# Patient Record
Sex: FEMALE | Race: WHITE | NOT HISPANIC OR LATINO | Employment: OTHER | URBAN - METROPOLITAN AREA
[De-identification: names, ages, dates, MRNs, and addresses within clinical notes are randomized per-mention and may not be internally consistent; named-entity substitution may affect disease eponyms.]

---

## 2017-03-13 ENCOUNTER — HISTORICAL (OUTPATIENT)
Dept: INTERNAL MEDICINE | Facility: CLINIC | Age: 62
End: 2017-03-13

## 2017-03-13 ENCOUNTER — HISTORICAL (OUTPATIENT)
Dept: CARDIOLOGY | Facility: HOSPITAL | Age: 62
End: 2017-03-13

## 2017-03-23 ENCOUNTER — HISTORICAL (OUTPATIENT)
Dept: INTERNAL MEDICINE | Facility: CLINIC | Age: 62
End: 2017-03-23

## 2017-04-11 ENCOUNTER — HISTORICAL (OUTPATIENT)
Dept: CARDIOLOGY | Facility: HOSPITAL | Age: 62
End: 2017-04-11

## 2017-04-18 ENCOUNTER — HISTORICAL (OUTPATIENT)
Dept: CARDIOLOGY | Facility: HOSPITAL | Age: 62
End: 2017-04-18

## 2017-06-08 ENCOUNTER — HISTORICAL (OUTPATIENT)
Dept: RESPIRATORY THERAPY | Facility: HOSPITAL | Age: 62
End: 2017-06-08

## 2017-07-27 ENCOUNTER — HISTORICAL (OUTPATIENT)
Dept: ADMINISTRATIVE | Facility: HOSPITAL | Age: 62
End: 2017-07-27

## 2017-07-27 LAB
ALBUMIN SERPL-MCNC: 3.2 GM/DL (ref 3.4–5)
ALBUMIN/GLOB SERPL: 1 RATIO (ref 1–2)
ALP SERPL-CCNC: 111 UNIT/L (ref 45–117)
ALT SERPL-CCNC: 20 UNIT/L (ref 12–78)
AST SERPL-CCNC: 13 UNIT/L (ref 15–37)
BILIRUB SERPL-MCNC: 0.5 MG/DL (ref 0.2–1)
BILIRUBIN DIRECT+TOT PNL SERPL-MCNC: 0.2 MG/DL
BILIRUBIN DIRECT+TOT PNL SERPL-MCNC: 0.3 MG/DL
BUN SERPL-MCNC: 10 MG/DL (ref 7–18)
CALCIUM SERPL-MCNC: 8.7 MG/DL (ref 8.5–10.1)
CHLORIDE SERPL-SCNC: 106 MMOL/L (ref 98–107)
CHOLEST SERPL-MCNC: 139 MG/DL
CHOLEST/HDLC SERPL: 2.6 {RATIO} (ref 0–4.4)
CO2 SERPL-SCNC: 30 MMOL/L (ref 21–32)
CREAT SERPL-MCNC: 0.7 MG/DL (ref 0.6–1.3)
GLOBULIN SER-MCNC: 3.3 GM/ML (ref 2.3–3.5)
GLUCOSE SERPL-MCNC: 94 MG/DL (ref 74–106)
HDLC SERPL-MCNC: 54 MG/DL
HIV 1+2 AB+HIV1 P24 AG SERPL QL IA: NONREACTIVE
LDLC SERPL CALC-MCNC: 64 MG/DL (ref 0–130)
POTASSIUM SERPL-SCNC: 4 MMOL/L (ref 3.5–5.1)
PROT SERPL-MCNC: 6.5 GM/DL (ref 6.4–8.2)
SODIUM SERPL-SCNC: 142 MMOL/L (ref 136–145)
TRIGL SERPL-MCNC: 104 MG/DL
VLDLC SERPL CALC-MCNC: 21 MG/DL

## 2018-02-14 ENCOUNTER — HISTORICAL (OUTPATIENT)
Dept: ADMINISTRATIVE | Facility: HOSPITAL | Age: 63
End: 2018-02-14

## 2018-02-14 LAB
ALBUMIN SERPL-MCNC: 3.6 GM/DL (ref 3.4–5)
ALBUMIN/GLOB SERPL: 1 RATIO (ref 1–2)
ALP SERPL-CCNC: 154 UNIT/L (ref 45–117)
ALT SERPL-CCNC: 25 UNIT/L (ref 12–78)
AST SERPL-CCNC: 15 UNIT/L (ref 15–37)
BILIRUB SERPL-MCNC: 0.3 MG/DL (ref 0.2–1)
BILIRUBIN DIRECT+TOT PNL SERPL-MCNC: 0.1 MG/DL
BILIRUBIN DIRECT+TOT PNL SERPL-MCNC: 0.2 MG/DL
BUN SERPL-MCNC: 12 MG/DL (ref 7–18)
CALCIUM SERPL-MCNC: 8.7 MG/DL (ref 8.5–10.1)
CHLORIDE SERPL-SCNC: 106 MMOL/L (ref 98–107)
CO2 SERPL-SCNC: 29 MMOL/L (ref 21–32)
CREAT SERPL-MCNC: 0.9 MG/DL (ref 0.6–1.3)
FOLATE SERPL-MCNC: 6.7 NG/ML (ref 3.1–17.5)
GLOBULIN SER-MCNC: 4 GM/ML (ref 2.3–3.5)
GLUCOSE SERPL-MCNC: 104 MG/DL (ref 74–106)
POTASSIUM SERPL-SCNC: 3.9 MMOL/L (ref 3.5–5.1)
PROT SERPL-MCNC: 7.6 GM/DL (ref 6.4–8.2)
SODIUM SERPL-SCNC: 140 MMOL/L (ref 136–145)
T4 FREE SERPL-MCNC: 1.39 NG/DL (ref 0.76–1.46)
TSH SERPL-ACNC: 0.08 MIU/L (ref 0.36–3.74)
VIT B12 SERPL-MCNC: 371 PG/ML (ref 193–986)

## 2018-03-13 ENCOUNTER — HISTORICAL (OUTPATIENT)
Dept: RADIOLOGY | Facility: HOSPITAL | Age: 63
End: 2018-03-13

## 2018-03-29 ENCOUNTER — HISTORICAL (OUTPATIENT)
Dept: INTERNAL MEDICINE | Facility: CLINIC | Age: 63
End: 2018-03-29

## 2018-05-03 ENCOUNTER — HISTORICAL (OUTPATIENT)
Dept: INTERNAL MEDICINE | Facility: CLINIC | Age: 63
End: 2018-05-03

## 2018-05-03 LAB
T4 FREE SERPL-MCNC: 0.94 NG/DL (ref 0.76–1.46)
TSH SERPL-ACNC: 0.42 MIU/L (ref 0.36–3.74)

## 2018-05-10 ENCOUNTER — HISTORICAL (OUTPATIENT)
Dept: ADMINISTRATIVE | Facility: HOSPITAL | Age: 63
End: 2018-05-10

## 2018-05-24 ENCOUNTER — HISTORICAL (OUTPATIENT)
Dept: INTERNAL MEDICINE | Facility: CLINIC | Age: 63
End: 2018-05-24

## 2018-06-05 ENCOUNTER — HISTORICAL (OUTPATIENT)
Dept: INTERNAL MEDICINE | Facility: CLINIC | Age: 63
End: 2018-06-05

## 2018-08-09 ENCOUNTER — HISTORICAL (OUTPATIENT)
Dept: ADMINISTRATIVE | Facility: HOSPITAL | Age: 63
End: 2018-08-09

## 2018-08-09 LAB
CHOLEST SERPL-MCNC: 211 MG/DL
CHOLEST/HDLC SERPL: 3.5 {RATIO} (ref 0–4.4)
HDLC SERPL-MCNC: 61 MG/DL
LDLC SERPL CALC-MCNC: 105 MG/DL (ref 0–130)
T3FREE SERPL-MCNC: 1.96 PG/ML (ref 2.18–3.98)
T4 FREE SERPL-MCNC: 0.67 NG/DL (ref 0.76–1.46)
TRIGL SERPL-MCNC: 224 MG/DL
TSH SERPL-ACNC: 14.8 MIU/L (ref 0.36–3.74)
VLDLC SERPL CALC-MCNC: 45 MG/DL

## 2018-08-28 ENCOUNTER — HISTORICAL (OUTPATIENT)
Dept: INTERNAL MEDICINE | Facility: CLINIC | Age: 63
End: 2018-08-28

## 2018-08-28 LAB
T3FREE SERPL-MCNC: 2.9 PG/ML (ref 2.18–3.98)
T4 FREE SERPL-MCNC: 1.1 NG/DL (ref 0.76–1.46)
TSH SERPL-ACNC: 2.13 MIU/L (ref 0.36–3.74)

## 2018-09-18 ENCOUNTER — HISTORICAL (OUTPATIENT)
Dept: ADMINISTRATIVE | Facility: HOSPITAL | Age: 63
End: 2018-09-18

## 2018-09-18 ENCOUNTER — HISTORICAL (OUTPATIENT)
Dept: RADIOLOGY | Facility: HOSPITAL | Age: 63
End: 2018-09-18

## 2018-09-18 LAB
BUN SERPL-MCNC: 9 MG/DL (ref 7–18)
CALCIUM SERPL-MCNC: 8.8 MG/DL (ref 8.5–10.1)
CHLORIDE SERPL-SCNC: 104 MMOL/L (ref 98–107)
CO2 SERPL-SCNC: 30 MMOL/L (ref 21–32)
CREAT SERPL-MCNC: 0.8 MG/DL (ref 0.6–1.3)
CREAT/UREA NIT SERPL: 11
GLUCOSE SERPL-MCNC: 107 MG/DL (ref 74–106)
POTASSIUM SERPL-SCNC: 4 MMOL/L (ref 3.5–5.1)
SODIUM SERPL-SCNC: 139 MMOL/L (ref 136–145)

## 2018-11-06 ENCOUNTER — HISTORICAL (OUTPATIENT)
Dept: RADIOLOGY | Facility: HOSPITAL | Age: 63
End: 2018-11-06

## 2018-11-06 ENCOUNTER — HISTORICAL (OUTPATIENT)
Dept: ADMINISTRATIVE | Facility: HOSPITAL | Age: 63
End: 2018-11-06

## 2018-11-13 ENCOUNTER — HISTORICAL (OUTPATIENT)
Dept: RADIOLOGY | Facility: HOSPITAL | Age: 63
End: 2018-11-13

## 2018-12-29 ENCOUNTER — HISTORICAL (OUTPATIENT)
Dept: ADMINISTRATIVE | Facility: HOSPITAL | Age: 63
End: 2018-12-29

## 2018-12-31 LAB — FINAL CULTURE: NORMAL

## 2019-02-12 ENCOUNTER — HISTORICAL (OUTPATIENT)
Dept: RADIOLOGY | Facility: HOSPITAL | Age: 64
End: 2019-02-12

## 2019-02-12 LAB
BUN SERPL-MCNC: 11 MG/DL (ref 7–18)
CREAT SERPL-MCNC: 0.9 MG/DL (ref 0.6–1.3)

## 2019-03-14 ENCOUNTER — HISTORICAL (OUTPATIENT)
Dept: ADMINISTRATIVE | Facility: HOSPITAL | Age: 64
End: 2019-03-14

## 2019-03-14 LAB
ABS NEUT (OLG): 4.9 X10(3)/MCL (ref 2.1–9.2)
ALBUMIN SERPL-MCNC: 3.9 GM/DL (ref 3.4–5)
ALBUMIN/GLOB SERPL: 1.1 RATIO (ref 1.1–2)
ALP SERPL-CCNC: 110 UNIT/L (ref 45–117)
ALT SERPL-CCNC: 26 UNIT/L (ref 12–78)
APPEARANCE, UA: CLEAR
AST SERPL-CCNC: 19 UNIT/L (ref 15–37)
BACTERIA #/AREA URNS AUTO: ABNORMAL /[HPF]
BASOPHILS # BLD AUTO: 0.05 X10(3)/MCL
BASOPHILS NFR BLD AUTO: 1 %
BILIRUB SERPL-MCNC: 0.6 MG/DL (ref 0.2–1)
BILIRUB UR QL STRIP: NEGATIVE
BILIRUBIN DIRECT+TOT PNL SERPL-MCNC: 0.2 MG/DL
BILIRUBIN DIRECT+TOT PNL SERPL-MCNC: 0.4 MG/DL
BUN SERPL-MCNC: 12 MG/DL (ref 7–18)
CALCIUM SERPL-MCNC: 9.2 MG/DL (ref 8.5–10.1)
CHLORIDE SERPL-SCNC: 104 MMOL/L (ref 98–107)
CHOLEST SERPL-MCNC: 212 MG/DL
CHOLEST/HDLC SERPL: 3.7 {RATIO} (ref 0–4.4)
CO2 SERPL-SCNC: 30 MMOL/L (ref 21–32)
COLOR UR: NORMAL
CREAT SERPL-MCNC: 0.9 MG/DL (ref 0.6–1.3)
CRP SERPL HS-MCNC: 1.53 MG/L (ref 0–3)
EOSINOPHIL # BLD AUTO: 0.22 10*3/UL
EOSINOPHIL NFR BLD AUTO: 3 %
ERYTHROCYTE [DISTWIDTH] IN BLOOD BY AUTOMATED COUNT: 12.8 % (ref 11.5–14.5)
ERYTHROCYTE [SEDIMENTATION RATE] IN BLOOD: 8 MM/HR (ref 0–20)
GLOBULIN SER-MCNC: 3.7 GM/ML (ref 2.3–3.5)
GLUCOSE (UA): NORMAL
GLUCOSE SERPL-MCNC: 97 MG/DL (ref 74–106)
HCT VFR BLD AUTO: 45.9 % (ref 35–46)
HDLC SERPL-MCNC: 57 MG/DL
HGB BLD-MCNC: 14.9 GM/DL (ref 12–16)
HGB UR QL STRIP: 0.2 MG/DL
HYALINE CASTS #/AREA URNS LPF: ABNORMAL /[LPF]
IMM GRANULOCYTES # BLD AUTO: 0.02 10*3/UL
IMM GRANULOCYTES NFR BLD AUTO: 0 %
KETONES UR QL STRIP: NEGATIVE
LDLC SERPL CALC-MCNC: 126 MG/DL (ref 0–130)
LEUKOCYTE ESTERASE UR QL STRIP: NEGATIVE
LYMPHOCYTES # BLD AUTO: 1.22 X10(3)/MCL
LYMPHOCYTES NFR BLD AUTO: 18 % (ref 13–40)
MCH RBC QN AUTO: 29.7 PG (ref 26–34)
MCHC RBC AUTO-ENTMCNC: 32.5 GM/DL (ref 31–37)
MCV RBC AUTO: 91.6 FL (ref 80–100)
MONOCYTES # BLD AUTO: 0.41 X10(3)/MCL
MONOCYTES NFR BLD AUTO: 6 % (ref 4–12)
NEUTROPHILS # BLD AUTO: 4.9 X10(3)/MCL
NEUTROPHILS NFR BLD AUTO: 72 X10(3)/MCL
NITRITE UR QL STRIP: NEGATIVE
PH UR STRIP: 5 [PH] (ref 4.5–8)
PLATELET # BLD AUTO: 161 X10(3)/MCL (ref 130–400)
PMV BLD AUTO: 11.6 FL (ref 7.4–10.4)
POTASSIUM SERPL-SCNC: 4 MMOL/L (ref 3.5–5.1)
PROT SERPL-MCNC: 7.6 GM/DL (ref 6.4–8.2)
PROT UR QL STRIP: NEGATIVE
RBC # BLD AUTO: 5.01 X10(6)/MCL (ref 4–5.2)
RBC #/AREA URNS AUTO: ABNORMAL /[HPF]
SODIUM SERPL-SCNC: 137 MMOL/L (ref 136–145)
SP GR UR STRIP: 1.01 (ref 1–1.03)
SQUAMOUS #/AREA URNS LPF: ABNORMAL /[LPF]
TRIGL SERPL-MCNC: 144 MG/DL
UROBILINOGEN UR STRIP-ACNC: NORMAL
VLDLC SERPL CALC-MCNC: 29 MG/DL
WBC # SPEC AUTO: 6.8 X10(3)/MCL (ref 4.5–11)
WBC #/AREA URNS AUTO: ABNORMAL /HPF

## 2019-03-26 ENCOUNTER — HISTORICAL (OUTPATIENT)
Dept: RADIOLOGY | Facility: HOSPITAL | Age: 64
End: 2019-03-26

## 2019-04-04 ENCOUNTER — HISTORICAL (OUTPATIENT)
Dept: RADIOLOGY | Facility: HOSPITAL | Age: 64
End: 2019-04-04

## 2019-08-29 ENCOUNTER — HISTORICAL (OUTPATIENT)
Dept: INTERNAL MEDICINE | Facility: CLINIC | Age: 64
End: 2019-08-29

## 2019-08-29 LAB
ABS NEUT (OLG): 4.4 X10(3)/MCL (ref 2.1–9.2)
ALBUMIN SERPL-MCNC: 3.6 GM/DL (ref 3.4–5)
ALBUMIN/GLOB SERPL: 1 RATIO (ref 1.1–2)
ALP SERPL-CCNC: 104 UNIT/L (ref 45–117)
ALT SERPL-CCNC: 26 UNIT/L (ref 12–78)
AST SERPL-CCNC: 16 UNIT/L (ref 15–37)
BASOPHILS # BLD AUTO: 0 X10(3)/MCL (ref 0–0.2)
BASOPHILS NFR BLD AUTO: 1 %
BILIRUB SERPL-MCNC: 0.4 MG/DL (ref 0.2–1)
BILIRUBIN DIRECT+TOT PNL SERPL-MCNC: 0.1 MG/DL
BILIRUBIN DIRECT+TOT PNL SERPL-MCNC: 0.3 MG/DL
BUN SERPL-MCNC: 10 MG/DL (ref 7–18)
CALCIUM SERPL-MCNC: 9.1 MG/DL (ref 8.5–10.1)
CHLORIDE SERPL-SCNC: 104 MMOL/L (ref 98–107)
CHOLEST SERPL-MCNC: 187 MG/DL
CHOLEST/HDLC SERPL: 3.5 {RATIO} (ref 0–4.4)
CO2 SERPL-SCNC: 31 MMOL/L (ref 21–32)
CREAT SERPL-MCNC: 0.9 MG/DL (ref 0.6–1.3)
DEPRECATED CALCIDIOL+CALCIFEROL SERPL-MC: 29.4 NG/ML (ref 30–80)
EOSINOPHIL # BLD AUTO: 0.2 X10(3)/MCL (ref 0–0.9)
EOSINOPHIL NFR BLD AUTO: 3 %
ERYTHROCYTE [DISTWIDTH] IN BLOOD BY AUTOMATED COUNT: 12.8 % (ref 11.5–14.5)
EST. AVERAGE GLUCOSE BLD GHB EST-MCNC: 126 MG/DL
GLOBULIN SER-MCNC: 3.6 GM/ML (ref 2.3–3.5)
GLUCOSE SERPL-MCNC: 105 MG/DL (ref 74–106)
HBA1C MFR BLD: 6 % (ref 4.2–6.3)
HCT VFR BLD AUTO: 45.1 % (ref 35–46)
HDLC SERPL-MCNC: 54 MG/DL
HGB BLD-MCNC: 14.6 GM/DL (ref 12–16)
IMM GRANULOCYTES # BLD AUTO: 0.01 10*3/UL
IMM GRANULOCYTES NFR BLD AUTO: 0 %
LDLC SERPL CALC-MCNC: 116 MG/DL (ref 0–130)
LYMPHOCYTES # BLD AUTO: 1.2 X10(3)/MCL (ref 0.6–4.6)
LYMPHOCYTES NFR BLD AUTO: 19 %
MCH RBC QN AUTO: 30 PG (ref 26–34)
MCHC RBC AUTO-ENTMCNC: 32.4 GM/DL (ref 31–37)
MCV RBC AUTO: 92.8 FL (ref 80–100)
MONOCYTES # BLD AUTO: 0.5 X10(3)/MCL (ref 0.1–1.3)
MONOCYTES NFR BLD AUTO: 7 %
NEUTROPHILS # BLD AUTO: 4.4 X10(3)/MCL (ref 2.1–9.2)
NEUTROPHILS NFR BLD AUTO: 70 %
PLATELET # BLD AUTO: 155 X10(3)/MCL (ref 130–400)
PMV BLD AUTO: 11.2 FL (ref 7.4–10.4)
POTASSIUM SERPL-SCNC: 4.4 MMOL/L (ref 3.5–5.1)
PROT SERPL-MCNC: 7.2 GM/DL (ref 6.4–8.2)
RBC # BLD AUTO: 4.86 X10(6)/MCL (ref 4–5.2)
SODIUM SERPL-SCNC: 139 MMOL/L (ref 136–145)
T PALLIDUM AB SER QL: NONREACTIVE
T3FREE SERPL-MCNC: 2.3 PG/ML (ref 2.18–3.98)
T4 FREE SERPL-MCNC: 0.96 NG/DL (ref 0.76–1.46)
T4 SERPL-MCNC: 10.2 MCG/DL (ref 4.8–13.9)
TRIGL SERPL-MCNC: 84 MG/DL
TSH SERPL-ACNC: 8.17 MIU/L (ref 0.36–3.74)
VIT B12 SERPL-MCNC: 400 PG/ML (ref 193–986)
VLDLC SERPL CALC-MCNC: 17 MG/DL
WBC # SPEC AUTO: 6.3 X10(3)/MCL (ref 4.5–11)

## 2019-10-24 ENCOUNTER — HISTORICAL (OUTPATIENT)
Dept: ADMINISTRATIVE | Facility: HOSPITAL | Age: 64
End: 2019-10-24

## 2019-11-05 ENCOUNTER — HISTORICAL (OUTPATIENT)
Dept: RADIOLOGY | Facility: HOSPITAL | Age: 64
End: 2019-11-05

## 2019-11-05 ENCOUNTER — HISTORICAL (OUTPATIENT)
Dept: ADMINISTRATIVE | Facility: HOSPITAL | Age: 64
End: 2019-11-05

## 2019-11-05 LAB
BUN SERPL-MCNC: 9 MG/DL (ref 7–18)
CALCIUM SERPL-MCNC: 8.9 MG/DL (ref 8.5–10.1)
CHLORIDE SERPL-SCNC: 104 MMOL/L (ref 98–107)
CO2 SERPL-SCNC: 28 MMOL/L (ref 21–32)
CREAT SERPL-MCNC: 1 MG/DL (ref 0.6–1.3)
CREAT/UREA NIT SERPL: 9
GLUCOSE SERPL-MCNC: 205 MG/DL (ref 74–106)
POTASSIUM SERPL-SCNC: 3.9 MMOL/L (ref 3.5–5.1)
SODIUM SERPL-SCNC: 138 MMOL/L (ref 136–145)

## 2020-02-18 ENCOUNTER — HISTORICAL (OUTPATIENT)
Dept: ADMINISTRATIVE | Facility: HOSPITAL | Age: 65
End: 2020-02-18

## 2020-02-18 LAB
ABS NEUT (OLG): 4.78 X10(3)/MCL (ref 2.1–9.2)
APTT PPP: 28.9 SECOND(S) (ref 23.3–37)
BASOPHILS # BLD AUTO: 0.1 X10(3)/MCL (ref 0–0.2)
BASOPHILS NFR BLD AUTO: 1 %
BUN SERPL-MCNC: 14 MG/DL (ref 7–18)
CALCIUM SERPL-MCNC: 9 MG/DL (ref 8.5–10.1)
CHLORIDE SERPL-SCNC: 104 MMOL/L (ref 98–107)
CO2 SERPL-SCNC: 31 MMOL/L (ref 21–32)
CREAT SERPL-MCNC: 0.9 MG/DL (ref 0.6–1.3)
CREAT/UREA NIT SERPL: 16
EOSINOPHIL # BLD AUTO: 0.2 X10(3)/MCL (ref 0–0.9)
EOSINOPHIL NFR BLD AUTO: 3 %
ERYTHROCYTE [DISTWIDTH] IN BLOOD BY AUTOMATED COUNT: 13.1 % (ref 11.5–14.5)
GLUCOSE SERPL-MCNC: 110 MG/DL (ref 74–106)
HCT VFR BLD AUTO: 42.9 % (ref 35–46)
HGB BLD-MCNC: 13.8 GM/DL (ref 12–16)
IMM GRANULOCYTES # BLD AUTO: 0.03 10*3/UL
IMM GRANULOCYTES NFR BLD AUTO: 0 %
INR PPP: 0.9 (ref 0.9–1.2)
LYMPHOCYTES # BLD AUTO: 1.4 X10(3)/MCL (ref 0.6–4.6)
LYMPHOCYTES NFR BLD AUTO: 20 %
MCH RBC QN AUTO: 30.1 PG (ref 26–34)
MCHC RBC AUTO-ENTMCNC: 32.2 GM/DL (ref 31–37)
MCV RBC AUTO: 93.7 FL (ref 80–100)
MONOCYTES # BLD AUTO: 0.5 X10(3)/MCL (ref 0.1–1.3)
MONOCYTES NFR BLD AUTO: 7 %
NEUTROPHILS # BLD AUTO: 4.78 X10(3)/MCL (ref 2.1–9.2)
NEUTROPHILS NFR BLD AUTO: 69 %
PLATELET # BLD AUTO: 182 X10(3)/MCL (ref 130–400)
PMV BLD AUTO: 12.1 FL (ref 7.4–10.4)
POTASSIUM SERPL-SCNC: 4.1 MMOL/L (ref 3.5–5.1)
PROTHROMBIN TIME: 12 SECOND(S) (ref 11.9–14.4)
RBC # BLD AUTO: 4.58 X10(6)/MCL (ref 4–5.2)
SODIUM SERPL-SCNC: 137 MMOL/L (ref 136–145)
WBC # SPEC AUTO: 7 X10(3)/MCL (ref 4.5–11)

## 2020-02-26 ENCOUNTER — HISTORICAL (OUTPATIENT)
Dept: CARDIOLOGY | Facility: HOSPITAL | Age: 65
End: 2020-02-26

## 2020-07-28 ENCOUNTER — HISTORICAL (OUTPATIENT)
Dept: ADMINISTRATIVE | Facility: HOSPITAL | Age: 65
End: 2020-07-28

## 2020-07-30 LAB — FINAL CULTURE: NORMAL

## 2020-09-10 ENCOUNTER — HISTORICAL (OUTPATIENT)
Dept: RADIOLOGY | Facility: HOSPITAL | Age: 65
End: 2020-09-10

## 2020-09-23 ENCOUNTER — HISTORICAL (OUTPATIENT)
Dept: ADMINISTRATIVE | Facility: HOSPITAL | Age: 65
End: 2020-09-23

## 2020-09-23 LAB
ABS NEUT (OLG): 4.13 X10(3)/MCL (ref 2.1–9.2)
ALBUMIN SERPL-MCNC: 3.6 GM/DL (ref 3.4–5)
ALBUMIN/GLOB SERPL: 1.1 RATIO (ref 1.1–2)
ALP SERPL-CCNC: 111 UNIT/L (ref 45–117)
ALT SERPL-CCNC: 32 UNIT/L (ref 12–78)
APPEARANCE, UA: ABNORMAL
AST SERPL-CCNC: 21 UNIT/L (ref 15–37)
BACTERIA #/AREA URNS AUTO: ABNORMAL /HPF
BASOPHILS # BLD AUTO: 0 X10(3)/MCL (ref 0–0.2)
BASOPHILS NFR BLD AUTO: 1 %
BILIRUB SERPL-MCNC: 0.3 MG/DL (ref 0.2–1)
BILIRUB UR QL STRIP: NEGATIVE
BILIRUBIN DIRECT+TOT PNL SERPL-MCNC: 0.1 MG/DL (ref 0–0.2)
BILIRUBIN DIRECT+TOT PNL SERPL-MCNC: 0.2 MG/DL
BUN SERPL-MCNC: 14 MG/DL (ref 7–18)
CALCIUM SERPL-MCNC: 8.2 MG/DL (ref 8.5–10.1)
CHLORIDE SERPL-SCNC: 106 MMOL/L (ref 98–107)
CHOLEST SERPL-MCNC: 199 MG/DL
CHOLEST/HDLC SERPL: 3.9 {RATIO} (ref 0–4.4)
CO2 SERPL-SCNC: 29 MMOL/L (ref 21–32)
COLOR UR: YELLOW
CREAT SERPL-MCNC: 1 MG/DL (ref 0.6–1.3)
DEPRECATED CALCIDIOL+CALCIFEROL SERPL-MC: 35.4 NG/ML (ref 30–80)
EOSINOPHIL # BLD AUTO: 0.3 X10(3)/MCL (ref 0–0.9)
EOSINOPHIL NFR BLD AUTO: 4 %
ERYTHROCYTE [DISTWIDTH] IN BLOOD BY AUTOMATED COUNT: 12.7 % (ref 11.5–14.5)
EST. AVERAGE GLUCOSE BLD GHB EST-MCNC: 131 MG/DL
GLOBULIN SER-MCNC: 3.4 GM/ML (ref 2.3–3.5)
GLUCOSE (UA): NEGATIVE
GLUCOSE SERPL-MCNC: 87 MG/DL (ref 74–106)
HBA1C MFR BLD: 6.2 % (ref 4.2–6.3)
HCT VFR BLD AUTO: 40.8 % (ref 35–46)
HDLC SERPL-MCNC: 51 MG/DL (ref 40–59)
HGB BLD-MCNC: 13.3 GM/DL (ref 12–16)
HGB UR QL STRIP: 0.2
HYALINE CASTS #/AREA URNS LPF: ABNORMAL /LPF
IMM GRANULOCYTES # BLD AUTO: 0.02 10*3/UL
IMM GRANULOCYTES NFR BLD AUTO: 0 %
KETONES UR QL STRIP: NEGATIVE
LDLC SERPL CALC-MCNC: 98 MG/DL
LEUKOCYTE ESTERASE UR QL STRIP: NEGATIVE
LYMPHOCYTES # BLD AUTO: 1.3 X10(3)/MCL (ref 0.6–4.6)
LYMPHOCYTES NFR BLD AUTO: 21 %
MCH RBC QN AUTO: 30.4 PG (ref 26–34)
MCHC RBC AUTO-ENTMCNC: 32.6 GM/DL (ref 31–37)
MCV RBC AUTO: 93.2 FL (ref 80–100)
MONOCYTES # BLD AUTO: 0.4 X10(3)/MCL (ref 0.1–1.3)
MONOCYTES NFR BLD AUTO: 7 %
NEUTROPHILS # BLD AUTO: 4.13 X10(3)/MCL (ref 2.1–9.2)
NEUTROPHILS NFR BLD AUTO: 67 %
NITRITE UR QL STRIP: NEGATIVE
PH UR STRIP: 7 [PH] (ref 4.5–8)
PLATELET # BLD AUTO: 180 X10(3)/MCL (ref 130–400)
PMV BLD AUTO: 11.6 FL (ref 7.4–10.4)
POTASSIUM SERPL-SCNC: 4.2 MMOL/L (ref 3.5–5.1)
PROT SERPL-MCNC: 7 GM/DL (ref 6.4–8.2)
PROT UR QL STRIP: 20 MG/DL
RBC # BLD AUTO: 4.38 X10(6)/MCL (ref 4–5.2)
RBC #/AREA URNS AUTO: ABNORMAL /HPF
SODIUM SERPL-SCNC: 139 MMOL/L (ref 136–145)
SP GR UR STRIP: 1.02 (ref 1–1.03)
SQUAMOUS #/AREA URNS LPF: >100 /LPF
T4 FREE SERPL-MCNC: 1.27 NG/DL (ref 0.76–1.46)
TRIGL SERPL-MCNC: 248 MG/DL
TSH SERPL-ACNC: 2.95 MIU/L (ref 0.36–3.74)
UROBILINOGEN UR STRIP-ACNC: 3 MG/DL
VLDLC SERPL CALC-MCNC: 50 MG/DL
WBC # SPEC AUTO: 6.2 X10(3)/MCL (ref 4.5–11)
WBC #/AREA URNS AUTO: ABNORMAL /HPF

## 2020-10-20 ENCOUNTER — HISTORICAL (OUTPATIENT)
Dept: RADIOLOGY | Facility: HOSPITAL | Age: 65
End: 2020-10-20

## 2020-12-03 ENCOUNTER — HISTORICAL (OUTPATIENT)
Dept: CARDIOLOGY | Facility: CLINIC | Age: 65
End: 2020-12-03

## 2020-12-03 LAB
BUN SERPL-MCNC: 9 MG/DL (ref 9.8–20.1)
CALCIUM SERPL-MCNC: 9 MG/DL (ref 8.4–10.2)
CHLORIDE SERPL-SCNC: 100 MMOL/L (ref 98–107)
CO2 SERPL-SCNC: 29 MMOL/L (ref 23–31)
CREAT SERPL-MCNC: 0.76 MG/DL (ref 0.55–1.02)
CREAT/UREA NIT SERPL: 12
GLUCOSE SERPL-MCNC: 83 MG/DL (ref 82–115)
POTASSIUM SERPL-SCNC: 4 MMOL/L (ref 3.5–5.1)
SODIUM SERPL-SCNC: 135 MMOL/L (ref 136–145)

## 2021-01-05 ENCOUNTER — HISTORICAL (OUTPATIENT)
Dept: RADIOLOGY | Facility: HOSPITAL | Age: 66
End: 2021-01-05

## 2021-02-11 ENCOUNTER — HISTORICAL (OUTPATIENT)
Dept: RADIOLOGY | Facility: HOSPITAL | Age: 66
End: 2021-02-11

## 2021-02-11 ENCOUNTER — HISTORICAL (OUTPATIENT)
Dept: ADMINISTRATIVE | Facility: HOSPITAL | Age: 66
End: 2021-02-11

## 2021-02-11 LAB — CREAT SERPL-MCNC: 0.81 MG/DL (ref 0.55–1.02)

## 2021-05-20 ENCOUNTER — HOSPITAL ENCOUNTER (OUTPATIENT)
Dept: MEDSURG UNIT | Facility: HOSPITAL | Age: 66
End: 2021-05-22
Attending: INTERNAL MEDICINE | Admitting: INTERNAL MEDICINE

## 2021-05-20 LAB
ABS NEUT (OLG): 4.7 X10(3)/MCL (ref 2.1–9.2)
ALBUMIN SERPL-MCNC: 3.8 GM/DL (ref 3.4–4.8)
ALBUMIN/GLOB SERPL: 1.2 RATIO (ref 1.1–2)
ALP SERPL-CCNC: 106 UNIT/L (ref 40–150)
ALT SERPL-CCNC: 17 UNIT/L (ref 0–55)
APPEARANCE, UA: CLEAR
AST SERPL-CCNC: 15 UNIT/L (ref 5–34)
BACTERIA #/AREA URNS AUTO: ABNORMAL /HPF
BASOPHILS # BLD AUTO: 0 X10(3)/MCL (ref 0–0.2)
BASOPHILS NFR BLD AUTO: 1 %
BILIRUB SERPL-MCNC: 0.7 MG/DL
BILIRUB UR QL STRIP: NEGATIVE
BILIRUBIN DIRECT+TOT PNL SERPL-MCNC: 0.3 MG/DL (ref 0–0.5)
BILIRUBIN DIRECT+TOT PNL SERPL-MCNC: 0.4 MG/DL (ref 0–0.8)
BUN SERPL-MCNC: 7.4 MG/DL (ref 9.8–20.1)
CALCIUM SERPL-MCNC: 9.8 MG/DL (ref 8.4–10.2)
CHLORIDE SERPL-SCNC: 102 MMOL/L (ref 98–107)
CO2 SERPL-SCNC: 28 MMOL/L (ref 23–31)
COLOR UR: COLORLESS
CREAT SERPL-MCNC: 0.85 MG/DL (ref 0.55–1.02)
EOSINOPHIL # BLD AUTO: 0.1 X10(3)/MCL (ref 0–0.9)
EOSINOPHIL NFR BLD AUTO: 2 %
ERYTHROCYTE [DISTWIDTH] IN BLOOD BY AUTOMATED COUNT: 12.7 % (ref 11.5–14.5)
GLOBULIN SER-MCNC: 3.3 GM/DL (ref 2.4–3.5)
GLUCOSE (UA): NEGATIVE
GLUCOSE SERPL-MCNC: 108 MG/DL (ref 82–115)
HCT VFR BLD AUTO: 44.1 % (ref 35–46)
HGB BLD-MCNC: 14.3 GM/DL (ref 12–16)
HGB UR QL STRIP: 0.06 MG/DL
HYALINE CASTS #/AREA URNS LPF: ABNORMAL /LPF
IMM GRANULOCYTES # BLD AUTO: 0.01 10*3/UL
IMM GRANULOCYTES NFR BLD AUTO: 0 %
KETONES UR QL STRIP: NEGATIVE
LEUKOCYTE ESTERASE UR QL STRIP: NEGATIVE
LIPASE SERPL-CCNC: 12 U/L
LYMPHOCYTES # BLD AUTO: 1.2 X10(3)/MCL (ref 0.6–4.6)
LYMPHOCYTES NFR BLD AUTO: 18 %
MAGNESIUM SERPL-MCNC: 2 MG/DL (ref 1.6–2.6)
MCH RBC QN AUTO: 30.2 PG (ref 26–34)
MCHC RBC AUTO-ENTMCNC: 32.4 GM/DL (ref 31–37)
MCV RBC AUTO: 93.2 FL (ref 80–100)
MONOCYTES # BLD AUTO: 0.4 X10(3)/MCL (ref 0.1–1.3)
MONOCYTES NFR BLD AUTO: 7 %
NEUTROPHILS # BLD AUTO: 4.7 X10(3)/MCL (ref 2.1–9.2)
NEUTROPHILS NFR BLD AUTO: 72 %
NITRITE UR QL STRIP: NEGATIVE
PH UR STRIP: 6 [PH] (ref 4.5–8)
PHOSPHATE SERPL-MCNC: 3.1 MG/DL (ref 2.3–4.7)
PLATELET # BLD AUTO: 169 X10(3)/MCL (ref 130–400)
PMV BLD AUTO: 11.3 FL (ref 7.4–10.4)
POTASSIUM SERPL-SCNC: 4 MMOL/L (ref 3.5–5.1)
PROT SERPL-MCNC: 7.1 GM/DL (ref 5.8–7.6)
PROT UR QL STRIP: NEGATIVE
RBC # BLD AUTO: 4.73 X10(6)/MCL (ref 4–5.2)
RBC #/AREA URNS AUTO: ABNORMAL /HPF
SARS-COV-2 AG RESP QL IA.RAPID: NEGATIVE
SODIUM SERPL-SCNC: 137 MMOL/L (ref 136–145)
SP GR UR STRIP: 1 (ref 1–1.03)
SQUAMOUS #/AREA URNS LPF: ABNORMAL /LPF
TROPONIN I SERPL-MCNC: 0.06 NG/ML (ref 0–0.04)
TROPONIN I SERPL-MCNC: <0.01 NG/ML (ref 0–0.04)
TROPONIN I SERPL-MCNC: <0.01 NG/ML (ref 0–0.04)
UROBILINOGEN UR STRIP-ACNC: NORMAL
WBC # SPEC AUTO: 6.6 X10(3)/MCL (ref 4.5–11)
WBC #/AREA URNS AUTO: ABNORMAL /HPF

## 2021-05-21 LAB
ABS NEUT (OLG): 4.66 X10(3)/MCL (ref 2.1–9.2)
ALBUMIN SERPL-MCNC: 3.3 GM/DL (ref 3.4–4.8)
ALBUMIN/GLOB SERPL: 1.1 RATIO (ref 1.1–2)
ALP SERPL-CCNC: 96 UNIT/L (ref 40–150)
ALT SERPL-CCNC: 17 UNIT/L (ref 0–55)
AST SERPL-CCNC: 16 UNIT/L (ref 5–34)
BASOPHILS # BLD AUTO: 0 X10(3)/MCL (ref 0–0.2)
BASOPHILS NFR BLD AUTO: 1 %
BILIRUB SERPL-MCNC: 0.6 MG/DL
BILIRUBIN DIRECT+TOT PNL SERPL-MCNC: 0.2 MG/DL (ref 0–0.5)
BILIRUBIN DIRECT+TOT PNL SERPL-MCNC: 0.4 MG/DL (ref 0–0.8)
BUN SERPL-MCNC: 11.8 MG/DL (ref 9.8–20.1)
CALCIUM SERPL-MCNC: 9.4 MG/DL (ref 8.4–10.2)
CHLORIDE SERPL-SCNC: 102 MMOL/L (ref 98–107)
CHOLEST SERPL-MCNC: 187 MG/DL
CHOLEST/HDLC SERPL: 4 {RATIO} (ref 0–5)
CO2 SERPL-SCNC: 28 MMOL/L (ref 23–31)
CREAT SERPL-MCNC: 0.81 MG/DL (ref 0.55–1.02)
EOSINOPHIL # BLD AUTO: 0.2 X10(3)/MCL (ref 0–0.9)
EOSINOPHIL NFR BLD AUTO: 3 %
ERYTHROCYTE [DISTWIDTH] IN BLOOD BY AUTOMATED COUNT: 12.6 % (ref 11.5–14.5)
GLOBULIN SER-MCNC: 3.1 GM/DL (ref 2.4–3.5)
GLUCOSE SERPL-MCNC: 116 MG/DL (ref 82–115)
HCT VFR BLD AUTO: 42.2 % (ref 35–46)
HDLC SERPL-MCNC: 42 MG/DL (ref 35–60)
HGB BLD-MCNC: 13.6 GM/DL (ref 12–16)
IMM GRANULOCYTES # BLD AUTO: 0.01 10*3/UL
IMM GRANULOCYTES NFR BLD AUTO: 0 %
LDLC SERPL CALC-MCNC: 124 MG/DL (ref 50–140)
LYMPHOCYTES # BLD AUTO: 0.8 X10(3)/MCL (ref 0.6–4.6)
LYMPHOCYTES NFR BLD AUTO: 13 %
MCH RBC QN AUTO: 29.9 PG (ref 26–34)
MCHC RBC AUTO-ENTMCNC: 32.2 GM/DL (ref 31–37)
MCV RBC AUTO: 92.7 FL (ref 80–100)
MONOCYTES # BLD AUTO: 0.5 X10(3)/MCL (ref 0.1–1.3)
MONOCYTES NFR BLD AUTO: 7 %
NEUTROPHILS # BLD AUTO: 4.66 X10(3)/MCL (ref 2.1–9.2)
NEUTROPHILS NFR BLD AUTO: 75 %
PLATELET # BLD AUTO: 162 X10(3)/MCL (ref 130–400)
PMV BLD AUTO: 11.8 FL (ref 7.4–10.4)
POTASSIUM SERPL-SCNC: 4.4 MMOL/L (ref 3.5–5.1)
PROT SERPL-MCNC: 6.4 GM/DL (ref 5.8–7.6)
RBC # BLD AUTO: 4.55 X10(6)/MCL (ref 4–5.2)
SODIUM SERPL-SCNC: 137 MMOL/L (ref 136–145)
TRIGL SERPL-MCNC: 105 MG/DL (ref 37–140)
VLDLC SERPL CALC-MCNC: 21 MG/DL
WBC # SPEC AUTO: 6.2 X10(3)/MCL (ref 4.5–11)

## 2021-05-22 LAB
ABS NEUT (OLG): 4.08 X10(3)/MCL (ref 2.1–9.2)
ALBUMIN SERPL-MCNC: 3.5 GM/DL (ref 3.4–4.8)
ALBUMIN/GLOB SERPL: 1.2 RATIO (ref 1.1–2)
ALP SERPL-CCNC: 92 UNIT/L (ref 40–150)
ALT SERPL-CCNC: 15 UNIT/L (ref 0–55)
AST SERPL-CCNC: 15 UNIT/L (ref 5–34)
BASOPHILS # BLD AUTO: 0 X10(3)/MCL (ref 0–0.2)
BASOPHILS NFR BLD AUTO: 1 %
BILIRUB SERPL-MCNC: 1 MG/DL
BILIRUBIN DIRECT+TOT PNL SERPL-MCNC: 0.3 MG/DL (ref 0–0.5)
BILIRUBIN DIRECT+TOT PNL SERPL-MCNC: 0.7 MG/DL (ref 0–0.8)
BUN SERPL-MCNC: 10.7 MG/DL (ref 9.8–20.1)
CALCIUM SERPL-MCNC: 9.2 MG/DL (ref 8.4–10.2)
CHLORIDE SERPL-SCNC: 104 MMOL/L (ref 98–107)
CO2 SERPL-SCNC: 27 MMOL/L (ref 23–31)
CREAT SERPL-MCNC: 0.82 MG/DL (ref 0.55–1.02)
EOSINOPHIL # BLD AUTO: 0.1 X10(3)/MCL (ref 0–0.9)
EOSINOPHIL NFR BLD AUTO: 2 %
ERYTHROCYTE [DISTWIDTH] IN BLOOD BY AUTOMATED COUNT: 12.7 % (ref 11.5–14.5)
GLOBULIN SER-MCNC: 3 GM/DL (ref 2.4–3.5)
GLUCOSE SERPL-MCNC: 102 MG/DL (ref 82–115)
HCT VFR BLD AUTO: 44 % (ref 35–46)
HGB BLD-MCNC: 14.2 GM/DL (ref 12–16)
IMM GRANULOCYTES # BLD AUTO: 0.01 10*3/UL
IMM GRANULOCYTES NFR BLD AUTO: 0 %
LYMPHOCYTES # BLD AUTO: 1.3 X10(3)/MCL (ref 0.6–4.6)
LYMPHOCYTES NFR BLD AUTO: 22 %
MCH RBC QN AUTO: 29.8 PG (ref 26–34)
MCHC RBC AUTO-ENTMCNC: 32.3 GM/DL (ref 31–37)
MCV RBC AUTO: 92.4 FL (ref 80–100)
MONOCYTES # BLD AUTO: 0.5 X10(3)/MCL (ref 0.1–1.3)
MONOCYTES NFR BLD AUTO: 8 %
NEUTROPHILS # BLD AUTO: 4.08 X10(3)/MCL (ref 2.1–9.2)
NEUTROPHILS NFR BLD AUTO: 67 %
PLATELET # BLD AUTO: 157 X10(3)/MCL (ref 130–400)
PMV BLD AUTO: 11.8 FL (ref 7.4–10.4)
POTASSIUM SERPL-SCNC: 4.1 MMOL/L (ref 3.5–5.1)
PROT SERPL-MCNC: 6.5 GM/DL (ref 5.8–7.6)
RBC # BLD AUTO: 4.76 X10(6)/MCL (ref 4–5.2)
SODIUM SERPL-SCNC: 137 MMOL/L (ref 136–145)
WBC # SPEC AUTO: 6.1 X10(3)/MCL (ref 4.5–11)

## 2021-08-31 ENCOUNTER — HISTORICAL (OUTPATIENT)
Dept: ADMINISTRATIVE | Facility: HOSPITAL | Age: 66
End: 2021-08-31

## 2021-09-03 LAB — FINAL CULTURE: NO GROWTH

## 2021-09-23 ENCOUNTER — HISTORICAL (OUTPATIENT)
Dept: LAB | Facility: HOSPITAL | Age: 66
End: 2021-09-23

## 2021-09-23 LAB
ABS NEUT (OLG): 4.43 X10(3)/MCL (ref 2.1–9.2)
ALBUMIN SERPL-MCNC: 3.8 GM/DL (ref 3.4–4.8)
ALBUMIN/GLOB SERPL: 1.1 RATIO (ref 1.1–2)
ALP SERPL-CCNC: 115 UNIT/L (ref 40–150)
ALT SERPL-CCNC: 18 UNIT/L (ref 0–55)
AST SERPL-CCNC: 16 UNIT/L (ref 5–34)
BASOPHILS # BLD AUTO: 0 X10(3)/MCL (ref 0–0.2)
BASOPHILS NFR BLD AUTO: 1 %
BILIRUB SERPL-MCNC: 0.6 MG/DL
BILIRUBIN DIRECT+TOT PNL SERPL-MCNC: 0.2 MG/DL (ref 0–0.5)
BILIRUBIN DIRECT+TOT PNL SERPL-MCNC: 0.4 MG/DL (ref 0–0.8)
BUN SERPL-MCNC: 9.1 MG/DL (ref 9.8–20.1)
CALCIUM SERPL-MCNC: 9.4 MG/DL (ref 8.4–10.2)
CHLORIDE SERPL-SCNC: 100 MMOL/L (ref 98–107)
CHOLEST SERPL-MCNC: 214 MG/DL
CHOLEST/HDLC SERPL: 5 {RATIO} (ref 0–5)
CO2 SERPL-SCNC: 28 MMOL/L (ref 23–31)
CREAT SERPL-MCNC: 0.95 MG/DL (ref 0.55–1.02)
DEPRECATED CALCIDIOL+CALCIFEROL SERPL-MC: 35.3 NG/ML (ref 30–80)
EOSINOPHIL # BLD AUTO: 0.2 X10(3)/MCL (ref 0–0.9)
EOSINOPHIL NFR BLD AUTO: 2 %
ERYTHROCYTE [DISTWIDTH] IN BLOOD BY AUTOMATED COUNT: 13.2 % (ref 11.5–14.5)
EST. AVERAGE GLUCOSE BLD GHB EST-MCNC: 116.9 MG/DL
GLOBULIN SER-MCNC: 3.6 GM/DL (ref 2.4–3.5)
GLUCOSE SERPL-MCNC: 171 MG/DL (ref 82–115)
HAV IGM SERPL QL IA: ABNORMAL
HBA1C MFR BLD: 5.7 %
HBV CORE IGM SERPL QL IA: NONREACTIVE
HBV SURFACE AG SERPL QL IA: NONREACTIVE
HCT VFR BLD AUTO: 43.9 % (ref 35–46)
HCV AB SERPL QL IA: NONREACTIVE
HDLC SERPL-MCNC: 47 MG/DL (ref 35–60)
HGB BLD-MCNC: 14.1 GM/DL (ref 12–16)
HIV 1+2 AB+HIV1 P24 AG SERPL QL IA: NONREACTIVE
IMM GRANULOCYTES # BLD AUTO: 0.01 10*3/UL
IMM GRANULOCYTES NFR BLD AUTO: 0 %
LDLC SERPL CALC-MCNC: 138 MG/DL (ref 50–140)
LYMPHOCYTES # BLD AUTO: 1.1 X10(3)/MCL (ref 0.6–4.6)
LYMPHOCYTES NFR BLD AUTO: 18 %
MCH RBC QN AUTO: 29.4 PG (ref 26–34)
MCHC RBC AUTO-ENTMCNC: 32.1 GM/DL (ref 31–37)
MCV RBC AUTO: 91.5 FL (ref 80–100)
MONOCYTES # BLD AUTO: 0.3 X10(3)/MCL (ref 0.1–1.3)
MONOCYTES NFR BLD AUTO: 5 %
NEUTROPHILS # BLD AUTO: 4.43 X10(3)/MCL (ref 2.1–9.2)
NEUTROPHILS NFR BLD AUTO: 74 %
NRBC BLD AUTO-RTO: 0 % (ref 0–0.2)
PLATELET # BLD AUTO: 180 X10(3)/MCL (ref 130–400)
PMV BLD AUTO: 11.4 FL (ref 7.4–10.4)
POTASSIUM SERPL-SCNC: 3.8 MMOL/L (ref 3.5–5.1)
PROT SERPL-MCNC: 7.4 GM/DL (ref 5.8–7.6)
RBC # BLD AUTO: 4.8 X10(6)/MCL (ref 4–5.2)
SODIUM SERPL-SCNC: 136 MMOL/L (ref 136–145)
T4 FREE SERPL-MCNC: 1.02 NG/DL (ref 0.7–1.48)
TRIGL SERPL-MCNC: 143 MG/DL (ref 37–140)
TSH SERPL-ACNC: 0.83 UIU/ML (ref 0.35–4.94)
VLDLC SERPL CALC-MCNC: 29 MG/DL
WBC # SPEC AUTO: 6 X10(3)/MCL (ref 4.5–11)

## 2021-09-28 ENCOUNTER — HISTORICAL (OUTPATIENT)
Dept: RADIOLOGY | Facility: HOSPITAL | Age: 66
End: 2021-09-28

## 2021-09-30 ENCOUNTER — HISTORICAL (OUTPATIENT)
Dept: CARDIOLOGY | Facility: HOSPITAL | Age: 66
End: 2021-09-30

## 2021-10-12 ENCOUNTER — HISTORICAL (OUTPATIENT)
Dept: GASTROENTEROLOGY | Facility: CLINIC | Age: 66
End: 2021-10-12

## 2021-10-12 LAB — SARS-COV-2 AG RESP QL IA.RAPID: NEGATIVE

## 2021-10-14 ENCOUNTER — HISTORICAL (OUTPATIENT)
Dept: ENDOSCOPY | Facility: HOSPITAL | Age: 66
End: 2021-10-14

## 2021-10-14 LAB — CRC RECOMMENDATION EXT: NORMAL

## 2022-01-10 ENCOUNTER — HISTORICAL (OUTPATIENT)
Dept: ADMINISTRATIVE | Facility: HOSPITAL | Age: 67
End: 2022-01-10

## 2022-01-10 LAB
SARS-COV-2 RNA RESP QL NAA+PROBE: POSITIVE
STREP A PCR (OHS): NOT DETECTED

## 2022-02-08 ENCOUNTER — HISTORICAL (OUTPATIENT)
Dept: ADMINISTRATIVE | Facility: HOSPITAL | Age: 67
End: 2022-02-08

## 2022-03-10 ENCOUNTER — HISTORICAL (OUTPATIENT)
Dept: ADMINISTRATIVE | Facility: HOSPITAL | Age: 67
End: 2022-03-10

## 2022-03-10 ENCOUNTER — HISTORICAL (OUTPATIENT)
Dept: RADIOLOGY | Facility: HOSPITAL | Age: 67
End: 2022-03-10

## 2022-03-10 LAB — CREAT SERPL-MCNC: 0.83 MG/DL (ref 0.55–1.02)

## 2022-04-10 ENCOUNTER — HISTORICAL (OUTPATIENT)
Dept: ADMINISTRATIVE | Facility: HOSPITAL | Age: 67
End: 2022-04-10
Payer: MEDICARE

## 2022-04-26 VITALS
BODY MASS INDEX: 30.97 KG/M2 | WEIGHT: 192.69 LBS | HEIGHT: 66 IN | DIASTOLIC BLOOD PRESSURE: 83 MMHG | SYSTOLIC BLOOD PRESSURE: 172 MMHG | OXYGEN SATURATION: 99 %

## 2022-04-30 NOTE — ED PROVIDER NOTES
Patient:   Blanquita Montoya            MRN: [id number]            FIN: [id number]               Age:   65 years     Sex:  Female     :  1955   Associated Diagnoses:   Epigastric Pain; Pain in the chest   Author:   Beau Guzman MD      Basic Information   Time seen: Date & time 2021 10:38:00.   History source: Patient.   Arrival mode: Private vehicle.   Additional information: Chief Complaint from Nursing Triage Note : Chief Complaint   2021 9:49 CDT       Chief Complaint           pt reports to the ed c/o epigastric pain that radiates to upper chest and back (x)1 week. history of GERD. (+) nausea. also states otc meds aren't working. vss. 12 lead ekg obtained..  (Modified) .   Provider/Visit info:   Time Seen:  Beau Guzman MD / 2021 10:24  .   History of Present Illness   The patient presents with abdominal pain.  The onset was 1  weeks ago.  The course/duration of symptoms is constant.  The character of symptoms is sharp.  The degree at onset was moderate.  The Location of pain at onset was mid epigastric.  The degree at present is moderate.  The Location of pain at present is mid epigastric.  Radiating pain: sternal.  middle of the back. The exacerbating factor is none.  The relieving factor is none.  Therapy today: prescription medications.  Risk factors consist of hypertension and AAA.        Review of Systems   Respiratory symptoms:  Shortness of breath.   Cardiovascular symptoms:  Chest pain.   Gastrointestinal symptoms:  Abdominal pain, nausea.    Musculoskeletal symptoms:  Back pain.             Additional review of systems information: All other systems reviewed and otherwise negative.      Health Status   Allergies:    Allergic Reactions (Selected)  Severity Not Documented  Demerol HCl- Unknown.  TraMADol- Sweating..   Medications:  (Selected)   Prescriptions  Prescribed  Fish Oil 1200 mg oral capsule: 1,200 mg = 1 cap(s), Oral, Daily, # 90 cap(s), 2 Refill(s),  Pharmacy: [facility], 170, cm, Height/Length Dosing, 11/17/20 9:24:00 CST, 84.4, kg, Weight Dosing, 11/17/20 9:24:00 CST  Melatonin 3 mg oral tablet: 3 mg = 1 tab(s), Oral, Once a day (at bedtime), PRN PRN for insomnia, 1-2 tabs as needed for sleep at night, # 60 tab(s), 1 Refill(s), Pharmacy: [facility], 170, cm, Height/Length Dosing, 09/23/20 13:18:00 CDT, 85.7, kg, Weight Do...  NEBULIZER MACHINE AND TUBING: NEBULIZER MACHINE AND TUBING, See Instructions, USE Q4H PRN SOB, # 1 units, 0 Refill(s)  NIFEdipine (Eqv-Adalat CC) 60 mg oral tablet, extended release: 60 mg = 1 tab(s), Oral, Daily, # 90 tab(s), 2 Refill(s)  Nitrostat 0.4 mg sublingual tab: 0.4 mg = 1 tab(s), SL, q5min, PRN PRN for chest pain, # 25 tab(s), 5 Refill(s), Pharmacy: [facility], 170, cm, Height/Length Dosing, 09/23/20 13:18:00 CDT, 85.7, kg, Weight Dosing, 09/23/20 13:18:00 CDT  Pantoprazole 40 mg ORAL EC-Tablet: 40 mg = 1 tab(s), Oral, Daily, # 90 tab(s), 3 Refill(s), Pharmacy: [facility], 170, cm, Height/Length Dosing, 09/23/20 13:18:00 CDT, 85.7, kg, Weight Dosing, 09/23/20 13:18:00 CDT  aspirin 81 mg oral tablet, CHEWABLE: 81 mg = 1 tab(s), Oral, Daily, # 90 tab(s), 6 Refill(s), Pharmacy: [facility]  calcium carbonate 600 mg oral tablet: 1,200 mg = 2 tab(s), Oral, BID, # 60 tab(s), 2 Refill(s), Pharmacy: [facility], 170, cm, Height/Length Dosing, 03/11/21 8:13:00 CST, 85, kg, Weight Dosing, 03/11/21 8:13:00 CST  cholecalciferol 2000 intl units (50 mcg) oral tablet: 2,000 IntUnit = 1 tab(s), Oral, Daily, # 90 tab(s), 3 Refill(s), Pharmacy: [facility], 170, cm, Height/Length Dosing, 03/11/21 8:13:00 CST, 85, kg, Weight Dosing, 03/11/21 8:13:00 CST  levothyroxine 100 mcg (0.1 mg) oral tablet: 100 mcg = 1 tab(s), Oral, Daily, on a empty stomach., # 90 tab(s), 1 Refill(s), Pharmacy: [facility], 170, cm, Height/Length Dosing,  03/11/21 8:13:00 CST, 85, kg, Weight Dosing, 03/11/21 8:13:00 CST  losartan 50 mg oral tablet: 50 mg = 1 tab(s), Oral, Daily, # 90 tab(s), 3 Refill(s), Pharmacy: [facility], 170, cm, Height/Length Dosing, 03/18/21 11:08:00 CDT, 85, kg, Weight Dosing, 03/18/21 11:08:00 CDT  meloxicam 7.5 mg oral tablet: 7.5 mg = 1 tab(s), Oral, Daily, # 30 tab(s), 2 Refill(s), Pharmacy: [facility], 170, cm, Height/Length Dosing, 03/11/21 8:13:00 CST, 85, kg, Weight Dosing, 03/11/21 8:13:00 CST  simvastatin 20 mg oral tablet: 20 mg = 1 tab(s), Oral, Once a day (at bedtime), # 90 tab(s), 1 Refill(s), Pharmacy: [facility], 170, cm, Height/Length Dosing, 03/11/21 8:13:00 CST, 85, kg, Weight Dosing, 03/11/21 8:13:00 CST  triamcinolone 0.5% topical cream: 1 carla, TOP, BID, # 20 gm, 2 Refill(s), Pharmacy: [facility], 170, cm, Height/Length Dosing, 11/17/20 12:39:00 CST, 84.4, kg, Weight Dosing, 11/17/20 12:39:00 CST  Documented Medications  Documented  hydrOXYzine hydrochloride 25 mg oral tablet: 25 mg = 1 tab(s), Oral, QID.      Past Medical/ Family/ Social History   Medical history:    Active  HTN - Hypertension ([id number])  Hyperlipidemia (37511581)  Resolved  Ascending aorta abnormality (997549268): Onset on 11/13/2012 at 56 years.  Resolved.  Disorder of aorta (60771656): Onset on 11/13/2012 at 56 years.  Resolved.  Pregnant (555794401): Onset on 9/13/1979 at 23 years.  Resolved on 6/19/1980 at 24 years.  Pregnant (748288717): Onset on 1/17/1977 at 21 years.  Resolved on 10/25/1977 at 21 years.  Thyroid (538388403):  Resolved.  Gastric reflux (597210272):  Resolved.  Thyroid disease (530613404):  Resolved.  Ankle and/or foot joint pain (8293866373):  Resolved.  Comments:  6/1/2015 CDT 10:05 CDT - Mica HAYES, Moise PIPER  right ankle  Knee joint pain (804989426):  Resolved.  Aneurysm (960707770):  Resolved.  Hypertension, uncontrolled (5703302889):  Resolved.  Chronic stable  angina (011201757):  Resolved.  Anxiety (07960710):  Resolved.  Acid reflux (6386357802):  Resolved.  Osteoarthritis of left knee (0703373420):  Resolved..   Surgical history:    Angiogram (883393907) on 1/1/2015 at 59 Years.  Comments:  6/1/2015 10:07 CDT - Mica HAYES, Moise PIPER  q six months  Sebaceous cyst removal (741411156) on 1/1/1985 at 29 Years.  Abdominal hysterectomy (581415045) in 1981 at 25 Years.  Removal of benign tumor from bone (658480852) on 1/1/1981 at 25 Years.  bilateral tubal ligation on 6/23/1980 at 24 Years.  right knee surgery.  colonoscopy.  EGD..   Family history:    Primary malignant neoplasm of lung  Father  Seizure  Sister  Nephrectomy  Brother  Congestive heart disease.  Sister  Heart failure.  Mother  Brother  Uterine cancer.......  Mother  .   Social history: Alcohol use: Denies, Tobacco use: Denies, Drug use: Denies.      Physical Examination               Vital Signs   Vital Signs   5/20/2021 10:37 CDT      Peripheral Pulse Rate     72 bpm                             Respiratory Rate          18 br/min                             SpO2                      98 %                             Systolic Blood Pressure   176 mmHg  HI                             Diastolic Blood Pressure  80 mmHg                             Mean Arterial Pressure, Cuff              112 mmHg    5/20/2021 9:49 CDT       Temperature Oral          36.6 DegC                             Temperature Oral (calculated)             97.88 DegF                             Peripheral Pulse Rate     60 bpm                             Respiratory Rate          20 br/min                             SpO2                      97 %                             Systolic Blood Pressure   184 mmHg  HI                             Diastolic Blood Pressure  85 mmHg  .   Measurements   5/20/2021 9:49 CDT       Weight Dosing             84 kg                             Weight Measured           84 kg                              Weight Measured and Calculated in Lbs     185.19 lb                             Height/Length Dosing      170.81 cm                             Height/Length Measured    170.18 cm                             Body Mass Index Measured  29 kg/m2  .   Basic Oxygen Information   5/20/2021 10:37 CDT      SpO2                      98 %    5/20/2021 9:49 CDT       SpO2                      97 %  .   General:  Alert, no acute distress.    Skin:  Intact, moist.    Head:  Atraumatic.   Neck:  Trachea midline.   Eye:  Pupils are equal, round and reactive to light, extraocular movements are intact.    Ears, nose, mouth and throat:  Oral mucosa moist.   Cardiovascular:  Regular rate and rhythm.   Respiratory:  Lungs are clear to auscultation.   Gastrointestinal:  Soft, Nontender, Non distended, Normal bowel sounds.    Back:  Nontender, Normal range of motion.    Musculoskeletal:  Normal ROM.   Neurological:  Alert and oriented to person, place, time, and situation.   Psychiatric:  Cooperative, appropriate mood & affect.       Medical Decision Making   Electrocardiogram:  EKG May 20, 2021, time 959, rate 52, sinus bradycardia with a PAC, nonspecific ST wave changes.      Impression and Plan   Diagnosis   Epigastric Pain (PNED [id number])   Pain in the chest ([id number]-CK R07.9)      Calls-Consults   -  5/20/2021 13:31:00 , Eladio CAO, Henny MEZA, Medicine.    Plan   Disposition: Medically cleared, Admit to Inpatient Telemetry Unit.

## 2022-05-02 NOTE — HISTORICAL [facility] CERNER
This is a historical note converted from [employer]. Formatting and pictures may have been removed.  Please reference [employer] for original formatting and attached multimedia. Chief Complaint  -pt c/o bilateral leg pain-lateral aspect and shoots down to toes -wants flu vaccine -medication refills  History of Present Illness  62-year-old  female presents to clinic for follow-up appointment.? Past medical history includes hypertension, hyperlipidemia, hypothyroidism, chronic GERD, atypical chest pain, mild aneurysmal dilatation of the ascending aorta, knee joint pain, no COPD based on most recent PFTs.? Patient is still complaining of chronic cough.? We have already discontinued her ACE inhibitor, and her ARB, and she has been treated for reflux.? Patient states she milligrams reflux symptoms and has not been taking her Nexium.? Patients cough continues with sputum production.? Has been treated multiple times for URI, most recently in the ED on 1/8/18 and she was also positive for influenza.? She experiences transient relief of symptoms while on antibiotics, however it returns after antibiotics.??Worse with lying down. ?Not effective with eating.? Has never had a CT of her head to evaluate for sinusitis.? Also complaining of right 2nd 3rd and 4th fingertip numbness.? Patient uses her right hand for lifting at work repeatedly.? Also complaining?of bilateral knee pain that has recently worsened over the past 2 weeks.? Family history of arthritis.? Patient has degenerative changes in her right knee from previous x-ray in 2015.? Little?relief with heating pad, massage, ibuprofen, or Tylenol.??Pain is worse at night?and with?sitting for extended periods of time.??Pain is better with walking.? She recently saw cardiology-blood pressure is well controlled. Next screening CTA scheduled for April 2018.? If descending aorta mild aneurysmal territories are unchanged, screening will be done every 2 years.  Review of  Systems  Constitutional: No fever, chills, wt loss, weakness, or fatigue.?  HEENT: No vision change, ear pain, nasal congestion, sore throat.?  Respiratory: No shortness of breath, +?chronic?cough?with sputum production  Cardiovascular: No chest pain, palpitations, peripheral edema.?  Gastrointestinal: No n/v/d/c, No abdominal pain  Musculoskeletal: Bilateral knee pain  Integumentary: No rash, breakdown, wounds?  Neuro: Right 2nd 3rd and 4th fingertip numbness  Physical Exam  Vitals & Measurements  T:?37.1? ?C ?(Oral)? HR:?65?(Peripheral)? RR:?20? BP:?118/76?  HT:?168?cm? HT:?168?cm? WT:?81.8?kg? WT:?81.8?kg? BMI:?28.98?  General: Alert and oriented, No acute distress.  HEENT: NCAT, EOMI, Normal hearing, Oral mucosa moist, No pharyngeal exudates, injected?oropharynx,?TM pearly gray  Respiratory: CTAB, No c/r/w, Respirations non-labored, Breath sounds equal.  Cardiovascular: Normal rate, Regular rhythm, No m/r/g, No c/c/e  Gastrointestinal: Soft, NT, ND, +BS  Musculoskeletal: Normal range of motion, alternatives palpation lateral?left knee. ?Mild?swelling right knee..  Integumentary: Warm, Dry, Intact.  Neurologic: Negative Tinels test at the right?wrist.  Assessment/Plan  1.?Chronic cough  ? Obtain CT sinuses to?evaluate for?chronic sinusitis. ?Continue with Flonase. ?Also take Zyrtec daily.  2.?Well-controlled hypertension  ? Continue Norvasc.  3.?Numbness of right hand  ? Instructed the patient to buy a wrist splint and wear it?at night, and during breaks during the day.? If no improvement, will order further testing.  4.?Bilateral knee pain  ???prescribe prednisone 40 mg ?3 days, then 20 mg ?5 days. ?Take ibuprofen 3 times a day for 2 weeks. ?Likely tendinitis. ?If no improvement, we will?x-ray bilateral knees.? checking CMP for legs cramps as well.  5.?Need for influenza vaccination  ? Administered today.  6.?Hypothyroid  ? Follow-up TSH today. ?Continue Synthroid.  7.?Hyperlipidemia  ? Within normal limits.  ?Continue pravastatin.  Follow-up CMP and TSH today. ?CT sinuses. ?Patient will call the clinic if knee pain persists after 2 weeks of treatment. ?If it persists, we will order?bilateral x-rays.? Return to clinic in?2 months.   Problem List/Past Medical History  Ongoing  Aneurysm  Ankle and/or foot joint pain 26-FEB-2015 00:10:12<$>  Chronic stable angina  COPD (chronic obstructive pulmonary disease)  Gastric reflux  HTN - Hypertension  Hyperlipidemia  Hyperlipidemia  Hypertension  Hypertension, uncontrolled  Knee joint pain  Thyroid  Thyroid disease  Historical  Procedure/Surgical History  Esophagogastroduodenoscopy, flexible, transoral; with biopsy, single or multiple (01/20/2016)  Excision of Small Intestine, Via Natural or Artificial Opening Endoscopic (01/20/2016)  Angiogram (01/01/2015)  Sebaceous cyst removal (01/01/1985)  Abdominal hysterectomy (1981)  Removal of benign tumor from bone (01/01/1981)  colonoscopy  EGD  right knee surgery  Medications  aspirin 81 mg oral tablet, CHEWABLE, 81 mg= 1 tab(s), Oral, Daily, 3 refills  Atrovent HFA 17 mcg/inh inhalation aerosol, 2 puff(s), INH, QID, 6 refills,? ?Not taking: pt stated Last Dose Date/Time Unknown  cloNIDine, 0.1 mg= 1 tab(s), Oral, Once  FLUTICASONE PROPIONA 50MCG/INH AER, 2 spray(s), Both Nostrils, Daily,? ?Not taking: pt stated  levothyroxine 137 mcg (0.137 mg) oral tablet, 137 mcg= 1 tab(s), Oral, Daily, 6 refills  NEBULIZER MACHINE AND TUBING, See Instructions  NexIUM 40 mg oral delayed release capsule, 40 mg= 1 cap(s), Oral, Daily, 6 refills  Norvasc 10 mg oral tablet, 10 mg= 1 tab(s), Oral, Daily, 6 refills  pravastatin 40 mg oral tablet, 40 mg= 1 tab(s), Oral, Daily, 6 refills  Allergies  Demerol HCl?(unknown)  traMADol?(sweating)  Social History  Alcohol - 05/11/2017  Never  Employment/School - 05/11/2017  Employed, Work/School description:  at [employer]. Activity level: Moderate physical work. Highest education level: High school.  Workplace hazards: Heavy lifting/twisting, Repetitive motion.  Exercise - 05/11/2017  Exercise frequency: 3-4 times/week. Self assessment: Fair condition. Exercise type: Walking, Sit-ups.  Home/Environment - 05/11/2017  Lives with Spouse. Living situation: Home/Independent. Home equipment: CPAP/BiPAP, BP monitor. Alcohol abuse in household: No. Substance abuse in household: No. Smoker in household: No. Injuries/Abuse/Neglect in household: No. Feels unsafe at home: No. Family/Friends available for support: Yes. Concern for family members at home: No. Major illness in household: No. Financial concerns: No.  Nutrition/Health - 05/11/2017  Low salt/low cholesterol, Wants to lose weight: Yes. Sleeping concerns: Yes. Feels highly stressed: No.  Sexual - 05/11/2017  Substance Abuse - 05/11/2017  Never  Tobacco - 05/11/2017  Former smoker  Family History  Congestive heart disease.: Sister.  Heart failure.: Mother and Brother.  Nephrectomy: Brother.  Primary malignant neoplasm of lung: Father.  Seizure: Sister.  Uterine cancer 27-APR-2016 23:35:49<$>: Mother.      Patient seen and examined,?reviewed with the resident,?agree with?assessment?and?plan.

## 2022-05-02 NOTE — HISTORICAL [facility]ER
This is a historical note converted from [employer]. Formatting and pictures may have been removed.  Please reference [employer] for original formatting and attached multimedia. Chief Complaint  pt reports to the ed c/o epigastric pain that radiates to upper chest and back (x)1 week. history of GERD. (+) nausea. also states otc meds arent working. vss. 12 lead ekg obtained..  Reason for Consultation  ?epigastric pain, nausea, vomiting  History of Present Illness  65-year-old  female, past medical history significant for HTN,?HLD,?thoracic ascending aortic aneurysm,?aortic regurg,?GERD (?presented to the emergency department?on 5/20/2021?with chief complaint of epigastric abdominal pain radiating into her back x1 week. Associated nausea and postprandial vomiting.?Grabs a sticking sensation after eating.?Over the last 2 days, she has only been able to tolerate liquids.?Denies fever, chills, dysphagia,?hematemesis,?hematochezia, melena,?weight loss, diarrhea, constipation.?Former 2 pack/day smoker quit 11 years ago. Denies EtOH, illicit drugs.?She has been taking?ibuprofen?and meloxicam daily?for the last 1 to 2 weeks.  ?  EGD dated 1/20/2016 per Dr. Espinoza:  ?  FINDINGS: ?Mild diffuse gastric mucosal erythema with otherwise normal upper endoscopy.  ?  Review of Systems  Comprehensive Review of Systems performed with no exceptions other than as noted in HPI.  Physical Exam  Vitals & Measurements  T:?37? ?C (Oral)? TMIN:?36.6? ?C (Oral)? TMAX:?37.2? ?C (Oral)? HR:?60(Monitored)? RR:?20? BP:?131/71? SpO2:?95%? WT:?84?kg? BMI:?29?  General:?well-developed well-nourished in no acute distress  Eye: PERRLA, EOMI, clear conjunctiva  HENT:? oropharynx without erythema/exudate, oropharynx and nasal mucosal surfaces moist  Neck:? no thyromegaly or lymphadenopathy, trachea midline  Respiratory:?symmetrical chest expansion and respiratory effort, clear to auscultation bilaterally  Cardiovascular:?regular rate and rhythm without  murmurs, gallops or rubs  Gastrointestinal:?soft, non-tender, non-distended with normal bowel sounds, without masses to palpation  Integumentary: no rashes or skin lesions present  Neurologic: cranial nerves intact, awake, alert, and oriented  Psych: good insight, appropriate mood, normal affect  Assessment/Plan  1. Epigastric abdominal pain  ?-PPI BID  ?-Stop NSAIDs  ?-Keep NPO  ?-EGD today  ?  2. Nausea/vomiting  ?  3. ?Ascending thoracic?aortic aneurysm  ?-stable   Problem List/Past Medical History  Ongoing  Anomalous origin of right coronary artery  Aortic regurgitation  GERD (gastroesophageal reflux disease)  HTN - Hypertension  Hyperlipidemia  Hyperlipidemia  Hypertension  Hypothyroidism  Insomnia  MIKI on CPAP  Osteoarthritis  Osteopenia  Prediabetes  Thoracic ascending aortic aneurysm  Vitamin D deficiency  Historical  Acid reflux  Aneurysm  Ankle and/or foot joint pain  Anxiety  Ascending aorta abnormality  Chronic stable angina  Disorder of aorta  Gastric reflux  Hypertension, uncontrolled  Knee joint pain  Osteoarthritis of left knee  Pregnant  Pregnant  Thyroid  Thyroid disease  Procedure/Surgical History  Catheter placement in coronary artery(s) for coronary angiography, including intraprocedural injection(s) for coronary angiography, imaging supervision and interpretation; with left heart catheterization including intraprocedural injection(s) for left karely (02/26/2020)  Fluoroscopy of Multiple Coronary Arteries using Low Osmolar Contrast (02/26/2020)  Fluoroscopy of Thoracic Aorta using Low Osmolar Contrast (02/26/2020)  Injection procedure during cardiac catheterization including imaging supervision, interpretation, and report; for supravalvular aortography (List separately in addition to code for primary procedure) (02/26/2020)  Measurement of Cardiac Sampling and Pressure, Left Heart, Percutaneous Approach (02/26/2020)  Esophagogastroduodenoscopy, flexible, transoral; with biopsy, single or multiple  (01/20/2016)  Excision of Small Intestine, Via Natural or Artificial Opening Endoscopic (01/20/2016)  Angiogram (01/01/2015)  Sebaceous cyst removal (01/01/1985)  Abdominal hysterectomy (1981)  Removal of benign tumor from bone (01/01/1981)  bilateral tubal ligation (06/23/1980)  colonoscopy  EGD  right knee surgery   Medications  Inpatient  aspirin 81 mg oral tablet, CHEWABLE, 81 mg= 1 tab(s), Oral, Daily  calcium carbonate 500 mg oral tablet, chewable, 500 mg= 1 tab(s), Chewed, TID, PRN  cholecalciferol 2000 intl units (50 mcg) oral tablet, 2000 units= 1 tab(s), Oral, Daily  enoxaparin 40 mg/0.4 mL subcutaneous solution, 40 mg= 0.4 mL, Subcutaneous, Daily  hydrALAZINE (Apresoline) Inj., 10 mg= 0.5 mL, IV Push, q2hr, PRN  levothyroxine 100 mcg (0.1 mg) oral tablet, 100 mcg= 1 tab(s), Oral, Daily  losartan 50 mg oral tablet, 50 mg= 1 tab(s), Oral, Daily  Melatonin 3 mg oral tablet, 3 mg= 1 tab(s), Oral, Once a day (at bedtime), PRN  NIFEdipine 30 mg oral tablet, extended release, 60 mg= 2 tab(s), Oral, Daily  Pantoprazole 40 mg ORAL EC-Tablet, 40 mg= 1 tab(s), Oral, Daily  simvastatin 20 mg oral tablet, 20 mg= 1 tab(s), Oral, Once a day (at bedtime)  Home  aspirin 81 mg oral tablet, CHEWABLE, 81 mg= 1 tab(s), Oral, Daily, 6 refills  calcium carbonate 600 mg oral tablet, 1200 mg= 2 tab(s), Oral, BID, 2 refills  cholecalciferol 2000 intl units (50 mcg) oral tablet, 2000 IntUnit= 1 tab(s), Oral, Daily, 3 refills  Fish Oil 1200 mg oral capsule, 1200 mg= 1 cap(s), Oral, Daily, 2 refills  hydrOXYzine hydrochloride 25 mg oral tablet, 25 mg= 1 tab(s), Oral, QID,? ?Not Taking per Prescriber  levothyroxine 100 mcg (0.1 mg) oral tablet, 100 mcg= 1 tab(s), Oral, Daily, 1 refills  losartan 50 mg oral tablet, 50 mg= 1 tab(s), Oral, Daily, 3 refills  Melatonin 3 mg oral tablet, 3 mg= 1 tab(s), Oral, Once a day (at bedtime), PRN, 1 refills  meloxicam 7.5 mg oral tablet, 7.5 mg= 1 tab(s), Oral, Daily, 2 refills  NEBULIZER MACHINE  AND TUBING, See Instructions  NIFEdipine (Eqv-Adalat CC) 60 mg oral tablet, extended release, 60 mg= 1 tab(s), Oral, Daily, 2 refills  Nitrostat 0.4 mg sublingual tab, 0.4 mg= 1 tab(s), SL, q5min, PRN, 5 refills  Pantoprazole 40 mg ORAL EC-Tablet, 40 mg= 1 tab(s), Oral, Daily, 3 refills  simvastatin 20 mg oral tablet, 20 mg= 1 tab(s), Oral, Once a day (at bedtime), 1 refills  triamcinolone 0.5% topical cream, 1 carla, TOP, BID, 2 refills,? ?Still taking, not as prescribed: pt stated- prn  Allergies  Demerol HCl?(unknown)  traMADol?(sweating)  Social History  Abuse/Neglect  No, 05/20/2021  No, No, Yes, 03/11/2021  Alcohol  Never, 03/11/2021  Employment/School  Employed, Highest education level: High school., 03/11/2021  Exercise  Exercise duration: 30. Exercise frequency: Daily. Exercise type: Walking., 03/11/2021  Home/Environment  Lives with Spouse. Living situation: Home/Independent. CPAP/BiPAP, Respiratory treatments, Single family house, 03/11/2021  Nutrition/Health  Low sodium, Good, 03/11/2021  Sexual  Sexually active: Yes. Number of current partners 1. Gender Identity Identifies as female., 03/11/2021  Spiritual/Cultural  Ep[employer]opalian, Yes, 03/11/2021  Substance Use  Never, 03/11/2021  Tobacco  Former smoker, quit more than 30 days ago, Yes, 05/20/2021  Former smoker, quit more than 30 days ago, Cigarettes, N/A, 55 Years (Age stopped)., 03/11/2021  Family History  Congestive heart disease.: Sister.  Heart failure.: Mother and Brother.  Nephrectomy: Brother.  Primary malignant neoplasm of lung: Father.  Seizure: Sister.  Uterine cancer.......: Mother.  Immunizations  Vaccine Date Status   influenza virus vaccine, inactivated 12/17/2020 Given   zoster vaccine, inactivated 12/17/2020 Given   zoster vaccine, inactivated 09/23/2020 Given   influenza virus vaccine, inactivated 10/24/2019 Given   pneumococcal 23-polyvalent vaccine 07/30/2019 Given   influenza virus vaccine, inactivated 02/14/2018 Given    tetanus/diphtheria/pertussis, acel(Tdap) 03/07/2017 Given   Lab Results  Labs Last 24 Hours?  ?Chemistry? Hematology/Coagulation?   Sodium Lvl: 137 mmol/L (05/21/21 04:02:00) WBC: 6.2 x10(3)/mcL (05/21/21 04:02:00)   Potassium Lvl: 4.4 mmol/L (05/21/21 04:02:00) RBC: 4.55 x10(6)/mcL (05/21/21 04:02:00)   Chloride: 102 mmol/L (05/21/21 04:02:00) Hgb: 13.6 gm/dL (05/21/21 04:02:00)   CO2: 28 mmol/L (05/21/21 04:02:00) Hct: 42.2 % (05/21/21 04:02:00)   Calcium Lvl: 9.4 mg/dL (05/21/21 04:02:00) Platelet: 162 x10(3)/mcL (05/21/21 04:02:00)   Magnesium Lvl: 2 mg/dL (05/20/21 16:34:00) MCV: 92.7 fL (05/21/21 04:02:00)   Glucose Lvl:?116 mg/dL?High (05/21/21 04:02:00) MCH: 29.9 pg (05/21/21 04:02:00)   BUN: 11.8 mg/dL (05/21/21 04:02:00) MCHC: 32.2 gm/dL (05/21/21 04:02:00)   Creatinine: 0.81 mg/dL (05/21/21 04:02:00) RDW: 12.6 % (05/21/21 04:02:00)   Est Creat Clearance Ser: 67.05 mL/min (05/21/21 05:42:02) MPV:?11.8 fL?High (05/21/21 04:02:00)   eGFR-AA: 91 (05/21/21 04:02:00) Abs Neut: 4.66 x10(3)/mcL (05/21/21 04:02:00)   eGFR-CHANTAL:?75 mL/min/1.73 m2?Low (05/21/21 04:02:00) Neutro Auto: 75 % (05/21/21 04:02:00)   Bili Total: 0.6 mg/dL (05/21/21 04:02:00) Lymph Auto: 13 % (05/21/21 04:02:00)   Bili Direct: 0.2 mg/dL (05/21/21 04:02:00) Mono Auto: 7 % (05/21/21 04:02:00)   Bili Indirect: 0.4 mg/dL (05/21/21 04:02:00) Eos Auto: 3 % (05/21/21 04:02:00)   AST: 16 unit/L (05/21/21 04:02:00) Abs Eos: 0.2 x10(3)/mcL (05/21/21 04:02:00)   ALT: 17 unit/L (05/21/21 04:02:00) Basophil Auto: 1 % (05/21/21 04:02:00)   Alk Phos: 96 unit/L (05/21/21 04:02:00) Abs Neutro: 4.66 x10(3)/mcL (05/21/21 04:02:00)   Total Protein: 6.4 gm/dL (05/21/21 04:02:00) Abs Lymph: 0.8 x10(3)/mcL (05/21/21 04:02:00)   Albumin Lvl:?3.3 gm/dL?Low (05/21/21 04:02:00) Abs Mono: 0.5 x10(3)/mcL (05/21/21 04:02:00)   Globulin: 3.1 gm/dL (05/21/21 04:02:00) Abs Baso: 0 x10(3)/mcL (05/21/21 04:02:00)   A/G Ratio: 1.1 ratio (05/21/21 04:02:00) IG%: 0 % (05/21/21  04:02:00)   Phosphorus: 3.1 mg/dL (21 16:34:00) IG#: 0.01 (21 04:02:00)   Chol: 187 mg/dL (21 04:02:00)    HDL: 42 mg/dL (21 04:02:00)    Tri mg/dL (21 04:02:00)    LDL: 124 mg/dL (21 04:02:00)    Chol/HDL: 4 (21 04:02:00)    VLDL: 21 (21 04:02:00)    Troponin-I: <0.010 (21 23:05:00)    Diagnostic Results  Accession:?[id number]  Order:?US Abdomen Limited  Report Dt/Tm:?2021 10:57  Report:?  ABDOMINAL SONOGRAM:  ?  COMPARISON: Recent CT and prior MRI from   ?  FINDINGS: The liver measures 14 cm. Visualized pancreas unremarkable.  Right kidney normal size without hydronephrosis. Gallbladder detail is  less than optimally visualized although no discrete stones or wall  thickening noted. No biliary ductal station CBD 4 mm  ?  IMPRESSION:  No sonographic right upper quadrant pathology  ?  Accession:?[id number]  Order:?CT Angio Chest W W/O Contrast  Report Dt/Tm:?2021 12:39  Report:?  CT angiogram chest with and without intravenous contrast  CT abdomen and pelvis with and without contrast  2-D MIPS and postprocessing volume rendering provided  HISTORY: Dissection ?thoracic aneurysm, correlation 2021  and 2016?  ?  As per PQRS measures, all CT scans at this facility used dose  modulation, iterative reconstruction, and/or weight based dose  adjustment when appropriate to reduce radiation dose to as low as  reasonably achievable.  ?  FINDINGS:  Ascending thoracic aorta diameter up to 4.5 cm stable. Ectasia at the  arch and descending also stable. Abdominal aorta caliber also similar  prior with ectasia/focal saccular aneurysm diameter up to 25 mm,  slightly increasing from 23 mm. Diffuse plaquing is mild. No  dissection identified. There may be significant narrowing proximal  celiac axis with poststenotic dilatation similar recent prior exam.  SMA and KARMA patent. Iliac arteries patent. No pulmonary arterial  embolus identified.  No pleural effusion. No sizable pulmonary  consolidation.  ?  Gastric mucosa may be hyperemic. No obstructive bowel findings.  Appendix normal caliber.  ?  The liver, spleen, pancreas adrenal glands and kidneys without acute  or adverse change. No intrapelvic pathology noted urinary bladder  unremarkable. Bones appear demineralized.  ?  ?  IMPRESSION:  Aortic aneurysm without dissection details above  ?  ?  ?  ?      Patient seen and examined in conjunction with GIANA Fiore, during rounds on 05/21. ?I actively participated in all aspects of [facility] patient encounter along with the NP, and agree with the assessment and plan as outlined above. ?  ?   She reports for the past week having difficulty with?mainly postprandial epigastric pain and nausea?with subsequent emesis.? She denies having these problems before.? She feels as if her food does not digest?which causes her to feel nauseated and?subsequently vomit. ?She denies any weight loss. ?Her bowel movements have been regular without any diarrhea, constipation, rectal bleeding, or melena.? Former smoker.? No significant alcohol use. ?Labs are unremarkable. ?She had a CT angiography?that showed a stable 4.5 cm?aneurysm.? No significant gastrointestinal issues seen on CT scan.? She says she has been on Nexium for several years for heartburn?and it previously was well controlled but lately it has not?been as well controlled despite the Nexium. ?She takes Advil twice a week.? Also on meloxicam.  ?   EGD today?with mild gastritis but otherwise unremarkable exam.? No?evidence of peptic ulcer disease or gastric outlet obstruction.? Biopsies were taken to rule out H. pylori.  Would recommend?advancement of diet as tolerated. ?Continue on PPI once daily.? Follow-up pathology.  ?  ?   Also of note, abdominal ultrasound was negative.

## 2022-05-02 NOTE — HISTORICAL [facility]ER
This is a historical note converted from Gilda. Formatting and pictures may have been removed.  Please reference Gilda for original formatting and attached multimedia. Chief Complaint  Pt here for a follow up. Pt c/o high blood pressure readings at home and dizziness which resulted in her falling 3 weeks ago pt states.  History of Present Illness  63 y/o WF w/PMH of hypothyroidism, MIKI on CPAP, HTN, HLD, GERD, osteopenia,?hypovitaminosis D,?osteoarthritis?of B/L knees, and ascending thoracic aneurysm who presents to?IM?clinic for routine follow-up and medication refill.  ?  States she has been doing well.? Was recently?in the ER?and they increased her?Synthroid.? Still reporting decreased appetite, weight gain,?fatigue,?and insomnia.??Also endorsing her chronic?bilateral knee?pains?but she states they have improved and she is requiring less?NSAIDs. ?Also endorsing?chronic nonproductive cough?and feelings of congestion?but has not been taking her?Protonix. Only?occasionally uses her CPAP at night?for her MIKI which could be contributing to her fatigue.  ?  Otherwise has been doing well?and denies any?chest pain, palpitations, swelling, PND, shortness of breath,?abdominal pain, constipation, diarrhea, nausea, vomiting, dysuria,?weakness, syncope. Denies tobacco, alcohol, or?drug use.  Review of Systems  12 point review of systems performed and negative except as stated above.  Physical Exam  Vitals & Measurements  T:?36.8? ?C (Oral)? HR:?71(Peripheral)? RR:?18? BP:?128/70?  HT:?170.00?cm? WT:?85.700?kg? BMI:?29.65?  Gen: well-nourished, well-developed, in no acute distress  HEENT: Normocephalic. PERRL, EMOI. Oral mucosa benign. Mucus membranes moist.  Neck: No JVD or carotid bruits. No thyromegaly. No lymphadenopathy.  Heart: RRR, no murmurs, gallops, clicks or rubs.  Lungs: CTAB without rales, wheezes or rhonchi. Normal work of breathing. Chest rise symmetrical on inspiration.  Abd: Soft, non-tender, non-distended  and without guarding. No organomegaly. No obvious masses. Bowel sounds present x4 quadrants.  Extremities: Radial and pedal pulses 2+ bilaterally, no LE edema.  MSK: No obvious deformities. Moves all extremities purposefully.  Neuro: Responds well to commands. Alert and oriented x3. ?5/5 strength?in bilateral upper and lower extremity.? Cranial nerves grossly intact. ?Sensation grossly intact.  Skin: Warm, dry and without rashes.  Assessment/Plan  1.?Hypothyroidism?E03.9  -Recent ER visit?with increase of?her Synthroid from 88 to 100 mcg  -Last TSH 19.88, free T4 0.86 on 8/6/2020  -Reordered thyroid function studies  -will change dose as necessary  Ordered:  ?  2.?HTN - Hypertension?I10  -Blood pressure today 128/70, well controlled  -Continue Imdur 30?and losartan 50  -Educated on exercise and DASH diet  Ordered:  ?  3.?Hyperlipidemia?E78.5  - on?8/2019  -Reordered lipid panel  -Continue pravastatin 40 and aspirin 81  -Counseled on exercise and low-fat, low-cholesterol diet  Ordered:  ?  4.?Osteoarthritis?M19.90  -Well-controlled with?naproxen  -No current complaints today  -Advised on over-the-counter Tylenol if needed  -Follows with Ortho and sports medicine  Ordered:  ?  5.?GERD (gastroesophageal reflux disease)?K21.9  -Chronic nonproductive cough today  -Has not been taking Protonix as patient was unaware what?this drug was for  -Reeducated about and restarted Protonix 40 daily  -We will see about symptom improvement at next follow-up  Ordered:  ?  6.?Osteopenia?M85.80  -Patient understands she has osteopenia from recent DEXA scan  -Started on?over-the-counter calcium and vitamin D  -she endorses compliance  Ordered:  ?  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  8.?MIKI on CPAP?G47.33  -States she only sometimes uses her CPAP at night?due to?her face being red?in the morning  -Encouraged patient?to use nightly CPAP?for better symptom control, she states that she will try  Ordered:  ?  9.?Vitamin D deficiency?E55.9  -Vitamin D level?29.4 on 8/2019  -Patient on supplements  -will recheck level  ?  Insomnia  -Melatonin  Ordered:  ?  Orders:  zoster vaccine, inactivated, 0.5 mL, form: Powder-Inj, IM, Once-Unscheduled, first dose 09/23/20 14:13:00 CDT  ?  Routine Health Maintenance  Mammo: 9/10/20, wnl  DEXA: 9/10/20, osteopenia of lumbar vertebrae  Colon CA screening: FIT test today 9/23  Gyne: Follows with gynecology, last seen 6/2020  Vaccines: Zoster First dose given today, 9/23  Referrals  Clinic Follow up, *Est. 12/17/20 12:30:00 CST, Order for future visit, Hypothyroidism, [facility]   Problem List/Past Medical History  Ongoing  GERD (gastroesophageal reflux disease)  HTN - Hypertension  Hyperlipidemia  Hyperlipidemia  Hypertension  Hypothyroidism  MIKI on CPAP  Osteoarthritis  Osteopenia  Thoracic ascending aortic aneurysm  Vitamin D deficiency  Historical  Acid reflux  Aneurysm  Ankle and/or foot joint pain  Anxiety  Ascending aorta abnormality  Chronic stable angina  Disorder of aorta  Gastric reflux  Hypertension, uncontrolled  Knee joint pain  Osteoarthritis of left knee  Pregnant  Pregnant  Thyroid  Thyroid disease  Procedure/Surgical History  Catheter placement in coronary artery(s) for coronary angiography, including intraprocedural injection(s) for coronary angiography, imaging supervision and interpretation; with left heart catheterization including intraprocedural injection(s) for left karely (02/26/2020)  Fluoroscopy of Multiple Coronary Arteries using Low Osmolar Contrast (02/26/2020)  Fluoroscopy of Thoracic Aorta using Low Osmolar Contrast (02/26/2020)  Injection procedure during cardiac  catheterization including imaging supervision, interpretation, and report; for supravalvular aortography (List separately in addition to code for primary procedure) (02/26/2020)  Measurement of Cardiac Sampling and Pressure, Left Heart, Percutaneous Approach (02/26/2020)  Esophagogastroduodenoscopy, flexible, transoral; with biopsy, single or multiple (01/20/2016)  Excision of Small Intestine, Via Natural or Artificial Opening Endoscopic (01/20/2016)  Angiogram (01/01/2015)  Sebaceous cyst removal (01/01/1985)  Abdominal hysterectomy (1981)  Removal of benign tumor from bone (01/01/1981)  bilateral tubal ligation (06/23/1980)  colonoscopy  EGD  right knee surgery   Medications  aspirin 81 mg oral tablet, CHEWABLE, 81 mg= 1 tab(s), Oral, Daily, 6 refills  cholecalciferol 2000 intl units (50 mcg) oral tablet, 2000 IntUnit= 1 tab(s), Oral, Daily, 3 refills  Imdur 30 mg oral tablet, extended release, 30 mg= 1 tab(s), Oral, qAM, 2 refills  levothyroxine 100 mcg (0.1 mg) oral tablet, 100 mcg= 1 tab(s), Oral, Daily, 1 refills  losartan 50 mg oral tablet, 50 mg= 1 tab(s), Oral, Daily, 3 refills  Melatonin 3 mg oral tablet, 3 mg= 1 tab(s), Oral, Once a day (at bedtime), PRN, 1 refills  naproxen 250 mg oral tablet, 250 mg= 1 tab(s), Oral, BID,? ?Not Taking, Completed Rx  NEBULIZER MACHINE AND TUBING, See Instructions  Nitrostat 0.4 mg sublingual tab, 0.4 mg= 1 tab(s), SL, q5min, PRN, 5 refills  Pantoprazole 40 mg ORAL EC-Tablet, 40 mg= 1 tab(s), Oral, Daily, 3 refills  pravastatin 40 mg oral tablet, 40 mg= 1 tab(s), Oral, Daily, 3 refills  Allergies  Demerol HCl?(unknown)  traMADol?(sweating)  Social History  Abuse/Neglect  No, 08/20/2020  Alcohol  Never, 05/11/2017  Employment/School  Employed, Work/School description:  at [employer]. Activity level: Moderate physical work. Highest education level: High school. Workplace hazards: Heavy lifting/twisting, Repetitive motion., 03/31/2015  Exercise  Exercise frequency: 3-4  times/week. Exercise type: Walking, Sit-ups., 03/14/2019  Home/Environment  Lives with Spouse. Living situation: Home/Independent. Home equipment: CPAP/BiPAP, Respiratory treatments. Alcohol abuse in household: No. Substance abuse in household: No. Smoker in household: No. Injuries/Abuse/Neglect in household: No. Feels unsafe at home: No. Safe place to go: Yes. Family/Friends available for support: Yes., 03/14/2019  Nutrition/Health  Low fat, Good, 07/30/2019  Sexual  Gender Identity Identifies as female., 01/31/2019  Spiritual/Cultural  Scientology, 02/18/2020  Substance Use  Never, 08/27/2015  Tobacco  Former smoker, quit more than 30 days ago, N/A, 08/20/2020  Family History  Congestive heart disease.: Sister.  Heart failure.: Mother and Brother.  Nephrectomy: Brother.  Primary malignant neoplasm of lung: Father.  Seizure: Sister.  Uterine cancer.......: Mother.  Immunizations  Vaccine Date Status   zoster vaccine, inactivated 09/23/2020 Given   influenza virus vaccine, inactivated 10/24/2019 Given   pneumococcal 23-polyvalent vaccine 07/30/2019 Given   influenza virus vaccine, inactivated 02/14/2018 Given   tetanus/diphtheria/pertussis, acel(Tdap) 03/07/2017 Given   Health Maintenance  Health Maintenance  ???Pending?(in the next year)  ??? ??OverDue  ??? ? ? ?Influenza Vaccine due??and every?  ??? ? ? ?Pneumococcal Vaccine due??and every?  ??? ? ? ?Zoster Vaccine due??and every?  ??? ??Due?  ??? ? ? ?Colorectal Screening due??08/25/20??and every 1??year(s)  ??? ? ? ?IPPE due??09/23/20??One-time only  ??? ? ? ?Medicare Annual Wellness Exam due??09/23/20??and every 1??year(s)  ??? ??Due In Future?  ??? ? ? ?Obesity Screening not due until??01/01/21??and every 1??year(s)  ??? ? ? ?Advance Directive not due until??01/02/21??and every 1??year(s)  ??? ? ? ?Alcohol Misuse Screening not due until??01/02/21??and every 1??year(s)  ??? ? ? ?Cognitive Screening not due until??01/02/21??and every 1??year(s)  ??? ? ? ?Fall Risk  Assessment not due until??01/02/21??and every 1??year(s)  ??? ? ? ?Functional Assessment not due until??01/02/21??and every 1??year(s)  ??? ? ? ?ADL Screening not due until??01/23/21??and every 1??year(s)  ??? ? ? ?Diabetes Screening not due until??08/06/21??and every 1??year(s)  ??? ? ? ?Hypertension Management-BMP not due until??08/06/21??and every 1??year(s)  ???Satisfied?(in the past 1 year)  ??? ??Satisfied?  ??? ? ? ?ADL Screening on??01/23/20.??Satisfied by Raymundo Morgan LPN  ??? ? ? ?Advance Directive on??09/23/20.??Satisfied by Melissa Perez LPN  ??? ? ? ?Alcohol Misuse Screening on??09/23/20.??Satisfied by Kamila Whittaker MD  ??? ? ? ?Aspirin Therapy for CVD Prevention on??09/23/20.??Satisfied by Kamila Whittaker MD  ??? ? ? ?Blood Pressure Screening on??09/23/20.??Satisfied by Melissa Perez LPN  ??? ? ? ?Body Mass Index Check on??09/23/20.??Satisfied by Melsisa Perez LPN  ??? ? ? ?Bone Density Screening on??09/10/20.??Satisfied by Airam Azevedo  ??? ? ? ?Breast Cancer Screening on??09/10/20.??Satisfied by Benita Hughes  ??? ? ? ?Cognitive Screening on??01/23/20.??Satisfied by Raymundo Morgan LPN  ??? ? ? ?Coronary Artery Disease Maintenance-Lipid Lowering Therapy on??09/23/20.??Satisfied by Kamila Whtitaker MD  ??? ? ? ?Depression Screening on??09/23/20.??Satisfied by Melissa Perez LPN  ??? ? ? ?Diabetes Screening on??08/06/20.??Satisfied by Roxanna Steele  ??? ? ? ?Fall Risk Assessment on??09/23/20.??Satisfied by Melissa Perez LPN  ??? ? ? ?Functional Assessment on??09/23/20.??Satisfied by Melissa Perez LPN  ??? ? ? ?Hypertension Management-Education on??09/23/20.??Satisfied by Kamila Whittaker MD  ??? ? ? ?Hypertension Management-Blood Pressure on??09/23/20.??Satisfied by Melissa Perez LPN  ??? ? ? ?Hypertension Management-BMP on??08/06/20.??Satisfied by Roxanna Steele  ??? ? ? ?Influenza Vaccine on??10/24/19.??Satisfied by Сергей KNAPP,  Carli  ??? ? ? ?Obesity Screening on??09/23/20.??Satisfied by Melissa Perez LPN  ??? ? ? ?Zoster Vaccine on??09/23/20.??Satisfied by Melissa Perez LPN  ??? ??Refused?  ??? ? ? ?Alcohol Misuse Screening on??08/20/20.??Recorded by Reyes Tellez??Reason: Patient Refuses  ?      Patient seen and examined,?reviewed with the resident,?agree with?assessment?and?plan.

## 2022-05-02 NOTE — HISTORICAL [facility]
This is a historical note converted from Gilda. Formatting and pictures may have been removed.  Please reference Gilda for original formatting and attached multimedia. Chief Complaint  ED follow up  thyroid problems c/o abdominal bloating  History of Present Illness  Ms. Montoya 63 yo WF w/ PMH of HTN, HLD, hypothyroidism, chronic GERD, atypical chest pain, and mild aneurysmal dilatation of the ascending aorta who presents today for follow-up from her ED visit.? She was seen in the ED on 7/1/2018 for weakness.? At that time labs showed a TSH of 0.018 and T4 of 1.91.??The ED physician?felt that her symptoms?were due to?thyroid levels.? He reduced her Synthroid to?75 mcg. ?She states that she did have symptom improvement; however, symptoms returned several weeks later.? She reports that she quit taking her Synthroid 2 weeks ago?and that her symptoms again resolved.? She is however having leg cramps and abdominal cramps.? She also reports ear pain?associated with cough and sinus symptoms.  Review of Systems  Constitutional: No recent changes in weight or appetite. No fevers, no chills, no recent illness or sick contacts  Head: No headache, no neck pain  Eyes: No blurry vision, no double vision  Ears: +?Right ear pain. ?No tinnitus no hearing loss  Nose/Mouth: +?Cough w/?clear sputum,?congestion, rhinorrhea. ?No sore throat  Cardiac: No chest pain, no palpitations, no lower extremity edema, no orthopnea, no paroxysmal nocturnal dyspena  Resp: No shortness of breath, no dyspnea on exertion, no hemoptysis  GI: +?Abdominal cramps.?No constipation, no diarrhea, no nausea, no emesis.  Musculoskeletal: +?Lower extremity cramping?and weakness.  Neurologic: No loss of sensation, no dizziness, no syncope  Endocrine: No polyuria, no polydipsia, no heat/cold intolerance  Physical Exam  Vitals & Measurements  T:?37.2? ?C (Oral)? HR:?61(Peripheral)? RR:?20? BP:?124/72?  HT:?169?cm? HT:?169?cm? WT:?83.5?kg? WT:?83.5?kg?  BMI:?29.24?  Gen: well-nourished, well-developed?61yo WF?in no acute distress  HEENT: Tympanic membrane?chi?without redness. ?PERRL, EMOI. Oral mucosa benign. Mucus membranes moist.  Neck: No JVD or carotid bruits. No thyromegaly. No lymphadenopathy.  Heart: RRR, no murmurs, gallops, clicks or rubs.  Lungs: CTAB without rales, wheezes or rhonchi. Normal work of breathing. Chest rise symmetrical on inspiration.  Abd: Soft, non-tender, non-distended and without guarding. No organomegaly. No obvious masses. Bowel sounds present x4 quadrants.  Extremities: Radial and pedal pulses 2+ bilaterally, no LE edema.  MSK: No obvious deformities. Moves all extremities purposefully.  Neuro: Responds well to commands.  Skin: Warm, dry and without rashes.  Assessment/Plan  1.?Follow up  -Seen in the ED?July 1, 2018?for weakness  -Here today for follow-up  -TSH?0.018, ?T4 1.91. in ED (7/1/208)  ?  2.?Thyroid disease  -Patient  Ordered:  Clinic Follow up, *Est. 09/09/18 3:00:00 CDT, Follow-up with Dr. Richmond in 1 month with repeat TSH, T3, T4, Order for future visit, Thyroid disease, [facility]  Free T4, Routine collect, 08/09/18 14:07:00 CDT, Blood, Stop date 08/09/18 14:07:00 CDT, Lab Collect, Thyroid disease, 08/09/18 14:07:00 CDT  Free T4, Routine collect, *Est. 10/09/18 3:00:00 CDT, Blood, Order for future visit, *Est. Stop date 10/09/18 3:00:00 CDT, Lab Collect, Thyroid disease, 08/10/18 5:00:00 CDT  T3 Free, Routine collect, 08/09/18 14:07:00 CDT, Blood, Stop date 08/09/18 14:07:00 CDT, Lab Collect, Thyroid disease, 08/09/18 14:07:00 CDT  T3 Free, Routine collect, *Est. 10/09/18 3:00:00 CDT, Blood, Order for future visit, *Est. Stop date 10/09/18 3:00:00 CDT, Lab Collect, Thyroid disease, 08/09/18 13:48:00 CDT  Thyroid Stimulating Hormone, Routine collect, *Est. 10/09/18 3:00:00 CDT, Blood, Order for future visit, *Est. Stop date 10/09/18 3:00:00 CDT, Lab Collect, Thyroid disease, 08/09/18 13:48:00 CDT  Thyroid  Stimulating Hormone, Routine collect, 08/09/18 14:07:00 CDT, Blood, Stop date 08/09/18 14:07:00 CDT, Lab Collect, Thyroid disease, 08/09/18 14:07:00 CDT  ?  3.?Otalgia, right ear  ?  Orders:  levothyroxine, 75 mcg = 1 tab(s), Oral, Daily, # 30 tab(s), 3 Refill(s), Pharmacy: [id number]  1. ED Follow-up/ Thyroid Disease  -Seen in the ED?July 1, 2018?for weakness  -Here today for follow-up  -TSH?0.018, ?T4 1.91. in ED (7/1/208)  -repeat TSH, Free T3, Free T4 today  -restart Syhthroid 75mcg  -repeat TSH, Free T3, Free T4 today in 1 month  ?   2. Right Ear Pain  -TM chi and without erythema on PE  -associated with cough and congestio  -instructed patient to try OTH antihistamine  ?   3. Hypokalemia  -K 3.4 on 7/1/2018  -associated with leg cramps  -instructed patient to take OTC K+ for one 1 week and then PRN when leg cramps occur  ?  ?   Follow-up in 1 month with Dr. Richmond  ?  ?   Callie Horton, DO  PGY-1  Internal Medicine?   Problem List/Past Medical History  Ongoing  Acid reflux  Aneurysm  Ankle and/or foot joint pain 26-FEB-2015 00:10:12<$>  Anxiety  Chronic stable angina  COPD (chronic obstructive pulmonary disease)  Gastric reflux  HTN - Hypertension  Hyperlipidemia  Hyperlipidemia  Hypertension  Hypertension, uncontrolled  Knee joint pain  Thyroid  Thyroid disease  Historical  No qualifying data  Procedure/Surgical History  Esophagogastroduodenoscopy, flexible, transoral; with biopsy, single or multiple (01/20/2016)  Excision of Small Intestine, Via Natural or Artificial Opening Endoscopic (01/20/2016)  Angiogram (01/01/2015)  Sebaceous cyst removal (01/01/1985)  Abdominal hysterectomy (1981)  Removal of benign tumor from bone (01/01/1981)  colonoscopy  EGD  right knee surgery   Medications  aspirin 81 mg oral tablet, CHEWABLE, 81 mg= 1 tab(s), Oral, Daily, 6 refills  cloNIDine, 0.1 mg= 1 tab(s), Oral, Once  Flonase 50 mcg/inh nasal spray, 1 spray(s), Nasal, BID, 4 refills,? ?Not  taking  levothyroxine 75 mcg (0.075 mg) oral tablet, 75 mcg= 1 tab(s), Oral, Daily, 3 refills  Mobic 15 mg oral tablet, 15 mg= 1 tab(s), Oral, Daily, 2 refills  NEBULIZER MACHINE AND TUBING, See Instructions  neutral wrist splint, See Instructions  NexIUM 40 mg oral delayed release capsule, 40 mg= 1 cap(s), Oral, Daily, 6 refills  Norvasc 10 mg oral tablet, 10 mg= 1 tab(s), Oral, Daily, 6 refills  pravastatin 40 mg oral tablet, 40 mg= 1 tab(s), Oral, Daily, 6 refills  Allergies  Demerol HCl?(unknown)  traMADol?(sweating)  Social History  Alcohol  Never, 05/11/2017  Employment/School  Employed, Work/School description:  at [employer]. Activity level: Moderate physical work. Highest education level: High school. Workplace hazards: Heavy lifting/twisting, Repetitive motion., 03/31/2015  Exercise  Exercise frequency: 3-4 times/week. Self assessment: Fair condition. Exercise type: Walking, Sit-ups., 03/31/2015  Home/Environment  Lives with Spouse. Living situation: Home/Independent. Home equipment: CPAP/BiPAP, BP monitor. Alcohol abuse in household: No. Substance abuse in household: No. Smoker in household: No. Injuries/Abuse/Neglect in household: No. Feels unsafe at home: No. Family/Friends available for support: Yes. Concern for family members at home: No. Major illness in household: No. Financial concerns: No., 03/31/2015  Nutrition/Health  Low salt/low cholesterol, Wants to lose weight: Yes. Sleeping concerns: Yes. Feels highly stressed: No., 03/31/2015  Sexual  Substance Abuse  Never, 08/27/2015  Tobacco  Former smoker, 03/17/2016  Family History  Congestive heart disease.: Sister.  Heart failure.: Mother and Brother.  Nephrectomy: Brother.  Primary malignant neoplasm of lung: Father.  Seizure: Sister.  Uterine cancer 27-APR-2016 23:35:49<$>: Mother.  Immunizations  Vaccine Date Status   influenza virus vaccine, inactivated 02/14/2018 Given   tetanus/diphtheria/pertussis, acel(Tdap) 03/07/2017 Given   Health  Maintenance  Health Maintenance  ???Pending?(in the next year)  ??? ??OverDue  ??? ? ? ?Coronary Artery Disease Maintenance-Antiplatelet Agent Prescribed due??and every?  ??? ? ? ?Coronary Artery Disease Maintenance-Lipid Lowering Therapy due??and every?  ??? ? ? ?Colorectal Screening due??03/12/18??and every 1??year(s)  ??? ? ? ?Alcohol Misuse Screening due??05/15/18??and every 1??year(s)  ??? ??Due?  ??? ? ? ?COPD Maintenance-Spirometry due??08/09/18??and every?  ??? ? ? ?Diabetes Screening due??08/09/18??and every?  ??? ??Due In Future?  ??? ? ? ?Smoking Cessation not due until??04/12/19??and every 1??year(s)  ??? ? ? ?Depression Screening not due until??05/10/19??and every 1??year(s)  ??? ? ? ?Hypertension Management-Education not due until??05/10/19??and every 1??year(s)  ??? ? ? ?Aspirin Therapy for CVD Prevention not due until??05/10/19??and every 1??year(s)  ??? ? ? ?Hypertension Management-BMP not due until??07/01/19??and every 1??year(s)  ??? ? ? ?Blood Pressure Screening not due until??08/02/19??and every 1??year(s)  ??? ? ? ?Body Mass Index Check not due until??08/02/19??and every 1??year(s)  ??? ? ? ?Hypertension Management-Blood Pressure not due until??08/02/19??and every 1??year(s)  ???Satisfied?(in the past 1 year)  ??? ??Satisfied?  ??? ? ? ?Aspirin Therapy for CVD Prevention on??05/10/18.??Satisfied by Hoa Richmond DO  ??? ? ? ?Blood Pressure Screening on??08/09/18.??Satisfied by Jameson, Lashell  ??? ? ? ?Body Mass Index Check on??08/09/18.??Satisfied by Lashell Barba  ??? ? ? ?Breast Cancer Screening on??03/29/18.??Satisfied by Benita Hughes  ??? ? ? ?Colorectal Screening on??06/04/18.??Satisfied by Anisha Layne  ??? ? ? ?Coronary Artery Disease Maintenance-Lipid Lowering Therapy on??05/10/18.??Satisfied by Hoa Richmond DO  ??? ? ? ?Depression Screening on??08/09/18.??Satisfied by Lashell Barba  ??? ? ? ?Diabetes Screening on??07/01/18.??Satisfied by Eddie Terrazas  ???  ? ? ?Hypertension Management-BMP on??07/01/18.??Satisfied by Dallin Lopez Jr.  ??? ? ? ?Hypertension Management-Blood Pressure on??08/09/18.??Satisfied by Lashell Barba  ??? ? ? ?Hypertension Management-Education on??05/10/18.??Satisfied by Hoa Richmond DO??Reason: Expectation Satisfied Elsewhere  ??? ? ? ?Influenza Vaccine on??02/14/18.??Satisfied by Tara Rios RN  ??? ? ? ?Lipid Screening on??08/09/18.??Satisfied by Richard Cohn MD  ??? ? ? ?Obesity Screening on??08/09/18.??Satisfied by Lashell Barba  ??? ? ? ?Smoking Cessation on??04/12/18.??Satisfied by Armando Kay RN  ??? ? ? ?Smoking Cessation (Coronary Artery Disease) on??04/12/18.??Satisfied by Armando Kay RN  ??? ??Canceled?  ??? ? ? ?Cervical Cancer Screening on??05/10/18.?Recorded for Hoa Richmond DO?Reason: Expectation Not Necessary  ?  ?      Patient seen and examined,?reviewed with the resident,?agree with?assessment?and?plan.

## 2022-05-02 NOTE — HISTORICAL [facility] CERNER
This is a historical note converted from [employer]. Formatting and pictures may have been removed.  Please reference Cer[employer]ricia for original formatting and attached multimedia. Chief Complaint  Check up. c/o right upper arm pain. Lifts heavy boxes at work. c/o itchy skin to left upper arm x 2 months. Cardiologist stopped Norvasc, started Lisinopril.  History of Present Illness  63-year-old  female presents to clinic for follow-up appointment. ?Past?medical history of?chronic cough-currently?not present, MIKI dx?about 3 yrs ago,?occasionally uses CPAP, hypertension, hyperlipidemia, hypothyroidism, chronic GERD,and mild aneurysmal dilatation of the ascending aorta -being followed by cardiology with serial CT scans.??Denies any chest pain, shortness of breath, fevers, chills,?chest pain. ?Does admit to some claudication-pain and fatigue in the legs with walking, relieved with rest.? Has bilateral erythematous peeling rash?on bilateral feet, present for months.? Patient is wearing nylon socks at work. ?Treated in the past by dermatologist for the same. ?Diagnosis tinea pedis. ?Also complaining of rash left arm. ?Rash is pruritic and has been present for a few months. ?Has not changed detergents, soap, or lotions. ?States that she lifts boxes with her right arm and?places them on her left shoulder at work.? Works in a warehouse.? Thinks it may be related to this.? She does wear long sleeves at work.  ?   Work-up thus far: ACE inhibitor was stopped,?PFTs without COPD or restrictive lung disease, multiple trials of antihistamines, Flonase, antibiotics, no history of asthma, failed trial of albuterol, failed trial of PPI, CT sinuses negative. ?Quit smoking 8 years ago.  Chest x-ray on 1/21/19 overall unremarkable with read of stable chest exam with no active cardiopulmonary findings.  Angiogram 9/2010 normal. Following with cardiology.  Review of Systems  Remaining of the 14 point review of systems has been reviewed and is  negative except as documented above.  Physical Exam  Vitals & Measurements  T:?36.9? ?C (Oral)? HR:?48(Peripheral)? RR:?18? BP:?162/72?  HT:?170?cm? WT:?87.6?kg? BMI:?30.31?  General: Alert and oriented, No acute distress.  HEENT: NCAT, EOMI, Normal hearing  Respiratory: CTAB, No c/r/w, Respirations non-labored, Breath sounds equal.  Cardiovascular: Normal rate, Regular rhythm, No m/r/g, No c/c/e  Gastrointestinal: Soft, NT, ND, +BS  Musculoskeletal:? Normal range of motion, Normal strength, No swelling, No deformity.  Integumentary: pruritic papular rash left upper arm. papules at different stages.? Erythema, peeling, pruritus?bilateral?soles of feet.  Neurologic: Alert, Oriented, No focal deficits, CN II-XII grossly intact  Psychiatric: Cooperative, Appropriate mood & affect, Normal judgment.  Assessment/Plan  1.?Skin rash?R21  ?Prescribed Lamisil and ciclopirox topical antifungals.? Recommend that she can apply this to her left arm rash.? Also recommended Zyrtec. ?Patient states she is taking this medication.? Hydroxyzine helps relieve the pruritus.  ?  2.?Tinea pedis?B35.3  ?Prescribed Lamisil and Ciclopirox top antifungals. ?Recommended cotton socks. ?Patient alternates tissues.  ?  3.?Recurrent sinusitis?J32.9  Patient is currently not having a flare, and is actually doing well for a change as far as?upper respiratory?and sinus symptoms go.??Only has occasional cough at night and sputum production in?the morning. ?This?is chronic and daily for her.? Has been treated?multiple?times in the past with antibiotics for upper respiratory tract infections and recurrent sinusitis.? Has failed a trial of PPI and antihistamines.? Well complete the workup with the below lab work.  Ordered:  CHANA Reflexive [id number], Routine collect, *Est. 03/14/19 3:00:00 CDT, Blood, Order for future visit, *Est. Stop date 03/14/19 3:00:00 CDT, Lab Collect, Recurrent sinusitis, 03/14/19 8:42:00 CDT  Anti-Neutrophil  [id number] [id number], Routine collect, *Est. 03/14/19 3:00:00 CDT, Blood, Order for future visit, *Est. Stop date 03/14/19 3:00:00 CDT, Lab Collect, Recurrent sinusitis, 03/14/19 8:42:00 CDT  Aspergillus Niger IgE-ARUP, Routine collect, *Est. 03/14/19 3:00:00 CDT, Blood, Order for future visit, *Est. Stop date 03/14/19 3:00:00 CDT, Lab Collect, Recurrent sinusitis, 03/14/19 8:42:00 CDT  C-Reactive Protein High Sensitivity, Routine collect, *Est. 03/14/19 3:00:00 CDT, Blood, Order for future visit, *Est. Stop date 03/14/19 3:00:00 CDT, Lab Collect, Recurrent sinusitis, 03/14/19 8:42:00 CDT  CBC w/ Auto Diff, Routine collect, *Est. 03/14/19 3:00:00 CDT, Blood, Order for future visit, *Est. Stop date 03/14/19 3:00:00 CDT, Lab Collect, Recurrent sinusitis, 03/14/19 8:43:00 CDT  Clinic Follow up, *Est. 05/14/19 3:00:00 CDT, Order for future visit, Chronic shortness of breath  Recurrent sinusitis, [facility]  Comprehensive Metabolic Panel, Routine collect, *Est. 03/14/19 3:00:00 CDT, Blood, Order for future visit, *Est. Stop date 03/14/19 3:00:00 CDT, Lab Collect, Recurrent sinusitis, 03/14/19 8:43:00 CDT  Hypersensitivity Pneumonitis II-ARUP, Routine collect, *Est. 03/14/19 3:00:00 CDT, Blood, Order for future visit, *Est. Stop date 03/14/19 3:00:00 CDT, Lab Collect, Recurrent sinusitis, 03/14/19 8:43:00 CDT  Immunoglobulin A, Quant, Serum-[facility] [id number], Routine collect, *Est. 03/14/19 3:00:00 CDT, Blood, Order for future visit, *Est. Stop date 03/14/19 3:00:00 CDT, Lab Collect, Recurrent sinusitis, 03/14/19 8:42:00 CDT  Immunoglobulin G Subclasses (1, 2, 3, 4)-[facility] [id number], Routine collect, *Est. 03/14/19 3:00:00 CDT, Blood, Order for future visit, *Est. Stop date 03/14/19 3:00:00 CDT, Lab Collect, Recurrent sinusitis, 03/14/19 8:42:00 CDT  Sedimentation Rate, Routine collect, *Est. 03/14/19 3:00:00 CDT, Blood, Order for future visit, *Est. Stop date 03/14/19 3:00:00 CDT, Lab Collect, Recurrent  sinusitis, 03/14/19 8:42:00 CDT  Urinalysis with Microscopic if Indicated, Routine collect, Urine, Order for future visit, *Est. 03/14/19 3:00:00 CDT, *Est. Stop date 03/14/19 3:00:00 CDT, Nurse collect, Recurrent sinusitis  ?  4.?Claudication?I73.9  ?Cardiology has ordered ABIs.  ?  5.?Hyperlipidemia?E78.5  Last lipid panel 8/9/18 cholesterol 211, triglycerides 224, HDL 61, .? Will repeat lipid panel today.? Continue with pravastatin 40 mg.  ?  6.?Chronic cough?R05  ?Reordered methacholine challenge test?in an attempt to?rule in or out asthma. ?Regular PFTs were normal.. ?Must be performed at [facility]. ?Patient is free care and only receives free care at [facility]. ?Patient would have to pay out of pocket.? This will likely have to be canceled, however, we will wait to see what the out of pocket cost will be.? If test is canceled, patient is due for spirometry?for COPD  ?  Canceling pulmonary appointment scheduled on March 26. ?Patient was referred back in November?when cough was persistent and workup was unrevealing.? Since patient is currently doing well, we will cancel the appointment?and complete the workup with the above lab work.  ?  Orders:  ciclopirox topical, 1 carla, TOP, BID, # 30 gm, 0 Refill(s), Pharmacy: [facility]  terbinafine topical, 1 carla, TOP, BID, # 24 gm, 0 Refill(s), Pharmacy: [facility]  Lipid Panel, Routine collect, *Est. 03/14/19 3:00:00 CDT, Blood, Order for future visit, *Est. Stop date 03/14/19 3:00:00 CDT, Lab Collect, Hypercholesterolemia, 03/14/19 9:49:00 CDT  MG Screening, Routine, 03/14/19 9:28:00 CDT, Screening, None, Ambulatory, Rad Type, Order for future visit, Breast cancer screening by mammogram, Schedule this test, [facility], Follow Breast Imaging Order Set Protocol, 03/14/19 9:28:00 CDT  PFT -Methocholine Challenge, *Est. 04/04/19 10:00:00 CDT, Order for future visit, Chronic shortness of breath, [facility]  Return to  clinic in 2 months.  ?  Health maintenance:  Colon cancer screening: positive fecal occult blood 6/8/18.? Referred to GI, however, I do not see the referral in the system.  Pap smear-scheduled 4/4/19  Mammogram-due now. ordered today.  Vaccinations: Has received?Tdap 2017?Vaccination, Will need PC13 at age 64.   Problem List/Past Medical History  Ongoing  Acid reflux  Aneurysm  Ankle and/or foot joint pain 26-FEB-2015 00:10:12<$>  Anxiety  Chronic stable angina  Gastric reflux  HTN - Hypertension  Hyperlipidemia  Hyperlipidemia  Hypertension  Hypertension, uncontrolled  Knee joint pain  Thyroid  Thyroid disease  Historical  No qualifying data  Procedure/Surgical History  Angiogram (01/01/2015)  Sebaceous cyst removal (01/01/1985)  Abdominal hysterectomy (1981)  Removal of benign tumor from bone (01/01/1981)  colonoscopy  EGD  right knee surgery   Medications  aspirin 81 mg oral tablet, CHEWABLE, 81 mg= 1 tab(s), Oral, Daily, 6 refills  ciclopirox 0.77% topical cream, 1 carla, TOP, BID  cloNIDine, 0.1 mg= 1 tab(s), Oral, Once  Flonase 50 mcg/inh nasal spray, 1 spray(s), Nasal, BID, 4 refills  hydrOXYzine hydrochloride 25 mg oral tablet, 25 mg= 1 tab(s), Oral, BID, PRN, 1 refills  LamISIL AT 1% topical cream, 1 carla, TOP, BID  levothyroxine 75 mcg (0.075 mg) oral tablet, 75 mcg= 1 tab(s), Oral, Daily, 3 refills  lisinopril 20 mg oral tablet, 20 mg= 1 tab(s), Oral, Daily, 6 refills  montelukast 10 mg oral TABLET, 10 mg= 1 tab(s), Oral, qPM, 11 refills,? ?Not taking  NAPROXEN 500MG TAB, 500 mg= 1 tab(s), Oral, BID  NEBULIZER MACHINE AND TUBING, See Instructions  neutral wrist splint, See Instructions,? ?Not taking  NexIUM 40 mg oral delayed release capsule, 40 mg= 1 cap(s), Oral, Daily, 6 refills  pravastatin 40 mg oral tablet, 40 mg= 1 tab(s), Oral, Daily, 6 refills  Allergies  Demerol HCl?(unknown)  traMADol?(sweating)  Social History  Alcohol  Never, 05/11/2017  Employment/School  Employed, Work/School description:  [employer] at [employer]. Activity level: Moderate physical work. Highest education level: High school. Workplace hazards: Heavy lifting/twisting, Repetitive motion., 03/31/2015  Exercise  Exercise frequency: 3-4 times/week. Exercise type: Walking, Sit-ups., 03/14/2019  Home/Environment  Lives with Spouse. Living situation: Home/Independent. Home equipment: CPAP/BiPAP, Respiratory treatments. Alcohol abuse in household: No. Substance abuse in household: No. Smoker in household: No. Injuries/Abuse/Neglect in household: No. Feels unsafe at home: No. Safe place to go: Yes. Family/Friends available for support: Yes., 03/14/2019  Nutrition/Health  Type of diet: Low Sodium., 03/14/2019  Sexual  Gender Identity Identifies as female., 01/31/2019  Substance Abuse  Never, 08/27/2015  Tobacco  Former smoker, quit more than 30 days ago, Cigarettes, N/A, Ready to change: No. Previous treatment: None., 03/14/2019  Family History  Congestive heart disease.: Sister.  Heart failure.: Mother and Brother.  Nephrectomy: Brother.  Primary malignant neoplasm of lung: Father.  Seizure: Sister.  Uterine cancer 27-APR-2016 23:35:49<$>: Mother.  Immunizations  Vaccine Date Status   influenza virus vaccine, inactivated 02/14/2018 Given   tetanus/diphtheria/pertussis, acel(Tdap) 03/07/2017 Given   Health Maintenance  Health Maintenance  ???Pending?(in the next year)  ??? ??OverDue  ??? ? ? ?Coronary Artery Disease Maintenance-Antiplatelet Agent Prescribed due??and every?  ??? ? ? ?Coronary Artery Disease Maintenance-Lipid Lowering Therapy due??and every?  ??? ? ? ?Diabetes Screening due??and every?  ??? ? ? ?Alcohol Misuse Screening due??05/15/18??and every 1??year(s)  ??? ??Due?  ??? ? ? ?COPD Maintenance-Spirometry due??03/14/19??and every?  ??? ??Due In Future?  ??? ? ? ?Smoking Cessation not due until??04/12/19??and every 1??year(s)  ??? ? ? ?Hypertension Management-Education not due until??05/10/19??and every 1??year(s)  ??? ? ? ?Aspirin  Therapy for CVD Prevention not due until??05/10/19??and every 1??year(s)  ??? ? ? ?Colorectal Screening not due until??06/04/19??and every 1??year(s)  ??? ? ? ?Depression Screening not due until??12/29/19??and every 1??year(s)  ??? ? ? ?Hypertension Management-BMP not due until??02/12/20??and every 1??year(s)  ??? ? ? ?Blood Pressure Screening not due until??03/13/20??and every 1??year(s)  ??? ? ? ?Body Mass Index Check not due until??03/13/20??and every 1??year(s)  ??? ? ? ?Hypertension Management-Blood Pressure not due until??03/13/20??and every 1??year(s)  ???Satisfied?(in the past 1 year)  ??? ??Satisfied?  ??? ? ? ?ADL Screening on??03/14/19.??Satisfied by Azeem Giles RN  ??? ? ? ?Aspirin Therapy for CVD Prevention on??05/10/18.??Satisfied by Hoa Richmond DO  ??? ? ? ?Blood Pressure Screening on??03/14/19.??Satisfied by Tara Rios RN  ??? ? ? ?Body Mass Index Check on??03/14/19.??Satisfied by Azeem Giles RN  ??? ? ? ?Breast Cancer Screening on??03/29/18.??Satisfied by Benita Hughes  ??? ? ? ?Colorectal Screening on??06/04/18.??Satisfied by Anisha Layne  ??? ? ? ?Coronary Artery Disease Maintenance-Lipid Lowering Therapy on??05/10/18.??Satisfied by Hoa Richmond DO  ??? ? ? ?Depression Screening on??12/29/18.??Satisfied by Galilea Valerio LPN  ??? ? ? ?Diabetes Screening on??09/18/18.??Satisfied by Coco Yoon  ??? ? ? ?Hypertension Management-Blood Pressure on??03/14/19.??Satisfied by Gabriel HAYES, Tara  ??? ? ? ?Hypertension Management-BMP on??02/12/19.??Satisfied by Roxanna Steele  ??? ? ? ?Hypertension Management-Education on??05/10/18.??Satisfied by Hoa Richmond DO??Reason: Expectation Satisfied Elsewhere  ??? ? ? ?Influenza Vaccine on??12/29/18.??Satisfied by Galilea Valerio LPN  ??? ? ? ?Lipid Screening on??08/09/18.??Satisfied by Rosy Ellington  ??? ? ? ?Obesity Screening on??03/14/19.??Satisfied by Chan HAYES, Azeem Nelson  ??? ? ? ?Smoking Cessation  on??04/12/18.??Satisfied by Armando Kay RN  ??? ? ? ?Smoking Cessation (Coronary Artery Disease) on??04/12/18.??Satisfied by Armando Kay RN  ??? ??Canceled?  ??? ? ? ?COPD Maintenance-Spirometry on??03/14/19.?Recorded by Hoa Richmond DO  ??? ? ? ?Cervical Cancer Screening on??05/10/18.?Recorded by Hoa Richmond DO?Reason: Expectation Not Necessary  ?  ?      Patient seen and examined,?reviewed with the resident,?agree with?assessment?and?plan.   I do not think this is scabies on her left upper outer arm but would keep it in consideration?worsens at all.

## 2022-05-02 NOTE — HISTORICAL [facility] CERNER
This is a historical note converted from [employer]. Formatting and pictures may have been removed.  Please reference Cerpatricia for original formatting and attached multimedia. Admit and Discharge Dates  Admit Date: 05/20/2021  Discharge Date: 05/22/2021  Physicians  Attending Physician - Dorothy CAO, Beatriz  Admitting Physician - Dorothy CAO, Beatriz  Consulting Physician - Lorena Cho MD  Primary Care Physician - Kamila Whittaker MD  Discharge Diagnosis  NSTEMI likely secondary to uncontrolled hypertension  Stable thoracic ascending aortic aneurysm, no dissection seen on CTA  History of GERD  Gastritis  Bradycardia, chronic  Hyperlipidemia  Prediabetes  MIKI on CPAP  Hypothyroidism  History of osteopenia with vitamin D deficiency  FOBT positive on 1/4/2021 - no overt bleeding at this time  Surgical Procedures  05/21/2021 - [id number] - Biopsy Gastrointestinal  05/21/2021 - [id number] - Esophagogastroduodenoscopy  Immunizations  No immunizations recorded for this visit.  Admission Information  Patient is a 65-year-old female with past medical history significant for hypothyroidism, MIKI on CPAP, hypertension, hyperlipidemia, prediabetes, GERD, osteopenia, vitamin D deficiency, bilateral knee osteoarthritis, insomnia, aortic regurgitation, and 4.5 cm ascending thoracic aortic aneurysm who presented to the ER today with chief complaints of epigastric pain radiating to the upper chest and back pain x1 week.? Patient reports chest pain which she describes pointing to the epigastric region and radiating to central chest.? The pain described as intermittent spasms that come and go.? Patient reports pain is worsened with eating and worse with exertion.? She does report a history of GERD and states that over the past week she feels that anytime she eats it feels like something is getting stuck in her chest.? Endorse a few occasions of nonbloody nonbilious emesis immediately upon intake of water or solid  foods.? She does state that chest pain will come on intermittently at night.? Does not know of any alleviating factors.? Chest pain will last for few minutes to a few hours.? She actually has a prescription for sublingual nitroglycerin provided by her cardiologist but states she never tried it because she was too scared.? She also endorses increased shortness of breath over the past week that accompanies the chest pain. she denies any associated diaphoresis is report associated nausea with a few episodes of vomiting, and palpitations.? Reports the chest pain is nonrating to her back.? Her back pain is separate from her chest pain, located in the lower lumbar region and feels like muscle spasms.? Patient denies any fever, chills, lower extremity swelling, weakness, dizziness, headaches, vision changes, new rashes, dysuria, hematuria, hematemesis, melena, hematochezia, increased urinary frequency, loss of consciousness or syncope.? She reports compliance with all of her medications.? Patient was last seen in the cardiology clinic on 3/9/2021 visit Procardia was started and plan to repeat CTA chest in March 2022 for monitoring purposes.? Then patient has also seen her PCP who increased losartan dose to 50 mg daily for better BP control. ?Present patient reports?chest pain?has subsided since?arrival in the ER,?she states it feels more like a soreness  ?   In the ED, CT angio chest and CT abdomen and pelvis was performed which showed aneurysm without dissection and stable from prior exam.? BMP rather unremarkable, lipase 12, initial troponin 0 0.060, CBC rather unremarkable.? Vital signs showed bradycardia HR in low 50s which on chart review patient has chronic history of bradycardia which she reports asymptomatic.? SBP noted to be elevated in the 180s to 190s.? In the ED patient was given the pain 10 mg, aspirin 325 mg, Lopressor 2 mg IV push, and nitro paste 1in. internal medicine is being consulted for further  evaluation and ACS rule out.  Hospital Course  Patient was admitted for NSTEMI thought 2/2 uncontrolled HTN. Troponins trended down to wnl and no EKG changes. Her BP was controlled with PO home meds. She denied any chest pain the following morning but did?continue to complain of nausea and vomiting after eating her dinner the night prior.?RUQ US was unremarkable.?GI was consulted?for?EGD which?was done on 5/20/2020?and was rather unremarkable. ?EGD showed gastritis and a biopsy was taken, pathology reports still pending.??today,?patient?was able to tolerate?a diet very well without any further episodes of nausea and vomiting or chest pain.? All labs are stable and her vitals have been stable. ?Patient is stable for discharge to home at this point. ?Placed an order for post wards follow-up with her PCP Dr. Whittaker.??Patient was instructed to continue all her home medications except for?hydroxyzine?due to she stated she?had already stopped taking this medication prior to this admission.  Significant Findings  CT angio chest with and without contrast on 5/20/2021 showed ascending thoracic aorta diameter up to 4.5 cm stable.? No pulmonary arterial embolus identified.? No pleural effusion.? No sizable pulmonary consolidation.? No obstructive bowel findings.? Aortic aneurysm without dissection.? The liver spleen pancreas adrenal glands and kidneys without acute or adverse change.? No intrapelvic pathology noted urinary bladder unremarkable.? Bones appear demineralized.  Ultrasound abdomen limited on 5/21/2021 showed no sonographic right upper quadrant pathology.  Time Spent on discharge  > 30 minutes  Objective  Vitals & Measurements  T:?36.4? ?C (Oral)? TMIN:?36.4? ?C (Oral)? TMAX:?37.4? ?C (Oral)? HR:?66(Peripheral)? HR:?57(Monitored)? RR:?20? BP:?159/79? SpO2:?97%?  Physical Exam  Refer to progress note on same day of discharge 5/22/21  Patient Discharge Condition  Clinically and hemodynamically stable.  Discharge  Disposition  DC to home.   Discharge Medication Reconciliation  Continue  Misc Prescription (NEBULIZER MACHINE AND TUBING)?See Instructions  NIFEdipine (NIFEdipine (Eqv-Adalat CC) 60 mg oral tablet, extended release)?60 mg, Oral, Daily  aspirin (aspirin 81 mg oral tablet, CHEWABLE)?81 mg, Oral, Daily  calcium carbonate (calcium carbonate 600 mg oral tablet)?1,200 mg, Oral, BID  cholecalciferol (cholecalciferol 2000 intl units (50 mcg) oral tablet)?2,000 IntUnit, Oral, Daily  levothyroxine (levothyroxine 100 mcg (0.1 mg) oral tablet)?100 mcg, Oral, Daily  losartan (losartan 50 mg oral tablet)?50 mg, Oral, Daily  melatonin (Melatonin 3 mg oral tablet)?3 mg, Oral, Once a day (at bedtime), PRN for insomnia  meloxicam (meloxicam 7.5 mg oral tablet)?7.5 mg, Oral, Daily  nitroglycerin (Nitrostat 0.4 mg sublingual tab)?0.4 mg, SL, q5min, PRN for chest pain  omega-3 polyunsaturated fatty acids (Fish Oil 1200 mg oral capsule)?1,200 mg, Oral, Daily  pantoprazole (Pantoprazole 40 mg ORAL EC-Tablet)?40 mg, Oral, Daily  simvastatin (simvastatin 20 mg oral tablet)?20 mg, Oral, Once a day (at bedtime)  triamcinolone topical (triamcinolone 0.5% topical cream)?1 carla, TOP, BID  Discontinue  hydrOXYzine (hydrOXYzine hydrochloride 25 mg oral tablet)?25 mg, Oral, QID  Education and Orders Provided  Nonspecific Chest Pain  Hypertension, Easy-to-Read  Managing Your Hypertension  Discharge - 05/22/21 10:29:00 CDT, Home?  Follow up  [facility], within 2 to 4 weeks  ????Office will call w/follow up appointmentPost-wards follow up with your PCP Dr. Whittaker  Report to Emergency Department if symptoms return or worsen      I was present with the Resident during discharge day management.  ?  [ x?] I discussed the case with the Resident and agree with the discharge plan as above.  [ ?] I discussed the case with the Resident and agree with the discharge plan as above?except:  ?  Time spent on discharge [ >30?] minutes  ?

## 2022-05-02 NOTE — HISTORICAL [facility] CERNER
This is a historical note converted from Cerner. Formatting and pictures may have been removed.  Please reference Cerner for original formatting and attached multimedia. Chief Complaint  bump on rt side side of face, ha. denies drainage from site. rash x 2 weeks denies changing laundry detergent or soaps  History of Present Illness  Pt is a 65-year-old female, here today for bump to face x3 weeks. ?Also states she has had a, itchy?rash for several weeks. ?Patient states that?she has had bumps to her face which needed?incision and drainage?previously,?states last time she was seen by Derm was in 2019. ?Followed by family medicine clinic, states that?her annual appointment?had been canceled recently.? Denies any changes in laundry, detergent, lotions or any other thing she can think of.? States that she had the same rash about 2 to 3 years ago, was prescribed Vistaril?and a cream?(ciclopirox olamine 0.77% apply twice daily)?which seemed to help.  States the rash starts off?as a reddened area, which she scratches and causes scabbing.  Review of Systems  Constitutional: negative except as stated in HPI  Eye: negative except as stated in HPI  ENT: negative except as stated in HPI  Respiratory: negative except as stated in HPI  Cardiovascular: negative except as stated in HPI  Gastrointestinal: negative except as stated in HPI  Genitourinary: negative except as stated in HPI  Hema/Lymph: negative except as stated in HPI  Endocrine: negative except as stated in HPI  Immunologic: negative except as stated in HPI  Musculoskeletal: negative except as stated in HPI  Integumentary: negative except as stated in HPI  Neurologic: negative except as stated in HPI  All Other ROS_ negative except as stated in HPI  ?  ?  Physical Exam  Vitals & Measurements  T:?36.7? ?C (Oral)? HR:?64(Peripheral)? RR:?18? BP:?162/88? SpO2:?98%?  HT:?170.00?cm? WT:?85.200?kg? BMI:?29.48?  General: Alert and oriented, No acute distress.  HENT:  Normocephalic.  Neck: Supple, Non-tender, No lymphadenopathy.  Respiratory: Lungs are clear to auscultation, Respirations are non-labored, Breath sounds are equal, Symmetrical chest wall expansion.  Cardiovascular: Normal rate, Regular rhythm, No murmur.  Musculoskeletal: Normal range of motion.  Integumentary: Warm, Dry, Intact. abscess noted to right face lateral to right eye approximately 2 cm x 2 cm tunneling at 6:00. No drainage or erythema.  ?Scabbing noted to bilateral upper arms. Slight erythema noted to bilateral forearms.  Neurologic: No focal deficits, Cranial Nerves II-XII are grossly intact.  ?   ??????Description?  ??????Location:?R face,?cheek  ??????Anesthesia:?ethyl chloride  ??????Preparation: sterile field established, skin prepped with?chloraprep  ??????Incision:? ?incision was made, using an 18 gauge needle  ??????Drainage: moderate amount, purulent.?  ??????Wound:?Cleansed with NS KEY.  ?????Post procedure exam:?Circulation, motor, sensory examination intact. ?  ?????Patient tolerated:?Well. ?  ?????Complications:?None. ?  ?????Follow-up:?PCP, In 2 days, Antibiotic:?Bactrim  ?????Performed by:?Self. ?  ?  ?  Assessment/Plan  1.?Abscess of face?L02.01  ?Medication as ordered.? Cleanse area twice a day,?leave open to air to dry completely.? May cover?as needed,?change any dressings twice daily?and as needed.? If no improvement in symptoms in 2 to 3 days or if symptoms worsen?then return to clinic or go to ER.  Ordered:  sulfamethoxazole-trimethoprim, 1 tab(s), Oral, BID, X 10 day(s), # 20 tab(s), 0 Refill(s), Pharmacy: [facility], 170, cm, Height/Length Dosing, 08/31/21 9:22:00 CDT, 85.2, kg, Weight Dosing, 08/31/21 9:22:00 CDT  I&D SubQ Abscess simple - Incision/Drain [id number] PC, 08/31/21 14:25:00 CDT, 08/31/21 14:25:00 CDT, Abscess of face  Rash  Itching  Office/Outpatient Visit Level 4 Established 33674 PC, Abscess of face  Rash  Itching, 25, 08/31/21 14:25:00 CDT  Wound Culture,  Stat collect, 08/31/21 9:59:00 CDT, Drainage, Face, Nurse collect, Stop date 08/31/21 10:00:00 CDT, Abscess of face  ?  2.?Rash?R21  ??Triamcinolone as ordered.? ?Avoid environmental irritants. F/u with pcp. ER precautions.  Ordered:  hydrOXYzine, 25 mg = 1 cap(s), Oral, QID, PRN PRN for itching, X 10 day(s), # 40 cap(s), 0 Refill(s), Pharmacy: [facility], 170, cm, Height/Length Dosing, 08/31/21 9:22:00 CDT, 85.2, kg, Weight Dosing, 08/31/21 9:22:00 CDT  triamcinolone topical, 1 carla, TOP, TID, Apply sufficient amount to affected area as needed. Use for no longer than 2 consecutive weeks at a time., X 14 day(s), # 30 gm, 1 Refill(s), Pharmacy: [facility], 170, cm, Height/Length Dosing, 08/31/21 9:22:00 CDT, 85.2, k...  I&D SubQ Abscess simple - Incision/Drain [id number] PC, 08/31/21 14:25:00 CDT, 08/31/21 14:25:00 CDT, Abscess of face  Rash  Itching  Office/Outpatient Visit Level 4 Established 22408 PC, Abscess of face  Rash  Itching, 25, 08/31/21 14:25:00 CDT  ?  3.?Itching?L29.9  ?as above  Ordered:  hydrOXYzine, 25 mg = 1 cap(s), Oral, QID, PRN PRN for itching, X 10 day(s), # 40 cap(s), 0 Refill(s), Pharmacy: [facility], 170, cm, Height/Length Dosing, 08/31/21 9:22:00 CDT, 85.2, kg, Weight Dosing, 08/31/21 9:22:00 CDT  I&D SubQ Abscess simple - Incision/Drain [id number] PC, 08/31/21 14:25:00 CDT, 08/31/21 14:25:00 CDT, Abscess of face  Rash  Itching  Office/Outpatient Visit Level 4 Established 15680 PC, Abscess of face  Rash  Itching, 25, 08/31/21 14:25:00 CDT  ?   Problem List/Past Medical History  Ongoing  Anomalous origin of right coronary artery  Aortic regurgitation  GERD (gastroesophageal reflux disease)  HTN - Hypertension  Hyperlipidemia  Hyperlipidemia  Hypertension  Hypothyroidism  Insomnia  MIKI on CPAP  Osteoarthritis  Osteopenia  Prediabetes  Thoracic ascending aortic aneurysm  Vitamin D deficiency  Historical  Acid reflux  Aneurysm  Ankle and/or foot joint  pain  Anxiety  Ascending aorta abnormality  Chronic stable angina  Disorder of aorta  Gastric reflux  Hypertension, uncontrolled  Knee joint pain  Osteoarthritis of left knee  Pregnant  Pregnant  Thyroid  Thyroid disease  Procedure/Surgical History  Biopsy Gastrointestinal (None) (05/21/2021)  Esophagogastroduodenoscopy (None) (05/21/2021)  Esophagogastroduodenoscopy, flexible, transoral; with biopsy, single or multiple (05/21/2021)  Excision of Stomach, Pylorus, Via Natural or Artificial Opening Endoscopic, Diagnostic (05/21/2021)  Catheter placement in coronary artery(s) for coronary angiography, including intraprocedural injection(s) for coronary angiography, imaging supervision and interpretation; with left heart catheterization including intraprocedural injection(s) for left karely (02/26/2020)  Fluoroscopy of Multiple Coronary Arteries using Low Osmolar Contrast (02/26/2020)  Fluoroscopy of Thoracic Aorta using Low Osmolar Contrast (02/26/2020)  Injection procedure during cardiac catheterization including imaging supervision, interpretation, and report; for supravalvular aortography (List separately in addition to code for primary procedure) (02/26/2020)  Measurement of Cardiac Sampling and Pressure, Left Heart, Percutaneous Approach (02/26/2020)  Esophagogastroduodenoscopy, flexible, transoral; with biopsy, single or multiple (01/20/2016)  Excision of Small Intestine, Via Natural or Artificial Opening Endoscopic (01/20/2016)  Angiogram (01/01/2015)  Sebaceous cyst removal (01/01/1985)  Abdominal hysterectomy (1981)  Removal of benign tumor from bone (01/01/1981)  bilateral tubal ligation (06/23/1980)  colonoscopy  EGD  right knee surgery   Medications  aspirin 81 mg oral tablet, CHEWABLE, 81 mg= 1 tab(s), Oral, Daily, 6 refills  Bactrim  mg-160 mg oral tablet, 1 tab(s), Oral, BID  calcium carbonate 600 mg oral tablet, 1200 mg= 2 tab(s), Oral, BID, 2 refills  cholecalciferol 2000 intl units (50 mcg) oral  tablet, 2000 IntUnit= 1 tab(s), Oral, Daily, 3 refills  Fish Oil 1200 mg oral capsule, 1200 mg= 1 cap(s), Oral, Daily, 2 refills  levothyroxine 100 mcg (0.1 mg) oral tablet, 100 mcg= 1 tab(s), Oral, Daily, 1 refills  losartan 25 mg oral tablet, 25 mg= 1 tab(s), Oral, Daily,? ?Not taking  losartan 50 mg oral tablet, 50 mg= 1 tab(s), Oral, Daily, 3 refills  Melatonin 3 mg oral tablet, 3 mg= 1 tab(s), Oral, Once a day (at bedtime), PRN, 1 refills  meloxicam 7.5 mg oral tablet, 7.5 mg= 1 tab(s), Oral, Daily, 2 refills  NEBULIZER MACHINE AND TUBING, See Instructions  NIFEdipine (Eqv-Adalat CC) 60 mg oral tablet, extended release, 60 mg= 1 tab(s), Oral, Daily, 2 refills,? ?Not Taking per Prescriber: pt stated  Nitrostat 0.4 mg sublingual tab, 0.4 mg= 1 tab(s), SL, q5min, PRN, 5 refills  Pantoprazole 40 mg ORAL EC-Tablet, 40 mg= 1 tab(s), Oral, Daily, 3 refills  simvastatin 20 mg oral tablet, 20 mg= 1 tab(s), Oral, Once a day (at bedtime), 1 refills  triamcinolone 0.1% topical cream, 1 carla, TOP, TID, 1 refills  triamcinolone 0.5% topical cream, 1 carla, TOP, BID, 2 refills,? ?Still taking, not as prescribed: pt stated- prn Last Dose Date/Time Unknown  Vistaril 25 mg oral capsule, 25 mg= 1 cap(s), Oral, QID, PRN  Allergies  Demerol HCl?(unknown)  traMADol?(sweating)  Social History  Abuse/Neglect  No, No, Yes, 03/11/2021  Alcohol  Never, Alcohol use interferes with work or home: No. Others hurt by drinking: No. Household alcohol concerns: No., 06/29/2021  Employment/School  Employed, Highest education level: High school., 03/11/2021  Exercise  Exercise duration: 30. Exercise frequency: Daily. Exercise type: Walking., 03/11/2021  Home/Environment  Lives with Spouse. Living situation: Home/Independent. CPAP/BiPAP, Respiratory treatments, Single family house, 03/11/2021  Nutrition/Health  Low sodium, Good, 03/11/2021  Sexual  Sexually active: No. Gender Identity Identifies as female., 06/29/2021  Spiritual/Cultural  [place],  Yes, 03/11/2021  Substance Use  Never, 03/11/2021  Tobacco  Former smoker, quit more than 30 days ago, Cigarettes, N/A, 55 Years (Age stopped)., 03/11/2021  Family History  Congestive heart disease.: Sister.  Heart failure.: Mother and Brother.  Nephrectomy: Brother.  Primary malignant neoplasm of lung: Father.  Seizure: Sister.  Uterine cancer.......: Mother.  Immunizations  Vaccine Date Status   COVID-19 mRNA, LNP-S, PF - Moderna 03/31/2021 Recorded   COVID-19 mRNA, LNP-S, PF - Moderna 03/03/2021 Recorded   influenza virus vaccine, inactivated 12/17/2020 Given   zoster vaccine, inactivated 12/17/2020 Given   zoster vaccine, inactivated 09/23/2020 Given   influenza virus vaccine, inactivated 10/24/2019 Given   pneumococcal 23-polyvalent vaccine 07/30/2019 Given   influenza virus vaccine, inactivated 02/14/2018 Given   tetanus/diphtheria/pertussis, acel(Tdap) 03/07/2017 Given   Health Maintenance  Health Maintenance  ???Pending?(in the next year)  ???There are no current recommendations pending  ??? ??Due In Future?  ??? ? ? ?Hypertension Management-Education not due until??09/23/21??and every 1??year(s)  ??? ? ? ?Obesity Screening not due until??01/01/22??and every 1??year(s)  ??? ? ? ?Advance Directive not due until??01/02/22??and every 1??year(s)  ??? ? ? ?Alcohol Misuse Screening not due until??01/02/22??and every 1??year(s)  ??? ? ? ?Cognitive Screening not due until??01/02/22??and every 1??year(s)  ??? ? ? ?Fall Risk Assessment not due until??01/02/22??and every 1??year(s)  ??? ? ? ?Functional Assessment not due until??01/02/22??and every 1??year(s)  ??? ? ? ?Colorectal Screening not due until??01/04/22??and every 1??year(s)  ??? ? ? ?ADL Screening not due until??03/11/22??and every 1??year(s)  ??? ? ? ?Diabetes Screening not due until??05/22/22??and every 1??year(s)  ??? ? ? ?Hypertension Management-BMP not due until??05/22/22??and every 1??year(s)  ??? ? ? ?Aspirin Therapy for CVD Prevention not due  until??05/22/22??and every 1??year(s)  ??? ? ? ?Depression Screening not due until??06/19/22??and every 1??year(s)  ??? ? ? ?Medicare Annual Wellness Exam not due until??06/19/22??and every 1??year(s)  ???Satisfied?(in the past 1 year)  ??? ??Satisfied?  ??? ? ? ?ADL Screening on??03/11/21.??Satisfied by Lacey Varela LPN  ??? ? ? ?Advance Directive on??06/19/21.??Satisfied by Pily Stallworth LPN  ??? ? ? ?Alcohol Misuse Screening on??03/11/21.??Satisfied by Kamila Whittaker MD  ??? ? ? ?Aspirin Therapy for CVD Prevention on??05/22/21.??Satisfied by Danna HAYES, Rebecca Barton  ??? ? ? ?Blood Pressure Screening on??08/31/21.??Satisfied by Fernando Rocha LPN  ??? ? ? ?Body Mass Index Check on??08/31/21.??Satisfied by Fernando Rocha LPN  ??? ? ? ?Bone Density Screening on??09/10/20.??Satisfied by Airam Azevedo  ??? ? ? ?Breast Cancer Screening on??09/10/20.??Satisfied by Benita Hughes  ??? ? ? ?Cognitive Screening on??03/11/21.??Satisfied by Lacey Varela LPN  ??? ? ? ?Colorectal Screening on??01/04/21.??Satisfied by Pranay Barba  ??? ? ? ?Coronary Artery Disease Maintenance-Lipid Lowering Therapy on??05/21/21.??Satisfied by Radha HAYES, Ajay ALTAMIRANO  ??? ? ? ?Depression Screening on??06/19/21.??Satisfied by Pily Stallworth LPN  ??? ? ? ?Diabetes Screening on??05/22/21.??Satisfied by David Urbina  ??? ? ? ?Fall Risk Assessment on??06/19/21.??Satisfied by Pily Stallworth LPN  ??? ? ? ?Functional Assessment on??06/19/21.??Satisfied by Pily Stallworth LPN  ??? ? ? ?Hypertension Management-Blood Pressure on??08/31/21.??Satisfied by Fernando Rocha LPN  ??? ? ? ?Hypertension Management-BMP on??05/22/21.??Satisfied by David Urbina  ??? ? ? ?Hypertension Management-Education on??09/23/20.??Satisfied by Kamila Whittaker MD  ??? ? ? ?Influenza Vaccine on??12/17/20.??Satisfied by Lacey Varela LPN  ??? ? ? ?Lipid Screening on??05/21/21.??Satisfied by David Urbina  Víctor  ??? ? ? ?Medicare Annual Wellness Exam on??06/19/21.??Satisfied by Alex Ramos DO  ??? ? ? ?Obesity Screening on??08/31/21.??Satisfied by Fernando Rocha LPN  ??? ? ? ?Zoster Vaccine on??12/17/20.??Satisfied by Lacey Varela LPN  ?

## 2022-05-02 NOTE — HISTORICAL [facility] CERNER
This is a historical note converted from Gilda. Formatting and pictures may have been removed.  Please reference Gilda for original formatting and attached multimedia. Chief Complaint  2 month follow up wuth labs, pt c/o persistant cough  History of Present Illness  61-year-old female with past medical history of ascending aortic mildly aneurysmal territories, atypical chest pain, hypertension, hypercholesterolemia, and hypothyroidism is here for follow-up after labs. Currently the patient denies any acute complaints.? Still reports a chronic dry cough that has not resolved since stopping lisinopril.? Patient currently on Nexium for GERD, recent PFTs reveal any evidence of COPD, restrictive disease, or diffusion defect.? Patient had CMP/lipid panel drawn today, cholesterol well-controlled, CMP within normal limits.? Blood pressure elevated in clinic today 174/73, for patient reports she did not take her medication because of the labs.? She reports that she checks her blood pressure every day at home and is usually within the 120/70 range.? She denies any complaints of chest pain, shortness of breath, syncopal episodes, or palpitations, orthopnea, PND, lower extremity swelling. ?Patient will have repeat CT angiogram of the chest on April 2018. She has had extensive cardiovascular workup recently, see full report below.  ?  48 hour Holter study April 11, 2017:  Patient was in normal sinus rhythm throughout  Supraventricular ectopic beats consisted of rare premature beats  ventricular ectopic beats consisted of rare unifocal beats  ?   Echocardiogram April 11, 2017:  Normal left ventricular cavity with overall normal systolic function, estimated at 62%  Trace MR  1+ aortic regurgitation next line trace TR  No pulmonic regurgitation  Mildly dilated left atrium  Normal right atrial dimension with no evidence of thrombus or mass noted  Normal right ventricular size with preserved RV function  No evidence of any  pericardial effusion  ?   CT angiogram of the chest with and without contrast April 18, 2017:  Ascending aortic mild aneurysmal dilatation, unchanged (last study February 2016)  ?   Lexiscan 3/23/17: low risk study  Impression:  Abnormal myocardial perfusion study. Negative for ischemia. Small to  moderate size inferior defect of mild intensity appears fixed and  represents attenuation or prior infarction. Normal LVEF of 64%  ?   CT Angio of chest Feb. 2016  The aortic root measured 44.99 x 38.42 mm; the tubular portion of the  ascending aorta measured 45.34 x 41.46 mm; the right side of the arch  measured 43.49 x 38.28 mm (all these mildly aneurysmal).  ?   The aortic arch measured 28.06 mm, and the descending aorta measured  28.33 x 26.47 mm (both normal), with the measurement of the latter at  the level of the aortic ligament.  ?   The suprarenal abdominal aorta measured 21.6 x 19.22 mm (normal). The  celiac trunk has usual three vessel configuration and the SMA is  preserved.  ?   CT with contrast of Abd 7/2016  IMPRESSION:  No acute findings. Suspected constipation. Other chronic findings as  above.  ?  ?  Review of Systems  ?  Constitutional: No fever, chills, wt loss, weakness, or fatigue.  HEENT: No vision change, ear pain, nasal congestion, sore throat.  Respiratory: No shortness of breath, + cough  Cardiovascular: No chest pain, palpitations, peripheral edema.  Gastrointestinal: No n/v/d/c, No abdominal pain  Genitourinary: No dysuria, gross hematuria  Musculoskeletal: No muscle pain, joint pain or deformity  Integumentary: No rash, breakdown, wounds  Neuro: No focal neuro deficit, change in sensation  ?  ?  Physical Exam  Vitals & Measurements  T:?36.5? ?C ?(Oral)? BP:?174/73? SpO2:?100%? WT:?82.3?kg? WT:?82.3?kg?  ?  General: Alert and oriented, No acute distress.  HEENT: PERRLA, EOMI, Normal hearing, Oral mucosa moist, No pharyngeal exudates  Respiratory: CTAB, No c/r/w, Respirations non-labored, Breath  sounds equal.  Cardiovascular: Normal rate, Regular rhythm, No c/m/r/g, no carotid bruits, 2+ radial and DP pulses, no lower extremity edema  Gastrointestinal: Soft, NT, ND  Genitourinary: No costovertebral angle or suprapubic tenderness.  Musculoskeletal: Normal range of motion, Normal strength, No swelling, No deformity.  Integumentary: Warm, Dry, Intact.  Neurologic: Alert, Oriented, No focal deficits, CN II-XII grossly intact  Psychiatric: Cooperative, Appropriate mood & affect, Normal judgment.  ?  ?  Assessment/Plan  1. Ascending aortic mildly aneurysmal territories  -CT angiogram of the chest with and without contrast April 18, 2017:  -Ascending aortic mild aneurysmal dilatation, unchanged (last study February 2016)  -Good blood pressure control is very important, patient blood pressure elevated in clinic today, she did not take her blood pressure medication, reports that it is well controlled at home. ?Advised patient to check blood pressure at?home in the morning and in the evening, keep blood pressure log and bring it to next clinic appointment. ?Counseled patient on maintaining low sodium diet no more than 2 g of salt per day. also maintain smoking cessation, and good cholesterol control  -Next screening with CTA in 1 year April 2018, if similar findings with no changes consider screening every 2 years  ?   Hypertension  Continue losartan 50 mg daily  ?   Hypercholesterolemia  LDL, triglycerides at goal, continue pravastatin 40 mg daily  ?  Hypothyroidism  Continue levothyroxine and follow up with PCP  ?  All of the cardiology clinic in 6 months  ?   Problem List/Past Medical History  Aneurysm  Ankle and/or foot joint pain 26-FEB-2015 00:10:12<$>  Chronic stable angina  COPD (chronic obstructive pulmonary disease)  Gastric reflux  HTN - Hypertension  Hyperlipidemia  Hyperlipidemia  Hypertension  Hypertension, uncontrolled  Knee joint pain  Thyroid  Thyroid disease  Historical  No historical  problems  Procedure/Surgical History  Esophagogastroduodenoscopy, flexible, transoral; with biopsy, single or multiple (01/20/2016), Excision of Small Intestine, Via Natural or Artificial Opening Endoscopic (01/20/2016), Angiogram (01/01/2015), Sebaceous cyst removal (01/01/1985), Abdominal hysterectomy (1981), Removal of benign tumor from bone (01/01/1981), colonoscopy, EGD, right knee surgery.  Medications  amitriptyline 25 mg oral tablet, 25 mg, 1 tab(s), Oral, Once a day (at bedtime)  aspirin 81 mg oral tablet, CHEWABLE, 81 mg, 1 tab(s), Oral, Daily, 3 refills  Atrovent HFA 17 mcg/inh inhalation aerosol, 2 puff(s), INH, QID, 6 refills  cloNIDine, 0.1 mg, 1 tab(s), Oral, Once  levothyroxine 137 mcg (0.137 mg) oral tablet, 137 mcg, 1 tab(s), Oral, Daily, 6 refills  losartan 50 mg oral tablet, 50 mg, 1 tab(s), Oral, Daily, 6 refills  NEBULIZER MACHINE AND TUBING, See Instructions  NexIUM 40 mg oral delayed release capsule, 40 mg, 1 cap(s), Oral, Daily, 6 refills  pravastatin 40 mg oral tablet, 40 mg, 1 tab(s), Oral, Daily, 6 refills  Allergies  Demerol HCl?(unknown)  traMADol?(sweating)  Social History  Alcohol  Never  Employment/School  Employed, Work/School description:  at [employer]. Activity level: Moderate physical work. Highest education level: High school. Workplace hazards: Heavy lifting/twisting, Repetitive motion.  Exercise  Exercise frequency: 3-4 times/week. Self assessment: Fair condition. Exercise type: Walking, Sit-ups.  Home/Environment  Lives with Spouse. Living situation: Home/Independent. Home equipment: CPAP/BiPAP, BP monitor. Alcohol abuse in household: No. Substance abuse in household: No. Smoker in household: No. Injuries/Abuse/Neglect in household: No. Feels unsafe at home: No. Family/Friends available for support: Yes. Concern for family members at home: No. Major illness in household: No. Financial concerns: No.  Nutrition/Health  Low salt/low cholesterol, Wants to lose weight: Yes.  Sleeping concerns: Yes. Feels highly stressed: No.  Sexual  Substance Abuse  Never  Tobacco  Former smoker  Family History  Congestive heart disease.: Sister.  Heart failure.: Mother and Brother.  Nephrectomy: Brother.  Primary malignant neoplasm of lung: Father.  Seizure: Sister.  Uterine cancer 27-APR-2016 23:35:49<$>: Mother.  Lab Results  Reviewed  Diagnostic Results  Reviewed      I was present with the resident during the history and exam.  ? ?  [x? ] I discussed the case with the resident and agree with the findings and plan as documented in the resident?s note.  [ ] I discussed the case with the resident and agree with the findings and plan as documented in the resident?s note except: [ ]  ?

## 2022-05-02 NOTE — HISTORICAL [facility] CERNER
This is a historical note converted from [employer]. Formatting and pictures may have been removed.  Please reference [employer] for original formatting and attached multimedia. Chief Complaint  6m FU. denies CP and SOB. Hx of aortic aneurysm.  History of Present Illness  61 y/o female with PMH Ascending aortic mild aneurysmal dilatation, atypical chest pain, HTN, HLD, COPD, and hypothyroidism is here for 6 month?follow-up. Last screening for aortic aneurysm was in 2017 which was unchanged from 2016. She was recently at the ED on 7/1/18 with diaphoresis and weakness which was attributed to iatrogenic hyperthyroidism due to levothyroxine dose being too high. Since that time she has stopped taking levothyroxine and has felt fine. She denies chest pain, SOB, orthopnea, PND. BP is good at home and she is taking her medications appropriately. She has no issues with daily activities.  Review of Systems  General:?no fever, no night sweats, chills, fatigue, changes in weight.  Skin: no rashes, bruises, petechia  HEENT: no headache, head injury, no acute vision changes.  CVS: no chest pain, orthopnea, PND, edema  Resp: no SOB, cough, wheezing  GI: no dysphagia, melena, hematochezia, abdominal pain, nausea, vomiting, constipation.  : no dysuria, hematuria, polyuria, CVA pain, nocturia  Musculoskeletal: no joint stiffness, pain, swelling or weakness  Neuro: no headaches, syncope, seizures, numbness, tingling, vertigo, dizziness  ?  Physical Exam  Vitals & Measurements  T:?36.9? ?C (Oral)? HR:?62(Peripheral)? RR:?18? BP:?126/74? SpO2:?95%? WT:?83.6?kg? WT:?83.6?kg?  General: Alert and oriented x 3, in no acute distress.  HEENT: Normocephalic, atraumatic.  Neck: Supple. No JVD.  Chest: Clear to auscultation bilaterally. No wheezing or rhonchi.  Heart: S1 and S2 present. Regular rate and rhythm. No murmurs/gallops.  Abdomen: Soft, nontender, and nondistended. Normal active bowel sounds.  Extremities: No cyanosis, clubbing or  edema.  Neurologic: No focal deficit. No sensory deficit.  Integumentary: Moist mucous membranes. Good skin turgor, intact.  Psychiatric: Appropriate affect.  Assessment/Plan  1) Ascending aorta aneurysmal dilatation  - Last CT angiogram chest?was 4/2017 which was unchanged from 2016.  - Will order CT angiogram chest to assess. If unchanged, consider imaging every 2 years.  - BP well managed.  - Continue Norvasc, clonidine, ASA.  ?  2) HTN  - BP today 126/74, HR 62  - Continue Norvasc 10mg, clonidine 0.1mg  ?  3) Hypothyroidism  - Follow up with PCP for appropriate management.  ?  4) HLD  - Last lipid profile last year. Will order repeat fasting lipid profile.  ?  Follow up in 2 months after CT angiogram chest w/ and w/o contrast and fasting lipid profile.  ?  ?   Problem List/Past Medical History  Ongoing  Acid reflux  Aneurysm  Ankle and/or foot joint pain 26-FEB-2015 00:10:12<$>  Anxiety  Chronic stable angina  COPD (chronic obstructive pulmonary disease)  Gastric reflux  HTN - Hypertension  Hyperlipidemia  Hyperlipidemia  Hypertension  Hypertension, uncontrolled  Knee joint pain  Thyroid  Thyroid disease  Historical  No qualifying data  Procedure/Surgical History  Esophagogastroduodenoscopy, flexible, transoral; with biopsy, single or multiple (01/20/2016)  Excision of Small Intestine, Via Natural or Artificial Opening Endoscopic (01/20/2016)  Angiogram (01/01/2015)  Sebaceous cyst removal (01/01/1985)  Abdominal hysterectomy (1981)  Removal of benign tumor from bone (01/01/1981)  colonoscopy  EGD  right knee surgery   Medications  aspirin 81 mg oral tablet, CHEWABLE, 81 mg= 1 tab(s), Oral, Daily, 6 refills  cloNIDine, 0.1 mg= 1 tab(s), Oral, Once  Flonase 50 mcg/inh nasal spray, 1 spray(s), Nasal, BID, 4 refills,? ?Not taking  levothyroxine 75 mcg (0.075 mg) oral tablet, 75 mcg= 1 tab(s), Oral, Daily,? ?Not taking: stopped 7/8/2018  NEBULIZER MACHINE AND TUBING, See Instructions  neutral wrist splint, See  Instructions  NexIUM 40 mg oral delayed release capsule, 40 mg= 1 cap(s), Oral, Daily, 6 refills  Norvasc 10 mg oral tablet, 10 mg= 1 tab(s), Oral, Daily, 6 refills  pravastatin 40 mg oral tablet, 40 mg= 1 tab(s), Oral, Daily, 6 refills  Allergies  Demerol HCl?(unknown)  traMADol?(sweating)  Social History  Alcohol  Never, 05/11/2017  Employment/School  Employed, Work/School description:  at [employer]. Activity level: Moderate physical work. Highest education level: High school. Workplace hazards: Heavy lifting/twisting, Repetitive motion., 03/31/2015  Exercise  Exercise frequency: 3-4 times/week. Self assessment: Fair condition. Exercise type: Walking, Sit-ups., 03/31/2015  Home/Environment  Lives with Spouse. Living situation: Home/Independent. Home equipment: CPAP/BiPAP, BP monitor. Alcohol abuse in household: No. Substance abuse in household: No. Smoker in household: No. Injuries/Abuse/Neglect in household: No. Feels unsafe at home: No. Family/Friends available for support: Yes. Concern for family members at home: No. Major illness in household: No. Financial concerns: No., 03/31/2015  Nutrition/Health  Low salt/low cholesterol, Wants to lose weight: Yes. Sleeping concerns: Yes. Feels highly stressed: No., 03/31/2015  Sexual  Substance Abuse  Never, 08/27/2015  Tobacco  Former smoker, 03/17/2016  Family History  Congestive heart disease.: Sister.  Heart failure.: Mother and Brother.  Nephrectomy: Brother.  Primary malignant neoplasm of lung: Father.  Seizure: Sister.  Uterine cancer 27-APR-2016 23:35:49<$>: Mother.      I was present with the resident during the history and exam.  ? ?  [?x ] I discussed the case with the resident and agree with the findings and plan as documented in the resident?s note.  [ ] I discussed the case with the resident and agree with the findings and plan as documented in the resident?s note except: [ ]  ?

## 2022-05-02 NOTE — HISTORICAL [facility] CERNER
This is a historical note converted from [employer]. Formatting and pictures may have been removed.  Please reference [employer] for original formatting and attached multimedia. Chief Complaint  F/U VISIT, C/O FREQUENT CHEST DISCOMFORT WITH SOB ON EXERTION, C/O ALMA ROSA. LEG PAIN  History of Present Illness  Cardiac tests:?Treadmill stress test (3/13/2017) abnormal stress test due to ST depression in recovery  Echocardiogram (11/6/2018) EF 65%.? Mild to moderate AR.? Mild MR.  ?  family hx:? brothers, sisters, mother had CAD  ?   Patient is?65 YO and is being?seen for CP.??Patient was prescribed Imdur;?however, patient has been putting it under the tongue. ?It has helped but made her lightheaded.? (Advised patient on the right way to take Imdur. ?We will plan on prescribing?sublingual nitroglycerin.)  ?   +??chest discomfort, substernal, and radiate to left arm and assoc with SOB.? Get these sxs with exertion.??  +??shortness of breath?  + 3 pillow??orthopnea?  +??dizziness?  No ?syncope?  +??fatigue?  Sometimes has? palpitations?  No ?edema? ?  ?   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   Body habitus:?overweight  Smoking history:?quit x 10 years  ?  ?  ?  ?  ?  Review of Systems  Constitutional:  ?No?weakness?  ?+?diaphoresis ?  HEENT:  ?+ congestion?  ?+ vision change  Cardiovascular:  See HPI.  Respiratory:  ?+ cough?  ?No wheezing  GI:  ?No heartburn?  ?+ nausea?  ?No vomiting?  ?No diarrhea?  :  ?No hematuria?  ?No dysuria?  Msl/Skel:  ?+ difficult walking, get pain behind knees?that shoot down leg?  ?+ joint pain  Skin:  ?No rash?  ?No pruritus?  Neuro/Psych:  ?No?headache???  ?No anxiety  Endocrine:  ?No nocturia?  ?+ excessive thirst?  Hematologic:  ?No easy bruising?  ?No easy bleeding?  ?  ?  ?  Physical Exam  Vitals & Measurements  T:?37.1? ?C (Oral)? HR:?62(Peripheral)? RR:?20? BP:?144/68? HT:?170?cm? WT:?85?kg? WT:?85?kg?  General:  No  apparent distress.  Appears stated age.  HEENT:  Pupils are round with white sclera.  No rhinorrhea.  Neck:  Supple.  No JVD.  Cardiac:  Regular rhythm.  Normal rate.  No murmur.  Lungs:  Clear to auscultation bilaterally.  Nonlabored breathing.  Abdomen:  Nondistended.  Soft.  Vascular:  No clubbing.  No cyanosis.  Normal radial pulse.  Normal capillary refill.  Extremities:  No edema.  Neuro:  Awake. ?  Alert.  Psych:  Pleasant.  Cooperative.  Hematologic:  No ecchymosis.  Skin:  Warm.  No rash.  ?  Assessment/Plan  Aortic regurgitation?I35.1  Plan on repeat?echocardiogram  ?  Ascending aortic aneurysm?I71.2  Last CT was 11/2019?(reports says?ectasia is unchanged)  ?  Hyperlipemia?E78.5  Continue statin  ?  Hypertension?I10  ?Continue antihypertensive  ?  Angina  -Discussed coronary angiogram with patient.? Patient has a abnormal stress test from 2017.? The patient is agreeable to proceed with an angiogram.  ?  The indications, risks,?benefits, and alternatives to coronary angiography and potential stenting?were discussed with the patient.? The risks include but are not limited to: ?Bleeding,?myocardial infarction,?perforation,?tamponade,?dissection,?thrombus,?death,?contrast allergy,?allergic reaction,?need for emergent surgery,?hematoma,?stent thrombosis, infection.? The patient has standard risk for?the patients?age group and comorbidities.? The patient is questions were answered.? The patient?understands the possible risks?but desires to proceed.   Problem List/Past Medical History  Ongoing  Acid reflux  Aneurysm  Ankle and/or foot joint pain  Anxiety  Ascending aorta abnormality  Chronic stable angina  Disorder of aorta  Gastric reflux  HTN - Hypertension  Hyperlipidemia  Hyperlipidemia  Hypertension  Hypertension, uncontrolled  Knee joint pain  Thyroid  Thyroid disease  Historical  Pregnant  Pregnant  Procedure/Surgical History  Esophagogastroduodenoscopy, flexible, transoral; with biopsy, single or multiple  (01/20/2016)  Excision of Small Intestine, Via Natural or Artificial Opening Endoscopic (01/20/2016)  Angiogram (01/01/2015)  Sebaceous cyst removal (01/01/1985)  Abdominal hysterectomy (1981)  Removal of benign tumor from bone (01/01/1981)  bilateral tubal ligation (06/23/1980)  colonoscopy  EGD  right knee surgery   Medications  aspirin 81 mg oral tablet, CHEWABLE, 81 mg= 1 tab(s), Oral, Daily, 6 refills  cholecalciferol 2000 intl units (50 mcg) oral tablet, 2000 IntUnit= 1 tab(s), Oral, Daily, 3 refills  Flonase 50 mcg/inh nasal spray, 1 spray(s), Nasal, BID, 7 refills  Imdur 30 mg oral tablet, extended release, 30 mg= 1 tab(s), Oral, qAM, 3 refills  levothyroxine 88 mcg (0.088 mg) oral tablet, 88 mcg= 1 tab(s), Oral, Daily, 3 refills  losartan 50 mg oral tablet, 50 mg= 1 tab(s), Oral, Daily, 3 refills  NEBULIZER MACHINE AND TUBING, See Instructions  neutral wrist splint, See Instructions,? ?Not taking: Last Dose Date/Time Unknown  Pantoprazole 40 mg ORAL EC-Tablet, 40 mg= 1 tab(s), Oral, Daily  pravastatin 40 mg oral tablet, 40 mg= 1 tab(s), Oral, Daily, 3 refills  Allergies  Demerol HCl?(unknown)  traMADol?(sweating)  Social History  Abuse/Neglect  No, No, Yes, 01/23/2020  Alcohol  Never, 05/11/2017  Employment/School  Employed, Work/School description:  at [employer]. Activity level: Moderate physical work. Highest education level: High school. Workplace hazards: Heavy lifting/twisting, Repetitive motion., 03/31/2015  Exercise  Exercise frequency: 3-4 times/week. Exercise type: Walking, Sit-ups., 03/14/2019  Home/Environment  Lives with Spouse. Living situation: Home/Independent. Home equipment: CPAP/BiPAP, Respiratory treatments. Alcohol abuse in household: No. Substance abuse in household: No. Smoker in household: No. Injuries/Abuse/Neglect in household: No. Feels unsafe at home: No. Safe place to go: Yes. Family/Friends available for support: Yes., 03/14/2019  Nutrition/Health  Low fat, Good,  07/30/2019  Sexual  Gender Identity Identifies as female., 01/31/2019  Spiritual/Cultural  Restoration, 02/18/2020  Substance Use  Never, 08/27/2015  Tobacco  Former smoker, quit more than 30 days ago, Cigarettes, N/A, 02/18/2020  Former smoker, quit more than 30 days ago, N/A, 01/23/2020  Family History  Congestive heart disease.: Sister.  Heart failure.: Mother and Brother.  Nephrectomy: Brother.  Primary malignant neoplasm of lung: Father.  Seizure: Sister.  Uterine cancer 27-APR-2016 23:35:49<$>: Mother.  Health Maintenance  Health Maintenance  ???Pending?(in the next year)  ??? ??OverDue  ??? ? ? ?Coronary Artery Disease Maintenance-Antiplatelet Agent Prescribed due??and every?  ??? ? ? ?Coronary Artery Disease Maintenance-Lipid Lowering Therapy due??and every?  ??? ? ? ?Pneumococcal Vaccine due??and every?  ??? ? ? ?Hypertension Management-Education due??05/10/19??and every 1??year(s)  ??? ? ? ?Aspirin Therapy for CVD Prevention due??05/10/19??and every 1??year(s)  ??? ? ? ?Advance Directive due??01/01/20??and every 1??year(s)  ??? ??Due?  ??? ? ? ?Alcohol Misuse Screening due??01/01/20??and every 1??year(s)  ??? ? ? ?Functional Assessment due??01/01/20??and every 1??year(s)  ??? ? ? ?Bone Density Screening due??02/18/20??Variable frequency  ??? ? ? ?Medicare Annual Wellness Exam due??02/18/20??and every 1??year(s)  ??? ? ? ?Pneumococcal Vaccine due??02/18/20??Variable frequency  ??? ? ? ?Zoster Vaccine due??02/18/20??and every 100??year(s)  ??? ??Due In Future?  ??? ? ? ?Colorectal Screening (Senior Wellness) not due until??08/25/20??and every 1??year(s)  ??? ? ? ?Hypertension Management-BMP not due until??11/04/20??and every 1??year(s)  ??? ? ? ?Cognitive Screening not due until??01/01/21??and every 1??year(s)  ??? ? ? ?Fall Risk Assessment not due until??01/01/21??and every 1??year(s)  ??? ? ? ?Geriatric Depression Screening not due until??01/01/21??and every 1??year(s)  ??? ? ? ?Obesity Screening not due  until??01/01/21??and every 1??year(s)  ??? ? ? ?ADL Screening not due until??01/23/21??and every 1??year(s)  ??? ? ? ?Hypertension Management-Blood Pressure not due until??02/17/21??and every 1??year(s)  ???Satisfied?(in the past 1 year)  ??? ??Satisfied?  ??? ? ? ?ADL Screening on??01/23/20.??Satisfied by Morgan RA, Raymundo Aerial  ??? ? ? ?Blood Pressure Screening on??02/18/20.??Satisfied by Panchito LPN, Lashae K  ??? ? ? ?Body Mass Index Check on??02/18/20.??Satisfied by Panchito LPN, Lashae K  ??? ? ? ?Breast Cancer Screening (Senior Wellness) on??04/04/19.??Satisfied by Griselda Mac  ??? ? ? ?Cognitive Screening on??01/23/20.??Satisfied by Eddie KNAPP, Raymundo Aerial  ??? ? ? ?Colorectal Screening (Senior Wellness) on??08/26/19.??Satisfied by Tammy Layne  ??? ? ? ?Coronary Artery Disease Maintenance-Lipid Lowering Therapy on??10/24/19.??Satisfied by Leticia Contreras MD  ??? ? ? ?Depression Screening on??02/18/20.??Satisfied by Panchito LPN, Lashae K  ??? ? ? ?Diabetes Screening on??11/05/19.??Satisfied by Rosita Moore  ??? ? ? ?Fall Risk Assessment on??02/18/20.??Satisfied by Panchito LPN, Lashae K  ??? ? ? ?Geriatric Depression Screening on??01/23/20.??Satisfied by Morgan LPN, Raymundo Aerial  ??? ? ? ?Hypertension Management-Blood Pressure on??02/18/20.??Satisfied by Panchito LPN, Lashae K  ??? ? ? ?Influenza Vaccine on??10/24/19.??Satisfied by Сергей KNAPP Carli  ??? ? ? ?Lipid Screening on??08/29/19.??Satisfied by Jace Marin  ??? ? ? ?Obesity Screening on??02/18/20.??Satisfied by Lashae Flanagan LPN

## 2022-05-02 NOTE — HISTORICAL [facility] CERNER
This is a historical note converted from [employer]. Formatting and pictures may have been removed.  Please reference [employer] for original formatting and attached multimedia. Chief Complaint: chest pain, nausea, vomiting, back pain  ?   History of Present Illness  Patient is a 65-year-old female with past medical history significant for hypothyroidism, MIKI on CPAP, hypertension, hyperlipidemia, prediabetes, GERD, osteopenia, vitamin D deficiency, bilateral knee osteoarthritis, insomnia, aortic regurgitation, and 4.5 cm ascending thoracic aortic aneurysm who presented to the ER today with chief complaints of epigastric pain radiating to the upper chest and back pain x1 week.? Patient reports chest pain which she describes pointing to the epigastric region and radiating to central chest.? The pain described as intermittent spasms that come and go.? Patient reports pain is worsened with eating and worse with exertion.? She does report a history of GERD and states that over the past week she feels that anytime she eats it feels like something is getting stuck in her chest.? Endorse a few occasions of nonbloody nonbilious emesis immediately upon intake of water or solid foods.? She does state that chest pain will come on intermittently at night.? Does not know of any alleviating factors.? Chest pain will last for few minutes to a few hours.? She actually has a prescription for sublingual nitroglycerin provided by her cardiologist but states she never tried it because she was too scared.? She also endorses increased shortness of breath over the past week that accompanies the chest pain. she denies any associated diaphoresis is report associated nausea with a few episodes of vomiting, and palpitations.? Reports the chest pain is nonrating to her back.? Her back pain is separate from her chest pain, located in the lower lumbar region and feels like muscle spasms.? Patient denies any fever, chills, lower extremity swelling,  weakness, dizziness, headaches, vision changes, new rashes, dysuria, hematuria, hematemesis, melena, hematochezia, increased urinary frequency, loss of consciousness or syncope.? She reports compliance with all of her medications.? Patient was last seen in the cardiology clinic on 3/9/2021 visit Procardia was started and plan to repeat CTA chest in March 2022 for monitoring purposes.? Then patient has also seen her PCP who increased losartan dose to 50 mg daily for better BP control. ?Present patient reports?chest pain?has subsided since?arrival in the ER,?she states it feels more like a soreness  ?  In the ED, CT angio chest and CT abdomen and pelvis was performed which showed aneurysm without dissection and stable from prior exam.? BMP rather unremarkable, lipase 12, initial troponin 0 0.060, CBC rather unremarkable.? Vital signs showed bradycardia HR in low 50s which on chart review patient has chronic history of bradycardia which she reports asymptomatic.? SBP noted to be elevated in the 180s to 190s.? In the ED patient was given the pain 10 mg, aspirin 325 mg, Lopressor 2 mg IV push, and nitro paste 1in. internal medicine is being consulted for further evaluation and ACS rule out.  ?  PMH: as stated above in HPI  ?   PSX:Angiogram: 01/01/15  Sebaceous cyst removal: 01/01/85  Abdominal hysterectomy: 1981  Removal of benign tumor from bone: 01/01/81  bilateral tubal ligation: 06/23/80  right knee surgery  EGD  colonoscopy  ?  FMH:Father: Primary malignant neoplasm of lung  Mother: Heart failure.; Uterine cancer.......  Brother: Heart failure.; Nephrectomy  Sister: Congestive heart disease.; Seizure  ?  Allergies:traMADol; Demerol HCl  ?  Social  Tobacco: Former smokers quit 10-12 years ago, prior 2 ppd since age 15  ETOH abuse: Denies  Illicit drug use: Denies  ?   ROS  General:?no fever, no night sweats, chills, fatigue, changes in weight.  Skin: no rashes, bruises, petechia  HEENT: no headache, head injury, no  acute vision changes.  CVS: + chest pain, palpitations, orthopnea; no PND, no edema  Resp:?+ SOB worse with exertion during chest pain, no cough or wheezing  GI: no dysphagia, melena, hematochezia, + epigastric abdominal pain, +nausea, +vomiting, no constipation.  : no dysuria, hematuria, polyuria, CVA pain, nocturia  Musculoskeletal: no joint stiffness, pain, swelling or weakness; + lower lumar back spasms  Neuro: no headaches, syncope, seizures, numbness, tingling, vertigo, dizziness  ?   Home medications  Home Medications (15) Active  aspirin 81 mg oral tablet, CHEWABLE?81 mg = 1 tab(s), Oral, Daily  calcium carbonate 600 mg oral tablet?1,200 mg = 2 tab(s), Oral, BID  cholecalciferol 2000 intl units (50 mcg) oral tablet?2,000 IntUnit = 1 tab(s), Oral, Daily  Fish Oil 1200 mg oral capsule?1,200 mg = 1 cap(s), Oral, Daily  hydrOXYzine hydrochloride 25 mg oral tablet?25 mg = 1 tab(s), Oral, QID  levothyroxine 100 mcg (0.1 mg) oral tablet?100 mcg = 1 tab(s), Oral, Daily  losartan 50 mg oral tablet?50 mg = 1 tab(s), Oral, Daily  Melatonin 3 mg oral tablet?3 mg = 1 tab(s), PRN, Oral, Once a day (at bedtime)  meloxicam 7.5 mg oral tablet?7.5 mg = 1 tab(s), Oral, Daily  NEBULIZER MACHINE AND TUBING?See Instructions  NIFEdipine (Eqv-Adalat CC) 60 mg oral tablet, extended release?60 mg = 1 tab(s), Oral, Daily  Nitrostat 0.4 mg sublingual tab?0.4 mg = 1 tab(s), PRN, SL, q5min  Pantoprazole 40 mg ORAL EC-Tablet?40 mg = 1 tab(s), Oral, Daily  simvastatin 20 mg oral tablet?20 mg = 1 tab(s), Oral, Once a day (at bedtime)  triamcinolone 0.5% topical cream?1 carla, TOP, BID  ?  Physical exam  Vital Signs (last 24 hrs)_____??????????Last Charted___________  Temp Oral??????????????????????36.6 DegC ?(MAY 20 09:49)  Heart Rate Peripheral?????????????????L?55bpm ?(MAY 20 14:10)  Resp Rate???????????????????????18 br/min ?(MAY 20 14:10)  SBP??????????????????????H?188mmHg ?(MAY 20 14:10)  DBP??????????????????????H?91mmHg ?(MAY 20  14:10)  SpO2??????????????????????97 % ?(MAY 20 14:10)  Weight??????????????????????84 kg ?(MAY 20 09:49)  Height??????????????????????170.18 cm ?(MAY 20 09:49)  BMI??????????????????????29 ?(MAY 20 09:49)  ?  General: AAOx3, NAD, alert and cooperative  HEENT: PERRL, EOMI  Neck: no LAD, no JVD, supple  CVS: S1/S2 nml, Regular rhythm, bradycardic, no murmurs, rubs or gallops, no carotid bruits  Resp: CTA b/l, no wheezing, rales or rhonchi  GI: Not distended, BS +, no guarding or rebound, TTP in epigastric region and RUQ  Skin: not jaundiced, warm, no rashes  Musculoskeletal: normal ROM, no joint tenderness, normal muscular development  Extremities: No edema, peripheral pulses intact  Neuro: CN II-XII grossly intact, strength and sensation symmetric and intact throughout, no focal neurological deficits  ?   Labs  Labs Last 24 Hours?  ?Chemistry? Hematology/Coagulation?   Sodium Lvl: 137 mmol/L (05/20/21 10:32:00) WBC: 6.6 x10(3)/mcL (05/20/21 10:32:00)   Potassium Lvl: 4 mmol/L (05/20/21 10:32:00) RBC: 4.73 x10(6)/mcL (05/20/21 10:32:00)   Chloride: 102 mmol/L (05/20/21 10:32:00) Hgb: 14.3 gm/dL (05/20/21 10:32:00)   CO2: 28 mmol/L (05/20/21 10:32:00) Hct: 44.1 % (05/20/21 10:32:00)   Calcium Lvl: 9.8 mg/dL (05/20/21 10:32:00) Platelet: 169 x10(3)/mcL (05/20/21 10:32:00)   Glucose Lvl: 108 mg/dL (05/20/21 10:32:00) MCV: 93.2 fL (05/20/21 10:32:00)   BUN:?7.4 mg/dL?Low (05/20/21 10:32:00) MCH: 30.2 pg (05/20/21 10:32:00)   Creatinine: 0.85 mg/dL (05/20/21 10:32:00) MCHC: 32.4 gm/dL (05/20/21 10:32:00)   Est Creat Clearance Ser: 63.9 mL/min (05/20/21 11:01:22) RDW: 12.7 % (05/20/21 10:32:00)   eGFR-AA:?86?Low (05/20/21 10:32:00) MPV:?11.3 fL?High (05/20/21 10:32:00)   eGFR-CHANTAL:?71 mL/min/1.73 m2?Low (05/20/21 10:32:00) Abs Neut: 4.7 x10(3)/mcL (05/20/21 10:32:00)   Bili Total: 0.7 mg/dL (05/20/21 10:32:00) Neutro Auto: 72 % (05/20/21 10:32:00)   Bili Direct: 0.3 mg/dL (05/20/21 10:32:00) Lymph Auto: 18 % (05/20/21  10:32:00)   Bili Indirect: 0.4 mg/dL (05/20/21 10:32:00) Mono Auto: 7 % (05/20/21 10:32:00)   AST: 15 unit/L (05/20/21 10:32:00) Eos Auto: 2 % (05/20/21 10:32:00)   ALT: 17 unit/L (05/20/21 10:32:00) Abs Eos: 0.1 x10(3)/mcL (05/20/21 10:32:00)   Alk Phos: 106 unit/L (05/20/21 10:32:00) Basophil Auto: 1 % (05/20/21 10:32:00)   Total Protein: 7.1 gm/dL (05/20/21 10:32:00) Abs Neutro: 4.7 x10(3)/mcL (05/20/21 10:32:00)   Albumin Lvl: 3.8 gm/dL (05/20/21 10:32:00) Abs Lymph: 1.2 x10(3)/mcL (05/20/21 10:32:00)   Globulin: 3.3 gm/dL (05/20/21 10:32:00) Abs Mono: 0.4 x10(3)/mcL (05/20/21 10:32:00)   A/G Ratio: 1.2 ratio (05/20/21 10:32:00) Abs Baso: 0 x10(3)/mcL (05/20/21 10:32:00)   Lipase Lvl: 12 U/L (05/20/21 10:32:00) IG%: 0 % (05/20/21 10:32:00)   Troponin-I:?0.06 ng/mL?High (05/20/21 10:32:00) IG#: 0.01 (05/20/21 10:32:00)   ?  ?  Radiology  Accession:?[id number]  Order:?CT Angio Chest W W/O Contrast  Report Dt/Tm:?05/20/2021 12:39  Report:?  CT angiogram chest with and without intravenous contrast  CT abdomen and pelvis with and without contrast  2-D MIPS and postprocessing volume rendering provided  HISTORY: Dissection ?thoracic aneurysm, correlation February 11, 2021  and July 2016?  ?  As per PQRS measures, all CT scans at this facility used dose  modulation, iterative reconstruction, and/or weight based dose  adjustment when appropriate to reduce radiation dose to as low as  reasonably achievable.  ?  FINDINGS:  Ascending thoracic aorta diameter up to 4.5 cm stable. Ectasia at the  arch and descending also stable. Abdominal aorta caliber also similar  prior with ectasia/focal saccular aneurysm diameter up to 25 mm,  slightly increasing from 23 mm. Diffuse plaquing is mild. No  dissection identified. There may be significant narrowing proximal  celiac axis with poststenotic dilatation similar recent prior exam.  SMA and KARMA patent. Iliac arteries patent. No pulmonary arterial  embolus identified. No pleural  effusion. No sizable pulmonary  consolidation.  ?  Gastric mucosa may be hyperemic. No obstructive bowel findings.  Appendix normal caliber.  ?  The liver, spleen, pancreas adrenal glands and kidneys without acute  or adverse change. No intrapelvic pathology noted urinary bladder  unremarkable. Bones appear demineralized.  ?  ?  IMPRESSION:  Aortic aneurysm without dissection details above  ?  ?  ?  ?  ?  Assessment and Plan  NSTEMI possibly 2/2 uncontrolled HTN  Thoracic ascending aortic aneurysm, no dissection seen on CTA today  -Pt reports chest pain intermittent x1 week with occasional associated nausea and increased shortness of breath  -SBP 170s-180s on arrival, initial troponin 0.060, EKG on arrival showed sinus bradycardia with nonspecific T wave changes which were noted in prior EKG on 08/2020  -Given aspirin 325mg in ED along with amlodipine 10mg, Lopressor 2mg IV push, and nitro paste  -will continue to trend troponins with EKG q6h x 3 doses  -Pt currently reports chest pain subsided, denies shortness of breath -patient symptoms of chest pain or shortness of breath?should?reoccur/worsen and troponin continues to rise will start ACS protocol with heparin gtt  -Repeat BP after nitro paste was 161/67 -- started hydralazine 10mg IV q2h prn SBP > 160 or DBP > 110 - will try hydralazine first and if BP continues to remain in 170s-180s will need to consider upgrade to the ICU and starting on nitro or nicardipine gtt?  -will resume home meds losartan 50mg daily and procardia 60mg daily to start tomorrow  -resume home aspirin 81mg tomorrow?and statin tonight  -CTA chest performed 2/11/21 showed the aneurysm is?stable at 4.5cm, CTA chest/A/P done today in ER showed stable and no dissection  -pt follows with [facility] cardiology  -will aim for goal BP < 140/90 in setting of aortic aneurysm  -last Echo done Oct 2020 > 6 months ago, ordered repeat Echo for the am  ?  HX of GERD  -Resume home Protonix 40mg daily and calcium  carbonate prn  -In setting of epigastric abdominal pain with nausea and vomiting associated with eating?on going x 1 week plus TTP on exam in epigastric and RUQ, will order US RUQ for the am to evaluate gallbladder, keep patient NPO p mn  ?  Bradycardia, chronic  -pt reports chronic hx of bradycardia  -will monitor on tele  ?  HLD  -ordered lipid panel  -continue home simvastatin  ?  Pre-Diabetes  A1c 6.1%  ?  MIKI on CPAP  -will start nightly CPAP  ?  Hypothyroidism  -resume home med Synthroid  ?  Hx of osteopenia with vit D deficiency  -resume home med vit d daily  ?  FOBT positive on 01/4/2021  -H&H wnl, no signs of active bleeding  -PCP has already sent referral to GI for colonoscopy - no need for further inpatient workup  ?  ?  Prophylaxis: Lovenox 40mg sub daily  Nutrition:NPO After Midnight -- 05/21/21 0:01:00 CDT, okay to give meds, CM NPO;  Cardiac Meal Plan -- 05/20/21 14:44:00 CDT, Start Meal: Next Meal  Antimicrobials:? none  Fluids: none  ?  Disposition: Admit to tele with monitor for NSTEMI. Trend troponins and EKGs. F/U RUQ US in the am. BP control, may need to upgrade to ICU on nitro or?nicardipine gtt if?BP remains?uncontrolled, will try hydralazine IV push?first since?BP is?coming?down after nitro paste.  ?  ?   I was present with the resident during the history and physical examination.  ?  [ ?] I discussed the case with the resident and agree with the findings and plan as documented in the residents note.  [ x?] I discussed the case with the resident and agree with the findings and plan as documented in the residents note?except:  ?  ?GI consult for possible EGD

## 2022-05-19 DIAGNOSIS — I73.9 CLAUDICATION: Primary | ICD-10-CM

## 2022-05-24 ENCOUNTER — TELEPHONE (OUTPATIENT)
Dept: CARDIOLOGY | Facility: CLINIC | Age: 67
End: 2022-05-24
Payer: MEDICARE

## 2022-05-26 ENCOUNTER — HOSPITAL ENCOUNTER (EMERGENCY)
Facility: HOSPITAL | Age: 67
Discharge: HOME | End: 2022-05-26
Attending: INTERNAL MEDICINE
Payer: MEDICARE

## 2022-05-26 VITALS
TEMPERATURE: 98 F | BODY MASS INDEX: 30.45 KG/M2 | HEART RATE: 51 BPM | SYSTOLIC BLOOD PRESSURE: 176 MMHG | HEIGHT: 67 IN | RESPIRATION RATE: 15 BRPM | OXYGEN SATURATION: 97 % | WEIGHT: 194 LBS | DIASTOLIC BLOOD PRESSURE: 75 MMHG

## 2022-05-26 DIAGNOSIS — I71.21 ANEURYSM OF ASCENDING AORTA: ICD-10-CM

## 2022-05-26 DIAGNOSIS — R07.9 CHEST PAIN: ICD-10-CM

## 2022-05-26 DIAGNOSIS — R07.9 CHEST PAIN, UNSPECIFIED TYPE: Primary | ICD-10-CM

## 2022-05-26 PROBLEM — I10 HYPERTENSION: Status: ACTIVE | Noted: 2022-05-26

## 2022-05-26 PROBLEM — Q24.5 ANOMALOUS ORIGIN OF RIGHT CORONARY ARTERY: Status: ACTIVE | Noted: 2022-05-26

## 2022-05-26 PROBLEM — E03.9 HYPOTHYROIDISM: Status: ACTIVE | Noted: 2022-05-26

## 2022-05-26 PROBLEM — K21.9 GASTROESOPHAGEAL REFLUX DISEASE: Status: ACTIVE | Noted: 2022-05-26

## 2022-05-26 PROBLEM — G47.33 OBSTRUCTIVE SLEEP APNEA SYNDROME: Status: ACTIVE | Noted: 2022-05-26

## 2022-05-26 PROBLEM — E78.5 HYPERLIPIDEMIA: Status: ACTIVE | Noted: 2022-05-26

## 2022-05-26 PROBLEM — I35.1 AORTIC VALVE REGURGITATION: Status: ACTIVE | Noted: 2022-05-26

## 2022-05-26 LAB
ALBUMIN SERPL-MCNC: 3.6 GM/DL (ref 3.4–4.8)
ALBUMIN/GLOB SERPL: 1.1 RATIO (ref 1.1–2)
ALP SERPL-CCNC: 113 UNIT/L (ref 40–150)
ALT SERPL-CCNC: 18 UNIT/L (ref 0–55)
APTT PPP: 30.1 SECONDS
AST SERPL-CCNC: 19 UNIT/L (ref 5–34)
BASOPHILS # BLD AUTO: 0.04 X10(3)/MCL (ref 0–0.2)
BASOPHILS NFR BLD AUTO: 0.8 %
BILIRUBIN DIRECT+TOT PNL SERPL-MCNC: 0.6 MG/DL
BNP BLD-MCNC: 126.6 PG/ML
BUN SERPL-MCNC: 10.5 MG/DL (ref 9.8–20.1)
CALCIUM SERPL-MCNC: 9.6 MG/DL (ref 8.4–10.2)
CHLORIDE SERPL-SCNC: 101 MMOL/L (ref 98–107)
CO2 SERPL-SCNC: 31 MMOL/L (ref 23–31)
CREAT SERPL-MCNC: 0.81 MG/DL (ref 0.55–1.02)
EOSINOPHIL # BLD AUTO: 0.17 X10(3)/MCL (ref 0–0.9)
EOSINOPHIL NFR BLD AUTO: 3.3 %
ERYTHROCYTE [DISTWIDTH] IN BLOOD BY AUTOMATED COUNT: 12.8 % (ref 11.5–17)
GLOBULIN SER-MCNC: 3.3 GM/DL (ref 2.4–3.5)
GLUCOSE SERPL-MCNC: 92 MG/DL (ref 82–115)
HCT VFR BLD AUTO: 44.8 % (ref 37–47)
HGB BLD-MCNC: 14.3 GM/DL (ref 12–16)
IMM GRANULOCYTES # BLD AUTO: 0.02 X10(3)/MCL (ref 0–0.02)
IMM GRANULOCYTES NFR BLD AUTO: 0.4 % (ref 0–0.43)
LYMPHOCYTES # BLD AUTO: 1.09 X10(3)/MCL (ref 0.6–4.6)
LYMPHOCYTES NFR BLD AUTO: 20.8 %
MCH RBC QN AUTO: 29.4 PG (ref 27–31)
MCHC RBC AUTO-ENTMCNC: 31.9 MG/DL (ref 33–36)
MCV RBC AUTO: 92.2 FL (ref 80–94)
MONOCYTES # BLD AUTO: 0.43 X10(3)/MCL (ref 0.1–1.3)
MONOCYTES NFR BLD AUTO: 8.2 %
NEUTROPHILS # BLD AUTO: 3.5 X10(3)/MCL (ref 2.1–9.2)
NEUTROPHILS NFR BLD AUTO: 66.5 %
NRBC BLD AUTO-RTO: 0 %
PLATELET # BLD AUTO: 169 X10(3)/MCL (ref 130–400)
PMV BLD AUTO: 12.1 FL (ref 9.4–12.4)
POTASSIUM SERPL-SCNC: 4.2 MMOL/L (ref 3.5–5.1)
PROT SERPL-MCNC: 6.9 GM/DL (ref 5.8–7.6)
RBC # BLD AUTO: 4.86 X10(6)/MCL (ref 4.2–5.4)
SODIUM SERPL-SCNC: 139 MMOL/L (ref 136–145)
TROPONIN I SERPL-MCNC: <0.01 NG/ML (ref 0–0.04)
WBC # SPEC AUTO: 5.2 X10(3)/MCL (ref 4.5–11.5)

## 2022-05-26 PROCEDURE — 99285 EMERGENCY DEPT VISIT HI MDM: CPT | Mod: 25

## 2022-05-26 PROCEDURE — 25000003 PHARM REV CODE 250: Performed by: INTERNAL MEDICINE

## 2022-05-26 PROCEDURE — 85730 THROMBOPLASTIN TIME PARTIAL: CPT | Performed by: INTERNAL MEDICINE

## 2022-05-26 PROCEDURE — 83880 ASSAY OF NATRIURETIC PEPTIDE: CPT | Performed by: INTERNAL MEDICINE

## 2022-05-26 PROCEDURE — 36415 COLL VENOUS BLD VENIPUNCTURE: CPT | Performed by: INTERNAL MEDICINE

## 2022-05-26 PROCEDURE — 93005 ELECTROCARDIOGRAM TRACING: CPT

## 2022-05-26 PROCEDURE — 85610 PROTHROMBIN TIME: CPT | Performed by: INTERNAL MEDICINE

## 2022-05-26 PROCEDURE — A4216 STERILE WATER/SALINE, 10 ML: HCPCS | Performed by: INTERNAL MEDICINE

## 2022-05-26 PROCEDURE — 80053 COMPREHEN METABOLIC PANEL: CPT | Performed by: INTERNAL MEDICINE

## 2022-05-26 PROCEDURE — 84484 ASSAY OF TROPONIN QUANT: CPT | Performed by: INTERNAL MEDICINE

## 2022-05-26 PROCEDURE — 85025 COMPLETE CBC W/AUTO DIFF WBC: CPT | Performed by: INTERNAL MEDICINE

## 2022-05-26 RX ORDER — PANTOPRAZOLE SODIUM 40 MG/1
40 TABLET, DELAYED RELEASE ORAL DAILY
Status: ON HOLD | COMMUNITY
Start: 2022-03-24 | End: 2022-09-22 | Stop reason: HOSPADM

## 2022-05-26 RX ORDER — METOPROLOL SUCCINATE 25 MG/1
25 TABLET, EXTENDED RELEASE ORAL DAILY
Qty: 30 TABLET | Refills: 2 | Status: SHIPPED | OUTPATIENT
Start: 2022-05-26 | End: 2022-05-26 | Stop reason: SINTOL

## 2022-05-26 RX ORDER — CHOLECALCIFEROL (VITAMIN D3) 50 MCG
1 TABLET ORAL DAILY
COMMUNITY
Start: 2022-02-15 | End: 2022-06-28

## 2022-05-26 RX ORDER — LOSARTAN POTASSIUM 100 MG/1
100 TABLET ORAL DAILY
Status: ON HOLD | COMMUNITY
Start: 2022-05-17 | End: 2022-08-15 | Stop reason: HOSPADM

## 2022-05-26 RX ORDER — ASPIRIN 325 MG
325 TABLET ORAL
Status: COMPLETED | OUTPATIENT
Start: 2022-05-26 | End: 2022-05-26

## 2022-05-26 RX ORDER — ROSUVASTATIN CALCIUM 10 MG/1
20 TABLET, COATED ORAL NIGHTLY
Status: ON HOLD | COMMUNITY
Start: 2022-04-21 | End: 2022-08-15 | Stop reason: SDUPTHER

## 2022-05-26 RX ORDER — AMLODIPINE BESYLATE 10 MG/1
10 TABLET ORAL DAILY
Status: ON HOLD | COMMUNITY
Start: 2022-04-05 | End: 2022-08-15 | Stop reason: HOSPADM

## 2022-05-26 RX ORDER — NAPROXEN SODIUM 220 MG/1
81 TABLET, FILM COATED ORAL
COMMUNITY
Start: 2021-09-09 | End: 2022-06-28 | Stop reason: SDUPTHER

## 2022-05-26 RX ORDER — ISOSORBIDE MONONITRATE 30 MG/1
30 TABLET, EXTENDED RELEASE ORAL DAILY
Status: ON HOLD | COMMUNITY
Start: 2022-04-21 | End: 2022-09-22 | Stop reason: HOSPADM

## 2022-05-26 RX ORDER — SODIUM CHLORIDE 0.9 % (FLUSH) 0.9 %
10 SYRINGE (ML) INJECTION EVERY 8 HOURS
Status: DISCONTINUED | OUTPATIENT
Start: 2022-05-26 | End: 2022-05-26

## 2022-05-26 RX ORDER — LEVOTHYROXINE SODIUM 100 UG/1
100 TABLET ORAL DAILY
Status: ON HOLD | COMMUNITY
Start: 2022-05-17 | End: 2022-08-15 | Stop reason: SDUPTHER

## 2022-05-26 RX ORDER — NITROGLYCERIN 0.4 MG/1
0.4 TABLET SUBLINGUAL
Status: ON HOLD | COMMUNITY
Start: 2021-09-09 | End: 2022-08-15 | Stop reason: HOSPADM

## 2022-05-26 RX ADMIN — ASPIRIN 325 MG ORAL TABLET 325 MG: 325 PILL ORAL at 08:05

## 2022-05-26 RX ADMIN — SODIUM CHLORIDE, PRESERVATIVE FREE 10 ML: 5 INJECTION INTRAVENOUS at 08:05

## 2022-05-26 NOTE — DISCHARGE INSTRUCTIONS
Take medicines as prescribed    See your family doctor in one to 2 days for further evaluation, workup, and treatment as necessary    Avoid driving or operating machinery while taking medicines as some medicines might cause drowsiness and may cause problems. Also pain medicines have potential of being addictive  so use Pain meds specially Narcotics Sparingly.    The exam and treatment you received in Emergency Room was for an urgent problem and NOT INTENDED AS COMPLETE CARE. It is important that you FOLLOW UP with a doctor for ongoing care. If your symptoms become WORSE or you DO NOT IMPROVE and you are unable to reach your health care provider, you should RETURN to the emergency department. The Emergency Room doctor has provided a PRELIMINARY INTERPRETATION of all your STUDIES. A final interpretation may be done after you are discharged. IF A CHANGE in your diagnosis or treatment is needed WE WILL CONTACT YOU. It is critical that we have a CURRENT PHONE NUMBER FOR YOU.        See a cardiologist/Heart Doctor to do a stress test and evaluate further as soon as possible,    Avoid any strenuous activity till you see the cardiologist.    If you do not have a cardiologist talk to your family doctor to refer you to one today.    Take an aspirin every day if not allergic to it.    The exam and treatment you received in Emergency Room was for an urgent problem and NOT INTENDED AS COMPLETE CARE. It is important that you FOLLOW UP with a doctor for ongoing care. If your symptoms become WORSE or you DO NOT IMPROVE and you are unable to reach your health care provider, you should RETURN to the emergency department. The Emergency Room doctor has provided a PRELIMINARY INTERPRETATION of all your STUDIES. A final interpretation may be done after you are discharged. IF A CHANGE in your diagnosis or treatment is needed WE WILL CONTACT YOU. It is critical that we have a CURRENT PHONE NUMBER FOR YOU.

## 2022-05-26 NOTE — ED PROVIDER NOTES
05/26/2022         8:27 AM      Source of History:  History obtained from the patient.         Chief complaint:  From Nurse Triage:  Chest Pain (Pt w co CP/SOB x 2 wks.  OCCASIONAL COUGH.  EKG OBTAINED. )      HPI:  Blanquita Montoya is a 66 y.o. female presenting with Chest Pain (Pt w co CP/SOB x 2 wks.  OCCASIONAL COUGH.  EKG OBTAINED. )       Patient with history of aortic aneurysm ascending thoracic aorta comes to the emergency room with complaint of chest pain which has been going on for 2 weeks.  She says her aortic aneurysm was at 4.5 cm in March, she also feels it may be her acid reflux but the chest pain starts in the mid lower sternum goes up to the left side and then goes into her neck so she got concerned and decided to come to the emergency room to get checked.  Her brother recently had a triple bypass.   The pain is mainly central a little tightness get worse when she bends down, does not go to the back, mainly radiates to the neck it comes and goes on a daily basis for the last 2 weeks or more        Review of Systems   Constitutional symptoms:  Weakness, no fever, no chills.    Skin symptoms:  No rash.    Eye symptoms:  Negative except as documented in HPI.   ENMT symptoms:  No sore throat,    Respiratory symptoms:  Shortness of breath, no cough, no wheezing.    Cardiovascular symptoms:  Chest pain, no palpitations, no tachycardia.    Gastrointestinal symptoms:  No abdominal pain, no nausea, no vomiting, no diarrhea, no constipation.    Genitourinary symptoms:  No dysuria,    Musculoskeletal symptoms:  No back pain,    Neurologic symptoms:  Weakness, no headache, no dizziness.    Psychiatric symptoms:  No anxiety, no depression, no substance abuse.    Allergy/immunologic symptoms:  No seasonal allergies,              Additional review of systems information: All other systems reviewed and otherwise negative.    Review of patient's allergies indicates:   Allergen Reactions    Meperidine      Other  reaction(s): unknown    Tramadol Hives     Other reaction(s): sweating       Current Outpatient Medications on File Prior to Encounter   Medication Sig Dispense Refill Last Dose    albuterol sulfate 90 mcg/actuation aebs Inhale into the lungs.       aspirin 81 MG Chew Take 81 mg by mouth.       nitroGLYCERIN (NITROSTAT) 0.4 MG SL tablet Place 0.4 mg under the tongue.       amLODIPine (NORVASC) 10 MG tablet Take 10 mg by mouth once daily.       cholecalciferol, vitamin D3, (VITAMIN D3) 50 mcg (2,000 unit) Tab Take 1 tablet by mouth once daily.       EUTHYROX 100 mcg tablet Take 100 mcg by mouth once daily.       FLUTICASONE PROPIONATE INHL Inhale 50 mcg into the lungs.       isosorbide mononitrate (IMDUR) 30 MG 24 hr tablet Take 30 mg by mouth once daily.       losartan (COZAAR) 100 MG tablet Take 100 mg by mouth once daily.       pantoprazole (PROTONIX) 40 MG tablet Take 40 mg by mouth once daily.       rosuvastatin (CRESTOR) 10 MG tablet Take 10 mg by mouth once daily.            PMH:  As per HPI and below:    Reviewed in chart.    Past Medical History:   Diagnosis Date    Anxiety disorder     Coronary artery disease     GERD (gastroesophageal reflux disease)     Hypercholesterolemia     Hypertension     Hypothyroidism     Thoracic aortic aneurysm 03/10/2022    4.5X4.4 Ascending Aorta as of 3/10/2022    Thyroid disease         Past Surgical History:   Procedure Laterality Date    HYSTERECTOMY      RT KNEE         Social History     Tobacco Use    Smoking status: Former Smoker     Types: Cigarettes    Smokeless tobacco: Never Used       Family History   Problem Relation Age of Onset    Heart disease Mother     Uterine cancer Mother     Lung cancer Father     Seizures Sister     Heart disease Sister         Patient Active Problem List   Diagnosis    Anomalous origin of right coronary artery    Aortic valve regurgitation    Ascending aortic aneurysm    Gastroesophageal reflux disease  "   Hyperlipidemia    Hypertension    Hypothyroidism    Obstructive sleep apnea syndrome        Physical Exam:    BP (!) 176/75   Pulse (!) 49   Temp 97.9 °F (36.6 °C) (Tympanic)   Resp 19   Ht 5' 7" (1.702 m)   Wt 88 kg (194 lb 0.1 oz)   SpO2 97%   BMI 30.39 kg/m²     General:  Alert, no acute distress.   Skin: Normal for Ethnic Origin  Eye:  Extraocular movements are intact.   Cardiovascular:  Regular rate and rhythm, No murmur.  No pedal edema  Respiratory:  Lungs are clear to auscultation, respirations are non-labored.    Musculoskeletal:  No deformity.   Gastrointestinal:  Soft, Nontender, Non distended, Normal bowel sounds.    Neurological:  Alert and oriented to person, place, time, and situation, normal motor observed, normal speech observed.    Psychiatric:  Cooperative, appropriate mood & affect.          Initial Impression/ Differential Dx:    MDM:      Reviewed Nurses Note.    Reviewed Pertinent old records.    66 y.o. female with with history of  thoracic aortic aneurysm having chest pain which comes and goes for the last 2-3 weeks.  I will do a cardiac workup and after looking at the chest x-ray will decide if I need to do a CT scan of chest.  As such patient is not in any distress, she is comfortably sitting with no particular pain at this time.  I do not think the patient is having dissection of the aorta as such at this time.      Orders Placed This Encounter   Procedures    X-ray Chest AP Portable    CT Chest Without Contrast    Comprehensive metabolic panel    CBC auto differential    Protime-INR    APTT    Troponin I    Brain natriuretic peptide    CBC with Differential    EXTRA TUBES    Gold Top Hold    Pink Top Hold    Diet NPO    Vital signs    Cardiac Monitoring - Adult    Pulse Oximetry Continuous    EKG 12-lead (Chest Pain) Age >30    Insert saline lock                No results found for this visit on 05/26/22.    Labs Reviewed   B-TYPE NATRIURETIC PEPTIDE - " Abnormal; Notable for the following components:       Result Value    Natriuretic Peptide 126.6 (*)     All other components within normal limits   CBC WITH DIFFERENTIAL - Abnormal; Notable for the following components:    MCHC 31.9 (*)     IG# 0.02 (*)     All other components within normal limits   APTT - Normal   TROPONIN I - Normal   COMPREHENSIVE METABOLIC PANEL   CBC W/ AUTO DIFFERENTIAL    Narrative:     The following orders were created for panel order CBC auto differential.  Procedure                               Abnormality         Status                     ---------                               -----------         ------                     CBC with Differential[[id number]]        Abnormal            Final result                 Please view results for these tests on the individual orders.   PROTIME-INR   EXTRA TUBES    Narrative:     The following orders were created for panel order EXTRA TUBES.  Procedure                               Abnormality         Status                     ---------                               -----------         ------                     Gold Top Hold[[id number]]                                    In process                 Pink Top Hold[[id number]]                                    In process                   Please view results for these tests on the individual orders.   GOLD TOP HOLD   PINK TOP HOLD        CT Chest Without Contrast   Final Result      Stable dilatation of the ascending thoracic aorta.  No acute findings.         Electronically signed by: Pranay Mccormack   Date:    05/26/2022   Time:    12:41      X-ray Chest AP Portable   Final Result      No acute chest disease is identified.         Electronically signed by: Chet Ambriz   Date:    05/26/2022   Time:    09:17           Imaging Results          CT Chest Without Contrast (Final result)  Result time 05/26/22 12:41:22    Final result by Pranay Mccormack MD (05/26/22 12:41:22)                 Impression:       Stable dilatation of the ascending thoracic aorta.  No acute findings.      Electronically signed by: Pranay Mccormack  Date:    05/26/2022  Time:    12:41             Narrative:    EXAMINATION:  CT CHEST WITHOUT CONTRAST    CLINICAL HISTORY:  Aortic aneurysm, known or suspected;    TECHNIQUE:  Helical acquisition through the chest performed without IV contrast. Three plane reconstructions made available for review.  mGycm. Automatic exposure control, adjustment of mA/kV or iterative reconstruction technique was used to reduce radiation.    COMPARISON:  10 March 2022    FINDINGS:  The heart is not enlarged.  Dilatation of the ascending thoracic aorta up to 4.9 cm, stable by my measurement.  Similar focal mild dilatation of the aortic arch image 18 series 2.  Imaged upper abdominal aorta ectatic near the level of the hiatus.  There is moderate atherosclerotic disease.  There are coronary artery calcifications.  There is no pericardial effusion.    No enlarged mediastinal, hilar or axillary lymph nodes.    No pleural effusion.  Mild atelectasis or scarring towards the lung bases.  No new suspicious pulmonary parenchymal findings.    There are no acute findings in the imaged upper abdomen.  No acute osseous findings.                               X-ray Chest AP Portable (Final result)  Result time 05/26/22 09:17:54    Final result by Chet Ambriz MD (05/26/22 09:17:54)                 Impression:      No acute chest disease is identified.      Electronically signed by: Chet Ambriz  Date:    05/26/2022  Time:    09:17             Narrative:    EXAMINATION:  XR CHEST AP PORTABLE    CLINICAL HISTORY:  , . Shortness of breath    FINDINGS:  No alveolar consolidation, effusion, or pneumothorax is seen.   The thoracic aorta is ectatic and calcified, but otherwise the  cardiac silhouette, central pulmonary vessels and mediastinum are normal in size and are grossly unremarkable. Except for degenerative  changes, the  visualized osseous structures are grossly unremarkable.                                             ED Course as of 05/26/22 1303   Thu May 26, 2022   1131 Patient's workup is negative for acute MI, patient has an ascending aortic aneurysm, but her symptoms are not consistent with dissection or rupture of aortic aneurysm at this time.  She has been having this nonspecific chest pain for 2-3 weeks.  Recently her aortic aneurysm was 4.5 cm and she was told that she can body for the time being.  They are waiting for it to be at 5 cm for surgery. [GQ]   1251 Patient's CT scan does not show any signs of dissection, of course she does not have symptoms of dissection either.  She mainly has this low-grade chest pain going on for couple of weeks with no other associated symptoms of acute MI or aortic dissection.  Thoracic aortic aneurysm is stable according to Radiology report at 4.9 cm.  I will advise her that she must go and see her family doctor to refer her back to the cardiothoracic surgeon for evaluation and further management as needed.  As she does not have any other acute abnormality going on at this time I will go ahead and discharge her home.   [GQ]   1254 I will add metoprolol in her treatment for Blood Pressure and would taper off other meds if BP would be low, Off course she will have to see PMD for that. [GQ]   1302 Pt. Has bradycardia, that might be the reason they did not have her on Betablocker, will not put her on it and let her cardiologist decide further. [GQ]      ED Course User Index  [GQ] Angeles Kaplan MD        Medications   aspirin tablet 325 mg (325 mg Oral Given 5/26/22 0846)                      Medication List      CONTINUE taking these medications    albuterol sulfate 90 mcg/actuation Aebs     amLODIPine 10 MG tablet  Commonly known as: NORVASC     aspirin 81 MG Chew     cholecalciferol (vitamin D3) 50 mcg (2,000 unit) Tab  Commonly known as: VITAMIN D3     EUTHYROX 100 MCG  tablet  Generic drug: levothyroxine     FLUTICASONE PROPIONATE INHL     isosorbide mononitrate 30 MG 24 hr tablet  Commonly known as: IMDUR     losartan 100 MG tablet  Commonly known as: COZAAR     NITROSTAT 0.4 MG SL tablet  Generic drug: nitroGLYCERIN     pantoprazole 40 MG tablet  Commonly known as: PROTONIX     rosuvastatin 10 MG tablet  Commonly known as: CRESTOR                  Diagnostic Impression:    1. Chest pain, unspecified type    2. Chest pain    3. Aneurysm of ascending aorta         ED Disposition Condition    Discharge Stable          @DISCHARGEMEDSLIST(<NOROUTINE> error)@     Follow-up Information     PMD In 2 days.                        ED Prescriptions     Medication Sig Dispense Start Date End Date Auth. Provider    metoprolol succinate (TOPROL-XL) 25 MG 24 hr tablet  (Status: Discontinued) Take 1 tablet (25 mg total) by mouth once daily. 30 tablet 5/26/2022 5/26/2022 Angeles Kaplan MD        Follow-up Information     Follow up With Specialties Details Why Contact Info    PMD  In 2 days               Medication List      CONTINUE taking these medications    albuterol sulfate 90 mcg/actuation Aebs     amLODIPine 10 MG tablet  Commonly known as: NORVASC     aspirin 81 MG Chew     cholecalciferol (vitamin D3) 50 mcg (2,000 unit) Tab  Commonly known as: VITAMIN D3     EUTHYROX 100 MCG tablet  Generic drug: levothyroxine     FLUTICASONE PROPIONATE INHL     isosorbide mononitrate 30 MG 24 hr tablet  Commonly known as: IMDUR     losartan 100 MG tablet  Commonly known as: COZAAR     NITROSTAT 0.4 MG SL tablet  Generic drug: nitroGLYCERIN     pantoprazole 40 MG tablet  Commonly known as: PROTONIX     rosuvastatin 10 MG tablet  Commonly known as: JASWANT Kaplan MD  05/26/22 0571       Angeles Kaplan MD  05/26/22 9230

## 2022-06-07 ENCOUNTER — HOSPITAL ENCOUNTER (OUTPATIENT)
Dept: RADIOLOGY | Facility: HOSPITAL | Age: 67
Discharge: HOME | End: 2022-06-07
Attending: INTERNAL MEDICINE
Payer: MEDICARE

## 2022-06-07 DIAGNOSIS — I73.9 CLAUDICATION: ICD-10-CM

## 2022-06-07 PROCEDURE — 93925 LOWER EXTREMITY STUDY: CPT

## 2022-06-08 LAB
LEFT ANT TIBIAL SYS PSV: 59 CM/S
LEFT CFA PSV: 92 CM/S
LEFT DORSALIS PEDIS PSV: 76 CM/S
LEFT POPLITEAL PSV: 50 CM/S
LEFT POST TIBIAL SYS PSV: 46 CM/S
LEFT PROFUNDA SYS PSV: 84 CM/S
LEFT SUPER FEMORAL DIST SYS PSV: 104 CM/S
LEFT SUPER FEMORAL MID SYS PSV: 94 CM/S
LEFT SUPER FEMORAL PROX SYS PSV: 105 CM/S
OHS CV LEFT LOWER EXTREMITY ABI (NO CALC): 1.06
OHS CV RIGHT ABI LOWER EXTREMITY (NO CALC): 1.01
RIGHT ANT TIBIAL SYS PSV: 55 CM/S
RIGHT CFA PSV: 115 CM/S
RIGHT DORSALIS PEDIS PSV: 58 CM/S
RIGHT POPLITEAL PSV: 49 CM/S
RIGHT POST TIBIAL SYS PSV: 54 CM/S
RIGHT PROFUNDA SYS PSV: 69 CM/S
RIGHT SUPER FEMORAL DIST SYS PSV: 146 CM/S
RIGHT SUPER FEMORAL MID SYS PSV: 82 CM/S
RIGHT SUPER FEMORAL PROX SYS PSV: 74 CM/S

## 2022-06-14 ENCOUNTER — OFFICE VISIT (OUTPATIENT)
Dept: CARDIOLOGY | Facility: CLINIC | Age: 67
End: 2022-06-14
Payer: MEDICARE

## 2022-06-14 VITALS
TEMPERATURE: 98 F | HEIGHT: 67 IN | BODY MASS INDEX: 30.52 KG/M2 | RESPIRATION RATE: 18 BRPM | OXYGEN SATURATION: 98 % | HEART RATE: 60 BPM | DIASTOLIC BLOOD PRESSURE: 70 MMHG | WEIGHT: 194.44 LBS | SYSTOLIC BLOOD PRESSURE: 137 MMHG

## 2022-06-14 DIAGNOSIS — I71.21 THORACIC ASCENDING AORTIC ANEURYSM: Primary | ICD-10-CM

## 2022-06-14 DIAGNOSIS — E78.5 HYPERLIPIDEMIA, UNSPECIFIED HYPERLIPIDEMIA TYPE: ICD-10-CM

## 2022-06-14 DIAGNOSIS — I73.9 BILATERAL CLAUDICATION OF LOWER LIMB: ICD-10-CM

## 2022-06-14 PROCEDURE — 99214 OFFICE O/P EST MOD 30 MIN: CPT | Mod: PBBFAC | Performed by: INTERNAL MEDICINE

## 2022-06-14 RX ORDER — AMLODIPINE BESYLATE 10 MG/1
10 TABLET ORAL DAILY
COMMUNITY
Start: 2021-09-23 | End: 2022-06-28 | Stop reason: SDUPTHER

## 2022-06-14 RX ORDER — ASPIRIN 81 MG/1
81 TABLET ORAL DAILY
Status: ON HOLD | COMMUNITY
End: 2022-08-15 | Stop reason: SDUPTHER

## 2022-06-14 RX ORDER — LOSARTAN POTASSIUM 100 MG/1
100 TABLET ORAL DAILY
COMMUNITY
Start: 2021-09-23 | End: 2022-06-28 | Stop reason: SDUPTHER

## 2022-06-14 RX ORDER — ALBUTEROL SULFATE 90 UG/1
1 AEROSOL, METERED RESPIRATORY (INHALATION) EVERY 6 HOURS PRN
Status: ON HOLD | COMMUNITY
Start: 2022-03-02 | End: 2022-07-29

## 2022-06-14 RX ORDER — METOPROLOL SUCCINATE 25 MG/1
25 TABLET, EXTENDED RELEASE ORAL DAILY
Qty: 30 TABLET | Refills: 11 | Status: ON HOLD | OUTPATIENT
Start: 2022-06-14 | End: 2022-08-15 | Stop reason: HOSPADM

## 2022-06-14 NOTE — PATIENT INSTRUCTIONS
You have been referred to [facility] to CV Surgery Dept.  They will contact you directly for appointment.

## 2022-06-14 NOTE — PROGRESS NOTES
Cardiology Attending    I evaluated Ms. Blanquita Montoya and discussed her symptoms, findings, and management plan with the resident.  I agree with the H&P and Assessment & Plan of the Cardiology Clinic Note.        Harry Jj MD

## 2022-06-14 NOTE — H&P (VIEW-ONLY)
[facility]    Subjective:        Blanquita Montoya is a 66 y.o. female who  has a past medical history of Anxiety disorder, Coronary artery disease, GERD (gastroesophageal reflux disease), Hypercholesterolemia, Hypertension, Hypothyroidism, Thoracic aortic aneurysm, and Thyroid disease.  She presents to clinic today for follow-up of chronic medical conditions.  Patient presents as a follow-up from the ED visit, patient had an sharp retrosternal chest pain which does not radiate anywhere. Patient denies any tearing nature of her chest pain and denies any radiation to her back. Patient denies any qualitative pressure like symptoms.  Patient had imaging done which showed a growth in her thoracic aneurysm from 4.5 cm to 4.9 cm.  Patient denies any anginal chest pain.  Patient notices that sometimes when she leans forward that she feels shortness of breath and sharp chest pain.  Patient was a two pack-a-day smoker however she has quit in the past 15 years.  Patient says that she gets some weakness in her bilateral lower extremities when she is walking her she had YUE done which was negative for any acute claudication or limb blockage.  Patient has not been sent to Cardiovascular surgery or any other specialist at this time.  Patient is trying to obtain free care to that she can not afford a 20% co-pay and her Medicare.  Patient denies any paroxysmal nocturnal dyspnea, orthopnea.    Review of Systems:  10 point ROS negative except for HPI    Objective:   Vital Signs:  Vitals:    06/14/22 1413   BP: 137/70   Pulse: 60   Resp: 18   Temp: 98.4 °F (36.9 °C)        Body mass index is 30.45 kg/m².     General:  Well developed, well nourished, no acute respiratory distress  Head: Normocephalic, atraumatic  Eyes: PERRL, EOMI, anicteric sclera  Throat: No posterior pharyngeal erythema or exudate, no tonsillar exudate  Neck: supple, normal ROM, no JVD  CVS:  RRR, S1 and S2 normal, no murmurs, no added heart sounds,  rubs, gallops, regular peripheral pulses, and no peripheral edema  Resp:  Lungs clear to auscultation bilaterally, no wheezes, rales, or rhonci  GI:  Abdomen soft, non-tender, non-distended, normoactive bowel sounds  MSK:  Full range of motion, no obvious deformities   Skin:  No rashes, ulcers, erythema  Neuro:  Alert and oriented x3, No focal neuro deficits, CNII-XII grossly intact  Psych:  Appropriate mood and affect         Laboratory:  Lab Results   Component Value Date    WBC 5.2 05/26/2022    HGB 14.3 05/26/2022    HCT 44.8 05/26/2022     05/26/2022    MCV 92.2 05/26/2022    RDW 12.8 05/26/2022     Lab Results   Component Value Date    HGBA1C 5.7 09/23/2021    .9 09/23/2021    CREATININE 0.81 05/26/2022    Lab Results   Component Value Date     05/26/2022    K 4.2 05/26/2022    CO2 31 05/26/2022    BUN 10.5 05/26/2022    CREATININE 0.81 05/26/2022    CALCIUM 9.6 05/26/2022    MG 2.00 05/20/2021    PHOS 3.1 05/20/2021     Lab Results   Component Value Date    TSH 0.8335 09/23/2021    DCNWIG5GTHG 1.02 09/23/2021          Anemia:   Lab Results   Component Value Date    AANMRDDV83 400 08/29/2019    FOLATE 6.7 02/14/2018       Current Medications:  Current Outpatient Medications   Medication Instructions    albuterol (PROVENTIL/VENTOLIN HFA) 90 mcg/actuation inhaler 1 puff, Inhalation, Every 6 hours PRN, As needed for wheezing.     albuterol sulfate 90 mcg/actuation aebs Inhalation    amLODIPine (NORVASC) 10 mg, Oral, Daily    amLODIPine (NORVASC) 10 mg, Oral, Daily    aspirin (ECOTRIN) 81 mg, Oral, Daily    aspirin 81 mg, Oral    cholecalciferol, vitamin D3, (VITAMIN D3) 50 mcg (2,000 unit) Tab 1 tablet, Oral, Daily    EUTHYROX 100 mcg, Oral, Daily    FLUTICASONE PROPIONATE INHL 50 mcg, Inhalation    isosorbide mononitrate (IMDUR) 30 mg, Oral, Daily    losartan (COZAAR) 100 mg, Oral, Daily    losartan (COZAAR) 100 mg, Oral, Daily    nitroGLYCERIN (NITROSTAT) 0.4 mg, Sublingual     pantoprazole (PROTONIX) 40 mg, Oral, Daily    rosuvastatin (CRESTOR) 10 mg, Oral, Daily       Echocardiogram 05/2021 showed RVSP 38 mm Hg, a ventricular ejection fraction 65-69%.  Structurally trileaflet aortic valve, trace aortic insufficiency.    Coronary angiogram 02/2020 showed nonobstructive coronary artery disease      04/2017 aortic root is 4.1 cm, ascending thoracic aorta 4.4 cm, aortic arch 2.8 cm and ascending aorta 2.7 cm.    2/2019 ascending aorta measures 4.5 cm.    02/2021 showed 4.5 cm thoracic aortic diameter     03/2022 showed 4.5 cm x 4.4 cm ascending aortic    The right lower extremity demonstrated multiphasic waveforms at all levels.   The YUE on the right was normal.  The right lower extremity demonstrated no significant arterial flow reduction.     The left lower extremity demonstrated multiphasic waveforms at all levels.   The YUE on the left was normal.  The left lower extremity demonstrated no significant arterial flow reduction.    Assessment and Plan:        Health Maintenance   Topic Date Due    Hepatitis C Screening  Never done    Mammogram  09/28/2022    DEXA Scan  09/10/2023    Lipid Panel  09/23/2026    TETANUS VACCINE  03/07/2027        Thoracic aortic aneurysm 4.9 cm  -will send referral to cardiothoracic surgery  -starting beta blockade toprol xl 25 mg qd  -increase crestor to 20    HLD:  Crestor to 20    Limb claudication:  YUE normal continue statin    rtc in 3 months with FLP          Julio Cesar Young MD

## 2022-06-14 NOTE — PROGRESS NOTES
[facility]    Subjective:        Blanquita Montoya is a 66 y.o. female who  has a past medical history of Anxiety disorder, Coronary artery disease, GERD (gastroesophageal reflux disease), Hypercholesterolemia, Hypertension, Hypothyroidism, Thoracic aortic aneurysm, and Thyroid disease.  She presents to clinic today for follow-up of chronic medical conditions.  Patient presents as a follow-up from the ED visit, patient had an sharp retrosternal chest pain which does not radiate anywhere. Patient denies any tearing nature of her chest pain and denies any radiation to her back. Patient denies any qualitative pressure like symptoms.  Patient had imaging done which showed a growth in her thoracic aneurysm from 4.5 cm to 4.9 cm.  Patient denies any anginal chest pain.  Patient notices that sometimes when she leans forward that she feels shortness of breath and sharp chest pain.  Patient was a two pack-a-day smoker however she has quit in the past 15 years.  Patient says that she gets some weakness in her bilateral lower extremities when she is walking her she had YUE done which was negative for any acute claudication or limb blockage.  Patient has not been sent to Cardiovascular surgery or any other specialist at this time.  Patient is trying to obtain free care to that she can not afford a 20% co-pay and her Medicare.  Patient denies any paroxysmal nocturnal dyspnea, orthopnea.    Review of Systems:  10 point ROS negative except for HPI    Objective:   Vital Signs:  Vitals:    06/14/22 1413   BP: 137/70   Pulse: 60   Resp: 18   Temp: 98.4 °F (36.9 °C)        Body mass index is 30.45 kg/m².     General:  Well developed, well nourished, no acute respiratory distress  Head: Normocephalic, atraumatic  Eyes: PERRL, EOMI, anicteric sclera  Throat: No posterior pharyngeal erythema or exudate, no tonsillar exudate  Neck: supple, normal ROM, no JVD  CVS:  RRR, S1 and S2 normal, no murmurs, no added heart sounds,  rubs, gallops, regular peripheral pulses, and no peripheral edema  Resp:  Lungs clear to auscultation bilaterally, no wheezes, rales, or rhonci  GI:  Abdomen soft, non-tender, non-distended, normoactive bowel sounds  MSK:  Full range of motion, no obvious deformities   Skin:  No rashes, ulcers, erythema  Neuro:  Alert and oriented x3, No focal neuro deficits, CNII-XII grossly intact  Psych:  Appropriate mood and affect         Laboratory:  Lab Results   Component Value Date    WBC 5.2 05/26/2022    HGB 14.3 05/26/2022    HCT 44.8 05/26/2022     05/26/2022    MCV 92.2 05/26/2022    RDW 12.8 05/26/2022     Lab Results   Component Value Date    HGBA1C 5.7 09/23/2021    .9 09/23/2021    CREATININE 0.81 05/26/2022    Lab Results   Component Value Date     05/26/2022    K 4.2 05/26/2022    CO2 31 05/26/2022    BUN 10.5 05/26/2022    CREATININE 0.81 05/26/2022    CALCIUM 9.6 05/26/2022    MG 2.00 05/20/2021    PHOS 3.1 05/20/2021     Lab Results   Component Value Date    TSH 0.8335 09/23/2021    HBRRUM7AYOB 1.02 09/23/2021          Anemia:   Lab Results   Component Value Date    XJKGUUPF22 400 08/29/2019    FOLATE 6.7 02/14/2018       Current Medications:  Current Outpatient Medications   Medication Instructions    albuterol (PROVENTIL/VENTOLIN HFA) 90 mcg/actuation inhaler 1 puff, Inhalation, Every 6 hours PRN, As needed for wheezing.     albuterol sulfate 90 mcg/actuation aebs Inhalation    amLODIPine (NORVASC) 10 mg, Oral, Daily    amLODIPine (NORVASC) 10 mg, Oral, Daily    aspirin (ECOTRIN) 81 mg, Oral, Daily    aspirin 81 mg, Oral    cholecalciferol, vitamin D3, (VITAMIN D3) 50 mcg (2,000 unit) Tab 1 tablet, Oral, Daily    EUTHYROX 100 mcg, Oral, Daily    FLUTICASONE PROPIONATE INHL 50 mcg, Inhalation    isosorbide mononitrate (IMDUR) 30 mg, Oral, Daily    losartan (COZAAR) 100 mg, Oral, Daily    losartan (COZAAR) 100 mg, Oral, Daily    nitroGLYCERIN (NITROSTAT) 0.4 mg, Sublingual     pantoprazole (PROTONIX) 40 mg, Oral, Daily    rosuvastatin (CRESTOR) 10 mg, Oral, Daily       Echocardiogram 05/2021 showed RVSP 38 mm Hg, a ventricular ejection fraction 65-69%.  Structurally trileaflet aortic valve, trace aortic insufficiency.    Coronary angiogram 02/2020 showed nonobstructive coronary artery disease      04/2017 aortic root is 4.1 cm, ascending thoracic aorta 4.4 cm, aortic arch 2.8 cm and ascending aorta 2.7 cm.    2/2019 ascending aorta measures 4.5 cm.    02/2021 showed 4.5 cm thoracic aortic diameter     03/2022 showed 4.5 cm x 4.4 cm ascending aortic    The right lower extremity demonstrated multiphasic waveforms at all levels.   The YUE on the right was normal.  The right lower extremity demonstrated no significant arterial flow reduction.     The left lower extremity demonstrated multiphasic waveforms at all levels.   The YUE on the left was normal.  The left lower extremity demonstrated no significant arterial flow reduction.    Assessment and Plan:        Health Maintenance   Topic Date Due    Hepatitis C Screening  Never done    Mammogram  09/28/2022    DEXA Scan  09/10/2023    Lipid Panel  09/23/2026    TETANUS VACCINE  03/07/2027        Thoracic aortic aneurysm 4.9 cm  -will send referral to cardiothoracic surgery  -starting beta blockade toprol xl 25 mg qd  -increase crestor to 20    HLD:  Crestor to 20    Limb claudication:  YUE normal continue statin    rtc in 3 months with FLP          Julio Cesar Young MD

## 2022-06-17 ENCOUNTER — OFFICE VISIT (OUTPATIENT)
Dept: DERMATOLOGY | Facility: CLINIC | Age: 67
End: 2022-06-17
Payer: MEDICARE

## 2022-06-17 VITALS
RESPIRATION RATE: 18 BRPM | OXYGEN SATURATION: 97 % | BODY MASS INDEX: 30.45 KG/M2 | TEMPERATURE: 98 F | HEART RATE: 64 BPM | HEIGHT: 67 IN | SYSTOLIC BLOOD PRESSURE: 121 MMHG | WEIGHT: 194 LBS | DIASTOLIC BLOOD PRESSURE: 77 MMHG

## 2022-06-17 DIAGNOSIS — L29.9 PRURITUS: Primary | ICD-10-CM

## 2022-06-17 PROCEDURE — 99214 OFFICE O/P EST MOD 30 MIN: CPT | Mod: PBBFAC | Performed by: DERMATOLOGY

## 2022-06-17 RX ORDER — HYDROCORTISONE 25 MG/G
CREAM TOPICAL DAILY PRN
Qty: 30 G | Refills: 1 | Status: ON HOLD | OUTPATIENT
Start: 2022-06-17 | End: 2022-09-22 | Stop reason: HOSPADM

## 2022-06-17 NOTE — PROGRESS NOTES
Subjective:       Patient ID: Blanquita Montoya is a 66 y.o. female.    Chief Complaint: Cyst (Follow up Cyst removal)      HPI:  -66 year-old  female with PMH of hypothyroidism, MIKI on CPAP, hypertension, hyperlipidemia, prediabetes, GERD, osteopenia, vitamin D deficiency, insomnia, aortic regurgitation, and 4.5 cm stable ascending thoracic aortic aneurysm who presents for follow up on facial abscess and facial itching.    Facial Abscess  -seen at urgent care 8/2022 where it was diagnosed right facial abscess over maxillary bone that was I/D with good results  -she does not currently have any symptoms; abscess has not come back    Facial Itching  -has off and on facial itching; started after using moisturizer cream with odor  -also states she has worsened itching when working outside or in cold weather  -states she has allergy to benadryl and breaks out in hives          ROS:  -Pertinent ros as per HPI    PE:  Vitals:    06/17/22 0851   BP: 121/77   Pulse: 64   Resp: 18   Temp: 98.3 °F (36.8 °C)     Gen: alert and oriented  Resp: non labored respirations  Skin: no masses or lesions over right maxillary bone or on right side of face.  No erythema or rash over face.  1-2cm soft, moveable lesion over left forearm.   Multiple SKs on back but no suspicious lesions suspected.    Assessment and Plan:    Facial Abscess  Facial Itching  -Abscess resolved  -For itching counseled on using Cerave PM and refrain from moisturizer with odor  -Follow up DERM prn    Lipoma left arm  -Referral to Minor Procedures  -Picture in Epic       -Follow up prn in Derm clinic

## 2022-06-21 ENCOUNTER — OFFICE VISIT (OUTPATIENT)
Dept: CARDIAC SURGERY | Facility: CLINIC | Age: 67
End: 2022-06-21
Payer: MEDICARE

## 2022-06-21 VITALS
SYSTOLIC BLOOD PRESSURE: 160 MMHG | WEIGHT: 193 LBS | DIASTOLIC BLOOD PRESSURE: 78 MMHG | HEART RATE: 64 BPM | BODY MASS INDEX: 30.29 KG/M2 | HEIGHT: 67 IN

## 2022-06-21 DIAGNOSIS — Z13.6 ENCOUNTER FOR SCREENING FOR CARDIOVASCULAR DISORDERS: ICD-10-CM

## 2022-06-21 DIAGNOSIS — I71.20 THORACIC AORTIC ANEURYSM WITHOUT RUPTURE: Primary | ICD-10-CM

## 2022-06-21 DIAGNOSIS — I71.21 THORACIC ASCENDING AORTIC ANEURYSM: ICD-10-CM

## 2022-06-21 PROCEDURE — 99204 PR OFFICE/OUTPT VISIT, NEW, LEVL IV, 45-59 MIN: ICD-10-PCS | Mod: ,,, | Performed by: SPECIALIST

## 2022-06-21 PROCEDURE — 99204 OFFICE O/P NEW MOD 45 MIN: CPT | Mod: ,,, | Performed by: SPECIALIST

## 2022-06-21 NOTE — PROGRESS NOTES
[facility]  History & Physical  Cardiothoracic Surgery    SUBJECTIVE:     Chief Complaint/Reason for Visit:   Chief Complaint   Patient presents with    Pre-op Exam      RE: TAA        History of Present Illness:  Patient is a 66 y.o. female presents referred by Dr. Young for evaluation of ascending aortic aneurysm.  She had a CT scan of the chest in March of this year that revealed a 4.4 x 4.5 cm ascending aortic aneurysm.  The patient then presented to the ER with chest pressure complaints and a CTA of the chest revealed the aneurysm had grown to 4.9 cm and only 2 months.  She does get short of breath especially at work when lifting heavy objects.  She certainly is having a rapid growth of this ascending aortic aneurysm.  I think she will benefit from having this resected.  We will need to obtain a echo and left heart catheterization.  I will see her back after these test so that we can plan for her surgery.    Review of patient's allergies indicates:   Allergen Reactions    Meperidine      Other reaction(s): unknown    Tramadol Hives     Other reaction(s): sweating       Past Medical History:   Diagnosis Date    Anxiety disorder     Coronary artery disease     GERD (gastroesophageal reflux disease)     Hypercholesterolemia     Hypertension     Hypothyroidism     Thoracic aortic aneurysm 03/10/2022    4.5X4.4 Ascending Aorta as of 3/10/2022    Thyroid disease      Past Surgical History:   Procedure Laterality Date    HYSTERECTOMY      RT KNEE       Family History   Problem Relation Age of Onset    Heart disease Mother     Uterine cancer Mother     Lung cancer Father     Seizures Sister     Heart disease Sister      Social History     Tobacco Use    Smoking status: Former Smoker     Types: Cigarettes    Smokeless tobacco: Never Used   Substance Use Topics    Alcohol use: Not Currently    Drug use: Never        Review of Systems:  Review of Systems    Constitutional: Negative.   HENT: Negative.    Eyes: Negative.    Cardiovascular: Positive for chest pain and dyspnea on exertion.   Respiratory: Positive for shortness of breath.    Endocrine: Negative.    Skin: Negative.    Musculoskeletal: Negative.    Gastrointestinal: Positive for nausea.   Genitourinary: Negative.    Neurological: Negative.    Psychiatric/Behavioral: Negative.    Allergic/Immunologic: Negative.        OBJECTIVE:     Vital Signs (Most Recent):  Pulse: 64 (06/21/22 0840)  BP: (!) 160/78 (06/21/22 0840)    Admission: Weight: 87.5 kg (193 lb) (06/21/22 0840)   Most Recent: Weight: 87.5 kg (193 lb) (06/21/22 0840)    Physical Exam:  Physical Exam  Vitals reviewed.   Constitutional:       Appearance: Normal appearance. She is normal weight.   HENT:      Head: Normocephalic and atraumatic.   Eyes:      Pupils: Pupils are equal, round, and reactive to light.   Cardiovascular:      Rate and Rhythm: Normal rate.      Pulses: Normal pulses.      Heart sounds: Normal heart sounds. No murmur heard.  Pulmonary:      Effort: Pulmonary effort is normal.      Breath sounds: Normal breath sounds.   Abdominal:      General: Abdomen is flat.      Palpations: Abdomen is soft.   Musculoskeletal:         General: Normal range of motion.      Cervical back: Normal range of motion.   Skin:     General: Skin is warm.   Neurological:      General: No focal deficit present.      Mental Status: She is alert and oriented to person, place, and time.   Psychiatric:         Mood and Affect: Mood normal.         Behavior: Behavior normal.         Diagnostic Results:  CT: Reviewed      ASSESSMENT/PLAN:     1. Thoracic aortic aneurysm without rupture    2. Thoracic ascending aortic aneurysm    4.9 cm ascending aortic aneurysm with rapid growth  Hypertension  Hypothyroidism  Anxiety  Shortness of breath  Obtain echo and left heart catheterization for surgical planning  Return to clinic after above

## 2022-06-23 DIAGNOSIS — I71.20 THORACIC AORTIC ANEURYSM WITHOUT RUPTURE: Primary | ICD-10-CM

## 2022-06-23 RX ORDER — SODIUM CHLORIDE 0.9 % (FLUSH) 0.9 %
10 SYRINGE (ML) INJECTION
Status: DISCONTINUED | OUTPATIENT
Start: 2022-06-23 | End: 2022-06-23 | Stop reason: HOSPADM

## 2022-06-23 RX ORDER — SODIUM CHLORIDE 9 MG/ML
INJECTION, SOLUTION INTRAVENOUS ONCE
Status: CANCELLED | OUTPATIENT
Start: 2022-06-23 | End: 2022-06-23

## 2022-06-27 DIAGNOSIS — I71.20 THORACIC AORTIC ANEURYSM WITHOUT RUPTURE: Primary | ICD-10-CM

## 2022-06-28 ENCOUNTER — OFFICE VISIT (OUTPATIENT)
Dept: INTERNAL MEDICINE | Facility: CLINIC | Age: 67
End: 2022-06-28
Payer: MEDICARE

## 2022-06-28 VITALS
WEIGHT: 196 LBS | TEMPERATURE: 98 F | DIASTOLIC BLOOD PRESSURE: 66 MMHG | HEART RATE: 56 BPM | HEIGHT: 67 IN | BODY MASS INDEX: 30.76 KG/M2 | SYSTOLIC BLOOD PRESSURE: 130 MMHG | RESPIRATION RATE: 20 BRPM

## 2022-06-28 DIAGNOSIS — K21.9 GASTROESOPHAGEAL REFLUX DISEASE, UNSPECIFIED WHETHER ESOPHAGITIS PRESENT: ICD-10-CM

## 2022-06-28 DIAGNOSIS — E78.5 HYPERLIPIDEMIA, UNSPECIFIED HYPERLIPIDEMIA TYPE: ICD-10-CM

## 2022-06-28 DIAGNOSIS — I71.21 ASCENDING AORTIC ANEURYSM: Primary | ICD-10-CM

## 2022-06-28 DIAGNOSIS — E55.9 VITAMIN D DEFICIENCY: ICD-10-CM

## 2022-06-28 DIAGNOSIS — R73.03 PREDIABETES: ICD-10-CM

## 2022-06-28 DIAGNOSIS — E03.9 HYPOTHYROIDISM, UNSPECIFIED TYPE: ICD-10-CM

## 2022-06-28 DIAGNOSIS — G47.33 OBSTRUCTIVE SLEEP APNEA SYNDROME: ICD-10-CM

## 2022-06-28 DIAGNOSIS — Z01.89 ROUTINE LAB DRAW: ICD-10-CM

## 2022-06-28 DIAGNOSIS — I10 HYPERTENSION, UNSPECIFIED TYPE: ICD-10-CM

## 2022-06-28 PROBLEM — G47.00 INSOMNIA: Status: ACTIVE | Noted: 2022-06-28

## 2022-06-28 PROBLEM — M19.90 OSTEOARTHRITIS: Status: ACTIVE | Noted: 2022-06-28

## 2022-06-28 PROBLEM — M85.80 OSTEOPENIA: Status: ACTIVE | Noted: 2022-06-28

## 2022-06-28 PROCEDURE — 99214 OFFICE O/P EST MOD 30 MIN: CPT | Mod: PBBFAC

## 2022-06-28 NOTE — PROGRESS NOTES
"[facility] INTERNAL MEDICINE  OUTPATIENT OFFICE VISIT NOTE    SUBJECTIVE:      HPI: Blanquita Montoya is a 66-year-old  female with PMH of hypothyroidism, MIKI on CPAP, hypertension, hyperlipidemia, nonobstructive CAD, prediabetes, GERD, osteopenia, vitamin D deficiency, insomnia, and 4.9 cm stable ascending thoracic aortic aneurysm who presents to  clinic for follow-up.      Found to have rapidly growing ascending aortic aneurysm, 4.9 cm now  Evaluated by CT surgery for aortic aneurysm repair, Dr. Vega   Echocardiogram ordered and pending on 6/30/22  Angiogram prior to repair scheduled for 07/11/2022  No other complaints and not in need of any medication refills at this time  Complaint with medication  Occasionally gets chest pressure with heavy lifting during work, doing better with Imdur, has not needed nitroglycerin  Declined lab work today, will prefer to wait until after surgery, ordered placed      Lives in [place] with .  Works at a grocery store.  Denies tobacco, alcohol, or drug use.  Previous 2 PPD smoker, quit about 12 years ago    ROS:  (+) chest pressure with exertion  (-) palpitations, SOB, dizziness, syncope, edema, PND, orthopnea, claudication, cough, fever, chills, abdominal pain, vomiting, diarrhea, dysuria, bleeding    OBJECTIVE:     Vital signs:   /66 (BP Location: Right arm, Patient Position: Sitting)   Pulse (!) 56   Temp 98.2 °F (36.8 °C)   Resp 20   Ht 5' 6.93" (1.7 m)   Wt 88.9 kg (195 lb 15.8 oz)   BMI 30.76 kg/m²      Physical Examination:  General:  Well-nourished, well-developed, no acute distress, on room air  HEENT: Normocephalic, atraumatic. EOMI.  MMM  Neck: Supple. No obvious JVD.  Normal range of motion.  Respiratory: CTAB.  No obvious crackles wheeze or rhonchi.  Normal work of breathing.  Cardiovascular:  RRR.  No obvious M/G/R. Warm extremities.  Peripheral pulses intact.  No edema.  Gastrointestinal:  Soft, nontender, nondistended.  Normal bowel " sounds.  Neurologic: ANOx3.  Answering questions/following commands appropriately.  No FND    Current Outpatient Medications:     albuterol (PROVENTIL/VENTOLIN HFA) 90 mcg/actuation inhaler, Inhale 1 puff into the lungs every 6 (six) hours as needed. As needed for wheezing., Disp: , Rfl:     amLODIPine (NORVASC) 10 MG tablet, Take 10 mg by mouth once daily., Disp: , Rfl:     aspirin (ECOTRIN) 81 MG EC tablet, Take 81 mg by mouth once daily., Disp: , Rfl:     EUTHYROX 100 mcg tablet, Take 100 mcg by mouth once daily., Disp: , Rfl:     FLUTICASONE PROPIONATE INHL, Inhale 50 mcg into the lungs., Disp: , Rfl:     hydrocortisone 2.5 % cream, Apply topically daily as needed (as needed for itching)., Disp: 30 g, Rfl: 1    isosorbide mononitrate (IMDUR) 30 MG 24 hr tablet, Take 30 mg by mouth once daily., Disp: , Rfl:     losartan (COZAAR) 100 MG tablet, Take 100 mg by mouth once daily., Disp: , Rfl:     metoprolol succinate (TOPROL-XL) 25 MG 24 hr tablet, Take 1 tablet (25 mg total) by mouth once daily., Disp: 30 tablet, Rfl: 11    nitroGLYCERIN (NITROSTAT) 0.4 MG SL tablet, Place 0.4 mg under the tongue., Disp: , Rfl:     pantoprazole (PROTONIX) 40 MG tablet, Take 40 mg by mouth once daily., Disp: , Rfl:     rosuvastatin (CRESTOR) 10 MG tablet, Take 20 mg by mouth every evening., Disp: , Rfl:      ASSESSMENT & PLAN:     Hypertension  -controlled, Continue amlodipine 10, Toprol 25, and losartan 100  -Intolerant to Procardia in the past secondary to side effects of headache/dizziness  -Educated on DASH diet and exercise as tolerated     Hyperlipidemia  -, repeat pending   -previously endorsed myalgias on simvastatin and pravastatin, discontinued  -Continue rosuvastatin 20 mg daily  -Continue fish oil and aspirin 81     Stable thoracic ascending aortic aneurysm  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  Nonobstructive coronary artery disease  -Follows with  cardiology  -Echocardiogram: 5/21/2021: LVEF 65%, no significant valve pathology, normal RV size and function; repeat pending   -Stable 4.9 cm aortic aneurysm, CT following  -Angiogram 2/2020: No coronary stenosis, mild aortic regurgitation, ectatic aortic root, anomalous right coronary artery coming off the left coronary cusp  -repeat angiogram 7/11/22  -Continue Nitrostat as needed, aspirin and statin, and Imdur 30    Osteopenia  History of Vitamin D deficiency  -Lumbar osteopenia, T score -2.1  -Continue multivitamin with calcium and vitamin D  -Plan to repeat DEXA scan 9/2022     GERD/gastritis  -Controlled on Protonix 40 daily  -EGD: 5/21/2021: with normal esophagus and gastritis. Discontinue NSAIDs.  -Colonoscopy from 10/2021 with diverticulosis of the sigmoid colon, otherwise normal, repeat 5 years     MIKI on CPAP  -Endorses intermittent compliance with CPAP, continue     Hypothyroidism  -continue Synthroid 100 MCG daily  -repeat TSH free T4 pending     Insomnia  -Continue melatonin PRN     Prediabetes  -A1c 5.7, repeat is pending  -Continue diet and exercise     Routine health maintenance  Mammogram: 9/2021, WNL, next 9/2022  DEXA: 9/2020, osteopenia of lumbar vertebrae, plan to repeat 9/2022  Colon cancer screening: Colonoscopy from 10/2021 with diverticulosis of the sigmoid colon, otherwise normal, repeat 5 years in 2026  GYN: Follows with gynecology      Vaccines:  Flu vaccine, 2/8/2022  Pneumonia 23 on 7/2019  Tdap on 3/2017  Zoster series completed 12/2020  COVID-19: Vaccinated x2     Antione Whittaker MD

## 2022-07-07 ENCOUNTER — OFFICE VISIT (OUTPATIENT)
Dept: PREADMISSION TESTING | Facility: HOSPITAL | Age: 67
End: 2022-07-07
Attending: INTERNAL MEDICINE
Payer: MEDICARE

## 2022-07-07 ENCOUNTER — CLINICAL SUPPORT (OUTPATIENT)
Dept: CARDIOLOGY | Facility: CLINIC | Age: 67
End: 2022-07-07
Payer: MEDICARE

## 2022-07-07 VITALS
BODY MASS INDEX: 30.29 KG/M2 | RESPIRATION RATE: 20 BRPM | DIASTOLIC BLOOD PRESSURE: 72 MMHG | OXYGEN SATURATION: 97 % | HEART RATE: 48 BPM | WEIGHT: 193 LBS | HEIGHT: 67 IN | SYSTOLIC BLOOD PRESSURE: 166 MMHG

## 2022-07-07 DIAGNOSIS — I20.0 UNSTABLE ANGINA PECTORIS: Primary | ICD-10-CM

## 2022-07-07 DIAGNOSIS — I71.20 THORACIC AORTIC ANEURYSM WITHOUT RUPTURE: ICD-10-CM

## 2022-07-07 DIAGNOSIS — R07.9 CHEST PAIN, UNSPECIFIED TYPE: ICD-10-CM

## 2022-07-07 DIAGNOSIS — I20.0 UNSTABLE ANGINA PECTORIS: ICD-10-CM

## 2022-07-07 DIAGNOSIS — I10 HYPERTENSION, UNSPECIFIED TYPE: Primary | ICD-10-CM

## 2022-07-07 DIAGNOSIS — I71.21 ASCENDING AORTIC ANEURYSM: ICD-10-CM

## 2022-07-07 DIAGNOSIS — I71.21 THORACIC ASCENDING AORTIC ANEURYSM: ICD-10-CM

## 2022-07-07 PROCEDURE — 93005 ELECTROCARDIOGRAM TRACING: CPT

## 2022-07-07 PROCEDURE — 99214 OFFICE O/P EST MOD 30 MIN: CPT | Mod: PBBFAC

## 2022-07-07 RX ORDER — AMOXICILLIN 500 MG
1 CAPSULE ORAL DAILY
Status: ON HOLD | COMMUNITY
End: 2022-08-15 | Stop reason: HOSPADM

## 2022-07-11 ENCOUNTER — HOSPITAL ENCOUNTER (OUTPATIENT)
Facility: HOSPITAL | Age: 67
Discharge: HOME | End: 2022-07-29
Attending: INTERNAL MEDICINE | Admitting: INTERNAL MEDICINE
Payer: MEDICARE

## 2022-07-11 DIAGNOSIS — I71.20 THORACIC AORTIC ANEURYSM WITHOUT RUPTURE: ICD-10-CM

## 2022-07-11 PROCEDURE — 93454 CORONARY ARTERY ANGIO S&I: CPT | Performed by: INTERNAL MEDICINE

## 2022-07-11 PROCEDURE — C1769 GUIDE WIRE: HCPCS | Performed by: INTERNAL MEDICINE

## 2022-07-11 PROCEDURE — 99152 MOD SED SAME PHYS/QHP 5/>YRS: CPT | Performed by: INTERNAL MEDICINE

## 2022-07-11 PROCEDURE — C1894 INTRO/SHEATH, NON-LASER: HCPCS | Performed by: INTERNAL MEDICINE

## 2022-07-11 PROCEDURE — 25500020 PHARM REV CODE 255: Performed by: INTERNAL MEDICINE

## 2022-07-11 PROCEDURE — 63600175 PHARM REV CODE 636 W HCPCS: Performed by: INTERNAL MEDICINE

## 2022-07-11 PROCEDURE — 25000003 PHARM REV CODE 250: Performed by: INTERNAL MEDICINE

## 2022-07-11 PROCEDURE — C1760 CLOSURE DEV, VASC: HCPCS | Performed by: INTERNAL MEDICINE

## 2022-07-11 RX ORDER — SODIUM CHLORIDE 9 MG/ML
0-999 INJECTION, SOLUTION INTRAVENOUS CONTINUOUS
Status: DISCONTINUED | OUTPATIENT
Start: 2022-07-11 | End: 2022-07-29 | Stop reason: HOSPADM

## 2022-07-11 RX ORDER — LIDOCAINE HYDROCHLORIDE 20 MG/ML
INJECTION, SOLUTION EPIDURAL; INFILTRATION; INTRACAUDAL; PERINEURAL
Status: DISPENSED
Start: 2022-07-11 | End: 2022-07-11

## 2022-07-11 RX ORDER — SODIUM CHLORIDE 9 MG/ML
INJECTION, SOLUTION INTRAVENOUS ONCE
Status: DISCONTINUED | OUTPATIENT
Start: 2022-07-11 | End: 2022-07-29 | Stop reason: HOSPADM

## 2022-07-11 RX ORDER — LIDOCAINE HYDROCHLORIDE 20 MG/ML
INJECTION, SOLUTION INFILTRATION; PERINEURAL
Status: DISCONTINUED | OUTPATIENT
Start: 2022-07-11 | End: 2022-07-29 | Stop reason: HOSPADM

## 2022-07-11 RX ORDER — DIAZEPAM 5 MG/1
5 TABLET ORAL EVERY 6 HOURS PRN
Status: DISCONTINUED | OUTPATIENT
Start: 2022-07-11 | End: 2022-07-29 | Stop reason: HOSPADM

## 2022-07-11 RX ORDER — MIDAZOLAM HYDROCHLORIDE 1 MG/ML
INJECTION, SOLUTION INTRAMUSCULAR; INTRAVENOUS
Status: DISCONTINUED | OUTPATIENT
Start: 2022-07-11 | End: 2022-07-29 | Stop reason: HOSPADM

## 2022-07-11 RX ORDER — CLONIDINE HYDROCHLORIDE 0.1 MG/1
0.1 TABLET ORAL ONCE
Status: COMPLETED | OUTPATIENT
Start: 2022-07-11 | End: 2022-07-11

## 2022-07-11 RX ORDER — DIPHENHYDRAMINE HCL 25 MG
25 CAPSULE ORAL EVERY 6 HOURS PRN
Status: DISCONTINUED | OUTPATIENT
Start: 2022-07-11 | End: 2022-07-29 | Stop reason: HOSPADM

## 2022-07-11 RX ADMIN — DIAZEPAM 5 MG: 5 TABLET ORAL at 08:07

## 2022-07-11 RX ADMIN — CLONIDINE HYDROCHLORIDE 0.1 MG: 0.1 TABLET ORAL at 12:07

## 2022-07-11 RX ADMIN — SODIUM CHLORIDE 75 ML/HR: 9 INJECTION, SOLUTION INTRAVENOUS at 06:07

## 2022-07-11 RX ADMIN — DIPHENHYDRAMINE HYDROCHLORIDE 25 MG: 25 CAPSULE ORAL at 08:07

## 2022-07-11 NOTE — Clinical Note
An angiography of the  right femoral artery was performed to evaluate for the placement of a closure device. Closure angio performed

## 2022-07-11 NOTE — DISCHARGE SUMMARY
[facility] - Cath Lab  Discharge Note  Short Stay    Date of procedure:  07/11/2022  Outcome of procedure: Patient tolerated the procedure well.    Complications:  There were no immediate complications.   Final diagnosis:  Anomalous orignin of RCA  Change in medications:  none.    Discharge instructions:    avoid straining with affected limb for one week  Disposition:  home  Follow up:  with CV Surgeon    Harry Jj MD

## 2022-07-11 NOTE — Clinical Note
The catheter was repositioned into the proximal   right coronary artery. An angiography was performed of the right coronary arteries. Multiple views were taken. The angiography was performed via power injection.

## 2022-07-11 NOTE — DISCHARGE INSTRUCTIONS
Take it easy for 5 days  Drink plenty of water  Do not lift/push/pull anything over 5lb for 5 days  Remove gauze dressing in 24 hours, wash with soap and water, leave open to air  Do not apply any ointment, cream, or powder to site    Keep follow up appt

## 2022-07-11 NOTE — BRIEF OP NOTE
Brief Operative Note:    : Harry Jj MD     All Operators: Surgeon(s):  Harry Jj MD     Preoperative Diagnosis: Thoracic aortic aneurysm without rupture [I71.2]     Postop Diagnosis: Thoracic aortic aneurysm without rupture [I71.2]    Treatments/Procedures: Procedure(s) (LRB):  Angiogram, Coronary, with Left Heart Cath (N/A)    Findings:No coronary disease is present Anomalous origin of RCA    . See procedure report for full details.    Estimated Blood loss:   10 cc    Specimens removed: No    Complications:  No immediate complications    Disposition:  7E Outpatient Unit with plans to discharge home later today    Condition:  Stable    Harry Jj

## 2022-07-11 NOTE — Clinical Note
103 ml of contrast were injected throughout the case. 97 mL of contrast was the total wasted during the case. 200 mL was the total amount used during the case.

## 2022-07-11 NOTE — INTERVAL H&P NOTE
Update to the H&P/Cardiology Clinic Note    Ms. Blanquita Montoya was evaluated by the resident and me in Cardiology Clinic within the past 30 days.  I reviewed the H&P/clinic note from this visit, and evaluated the patient.  There are no changes to the H&P/clinic note.  Please see the H&P/clinic note for details.

## 2022-07-13 VITALS
WEIGHT: 193.31 LBS | OXYGEN SATURATION: 99 % | DIASTOLIC BLOOD PRESSURE: 70 MMHG | RESPIRATION RATE: 16 BRPM | HEART RATE: 48 BPM | TEMPERATURE: 98 F | BODY MASS INDEX: 30.34 KG/M2 | SYSTOLIC BLOOD PRESSURE: 134 MMHG | HEIGHT: 67 IN

## 2022-07-13 PROCEDURE — 94761 N-INVAS EAR/PLS OXIMETRY MLT: CPT

## 2022-07-18 ENCOUNTER — TELEPHONE (OUTPATIENT)
Dept: CARDIOLOGY | Facility: CLINIC | Age: 67
End: 2022-07-18
Payer: MEDICARE

## 2022-07-18 NOTE — TELEPHONE ENCOUNTER
Pt called today stating she has a small lump in groin where angiogram was performed on 7-11-22. Pt states it does not bother her, but she does feel a burning sensation at times. Recommended pt go to urgent care to be safe. Of note, pt did have mynx collagen closure device deployed after angiogram. Pt educated on closure device. Pt scheduled with Dr. Vega tomorrow. She states she will ask him to take a look at site. Pt verbalizes understanding that she needs to seek care at urgent care/ED.

## 2022-07-19 ENCOUNTER — OFFICE VISIT (OUTPATIENT)
Dept: CARDIAC SURGERY | Facility: CLINIC | Age: 67
End: 2022-07-19
Payer: MEDICARE

## 2022-07-19 VITALS — WEIGHT: 194 LBS | SYSTOLIC BLOOD PRESSURE: 161 MMHG | DIASTOLIC BLOOD PRESSURE: 86 MMHG | BODY MASS INDEX: 30.45 KG/M2

## 2022-07-19 DIAGNOSIS — I71.21 ASCENDING AORTIC ANEURYSM: Primary | ICD-10-CM

## 2022-07-19 PROCEDURE — 99212 PR OFFICE/OUTPT VISIT, EST, LEVL II, 10-19 MIN: ICD-10-PCS | Mod: ,,, | Performed by: SPECIALIST

## 2022-07-19 PROCEDURE — 99212 OFFICE O/P EST SF 10 MIN: CPT | Mod: ,,, | Performed by: SPECIALIST

## 2022-07-19 NOTE — PROGRESS NOTES
This is essentially an addendum to my previous history and physical  Patient returns now after having had her left heart catheterization which was normal  Her echo showed only trace aortic insufficiency  She is ready for resection and repair of an ascending aortic aneurysm.  I have discussed the risk benefits alternatives in great detail with the patient.  She understands and would like to proceed.  See my recent history and physical for further information

## 2022-07-20 DIAGNOSIS — Z51.81 ENCOUNTER FOR MONITORING CHRONIC NSAID THERAPY: ICD-10-CM

## 2022-07-20 DIAGNOSIS — I71.21 ASCENDING AORTIC ANEURYSM: Primary | ICD-10-CM

## 2022-07-20 DIAGNOSIS — Z79.1 ENCOUNTER FOR MONITORING CHRONIC NSAID THERAPY: ICD-10-CM

## 2022-07-20 RX ORDER — HYDROCODONE BITARTRATE AND ACETAMINOPHEN 500; 5 MG/1; MG/1
TABLET ORAL
Status: CANCELLED | OUTPATIENT
Start: 2022-07-20

## 2022-07-20 RX ORDER — MUPIROCIN 20 MG/G
OINTMENT TOPICAL
Status: CANCELLED | OUTPATIENT
Start: 2022-07-20 | End: 2022-07-20

## 2022-07-26 ENCOUNTER — CLINICAL SUPPORT (OUTPATIENT)
Dept: CARDIOLOGY | Facility: CLINIC | Age: 67
End: 2022-07-26
Attending: INTERNAL MEDICINE
Payer: MEDICARE

## 2022-07-26 VITALS
BODY MASS INDEX: 30.45 KG/M2 | SYSTOLIC BLOOD PRESSURE: 135 MMHG | RESPIRATION RATE: 18 BRPM | WEIGHT: 194 LBS | HEIGHT: 67 IN | HEART RATE: 55 BPM | DIASTOLIC BLOOD PRESSURE: 77 MMHG

## 2022-07-26 PROCEDURE — 99213 OFFICE O/P EST LOW 20 MIN: CPT | Mod: PBBFAC

## 2022-07-26 NOTE — PROGRESS NOTES
Pt presents to cardio clinic for site check of R groin. Pt had angiogram completed on 7-11-22, Mynx closure device was deployed at that time.  She is complaining of R groin discomfort when bending down. Vitals stable, pedal pulses +2, LE neurovascular intact. No redness, swelling, tenderness upon palpation of site. Pt has no other complaints. Report given to Dr. Jj who examined the pt, no new orders. Pt should continue to monitor and alert clinic of any changes. Pt verbalizes understanding.

## 2022-07-27 ENCOUNTER — HOSPITAL ENCOUNTER (OUTPATIENT)
Dept: RADIOLOGY | Facility: HOSPITAL | Age: 67
Discharge: HOME | DRG: 219 | End: 2022-07-27
Attending: SPECIALIST
Payer: MEDICARE

## 2022-07-27 DIAGNOSIS — I71.21 ASCENDING AORTIC ANEURYSM: ICD-10-CM

## 2022-07-27 PROCEDURE — 86920 COMPATIBILITY TEST SPIN: CPT | Performed by: SPECIALIST

## 2022-07-27 PROCEDURE — 71046 X-RAY EXAM CHEST 2 VIEWS: CPT | Mod: TC

## 2022-07-27 NOTE — PROGRESS NOTES
Preop CHG instructions: 3 preop showers using provided CHG scrub kit starting today (7/27/22)Do not use scrubs to face, one scrub per wash, and always start at surgery site (chest). No animals in bed. No lotions, creams, powders, etc to skin after last wash.  Wash sheets, undergarments and pajamas in HOT water and normal detergent when surgery washes are started today (7/27/22). Printed Preop CHG scrub instructions and wash Kit given.     07/27/22 1122   Education   Person Taught patient   Learning Readiness and Ability no barriers identified   Education Outcome Evaluation eager to learn;verbalizes understanding

## 2022-07-28 ENCOUNTER — ANESTHESIA EVENT (OUTPATIENT)
Dept: SURGERY | Facility: HOSPITAL | Age: 67
DRG: 219 | End: 2022-07-28
Payer: MEDICARE

## 2022-07-29 ENCOUNTER — ANESTHESIA (OUTPATIENT)
Dept: SURGERY | Facility: HOSPITAL | Age: 67
DRG: 219 | End: 2022-07-29
Payer: MEDICARE

## 2022-07-29 ENCOUNTER — HOSPITAL ENCOUNTER (INPATIENT)
Facility: HOSPITAL | Age: 67
LOS: 12 days | Discharge: NURSING, REHAB OR LONG-TERM CARE | DRG: 219 | End: 2022-08-10
Attending: SPECIALIST | Admitting: SPECIALIST
Payer: MEDICARE

## 2022-07-29 DIAGNOSIS — I49.9 IRREGULAR HEART RHYTHM: ICD-10-CM

## 2022-07-29 DIAGNOSIS — I25.10 CAD (CORONARY ARTERY DISEASE): ICD-10-CM

## 2022-07-29 DIAGNOSIS — I71.9 AORTIC ANEURYSM: ICD-10-CM

## 2022-07-29 DIAGNOSIS — I71.21 ASCENDING AORTIC ANEURYSM: ICD-10-CM

## 2022-07-29 LAB
ALBUMIN SERPL-MCNC: 2.5 GM/DL (ref 3.4–4.8)
ALBUMIN/GLOB SERPL: 1.7 RATIO (ref 1.1–2)
ALP SERPL-CCNC: 56 UNIT/L (ref 40–150)
ALT SERPL-CCNC: 10 UNIT/L (ref 0–55)
ANION GAP SERPL CALC-SCNC: 9 MEQ/L
APTT PPP: 28.1 SECONDS (ref 23.2–33.7)
AST SERPL-CCNC: 15 UNIT/L (ref 5–34)
BASOPHILS # BLD AUTO: 0.07 X10(3)/MCL (ref 0–0.2)
BASOPHILS # BLD AUTO: 0.11 X10(3)/MCL (ref 0–0.2)
BASOPHILS NFR BLD AUTO: 0.5 %
BASOPHILS NFR BLD AUTO: 0.5 %
BILIRUBIN DIRECT+TOT PNL SERPL-MCNC: 0.6 MG/DL
BSA FOR ECHO PROCEDURE: 2.02 M2
BUN SERPL-MCNC: 6.8 MG/DL (ref 9.8–20.1)
BUN SERPL-MCNC: 7.5 MG/DL (ref 9.8–20.1)
CALCIUM SERPL-MCNC: 10.3 MG/DL (ref 8.4–10.2)
CALCIUM SERPL-MCNC: 8.7 MG/DL (ref 8.4–10.2)
CHLORIDE SERPL-SCNC: 111 MMOL/L (ref 98–107)
CHLORIDE SERPL-SCNC: 113 MMOL/L (ref 98–107)
CO2 SERPL-SCNC: 21 MMOL/L (ref 23–31)
CO2 SERPL-SCNC: 21 MMOL/L (ref 23–31)
CORRECTED TEMPERATURE (PCO2): 45 MMHG (ref 35–45)
CORRECTED TEMPERATURE (PH): 7.41 (ref 7.35–7.45)
CORRECTED TEMPERATURE (PO2): 64 MMHG (ref 80–100)
CREAT SERPL-MCNC: 0.68 MG/DL (ref 0.55–1.02)
CREAT SERPL-MCNC: 0.7 MG/DL (ref 0.55–1.02)
CREAT/UREA NIT SERPL: 10
EOSINOPHIL # BLD AUTO: 0.46 X10(3)/MCL (ref 0–0.9)
EOSINOPHIL # BLD AUTO: 0.57 X10(3)/MCL (ref 0–0.9)
EOSINOPHIL NFR BLD AUTO: 2.7 %
EOSINOPHIL NFR BLD AUTO: 3.2 %
ERYTHROCYTE [DISTWIDTH] IN BLOOD BY AUTOMATED COUNT: 12.9 % (ref 11.5–17)
ERYTHROCYTE [DISTWIDTH] IN BLOOD BY AUTOMATED COUNT: 12.9 % (ref 11.5–17)
FIO2: 60
FIO2: 65
GLOBULIN SER-MCNC: 1.5 GM/DL (ref 2.4–3.5)
GLUCOSE SERPL-MCNC: 112 MG/DL (ref 82–115)
GLUCOSE SERPL-MCNC: 115 MG/DL (ref 70–110)
GLUCOSE SERPL-MCNC: 133 MG/DL (ref 70–110)
GLUCOSE SERPL-MCNC: 138 MG/DL (ref 70–110)
GLUCOSE SERPL-MCNC: 144 MG/DL (ref 82–115)
GLUCOSE SERPL-MCNC: 160 MG/DL (ref 70–110)
HCO3 UR-SCNC: 24.4 MMOL/L (ref 24–28)
HCO3 UR-SCNC: 27.5 MMOL/L (ref 24–28)
HCO3 UR-SCNC: 28.5 MMOL/L
HCO3 UR-SCNC: 29.1 MMOL/L (ref 24–28)
HCO3 UR-SCNC: 29.2 MMOL/L (ref 24–28)
HCT VFR BLD AUTO: 33.6 % (ref 37–47)
HCT VFR BLD AUTO: 35.3 % (ref 37–47)
HCT VFR BLD CALC: 24 %PCV (ref 36–54)
HCT VFR BLD CALC: 25 %PCV (ref 36–54)
HCT VFR BLD CALC: 28 %PCV (ref 36–54)
HCT VFR BLD CALC: 33 %PCV (ref 36–54)
HGB BLD-MCNC: 10 G/DL
HGB BLD-MCNC: 10.6 GM/DL (ref 12–16)
HGB BLD-MCNC: 11 G/DL
HGB BLD-MCNC: 11.4 GM/DL (ref 12–16)
HGB BLD-MCNC: 13.7 G/DL (ref 12–16)
HGB BLD-MCNC: 8 G/DL
HGB BLD-MCNC: 9 G/DL
IMM GRANULOCYTES # BLD AUTO: 0.16 X10(3)/MCL (ref 0–0.04)
IMM GRANULOCYTES # BLD AUTO: 0.25 X10(3)/MCL (ref 0–0.04)
IMM GRANULOCYTES NFR BLD AUTO: 1.1 %
IMM GRANULOCYTES NFR BLD AUTO: 1.2 %
INR BLD: 1.37 (ref 0–1.3)
INR BLD: 1.46 (ref 0–1.3)
LYMPHOCYTES # BLD AUTO: 2.09 X10(3)/MCL (ref 0.6–4.6)
LYMPHOCYTES # BLD AUTO: 3.22 X10(3)/MCL (ref 0.6–4.6)
LYMPHOCYTES NFR BLD AUTO: 14.4 %
LYMPHOCYTES NFR BLD AUTO: 15.4 %
MAGNESIUM SERPL-MCNC: 2.6 MG/DL (ref 1.6–2.6)
MCH RBC QN AUTO: 29.6 PG (ref 27–31)
MCH RBC QN AUTO: 30.6 PG (ref 27–31)
MCHC RBC AUTO-ENTMCNC: 31.5 MG/DL (ref 33–36)
MCHC RBC AUTO-ENTMCNC: 32.3 MG/DL (ref 33–36)
MCV RBC AUTO: 93.9 FL (ref 80–94)
MCV RBC AUTO: 94.6 FL (ref 80–94)
MONOCYTES # BLD AUTO: 0.25 X10(3)/MCL (ref 0.1–1.3)
MONOCYTES # BLD AUTO: 1.11 X10(3)/MCL (ref 0.1–1.3)
MONOCYTES NFR BLD AUTO: 1.7 %
MONOCYTES NFR BLD AUTO: 5.3 %
NEUTROPHILS # BLD AUTO: 11.5 X10(3)/MCL (ref 2.1–9.2)
NEUTROPHILS # BLD AUTO: 15.6 X10(3)/MCL (ref 2.1–9.2)
NEUTROPHILS NFR BLD AUTO: 74.9 %
NEUTROPHILS NFR BLD AUTO: 79.1 %
NRBC BLD AUTO-RTO: 0 %
NRBC BLD AUTO-RTO: 0 %
PCO2 BLDA: 42.8 MMHG (ref 35–45)
PCO2 BLDA: 45 MMHG
PCO2 BLDA: 45 MMHG (ref 35–45)
PCO2 BLDA: 47.8 MMHG (ref 35–45)
PCO2 BLDA: 48 MMHG
PCO2 BLDA: 48.2 MMHG (ref 35–45)
PCO2 BLDA: 48.4 MMHG (ref 35–45)
PH SMN: 7.29 [PH] (ref 7.35–7.45)
PH SMN: 7.32 [PH] (ref 7.35–7.45)
PH SMN: 7.36 [PH] (ref 7.35–7.45)
PH SMN: 7.36 [PH] (ref 7.35–7.45)
PH SMN: 7.39 [PH] (ref 7.35–7.45)
PH SMN: 7.39 [PH] (ref 7.35–7.45)
PH SMN: 7.41 [PH] (ref 7.35–7.45)
PHOSPHATE SERPL-MCNC: 3.1 MG/DL (ref 2.3–4.7)
PLATELET # BLD AUTO: 126 X10(3)/MCL (ref 130–400)
PLATELET # BLD AUTO: 190 X10(3)/MCL (ref 130–400)
PMV BLD AUTO: 11.6 FL (ref 7.4–10.4)
PMV BLD AUTO: 11.7 FL (ref 7.4–10.4)
PO2 BLDA: 174 MMHG
PO2 BLDA: 258 MMHG (ref 80–100)
PO2 BLDA: 356 MMHG (ref 80–100)
PO2 BLDA: 414 MMHG (ref 80–100)
PO2 BLDA: 43 MMHG (ref 40–60)
PO2 BLDA: 64 MMHG (ref 80–100)
PO2 BLDA: 79 MMHG
POC BASE DEFICIT: -3 MMOL/L
POC BASE DEFICIT: -3.7 MMOL/L
POC BASE DEFICIT: 3.2 MMOL/L (ref -2–2)
POC BE: -1 MMOL/L
POC BE: 2 MMOL/L
POC BE: 4 MMOL/L
POC BE: 4 MMOL/L
POC COHB: 5.4 %
POC HCO3: 23.1 MMOL/L
POC HCO3: 23.2 MMOL/L
POC IONIZED CALCIUM: 0.98 MMOL/L (ref 1.06–1.42)
POC IONIZED CALCIUM: 1.02 MMOL/L (ref 1.06–1.42)
POC IONIZED CALCIUM: 1.13 MMOL/L (ref 1.06–1.42)
POC IONIZED CALCIUM: 1.15 MMOL/L (ref 1.12–1.23)
POC IONIZED CALCIUM: 1.23 MMOL/L
POC IONIZED CALCIUM: 1.24 MMOL/L (ref 1.12–1.23)
POC IONIZED CALCIUM: 1.59 MMOL/L (ref 1.06–1.42)
POC METHB: 1.3 % (ref 0.4–1.5)
POC O2HB: 89.5 % (ref 94–97)
POC SATURATED O2: 100 % (ref 95–100)
POC SATURATED O2: 76 % (ref 95–100)
POC SATURATED O2: 92.3 %
POC SATURATED O2: 94 %
POC SATURATED O2: 99 %
POC TCO2: 26 MMOL/L (ref 23–27)
POC TCO2: 29 MMOL/L (ref 24–29)
POC TCO2: 31 MMOL/L (ref 23–27)
POC TCO2: 31 MMOL/L (ref 23–27)
POC TEMPERATURE: 37 C
POC TEMPERATURE: 37 C
POC TEMPERATURE: 37 °C
POCT GLUCOSE: 102 MG/DL (ref 70–110)
POCT GLUCOSE: 105 MG/DL (ref 70–110)
POCT GLUCOSE: 107 MG/DL (ref 70–110)
POCT GLUCOSE: 115 MG/DL (ref 70–110)
POCT GLUCOSE: 152 MG/DL (ref 70–110)
POCT GLUCOSE: 167 MG/DL (ref 70–110)
POCT GLUCOSE: 180 MG/DL (ref 70–110)
POCT GLUCOSE: 181 MG/DL (ref 70–110)
POCT GLUCOSE: 192 MG/DL (ref 70–110)
POCT GLUCOSE: 196 MG/DL (ref 70–110)
POCT GLUCOSE: 212 MG/DL (ref 70–110)
POCT GLUCOSE: 232 MG/DL (ref 70–110)
POCT GLUCOSE: 94 MG/DL (ref 70–110)
POTASSIUM BLD-SCNC: 2.7 MMOL/L
POTASSIUM BLD-SCNC: 2.8 MMOL/L
POTASSIUM BLD-SCNC: 3.8 MMOL/L (ref 3.5–5.1)
POTASSIUM BLD-SCNC: 3.8 MMOL/L (ref 3.5–5.1)
POTASSIUM BLD-SCNC: 4 MMOL/L (ref 3.5–5)
POTASSIUM BLD-SCNC: 5.4 MMOL/L (ref 3.5–5.1)
POTASSIUM BLD-SCNC: 5.4 MMOL/L (ref 3.5–5.1)
POTASSIUM SERPL-SCNC: 3.1 MMOL/L (ref 3.5–5.1)
POTASSIUM SERPL-SCNC: 4 MMOL/L (ref 3.5–5.1)
POTASSIUM SERPL-SCNC: 4 MMOL/L (ref 3.5–5.1)
PROT SERPL-MCNC: 4 GM/DL (ref 5.8–7.6)
PROTHROMBIN TIME: 16.7 SECONDS (ref 12.5–14.5)
PROTHROMBIN TIME: 17.6 SECONDS (ref 12.5–14.5)
RBC # BLD AUTO: 3.58 X10(6)/MCL (ref 4.2–5.4)
RBC # BLD AUTO: 3.73 X10(6)/MCL (ref 4.2–5.4)
SAMPLE: ABNORMAL
SODIUM BLD-SCNC: 134 MMOL/L (ref 137–145)
SODIUM BLD-SCNC: 138 MMOL/L
SODIUM BLD-SCNC: 138 MMOL/L
SODIUM BLD-SCNC: 138 MMOL/L (ref 136–145)
SODIUM BLD-SCNC: 139 MMOL/L (ref 136–145)
SODIUM SERPL-SCNC: 139 MMOL/L (ref 136–145)
SODIUM SERPL-SCNC: 143 MMOL/L (ref 136–145)
SPECIMEN SOURCE: ABNORMAL
WBC # SPEC AUTO: 14.5 X10(3)/MCL (ref 4.5–11.5)
WBC # SPEC AUTO: 20.9 X10(3)/MCL (ref 4.5–11.5)

## 2022-07-29 PROCEDURE — 85730 THROMBOPLASTIN TIME PARTIAL: CPT

## 2022-07-29 PROCEDURE — C1887 CATHETER, GUIDING: HCPCS | Performed by: SPECIALIST

## 2022-07-29 PROCEDURE — 33859 AS-AORT GRF F/DS OTH/THN DSJ: CPT | Mod: AS,,,

## 2022-07-29 PROCEDURE — 36415 COLL VENOUS BLD VENIPUNCTURE: CPT

## 2022-07-29 PROCEDURE — 25000003 PHARM REV CODE 250

## 2022-07-29 PROCEDURE — 27200667 HC PACEMAKER, TEMPORARY MONITORING, PER SHIFT

## 2022-07-29 PROCEDURE — 82803 BLOOD GASES ANY COMBINATION: CPT

## 2022-07-29 PROCEDURE — 84132 ASSAY OF SERUM POTASSIUM: CPT | Performed by: SPECIALIST

## 2022-07-29 PROCEDURE — 94761 N-INVAS EAR/PLS OXIMETRY MLT: CPT

## 2022-07-29 PROCEDURE — 99900035 HC TECH TIME PER 15 MIN (STAT)

## 2022-07-29 PROCEDURE — 33859 PR GRAFT, ASC AORTA, W/CPB, W/VALVE SUSP, OTHER AORTIC DISEASE: ICD-10-PCS | Mod: ,,, | Performed by: SPECIALIST

## 2022-07-29 PROCEDURE — 63600175 PHARM REV CODE 636 W HCPCS

## 2022-07-29 PROCEDURE — 37799 UNLISTED PX VASCULAR SURGERY: CPT

## 2022-07-29 PROCEDURE — 36415 COLL VENOUS BLD VENIPUNCTURE: CPT | Performed by: SPECIALIST

## 2022-07-29 PROCEDURE — L8670 VASCULAR GRAFT, SYNTHETIC: HCPCS | Performed by: SPECIALIST

## 2022-07-29 PROCEDURE — 36620 INSERTION CATHETER ARTERY: CPT

## 2022-07-29 PROCEDURE — 83735 ASSAY OF MAGNESIUM: CPT

## 2022-07-29 PROCEDURE — 25000003 PHARM REV CODE 250: Performed by: SPECIALIST

## 2022-07-29 PROCEDURE — 27200966 HC CLOSED SUCTION SYSTEM

## 2022-07-29 PROCEDURE — 20000000 HC ICU ROOM

## 2022-07-29 PROCEDURE — C1894 INTRO/SHEATH, NON-LASER: HCPCS | Performed by: SPECIALIST

## 2022-07-29 PROCEDURE — 85025 COMPLETE CBC W/AUTO DIFF WBC: CPT

## 2022-07-29 PROCEDURE — 37000008 HC ANESTHESIA 1ST 15 MINUTES: Performed by: SPECIALIST

## 2022-07-29 PROCEDURE — 63600175 PHARM REV CODE 636 W HCPCS: Performed by: SPECIALIST

## 2022-07-29 PROCEDURE — 63600175 PHARM REV CODE 636 W HCPCS: Performed by: STUDENT IN AN ORGANIZED HEALTH CARE EDUCATION/TRAINING PROGRAM

## 2022-07-29 PROCEDURE — C1751 CATH, INF, PER/CENT/MIDLINE: HCPCS

## 2022-07-29 PROCEDURE — 33859 PR GRAFT, ASC AORTA, W/CPB, W/VALVE SUSP, OTHER AORTIC DISEASE: ICD-10-PCS | Mod: AS,,,

## 2022-07-29 PROCEDURE — 94002 VENT MGMT INPAT INIT DAY: CPT

## 2022-07-29 PROCEDURE — C1729 CATH, DRAINAGE: HCPCS | Performed by: SPECIALIST

## 2022-07-29 PROCEDURE — 36000712 HC OR TIME LEV V 1ST 15 MIN: Performed by: SPECIALIST

## 2022-07-29 PROCEDURE — 85610 PROTHROMBIN TIME: CPT

## 2022-07-29 PROCEDURE — 85025 COMPLETE CBC W/AUTO DIFF WBC: CPT | Performed by: SPECIALIST

## 2022-07-29 PROCEDURE — 85610 PROTHROMBIN TIME: CPT | Performed by: SPECIALIST

## 2022-07-29 PROCEDURE — 84100 ASSAY OF PHOSPHORUS: CPT

## 2022-07-29 PROCEDURE — 33859 AS-AORT GRF F/DS OTH/THN DSJ: CPT | Mod: ,,, | Performed by: SPECIALIST

## 2022-07-29 PROCEDURE — 37000009 HC ANESTHESIA EA ADD 15 MINS: Performed by: SPECIALIST

## 2022-07-29 PROCEDURE — 36000713 HC OR TIME LEV V EA ADD 15 MIN: Performed by: SPECIALIST

## 2022-07-29 PROCEDURE — 27000221 HC OXYGEN, UP TO 24 HOURS

## 2022-07-29 PROCEDURE — 27201423 OPTIME MED/SURG SUP & DEVICES STERILE SUPPLY: Performed by: SPECIALIST

## 2022-07-29 PROCEDURE — 80053 COMPREHEN METABOLIC PANEL: CPT | Performed by: SPECIALIST

## 2022-07-29 PROCEDURE — P9045 ALBUMIN (HUMAN), 5%, 250 ML: HCPCS | Mod: JG

## 2022-07-29 PROCEDURE — S5010 5% DEXTROSE AND 0.45% SALINE: HCPCS

## 2022-07-29 DEVICE — GRAFT 30 X 30 GELWEAVE WOVEN: Type: IMPLANTABLE DEVICE | Site: AORTA | Status: FUNCTIONAL

## 2022-07-29 RX ORDER — ASPIRIN 81 MG/1
81 TABLET ORAL DAILY
Status: CANCELLED | OUTPATIENT
Start: 2022-07-29

## 2022-07-29 RX ORDER — ACETAMINOPHEN 650 MG/20.3ML
650 LIQUID ORAL EVERY 6 HOURS PRN
Status: CANCELLED | OUTPATIENT
Start: 2022-07-29

## 2022-07-29 RX ORDER — INDOMETHACIN 25 MG/1
CAPSULE ORAL
Status: COMPLETED
Start: 2022-07-29 | End: 2022-07-29

## 2022-07-29 RX ORDER — CEFAZOLIN SODIUM 2 G/50ML
2 SOLUTION INTRAVENOUS
Status: COMPLETED | OUTPATIENT
Start: 2022-07-29 | End: 2022-07-30

## 2022-07-29 RX ORDER — CEFAZOLIN SODIUM 2 G/50ML
2 SOLUTION INTRAVENOUS
Status: CANCELLED | OUTPATIENT
Start: 2022-07-29 | End: 2022-07-30

## 2022-07-29 RX ORDER — EPHEDRINE SULFATE 50 MG/ML
INJECTION, SOLUTION INTRAVENOUS
Status: DISCONTINUED | OUTPATIENT
Start: 2022-07-29 | End: 2022-07-29

## 2022-07-29 RX ORDER — HYDROCODONE BITARTRATE AND ACETAMINOPHEN 5; 325 MG/1; MG/1
1 TABLET ORAL EVERY 4 HOURS PRN
Status: DISCONTINUED | OUTPATIENT
Start: 2022-07-29 | End: 2022-08-10 | Stop reason: HOSPADM

## 2022-07-29 RX ORDER — MIDAZOLAM HYDROCHLORIDE 1 MG/ML
INJECTION INTRAMUSCULAR; INTRAVENOUS
Status: DISCONTINUED | OUTPATIENT
Start: 2022-07-29 | End: 2022-07-29

## 2022-07-29 RX ORDER — ACETAMINOPHEN 650 MG/20.3ML
650 LIQUID ORAL EVERY 6 HOURS PRN
Status: DISCONTINUED | OUTPATIENT
Start: 2022-07-29 | End: 2022-08-10 | Stop reason: HOSPADM

## 2022-07-29 RX ORDER — ETOMIDATE 2 MG/ML
INJECTION INTRAVENOUS
Status: DISCONTINUED | OUTPATIENT
Start: 2022-07-29 | End: 2022-07-29

## 2022-07-29 RX ORDER — VANCOMYCIN HYDROCHLORIDE 1 G/20ML
INJECTION, POWDER, LYOPHILIZED, FOR SOLUTION INTRAVENOUS
Status: DISCONTINUED | OUTPATIENT
Start: 2022-07-29 | End: 2022-07-29 | Stop reason: HOSPADM

## 2022-07-29 RX ORDER — SODIUM BICARBONATE 1 MEQ/ML
50 SYRINGE (ML) INTRAVENOUS ONCE
Status: COMPLETED | OUTPATIENT
Start: 2022-07-29 | End: 2022-07-29

## 2022-07-29 RX ORDER — LACTULOSE 10 G/15ML
20 SOLUTION ORAL EVERY 6 HOURS PRN
Status: DISCONTINUED | OUTPATIENT
Start: 2022-07-29 | End: 2022-08-10 | Stop reason: HOSPADM

## 2022-07-29 RX ORDER — DOCUSATE SODIUM 100 MG/1
100 CAPSULE, LIQUID FILLED ORAL 2 TIMES DAILY
Status: CANCELLED | OUTPATIENT
Start: 2022-07-29

## 2022-07-29 RX ORDER — MORPHINE SULFATE 10 MG/ML
2 INJECTION INTRAMUSCULAR; INTRAVENOUS; SUBCUTANEOUS
Status: CANCELLED | OUTPATIENT
Start: 2022-07-29

## 2022-07-29 RX ORDER — VANCOMYCIN HCL IN 5 % DEXTROSE 1G/250ML
1000 PLASTIC BAG, INJECTION (ML) INTRAVENOUS
Status: CANCELLED | OUTPATIENT
Start: 2022-07-29 | End: 2022-07-30

## 2022-07-29 RX ORDER — ROCURONIUM BROMIDE 10 MG/ML
INJECTION, SOLUTION INTRAVENOUS
Status: DISCONTINUED | OUTPATIENT
Start: 2022-07-29 | End: 2022-07-29

## 2022-07-29 RX ORDER — DEXTROSE MONOHYDRATE AND SODIUM CHLORIDE 5; .45 G/100ML; G/100ML
INJECTION, SOLUTION INTRAVENOUS CONTINUOUS
Status: DISCONTINUED | OUTPATIENT
Start: 2022-07-29 | End: 2022-08-01

## 2022-07-29 RX ORDER — POTASSIUM CHLORIDE 14.9 MG/ML
40 INJECTION INTRAVENOUS
Status: DISCONTINUED | OUTPATIENT
Start: 2022-07-29 | End: 2022-07-30

## 2022-07-29 RX ORDER — METOCLOPRAMIDE HYDROCHLORIDE 5 MG/ML
5 INJECTION INTRAMUSCULAR; INTRAVENOUS EVERY 6 HOURS PRN
Status: CANCELLED | OUTPATIENT
Start: 2022-07-29

## 2022-07-29 RX ORDER — FOLIC ACID 1 MG/1
1 TABLET ORAL DAILY
Status: DISCONTINUED | OUTPATIENT
Start: 2022-07-29 | End: 2022-08-10 | Stop reason: HOSPADM

## 2022-07-29 RX ORDER — POTASSIUM CHLORIDE 29.8 MG/ML
40 INJECTION INTRAVENOUS
Status: CANCELLED | OUTPATIENT
Start: 2022-07-29

## 2022-07-29 RX ORDER — HEPARIN SODIUM 5000 [USP'U]/ML
INJECTION, SOLUTION INTRAVENOUS; SUBCUTANEOUS
Status: DISCONTINUED | OUTPATIENT
Start: 2022-07-29 | End: 2022-07-29

## 2022-07-29 RX ORDER — CALCIUM GLUCONATE 20 MG/ML
1 INJECTION, SOLUTION INTRAVENOUS
Status: DISCONTINUED | OUTPATIENT
Start: 2022-07-29 | End: 2022-07-30

## 2022-07-29 RX ORDER — 3% SODIUM CHLORIDE 3 G/100ML
INJECTION, SOLUTION INTRAVENOUS
Status: COMPLETED | OUTPATIENT
Start: 2022-07-29 | End: 2022-07-29

## 2022-07-29 RX ORDER — ONDANSETRON 2 MG/ML
4 INJECTION INTRAMUSCULAR; INTRAVENOUS EVERY 12 HOURS PRN
Status: CANCELLED | OUTPATIENT
Start: 2022-07-29

## 2022-07-29 RX ORDER — CALCIUM GLUCONATE 20 MG/ML
3 INJECTION, SOLUTION INTRAVENOUS
Status: DISCONTINUED | OUTPATIENT
Start: 2022-07-29 | End: 2022-07-30

## 2022-07-29 RX ORDER — LOPERAMIDE HYDROCHLORIDE 2 MG/1
4 CAPSULE ORAL ONCE
Status: CANCELLED | OUTPATIENT
Start: 2022-07-29 | End: 2022-07-29

## 2022-07-29 RX ORDER — CALCIUM CHLORIDE INJECTION 100 MG/ML
INJECTION, SOLUTION INTRAVENOUS
Status: DISCONTINUED | OUTPATIENT
Start: 2022-07-29 | End: 2022-07-29 | Stop reason: HOSPADM

## 2022-07-29 RX ORDER — OXYCODONE HYDROCHLORIDE 5 MG/1
5 TABLET ORAL EVERY 4 HOURS PRN
Status: DISCONTINUED | OUTPATIENT
Start: 2022-07-29 | End: 2022-08-10 | Stop reason: HOSPADM

## 2022-07-29 RX ORDER — METOPROLOL TARTRATE 25 MG/1
12.5 TABLET ORAL 2 TIMES DAILY
Status: DISCONTINUED | OUTPATIENT
Start: 2022-07-30 | End: 2022-08-05

## 2022-07-29 RX ORDER — DEXMEDETOMIDINE HYDROCHLORIDE 4 UG/ML
0-1.4 INJECTION, SOLUTION INTRAVENOUS CONTINUOUS
Status: DISCONTINUED | OUTPATIENT
Start: 2022-07-29 | End: 2022-07-31

## 2022-07-29 RX ORDER — FAMOTIDINE 10 MG/ML
20 INJECTION INTRAVENOUS 2 TIMES DAILY
Status: CANCELLED | OUTPATIENT
Start: 2022-07-29

## 2022-07-29 RX ORDER — SODIUM CHLORIDE 450 MG/100ML
75 INJECTION, SOLUTION INTRAVENOUS CONTINUOUS
Status: CANCELLED | OUTPATIENT
Start: 2022-07-29

## 2022-07-29 RX ORDER — MUPIROCIN 20 MG/G
OINTMENT TOPICAL 2 TIMES DAILY
Status: CANCELLED | OUTPATIENT
Start: 2022-07-29 | End: 2022-08-03

## 2022-07-29 RX ORDER — SUCRALFATE 1 G/1
1 TABLET ORAL
Status: DISCONTINUED | OUTPATIENT
Start: 2022-07-30 | End: 2022-08-10 | Stop reason: HOSPADM

## 2022-07-29 RX ORDER — POTASSIUM CHLORIDE 14.9 MG/ML
20 INJECTION INTRAVENOUS
Status: CANCELLED | OUTPATIENT
Start: 2022-07-29

## 2022-07-29 RX ORDER — FAMOTIDINE 10 MG/ML
20 INJECTION INTRAVENOUS DAILY
Status: DISCONTINUED | OUTPATIENT
Start: 2022-07-29 | End: 2022-08-10 | Stop reason: HOSPADM

## 2022-07-29 RX ORDER — LEVETIRACETAM 10 MG/ML
1000 INJECTION INTRAVASCULAR ONCE
Status: COMPLETED | OUTPATIENT
Start: 2022-07-29 | End: 2022-07-29

## 2022-07-29 RX ORDER — ALBUMIN HUMAN 50 G/1000ML
12.5 SOLUTION INTRAVENOUS
Status: DISCONTINUED | OUTPATIENT
Start: 2022-07-29 | End: 2022-08-10 | Stop reason: HOSPADM

## 2022-07-29 RX ORDER — VASOPRESSIN 20 [USP'U]/ML
INJECTION, SOLUTION INTRAMUSCULAR; SUBCUTANEOUS
Status: DISCONTINUED | OUTPATIENT
Start: 2022-07-29 | End: 2022-07-29

## 2022-07-29 RX ORDER — ALBUMIN HUMAN 50 G/1000ML
12.5 SOLUTION INTRAVENOUS
Status: CANCELLED | OUTPATIENT
Start: 2022-07-29

## 2022-07-29 RX ORDER — MAGNESIUM SULFATE HEPTAHYDRATE 40 MG/ML
4 INJECTION, SOLUTION INTRAVENOUS
Status: CANCELLED | OUTPATIENT
Start: 2022-07-29

## 2022-07-29 RX ORDER — CEFUROXIME SODIUM 1.5 G/16ML
INJECTION, POWDER, FOR SOLUTION INTRAVENOUS
Status: DISPENSED
Start: 2022-07-29 | End: 2022-07-29

## 2022-07-29 RX ORDER — CALCIUM GLUCONATE 20 MG/ML
3 INJECTION, SOLUTION INTRAVENOUS
Status: CANCELLED | OUTPATIENT
Start: 2022-07-29

## 2022-07-29 RX ORDER — LOPERAMIDE HYDROCHLORIDE 2 MG/1
4 CAPSULE ORAL ONCE
Status: DISCONTINUED | OUTPATIENT
Start: 2022-07-29 | End: 2022-08-10 | Stop reason: HOSPADM

## 2022-07-29 RX ORDER — MAGNESIUM SULFATE HEPTAHYDRATE 40 MG/ML
2 INJECTION, SOLUTION INTRAVENOUS
Status: CANCELLED | OUTPATIENT
Start: 2022-07-29

## 2022-07-29 RX ORDER — FOLIC ACID 1 MG/1
1 TABLET ORAL DAILY
Status: CANCELLED | OUTPATIENT
Start: 2022-07-29

## 2022-07-29 RX ORDER — POTASSIUM CHLORIDE 14.9 MG/ML
20 INJECTION INTRAVENOUS
Status: DISCONTINUED | OUTPATIENT
Start: 2022-07-29 | End: 2022-07-30

## 2022-07-29 RX ORDER — CALCIUM GLUCONATE 20 MG/ML
1 INJECTION, SOLUTION INTRAVENOUS
Status: CANCELLED | OUTPATIENT
Start: 2022-07-29

## 2022-07-29 RX ORDER — PROTAMINE SULFATE 10 MG/ML
INJECTION, SOLUTION INTRAVENOUS
Status: DISCONTINUED | OUTPATIENT
Start: 2022-07-29 | End: 2022-07-29

## 2022-07-29 RX ORDER — LIDOCAINE HYDROCHLORIDE 20 MG/ML
INJECTION INTRAVENOUS
Status: DISCONTINUED | OUTPATIENT
Start: 2022-07-29 | End: 2022-07-29

## 2022-07-29 RX ORDER — ONDANSETRON 2 MG/ML
INJECTION INTRAMUSCULAR; INTRAVENOUS
Status: DISCONTINUED | OUTPATIENT
Start: 2022-07-29 | End: 2022-07-29

## 2022-07-29 RX ORDER — HYDROCODONE BITARTRATE AND ACETAMINOPHEN 500; 5 MG/1; MG/1
TABLET ORAL
Status: DISCONTINUED | OUTPATIENT
Start: 2022-07-29 | End: 2022-07-30

## 2022-07-29 RX ORDER — TRANEXAMIC ACID 100 MG/ML
INJECTION, SOLUTION INTRAVENOUS
Status: DISCONTINUED | OUTPATIENT
Start: 2022-07-29 | End: 2022-07-29

## 2022-07-29 RX ORDER — METOPROLOL TARTRATE 25 MG/1
12.5 TABLET ORAL 2 TIMES DAILY
Status: CANCELLED | OUTPATIENT
Start: 2022-07-29

## 2022-07-29 RX ORDER — CALCIUM GLUCONATE 20 MG/ML
2 INJECTION, SOLUTION INTRAVENOUS
Status: CANCELLED | OUTPATIENT
Start: 2022-07-29

## 2022-07-29 RX ORDER — FENTANYL CITRATE 50 UG/ML
INJECTION, SOLUTION INTRAMUSCULAR; INTRAVENOUS
Status: DISCONTINUED | OUTPATIENT
Start: 2022-07-29 | End: 2022-07-29

## 2022-07-29 RX ORDER — FAMOTIDINE 10 MG/ML
20 INJECTION INTRAVENOUS ONCE
Status: COMPLETED | OUTPATIENT
Start: 2022-07-29 | End: 2022-07-29

## 2022-07-29 RX ORDER — FAMOTIDINE 10 MG/ML
INJECTION INTRAVENOUS
Status: COMPLETED
Start: 2022-07-29 | End: 2022-07-29

## 2022-07-29 RX ORDER — MAGNESIUM SULFATE HEPTAHYDRATE 40 MG/ML
4 INJECTION, SOLUTION INTRAVENOUS
Status: DISCONTINUED | OUTPATIENT
Start: 2022-07-29 | End: 2022-07-30

## 2022-07-29 RX ORDER — GLYCOPYRROLATE 0.2 MG/ML
INJECTION INTRAMUSCULAR; INTRAVENOUS
Status: DISCONTINUED | OUTPATIENT
Start: 2022-07-29 | End: 2022-07-29

## 2022-07-29 RX ORDER — OXYCODONE HYDROCHLORIDE 5 MG/1
5 TABLET ORAL EVERY 4 HOURS PRN
Status: CANCELLED | OUTPATIENT
Start: 2022-07-29

## 2022-07-29 RX ORDER — MUPIROCIN 20 MG/G
OINTMENT TOPICAL
Status: COMPLETED | OUTPATIENT
Start: 2022-07-29 | End: 2022-07-29

## 2022-07-29 RX ORDER — ASPIRIN 81 MG/1
81 TABLET ORAL DAILY
Status: DISCONTINUED | OUTPATIENT
Start: 2022-07-30 | End: 2022-08-10 | Stop reason: HOSPADM

## 2022-07-29 RX ORDER — METOCLOPRAMIDE HYDROCHLORIDE 5 MG/ML
5 INJECTION INTRAMUSCULAR; INTRAVENOUS EVERY 6 HOURS PRN
Status: DISCONTINUED | OUTPATIENT
Start: 2022-07-29 | End: 2022-08-10 | Stop reason: HOSPADM

## 2022-07-29 RX ORDER — SUCRALFATE 1 G/1
1 TABLET ORAL
Status: CANCELLED | OUTPATIENT
Start: 2022-07-29

## 2022-07-29 RX ORDER — MAGNESIUM SULFATE HEPTAHYDRATE 40 MG/ML
2 INJECTION, SOLUTION INTRAVENOUS
Status: DISCONTINUED | OUTPATIENT
Start: 2022-07-29 | End: 2022-07-30

## 2022-07-29 RX ORDER — LIDOCAINE HYDROCHLORIDE 10 MG/ML
1 INJECTION, SOLUTION EPIDURAL; INFILTRATION; INTRACAUDAL; PERINEURAL ONCE
Status: DISCONTINUED | OUTPATIENT
Start: 2022-07-29 | End: 2022-08-10 | Stop reason: HOSPADM

## 2022-07-29 RX ORDER — POTASSIUM CHLORIDE 14.9 MG/ML
60 INJECTION INTRAVENOUS
Status: DISCONTINUED | OUTPATIENT
Start: 2022-07-29 | End: 2022-07-30

## 2022-07-29 RX ORDER — DEXMEDETOMIDINE HYDROCHLORIDE 4 UG/ML
0-1.4 INJECTION, SOLUTION INTRAVENOUS CONTINUOUS
Status: CANCELLED | OUTPATIENT
Start: 2022-07-29

## 2022-07-29 RX ORDER — DOCUSATE SODIUM 100 MG/1
100 CAPSULE, LIQUID FILLED ORAL 2 TIMES DAILY
Status: DISCONTINUED | OUTPATIENT
Start: 2022-07-29 | End: 2022-08-01

## 2022-07-29 RX ORDER — ENOXAPARIN SODIUM 100 MG/ML
40 INJECTION SUBCUTANEOUS EVERY 24 HOURS
Status: DISCONTINUED | OUTPATIENT
Start: 2022-07-29 | End: 2022-08-10 | Stop reason: HOSPADM

## 2022-07-29 RX ORDER — MORPHINE SULFATE 10 MG/ML
2 INJECTION INTRAMUSCULAR; INTRAVENOUS; SUBCUTANEOUS
Status: DISCONTINUED | OUTPATIENT
Start: 2022-07-29 | End: 2022-08-10 | Stop reason: HOSPADM

## 2022-07-29 RX ORDER — HYDROCODONE BITARTRATE AND ACETAMINOPHEN 5; 325 MG/1; MG/1
1 TABLET ORAL EVERY 4 HOURS PRN
Status: CANCELLED | OUTPATIENT
Start: 2022-07-29

## 2022-07-29 RX ORDER — ONDANSETRON 2 MG/ML
4 INJECTION INTRAMUSCULAR; INTRAVENOUS EVERY 12 HOURS PRN
Status: DISCONTINUED | OUTPATIENT
Start: 2022-07-29 | End: 2022-08-10 | Stop reason: HOSPADM

## 2022-07-29 RX ORDER — CALCIUM GLUCONATE 20 MG/ML
2 INJECTION, SOLUTION INTRAVENOUS
Status: DISCONTINUED | OUTPATIENT
Start: 2022-07-29 | End: 2022-07-30

## 2022-07-29 RX ORDER — MUPIROCIN 20 MG/G
OINTMENT TOPICAL 2 TIMES DAILY
Status: DISPENSED | OUTPATIENT
Start: 2022-07-29 | End: 2022-08-03

## 2022-07-29 RX ADMIN — SODIUM BICARBONATE 50 MEQ: 84 INJECTION, SOLUTION INTRAVENOUS at 12:07

## 2022-07-29 RX ADMIN — MUPIROCIN: 20 OINTMENT TOPICAL at 09:07

## 2022-07-29 RX ADMIN — FENTANYL CITRATE 50 MCG: 50 INJECTION, SOLUTION INTRAMUSCULAR; INTRAVENOUS at 07:07

## 2022-07-29 RX ADMIN — POTASSIUM CHLORIDE 40 MEQ: 14.9 INJECTION, SOLUTION INTRAVENOUS at 12:07

## 2022-07-29 RX ADMIN — FAMOTIDINE 20 MG: 10 INJECTION INTRAVENOUS at 06:07

## 2022-07-29 RX ADMIN — ALBUMIN (HUMAN) 12.5 G: 2.5 SOLUTION INTRAVENOUS at 12:07

## 2022-07-29 RX ADMIN — ONDANSETRON 500 MG: 2 INJECTION INTRAMUSCULAR; INTRAVENOUS at 08:07

## 2022-07-29 RX ADMIN — PROTAMINE SULFATE 400 MG: 10 INJECTION, SOLUTION INTRAVENOUS at 08:07

## 2022-07-29 RX ADMIN — FENTANYL CITRATE 300 MCG: 50 INJECTION, SOLUTION INTRAMUSCULAR; INTRAVENOUS at 07:07

## 2022-07-29 RX ADMIN — EPHEDRINE SULFATE 10 MG: 50 INJECTION INTRAVENOUS at 07:07

## 2022-07-29 RX ADMIN — ROCURONIUM BROMIDE 80 MG: 10 SOLUTION INTRAVENOUS at 06:07

## 2022-07-29 RX ADMIN — Medication 50 MEQ: at 12:07

## 2022-07-29 RX ADMIN — LEVETIRACETAM 1000 MG: 10 INJECTION INTRAVENOUS at 06:07

## 2022-07-29 RX ADMIN — LIDOCAINE HYDROCHLORIDE 80 MG: 20 INJECTION INTRAVENOUS at 06:07

## 2022-07-29 RX ADMIN — FENTANYL CITRATE 150 MCG: 50 INJECTION, SOLUTION INTRAMUSCULAR; INTRAVENOUS at 06:07

## 2022-07-29 RX ADMIN — SODIUM CHLORIDE, SODIUM GLUCONATE, SODIUM ACETATE, POTASSIUM CHLORIDE AND MAGNESIUM CHLORIDE: 526; 502; 368; 37; 30 INJECTION, SOLUTION INTRAVENOUS at 06:07

## 2022-07-29 RX ADMIN — HEPARIN SODIUM 30000 UNITS: 5000 INJECTION, SOLUTION INTRAVENOUS; SUBCUTANEOUS at 07:07

## 2022-07-29 RX ADMIN — EPHEDRINE SULFATE 20 MG: 50 INJECTION INTRAVENOUS at 07:07

## 2022-07-29 RX ADMIN — MUPIROCIN: 20 OINTMENT TOPICAL at 11:07

## 2022-07-29 RX ADMIN — DEXTROSE AND SODIUM CHLORIDE: 5; 450 INJECTION, SOLUTION INTRAVENOUS at 11:07

## 2022-07-29 RX ADMIN — ROCURONIUM BROMIDE 20 MG: 10 SOLUTION INTRAVENOUS at 07:07

## 2022-07-29 RX ADMIN — TRANEXAMIC ACID 1000 MG: 100 INJECTION, SOLUTION INTRAVENOUS at 07:07

## 2022-07-29 RX ADMIN — VASOPRESSIN 0.5 UNITS: 20 INJECTION INTRAVENOUS at 07:07

## 2022-07-29 RX ADMIN — MORPHINE SULFATE 2 MG: 10 INJECTION INTRAVENOUS at 02:07

## 2022-07-29 RX ADMIN — MUPIROCIN: 20 OINTMENT TOPICAL at 05:07

## 2022-07-29 RX ADMIN — ONDANSETRON 4 MG: 2 INJECTION INTRAMUSCULAR; INTRAVENOUS at 12:07

## 2022-07-29 RX ADMIN — TRANEXAMIC ACID 1000 MG: 100 INJECTION, SOLUTION INTRAVENOUS at 08:07

## 2022-07-29 RX ADMIN — ETOMIDATE 20 MG: 2 INJECTION INTRAVENOUS at 06:07

## 2022-07-29 RX ADMIN — CEFAZOLIN SODIUM 2 G: 2 SOLUTION INTRAVENOUS at 03:07

## 2022-07-29 RX ADMIN — FAMOTIDINE 20 MG: 10 INJECTION INTRAVENOUS at 11:07

## 2022-07-29 RX ADMIN — DEXMEDETOMIDINE HYDROCHLORIDE 0.4 MCG/KG/HR: 400 INJECTION INTRAVENOUS at 08:07

## 2022-07-29 RX ADMIN — EPINEPHRINE 0.02 MCG/KG/MIN: 1 INJECTION INTRAMUSCULAR; INTRAVENOUS; SUBCUTANEOUS at 08:07

## 2022-07-29 RX ADMIN — MIDAZOLAM 2 MG: 1 INJECTION INTRAMUSCULAR; INTRAVENOUS at 06:07

## 2022-07-29 RX ADMIN — DEXTROSE MONOHYDRATE 1.5 G: 5 INJECTION INTRAVENOUS at 07:07

## 2022-07-29 RX ADMIN — GLYCOPYRROLATE 0.2 MG: 0.2 INJECTION INTRAMUSCULAR; INTRAVENOUS at 07:07

## 2022-07-29 RX ADMIN — MORPHINE SULFATE 2 MG: 10 INJECTION INTRAVENOUS at 11:07

## 2022-07-29 RX ADMIN — ALBUMIN (HUMAN) 12.5 G: 2.5 SOLUTION INTRAVENOUS at 04:07

## 2022-07-29 RX ADMIN — SODIUM CHLORIDE 1 UNITS/HR: 9 INJECTION, SOLUTION INTRAVENOUS at 08:07

## 2022-07-29 RX ADMIN — ONDANSETRON 500 MG: 2 INJECTION INTRAMUSCULAR; INTRAVENOUS at 09:07

## 2022-07-29 RX ADMIN — EPINEPHRINE 0.04 MCG/KG/MIN: 1 INJECTION INTRAMUSCULAR; INTRAVENOUS; SUBCUTANEOUS at 09:07

## 2022-07-29 NOTE — ANESTHESIA PROCEDURE NOTES
Monitor GEOFF    Diagnosis: ascending aortic aneurysm repair  Patient location during procedure: OR  Procedure start time: 7/29/2022 8:40 AM  Procedure end time: 7/29/2022 8:50 AM  Surgery related to: aortic aneurysm    Staffing  Authorizing Provider: Malcolm Corona DO  Performing Provider: Malcolm Corona DO    Staffing  Anesthesiologist Present  Yes  Setup & Induction      Findings    Impression     Post procedure Echo docmentation:  Probe kept in from Pre-procedure inspection. Not re-inserted.  Aneurysm now repaired. Graft size 30mm.  AV not affected, still with only trace to mild AI.   Other Findings    Probe Removal     GEOFF probe removed without event.No blood on removal of probe.

## 2022-07-29 NOTE — ANESTHESIA PROCEDURE NOTES
Central Line    Diagnosis: aorta repair  Patient location during procedure: done in OR  Timeout: 7/29/2022 7:14 AM  Procedure end time: 7/29/2022 7:25 AM    Staffing  Authorizing Provider: Malcolm Corona DO  Performing Provider: Zach Davis    Staffing  Performed: anesthesiologist and resident/CRNA   Anesthesiologist: Malcolm Corona DO  Other anesthesia staff: Zach Davis  Anesthesiologist was present at the time of the procedure.  Preanesthetic Checklist  Completed: patient identified, IV checked, site marked, risks and benefits discussed, surgical consent, monitors and equipment checked, pre-op evaluation, timeout performed and anesthesia consent given  Indication   Indication: hemodynamic monitoring     Anesthesia   general anesthesia    Central Line   Skin Prep: skin prepped with ChloraPrep, skin prep agent completely dried prior to procedure  Sterile Barriers Followed: Yes    All five maximal barriers used- gloves, gown, cap, mask, and large sterile sheet    hand hygiene performed prior to central venous catheter insertion  Location: right internal jugular.   Catheter type: double lumen  Catheter Size: 9 Fr  Ultrasound: vascular probe with ultrasound   Vessel Caliber: small, patent, compressibility normal  Needle advanced into vessel with real time Ultrasound guidance.  Guidewire confirmed in vessel.  Image recorded and saved.  sterile gel and probe cover used in ultrasound-guided central venous catheter insertion  Manometry: none  Insertion Attempts: 1   Securement:line sutured, sterile dressing applied, blood return through all ports and chlorhexidine patch    Post-Procedure    Adverse Events:none      Guidewire Guidewire removed intact.

## 2022-07-29 NOTE — TRANSFER OF CARE
Anesthesia Transfer of Care Note    Patient: Blanquita Montoya    Procedure(s) Performed: Procedure(s) (LRB):  REPAIR, AORTA, ASCENDING (N/A)    Patient location: ICU    Anesthesia Type: general    Transport from OR: Transported from OR intubated on 100% O2 by AMBU with adequate controlled ventilation. Continuous ECG monitoring in transport. Continuous SpO2 monitoring in transport. Continuos invasive BP monitoring in transport    Post pain: adequate analgesia    Post assessment: no apparent anesthetic complications    Post vital signs: stable    Level of consciousness: sedated    Nausea/Vomiting: no nausea/vomiting    Complications: none    Transfer of care protocol was followed      Last vitals:

## 2022-07-29 NOTE — SIGNIFICANT EVENT
Ms. Montoya had seizure-like activity witnessed by nursing staff with reported convulsive movements of RUE and RLE. Prior to this event, she was lethargic and not really answering questions but was following commands. Seems to overall be at the same mental status currently as she was prior, although nurse says she is more lethargic currently. Pupils equal, round, reactive. No seizure activity on my arrival to room. Will load with Keppra and start 500mg BID. CBG also is not low.

## 2022-07-29 NOTE — ANESTHESIA PROCEDURE NOTES
Intubation    Date/Time: 7/29/2022 6:53 AM  Performed by: Zach Davis  Authorized by: Malcolm Corona DO     Intubation:     Induction:  Intravenous    Intubated:  Postinduction    Mask Ventilation:  Easy mask    Attempts:  1    Attempted By:  Student    Method of Intubation:  Direct    Blade:  Bee 2    Laryngeal View Grade: Grade I - full view of cords      Difficult Airway Encountered?: No      Complications:  None    Airway Device:  Oral endotracheal tube    Airway Device Size:  8.0    Style/Cuff Inflation:  Cuffed    Inflation Amount (mL):  8    Tube secured:  22    Secured at:  The lips    Placement Verified By:  Capnometry    Complicating Factors:  None    Findings Post-Intubation:  BS equal bilateral and atraumatic/condition of teeth unchanged

## 2022-07-29 NOTE — ANESTHESIA PROCEDURE NOTES
GEOFF    Diagnosis: aortic aneurysm ascending  Patient location during procedure: OR  Procedure start time: 7/29/2022 7:10 AM  Timeout: 7/29/2022 7:10 AM  Procedure end time: 7/29/2022 7:20 AM  Surgery related to: aortic aneurysm  Exam type: Baseline    Staffing  Performed: anesthesiologist     Anesthesiologist: Malcolm Corona DO        Anesthesiologist Present  Yes      Setup & Induction  Patient preparation: bite block inserted  Probe Insertion: easy  Exam: limitedDoppler Echo: 2D and color flow mapping.  Exam     Left Heart  Left Atruim: normal    Left Ventricle: 3.55 cm    LVAD  Estimated Ejection Fraction: > 55% normal  Regional Wall Abnormalities: no RWMA            Right Heart  Right Atria:  normal    Intra Atrial Septum          Right Ventricle    Aortic Valve:  Stenosis: none.  Morphology: trileaflet    Annulus Diameter: 2.17 cm  STJ Diameter: 2.69 cm  Regurgitation: no aortic valve regurgitation mild (2+) aortic regurgitation  AV Central Jet Direction     Mitral Valve:   Morphology:normal  Prolapse: none  Flail: no flail  Jet Description: noneStenosis: no significant stenosis.            Great Vessels  Ascending Aorta Diameter: 3.79 cm Descending Aorta Diameter: 2.54 cm  Descending Aorta Atherosclerosis: 3=sessile dz (3-5mm)      Effusions    SummaryFindings discussed with surgeon.    Other Findings   Max diameter aorta at PA 3.79  Max diameter reported past the PA was 4.9cm  Trace to mild AI

## 2022-07-29 NOTE — ANESTHESIA PROCEDURE NOTES
Arterial    Diagnosis: cad  Doctor requesting consult: CAD    Patient location during procedure: holding area  Procedure start time: 7/29/2022 6:16 AM    Staffing  Authorizing Provider: Malcolm Corona DO  Performing Provider: Zach Davis    Anesthesiologist was present at the time of the procedure.    Preanesthetic Checklist  Completed: patient identified, IV checked, site marked, risks and benefits discussed, surgical consent, monitors and equipment checked, pre-op evaluation, timeout performed and anesthesia consent givenArterial  Skin Prep: chlorhexidine gluconate  Local Infiltration: lidocaine  Orientation: left  Location: radial    Catheter Size: 20 G  Catheter placement by Anatomical landmarks. Heme positive aspiration all ports. Insertion Attempts: 1  Assessment  Dressing: secured with tape and tegaderm  Patient: Tolerated well

## 2022-07-29 NOTE — H&P
[facility] - [facility] ICU  Pulmonary Critical Care Note    Patient Name: Blanquita Montoya  MRN: [id number]  Admission Date: 7/29/2022  Hospital Length of Stay: 0 days  Code Status: Full Code  Attending Provider: Alec Vega IV, MD  Primary Care Provider: Kamila Whittaker MD     Subjective:     HPI:   This is a 66-year-old female with past medical history of coronary artery disease, hypertension, hyperlipidemia, hypothyroidism, and anxiety.  CT scan of chest in March revealed a 4.4 x 4.5 cm ascending aortic aneurysm which grew to 4.9 cm in 2 months.  She was then referred to CV surgery for evaluation.  Left heart catheterization on 07/11/2022 with no coronary disease.  She presented today, 07/29/2022, for a resection of ascending aortic aneurysm and repair with 30 mm Dacron graft and ligation of left atrial appendage by Dr. Vega.  Two mediastinal chest tubes and 1 right ventricular pacing wire was placed.  She was transported postoperatively to the ICU intubated on mechanical ventilation, requiring vasopressor support and insulin drip.  On arrival she was bradycardic and hypotensive therefore epicardial pacing was started at 80 bpm.    Hospital Course/Significant events:  N/A    24 Hour Interval History:  N/A    Past Medical History:   Diagnosis Date    Anxiety disorder     Coronary artery disease     GERD (gastroesophageal reflux disease)     Hypercholesterolemia     Hypertension     Hypothyroidism     Obesity     Seizure in childhood     last one age 11    Sleep apnea     does not currently use CPAP    Thoracic aortic aneurysm 03/10/2022    4.5X4.4 Ascending Aorta as of 3/10/2022    Thoracic aortic aneurysm     Thyroid disease        Past Surgical History:   Procedure Laterality Date    ANGIOGRAM, CORONARY, WITH LEFT HEART CATHETERIZATION N/A 07/11/2022    Procedure: Angiogram, Coronary, with Left Heart Cath;  Surgeon: Harry Jj MD;  Location: [facility] CATH LAB;  Service:  Cardiology;  Laterality: N/A;    COLONOSCOPY      HYSTERECTOMY      RT KNEE         Social History     Socioeconomic History    Marital status:    Tobacco Use    Smoking status: Former Smoker     Types: Cigarettes     Quit date: 2010     Years since quittin.0    Smokeless tobacco: Never Used   Substance and Sexual Activity    Alcohol use: Not Currently    Drug use: Never    Sexual activity: Not Currently     Social Determinants of Health     Financial Resource Strain: Low Risk     Difficulty of Paying Living Expenses: Not hard at all   Food Insecurity: No Food Insecurity    Worried About Running Out of Food in the Last Year: Never true    Ran Out of Food in the Last Year: Never true   Transportation Needs: No Transportation Needs    Lack of Transportation (Medical): No    Lack of Transportation (Non-Medical): No   Physical Activity: Inactive    Days of Exercise per Week: 0 days    Minutes of Exercise per Session: 0 min   Stress: No Stress Concern Present    Feeling of Stress : Not at all   Social Connections: Moderately Isolated    Frequency of Communication with Friends and Family: More than three times a week    Frequency of Social Gatherings with Friends and Family: More than three times a week    Attends Confucianist Services: Never    Active Member of Clubs or Organizations: No    Attends Club or Organization Meetings: Never    Marital Status:    Housing Stability: Unknown    Unable to Pay for Housing in the Last Year: No    Unstable Housing in the Last Year: No           Objective:     Current Outpatient Medications   Medication Instructions    amLODIPine (NORVASC) 10 mg, Oral, Daily    aspirin (ECOTRIN) 81 mg, Oral, Daily    EUTHYROX 100 mcg, Oral, Daily    hydrocortisone 2.5 % cream Topical (Top), Daily PRN    isosorbide mononitrate (IMDUR) 30 mg, Oral, Daily    losartan (COZAAR) 100 mg, Oral, Daily    metoprolol succinate (TOPROL-XL) 25 mg, Oral, Daily     nitroGLYCERIN (NITROSTAT) 0.4 mg, Sublingual    omega-3 fatty acids/fish oil (FISH OIL-OMEGA-3 FATTY ACIDS) 300-1,000 mg capsule 1 capsule, Oral, Daily, Takes 1000mg daily    pantoprazole (PROTONIX) 40 mg, Oral, Daily    rosuvastatin (CRESTOR) 20 mg, Oral, Nightly       Current Inpatient Medications   cefUROXime sodium        LIDOcaine (PF) 10 mg/ml (1%)  1 mL Intradermal Once           Intake/Output Summary (Last 24 hours) at 7/29/2022 1043  Last data filed at 7/29/2022 0940  Gross per 24 hour   Intake 1880 ml   Output 475 ml   Net 1405 ml       Review of Systems   Unable to perform ROS: Intubated        Vital Signs (Most Recent):  Temp: 98 °F (36.7 °C) (07/29/22 0539)  Pulse: (!) 57 (07/29/22 0610)  Resp: 18 (07/29/22 0610)  BP: (!) 180/79 (07/29/22 0610)  SpO2: 99 % (07/29/22 0610)  Body mass index is 29.8 kg/m².  Weight: 86.3 kg (190 lb 4.1 oz) Vital Signs (24h Range):  Temp:  [98 °F (36.7 °C)] 98 °F (36.7 °C)  Pulse:  [57-69] 57  Resp:  [18-19] 18  SpO2:  [98 %-99 %] 99 %  BP: (157-180)/(68-79) 180/79     Physical Exam  Constitutional:       Interventions: She is sedated and intubated.   Cardiovascular:      Comments: Paced  Pulmonary:      Effort: Pulmonary effort is normal. She is intubated.      Comments: Coarse breath sounds  Chest:      Comments: Midline surgical incision  Mediastinal chest tubes x2  Abdominal:      General: Bowel sounds are decreased.      Palpations: Abdomen is soft.   Musculoskeletal:      Right lower leg: No edema.      Left lower leg: No edema.   Neurological:      Comments: Remains under the effects of anesthesia           Mechanical ventilation support:  Vent Mode: SIMV (07/29/22 1023)  Ventilator Initiated: Yes (07/29/22 1023)  Set Rate: 20 BPM (07/29/22 1023)  Vt Set: 500 mL (07/29/22 1023)  Pressure Support: 10 cmH20 (07/29/22 1023)  PEEP/CPAP: 5 cmH20 (07/29/22 1023)  Oxygen Concentration (%): 60 (07/29/22 1023)  Peak Airway Pressure: 18 cmH2O (07/29/22 1023)  Total Ve:  9.8 mL (07/29/22 1023)    Lines/Drains/Airways     Central Venous Catheter Line  Duration           Percutaneous Central Line Insertion/Assessment - Double Lumen  07/29/22 0749 <1 day          Drain  Duration                Urethral Catheter 07/29/22 0701 Non-latex;Temperature probe;Straight-tip 16 Fr. <1 day          Airway  Duration                Airway - Non-Surgical 07/29/22 0653 <1 day          Arterial Line  Duration           Arterial Line 07/29/22 0634 Left Radial <1 day          Peripheral Intravenous Line  Duration                Peripheral IV - Single Lumen 07/29/22 0540 18 G Left Antecubital <1 day                Significant Labs:    Lab Results   Component Value Date    WBC 14.5 (H) 07/29/2022    HGB 10.6 (L) 07/29/2022    HCT 33.6 (L) 07/29/2022    MCV 93.9 07/29/2022     (L) 07/29/2022         BMP  Lab Results   Component Value Date     07/29/2022    K 4.0 07/29/2022    CO2 21 (L) 07/29/2022    BUN 7.5 (L) 07/29/2022    CREATININE 0.68 07/29/2022    CALCIUM 10.3 (H) 07/29/2022    EGFRNONAA >60 07/29/2022       ABG  Recent Labs   Lab 07/29/22  0830   PH 7.393   PO2 258*   PCO2 47.8*   HCO3 29.2*   BE 4           Significant Imaging:  I have reviewed all pertinent imaging within the past 24 hours.        Assessment/Plan:     Assessment  1. Ascending aortic aneurysm s/p resection and repair with 30 mm Dacron graft and ligation of left atrial appendage on 7/29/2022  2. Hypertension  3. Hyperlipidemia  4. Hypothyroidism      Plan  Continue post-operative management per CV surgery post op protocol  Wean mechanical ventilation as tolerated, extubate when appropriate  Titrate vasopressor support to maintain adequate BP with MAP >65; currently on EPI  Insulin drip per protocol  Continue pacing as needed for bradycardia and hypotension  Monitor surgical drain output for signs of bleeding  Multimodal pain control  Continue all ongoing ICU care    DVT Prophylaxis:  Josh wills  GI Prophylaxis:   Carafate     Greater than 30 minutes of critical care was time spent personally by me on the following activities: development of treatment plan with patient or surrogate and bedside caregivers, discussions with consultants, evaluation of patient's response to treatment, examination of patient, ordering and performing treatments and interventions, ordering and review of laboratory studies, ordering and review of radiographic studies, pulse oximetry, re-evaluation of patient's condition.  This critical care time did not overlap with that of any other provider or involve time for any procedures.     GIANA Monsalve  Pulmonary Critical Care Medicine  [facility] - [facility] ICU

## 2022-07-29 NOTE — CARE UPDATE
[id number] Rec call from Lindsey DIAZ who reported Dr Vega would like pt to have Anastasiia DIAZ. Pt had a CAB today.

## 2022-07-29 NOTE — H&P
H&P completed and has been reviewed, the patient has been examined and:    I concur with the findings and no changes have occurred since H&P was written.    There are no hospital problems to display for this patient.

## 2022-07-29 NOTE — OP NOTE
Date of service 07/29/2022  Preop diagnosis:  Ascending aortic aneurysm  Postop diagnosis:  Same  Procedure:  Resection of ascending aortic aneurysm and repair with 30 mm Dacron interposition graft; ligation of left atrial appendage  Surgeon:  Gary  Assistant:  Dawn  Anesthesia:  General endotracheal with cardiopulmonary bypass  Drains:  Chest tube x2 and 1 right ventricular pacing wire    Procedure in detail:  The patient was taken to the operating room under informed consent placed on table in a supine position where general endotracheal anesthesia was induced by the anesthesia department.  She was prepped and draped in usual sterile fashion from the chin to the toes.  Incision made in the right groin in the right common femoral artery dissected with sharp dissection.  Pursestring suture placed in a femoral arterial cannulae introduced and attached to the bypass circuit after the patient was fully heparinized.  Incision made in the chest from the sternal notch to the xiphoid carried down sharply to the sternum.  The sternum transected.  A sternal retractor placed in the pericardium incised.  A right atrial cannula placed attached to the bypass circuit and the patient placed on bypass and cooled to 32° systemic.  Aorta was was dissected all way up to the aortic arch.  Cross-clamp was placed right at the origin of the transverse arch.  Cold blood cardioplegia was given in antegrade fashion through the aortic root.  The ascending aortic aneurysm was resected from the transverse arch to the sinotubular junction.  The left atrial appendage was then ligated and excised with a vascular stapler Endo stapler.  The aorta measures a 30 mm Dacron interposition graft selected.  The proximal anastomosis performed in an end-to-end fashion with a running 4-0 Prolene suture.  CO2 was infused into the pericardial well during the open portion of the procedure.  The graft measured and cut to the reach the distal aorta.  The  distal anastomosis performed also with a running 4-0 Prolene in an end-to-end fashion.  De-airing was carried out prior to completion of the suture line.  Crossclamp removed and the patient rewarming.  A right ventricular pacing wires placed brother sharp stab was care to scan the patient was initially paced and then easily weaned from cardiopulmonary bypass without inotropic support.  Protamine given to reverse the heparin and the arterial and venous cannulae removed and their pursestring sutures secured.  Groin wound irrigated and closed in multiple layers with a running 2-0 Vicryl and the skin with a 3-0 subcuticular stitch.  The chest tube was placed both in the posterior mediastinal space and anterior mediastinum space brought out through separate stab wounds and secured to skin.  The wound copiously irrigated with a 3% saline antibiotic solution.  Then platelet concentrated aggregated growth factor was infused throughout the wound and sternum.  The sternum closed with sternal wires.  The linea alba and sternal fascia closed with a running 1. Vicryl the subQ with a 2-0 Vicryl and the skin with a 3-0 subcuticular stitch.  The patient tolerated procedure well and left operating room in stable condition.

## 2022-07-30 LAB
ANION GAP SERPL CALC-SCNC: 5 MEQ/L
BASOPHILS # BLD AUTO: 0.03 X10(3)/MCL (ref 0–0.2)
BASOPHILS NFR BLD AUTO: 0.3 %
BUN SERPL-MCNC: 10.8 MG/DL (ref 9.8–20.1)
CALCIUM SERPL-MCNC: 8 MG/DL (ref 8.4–10.2)
CHLORIDE SERPL-SCNC: 110 MMOL/L (ref 98–107)
CO2 SERPL-SCNC: 24 MMOL/L (ref 23–31)
CREAT SERPL-MCNC: 0.79 MG/DL (ref 0.55–1.02)
CREAT/UREA NIT SERPL: 14
EOSINOPHIL # BLD AUTO: 0 X10(3)/MCL (ref 0–0.9)
EOSINOPHIL NFR BLD AUTO: 0 %
ERYTHROCYTE [DISTWIDTH] IN BLOOD BY AUTOMATED COUNT: 13.3 % (ref 11.5–17)
GLUCOSE SERPL-MCNC: 111 MG/DL (ref 82–115)
HCT VFR BLD AUTO: 32.1 % (ref 37–47)
HGB BLD-MCNC: 10.2 GM/DL (ref 12–16)
IMM GRANULOCYTES # BLD AUTO: 0.05 X10(3)/MCL (ref 0–0.04)
IMM GRANULOCYTES NFR BLD AUTO: 0.5 %
LYMPHOCYTES # BLD AUTO: 0.69 X10(3)/MCL (ref 0.6–4.6)
LYMPHOCYTES NFR BLD AUTO: 6.5 %
MCH RBC QN AUTO: 30.6 PG (ref 27–31)
MCHC RBC AUTO-ENTMCNC: 31.8 MG/DL (ref 33–36)
MCV RBC AUTO: 96.4 FL (ref 80–94)
MONOCYTES # BLD AUTO: 0.9 X10(3)/MCL (ref 0.1–1.3)
MONOCYTES NFR BLD AUTO: 8.5 %
NEUTROPHILS # BLD AUTO: 9 X10(3)/MCL (ref 2.1–9.2)
NEUTROPHILS NFR BLD AUTO: 84.2 %
NRBC BLD AUTO-RTO: 0 %
PCO2 BLDA: 45 MMHG
PH SMN: 7.39 [PH]
PLATELET # BLD AUTO: 109 X10(3)/MCL (ref 130–400)
PMV BLD AUTO: 11.7 FL (ref 7.4–10.4)
PO2 BLDA: 70 MMHG
POC BASE DEFICIT: 1.8 MMOL/L
POC HCO3: 27.2 MMOL/L
POC IONIZED CALCIUM: 1.13 MMOL/L
POC SATURATED O2: 94 %
POC TEMPERATURE: 37 C
POCT GLUCOSE: 113 MG/DL (ref 70–110)
POCT GLUCOSE: 114 MG/DL (ref 70–110)
POCT GLUCOSE: 116 MG/DL (ref 70–110)
POCT GLUCOSE: 117 MG/DL (ref 70–110)
POCT GLUCOSE: 121 MG/DL (ref 70–110)
POCT GLUCOSE: 126 MG/DL (ref 70–110)
POCT GLUCOSE: 127 MG/DL (ref 70–110)
POTASSIUM BLD-SCNC: 4.1 MMOL/L
POTASSIUM SERPL-SCNC: 4.9 MMOL/L (ref 3.5–5.1)
RBC # BLD AUTO: 3.33 X10(6)/MCL (ref 4.2–5.4)
SODIUM BLD-SCNC: 134 MMOL/L (ref 137–145)
SODIUM SERPL-SCNC: 139 MMOL/L (ref 136–145)
SPECIMEN SOURCE: ABNORMAL
WBC # SPEC AUTO: 10.6 X10(3)/MCL (ref 4.5–11.5)

## 2022-07-30 PROCEDURE — 63600175 PHARM REV CODE 636 W HCPCS

## 2022-07-30 PROCEDURE — 99024 POSTOP FOLLOW-UP VISIT: CPT | Mod: POP,,, | Performed by: PHYSICIAN ASSISTANT

## 2022-07-30 PROCEDURE — 63600175 PHARM REV CODE 636 W HCPCS: Performed by: STUDENT IN AN ORGANIZED HEALTH CARE EDUCATION/TRAINING PROGRAM

## 2022-07-30 PROCEDURE — 82803 BLOOD GASES ANY COMBINATION: CPT

## 2022-07-30 PROCEDURE — 20000000 HC ICU ROOM

## 2022-07-30 PROCEDURE — 94761 N-INVAS EAR/PLS OXIMETRY MLT: CPT

## 2022-07-30 PROCEDURE — 99900035 HC TECH TIME PER 15 MIN (STAT)

## 2022-07-30 PROCEDURE — 36415 COLL VENOUS BLD VENIPUNCTURE: CPT

## 2022-07-30 PROCEDURE — 27000221 HC OXYGEN, UP TO 24 HOURS

## 2022-07-30 PROCEDURE — 80048 BASIC METABOLIC PNL TOTAL CA: CPT

## 2022-07-30 PROCEDURE — 99024 PR POST-OP FOLLOW-UP VISIT: ICD-10-PCS | Mod: POP,,, | Performed by: PHYSICIAN ASSISTANT

## 2022-07-30 PROCEDURE — 94799 UNLISTED PULMONARY SVC/PX: CPT

## 2022-07-30 PROCEDURE — 36600 WITHDRAWAL OF ARTERIAL BLOOD: CPT

## 2022-07-30 PROCEDURE — 25000003 PHARM REV CODE 250

## 2022-07-30 PROCEDURE — 85025 COMPLETE CBC W/AUTO DIFF WBC: CPT

## 2022-07-30 PROCEDURE — 25000003 PHARM REV CODE 250: Performed by: STUDENT IN AN ORGANIZED HEALTH CARE EDUCATION/TRAINING PROGRAM

## 2022-07-30 RX ADMIN — MORPHINE SULFATE 2 MG: 10 INJECTION INTRAVENOUS at 08:07

## 2022-07-30 RX ADMIN — LEVETIRACETAM 500 MG: 100 INJECTION, SOLUTION INTRAVENOUS at 09:07

## 2022-07-30 RX ADMIN — DOCUSATE SODIUM 100 MG: 100 CAPSULE, LIQUID FILLED ORAL at 08:07

## 2022-07-30 RX ADMIN — MUPIROCIN: 20 OINTMENT TOPICAL at 09:07

## 2022-07-30 RX ADMIN — CEFAZOLIN SODIUM 2 G: 2 SOLUTION INTRAVENOUS at 06:07

## 2022-07-30 RX ADMIN — OXYCODONE 5 MG: 5 TABLET ORAL at 10:07

## 2022-07-30 RX ADMIN — LEVETIRACETAM 500 MG: 100 INJECTION, SOLUTION INTRAVENOUS at 08:07

## 2022-07-30 RX ADMIN — CEFAZOLIN SODIUM 2 G: 2 SOLUTION INTRAVENOUS at 12:07

## 2022-07-30 RX ADMIN — METOPROLOL TARTRATE 12.5 MG: 25 TABLET, FILM COATED ORAL at 11:07

## 2022-07-30 RX ADMIN — OXYCODONE 5 MG: 5 TABLET ORAL at 06:07

## 2022-07-30 RX ADMIN — ENOXAPARIN SODIUM 40 MG: 40 INJECTION SUBCUTANEOUS at 05:07

## 2022-07-30 RX ADMIN — OXYCODONE 5 MG: 5 TABLET ORAL at 11:07

## 2022-07-30 NOTE — PROGRESS NOTES
Pod 1   Sitting up in bed  On keppra, poss seizure  vss  Heart sinus   H/h 10/32  Ct draining  oob

## 2022-07-30 NOTE — PROGRESS NOTES
Critical Care - Medicine  ICU Progress Note    Patient Name: Blanquita Montoya  MRN: [id number]  Admission Date: 7/29/2022  Hospital Length of Stay: 1 days  Code Status: Full Code  Attending Provider: Alec Vega IV, MD  Primary Care Provider: Kamila Whittaker MD   Principal Problem: <principal problem not specified>    Subjective:     Brief HPI: This is a 66-year-old female with past medical history of coronary artery disease, hypertension, hyperlipidemia, hypothyroidism, and anxiety.  CT scan of chest in March revealed a 4.4 x 4.5 cm ascending aortic aneurysm which grew to 4.9 cm in 2 months.  She was then referred to CV surgery for evaluation.  Left heart catheterization on 07/11/2022 with no coronary disease.  She presented today, 07/29/2022, for a resection of ascending aortic aneurysm and repair with 30 mm Dacron graft and ligation of left atrial appendage by Dr. Vega.  Two mediastinal chest tubes and 1 right ventricular pacing wire was placed.  She was transported postoperatively to the ICU intubated on mechanical ventilation, requiring vasopressor support and insulin drip.  On arrival she was bradycardic and hypotensive therefore epicardial pacing was started at 80 bpm.    Interval History/Significant Events: Patient seen and examined this morning. She was extubated yesterday. She required pacing yesterday. Around 7pm, oncall team was called as patient had seizure like activity described as convulsive movements of RUE and RLE. She was loaded with Keppra and started on 500 BID    Objective:     Vital Signs (Most Recent):  Temp: 98 °F (36.7 °C) (07/29/22 0539)  Pulse: 72 (07/30/22 0600)  Resp: (!) 26 (07/30/22 0600)  BP: 133/64 (07/30/22 0600)  SpO2: (!) 92 % (07/30/22 0600) Vital Signs (24h Range):  Pulse:  [56-83] 72  Resp:  [14-30] 26  SpO2:  [91 %-100 %] 92 %  BP: ()/(49-78) 133/64  Arterial Line BP: ()/(0-257) 94/83     Weight: 86.3 kg (190 lb 4.1 oz)  Body mass index is 29.8  kg/m².      Intake/Output Summary (Last 24 hours) at 7/30/2022 0631  Last data filed at 7/30/2022 0600  Gross per 24 hour   Intake 6338 ml   Output 1693 ml   Net 4645 ml        Physical Exam      Current Facility-Administered Medications:     0.9%  NaCl infusion (for blood administration), , Intravenous, Q24H PRN, Alec Vega IV, MD    acetaminophen oral solution 650 mg, 650 mg, Per OG tube, Q6H PRN, PAULETTE Hrut    albumin human 5% bottle 12.5 g, 12.5 g, Intravenous, PRN, PAULETTE Hurt, Stopped at 07/29/22 1756    aspirin EC tablet 81 mg, 81 mg, Oral, Daily, PAULETTE Hurt    calcium gluconate 1 g in NS IVPB (premixed), 1 g, Intravenous, PRN, PAULETTE Hurt    calcium gluconate 1 g in NS IVPB (premixed), 2 g, Intravenous, PRN, PAULETTE Hurt    calcium gluconate 1 g in NS IVPB (premixed), 3 g, Intravenous, PRN, PAULETTE Hurt    cefazolin (ANCEF) 2 gram in dextrose 5% 50 mL IVPB (premix), 2 g, Intravenous, Q8H, PAULETTE Hurt, Last Rate: 100 mL/hr at 07/30/22 0619, 2 g at 07/30/22 0619    dexmedetomidine (PRECEDEX) 400mcg/100mL 0.9% NaCL infusion, 0-1.4 mcg/kg/hr, Intravenous, Continuous, PAULETTE Hurt    dextrose 5 % and 0.45 % NaCl infusion, , Intravenous, Continuous, PAULETTE Hurt, Last Rate: 100 mL/hr at 07/29/22 1145, New Bag at 07/29/22 1145    dextrose 50% injection 12.5 g, 12.5 g, Intravenous, PRN, PAULETTE Hurt    dextrose 50% injection 25 g, 25 g, Intravenous, PRN, PAULETTE Hurt    docusate sodium capsule 100 mg, 100 mg, Oral, BID, PAULETTE Hurt    enoxaparin injection 40 mg, 40 mg, Subcutaneous, Daily, PAULETTE Hurt    EPINEPHrine (ADRENALIN) 5 mg in dextrose 5 % 250 mL infusion, 0-2 mcg/kg/min, Intravenous, Continuous, Alec Vega IV, MD, Last Rate: 0 mL/hr at 07/30/22 0000, 0 mcg/kg/min at 07/30/22 0000    famotidine (PF) injection 20 mg, 20 mg, Intravenous, Daily, PAULETTE Hurt, 20 mg at 07/29/22 1109    folic acid tablet 1 mg, 1 mg, Oral, Daily, PAULETTE Hurt     HYDROcodone-acetaminophen 5-325 mg per tablet 1 tablet, 1 tablet, Oral, Q4H PRN, PAULETTE Hurt    insulin regular in 0.9 % NaCl 100 unit/100 mL (1 unit/mL) infusion, 0-52 Units/hr, Intravenous, Continuous, PAULETTE Hurt, Last Rate: 1.6 mL/hr at 07/30/22 0000, 1.6 Units/hr at 07/30/22 0000    lactulose 10 gram/15 ml solution 20 g, 20 g, Oral, Q6H PRN, PAULETTE Hurt    levETIRAcetam (KEPPRA) 500 mg in dextrose 5 % in water (D5W) 5 % 100 mL IVPB (MB+), 500 mg, Intravenous, Q12H, Pranay Velasco MD    LIDOcaine (PF) 10 mg/ml (1%) injection 10 mg, 1 mL, Intradermal, Once, Malcolm Corona DO    loperamide capsule 4 mg, 4 mg, Oral, Once, PAULETTE Hurt    magnesium sulfate 2g in water 50mL IVPB (premix), 2 g, Intravenous, PRN, PAULETTE Hurt    magnesium sulfate 2g in water 50mL IVPB (premix), 4 g, Intravenous, PRN, PAULETTE Hurt    metoclopramide HCl injection 5 mg, 5 mg, Intravenous, Q6H PRN, PAULETTE Hurt    metoprolol tartrate (LOPRESSOR) split tablet 12.5 mg, 12.5 mg, Oral, BID, PAULETTE Hurt    morphine injection 2 mg, 2 mg, Intravenous, PRN, PAULETTE Hurt, 2 mg at 07/29/22 1408    mupirocin 2 % ointment, , Nasal, BID, PAULETTE Hurt, Given at 07/29/22 2101    ondansetron injection 4 mg, 4 mg, Intravenous, Q12H PRN, PAULETTE Hurt, 4 mg at 07/29/22 1254    oxyCODONE immediate release tablet 5 mg, 5 mg, Oral, Q4H PRN, PAULETTE Hurt    potassium chloride 20 mEq in 100 mL IVPB (FOR CENTRAL LINE ADMINISTRATION ONLY), 20 mEq, Intravenous, PRN, PAULETTE Hurt    potassium chloride 20 mEq in 100 mL IVPB (FOR CENTRAL LINE ADMINISTRATION ONLY), 40 mEq, Intravenous, PRN, PAULETTE Hurt, Last Rate: 50 mL/hr at 07/29/22 1253, 40 mEq at 07/29/22 1253    potassium chloride 20 mEq in 100 mL IVPB (FOR CENTRAL LINE ADMINISTRATION ONLY), 60 mEq, Intravenous, PRN, PAULETTE Hurt    sodium phosphate 15 mmol in dextrose 5 % 250 mL IVPB, 15 mmol, Intravenous, PRN, PAULETTE Hurt    sodium phosphate 20.01 mmol in  dextrose 5 % 250 mL IVPB, 20.01 mmol, Intravenous, PRN, PAULETTE Hurt    sodium phosphate 30 mmol in dextrose 5 % 250 mL IVPB, 30 mmol, Intravenous, PRN, PAULETTE Hurt    sucralfate tablet 1 g, 1 g, Oral, QID (AC & HS), PAULETTE Hurt     Vents:  Vent Mode: CPAP (07/29/22 1217)  Ventilator Initiated: Yes (07/29/22 1023)  Set Rate: 16 BPM (07/29/22 1121)  Vt Set: 500 mL (07/29/22 1121)  Pressure Support: 10 cmH20 (07/29/22 1217)  PEEP/CPAP: 5 cmH20 (07/29/22 1217)  Oxygen Concentration (%): 30 (07/29/22 1132)  Peak Airway Pressure: 16 cmH2O (07/29/22 1217)  Plateau Pressure: 8.4 cmH20 (07/29/22 1121)  Total Ve: 8.5 mL (07/29/22 1217)    Lines/Drains/Airways     Central Venous Catheter Line  Duration           Percutaneous Central Line Insertion/Assessment - Double Lumen  07/29/22 0749 <1 day          Drain  Duration                Chest Tube 07/29/22 1000 1 Mediastinal <1 day         Chest Tube 07/29/22 1000 2 Anterior <1 day         Urethral Catheter 07/29/22 0701 Non-latex;Temperature probe;Straight-tip 16 Fr. <1 day          Arterial Line  Duration           Arterial Line 07/29/22 0634 Left Radial <1 day          Peripheral Intravenous Line  Duration                Peripheral IV - Single Lumen 07/29/22 0540 18 G Left Antecubital 1 day                Significant Labs:    Recent Labs   Lab 07/30/22  0205   WBC 10.6   RBC 3.33*   HGB 10.2*   HCT 32.1*   *   MCV 96.4*   MCH 30.6   MCHC 31.8*     Recent Labs   Lab 07/29/22  1057 07/29/22  1536 07/30/22  0205   GLUCOSE 112  --  111     --  139   K 3.1*   < > 4.9   CO2 21*  --  24   BUN 6.8*  --  10.8   CREATININE 0.70  --  0.79   MG 2.60  --   --     < > = values in this interval not displayed.       ABG  Recent Labs   Lab 07/29/22  0920 07/29/22  1052 07/29/22  1228   PH 7.364   < > 7.29*   PO2 356*   < > 79*   PCO2 42.8   < > 48*   HCO3 24.4   < > 23.1   BE -1  --   --     < > = values in this interval not displayed.         DVT Prophylaxis:  Josh  hose  GI Prophylaxis:  Carafate    Assessment/Plan:     Assessment  1. Ascending aortic aneurysm s/p resection and repair with 30 mm Dacron graft and ligation of left atrial appendage on 7/29/2022  2. Hypertension  3. Hyperlipidemia  4. Hypothyroidism        Plan  Continue post-operative management per CV surgery post op protocol  Has been extubated and currently on NC  Vasopressor support w/ epi has been weaned off  Insulin drip per protocol  Continue pacing as needed for bradycardia and hypotension  Monitor surgical drain output for signs of bleeding  Multimodal pain control      Greater than 30 minutes of critical care was time spent personally by me on the following activities: development of treatment plan with patient or surrogate and bedside caregivers, discussions with consultants, evaluation of patient's response to treatment, examination of patient, ordering and performing treatments and interventions, ordering and review of laboratory studies, ordering and review of radiographic studies, pulse oximetry, re-evaluation of patient's condition.  This critical care time did not overlap with that of any other provider or involve time for any procedures.     Barbie Goodwin MD  Internal Medicine, PGY-2    [facility] - [facility]

## 2022-07-31 PROBLEM — I63.9 ISCHEMIC EMBOLIC STROKE: Status: ACTIVE | Noted: 2022-07-31

## 2022-07-31 LAB
ABO + RH BLD: NORMAL
ANION GAP SERPL CALC-SCNC: 7 MEQ/L
BASOPHILS # BLD AUTO: 0.05 X10(3)/MCL (ref 0–0.2)
BASOPHILS NFR BLD AUTO: 0.4 %
BLD PROD TYP BPU: NORMAL
BLOOD UNIT EXPIRATION DATE: NORMAL
BLOOD UNIT TYPE CODE: 5100
BUN SERPL-MCNC: 9 MG/DL (ref 9.8–20.1)
CALCIUM SERPL-MCNC: 7.9 MG/DL (ref 8.4–10.2)
CHLORIDE SERPL-SCNC: 106 MMOL/L (ref 98–107)
CO2 SERPL-SCNC: 23 MMOL/L (ref 23–31)
CREAT SERPL-MCNC: 0.73 MG/DL (ref 0.55–1.02)
CREAT/UREA NIT SERPL: 12
CROSSMATCH INTERPRETATION: NORMAL
DISPENSE STATUS: NORMAL
EOSINOPHIL # BLD AUTO: 0.01 X10(3)/MCL (ref 0–0.9)
EOSINOPHIL NFR BLD AUTO: 0.1 %
ERYTHROCYTE [DISTWIDTH] IN BLOOD BY AUTOMATED COUNT: 13.4 % (ref 11.5–17)
GLUCOSE SERPL-MCNC: 112 MG/DL (ref 82–115)
HCT VFR BLD AUTO: 29.7 % (ref 37–47)
HGB BLD-MCNC: 9.2 GM/DL (ref 12–16)
IMM GRANULOCYTES # BLD AUTO: 0.09 X10(3)/MCL (ref 0–0.04)
IMM GRANULOCYTES NFR BLD AUTO: 0.7 %
LYMPHOCYTES # BLD AUTO: 1.04 X10(3)/MCL (ref 0.6–4.6)
LYMPHOCYTES NFR BLD AUTO: 7.8 %
MCH RBC QN AUTO: 30 PG (ref 27–31)
MCHC RBC AUTO-ENTMCNC: 31 MG/DL (ref 33–36)
MCV RBC AUTO: 96.7 FL (ref 80–94)
MONOCYTES # BLD AUTO: 1.02 X10(3)/MCL (ref 0.1–1.3)
MONOCYTES NFR BLD AUTO: 7.7 %
NEUTROPHILS # BLD AUTO: 11.1 X10(3)/MCL (ref 2.1–9.2)
NEUTROPHILS NFR BLD AUTO: 83.3 %
NRBC BLD AUTO-RTO: 0 %
PLATELET # BLD AUTO: 107 X10(3)/MCL (ref 130–400)
PMV BLD AUTO: 11.5 FL (ref 7.4–10.4)
POTASSIUM SERPL-SCNC: 4.3 MMOL/L (ref 3.5–5.1)
RBC # BLD AUTO: 3.07 X10(6)/MCL (ref 4.2–5.4)
SODIUM SERPL-SCNC: 136 MMOL/L (ref 136–145)
UNIT NUMBER: NORMAL
WBC # SPEC AUTO: 13.3 X10(3)/MCL (ref 4.5–11.5)

## 2022-07-31 PROCEDURE — 25000003 PHARM REV CODE 250

## 2022-07-31 PROCEDURE — 36415 COLL VENOUS BLD VENIPUNCTURE: CPT

## 2022-07-31 PROCEDURE — 99233 PR SUBSEQUENT HOSPITAL CARE,LEVL III: ICD-10-PCS | Mod: ,,, | Performed by: THORACIC SURGERY (CARDIOTHORACIC VASCULAR SURGERY)

## 2022-07-31 PROCEDURE — 25000003 PHARM REV CODE 250: Performed by: STUDENT IN AN ORGANIZED HEALTH CARE EDUCATION/TRAINING PROGRAM

## 2022-07-31 PROCEDURE — 99024 PR POST-OP FOLLOW-UP VISIT: ICD-10-PCS | Mod: POP,,, | Performed by: PHYSICIAN ASSISTANT

## 2022-07-31 PROCEDURE — 99222 1ST HOSP IP/OBS MODERATE 55: CPT | Mod: ,,, | Performed by: SPECIALIST

## 2022-07-31 PROCEDURE — 63600175 PHARM REV CODE 636 W HCPCS: Performed by: STUDENT IN AN ORGANIZED HEALTH CARE EDUCATION/TRAINING PROGRAM

## 2022-07-31 PROCEDURE — 63600175 PHARM REV CODE 636 W HCPCS

## 2022-07-31 PROCEDURE — 20000000 HC ICU ROOM

## 2022-07-31 PROCEDURE — 99222 PR INITIAL HOSPITAL CARE,LEVL II: ICD-10-PCS | Mod: ,,, | Performed by: SPECIALIST

## 2022-07-31 PROCEDURE — 80048 BASIC METABOLIC PNL TOTAL CA: CPT

## 2022-07-31 PROCEDURE — 99233 SBSQ HOSP IP/OBS HIGH 50: CPT | Mod: ,,, | Performed by: THORACIC SURGERY (CARDIOTHORACIC VASCULAR SURGERY)

## 2022-07-31 PROCEDURE — 99024 POSTOP FOLLOW-UP VISIT: CPT | Mod: POP,,, | Performed by: PHYSICIAN ASSISTANT

## 2022-07-31 PROCEDURE — 85025 COMPLETE CBC W/AUTO DIFF WBC: CPT

## 2022-07-31 RX ADMIN — MUPIROCIN: 20 OINTMENT TOPICAL at 08:07

## 2022-07-31 RX ADMIN — MORPHINE SULFATE 2 MG: 10 INJECTION INTRAVENOUS at 03:07

## 2022-07-31 RX ADMIN — LEVETIRACETAM 500 MG: 100 INJECTION, SOLUTION INTRAVENOUS at 08:07

## 2022-07-31 RX ADMIN — METOPROLOL TARTRATE 12.5 MG: 25 TABLET, FILM COATED ORAL at 07:07

## 2022-07-31 RX ADMIN — OXYCODONE 5 MG: 5 TABLET ORAL at 08:07

## 2022-07-31 RX ADMIN — METOPROLOL TARTRATE 12.5 MG: 25 TABLET, FILM COATED ORAL at 08:07

## 2022-07-31 RX ADMIN — LEVETIRACETAM 500 MG: 100 INJECTION, SOLUTION INTRAVENOUS at 07:07

## 2022-07-31 RX ADMIN — DOCUSATE SODIUM 100 MG: 100 CAPSULE, LIQUID FILLED ORAL at 08:07

## 2022-07-31 RX ADMIN — FAMOTIDINE 20 MG: 10 INJECTION INTRAVENOUS at 08:07

## 2022-07-31 RX ADMIN — OXYCODONE 5 MG: 5 TABLET ORAL at 02:07

## 2022-07-31 RX ADMIN — ASPIRIN 81 MG: 81 TABLET, COATED ORAL at 08:07

## 2022-07-31 RX ADMIN — FOLIC ACID 1 MG: 1 TABLET ORAL at 07:07

## 2022-07-31 RX ADMIN — ENOXAPARIN SODIUM 40 MG: 40 INJECTION SUBCUTANEOUS at 05:07

## 2022-07-31 RX ADMIN — OXYCODONE 5 MG: 5 TABLET ORAL at 03:07

## 2022-07-31 NOTE — CONSULTS
Neurology Consult Note    Patient Name: Blanquita Montoya  MRN: [id number]  Admission Date: 7/29/2022  Hospital Length of Stay: 2 days  Consulting Provider: North Lamb MD  Primary Care Physician: Kamila Whittaker MD  Principal Problem:<principal problem not specified>    Consults   Subjective:     Chief Complaint:  No chief complaint on file.    HPI:   No notes on file      This is a 66-year-old female with past medical history of coronary artery disease, hypertension, hyperlipidemia, hypothyroidism, and anxiety.  CT scan of chest in March revealed a 4.4 x 4.5 cm ascending aortic aneurysm which grew to 4.9 cm in 2 months.  She was then referred to CV surgery for evaluation.  Left heart catheterization on 07/11/2022 with no coronary disease.  She presented, 07/29/2022, for a resection of ascending aortic aneurysm and repair with 30 mm Dacron graft and ligation of left atrial appendage by Dr. Vega.  Two mediastinal chest tubes and 1 right ventricular pacing wire was placed.  She was transported postoperatively to the ICU intubated on mechanical ventilation, requiring vasopressor support and insulin drip.  On arrival she was bradycardic and hypotensive therefore epicardial pacing was started at 80 bpm.    Seizure then CT head then neuro consult placed     Review of Systems  No specific complaints but lethargy so maybe not really able to anyway       Current Facility-Administered Medications:     acetaminophen oral solution 650 mg, 650 mg, Per OG tube, Q6H PRN, PAULETTE Hurt    albumin human 5% bottle 12.5 g, 12.5 g, Intravenous, PRN, PAULETTE Hurt, Stopped at 07/29/22 1756    aspirin EC tablet 81 mg, 81 mg, Oral, Daily, PAULETTE Hurt, 81 mg at 07/31/22 0800    dexmedetomidine (PRECEDEX) 400mcg/100mL 0.9% NaCL infusion, 0-1.4 mcg/kg/hr, Intravenous, Continuous, PAULETTE Hurt    dextrose 5 % and 0.45 % NaCl infusion, , Intravenous, Continuous, PAULETTE Hurt, Last Rate: 100 mL/hr at 07/29/22 1145, New  Bag at 07/29/22 1145    dextrose 50% injection 12.5 g, 12.5 g, Intravenous, PRN, PAULETTE Hurt    dextrose 50% injection 25 g, 25 g, Intravenous, PRN, PAULETTE Hurt    docusate sodium capsule 100 mg, 100 mg, Oral, BID, PAULETTE Hurt, 100 mg at 07/30/22 2010    enoxaparin injection 40 mg, 40 mg, Subcutaneous, Daily, PAULETTE Hurt, 40 mg at 07/30/22 1700    EPINEPHrine (ADRENALIN) 5 mg in dextrose 5 % 250 mL infusion, 0-2 mcg/kg/min, Intravenous, Continuous, Alec Vega IV, MD, Last Rate: 0 mL/hr at 07/30/22 0000, 0 mcg/kg/min at 07/30/22 0000    famotidine (PF) injection 20 mg, 20 mg, Intravenous, Daily, PAULETTE Hurt, 20 mg at 07/31/22 0801    folic acid tablet 1 mg, 1 mg, Oral, Daily, PAULETTE Hurt, 1 mg at 07/31/22 0758    HYDROcodone-acetaminophen 5-325 mg per tablet 1 tablet, 1 tablet, Oral, Q4H PRN, PAULETTE Hurt    insulin regular in 0.9 % NaCl 100 unit/100 mL (1 unit/mL) infusion, 0-52 Units/hr, Intravenous, Continuous, PAULETTE Hurt, Stopped at 07/30/22 1100    lactulose 10 gram/15 ml solution 20 g, 20 g, Oral, Q6H PRN, PAULETET Hurt    levETIRAcetam (KEPPRA) 500 mg in dextrose 5 % in water (D5W) 5 % 100 mL IVPB (MB+), 500 mg, Intravenous, Q12H, Pranay Velasco MD, Last Rate: 200 mL/hr at 07/31/22 0759, 500 mg at 07/31/22 0759    LIDOcaine (PF) 10 mg/ml (1%) injection 10 mg, 1 mL, Intradermal, Once, Malcolm Corona DO    loperamide capsule 4 mg, 4 mg, Oral, Once, PAULETTE Hurt    metoclopramide HCl injection 5 mg, 5 mg, Intravenous, Q6H PRN, PAULETTE Hurt    metoprolol tartrate (LOPRESSOR) split tablet 12.5 mg, 12.5 mg, Oral, BID, PAULETTE Hurt, 12.5 mg at 07/31/22 0759    morphine injection 2 mg, 2 mg, Intravenous, PRN, PAULETTE Hurt, 2 mg at 07/30/22 2011    mupirocin 2 % ointment, , Nasal, BID, PAULETTE Hurt, Given at 07/31/22 0800    ondansetron injection 4 mg, 4 mg, Intravenous, Q12H PRN, PAULETTE Hurt, 4 mg at 07/29/22 1254    oxyCODONE immediate release tablet 5 mg,  "5 mg, Oral, Q4H PRN, PAULETTE Hurt, 5 mg at 07/31/22 0800    sucralfate tablet 1 g, 1 g, Oral, QID (AC & HS), PAULETTE Hurt     Objective:      /76   Pulse 87   Temp 98 °F (36.7 °C) (Oral)   Resp (!) 23   Ht 5' 7" (1.702 m)   Wt 86.3 kg (190 lb 4.1 oz)   SpO2 (!) 90%   Breastfeeding No   BMI 29.80 kg/m²   Exam:   General Exam  Slow eye movements with difficulty gazing laterally either side   Slow speech   Neurological  cortical function__  speech__  cranial nerves:  CN 2 VF_ok   CN 3, 4, 6 EOMs_difff w lateral gaze   CN 3, pupils_ok    CN 7_no lower face asymmetry  CN 8_hearing _  CN 12 tongue_ok    Motor__ moves BUE not well given gen weakness still   _    Neuroimaging:  R occipital and Bifrotnal subacute isch changes to me   Images and report reviewed and reviewed w ICU nursing     Labs:          Assessment/Plan:       Problem List Items Addressed This Visit        Cardiac/Vascular    Ascending aortic aneurysm      Other Visit Diagnoses     Aortic aneurysm        CAD (coronary artery disease)            Active Hospital Problems    Diagnosis    Ischemic embolic stroke     perioperative embolic strokes   Seizure reported   Agree w keppra; should stay on at least one year; longer if recurrent seizures     Advised should not drive until at least six months seizure free     All her deficits should improve in time     No specific recommendations otherwise     Other comments/ follow up:        call  if needed for new issues going forward please [phone]    I can see in outpt clinic       North Lamb MD MBA      [facility] - [facility] ICU    "

## 2022-07-31 NOTE — PROGRESS NOTES
Critical Care - Medicine  ICU Progress Note    Patient Name: Blanquita Montoya  MRN: [id number]  Admission Date: 7/29/2022  Hospital Length of Stay: 2 days  Code Status: Full Code  Attending Provider: Alec Vega IV, MD  Primary Care Provider: Kamila Whittaker MD   Principal Problem: <principal problem not specified>    Subjective:     Brief HPI: This is a 66-year-old female with past medical history of coronary artery disease, hypertension, hyperlipidemia, hypothyroidism, and anxiety.  CT scan of chest in March revealed a 4.4 x 4.5 cm ascending aortic aneurysm which grew to 4.9 cm in 2 months.  She was then referred to CV surgery for evaluation.  Left heart catheterization on 07/11/2022 with no coronary disease.  She presented today, 07/29/2022, for a resection of ascending aortic aneurysm and repair with 30 mm Dacron graft and ligation of left atrial appendage by Dr. Vega.  Two mediastinal chest tubes and 1 right ventricular pacing wire was placed.  She was transported postoperatively to the ICU intubated on mechanical ventilation, requiring vasopressor support and insulin drip.  On arrival she was bradycardic and hypotensive therefore epicardial pacing was started at 80 bpm.    Interval History/Significant Events: Patient seen and examined this morning. She continues to have episodes where she becomes lethargic; never became alert/oriented yesterday. Unclear what her baseline prior to admission. I am unable to get her to follow commands this morning. CT done yesterday showed possible right occipitoparietal cortex hypodensity. Neurology consult pending.     Objective:     Vital Signs (Most Recent):  Temp: 98 °F (36.7 °C) (07/31/22 0400)  Pulse: 69 (07/31/22 0400)  Resp: (!) 21 (07/31/22 0400)  BP: 118/61 (07/31/22 0400)  SpO2: (!) 94 % (07/31/22 0400) Vital Signs (24h Range):  Temp:  [98 °F (36.7 °C)-98.5 °F (36.9 °C)] 98 °F (36.7 °C)  Pulse:  [58-87] 69  Resp:  [15-35] 21  SpO2:  [89 %-97 %] 94 %  BP:  (104-152)/(44-88) 118/61     Weight: 86.3 kg (190 lb 4.1 oz)  Body mass index is 29.8 kg/m².      Intake/Output Summary (Last 24 hours) at 7/31/2022 0617  Last data filed at 7/30/2022 1800  Gross per 24 hour   Intake 800 ml   Output 605 ml   Net 195 ml        Physical Exam  Constitutional:       Comments: Awake, slightly lethargic, not following commands, appears to be in pain   HENT:      Head: Normocephalic and atraumatic.      Mouth/Throat:      Mouth: Mucous membranes are dry.   Eyes:      Conjunctiva/sclera: Conjunctivae normal.      Pupils: Pupils are equal, round, and reactive to light.   Neck:      Comments: No JVD  Cardiovascular:      Rate and Rhythm: Normal rate and regular rhythm.      Pulses: Normal pulses.   Pulmonary:      Effort: No respiratory distress.      Comments: Chest tubes in place  Course breath sounds  Abdominal:      General: Bowel sounds are normal. There is no distension.      Palpations: Abdomen is soft.      Tenderness: There is no abdominal tenderness.   Musculoskeletal:         General: No swelling or deformity.      Cervical back: Neck supple.   Skin:     General: Skin is warm and dry.   Neurological:      Mental Status: She is disoriented.      Comments: Lethargic  Not following commands at this time           Current Facility-Administered Medications:     acetaminophen oral solution 650 mg, 650 mg, Per OG tube, Q6H PRN, PAULETTE Hurt    albumin human 5% bottle 12.5 g, 12.5 g, Intravenous, PRN, PAULETTE Hurt, Stopped at 07/29/22 1756    aspirin EC tablet 81 mg, 81 mg, Oral, Daily, PAULETTE Hurt    dexmedetomidine (PRECEDEX) 400mcg/100mL 0.9% NaCL infusion, 0-1.4 mcg/kg/hr, Intravenous, Continuous, PAULETTE Hurt    dextrose 5 % and 0.45 % NaCl infusion, , Intravenous, Continuous, PAULETTE Hurt, Last Rate: 100 mL/hr at 07/29/22 1145, New Bag at 07/29/22 1145    dextrose 50% injection 12.5 g, 12.5 g, Intravenous, PRN, PAULETTE Hurt    dextrose 50% injection 25 g, 25 g,  Intravenous, PRN, PAULETTE Hurt    docusate sodium capsule 100 mg, 100 mg, Oral, BID, PAULETTE Hurt, 100 mg at 07/30/22 2010    enoxaparin injection 40 mg, 40 mg, Subcutaneous, Daily, PAULETTE Hurt, 40 mg at 07/30/22 1700    EPINEPHrine (ADRENALIN) 5 mg in dextrose 5 % 250 mL infusion, 0-2 mcg/kg/min, Intravenous, Continuous, Alec Vega IV, MD, Last Rate: 0 mL/hr at 07/30/22 0000, 0 mcg/kg/min at 07/30/22 0000    famotidine (PF) injection 20 mg, 20 mg, Intravenous, Daily, PAULETTE Hurt, 20 mg at 07/29/22 1109    folic acid tablet 1 mg, 1 mg, Oral, Daily, PAULETTE Hurt    HYDROcodone-acetaminophen 5-325 mg per tablet 1 tablet, 1 tablet, Oral, Q4H PRN, PAULETTE Hurt    insulin regular in 0.9 % NaCl 100 unit/100 mL (1 unit/mL) infusion, 0-52 Units/hr, Intravenous, Continuous, PAULETTE Hurt, Stopped at 07/30/22 1100    lactulose 10 gram/15 ml solution 20 g, 20 g, Oral, Q6H PRN, PAULETTE Hurt    levETIRAcetam (KEPPRA) 500 mg in dextrose 5 % in water (D5W) 5 % 100 mL IVPB (MB+), 500 mg, Intravenous, Q12H, Pranay Velasco MD, Stopped at 07/30/22 2042    LIDOcaine (PF) 10 mg/ml (1%) injection 10 mg, 1 mL, Intradermal, Once, Malcolm Corona DO    loperamide capsule 4 mg, 4 mg, Oral, Once, PAULETTE Hurt    metoclopramide HCl injection 5 mg, 5 mg, Intravenous, Q6H PRN, PAULETTE Hurt    metoprolol tartrate (LOPRESSOR) split tablet 12.5 mg, 12.5 mg, Oral, BID, PAULETTE Hurt, 12.5 mg at 07/30/22 8305    morphine injection 2 mg, 2 mg, Intravenous, PRN, PAULETTE Hurt, 2 mg at 07/30/22 2011    mupirocin 2 % ointment, , Nasal, BID, PAULETTE Hurt, Given at 07/30/22 2100    ondansetron injection 4 mg, 4 mg, Intravenous, Q12H PRN, PAULETTE Hurt, 4 mg at 07/29/22 1254    oxyCODONE immediate release tablet 5 mg, 5 mg, Oral, Q4H PRN, PAULETTE Hurt, 5 mg at 07/31/22 0355    sucralfate tablet 1 g, 1 g, Oral, QID (AC & HS), PAULETTE Hurt     Vents:  Vent Mode: CPAP (07/29/22 1217)  Ventilator Initiated: Yes  (07/29/22 1023)  Set Rate: 16 BPM (07/29/22 1121)  Vt Set: 500 mL (07/29/22 1121)  Pressure Support: 10 cmH20 (07/29/22 1217)  PEEP/CPAP: 5 cmH20 (07/29/22 1217)  Oxygen Concentration (%): 30 (07/29/22 1132)  Peak Airway Pressure: 16 cmH2O (07/29/22 1217)  Plateau Pressure: 8.4 cmH20 (07/29/22 1121)  Total Ve: 8.5 mL (07/29/22 1217)    Lines/Drains/Airways     Drain  Duration                Chest Tube 07/29/22 1000 1 Mediastinal 1 day         Chest Tube 07/29/22 1000 2 Anterior 1 day         Urethral Catheter 07/29/22 0701 Non-latex;Temperature probe;Straight-tip 16 Fr. 1 day          Arterial Line  Duration           Arterial Line 07/29/22 0634 Left Radial 1 day          Peripheral Intravenous Line  Duration                Peripheral IV - Single Lumen 07/29/22 0540 18 G Left Antecubital 2 days                Significant Labs:    Recent Labs   Lab 07/31/22  0122   WBC 13.3*   RBC 3.07*   HGB 9.2*   HCT 29.7*   *   MCV 96.7*   MCH 30.0   MCHC 31.0*     Recent Labs   Lab 07/31/22  0122   GLUCOSE 112      K 4.3   CO2 23   BUN 9.0*   CREATININE 0.73       ABG  Recent Labs   Lab 07/29/22  0920 07/29/22  1052 07/30/22  0934   PH 7.364   < > 7.39   PO2 356*   < > 70*   PCO2 42.8   < > 45   HCO3 24.4   < > 27.2   BE -1  --   --     < > = values in this interval not displayed.         DVT Prophylaxis:  Josh wills  GI Prophylaxis:  Carafate    Assessment/Plan:     Assessment  1. Ascending aortic aneurysm s/p resection and repair with 30 mm Dacron graft and ligation of left atrial appendage on 7/29/2022  2. Hypertension  3. Hyperlipidemia  4. Hypothyroidism        Plan  Continue post-operative management per CV surgery post op protocol  Has been extubated and currently on NC  No longer on vasopressor support  Continue pacing as needed for bradycardia and hypotension- has not required pacing since day 1 of icu admission  Monitor surgical drain output for signs of bleeding  Multimodal pain control    CT was done  yesterday which showed no acute bleed but showed localized area of hypodensity at the right occipitoparietal cortex suspicious for acute/subacute nonhemorrhagic insult coukd be further characterized by brain MR. Neurology has been consulted by ICU day team yesterday and recommendations pending at this time.     Greater than 30 minutes of critical care was time spent personally by me on the following activities: development of treatment plan with patient or surrogate and bedside caregivers, discussions with consultants, evaluation of patient's response to treatment, examination of patient, ordering and performing treatments and interventions, ordering and review of laboratory studies, ordering and review of radiographic studies, pulse oximetry, re-evaluation of patient's condition.  This critical care time did not overlap with that of any other provider or involve time for any procedures.     Barbie Goodwin MD  Internal Medicine, PGY-2    [facility] - [facility]

## 2022-08-01 LAB
ANION GAP SERPL CALC-SCNC: 8 MEQ/L
BASOPHILS # BLD AUTO: 0.04 X10(3)/MCL (ref 0–0.2)
BASOPHILS NFR BLD AUTO: 0.4 %
BUN SERPL-MCNC: 9.6 MG/DL (ref 9.8–20.1)
CALCIUM SERPL-MCNC: 8.4 MG/DL (ref 8.4–10.2)
CHLORIDE SERPL-SCNC: 108 MMOL/L (ref 98–107)
CO2 SERPL-SCNC: 20 MMOL/L (ref 23–31)
CREAT SERPL-MCNC: 0.66 MG/DL (ref 0.55–1.02)
CREAT/UREA NIT SERPL: 15
EOSINOPHIL # BLD AUTO: 0.09 X10(3)/MCL (ref 0–0.9)
EOSINOPHIL NFR BLD AUTO: 0.8 %
ERYTHROCYTE [DISTWIDTH] IN BLOOD BY AUTOMATED COUNT: 13.3 % (ref 11.5–17)
ESTROGEN SERPL-MCNC: NORMAL PG/ML
GLUCOSE SERPL-MCNC: 89 MG/DL (ref 82–115)
HCT VFR BLD AUTO: 29.2 % (ref 37–47)
HGB BLD-MCNC: 9.2 GM/DL (ref 12–16)
IMM GRANULOCYTES # BLD AUTO: 0.07 X10(3)/MCL (ref 0–0.04)
IMM GRANULOCYTES NFR BLD AUTO: 0.6 %
INSULIN SERPL-ACNC: NORMAL U[IU]/ML
LAB AP CLINICAL INFORMATION: NORMAL
LAB AP GROSS DESCRIPTION: NORMAL
LAB AP REPORT FOOTNOTES: NORMAL
LYMPHOCYTES # BLD AUTO: 0.86 X10(3)/MCL (ref 0.6–4.6)
LYMPHOCYTES NFR BLD AUTO: 7.6 %
MCH RBC QN AUTO: 30.6 PG (ref 27–31)
MCHC RBC AUTO-ENTMCNC: 31.5 MG/DL (ref 33–36)
MCV RBC AUTO: 97 FL (ref 80–94)
MONOCYTES # BLD AUTO: 0.87 X10(3)/MCL (ref 0.1–1.3)
MONOCYTES NFR BLD AUTO: 7.7 %
NEUTROPHILS # BLD AUTO: 9.3 X10(3)/MCL (ref 2.1–9.2)
NEUTROPHILS NFR BLD AUTO: 82.9 %
NRBC BLD AUTO-RTO: 0 %
PLATELET # BLD AUTO: 110 X10(3)/MCL (ref 130–400)
PMV BLD AUTO: 12 FL (ref 7.4–10.4)
POTASSIUM SERPL-SCNC: 3.9 MMOL/L (ref 3.5–5.1)
RBC # BLD AUTO: 3.01 X10(6)/MCL (ref 4.2–5.4)
SODIUM SERPL-SCNC: 136 MMOL/L (ref 136–145)
T3RU NFR SERPL: NORMAL %
WBC # SPEC AUTO: 11.3 X10(3)/MCL (ref 4.5–11.5)

## 2022-08-01 PROCEDURE — 97163 PT EVAL HIGH COMPLEX 45 MIN: CPT

## 2022-08-01 PROCEDURE — 36415 COLL VENOUS BLD VENIPUNCTURE: CPT

## 2022-08-01 PROCEDURE — 99024 POSTOP FOLLOW-UP VISIT: CPT | Mod: POP,,, | Performed by: PHYSICIAN ASSISTANT

## 2022-08-01 PROCEDURE — 25000003 PHARM REV CODE 250: Performed by: STUDENT IN AN ORGANIZED HEALTH CARE EDUCATION/TRAINING PROGRAM

## 2022-08-01 PROCEDURE — 97167 OT EVAL HIGH COMPLEX 60 MIN: CPT

## 2022-08-01 PROCEDURE — 25000242 PHARM REV CODE 250 ALT 637 W/ HCPCS: Performed by: STUDENT IN AN ORGANIZED HEALTH CARE EDUCATION/TRAINING PROGRAM

## 2022-08-01 PROCEDURE — 94640 AIRWAY INHALATION TREATMENT: CPT

## 2022-08-01 PROCEDURE — 99024 PR POST-OP FOLLOW-UP VISIT: ICD-10-PCS | Mod: POP,,, | Performed by: PHYSICIAN ASSISTANT

## 2022-08-01 PROCEDURE — 25000003 PHARM REV CODE 250

## 2022-08-01 PROCEDURE — 94761 N-INVAS EAR/PLS OXIMETRY MLT: CPT

## 2022-08-01 PROCEDURE — 63600175 PHARM REV CODE 636 W HCPCS

## 2022-08-01 PROCEDURE — 25000003 PHARM REV CODE 250: Performed by: SPECIALIST

## 2022-08-01 PROCEDURE — 20000000 HC ICU ROOM

## 2022-08-01 PROCEDURE — 63600175 PHARM REV CODE 636 W HCPCS: Performed by: STUDENT IN AN ORGANIZED HEALTH CARE EDUCATION/TRAINING PROGRAM

## 2022-08-01 PROCEDURE — 85025 COMPLETE CBC W/AUTO DIFF WBC: CPT

## 2022-08-01 PROCEDURE — 27000221 HC OXYGEN, UP TO 24 HOURS

## 2022-08-01 PROCEDURE — 80048 BASIC METABOLIC PNL TOTAL CA: CPT

## 2022-08-01 RX ORDER — IPRATROPIUM BROMIDE AND ALBUTEROL SULFATE 2.5; .5 MG/3ML; MG/3ML
3 SOLUTION RESPIRATORY (INHALATION) EVERY 6 HOURS
Status: DISCONTINUED | OUTPATIENT
Start: 2022-08-01 | End: 2022-08-01

## 2022-08-01 RX ORDER — SIMETHICONE 80 MG
2 TABLET,CHEWABLE ORAL 3 TIMES DAILY PRN
Status: DISCONTINUED | OUTPATIENT
Start: 2022-08-01 | End: 2022-08-10 | Stop reason: HOSPADM

## 2022-08-01 RX ORDER — IPRATROPIUM BROMIDE AND ALBUTEROL SULFATE 2.5; .5 MG/3ML; MG/3ML
3 SOLUTION RESPIRATORY (INHALATION) EVERY 6 HOURS PRN
Status: DISCONTINUED | OUTPATIENT
Start: 2022-08-01 | End: 2022-08-01

## 2022-08-01 RX ORDER — SODIUM CHLORIDE 9 MG/ML
INJECTION, SOLUTION INTRAVENOUS CONTINUOUS
Status: DISCONTINUED | OUTPATIENT
Start: 2022-08-01 | End: 2022-08-10 | Stop reason: HOSPADM

## 2022-08-01 RX ORDER — DOCUSATE SODIUM 100 MG/1
200 CAPSULE, LIQUID FILLED ORAL 2 TIMES DAILY
Status: DISCONTINUED | OUTPATIENT
Start: 2022-08-01 | End: 2022-08-10 | Stop reason: HOSPADM

## 2022-08-01 RX ORDER — POLYETHYLENE GLYCOL 3350 17 G/17G
17 POWDER, FOR SOLUTION ORAL 2 TIMES DAILY
Status: DISCONTINUED | OUTPATIENT
Start: 2022-08-01 | End: 2022-08-10 | Stop reason: HOSPADM

## 2022-08-01 RX ORDER — IPRATROPIUM BROMIDE AND ALBUTEROL SULFATE 2.5; .5 MG/3ML; MG/3ML
3 SOLUTION RESPIRATORY (INHALATION) EVERY 4 HOURS PRN
Status: DISCONTINUED | OUTPATIENT
Start: 2022-08-02 | End: 2022-08-10 | Stop reason: HOSPADM

## 2022-08-01 RX ADMIN — MUPIROCIN: 20 OINTMENT TOPICAL at 08:08

## 2022-08-01 RX ADMIN — OXYCODONE 5 MG: 5 TABLET ORAL at 11:08

## 2022-08-01 RX ADMIN — LEVETIRACETAM 500 MG: 100 INJECTION, SOLUTION INTRAVENOUS at 08:08

## 2022-08-01 RX ADMIN — OXYCODONE 5 MG: 5 TABLET ORAL at 08:08

## 2022-08-01 RX ADMIN — METOPROLOL TARTRATE 12.5 MG: 25 TABLET, FILM COATED ORAL at 08:08

## 2022-08-01 RX ADMIN — DOCUSATE SODIUM 100 MG: 100 CAPSULE, LIQUID FILLED ORAL at 08:08

## 2022-08-01 RX ADMIN — SIMETHICONE 160 MG: 80 TABLET, CHEWABLE ORAL at 06:08

## 2022-08-01 RX ADMIN — SUCRALFATE 1 G: 1 TABLET ORAL at 11:08

## 2022-08-01 RX ADMIN — FOLIC ACID 1 MG: 1 TABLET ORAL at 08:08

## 2022-08-01 RX ADMIN — OXYCODONE 5 MG: 5 TABLET ORAL at 12:08

## 2022-08-01 RX ADMIN — MORPHINE SULFATE 2 MG: 10 INJECTION INTRAVENOUS at 08:08

## 2022-08-01 RX ADMIN — SODIUM CHLORIDE: 9 INJECTION, SOLUTION INTRAVENOUS at 02:08

## 2022-08-01 RX ADMIN — OXYCODONE 5 MG: 5 TABLET ORAL at 04:08

## 2022-08-01 RX ADMIN — SUCRALFATE 1 G: 1 TABLET ORAL at 07:08

## 2022-08-01 RX ADMIN — FAMOTIDINE 20 MG: 10 INJECTION INTRAVENOUS at 08:08

## 2022-08-01 RX ADMIN — IPRATROPIUM BROMIDE AND ALBUTEROL SULFATE 3 ML: .5; 3 SOLUTION RESPIRATORY (INHALATION) at 02:08

## 2022-08-01 RX ADMIN — ENOXAPARIN SODIUM 40 MG: 40 INJECTION SUBCUTANEOUS at 05:08

## 2022-08-01 RX ADMIN — IPRATROPIUM BROMIDE AND ALBUTEROL SULFATE 3 ML: .5; 3 SOLUTION RESPIRATORY (INHALATION) at 07:08

## 2022-08-01 RX ADMIN — SUCRALFATE 1 G: 1 TABLET ORAL at 04:08

## 2022-08-01 RX ADMIN — ASPIRIN 81 MG: 81 TABLET, COATED ORAL at 08:08

## 2022-08-01 NOTE — PLAN OF CARE
Care plan reviewed and updated as needed.       Problem: Adult Inpatient Plan of Care  Goal: Plan of Care Review  Outcome: Ongoing, Progressing  Goal: Absence of Hospital-Acquired Illness or Injury  Outcome: Ongoing, Progressing  Goal: Optimal Comfort and Wellbeing  Outcome: Ongoing, Progressing  Goal: Readiness for Transition of Care  Outcome: Ongoing, Progressing     Problem: Impaired Wound Healing  Goal: Optimal Wound Healing  Outcome: Ongoing, Progressing     Problem: Infection  Goal: Absence of Infection Signs and Symptoms  Outcome: Ongoing, Progressing     Problem: Communication Impairment (Artificial Airway)  Goal: Effective Communication  Outcome: Ongoing, Progressing     Problem: Skin and Tissue Injury (Artificial Airway)  Goal: Absence of Device-Related Skin or Tissue Injury  Outcome: Ongoing, Progressing     Problem: Fall Injury Risk  Goal: Absence of Fall and Fall-Related Injury  Outcome: Ongoing, Progressing     Problem: Adjustment to Illness (Delirium)  Goal: Optimal Coping  Outcome: Ongoing, Progressing

## 2022-08-01 NOTE — PLAN OF CARE
Problem: Occupational Therapy  Goal: Occupational Therapy Goal  Description: Goals to be met by: dc     Patient will increase functional independence with ADLs by performing:    Grooming while EOB with Minimal Assistance.  Static Sitting balance at edge of bed x10 minutes with Stand-by Assistance.  Stand pivot transfers with Moderate Assistance.  Toilet transfer to bedside commode with Moderate Assistance.  Increased functional strength to 3/5 for R UE.  Pt will follow simple 1 step commands 75% of requests.    Outcome: Ongoing, Progressing

## 2022-08-01 NOTE — ANESTHESIA POSTPROCEDURE EVALUATION
Anesthesia Post Evaluation    Patient: Blanquita Montoya    Procedure(s) Performed: Procedure(s) (LRB):  REPAIR, AORTA, ASCENDING (N/A)    Final Anesthesia Type: general      Patient location during evaluation: ICU  Patient participation: No - Unable to Participate, Intubation  Level of consciousness: obtunded/minimal responses  Post-procedure vital signs: reviewed and stable  Pain management: adequate  Airway patency: patent    PONV status at discharge: No PONV  Anesthetic complications: no      Cardiovascular status: blood pressure returned to baseline  Respiratory status: ETT and ventilator  Hydration status: euvolemic  Follow-up needed           Vitals Value Taken Time   /61 08/01/22 0632   Temp 36.9 °C (98.5 °F) 07/31/22 2000   Pulse 61 08/01/22 0645   Resp 16 08/01/22 0645   SpO2 98 % 08/01/22 0645   Vitals shown include unvalidated device data.      No case tracking events are documented in the log.      Pain/Thom Score: Pain Rating Prior to Med Admin: 8 (8/1/2022 12:55 AM)

## 2022-08-01 NOTE — PROGRESS NOTES
"Critical Care - Medicine  ICU Progress Note    Patient Name: Blanquita oMntoya  MRN: [id number]  Admission Date: 7/29/2022  Hospital Length of Stay: 3 days  Code Status: Full Code  Attending Provider: Alec Vega IV, MD  Primary Care Provider: Kamila Whittaker MD   Principal Problem: <principal problem not specified>    Subjective:     Brief HPI: This is a 66-year-old female with past medical history of coronary artery disease, hypertension, hyperlipidemia, hypothyroidism, and anxiety.  CT scan of chest in March revealed a 4.4 x 4.5 cm ascending aortic aneurysm which grew to 4.9 cm in 2 months.  She was then referred to CV surgery for evaluation.  Left heart catheterization on 07/11/2022 with no coronary disease.  She presented today, 07/29/2022, for a resection of ascending aortic aneurysm and repair with 30 mm Dacron graft and ligation of left atrial appendage by Dr. Vega.  Two mediastinal chest tubes and 1 right ventricular pacing wire was placed.  She was transported postoperatively to the ICU intubated on mechanical ventilation, requiring vasopressor support and insulin drip.  On arrival she was bradycardic and hypotensive therefore epicardial pacing was started at 80 bpm.    Interval History/Significant Events: Patient seen and examined this morning. She continues to be somewhat lethargic, but is more alert compared to my exam yesterday. She states that she is "feeling alright" She was seen by neurology yesterday; per their evaluation, expect deficits to improve overtime and recommend continuing keppra    Objective:     Vital Signs (Most Recent):  Temp: 98.5 °F (36.9 °C) (07/31/22 2000)  Pulse: 72 (08/01/22 0400)  Resp: 20 (08/01/22 0400)  BP: (!) 126/56 (08/01/22 0400)  SpO2: 96 % (08/01/22 0400) Vital Signs (24h Range):  Temp:  [98.5 °F (36.9 °C)-98.6 °F (37 °C)] 98.5 °F (36.9 °C)  Pulse:  [58-96] 72  Resp:  [12-36] 20  SpO2:  [90 %-99 %] 96 %  BP: ()/(51-86) 126/56     Weight: 86.3 kg (190 lb " 4.1 oz)  Body mass index is 29.8 kg/m².      Intake/Output Summary (Last 24 hours) at 8/1/2022 0607  Last data filed at 8/1/2022 0500  Gross per 24 hour   Intake --   Output 445 ml   Net -445 ml        Physical Exam  Constitutional:       Comments: Awake, slightly lethargic, not following commands, appears to be in pain   HENT:      Head: Normocephalic and atraumatic.      Mouth/Throat:      Mouth: Mucous membranes are dry.   Eyes:      Conjunctiva/sclera: Conjunctivae normal.      Pupils: Pupils are equal, round, and reactive to light.   Neck:      Comments: No JVD  Cardiovascular:      Rate and Rhythm: Normal rate and regular rhythm.      Pulses: Normal pulses.   Pulmonary:      Effort: No respiratory distress.      Comments: Chest tubes in place  Course breath sounds  Abdominal:      General: Bowel sounds are normal. There is no distension.      Palpations: Abdomen is soft.      Tenderness: There is no abdominal tenderness.   Musculoskeletal:         General: No swelling or deformity.      Cervical back: Neck supple.   Skin:     General: Skin is warm and dry.   Neurological:      Mental Status: She is disoriented.      Comments: Lethargic  Not following commands at this time           Current Facility-Administered Medications:     0.9%  NaCl infusion, , Intravenous, Continuous, Barbie Goodwin MD, Last Rate: 100 mL/hr at 08/01/22 0200, New Bag at 08/01/22 0200    acetaminophen oral solution 650 mg, 650 mg, Per OG tube, Q6H PRN, PAULETTE Hurt    albumin human 5% bottle 12.5 g, 12.5 g, Intravenous, PRN, PAULETTE Hurt, Stopped at 07/29/22 1756    albuterol-ipratropium 2.5 mg-0.5 mg/3 mL nebulizer solution 3 mL, 3 mL, Nebulization, Q6H PRN, Barbie Goodwin MD, 3 mL at 08/01/22 0211    aspirin EC tablet 81 mg, 81 mg, Oral, Daily, PAULETTE Hurt, 81 mg at 07/31/22 0800    dextrose 50% injection 12.5 g, 12.5 g, Intravenous, PRN, PAULETTE Hurt    dextrose 50% injection 25 g, 25 g, Intravenous, PRNPernell  PAULETTE Seymour    docusate sodium capsule 100 mg, 100 mg, Oral, BID, PAULETTE Hurt, 100 mg at 07/31/22 2000    enoxaparin injection 40 mg, 40 mg, Subcutaneous, Daily, PAULETTE Hurt, 40 mg at 07/31/22 1700    famotidine (PF) injection 20 mg, 20 mg, Intravenous, Daily, PAULETTE Hurt, 20 mg at 07/31/22 0801    folic acid tablet 1 mg, 1 mg, Oral, Daily, PAULETTE Hurt, 1 mg at 07/31/22 0758    HYDROcodone-acetaminophen 5-325 mg per tablet 1 tablet, 1 tablet, Oral, Q4H PRN, PAULETTE Hurt    lactulose 10 gram/15 ml solution 20 g, 20 g, Oral, Q6H PRN, PAULETTE Hurt    levETIRAcetam (KEPPRA) 500 mg in dextrose 5 % in water (D5W) 5 % 100 mL IVPB (MB+), 500 mg, Intravenous, Q12H, Pranay Velasco MD, Stopped at 07/31/22 2030    LIDOcaine (PF) 10 mg/ml (1%) injection 10 mg, 1 mL, Intradermal, Once, Malcolm Corona DO    loperamide capsule 4 mg, 4 mg, Oral, Once, PAULETTE Hurt    metoclopramide HCl injection 5 mg, 5 mg, Intravenous, Q6H PRN, PAULETTE Hurt    metoprolol tartrate (LOPRESSOR) split tablet 12.5 mg, 12.5 mg, Oral, BID, PAULETTE Hurt, 12.5 mg at 07/31/22 2001    morphine injection 2 mg, 2 mg, Intravenous, PRN, PAULETTE Hurt, 2 mg at 07/31/22 1546    mupirocin 2 % ointment, , Nasal, BID, PAULETTE Hurt, Given at 07/31/22 2001    ondansetron injection 4 mg, 4 mg, Intravenous, Q12H PRN, PAULETTE Hurt, 4 mg at 07/29/22 1254    oxyCODONE immediate release tablet 5 mg, 5 mg, Oral, Q4H PRN, PAULETTE Hurt, 5 mg at 08/01/22 0055    sucralfate tablet 1 g, 1 g, Oral, QID (AC & HS), PAULETTE Hurt     Vents:  Vent Mode: CPAP (07/29/22 1217)  Ventilator Initiated: Yes (07/29/22 1023)  Set Rate: 16 BPM (07/29/22 1121)  Vt Set: 500 mL (07/29/22 1121)  Pressure Support: 10 cmH20 (07/29/22 1217)  PEEP/CPAP: 5 cmH20 (07/29/22 1217)  Oxygen Concentration (%): 30 (07/29/22 1132)  Peak Airway Pressure: 16 cmH2O (07/29/22 1217)  Plateau Pressure: 8.4 cmH20 (07/29/22 1121)  Total Ve: 8.5 mL (07/29/22  1217)    Lines/Drains/Airways     Drain  Duration                Chest Tube 07/29/22 1000 1 Mediastinal 2 days         Chest Tube 07/29/22 1000 2 Anterior 2 days         Urethral Catheter 07/29/22 0701 Non-latex;Temperature probe;Straight-tip 16 Fr. 2 days          Arterial Line  Duration           Arterial Line 07/29/22 0634 Left Radial 2 days          Peripheral Intravenous Line  Duration                Peripheral IV - Single Lumen 07/29/22 0540 18 G Left Antecubital 3 days                Significant Labs:    Recent Labs   Lab 08/01/22  0211   WBC 11.3   RBC 3.01*   HGB 9.2*   HCT 29.2*   *   MCV 97.0*   MCH 30.6   MCHC 31.5*     Recent Labs   Lab 08/01/22  0211   GLUCOSE 89      K 3.9   CO2 20*   BUN 9.6*   CREATININE 0.66       ABG  Recent Labs   Lab 07/29/22  0920 07/29/22  1052 07/30/22  0934   PH 7.364   < > 7.39   PO2 356*   < > 70*   PCO2 42.8   < > 45   HCO3 24.4   < > 27.2   BE -1  --   --     < > = values in this interval not displayed.         DVT Prophylaxis:  Josh wlils  GI Prophylaxis:  Carafate    Assessment/Plan:     Assessment  1. Ascending aortic aneurysm s/p resection and repair with 30 mm Dacron graft and ligation of left atrial appendage on 7/29/2022  2. Ischemic embolic stroke  3. Witnessed seizure activity (7/29/22)  4. Hypertension  5. Hyperlipidemia  6. Hypothyroidism        Plan  Continue post-operative management per CV surgery post op protocol  Has been extubated and currently on NC  No longer on vasopressor support  Continue pacing as needed for bradycardia and hypotension- has not required pacing since day 1 of icu admission  Monitor surgical drain output for signs of bleeding  Multimodal pain control  Continue keppra 500 BID    CT was done 7/30 which showed no acute bleed but showed localized area of hypodensity at the right occipitoparietal cortex suspicious for acute/subacute nonhemorrhagic insult could be further characterized by brain MR. Neurology has been consulted,  recommended continuing keppra and expecting deficits from stroke to improve over time     Greater than 30 minutes of critical care was time spent personally by me on the following activities: development of treatment plan with patient or surrogate and bedside caregivers, discussions with consultants, evaluation of patient's response to treatment, examination of patient, ordering and performing treatments and interventions, ordering and review of laboratory studies, ordering and review of radiographic studies, pulse oximetry, re-evaluation of patient's condition.  This critical care time did not overlap with that of any other provider or involve time for any procedures.     Barbie Goodiwn MD  Internal Medicine, PGY-2    [facility] - [facility] ICU

## 2022-08-01 NOTE — PLAN OF CARE
Initial DC Assessment done at bedside with  Hector [phone] and sister Kaia ([phone]), pt sleeping, not responding to conversation. Pt lives with  and was indep in ADL's prior to admit. Was working as  in a grocery store. Pt's  request for CM to contact pt's dgt Miladys Urbina [phone] for any dc placement decisions and she will relay to .  Pharmacy [facility] in [place]  Will continue to f/u for dc rec.  Discussed with pt's  and sister dc poss and both agreed we will see how pt progresses in the next day or so.

## 2022-08-01 NOTE — PT/OT/SLP EVAL
Physical Therapy Evaluation    Patient Name:  Blanquita Montoya   MRN:  [id number]    Recommendations:     Discharge Recommendations:  rehabilitation facility, nursing facility, skilled (pending progress)   Discharge Equipment Recommendations:   pending  Barriers to discharge: medical diagnosis; decreased functional mobility/independence; severity of deficits    Assessment:     Blanquita Montoya is a 66 y.o. female admitted with a medical diagnosis of ascending aortic aneurysm s/p resection; seizure like activity; ischemic embolic stroke.  She presents with the following impairments/functional limitations:  weakness, impaired endurance, impaired balance, decreased lower extremity function, decreased safety awareness, impaired functional mobility, impaired self care skills, orthopedic precautions, pain, impaired coordination.    Patient with fair tolerance to PT eval. Pt required maxA for bed mobility and totA (max x2) for sit<>stand. Pt demonstrated very poor command following. Also with demonstrated L neglect/inattention. Would spontaneously move R and L LE but unable to MMT. Does appear deconditioned/generalized weakness with associated decreased tolerance to activity. Will continue to progress as able.     Rehab Prognosis: Fair; patient would benefit from acute skilled PT services to address these deficits and reach maximum level of function.    Recent Surgery: Procedure(s) (LRB):  REPAIR, AORTA, ASCENDING (N/A) 3 Days Post-Op    Plan:     During this hospitalization, patient to be seen daily to address the identified rehab impairments via gait training, therapeutic activities, therapeutic exercises, neuromuscular re-education and progress toward the following goals:    · Plan of Care Expires:  09/01/22    Subjective     Chief Complaint: u/a to appropriately state  Patient/Family Comments/goals: to get stronger  Pain/Comfort: while sitting EOB pt did appear to be in distress occasionally    Patients cultural,  "spiritual, Holiness conflicts given the current situation: no    Living Environment:  Unable to obtain at this time. Will follow up in order to obtain PLOF and home env in order to set POC and make appropriate d/c recommendations.     Objective:     Communicated with NSG prior to session.  Patient found HOB elevated with peripheral IV, pulse ox (continuous), telemetry, blood pressure cuff, oxygen  upon PT entry to room.    General Precautions: Standard, fall, sternal   Orthopedic Precautions:N/A   Braces: N/A  Respiratory Status: Nasal cannula, high flow 4 L/min  Vitals   BP= 144/99   HR= 72   SpO2= 91%... did drop to 80% (unsure of accuracy)    Exams:  · Cognitive Exam:  Patient is oriented to Person and Place (unable to name exact location, did state "hospital"); required cues for orientation to the year  · RLE and LLE Strength: attempted to assess manually however due to impaired command following unable to accurately assess    Functional Mobility:  · Bed Mobility:     · Scooting: maximal assistance  · Supine to Sit: maximal assistance  · Sit to Supine: maximal assistance  *attempted to initiate all bed mobility movements however still required maxA for completion    · Balance: pt overall with poor sitting balance; decreased trunk control with an occasional R lateral lean + posterior lean; maxTC/VC provided; did progress to SBA for a brief time period    · Transfers:     · Sit to Stand:  maximal assistance and of 2 persons with no AD; B knees blocked; noted to be able to initiate the stand with very minimal B LE muscle activity  · Gait: unable to perform at this time    Patient left HOB elevated with all lines intact, call button in reach and RN notified.    GOALS:   Multidisciplinary Problems     Physical Therapy Goals        Problem: Physical Therapy    Goal Priority Disciplines Outcome Goal Variances Interventions   Physical Therapy Goal     PT, PT/OT Ongoing, Progressing     Description: Goals to be met by: " 22    Patient will increase functional independence with mobility by performin. Supine to sit with MInimal Assistance  2. Sit to supine with MInimal Assistance  3. Sit to stand transfer with Moderate Assistance  4. Bed to chair transfer with Moderate Assistance using Rolling Walker vs LRAD  5. Gait : TBD  6. Sitting at edge of bed x15 minutes with Stand-by Assistance                     History:     Past Medical History:   Diagnosis Date    Anxiety disorder     Coronary artery disease     GERD (gastroesophageal reflux disease)     Hypercholesterolemia     Hypertension     Hypothyroidism     Obesity     Seizure in childhood     last one age 11    Sleep apnea     does not currently use CPAP    Thoracic aortic aneurysm 03/10/2022    4.5X4.4 Ascending Aorta as of 3/10/2022    Thoracic aortic aneurysm     Thyroid disease        Past Surgical History:   Procedure Laterality Date    ANGIOGRAM, CORONARY, WITH LEFT HEART CATHETERIZATION N/A 2022    Procedure: Angiogram, Coronary, with Left Heart Cath;  Surgeon: Harry Jj MD;  Location[facility] CATH LAB;  Service: Cardiology;  Laterality: N/A;    COLONOSCOPY      HYSTERECTOMY      REPAIR OF ASCENDING AORTA N/A 2022    Procedure: REPAIR, AORTA, ASCENDING;  Surgeon: Alec Vega IV, MD;  Location: [facility];  Service: Cardiovascular;  Laterality: N/A;  ECHO NOTIFIED    RT KNEE         Time Tracking:     PT Received On: 22  PT Start Time: 1220     PT Stop Time: 1244  PT Total Time (min): 24 min     Billable Minutes: Evaluation high      2022

## 2022-08-01 NOTE — PROGRESS NOTES
CT SURGERY PROGRESS NOTE  Blanquita Montoya  66 y.o.  1955    Patients Procedure: Procedure(s) (LRB):  REPAIR, AORTA, ASCENDING (N/A)    Subjective  Interval History: weekend events noted     ROS    Medication List  Infusions   sodium chloride 0.9% 100 mL/hr at 08/01/22 0200     Scheduled   aspirin  81 mg Oral Daily    docusate sodium  100 mg Oral BID    enoxaparin  40 mg Subcutaneous Daily    famotidine (PF)  20 mg Intravenous Daily    folic acid  1 mg Oral Daily    levetiracetam IV  500 mg Intravenous Q12H    LIDOcaine (PF) 10 mg/ml (1%)  1 mL Intradermal Once    loperamide  4 mg Oral Once    metoprolol tartrate  12.5 mg Oral BID    mupirocin   Nasal BID    sucralfate  1 g Oral QID (AC & HS)       Objective:  Recent Vitals:  Temp:  [98.5 °F (36.9 °C)-98.6 °F (37 °C)] 98.5 °F (36.9 °C)  Pulse:  [58-96] 72  Resp:  [12-36] 22  SpO2:  [90 %-99 %] 92 %  BP: ()/(51-86) 132/72    Physical Exam     I/O last 24 hrs:  Intake/Output - Last 3 Shifts       07/30 0700 07/31 0659 07/31 0700 08/01 0659 08/01 0700  08/02 0659    I.V. (mL/kg) 800 (9.3)      Blood       IV Piggyback       Total Intake(mL/kg) 800 (9.3)      Urine (mL/kg/hr) 1005 (0.5) 350 (0.2)     Chest Tube 197 95     Total Output 1202 445     Net -402 -445                  Labs  ABGs:   Recent Labs   Lab 07/30/22  0934   PH 7.39   PCO2 45   PO2 70*   HCO3 27.2   POCSATURATED 94     BMP:   Recent Labs   Lab 08/01/22 0211      K 3.9   CO2 20*   BUN 9.6*   CREATININE 0.66   CALCIUM 8.4     CBC:   Recent Labs   Lab 08/01/22 0211   WBC 11.3   RBC 3.01*   HGB 9.2*   HCT 29.2*   *   MCV 97.0*   MCH 30.6   MCHC 31.5*     CMP:   Recent Labs   Lab 08/01/22  0211   CALCIUM 8.4      K 3.9   CO2 20*   BUN 9.6*   CREATININE 0.66     Coagulation: No results for input(s): PT, INR, APTT in the last 48 hours.      Imaging:   CT: No results found in the last 24 hours.  CXR: X-Ray Chest AP Portable    Result Date: 8/1/2022  Mild  bilateral perihilar opacities with possible small pleural effusions. Electronically signed by: Ralph Alva Date:    08/01/2022 Time:    06:04        ASSESSMENT/PLAN:    Recovering from CVA  Neuro on the case   Will DC drains     Case and plan of care discussed with MD Harry Dumont PA-C

## 2022-08-01 NOTE — PLAN OF CARE
Problem: Physical Therapy  Goal: Physical Therapy Goal  Description: Goals to be met by: 22    Patient will increase functional independence with mobility by performin. Supine to sit with MInimal Assistance  2. Sit to supine with MInimal Assistance  3. Sit to stand transfer with Moderate Assistance  4. Bed to chair transfer with Moderate Assistance using Rolling Walker vs LRAD  5. Gait : TBD  6. Sitting at edge of bed x15 minutes with Stand-by Assistance    Outcome: Ongoing, Progressing

## 2022-08-01 NOTE — PT/OT/SLP EVAL
Occupational Therapy   Evaluation    Name: Blanquita Montoya  MRN: [id number]   Admit for: CV surgery   Admitting Diagnosis:  Ascending aortic aneurysm s/p resection and repair with graft and ligation of L atrial appendage, ischemic embolic CVA, witnessed seizure 7/29/22  Med Hx: CAD, HTN, anxiety, hyperlipidemia, hypothyroidism  Recent Surgery: Procedure(s) (LRB):  REPAIR, AORTA, ASCENDING (N/A) 3 Days Post-Op    Recommendations:     Discharge Recommendations: other (see comments), rehabilitation facility (pending progress)  Discharge Equipment Recommendations:     Barriers to discharge:       Assessment:     Blanquita Montoya is a 66 y.o. female with a medical diagnosis of <principal problem not specified>.  She presents with cognitive deficits, ?expressive and receptive aphasia?, decreased balance, limited R UE functional use impeding I with ADL tasks and functional mobility. Performance deficits affecting function: weakness, impaired endurance, impaired self care skills, impaired functional mobility, impaired balance, impaired cognition, decreased safety awareness.      Rehab Prognosis: Fair; patient would benefit from acute skilled OT services to address these deficits and reach maximum level of function.       Plan:     Patient to be seen daily to address the above listed problems via self-care/home management, therapeutic activities, therapeutic exercises, neuromuscular re-education, cognitive retraining  · Plan of Care Expires: 08/31/22  · Plan of Care Reviewed with:      Subjective     Chief Complaint: no c/o  Patient/Family Comments/goals: pt unable to state goal at this time    Occupational Profile:  Living Environment: pt unable to provide PLOF or living environment  Previous level of function: unknown  Equipment Used at Home:     Assistance upon Discharge: unknown    Pain/Comfort:  ·      Patients cultural, spiritual, Congregation conflicts given the current situation:      Objective:     Communicated with:  "RN prior to session.  Patient found supine with blood pressure cuff, oropeza catheter, oxygen upon OT entry to room.    General Precautions: Standard, other (see comments) (BP MAP >65)   Orthopedic Precautions:    Braces:    Respiratory Status: oxymizer 4 L ,  91% O2 sat, HR 74, /81 (102), sitting /99 (110)    Occupational Performance:    Bed Mobility:    · Patient completed Rolling/Turning to Left with  moderate assistance  · Patient completed Scooting/Bridging with maximal assistance  · Patient completed Supine to Sit with maximal assistance  · Patient completed Sit to Supine with maximal assistance    Functional Mobility/Transfers:  · Patient completed Sit <> Stand Transfer with maximal assistance and of 2 persons  with  hand-held assist   · Functional Mobility: pt unable to attempt to ambulate or t/f OOB  · Static sitting balance: mod-max A posterior and R lateral leaning, with max A for positioning pt able to momentarily maintain balance with SBA (< 1 minute)    Activities of Daily Living:  · Grooming: total assistance .  · Lower Body Dressing: total assistance .  · Toileting: total assistance oropeza cath    Cognitive/Visual Perceptual:  Cognitive/Psychosocial Skills:     -       Oriented to: Place and type of place "hospital" , did not attempt to answer any other orientation questions   -       Follows Commands/attention:Inattentive and pt inconsistent following simple 1 step command, ~ 25% of commands  -       Safety awareness/insight to disability: impaired   -       Mood/Affect/Coping skills/emotional control: drowsy, eyes closed majority of session    Physical Exam:  Upper Extremity Range of Motion:     -       Right Upper Extremity: Deficits: minimal movement noted, pt did not utilize RUE for functional tasks, did not follow commands for formal assessment  -       Left Upper Extremity: WFL  Upper Extremity Strength:    -       Right Upper Extremity: Deficits: pt did not follow commands, at least " 2/5 hand, elbow, did not observe shld flexion  -       Left Upper Extremity: pt did not follow consistent commands, strength at least 3+/5  strength, L shld flexion 3/5    AMPAC 6 Click ADL:  AMPAC Total Score:      Treatment & Education:  Education:    Patient left supine with all lines intact and RN notified    GOALS:   Multidisciplinary Problems     Occupational Therapy Goals        Problem: Occupational Therapy    Goal Priority Disciplines Outcome Interventions   Occupational Therapy Goal     OT, PT/OT Ongoing, Progressing    Description: Goals to be met by: dc     Patient will increase functional independence with ADLs by performing:    Grooming while EOB with Minimal Assistance.  Sitting at edge of bed x10 minutes with Stand-by Assistance.  Stand pivot transfers with Moderate Assistance.  Toilet transfer to bedside commode with Moderate Assistance.  Increased functional strength to 3/5 for R UE.                     History:     Past Medical History:   Diagnosis Date    Anxiety disorder     Coronary artery disease     GERD (gastroesophageal reflux disease)     Hypercholesterolemia     Hypertension     Hypothyroidism     Obesity     Seizure in childhood     last one age 11    Sleep apnea     does not currently use CPAP    Thoracic aortic aneurysm 03/10/2022    4.5X4.4 Ascending Aorta as of 3/10/2022    Thoracic aortic aneurysm     Thyroid disease        Past Surgical History:   Procedure Laterality Date    ANGIOGRAM, CORONARY, WITH LEFT HEART CATHETERIZATION N/A 07/11/2022    Procedure: Angiogram, Coronary, with Left Heart Cath;  Surgeon: Harry Jj MD;  Location: [facility] CATH LAB;  Service: Cardiology;  Laterality: N/A;    COLONOSCOPY      HYSTERECTOMY      REPAIR OF ASCENDING AORTA N/A 7/29/2022    Procedure: REPAIR, AORTA, ASCENDING;  Surgeon: Alec Vega IV, MD;  Location: [facility];  Service: Cardiovascular;  Laterality: N/A;  ECHO NOTIFIED    RT KNEE         Time Tracking:      OT Date of Treatment:    OT Start Time: 1219  OT Stop Time: 1243  OT Total Time (min): 24 min    Billable Minutes:Evaluation high complexity    8/1/2022

## 2022-08-02 LAB
ANION GAP SERPL CALC-SCNC: 11 MEQ/L
ANION GAP SERPL CALC-SCNC: 8 MEQ/L
BASOPHILS # BLD AUTO: 0.04 X10(3)/MCL (ref 0–0.2)
BASOPHILS NFR BLD AUTO: 0.4 %
BUN SERPL-MCNC: 8.1 MG/DL (ref 9.8–20.1)
BUN SERPL-MCNC: 9.8 MG/DL (ref 9.8–20.1)
CALCIUM SERPL-MCNC: 7.9 MG/DL (ref 8.4–10.2)
CALCIUM SERPL-MCNC: 8 MG/DL (ref 8.4–10.2)
CHLORIDE SERPL-SCNC: 101 MMOL/L (ref 98–107)
CHLORIDE SERPL-SCNC: 106 MMOL/L (ref 98–107)
CO2 SERPL-SCNC: 23 MMOL/L (ref 23–31)
CO2 SERPL-SCNC: 27 MMOL/L (ref 23–31)
CREAT SERPL-MCNC: 0.67 MG/DL (ref 0.55–1.02)
CREAT SERPL-MCNC: 0.68 MG/DL (ref 0.55–1.02)
CREAT/UREA NIT SERPL: 12
CREAT/UREA NIT SERPL: 14
EOSINOPHIL # BLD AUTO: 0.07 X10(3)/MCL (ref 0–0.9)
EOSINOPHIL NFR BLD AUTO: 0.7 %
ERYTHROCYTE [DISTWIDTH] IN BLOOD BY AUTOMATED COUNT: 13.2 % (ref 11.5–17)
GLUCOSE SERPL-MCNC: 109 MG/DL (ref 82–115)
GLUCOSE SERPL-MCNC: 117 MG/DL (ref 82–115)
HCT VFR BLD AUTO: 25.8 % (ref 37–47)
HGB BLD-MCNC: 8.5 GM/DL (ref 12–16)
IMM GRANULOCYTES # BLD AUTO: 0.07 X10(3)/MCL (ref 0–0.04)
IMM GRANULOCYTES NFR BLD AUTO: 0.7 %
LYMPHOCYTES # BLD AUTO: 0.84 X10(3)/MCL (ref 0.6–4.6)
LYMPHOCYTES NFR BLD AUTO: 7.9 %
MCH RBC QN AUTO: 30 PG (ref 27–31)
MCHC RBC AUTO-ENTMCNC: 32.9 MG/DL (ref 33–36)
MCV RBC AUTO: 91.2 FL (ref 80–94)
MONOCYTES # BLD AUTO: 0.83 X10(3)/MCL (ref 0.1–1.3)
MONOCYTES NFR BLD AUTO: 7.8 %
NEUTROPHILS # BLD AUTO: 8.8 X10(3)/MCL (ref 2.1–9.2)
NEUTROPHILS NFR BLD AUTO: 82.5 %
NRBC BLD AUTO-RTO: 0.2 %
PCO2 BLDA: 42 MMHG
PH SMN: 7.4 [PH]
PLATELET # BLD AUTO: 130 X10(3)/MCL (ref 130–400)
PMV BLD AUTO: 11.5 FL (ref 7.4–10.4)
PO2 BLDA: 58 MMHG
POC BASE DEFICIT: 1 MMOL/L
POC HCO3: 26 MMOL/L
POC IONIZED CALCIUM: 1.16 MMOL/L
POC SATURATED O2: 90 %
POC TEMPERATURE: 37 C
POTASSIUM BLD-SCNC: 3.4 MMOL/L
POTASSIUM SERPL-SCNC: 3.5 MMOL/L (ref 3.5–5.1)
POTASSIUM SERPL-SCNC: 3.8 MMOL/L (ref 3.5–5.1)
RBC # BLD AUTO: 2.83 X10(6)/MCL (ref 4.2–5.4)
SODIUM BLD-SCNC: 135 MMOL/L (ref 137–145)
SODIUM SERPL-SCNC: 137 MMOL/L (ref 136–145)
SODIUM SERPL-SCNC: 139 MMOL/L (ref 136–145)
SPECIMEN SOURCE: ABNORMAL
WBC # SPEC AUTO: 10.7 X10(3)/MCL (ref 4.5–11.5)

## 2022-08-02 PROCEDURE — 82803 BLOOD GASES ANY COMBINATION: CPT

## 2022-08-02 PROCEDURE — 85025 COMPLETE CBC W/AUTO DIFF WBC: CPT | Performed by: SPECIALIST

## 2022-08-02 PROCEDURE — 36415 COLL VENOUS BLD VENIPUNCTURE: CPT | Performed by: SPECIALIST

## 2022-08-02 PROCEDURE — 25000242 PHARM REV CODE 250 ALT 637 W/ HCPCS: Performed by: SPECIALIST

## 2022-08-02 PROCEDURE — 25000003 PHARM REV CODE 250: Performed by: INTERNAL MEDICINE

## 2022-08-02 PROCEDURE — 99900035 HC TECH TIME PER 15 MIN (STAT)

## 2022-08-02 PROCEDURE — 63600175 PHARM REV CODE 636 W HCPCS

## 2022-08-02 PROCEDURE — 36600 WITHDRAWAL OF ARTERIAL BLOOD: CPT

## 2022-08-02 PROCEDURE — 94640 AIRWAY INHALATION TREATMENT: CPT

## 2022-08-02 PROCEDURE — 25000003 PHARM REV CODE 250

## 2022-08-02 PROCEDURE — 36415 COLL VENOUS BLD VENIPUNCTURE: CPT | Performed by: STUDENT IN AN ORGANIZED HEALTH CARE EDUCATION/TRAINING PROGRAM

## 2022-08-02 PROCEDURE — 25000003 PHARM REV CODE 250: Performed by: STUDENT IN AN ORGANIZED HEALTH CARE EDUCATION/TRAINING PROGRAM

## 2022-08-02 PROCEDURE — 63600175 PHARM REV CODE 636 W HCPCS: Performed by: INTERNAL MEDICINE

## 2022-08-02 PROCEDURE — 80048 BASIC METABOLIC PNL TOTAL CA: CPT | Performed by: SPECIALIST

## 2022-08-02 PROCEDURE — 94799 UNLISTED PULMONARY SVC/PX: CPT

## 2022-08-02 PROCEDURE — 27000190 HC CPAP FULL FACE MASK W/VALVE

## 2022-08-02 PROCEDURE — 25000003 PHARM REV CODE 250: Performed by: SPECIALIST

## 2022-08-02 PROCEDURE — 51702 INSERT TEMP BLADDER CATH: CPT

## 2022-08-02 PROCEDURE — 63600175 PHARM REV CODE 636 W HCPCS: Performed by: STUDENT IN AN ORGANIZED HEALTH CARE EDUCATION/TRAINING PROGRAM

## 2022-08-02 PROCEDURE — 94761 N-INVAS EAR/PLS OXIMETRY MLT: CPT

## 2022-08-02 PROCEDURE — 20000000 HC ICU ROOM

## 2022-08-02 PROCEDURE — 94660 CPAP INITIATION&MGMT: CPT

## 2022-08-02 PROCEDURE — 80048 BASIC METABOLIC PNL TOTAL CA: CPT | Performed by: STUDENT IN AN ORGANIZED HEALTH CARE EDUCATION/TRAINING PROGRAM

## 2022-08-02 PROCEDURE — 27000221 HC OXYGEN, UP TO 24 HOURS

## 2022-08-02 RX ORDER — FUROSEMIDE 10 MG/ML
INJECTION INTRAMUSCULAR; INTRAVENOUS
Status: COMPLETED
Start: 2022-08-02 | End: 2022-08-05

## 2022-08-02 RX ORDER — DEXMEDETOMIDINE HYDROCHLORIDE 4 UG/ML
0-1.4 INJECTION, SOLUTION INTRAVENOUS CONTINUOUS
Status: DISCONTINUED | OUTPATIENT
Start: 2022-08-02 | End: 2022-08-08

## 2022-08-02 RX ORDER — FUROSEMIDE 10 MG/ML
40 INJECTION INTRAMUSCULAR; INTRAVENOUS DAILY
Status: DISCONTINUED | OUTPATIENT
Start: 2022-08-03 | End: 2022-08-09

## 2022-08-02 RX ORDER — FUROSEMIDE 10 MG/ML
40 INJECTION INTRAMUSCULAR; INTRAVENOUS ONCE
Status: COMPLETED | OUTPATIENT
Start: 2022-08-02 | End: 2022-08-02

## 2022-08-02 RX ORDER — FUROSEMIDE 10 MG/ML
20 INJECTION INTRAMUSCULAR; INTRAVENOUS ONCE
Status: COMPLETED | OUTPATIENT
Start: 2022-08-02 | End: 2022-08-02

## 2022-08-02 RX ADMIN — LEVETIRACETAM 500 MG: 100 INJECTION, SOLUTION INTRAVENOUS at 09:08

## 2022-08-02 RX ADMIN — ASPIRIN 81 MG: 81 TABLET, COATED ORAL at 08:08

## 2022-08-02 RX ADMIN — METOPROLOL TARTRATE 12.5 MG: 25 TABLET, FILM COATED ORAL at 08:08

## 2022-08-02 RX ADMIN — ENOXAPARIN SODIUM 40 MG: 40 INJECTION SUBCUTANEOUS at 04:08

## 2022-08-02 RX ADMIN — DOCUSATE SODIUM 200 MG: 100 CAPSULE, LIQUID FILLED ORAL at 08:08

## 2022-08-02 RX ADMIN — MUPIROCIN: 20 OINTMENT TOPICAL at 08:08

## 2022-08-02 RX ADMIN — IPRATROPIUM BROMIDE AND ALBUTEROL SULFATE 3 ML: 2.5; .5 SOLUTION RESPIRATORY (INHALATION) at 04:08

## 2022-08-02 RX ADMIN — IPRATROPIUM BROMIDE AND ALBUTEROL SULFATE 3 ML: 2.5; .5 SOLUTION RESPIRATORY (INHALATION) at 11:08

## 2022-08-02 RX ADMIN — FUROSEMIDE 20 MG: 10 INJECTION, SOLUTION INTRAMUSCULAR; INTRAVENOUS at 07:08

## 2022-08-02 RX ADMIN — DEXMEDETOMIDINE HYDROCHLORIDE 0.2 MCG/KG/HR: 400 INJECTION INTRAVENOUS at 01:08

## 2022-08-02 RX ADMIN — IPRATROPIUM BROMIDE AND ALBUTEROL SULFATE 3 ML: 2.5; .5 SOLUTION RESPIRATORY (INHALATION) at 08:08

## 2022-08-02 RX ADMIN — FOLIC ACID 1 MG: 1 TABLET ORAL at 08:08

## 2022-08-02 RX ADMIN — FUROSEMIDE 40 MG: 10 INJECTION, SOLUTION INTRAMUSCULAR; INTRAVENOUS at 12:08

## 2022-08-02 RX ADMIN — MORPHINE SULFATE 2 MG: 10 INJECTION INTRAVENOUS at 04:08

## 2022-08-02 RX ADMIN — DEXMEDETOMIDINE HYDROCHLORIDE 0.4 MCG/KG/HR: 400 INJECTION INTRAVENOUS at 05:08

## 2022-08-02 RX ADMIN — LEVETIRACETAM 500 MG: 100 INJECTION, SOLUTION INTRAVENOUS at 08:08

## 2022-08-02 RX ADMIN — POLYETHYLENE GLYCOL 3350 17 G: 17 POWDER, FOR SOLUTION ORAL at 08:08

## 2022-08-02 RX ADMIN — IPRATROPIUM BROMIDE AND ALBUTEROL SULFATE 3 ML: 2.5; .5 SOLUTION RESPIRATORY (INHALATION) at 12:08

## 2022-08-02 RX ADMIN — IPRATROPIUM BROMIDE AND ALBUTEROL SULFATE 3 ML: 2.5; .5 SOLUTION RESPIRATORY (INHALATION) at 07:08

## 2022-08-02 RX ADMIN — FAMOTIDINE 20 MG: 10 INJECTION INTRAVENOUS at 08:08

## 2022-08-02 RX ADMIN — MUPIROCIN: 20 OINTMENT TOPICAL at 09:08

## 2022-08-02 NOTE — H&P
[facility] - [facility] ICU  Pulmonary Critical Care Note    Patient Name: Blanquita Montoya  MRN: [id number]  Admission Date: 7/29/2022  Hospital Length of Stay: 4 days  Code Status: Full Code  Attending Provider: Alec Vega IV, MD  Primary Care Provider: Kamila Whittaker MD     Subjective:     HPI:   See 7/29/22 H&P for detailed infomormation. Ms Montoya is 66 CF who presented  07/29/2022, for a resection of ascending aortic aneurysm and repair with 30 mm Dacron graft and ligation of left atrial appendage by Dr. Vega.She was intially admitted to ICU intubated requiring vasopressor support and insulin drip and needed to be epicardially paced for symptomatic bradycardia and later had seizure like-activity with convulsive movements of RUE and RLE since resolved. She was downgraded 7/31 on nasal cannula but has since been progressively requiring more oxygenation, currently on 15L oxymask satting 91%. Apparently patient does well while being awake but starts to desat at night. There may be a component of central sleep apnea given recent CTH fidnigs with suspicious nonhemorrhagic occipitoparietal acute/subacute insult. Chest tubes were removed yesterday. She received her oxycodone earlier in the night morphine x 2 without much improvement. ABG this AM revealed hypoxemia and CXR appears to have worsenened from yesterday with interval increase in R>L pleural effusion.  Upgraded back up to ICU for closer monitoring given increasing oxygenation requirements    Hospital Course/Significant events:  Oxygenation requirements increased from NC to Oxymask 15L this AM.     24 Hour Interval History:  Decreased UOP  Overnight. BP improved from 180s systolic to 120s.    ROS  Somnolent appears to be in discomfort discomfort does not voice any significant complaints at this time except pain.    Past Medical History:   Diagnosis Date    Anxiety disorder     Coronary artery disease     GERD (gastroesophageal  reflux disease)     Hypercholesterolemia     Hypertension     Hypothyroidism     Obesity     Seizure in childhood     last one age 11    Sleep apnea     does not currently use CPAP    Thoracic aortic aneurysm 03/10/2022    4.5X4.4 Ascending Aorta as of 3/10/2022    Thoracic aortic aneurysm     Thyroid disease        Past Surgical History:   Procedure Laterality Date    ANGIOGRAM, CORONARY, WITH LEFT HEART CATHETERIZATION N/A 2022    Procedure: Angiogram, Coronary, with Left Heart Cath;  Surgeon: Harry Jj MD;  Location: [facility] CATH LAB;  Service: Cardiology;  Laterality: N/A;    COLONOSCOPY      HYSTERECTOMY      REPAIR OF ASCENDING AORTA N/A 2022    Procedure: REPAIR, AORTA, ASCENDING;  Surgeon: Alec Vega IV, MD;  Location: [facility] OR;  Service: Cardiovascular;  Laterality: N/A;  ECHO NOTIFIED    RT KNEE         Social History     Socioeconomic History    Marital status:    Tobacco Use    Smoking status: Former Smoker     Types: Cigarettes     Quit date: 2010     Years since quittin.1    Smokeless tobacco: Never Used   Substance and Sexual Activity    Alcohol use: Not Currently    Drug use: Never    Sexual activity: Not Currently     Social Determinants of Health     Financial Resource Strain: Low Risk     Difficulty of Paying Living Expenses: Not hard at all   Food Insecurity: No Food Insecurity    Worried About Running Out of Food in the Last Year: Never true    Ran Out of Food in the Last Year: Never true   Transportation Needs: No Transportation Needs    Lack of Transportation (Medical): No    Lack of Transportation (Non-Medical): No   Physical Activity: Inactive    Days of Exercise per Week: 0 days    Minutes of Exercise per Session: 0 min   Stress: No Stress Concern Present    Feeling of Stress : Not at all   Social Connections: Moderately Isolated    Frequency of Communication with Friends and Family: More than three times a week     Frequency of Social Gatherings with Friends and Family: More than three times a week    Attends Synagogue Services: Never    Active Member of Clubs or Organizations: No    Attends Club or Organization Meetings: Never    Marital Status:    Housing Stability: Unknown    Unable to Pay for Housing in the Last Year: No    Unstable Housing in the Last Year: No           Objective:     Current Outpatient Medications   Medication Instructions    amLODIPine (NORVASC) 10 mg, Oral, Daily    aspirin (ECOTRIN) 81 mg, Oral, Daily    EUTHYROX 100 mcg, Oral, Daily    hydrocortisone 2.5 % cream Topical (Top), Daily PRN    isosorbide mononitrate (IMDUR) 30 mg, Oral, Daily    losartan (COZAAR) 100 mg, Oral, Daily    metoprolol succinate (TOPROL-XL) 25 mg, Oral, Daily    nitroGLYCERIN (NITROSTAT) 0.4 mg, Sublingual    omega-3 fatty acids/fish oil (FISH OIL-OMEGA-3 FATTY ACIDS) 300-1,000 mg capsule 1 capsule, Oral, Daily, Takes 1000mg daily    pantoprazole (PROTONIX) 40 mg, Oral, Daily    rosuvastatin (CRESTOR) 20 mg, Oral, Nightly       Current Inpatient Medications   aspirin  81 mg Oral Daily    docusate sodium  200 mg Oral BID    enoxaparin  40 mg Subcutaneous Daily    famotidine (PF)  20 mg Intravenous Daily    folic acid  1 mg Oral Daily    levetiracetam IV  500 mg Intravenous Q12H    LIDOcaine (PF) 10 mg/ml (1%)  1 mL Intradermal Once    loperamide  4 mg Oral Once    metoprolol tartrate  12.5 mg Oral BID    mupirocin   Nasal BID    polyethylene glycol  17 g Oral BID    sucralfate  1 g Oral QID (AC & HS)           Intake/Output Summary (Last 24 hours) at 8/2/2022 0635  Last data filed at 8/1/2022 2200  Gross per 24 hour   Intake 3652 ml   Output 440 ml   Net 3212 ml       Vital Signs (Most Recent):  Temp: 98.8 °F (37.1 °C) (08/01/22 2000)  Pulse: 93 (08/02/22 0445)  Resp: 20 (08/02/22 0450)  BP: (!) 146/117 (08/02/22 0400)  SpO2: (!) 84 % (08/02/22 0445)  Body mass index is 29.8 kg/m².  Weight:  86.3 kg (190 lb 4.1 oz) Vital Signs (24h Range):  Temp:  [98 °F (36.7 °C)-98.8 °F (37.1 °C)] 98.8 °F (37.1 °C)  Pulse:  [53-96] 93  Resp:  [15-37] 20  SpO2:  [82 %-100 %] 84 %  BP: (109-189)/() 146/117     Physical Exam  Constitutional:       General: She is awake.      Appearance: She is obese. She is not diaphoretic.      Interventions: Face mask in place.   Cardiovascular:      Rate and Rhythm: Normal rate and regular rhythm.      Pulses: Normal pulses.      Heart sounds: Normal heart sounds.   Pulmonary:      Breath sounds: Wheezing present.      Comments: End expiratory wheezing, tachypenic, poor inspiratory effort  Chest:      Chest wall: Tenderness present.      Comments: Midline surgical incision  Mediastinal chest tubes x2  Abdominal:      General: Bowel sounds are normal. There is no distension.      Palpations: Abdomen is soft.      Tenderness: There is abdominal tenderness. There is no guarding or rebound.   Musculoskeletal:      Right lower leg: No edema.      Left lower leg: No edema.   Neurological:      General: No focal deficit present.      Mental Status: She is alert. Mental status is at baseline.   Psychiatric:         Behavior: Behavior is uncooperative.      Comments: abnormal behavior , always sits with legs dangling at side of bed with poor posture           Mechanical ventilation support:  Vent Mode: CPAP (07/29/22 1217)  Ventilator Initiated: Yes (07/29/22 1023)  Set Rate: 16 BPM (07/29/22 1121)  Vt Set: 500 mL (07/29/22 1121)  Pressure Support: 10 cmH20 (07/29/22 1217)  PEEP/CPAP: 5 cmH20 (07/29/22 1217)  Oxygen Concentration (%): 30 (08/01/22 1841)  Peak Airway Pressure: 16 cmH2O (07/29/22 1217)  Plateau Pressure: 8.4 cmH20 (07/29/22 1121)  Total Ve: 8.5 mL (07/29/22 1217)    Lines/Drains/Airways     Peripheral Intravenous Line  Duration                Peripheral IV - Single Lumen 07/29/22 0540 18 G Left Antecubital 4 days                Significant Labs:    Lab Results   Component  Value Date    WBC 10.7 08/02/2022    HGB 8.5 (L) 08/02/2022    HCT 25.8 (L) 08/02/2022    MCV 91.2 08/02/2022     08/02/2022         BMP  Lab Results   Component Value Date     08/02/2022    K 3.8 08/02/2022    CO2 23 08/02/2022    BUN 9.8 08/02/2022    CREATININE 0.68 08/02/2022    CALCIUM 8.0 (L) 08/02/2022    EGFRNONAA >60 08/02/2022       ABG  Recent Labs   Lab 07/29/22  0920 07/29/22  1052 08/02/22  0502   PH 7.364   < > 7.40   PO2 356*   < > 58*   PCO2 42.8   < > 42   HCO3 24.4   < > 26.0   BE -1  --   --     < > = values in this interval not displayed.           Significant Imaging:  CXR 1 view: New right-sided effusion, worsened left sided effusion compared to yesterday        Assessment/Plan:     Assessment    1. Acute Hypoxemia in the setting of recent CVTS and interval development of R > L pleural effusion with possibility of MIKI vs CSA although she does not display any CO2 retention  2. S/P Ascending aortic aneurysm s/p resection and repair with 30 mm Dacron graft and ligation of left atrial appendage on 7/29/2022, chest tube removed 8/1/2022  3. Hx Hypertension  4. Hx Hyperlipidemia  5. Hx Hypothyroidism      Plan  -Wean O2 as tolerated, CPAP at night time to assist with effusion and oxygenation  -Continue post-operative management per CV surgery post op protocol  -Will try lasix 20mg IV once to see if there is improvement in oxygenation as she intermittently hypertensive to 180s so there could be a component of flash pulmonary edema and the patient has also had no UOP since midnight.Renal indices are surprisingly unremarkable, will order bladder scan to assess for retention  -Continue Duo-Nebs q4h and ISS as tolerated  -Ambulate and start PT as tolerated    DVT Prophylaxis: Lovenox  GI Prophylaxis:  Carafate     Greater than 30 minutes of critical care was time spent personally by me on the following activities: development of treatment plan with patient or surrogate and bedside  caregivers, discussions with consultants, evaluation of patient's response to treatment, examination of patient, ordering and performing treatments and interventions, ordering and review of laboratory studies, ordering and review of radiographic studies, pulse oximetry, re-evaluation of patient's condition.  This critical care time did not overlap with that of any other provider or involve time for any procedures.     Derick Porras MD  Pulmonary Critical Care Medicine  [facility] - [facility] ICU

## 2022-08-02 NOTE — PT/OT/SLP PROGRESS
Occupational Therapy      Patient Name:  Blanquita Montoya   MRN:  [id number]    Patient not seen today secondary to Other (Comment). Will follow-up 8/3. Hold OT session at this time, pt. Requiring increased o2 at this time 15L oxymask, not appropriate for therapy session today, follow up as appropriate.    8/2/2022

## 2022-08-02 NOTE — PROGRESS NOTES
CT SURGERY PROGRESS NOTE  Blanquita Montoya  66 y.o.  1955    Patients Procedure: Procedure(s) (LRB):  REPAIR, AORTA, ASCENDING (N/A)    Subjective  Interval History: Patient had minimal output urinary output after midnight and AM CXR shows increasing pleural effusion, ICU resident gave lasix 20mg IV. Patient requiring 15L oxymask. Patient unable verablly respond. Up 7 kg from surgery. Replaced oropeza.     Review of Systems   Unable to perform ROS: Acuity of condition       Medication List  Infusions   sodium chloride 0.9% 100 mL/hr at 08/01/22 0200     Scheduled   aspirin  81 mg Oral Daily    docusate sodium  200 mg Oral BID    enoxaparin  40 mg Subcutaneous Daily    famotidine (PF)  20 mg Intravenous Daily    folic acid  1 mg Oral Daily    furosemide  40 mg Intravenous Once    levetiracetam IV  500 mg Intravenous Q12H    LIDOcaine (PF) 10 mg/ml (1%)  1 mL Intradermal Once    loperamide  4 mg Oral Once    metoprolol tartrate  12.5 mg Oral BID    mupirocin   Nasal BID    polyethylene glycol  17 g Oral BID    sucralfate  1 g Oral QID (AC & HS)       Objective:  Recent Vitals:  Temp:  [98 °F (36.7 °C)-99.7 °F (37.6 °C)] 99.7 °F (37.6 °C)  Pulse:  [53-96] 84  Resp:  [15-37] 24  SpO2:  [82 %-100 %] 94 %  BP: (111-189)/() 137/69    Physical Exam  Vitals and nursing note reviewed.   Constitutional:       General: She is in acute distress.   HENT:      Head: Normocephalic and atraumatic.   Eyes:      Extraocular Movements: Extraocular movements intact.      Pupils: Pupils are equal, round, and reactive to light.   Cardiovascular:      Rate and Rhythm: Normal rate and regular rhythm.      Pulses: Normal pulses.      Heart sounds: Normal heart sounds.   Pulmonary:      Effort: Pulmonary effort is normal.   Abdominal:      General: Abdomen is flat.      Palpations: Abdomen is soft.   Neurological:      Mental Status: She is alert.          I/O last 24 hrs:  Intake/Output - Last 3 Shifts       07/31  0700  08/01 0659 08/01 0700 08/02 0659 08/02 0700  08/03 0659    I.V. (mL/kg)  3652 (42.3)     Total Intake(mL/kg)  3652 (42.3)     Urine (mL/kg/hr) 350 (0.2) 440 (0.2) 895 (2.3)    Chest Tube 95      Total Output 445 440 895    Net -445 +3212 -895           Urine Occurrence  1 x           Labs  BMP:   Recent Labs   Lab 08/02/22  0244      K 3.8   CO2 23   BUN 9.8   CREATININE 0.68   CALCIUM 8.0*     CBC:   Recent Labs   Lab 08/02/22  0244   WBC 10.7   RBC 2.83*   HGB 8.5*   HCT 25.8*      MCV 91.2   MCH 30.0   MCHC 32.9*     All pertinent labs from the last 24 hours have been reviewed.      Imaging:   CXR: X-Ray Chest 1 View    Result Date: 8/2/2022  Increasing small pleural effusions and bibasilar airspace opacities, right greater than left. Electronically signed by: Alison Montemayor Date:    08/02/2022 Time:    06:50  I have reviewed all pertinent imaging results/findings within the past 24 hours.        ASSESSMENT/PLAN:  - cont ICU care  - Lasix IV 20 mg with potassium repletion PRN  - wean O2 as able    Case and plan of care discussed with MD Pernell Seymour PA-C

## 2022-08-02 NOTE — PLAN OF CARE
Problem: Adult Inpatient Plan of Care  Goal: Plan of Care Review  Outcome: Ongoing, Progressing  Goal: Absence of Hospital-Acquired Illness or Injury  Outcome: Ongoing, Progressing  Goal: Optimal Comfort and Wellbeing  Outcome: Ongoing, Progressing  Goal: Readiness for Transition of Care  Outcome: Ongoing, Progressing     Problem: Impaired Wound Healing  Goal: Optimal Wound Healing  Outcome: Ongoing, Progressing     Problem: Infection  Goal: Absence of Infection Signs and Symptoms  Outcome: Ongoing, Progressing     Problem: Nutrition Impairment (Mechanical Ventilation, Invasive)  Goal: Optimal Nutrition Delivery  Outcome: Ongoing, Progressing     Problem: Skin and Tissue Injury (Artificial Airway)  Goal: Absence of Device-Related Skin or Tissue Injury  Outcome: Ongoing, Progressing     Problem: Fall Injury Risk  Goal: Absence of Fall and Fall-Related Injury  Outcome: Ongoing, Progressing     Problem: Adjustment to Illness (Delirium)  Goal: Optimal Coping  Outcome: Ongoing, Progressing     Problem: Altered Behavior (Delirium)  Goal: Improved Behavioral Control  Outcome: Ongoing, Progressing     Problem: Attention and Thought Clarity Impairment (Delirium)  Goal: Improved Attention and Thought Clarity  Outcome: Ongoing, Progressing     Problem: Sleep Disturbance (Delirium)  Goal: Improved Sleep  Outcome: Ongoing, Progressing     Problem: Skin Injury Risk Increased  Goal: Skin Health and Integrity  Outcome: Ongoing, Progressing

## 2022-08-02 NOTE — PT/OT/SLP PROGRESS
Physical Therapy      Patient Name:  Blanquita Montoya   MRN:  [id number]    Patient not seen today secondary to Nurse/ DOT hold due to respiratory status. Will follow-up tomorrow.

## 2022-08-03 LAB
ABO + RH BLD: NORMAL
ABO + RH BLD: NORMAL
ANION GAP SERPL CALC-SCNC: 10 MEQ/L
BLD PROD TYP BPU: NORMAL
BLD PROD TYP BPU: NORMAL
BLOOD UNIT EXPIRATION DATE: NORMAL
BLOOD UNIT EXPIRATION DATE: NORMAL
BLOOD UNIT TYPE CODE: 5100
BLOOD UNIT TYPE CODE: 5100
BUN SERPL-MCNC: 10.5 MG/DL (ref 9.8–20.1)
CALCIUM SERPL-MCNC: 8.5 MG/DL (ref 8.4–10.2)
CHLORIDE SERPL-SCNC: 100 MMOL/L (ref 98–107)
CO2 SERPL-SCNC: 29 MMOL/L (ref 23–31)
CREAT SERPL-MCNC: 0.71 MG/DL (ref 0.55–1.02)
CREAT/UREA NIT SERPL: 15
CROSSMATCH INTERPRETATION: NORMAL
CROSSMATCH INTERPRETATION: NORMAL
DISPENSE STATUS: NORMAL
DISPENSE STATUS: NORMAL
GLUCOSE SERPL-MCNC: 102 MG/DL (ref 82–115)
GROUP & RH: NORMAL
INDIRECT COOMBS GEL: NORMAL
POTASSIUM SERPL-SCNC: 3.5 MMOL/L (ref 3.5–5.1)
SODIUM SERPL-SCNC: 139 MMOL/L (ref 136–145)
UNIT NUMBER: NORMAL
UNIT NUMBER: NORMAL

## 2022-08-03 PROCEDURE — 94761 N-INVAS EAR/PLS OXIMETRY MLT: CPT

## 2022-08-03 PROCEDURE — 20000000 HC ICU ROOM

## 2022-08-03 PROCEDURE — 25000003 PHARM REV CODE 250: Performed by: STUDENT IN AN ORGANIZED HEALTH CARE EDUCATION/TRAINING PROGRAM

## 2022-08-03 PROCEDURE — 99024 PR POST-OP FOLLOW-UP VISIT: ICD-10-PCS | Mod: POP,,, | Performed by: PHYSICIAN ASSISTANT

## 2022-08-03 PROCEDURE — 27100171 HC OXYGEN HIGH FLOW UP TO 24 HOURS

## 2022-08-03 PROCEDURE — 27000249 HC VAPOTHERM CIRCUIT

## 2022-08-03 PROCEDURE — 36415 COLL VENOUS BLD VENIPUNCTURE: CPT | Performed by: STUDENT IN AN ORGANIZED HEALTH CARE EDUCATION/TRAINING PROGRAM

## 2022-08-03 PROCEDURE — 25000003 PHARM REV CODE 250: Performed by: PHYSICIAN ASSISTANT

## 2022-08-03 PROCEDURE — 86920 COMPATIBILITY TEST SPIN: CPT | Performed by: INTERNAL MEDICINE

## 2022-08-03 PROCEDURE — 25000003 PHARM REV CODE 250: Performed by: SPECIALIST

## 2022-08-03 PROCEDURE — 99024 POSTOP FOLLOW-UP VISIT: CPT | Mod: POP,,, | Performed by: PHYSICIAN ASSISTANT

## 2022-08-03 PROCEDURE — 36415 COLL VENOUS BLD VENIPUNCTURE: CPT | Performed by: PHYSICIAN ASSISTANT

## 2022-08-03 PROCEDURE — 94799 UNLISTED PULMONARY SVC/PX: CPT

## 2022-08-03 PROCEDURE — 27100092 HC HIGH FLOW DELIVERY CANNULA

## 2022-08-03 PROCEDURE — 86850 RBC ANTIBODY SCREEN: CPT | Performed by: INTERNAL MEDICINE

## 2022-08-03 PROCEDURE — 99900035 HC TECH TIME PER 15 MIN (STAT)

## 2022-08-03 PROCEDURE — 63600175 PHARM REV CODE 636 W HCPCS: Performed by: STUDENT IN AN ORGANIZED HEALTH CARE EDUCATION/TRAINING PROGRAM

## 2022-08-03 PROCEDURE — 63600175 PHARM REV CODE 636 W HCPCS: Performed by: SPECIALIST

## 2022-08-03 PROCEDURE — 80048 BASIC METABOLIC PNL TOTAL CA: CPT | Performed by: STUDENT IN AN ORGANIZED HEALTH CARE EDUCATION/TRAINING PROGRAM

## 2022-08-03 PROCEDURE — 25000242 PHARM REV CODE 250 ALT 637 W/ HCPCS: Performed by: SPECIALIST

## 2022-08-03 PROCEDURE — 36430 TRANSFUSION BLD/BLD COMPNT: CPT

## 2022-08-03 PROCEDURE — 94660 CPAP INITIATION&MGMT: CPT

## 2022-08-03 PROCEDURE — P9016 RBC LEUKOCYTES REDUCED: HCPCS | Performed by: INTERNAL MEDICINE

## 2022-08-03 PROCEDURE — 94640 AIRWAY INHALATION TREATMENT: CPT

## 2022-08-03 PROCEDURE — 63600175 PHARM REV CODE 636 W HCPCS

## 2022-08-03 PROCEDURE — 25000003 PHARM REV CODE 250

## 2022-08-03 RX ORDER — HYDRALAZINE HYDROCHLORIDE 20 MG/ML
10 INJECTION INTRAMUSCULAR; INTRAVENOUS
Status: DISCONTINUED | OUTPATIENT
Start: 2022-08-03 | End: 2022-08-10 | Stop reason: HOSPADM

## 2022-08-03 RX ORDER — HYDROCODONE BITARTRATE AND ACETAMINOPHEN 500; 5 MG/1; MG/1
TABLET ORAL
Status: DISCONTINUED | OUTPATIENT
Start: 2022-08-03 | End: 2022-08-10 | Stop reason: HOSPADM

## 2022-08-03 RX ORDER — POTASSIUM CHLORIDE 14.9 MG/ML
40 INJECTION INTRAVENOUS ONCE
Status: DISCONTINUED | OUTPATIENT
Start: 2022-08-03 | End: 2022-08-08

## 2022-08-03 RX ORDER — GUAIFENESIN 100 MG/5ML
200 SOLUTION ORAL EVERY 4 HOURS PRN
Status: DISCONTINUED | OUTPATIENT
Start: 2022-08-03 | End: 2022-08-10 | Stop reason: HOSPADM

## 2022-08-03 RX ADMIN — IPRATROPIUM BROMIDE AND ALBUTEROL SULFATE 3 ML: 2.5; .5 SOLUTION RESPIRATORY (INHALATION) at 03:08

## 2022-08-03 RX ADMIN — METOPROLOL TARTRATE 12.5 MG: 25 TABLET, FILM COATED ORAL at 08:08

## 2022-08-03 RX ADMIN — IPRATROPIUM BROMIDE AND ALBUTEROL SULFATE 3 ML: 2.5; .5 SOLUTION RESPIRATORY (INHALATION) at 08:08

## 2022-08-03 RX ADMIN — POLYETHYLENE GLYCOL 3350 17 G: 17 POWDER, FOR SOLUTION ORAL at 08:08

## 2022-08-03 RX ADMIN — ASPIRIN 81 MG: 81 TABLET, COATED ORAL at 08:08

## 2022-08-03 RX ADMIN — FUROSEMIDE 40 MG: 10 INJECTION, SOLUTION INTRAMUSCULAR; INTRAVENOUS at 03:08

## 2022-08-03 RX ADMIN — DOCUSATE SODIUM 200 MG: 100 CAPSULE, LIQUID FILLED ORAL at 08:08

## 2022-08-03 RX ADMIN — IPRATROPIUM BROMIDE AND ALBUTEROL SULFATE 3 ML: 2.5; .5 SOLUTION RESPIRATORY (INHALATION) at 07:08

## 2022-08-03 RX ADMIN — GUAIFENESIN 200 MG: 200 SOLUTION ORAL at 12:08

## 2022-08-03 RX ADMIN — OXYCODONE 5 MG: 5 TABLET ORAL at 02:08

## 2022-08-03 RX ADMIN — ENOXAPARIN SODIUM 40 MG: 40 INJECTION SUBCUTANEOUS at 04:08

## 2022-08-03 RX ADMIN — LEVETIRACETAM 500 MG: 100 INJECTION, SOLUTION INTRAVENOUS at 08:08

## 2022-08-03 RX ADMIN — FOLIC ACID 1 MG: 1 TABLET ORAL at 08:08

## 2022-08-03 RX ADMIN — FAMOTIDINE 20 MG: 10 INJECTION INTRAVENOUS at 08:08

## 2022-08-03 RX ADMIN — SUCRALFATE 1 G: 1 TABLET ORAL at 11:08

## 2022-08-03 NOTE — PLAN OF CARE
Problem: Adult Inpatient Plan of Care  Goal: Plan of Care Review  Outcome: Ongoing, Progressing  Goal: Absence of Hospital-Acquired Illness or Injury  Outcome: Ongoing, Progressing  Goal: Optimal Comfort and Wellbeing  Outcome: Ongoing, Progressing  Goal: Readiness for Transition of Care  Outcome: Ongoing, Progressing     Problem: Impaired Wound Healing  Goal: Optimal Wound Healing  Outcome: Ongoing, Progressing     Problem: Infection  Goal: Absence of Infection Signs and Symptoms  Outcome: Ongoing, Progressing     Problem: Fall Injury Risk  Goal: Absence of Fall and Fall-Related Injury  Outcome: Ongoing, Progressing     Problem: Adjustment to Illness (Delirium)  Goal: Optimal Coping  Outcome: Ongoing, Progressing     Problem: Altered Behavior (Delirium)  Goal: Improved Behavioral Control  Outcome: Ongoing, Progressing     Problem: Attention and Thought Clarity Impairment (Delirium)  Goal: Improved Attention and Thought Clarity  Outcome: Ongoing, Progressing     Problem: Sleep Disturbance (Delirium)  Goal: Improved Sleep  Outcome: Ongoing, Progressing     Problem: Skin Injury Risk Increased  Goal: Skin Health and Integrity  Outcome: Ongoing, Progressing

## 2022-08-03 NOTE — PROGRESS NOTES
[facility] - [facility] ICU  Pulmonary Critical Care Note    Patient Name: Blanquita Montoya  MRN: [id number]  Admission Date: 7/29/2022  Hospital Length of Stay: 5 days  Code Status: Full Code  Attending Provider: Santy Chau MD  Primary Care Provider: Kamila Whittaker MD     Subjective:     HPI:   See 7/29/22 H&P for detailed information. Ms Montoya is 66 CF who presented  07/29/2022, for a resection of ascending aortic aneurysm and repair with 30 mm Dacron graft and ligation of left atrial appendage by Dr. Vega.She was intially admitted to ICU intubated requiring vasopressor support and insulin drip and needed to be epicardially paced for symptomatic bradycardia and later had seizure like-activity with convulsive movements of RUE and RLE since resolved. She was downgraded 7/31 on nasal cannula but has since been progressively requiring more oxygenation, currently on 15L oxymask satting 91%. Apparently patient does well while being awake but starts to desat at night. There may be a component of central sleep apnea given recent CTH fidnigs with suspicious nonhemorrhagic occipitoparietal acute/subacute insult. Chest tubes were removed yesterday. She received her oxycodone earlier in the night morphine x 2 without much improvement. ABG this AM revealed hypoxemia and CXR appears to have worsenened from yesterday with interval increase in R>L pleural effusion.  Upgraded back up to ICU for closer monitoring given increasing oxygenation requirements    Hospital Course/Significant events:  7/29: downgraded  8/1: mediastinal CT removal  8/2: Re-upgraded for increasing O2 requirement from NC to Oxymask    24 Hour Interval History:  Lasix worked really well for her yesterday increasing her UOP > 2L, however her outpt started to decrease again overnight possibly secondary to overdehydration but surprisngly renal indices remain unremarkable. Did not tolerate CPAP overnight, Intermittently placed on  precedex for agitation but became bradycardic each time. Otherwise afebrile, VSS.     CARLOS  Denies Cp, SOB, N/V/HA, pain anywhere else. Asking what time it is and when her daughter is coming to see her this AM.     Past Medical History:   Diagnosis Date    Anxiety disorder     Coronary artery disease     GERD (gastroesophageal reflux disease)     Hypercholesterolemia     Hypertension     Hypothyroidism     Obesity     Seizure in childhood     last one age 11    Sleep apnea     does not currently use CPAP    Thoracic aortic aneurysm 03/10/2022    4.5X4.4 Ascending Aorta as of 3/10/2022    Thoracic aortic aneurysm     Thyroid disease        Past Surgical History:   Procedure Laterality Date    ANGIOGRAM, CORONARY, WITH LEFT HEART CATHETERIZATION N/A 2022    Procedure: Angiogram, Coronary, with Left Heart Cath;  Surgeon: Harry Jj MD;  Location: [facility] CATH LAB;  Service: Cardiology;  Laterality: N/A;    COLONOSCOPY      HYSTERECTOMY      REPAIR OF ASCENDING AORTA N/A 2022    Procedure: REPAIR, AORTA, ASCENDING;  Surgeon: Alec Vega IV, MD;  Location: [facility] OR;  Service: Cardiovascular;  Laterality: N/A;  ECHO NOTIFIED    RT KNEE         Social History     Socioeconomic History    Marital status:    Tobacco Use    Smoking status: Former Smoker     Types: Cigarettes     Quit date: 2010     Years since quittin.1    Smokeless tobacco: Never Used   Substance and Sexual Activity    Alcohol use: Not Currently    Drug use: Never    Sexual activity: Not Currently     Social Determinants of Health     Financial Resource Strain: Low Risk     Difficulty of Paying Living Expenses: Not hard at all   Food Insecurity: No Food Insecurity    Worried About Running Out of Food in the Last Year: Never true    Ran Out of Food in the Last Year: Never true   Transportation Needs: No Transportation Needs    Lack of Transportation (Medical): No    Lack of Transportation  (Non-Medical): No   Physical Activity: Inactive    Days of Exercise per Week: 0 days    Minutes of Exercise per Session: 0 min   Stress: No Stress Concern Present    Feeling of Stress : Not at all   Social Connections: Moderately Isolated    Frequency of Communication with Friends and Family: More than three times a week    Frequency of Social Gatherings with Friends and Family: More than three times a week    Attends Restorationism Services: Never    Active Member of Clubs or Organizations: No    Attends Club or Organization Meetings: Never    Marital Status:    Housing Stability: Unknown    Unable to Pay for Housing in the Last Year: No    Unstable Housing in the Last Year: No           Objective:     Current Outpatient Medications   Medication Instructions    amLODIPine (NORVASC) 10 mg, Oral, Daily    aspirin (ECOTRIN) 81 mg, Oral, Daily    EUTHYROX 100 mcg, Oral, Daily    hydrocortisone 2.5 % cream Topical (Top), Daily PRN    isosorbide mononitrate (IMDUR) 30 mg, Oral, Daily    losartan (COZAAR) 100 mg, Oral, Daily    metoprolol succinate (TOPROL-XL) 25 mg, Oral, Daily    nitroGLYCERIN (NITROSTAT) 0.4 mg, Sublingual    omega-3 fatty acids/fish oil (FISH OIL-OMEGA-3 FATTY ACIDS) 300-1,000 mg capsule 1 capsule, Oral, Daily, Takes 1000mg daily    pantoprazole (PROTONIX) 40 mg, Oral, Daily    rosuvastatin (CRESTOR) 20 mg, Oral, Nightly       Current Inpatient Medications   aspirin  81 mg Oral Daily    docusate sodium  200 mg Oral BID    enoxaparin  40 mg Subcutaneous Daily    famotidine (PF)  20 mg Intravenous Daily    folic acid  1 mg Oral Daily    furosemide (LASIX) injection  40 mg Intravenous Daily    levetiracetam IV  500 mg Intravenous Q12H    LIDOcaine (PF) 10 mg/ml (1%)  1 mL Intradermal Once    loperamide  4 mg Oral Once    metoprolol tartrate  12.5 mg Oral BID    mupirocin   Nasal BID    polyethylene glycol  17 g Oral BID    potassium chloride in water  40 mEq  Intravenous Once    sucralfate  1 g Oral QID (AC & HS)           Intake/Output Summary (Last 24 hours) at 8/3/2022 0807  Last data filed at 8/3/2022 0700  Gross per 24 hour   Intake 260 ml   Output 2552 ml   Net -2292 ml       Vital Signs (Most Recent):  Temp: 97.3 °F (36.3 °C) (08/03/22 0700)  Pulse: (!) 59 (08/03/22 0700)  Resp: (!) 38 (08/03/22 0700)  BP: 130/67 (08/03/22 0700)  SpO2: 98 % (08/03/22 0700)  Body mass index is 31.11 kg/m².  Weight: 90.1 kg (198 lb 10.2 oz) Vital Signs (24h Range):  Temp:  [97.3 °F (36.3 °C)-98.7 °F (37.1 °C)] 97.3 °F (36.3 °C)  Pulse:  [51-85] 59  Resp:  [15-38] 38  SpO2:  [92 %-100 %] 98 %  BP: (101-177)/(47-84) 130/67     Physical Exam  Constitutional:       General: She is awake. She is not in acute distress.     Appearance: She is obese. She is not diaphoretic.      Interventions: Face mask in place.   Cardiovascular:      Rate and Rhythm: Normal rate and regular rhythm.      Pulses: Normal pulses.      Heart sounds: Normal heart sounds.   Pulmonary:      Breath sounds: Wheezing (wheezing improvement, currently on 9L oxymask) present.   Chest:      Chest wall: Tenderness present.      Comments: Midline surgical incision   Abdominal:      General: Bowel sounds are normal. There is no distension.      Palpations: Abdomen is soft.      Tenderness: There is no abdominal tenderness. There is no guarding or rebound.   Musculoskeletal:      Right lower leg: No edema.      Left lower leg: No edema.   Neurological:      General: No focal deficit present.      Mental Status: She is alert. Mental status is at baseline.   Psychiatric:         Mood and Affect: Mood normal.         Behavior: Behavior is cooperative.       Mechanical ventilation support:  Vent Mode: CPAP (07/29/22 1217)  Ventilator Initiated: Yes (07/29/22 1023)  Set Rate: 16 BPM (07/29/22 1121)  Vt Set: 500 mL (07/29/22 1121)  Pressure Support: 10 cmH20 (07/29/22 1217)  PEEP/CPAP: 5 cmH20 (07/29/22 1217)  Oxygen  Concentration (%): 45 (22 0015)  Peak Airway Pressure: 16 cmH2O (22 1217)  Plateau Pressure: 8.4 cmH20 (22 1121)  Total Ve: 8.5 mL (22 1217)    Lines/Drains/Airways     Drain  Duration                Urethral Catheter 22 0915 Non-latex 16 Fr. <1 day          Peripheral Intravenous Line  Duration                Peripheral IV - Single Lumen 22 0540 18 G Left Antecubital 5 days                Significant Labs:    Lab Results   Component Value Date    WBC 10.7 2022    HGB 8.5 (L) 2022    HCT 25.8 (L) 2022    MCV 91.2 2022     2022         BMP  Lab Results   Component Value Date     2022    K 3.5 2022    CO2 29 2022    BUN 10.5 2022    CREATININE 0.71 2022    CALCIUM 8.5 2022    EGFRNONAA >60 2022       ABG  Recent Labs   Lab 22  0920 22  1052 22  0502   PH 7.364   < > 7.40   PO2 356*   < > 58*   PCO2 42.8   < > 42   HCO3 24.4   < > 26.0   BE -1  --   --     < > = values in this interval not displayed.       Significant Imagin/2 CXR 1 view: New right-sided effusion, worsened left sided effusion compared to yesterday    No new imaging today    Assessment/Plan:     Assessment    1. Acute Hypoxemia in the setting of recent CVTS and interval development of R > L pleural effusion   2. S/P Ascending aortic aneurysm s/p resection and repair with 30 mm Dacron graft and ligation of left atrial appendage on 2022, chest tube removed 2022  3. Hx Hypertension  4. Hx Hyperlipidemia  5. Hx Hypothyroidism  6. Normocytic Anemia      Plan  -Wean O2 as tolerated, did not tolerate CPAP becomes agitated  -Continue post-operative management per CV surgery post op protocol, she is improving respiratory wise. Will monitor her an additional day in ICU and watch for signs of fluid overload given she is about to received 2 units pRBC   -Give lasix 40IV in between 2 infusions. If UOP does not  improve she may need a bolus of IVF ( as her urine is pretty concentrated appearing in the oropeza bag)  -Repleting K+ with KCL 40IV   -Continue Duo-Nebs q4h and ISS as tolerated  -PT/OT/SLP as tolerated, to be re-evaluated today    DVT Prophylaxis: Lovenox  GI Prophylaxis:  Carafate, Docusate senna and miralax for BM    CODE STATUS: FULL  Primary contact: Daughter     Greater than 30 minutes of critical care was time spent personally by me on the following activities: development of treatment plan with patient or surrogate and bedside caregivers, discussions with consultants, evaluation of patient's response to treatment, examination of patient, ordering and performing treatments and interventions, ordering and review of laboratory studies, ordering and review of radiographic studies, pulse oximetry, re-evaluation of patient's condition.  This critical care time did not overlap with that of any other provider or involve time for any procedures.     Derick Porras MD  Pulmonary Critical Care Medicine  [facility] - [facility] ICU

## 2022-08-04 LAB
ALBUMIN SERPL-MCNC: 2.4 GM/DL (ref 3.4–4.8)
ALBUMIN/GLOB SERPL: 0.6 RATIO (ref 1.1–2)
ALP SERPL-CCNC: 218 UNIT/L (ref 40–150)
ALT SERPL-CCNC: 59 UNIT/L (ref 0–55)
AST SERPL-CCNC: 76 UNIT/L (ref 5–34)
BASOPHILS # BLD AUTO: 0.03 X10(3)/MCL (ref 0–0.2)
BASOPHILS NFR BLD AUTO: 0.3 %
BILIRUBIN DIRECT+TOT PNL SERPL-MCNC: 3.6 MG/DL
BUN SERPL-MCNC: 10.3 MG/DL (ref 9.8–20.1)
CALCIUM SERPL-MCNC: 8.7 MG/DL (ref 8.4–10.2)
CHLORIDE SERPL-SCNC: 101 MMOL/L (ref 98–107)
CO2 SERPL-SCNC: 30 MMOL/L (ref 23–31)
CREAT SERPL-MCNC: 0.68 MG/DL (ref 0.55–1.02)
EOSINOPHIL # BLD AUTO: 0.06 X10(3)/MCL (ref 0–0.9)
EOSINOPHIL NFR BLD AUTO: 0.6 %
ERYTHROCYTE [DISTWIDTH] IN BLOOD BY AUTOMATED COUNT: 13.6 % (ref 11.5–17)
GLOBULIN SER-MCNC: 3.8 GM/DL (ref 2.4–3.5)
GLUCOSE SERPL-MCNC: 113 MG/DL (ref 82–115)
HCT VFR BLD AUTO: 36.3 % (ref 37–47)
HGB BLD-MCNC: 12 GM/DL (ref 12–16)
IMM GRANULOCYTES # BLD AUTO: 0.12 X10(3)/MCL (ref 0–0.04)
IMM GRANULOCYTES NFR BLD AUTO: 1.2 %
LYMPHOCYTES # BLD AUTO: 0.69 X10(3)/MCL (ref 0.6–4.6)
LYMPHOCYTES NFR BLD AUTO: 6.7 %
MAGNESIUM SERPL-MCNC: 1.9 MG/DL (ref 1.6–2.6)
MCH RBC QN AUTO: 29.9 PG (ref 27–31)
MCHC RBC AUTO-ENTMCNC: 33.1 MG/DL (ref 33–36)
MCV RBC AUTO: 90.5 FL (ref 80–94)
MONOCYTES # BLD AUTO: 0.94 X10(3)/MCL (ref 0.1–1.3)
MONOCYTES NFR BLD AUTO: 9.1 %
NEUTROPHILS # BLD AUTO: 8.5 X10(3)/MCL (ref 2.1–9.2)
NEUTROPHILS NFR BLD AUTO: 82.1 %
NRBC BLD AUTO-RTO: 0.5 %
PHOSPHATE SERPL-MCNC: 2.6 MG/DL (ref 2.3–4.7)
PLATELET # BLD AUTO: 189 X10(3)/MCL (ref 130–400)
PMV BLD AUTO: 11.1 FL (ref 7.4–10.4)
POTASSIUM SERPL-SCNC: 3.3 MMOL/L (ref 3.5–5.1)
PROT SERPL-MCNC: 6.2 GM/DL (ref 5.8–7.6)
RBC # BLD AUTO: 4.01 X10(6)/MCL (ref 4.2–5.4)
SODIUM SERPL-SCNC: 141 MMOL/L (ref 136–145)
WBC # SPEC AUTO: 10.3 X10(3)/MCL (ref 4.5–11.5)

## 2022-08-04 PROCEDURE — 27100171 HC OXYGEN HIGH FLOW UP TO 24 HOURS

## 2022-08-04 PROCEDURE — 63600175 PHARM REV CODE 636 W HCPCS: Performed by: PHYSICIAN ASSISTANT

## 2022-08-04 PROCEDURE — 94799 UNLISTED PULMONARY SVC/PX: CPT

## 2022-08-04 PROCEDURE — 93010 EKG 12-LEAD: ICD-10-PCS | Mod: ,,, | Performed by: INTERNAL MEDICINE

## 2022-08-04 PROCEDURE — 99024 POSTOP FOLLOW-UP VISIT: CPT | Mod: POP,,, | Performed by: PHYSICIAN ASSISTANT

## 2022-08-04 PROCEDURE — 25000003 PHARM REV CODE 250

## 2022-08-04 PROCEDURE — 63600175 PHARM REV CODE 636 W HCPCS: Performed by: STUDENT IN AN ORGANIZED HEALTH CARE EDUCATION/TRAINING PROGRAM

## 2022-08-04 PROCEDURE — 36415 COLL VENOUS BLD VENIPUNCTURE: CPT | Performed by: STUDENT IN AN ORGANIZED HEALTH CARE EDUCATION/TRAINING PROGRAM

## 2022-08-04 PROCEDURE — 93010 ELECTROCARDIOGRAM REPORT: CPT | Mod: ,,, | Performed by: INTERNAL MEDICINE

## 2022-08-04 PROCEDURE — 92610 EVALUATE SWALLOWING FUNCTION: CPT

## 2022-08-04 PROCEDURE — 94640 AIRWAY INHALATION TREATMENT: CPT

## 2022-08-04 PROCEDURE — 99900035 HC TECH TIME PER 15 MIN (STAT)

## 2022-08-04 PROCEDURE — 99900026 HC AIRWAY MAINTENANCE (STAT)

## 2022-08-04 PROCEDURE — 25000003 PHARM REV CODE 250: Performed by: STUDENT IN AN ORGANIZED HEALTH CARE EDUCATION/TRAINING PROGRAM

## 2022-08-04 PROCEDURE — 63600175 PHARM REV CODE 636 W HCPCS: Performed by: SPECIALIST

## 2022-08-04 PROCEDURE — 97530 THERAPEUTIC ACTIVITIES: CPT | Mod: CQ

## 2022-08-04 PROCEDURE — 93005 ELECTROCARDIOGRAM TRACING: CPT

## 2022-08-04 PROCEDURE — 63600175 PHARM REV CODE 636 W HCPCS

## 2022-08-04 PROCEDURE — 25000242 PHARM REV CODE 250 ALT 637 W/ HCPCS: Performed by: SPECIALIST

## 2022-08-04 PROCEDURE — 94761 N-INVAS EAR/PLS OXIMETRY MLT: CPT

## 2022-08-04 PROCEDURE — 85025 COMPLETE CBC W/AUTO DIFF WBC: CPT | Performed by: STUDENT IN AN ORGANIZED HEALTH CARE EDUCATION/TRAINING PROGRAM

## 2022-08-04 PROCEDURE — 80053 COMPREHEN METABOLIC PANEL: CPT | Performed by: STUDENT IN AN ORGANIZED HEALTH CARE EDUCATION/TRAINING PROGRAM

## 2022-08-04 PROCEDURE — 97535 SELF CARE MNGMENT TRAINING: CPT | Mod: CO

## 2022-08-04 PROCEDURE — 99024 PR POST-OP FOLLOW-UP VISIT: ICD-10-PCS | Mod: POP,,, | Performed by: PHYSICIAN ASSISTANT

## 2022-08-04 PROCEDURE — 94660 CPAP INITIATION&MGMT: CPT

## 2022-08-04 PROCEDURE — 83735 ASSAY OF MAGNESIUM: CPT | Performed by: STUDENT IN AN ORGANIZED HEALTH CARE EDUCATION/TRAINING PROGRAM

## 2022-08-04 PROCEDURE — 84100 ASSAY OF PHOSPHORUS: CPT | Performed by: STUDENT IN AN ORGANIZED HEALTH CARE EDUCATION/TRAINING PROGRAM

## 2022-08-04 PROCEDURE — 20000000 HC ICU ROOM

## 2022-08-04 RX ORDER — POTASSIUM CHLORIDE 14.9 MG/ML
40 INJECTION INTRAVENOUS ONCE
Status: COMPLETED | OUTPATIENT
Start: 2022-08-04 | End: 2022-08-04

## 2022-08-04 RX ADMIN — SUCRALFATE 1 G: 1 TABLET ORAL at 08:08

## 2022-08-04 RX ADMIN — AMIODARONE HYDROCHLORIDE 0.5 MG/MIN: 1.8 INJECTION, SOLUTION INTRAVENOUS at 07:08

## 2022-08-04 RX ADMIN — IPRATROPIUM BROMIDE AND ALBUTEROL SULFATE 3 ML: 2.5; .5 SOLUTION RESPIRATORY (INHALATION) at 07:08

## 2022-08-04 RX ADMIN — AMIODARONE HYDROCHLORIDE 1 MG/MIN: 1.8 INJECTION, SOLUTION INTRAVENOUS at 01:08

## 2022-08-04 RX ADMIN — AMIODARONE HYDROCHLORIDE 150 MG: 1.5 INJECTION, SOLUTION INTRAVENOUS at 12:08

## 2022-08-04 RX ADMIN — POTASSIUM CHLORIDE 40 MEQ: 14.9 INJECTION, SOLUTION INTRAVENOUS at 02:08

## 2022-08-04 RX ADMIN — HYDRALAZINE HYDROCHLORIDE 10 MG: 20 INJECTION, SOLUTION INTRAMUSCULAR; INTRAVENOUS at 02:08

## 2022-08-04 RX ADMIN — FAMOTIDINE 20 MG: 10 INJECTION INTRAVENOUS at 08:08

## 2022-08-04 RX ADMIN — IPRATROPIUM BROMIDE AND ALBUTEROL SULFATE 3 ML: 2.5; .5 SOLUTION RESPIRATORY (INHALATION) at 11:08

## 2022-08-04 RX ADMIN — LEVETIRACETAM 500 MG: 100 INJECTION, SOLUTION INTRAVENOUS at 08:08

## 2022-08-04 RX ADMIN — ENOXAPARIN SODIUM 40 MG: 40 INJECTION SUBCUTANEOUS at 06:08

## 2022-08-04 RX ADMIN — ONDANSETRON 4 MG: 2 INJECTION INTRAMUSCULAR; INTRAVENOUS at 04:08

## 2022-08-04 RX ADMIN — FUROSEMIDE 40 MG: 10 INJECTION, SOLUTION INTRAMUSCULAR; INTRAVENOUS at 08:08

## 2022-08-04 NOTE — PLAN OF CARE
Problem: Adult Inpatient Plan of Care  Goal: Plan of Care Review  Outcome: Ongoing, Progressing  Goal: Patient-Specific Goal (Individualized)  Outcome: Ongoing, Progressing  Goal: Absence of Hospital-Acquired Illness or Injury  Outcome: Ongoing, Progressing  Goal: Optimal Comfort and Wellbeing  Outcome: Ongoing, Progressing  Goal: Readiness for Transition of Care  Outcome: Ongoing, Progressing     Problem: Impaired Wound Healing  Goal: Optimal Wound Healing  Outcome: Ongoing, Progressing     Problem: Infection  Goal: Absence of Infection Signs and Symptoms  Outcome: Ongoing, Progressing     Problem: Communication Impairment (Mechanical Ventilation, Invasive)  Goal: Effective Communication  Outcome: Ongoing, Progressing     Problem: Device-Related Complication Risk (Mechanical Ventilation, Invasive)  Goal: Optimal Device Function  Outcome: Ongoing, Progressing     Problem: Inability to Wean (Mechanical Ventilation, Invasive)  Goal: Mechanical Ventilation Liberation  Outcome: Ongoing, Progressing     Problem: Nutrition Impairment (Mechanical Ventilation, Invasive)  Goal: Optimal Nutrition Delivery  Outcome: Ongoing, Progressing     Problem: Skin and Tissue Injury (Mechanical Ventilation, Invasive)  Goal: Absence of Device-Related Skin and Tissue Injury  Outcome: Ongoing, Progressing     Problem: Ventilator-Induced Lung Injury (Mechanical Ventilation, Invasive)  Goal: Absence of Ventilator-Induced Lung Injury  Outcome: Ongoing, Progressing     Problem: Communication Impairment (Artificial Airway)  Goal: Effective Communication  Outcome: Ongoing, Progressing     Problem: Device-Related Complication Risk (Artificial Airway)  Goal: Optimal Device Function  Outcome: Ongoing, Progressing     Problem: Skin and Tissue Injury (Artificial Airway)  Goal: Absence of Device-Related Skin or Tissue Injury  Outcome: Ongoing, Progressing     Problem: Noninvasive Ventilation Acute  Goal: Effective Unassisted Ventilation and  Oxygenation  Outcome: Ongoing, Progressing     Problem: Fall Injury Risk  Goal: Absence of Fall and Fall-Related Injury  Outcome: Ongoing, Progressing     Problem: Adjustment to Illness (Delirium)  Goal: Optimal Coping  Outcome: Ongoing, Progressing     Problem: Altered Behavior (Delirium)  Goal: Improved Behavioral Control  Outcome: Ongoing, Progressing     Problem: Attention and Thought Clarity Impairment (Delirium)  Goal: Improved Attention and Thought Clarity  Outcome: Ongoing, Progressing     Problem: Sleep Disturbance (Delirium)  Goal: Improved Sleep  Outcome: Ongoing, Progressing     Problem: Skin Injury Risk Increased  Goal: Skin Health and Integrity  Outcome: Ongoing, Progressing

## 2022-08-04 NOTE — PLAN OF CARE
Problem: SLP  Goal: SLP Goal  Description: LTG: Pt will tolerate least restrictive PO diet with no clinical signs/sx aspiration    STGs:  Pt will tolerate ice chips with no clinical signs/sx aspiration  Pt will tolerate puree solids with improved bolus formation/transport    Outcome: Ongoing, Progressing     POC initiated and goals created.  Yasmeen

## 2022-08-04 NOTE — PT/OT/SLP PROGRESS
Physical Therapy Treatment    Patient Name:  Blanquita Montoya   MRN:  [id number]    Recommendations:     Discharge Recommendations:  rehabilitation facility, nursing facility, skilled   Discharge Equipment Recommendations: other (see comments) (pending progress)   Barriers to discharge: None    Assessment:     Blanquita Montoya is a 66 y.o. female admitted with a medical diagnosis of <principal problem not specified>.  She presents with the following impairments/functional limitations:  weakness, gait instability, impaired endurance, impaired balance, decreased safety awareness, impaired functional mobility, impaired cognition.    Pt sitting up in bed, alert, able to communicate appropriately. Pt is now on vapotherm with stable vitals. Pt did have episode of AFIB RVR overnight but is stable now on amiodarone drip. Pt tolerated mobility very well today. Able to t/f into standing twice with knee buckling x 2.     Rehab Prognosis: Good; patient would benefit from acute skilled PT services to address these deficits and reach maximum level of function.    Recent Surgery: Procedure(s) (LRB):  REPAIR, AORTA, ASCENDING (N/A) 6 Days Post-Op    Plan:     During this hospitalization, patient to be seen daily to address the identified rehab impairments via gait training, therapeutic activities, therapeutic exercises, neuromuscular re-education and progress toward the following goals:    · Plan of Care Expires:  09/01/22    Subjective     Chief Complaint: None  Patient/Family Comments/goals: To get stronger  Pain/Comfort:  · Pain Rating 1: 0/10      Objective:     Communicated with RN prior to session.  Patient found HOB elevated with pulse ox (continuous), telemetry, blood pressure cuff, oxygen upon PT entry to room.     General Precautions: Standard, aspiration   Orthopedic Precautions:N/A   Braces: N/A  Respiratory Status: Vapotherm 35L/min at 60%   Vitals:     HR: 83    BP: 123/68    SPO2: 95%     Functional  Mobility:  · Bed Mobility:    · Supine<->sit; mod to max assist; good initiation   · Transfers:   · Sit<->stand with min to mod assist x 2 trials; min lift assist, mod assist for balance 2/2 2 episodes of R knee buckling    · Balance:   · Sitting balance x ~10 minutes with CGA to min assist.       AM-PAC 6 CLICK MOBILITY  Turning over in bed (including adjusting bedclothes, sheets and blankets)?: 2  Sitting down on and standing up from a chair with arms (e.g., wheelchair, bedside commode, etc.): 3  Moving from lying on back to sitting on the side of the bed?: 2  Moving to and from a bed to a chair (including a wheelchair)?: 2  Need to walk in hospital room?: 1  Climbing 3-5 steps with a railing?: 1  Basic Mobility Total Score: 11       Patient left HOB elevated with all lines intact and call button in reach..    GOALS:   Multidisciplinary Problems     Physical Therapy Goals        Problem: Physical Therapy    Goal Priority Disciplines Outcome Goal Variances Interventions   Physical Therapy Goal     PT, PT/OT Ongoing, Progressing     Description: Goals to be met by: 22    Patient will increase functional independence with mobility by performin. Supine to sit with MInimal Assistance  2. Sit to supine with MInimal Assistance  3. Sit to stand transfer with Moderate Assistance  4. Bed to chair transfer with Moderate Assistance using Rolling Walker vs LRAD  5. Gait : TBD  6. Sitting at edge of bed x15 minutes with Stand-by Assistance                     Time Tracking:     PT Received On: 22  PT Start Time: 838     PT Stop Time: 908  PT Total Time (min): 30 min     Billable Minutes: Therapeutic Activity 2 units    Treatment Type: Treatment  PT/PTA: PTA     PTA Visit Number: 1     2022

## 2022-08-04 NOTE — PT/OT/SLP PROGRESS
Occupational Therapy  Treatment    Blanquita Montoya   MRN: [id number]   Admitting Diagnosis: <principal problem not specified>    OT Date of Treatment: 08/04/22   OT Start Time: 0839  OT Stop Time: 0908  OT Total Time (min): 29 min     Billable Minutes:  Self Care/Home Management 2  Total Minutes: 29           KEY Visit Number: 1    General Precautions: Standard, other (see comments) (BP MAP >65)  Orthopedic Precautions:    Braces:           Subjective:  Communicated with RN prior to session.  84HR, 95o2, 123/68 35LPM, 60fio2 Vapotherm    Objective:  Pt. Requiring Min A progressing to SBA for sitting balance EOB, pt. Performing grooming task (oral hygiene) using L UE for excursion to mouth on command.  Pt. Requiring Mod A for sit to stand from EOB using RW for UE support with balance, vitals stable throughout mobility.    Functional Mobility:  Bed Mobility:   Supine to sit: Maximum Assistance   Sit to supine: Maximum Assistance   Rolling: Maximum Assistance   Scooting: Maximum Assistance    Transfer Training:   Sit to stand:Moderate Assistance with Rolling Walker .    Patient left with bed in chair position with all lines intact and call button in reach    ASSESSMENT:  Blanquita Montoya is a 66 y.o. female with a medical diagnosis of <principal problem not specified> Pt. Tolerated session well tolerating mobility.    Rehab potential is good    Activity tolerance: Good    Discharge recommendations: rehabilitation facility, nursing facility, skilled     Equipment recommendations:       GOALS:   Multidisciplinary Problems     Occupational Therapy Goals        Problem: Occupational Therapy    Goal Priority Disciplines Outcome Interventions   Occupational Therapy Goal     OT, PT/OT Ongoing, Progressing    Description: Goals to be met by: dc     Patient will increase functional independence with ADLs by performing:    Grooming while EOB with Minimal Assistance.  Sitting at edge of bed x10 minutes with Stand-by  Assistance.  Stand pivot transfers with Moderate Assistance.  Toilet transfer to bedside commode with Moderate Assistance.  Increased functional strength to 3/5 for R UE.                     Plan:  Patient to be seen daily to address the above listed problems via self-care/home management, therapeutic activities, therapeutic exercises  Plan of Care expires: 08/31/22  Plan of Care reviewed with: patient         08/04/2022

## 2022-08-04 NOTE — PT/OT/SLP EVAL
"Speech Language Pathology Department  Clinical Swallow Evaluation    Patient Name:  Blanquita Montoya   MRN:  [id number]  Admitting Diagnosis: ascending aortic aneurysm s/p resection    Recommendations:     General Recommendations:  Dysphagia therapy  Diet recommendations:  NPO, Liquid Diet Level: NPO   Swallow Strategies/Precautions: medications crushed in puree  General Precautions: Standard, aspiration    History:     Past Medical History:   Diagnosis Date    Anxiety disorder     Coronary artery disease     GERD (gastroesophageal reflux disease)     Hypercholesterolemia     Hypertension     Hypothyroidism     Obesity     Seizure in childhood     last one age 11    Sleep apnea     does not currently use CPAP    Thoracic aortic aneurysm 03/10/2022    4.5X4.4 Ascending Aorta as of 3/10/2022    Thoracic aortic aneurysm     Thyroid disease        Past Surgical History:   Procedure Laterality Date    ANGIOGRAM, CORONARY, WITH LEFT HEART CATHETERIZATION N/A 07/11/2022    Procedure: Angiogram, Coronary, with Left Heart Cath;  Surgeon: Harry Jj MD;  Location[facility] CATH LAB;  Service: Cardiology;  Laterality: N/A;    COLONOSCOPY      HYSTERECTOMY      REPAIR OF ASCENDING AORTA N/A 7/29/2022    Procedure: REPAIR, AORTA, ASCENDING;  Surgeon: Alec Vega IV, MD;  Location: [facility] OR;  Service: Cardiovascular;  Laterality: N/A;  ECHO NOTIFIED    RT KNEE           Home Diet: Regular and thin liquids  Current Method of Nutrition: NPO    Patient complaint: "I'm hungry."    Subjective     Patient awake and alert.    Patient goals: "I'm hungry"     Pain/Comfort: Pain Rating 1: 0/10    Respiratory Status: vapotherm, 35L at 60% fio2    Objective:     Oral Musculature Evaluation  · Oral Musculature: WFL    Consistency Fed By Oral Symptoms Pharyngeal Symptoms   Ice chips SLP Bolus holding Wet vocal quality   puree patient Bolus holding Multiple swallows                                   Respiratory rate " consistently increased to 37 with minimal PO trials, returned to upper 20s with rest.    Assessment:     The pt presents with oropharyngeal dysphagia and remains unsafe for PO intake.  Swallow function negatively impacted by respiratory status.  Rec: NPO, ok for meds crushed in pudding.  Skilled SLP services warranted to tx impairments.    Goals:   Multidisciplinary Problems     SLP Goals        Problem: SLP    Goal Priority Disciplines Outcome   SLP Goal     SLP Ongoing, Progressing   Description: LTG: Pt will tolerate least restrictive PO diet with no clinical signs/sx aspiration    STGs:  Pt will tolerate ice chips with no clinical signs/sx aspiration  Pt will tolerate puree solids with improved bolus formation/transport                     Plan:     Patient to be seen:  daily   Plan of Care expires:  09/01/22  Plan of Care reviewed with:  patient, family   SLP Follow-Up:  Yes      Time Tracking:     SLP Treatment Date:   08/04/22  Speech Start Time:  0900  Speech Stop Time:  0915     Speech Total Time (min):  15 min    Billable minutes:  Swallow and Oral Function Evaluation, 15 minutes     08/04/2022

## 2022-08-04 NOTE — CONSULTS
[facility] - [facility]  Cardiology  Consult Note    Patient Name: Blanquita Montoya  MRN: [id number]  Admission Date: 7/29/2022  Hospital Length of Stay: 6 days  Code Status: Full Code   Attending Provider: Santy Chau MD   Consulting Provider: Monica Arndt MD, Reuben Krishnamurthy MD  Primary Care Physician: Kamila Whittaker MD  Principal Problem:<principal problem not specified>    Patient information was obtained from patient and ER records.     Subjective:     Brief HPI: 66 F admitted for resection of ascending thoracic aortic aneurysm and left atrial appendage ligation now POD 6. Overnight, patient converted to a fib RVR. Started on amiodarone drip. Cardiology consulted.     8.4.22: Sitting up in bed. Afib CVR on tele. Patient denies CP, palpitations. + SOB on Vapotherm.     PMH: HTN, HLD, GERD, MIKI on CPAP, Hypothyroidism, and thoracic ascending aortic aneurysm   PSH: Hysterectomy, resection thoracic aortic aneurysm and left atrial appendage ligation  Family History: CAD in parents    Previous Cardiac Diagnostics:     LHC 7/11/22:  No obstructive coronary artery disease    ECHO 5/21/2021:   Normal left ventricular size and function.  EF= 65-69%  Normal right ventricular size and    ECHO (10/2020):  Left ventricular ejection fraction is measured at approximately 55%.  Mild mitral regurgitation.  Mild to moderate aortic regurgitation present.  Mild tricuspid regurgitation with RVSP estimated at 20 mmHg.  Pulmonic valve was not well visualized.  Normal size left atrium.   Normal right atrial size.  Normal right ventricular size with preserved RV function.  IVC normal.    No current facility-administered medications on file prior to encounter.     Current Outpatient Medications on File Prior to Encounter   Medication Sig    amLODIPine (NORVASC) 10 MG tablet Take 10 mg by mouth once daily.    aspirin (ECOTRIN) 81 MG EC tablet Take 81 mg by mouth once daily.    EUTHYROX 100 mcg tablet Take 100  mcg by mouth once daily.    hydrocortisone 2.5 % cream Apply topically daily as needed (as needed for itching).    isosorbide mononitrate (IMDUR) 30 MG 24 hr tablet Take 30 mg by mouth once daily.    losartan (COZAAR) 100 MG tablet Take 100 mg by mouth once daily.    nitroGLYCERIN (NITROSTAT) 0.4 MG SL tablet Place 0.4 mg under the tongue.    omega-3 fatty acids/fish oil (FISH OIL-OMEGA-3 FATTY ACIDS) 300-1,000 mg capsule Take 1 capsule by mouth once daily. Takes 1000mg daily    pantoprazole (PROTONIX) 40 MG tablet Take 40 mg by mouth once daily.    rosuvastatin (CRESTOR) 10 MG tablet Take 20 mg by mouth every evening.    metoprolol succinate (TOPROL-XL) 25 MG 24 hr tablet Take 1 tablet (25 mg total) by mouth once daily. (Patient not taking: No sig reported)       Review of Systems   Respiratory: Positive for shortness of breath.    Cardiovascular: Negative for chest pain, palpitations and leg swelling.       Objective:     Vital Signs (Most Recent):  Temp: 99.5 °F (37.5 °C) (08/04/22 0800)  Pulse: 82 (08/04/22 1100)  Resp: (!) 28 (08/04/22 1100)  BP: 122/71 (08/04/22 1100)  SpO2: 98 % (08/04/22 1100) Vital Signs (24h Range):  Temp:  [97.7 °F (36.5 °C)-99.5 °F (37.5 °C)] 99.5 °F (37.5 °C)  Pulse:  [] 82  Resp:  [17-44] 28  SpO2:  [92 %-100 %] 98 %  BP: (113-194)/() 122/71     Weight: 92.5 kg (203 lb 14.8 oz)  Body mass index is 31.94 kg/m².    SpO2: 98 %  O2 Device (Oxygen Therapy): Vapotherm      Intake/Output Summary (Last 24 hours) at 8/4/2022 1125  Last data filed at 8/4/2022 1000  Gross per 24 hour   Intake 1753.33 ml   Output 2227 ml   Net -473.67 ml       Lines/Drains/Airways       Drain  Duration                  Urethral Catheter 08/02/22 0915 Non-latex 16 Fr. 2 days              Peripheral Intravenous Line  Duration                  Peripheral IV - Single Lumen 08/03/22 0930 20 G;2 in Anterior;Left Forearm 1 day         Peripheral IV - Single Lumen 08/04/22 0400 20 G Right Forearm <1 day                     Significant Labs:  Recent Results (from the past 72 hour(s))   Basic Metabolic Panel    Collection Time: 08/02/22  2:44 AM   Result Value Ref Range    Sodium Level 137 136 - 145 mmol/L    Potassium Level 3.8 3.5 - 5.1 mmol/L    Chloride 106 98 - 107 mmol/L    Carbon Dioxide 23 23 - 31 mmol/L    Glucose Level 109 82 - 115 mg/dL    Blood Urea Nitrogen 9.8 9.8 - 20.1 mg/dL    Creatinine 0.68 0.55 - 1.02 mg/dL    BUN/Creatinine Ratio 14     Calcium Level Total 8.0 (L) 8.4 - 10.2 mg/dL    Estimated GFR-Non  >60 mls/min/1.73/m2    Anion Gap 8.0 mEq/L   CBC with Differential    Collection Time: 08/02/22  2:44 AM   Result Value Ref Range    WBC 10.7 4.5 - 11.5 x10(3)/mcL    RBC 2.83 (L) 4.20 - 5.40 x10(6)/mcL    Hgb 8.5 (L) 12.0 - 16.0 gm/dL    Hct 25.8 (L) 37.0 - 47.0 %    MCV 91.2 80.0 - 94.0 fL    MCH 30.0 27.0 - 31.0 pg    MCHC 32.9 (L) 33.0 - 36.0 mg/dL    RDW 13.2 11.5 - 17.0 %    Platelet 130 130 - 400 x10(3)/mcL    MPV 11.5 (H) 7.4 - 10.4 fL    Neut % 82.5 %    Lymph % 7.9 %    Mono % 7.8 %    Eos % 0.7 %    Basophil % 0.4 %    Lymph # 0.84 0.6 - 4.6 x10(3)/mcL    Neut # 8.8 2.1 - 9.2 x10(3)/mcL    Mono # 0.83 0.1 - 1.3 x10(3)/mcL    Eos # 0.07 0 - 0.9 x10(3)/mcL    Baso # 0.04 0 - 0.2 x10(3)/mcL    IG# 0.07 (H) 0 - 0.04 x10(3)/mcL    IG% 0.7 %    NRBC% 0.2 %   POCT ARTERIAL BLOOD GAS    Collection Time: 08/02/22  5:02 AM   Result Value Ref Range    POC PH 7.40     POC PCO2 42 mmHg    POC PO2 58 (A) mmHg    POC SATURATED O2 90 %    POC Potassium 3.4 (A) mmol/l    POC Sodium 135 (A) 137 - 145 mmol/l    POC Ionized Calcium 1.16 mmol/l    POC HCO3 26.0 mmol/l    Base Deficit 1.0 mmol/l    POC Temp 37.0 C    Specimen source Arterial sample    Basic Metabolic Panel    Collection Time: 08/02/22  6:33 PM   Result Value Ref Range    Sodium Level 139 136 - 145 mmol/L    Potassium Level 3.5 3.5 - 5.1 mmol/L    Chloride 101 98 - 107 mmol/L    Carbon Dioxide 27 23 - 31 mmol/L    Glucose Level 117  (H) 82 - 115 mg/dL    Blood Urea Nitrogen 8.1 (L) 9.8 - 20.1 mg/dL    Creatinine 0.67 0.55 - 1.02 mg/dL    BUN/Creatinine Ratio 12     Calcium Level Total 7.9 (L) 8.4 - 10.2 mg/dL    Estimated GFR-Non  >60 mls/min/1.73/m2    Anion Gap 11.0 mEq/L   Basic Metabolic Panel    Collection Time: 08/03/22  1:52 AM   Result Value Ref Range    Sodium Level 139 136 - 145 mmol/L    Potassium Level 3.5 3.5 - 5.1 mmol/L    Chloride 100 98 - 107 mmol/L    Carbon Dioxide 29 23 - 31 mmol/L    Glucose Level 102 82 - 115 mg/dL    Blood Urea Nitrogen 10.5 9.8 - 20.1 mg/dL    Creatinine 0.71 0.55 - 1.02 mg/dL    BUN/Creatinine Ratio 15     Calcium Level Total 8.5 8.4 - 10.2 mg/dL    Estimated GFR-Non  >60 mls/min/1.73/m2    Anion Gap 10.0 mEq/L   Prepare RBC 2 Units; to give 8/3/22    Collection Time: 08/03/22  8:58 AM   Result Value Ref Range    UNIT NUMBER [id number]     UNIT ABO/RH O POS     DISPENSE STATUS Transfused     Unit Expiration [id number]     Product Code G4484E37     Unit Blood Type Code 5100     CROSSMATCH INTERPRETATION Compatible     UNIT NUMBER [id number]     UNIT ABO/RH O POS     DISPENSE STATUS Transfused     Unit Expiration [id number]     Product Code P5507Y87     Unit Blood Type Code 5100     CROSSMATCH INTERPRETATION Compatible    Type & Screen    Collection Time: 08/03/22  8:58 AM   Result Value Ref Range    Group & Rh O POS     Indirect Kaye GEL NEG    Phosphorus    Collection Time: 08/04/22 12:21 AM   Result Value Ref Range    Phosphorus Level 2.6 2.3 - 4.7 mg/dL   Magnesium    Collection Time: 08/04/22 12:21 AM   Result Value Ref Range    Magnesium Level 1.90 1.60 - 2.60 mg/dL   Comprehensive Metabolic Panel    Collection Time: 08/04/22 12:21 AM   Result Value Ref Range    Sodium Level 141 136 - 145 mmol/L    Potassium Level 3.3 (L) 3.5 - 5.1 mmol/L    Chloride 101 98 - 107 mmol/L    Carbon Dioxide 30 23 - 31 mmol/L    Glucose Level 113 82 - 115 mg/dL    Blood Urea  Nitrogen 10.3 9.8 - 20.1 mg/dL    Creatinine 0.68 0.55 - 1.02 mg/dL    Calcium Level Total 8.7 8.4 - 10.2 mg/dL    Protein Total 6.2 5.8 - 7.6 gm/dL    Albumin Level 2.4 (L) 3.4 - 4.8 gm/dL    Globulin 3.8 (H) 2.4 - 3.5 gm/dL    Albumin/Globulin Ratio 0.6 (L) 1.1 - 2.0 ratio    Bilirubin Total 3.6 (H) <=1.5 mg/dL    Alkaline Phosphatase 218 (H) 40 - 150 unit/L    Alanine Aminotransferase 59 (H) 0 - 55 unit/L    Aspartate Aminotransferase 76 (H) 5 - 34 unit/L    Estimated GFR-Non  >60 mls/min/1.73/m2   CBC with Differential    Collection Time: 08/04/22 12:21 AM   Result Value Ref Range    WBC 10.3 4.5 - 11.5 x10(3)/mcL    RBC 4.01 (L) 4.20 - 5.40 x10(6)/mcL    Hgb 12.0 12.0 - 16.0 gm/dL    Hct 36.3 (L) 37.0 - 47.0 %    MCV 90.5 80.0 - 94.0 fL    MCH 29.9 27.0 - 31.0 pg    MCHC 33.1 33.0 - 36.0 mg/dL    RDW 13.6 11.5 - 17.0 %    Platelet 189 130 - 400 x10(3)/mcL    MPV 11.1 (H) 7.4 - 10.4 fL    Neut % 82.1 %    Lymph % 6.7 %    Mono % 9.1 %    Eos % 0.6 %    Basophil % 0.3 %    Lymph # 0.69 0.6 - 4.6 x10(3)/mcL    Neut # 8.5 2.1 - 9.2 x10(3)/mcL    Mono # 0.94 0.1 - 1.3 x10(3)/mcL    Eos # 0.06 0 - 0.9 x10(3)/mcL    Baso # 0.03 0 - 0.2 x10(3)/mcL    IG# 0.12 (H) 0 - 0.04 x10(3)/mcL    IG% 1.2 %    NRBC% 0.5 %       Significant Imaging:    Telemetry:  A fib- CVR      Physical Exam  Constitutional:       General: She is not in acute distress.  Cardiovascular:      Rate and Rhythm: Normal rate. Rhythm irregularly irregular.      Pulses:           Radial pulses are 2+ on the right side and 2+ on the left side.        Dorsalis pedis pulses are 2+ on the right side and 2+ on the left side.      Heart sounds: Normal heart sounds. No murmur heard.  Pulmonary:      Effort: Tachypnea present.      Breath sounds: Examination of the right-upper field reveals wheezing. Examination of the left-upper field reveals wheezing. Wheezing present.      Comments: On Vapotherm 60%  FIO2  Chest:      Comments: Dressing over  midline sternotomy without bleeding or discharge    Musculoskeletal:      Right lower leg: No edema.      Left lower leg: No edema.   Skin:     General: Skin is warm and dry.   Neurological:      Mental Status: She is alert.         Home Medications:   No current facility-administered medications on file prior to encounter.     Current Outpatient Medications on File Prior to Encounter   Medication Sig Dispense Refill    amLODIPine (NORVASC) 10 MG tablet Take 10 mg by mouth once daily.      aspirin (ECOTRIN) 81 MG EC tablet Take 81 mg by mouth once daily.      EUTHYROX 100 mcg tablet Take 100 mcg by mouth once daily.      hydrocortisone 2.5 % cream Apply topically daily as needed (as needed for itching). 30 g 1    isosorbide mononitrate (IMDUR) 30 MG 24 hr tablet Take 30 mg by mouth once daily.      losartan (COZAAR) 100 MG tablet Take 100 mg by mouth once daily.      nitroGLYCERIN (NITROSTAT) 0.4 MG SL tablet Place 0.4 mg under the tongue.      omega-3 fatty acids/fish oil (FISH OIL-OMEGA-3 FATTY ACIDS) 300-1,000 mg capsule Take 1 capsule by mouth once daily. Takes 1000mg daily      pantoprazole (PROTONIX) 40 MG tablet Take 40 mg by mouth once daily.      rosuvastatin (CRESTOR) 10 MG tablet Take 20 mg by mouth every evening.      metoprolol succinate (TOPROL-XL) 25 MG 24 hr tablet Take 1 tablet (25 mg total) by mouth once daily. (Patient not taking: No sig reported) 30 tablet 11       Current Inpatient Medications:    Current Facility-Administered Medications:     0.9%  NaCl infusion (for blood administration), , Intravenous, Q24H PRN, PAULETTE Fernandez    0.9%  NaCl infusion, , Intravenous, Continuous, Barbie Goodwin MD, Last Rate: 100 mL/hr at 08/01/22 0200, New Bag at 08/01/22 0200    acetaminophen oral solution 650 mg, 650 mg, Per OG tube, Q6H PRN, PAULETTE Hurt    albumin human 5% bottle 12.5 g, 12.5 g, Intravenous, PRN, PAULETTE Hurt, Stopped at 07/29/22 1756    albuterol-ipratropium 2.5 mg-0.5 mg/3  mL nebulizer solution 3 mL, 3 mL, Nebulization, Q4H PRN, Alec Vega IV, MD, 3 mL at 08/04/22 0750    amiodarone 360 mg/200 mL (1.8 mg/mL) infusion, 0.5 mg/min, Intravenous, Continuous, PAULETTE Fernandez, Last Rate: 16.7 mL/hr at 08/04/22 0703, 0.5 mg/min at 08/04/22 0703    aspirin EC tablet 81 mg, 81 mg, Oral, Daily, PAULETTE Hurt, 81 mg at 08/03/22 0801    dexmedetomidine (PRECEDEX) 400mcg/100mL 0.9% NaCL infusion, 0-1.4 mcg/kg/hr, Intravenous, Continuous, Santy Chau MD, Paused at 08/03/22 0400    dextrose 50% injection 12.5 g, 12.5 g, Intravenous, PRN, PAULETTE Hurt    dextrose 50% injection 25 g, 25 g, Intravenous, PRN, PAULETTE Hurt    docusate sodium capsule 200 mg, 200 mg, Oral, BID, Alec Vega IV, MD, 200 mg at 08/03/22 0801    enoxaparin injection 40 mg, 40 mg, Subcutaneous, Daily, PAULETTE Hurt, 40 mg at 08/03/22 1604    famotidine (PF) injection 20 mg, 20 mg, Intravenous, Daily, PAULETTE Hurt, 20 mg at 08/04/22 0820    folic acid tablet 1 mg, 1 mg, Oral, Daily, PAULETTE Hurt, 1 mg at 08/03/22 0801    furosemide injection 40 mg, 40 mg, Intravenous, Daily, Alec Vega IV, MD, 40 mg at 08/04/22 0820    guaiFENesin 100 mg/5 ml syrup 200 mg, 200 mg, Oral, Q4H PRN, PAULETTE Fernandez, 200 mg at 08/03/22 1249    hydrALAZINE injection 10 mg, 10 mg, Intravenous, Q2H PRN, Richard Hernández MD, 10 mg at 08/04/22 0207    HYDROcodone-acetaminophen 5-325 mg per tablet 1 tablet, 1 tablet, Oral, Q4H PRN, PAULETTE Hurt    lactulose 10 gram/15 ml solution 20 g, 20 g, Oral, Q6H PRN, PAULETTE Hurt    levETIRAcetam (KEPPRA) 500 mg in dextrose 5 % in water (D5W) 5 % 100 mL IVPB (MB+), 500 mg, Intravenous, Q12H, Pranay Velasco MD, Stopped at 08/04/22 0849    LIDOcaine (PF) 10 mg/ml (1%) injection 10 mg, 1 mL, Intradermal, Once, Malcolm Corona DO    loperamide capsule 4 mg, 4 mg, Oral, Once, PAULETTE Hurt    metoclopramide HCl injection 5 mg, 5 mg, Intravenous, Q6H PRN, PAULETTE Hurt     metoprolol tartrate (LOPRESSOR) split tablet 12.5 mg, 12.5 mg, Oral, BID, PAULETTE Hurt, 12.5 mg at 08/03/22 0801    morphine injection 2 mg, 2 mg, Intravenous, PRN, PAULETTE Hurt, 2 mg at 08/02/22 0450    ondansetron injection 4 mg, 4 mg, Intravenous, Q12H PRN, PAULETTE Hurt, 4 mg at 08/04/22 0432    oxyCODONE immediate release tablet 5 mg, 5 mg, Oral, Q4H PRN, PAULETTE Hurt, 5 mg at 08/03/22 1444    polyethylene glycol packet 17 g, 17 g, Oral, BID, Alec Vega IV, MD, 17 g at 08/03/22 0801    potassium chloride 20 mEq in 100 mL IVPB (FOR CENTRAL LINE ADMINISTRATION ONLY), 40 mEq, Intravenous, Once, Derick Porras MD    simethicone chewable tablet 160 mg, 2 tablet, Oral, TID PRN, Alec Vega IV, MD, 160 mg at 08/01/22 1830    sucralfate tablet 1 g, 1 g, Oral, QID (AC & HS), PAULETTE Hurt, 1 g at 08/03/22 1100         VTE Risk Mitigation (From admission, onward)           Ordered     enoxaparin injection 40 mg  Daily         07/29/22 1046     Place sequential compression device  Until discontinued         07/29/22 1046     IP VTE HIGH RISK PATIENT  Once         07/29/22 1036                    Assessment:     Post-operative atrial fibrillation with controlled VR  Ascending thoracic aortic aneurysm s/p resection and left atrial appendage ligation   Essential hypertension  Mixed hyperlipidemia  MIKI on CPAP    Plan:     Continue amiodarone drip   Okay to add IV metoprolol or digoxin prn  Cardioversion if becomes unstable or difficult to control HR  No need for anticoagulation at this time as the patient is s/p CROW ligation    Will continue to follow patient. Thank you for your consult.     Monica Arndt MD   Resident, HO-II  Cardiology  [facility] - [facility]  08/04/2022 11:25 AM      I have reviewed and concur with the resident's history, physical, assessment, and plan.  I have personally interviewed and examined the patient at bedside.

## 2022-08-04 NOTE — PLAN OF CARE
Problem: Adult Inpatient Plan of Care  Goal: Plan of Care Review  Outcome: Ongoing, Progressing  Goal: Patient-Specific Goal (Individualized)  Outcome: Ongoing, Progressing  Goal: Absence of Hospital-Acquired Illness or Injury  Outcome: Ongoing, Progressing  Goal: Optimal Comfort and Wellbeing  Outcome: Ongoing, Progressing  Goal: Readiness for Transition of Care  Outcome: Ongoing, Progressing     Problem: Impaired Wound Healing  Goal: Optimal Wound Healing  Outcome: Ongoing, Progressing     Problem: Infection  Goal: Absence of Infection Signs and Symptoms  Outcome: Ongoing, Progressing     Problem: Communication Impairment (Mechanical Ventilation, Invasive)  Goal: Effective Communication  Outcome: Ongoing, Progressing     Problem: Device-Related Complication Risk (Artificial Airway)  Goal: Optimal Device Function  Outcome: Ongoing, Progressing     Problem: Skin and Tissue Injury (Artificial Airway)  Goal: Absence of Device-Related Skin or Tissue Injury  Outcome: Ongoing, Progressing     Problem: Noninvasive Ventilation Acute  Goal: Effective Unassisted Ventilation and Oxygenation  Outcome: Ongoing, Progressing     Problem: Fall Injury Risk  Goal: Absence of Fall and Fall-Related Injury  Outcome: Ongoing, Progressing     Problem: Sleep Disturbance (Delirium)  Goal: Improved Sleep  Outcome: Ongoing, Progressing

## 2022-08-04 NOTE — PROGRESS NOTES
"Nutrition   Progress Note      Recommendations:  1. Advance diet as tolerated and medically feasible  2. RD to monitor diet advancement, po intake and weight changes      Reason for Evaluation:  Length of Stay    Diagnosis:    1. Ascending aortic aneurysm    2. Aortic aneurysm    3. CAD (coronary artery disease)    4. Irregular heart rhythm        Relevant Medical History:    Past Medical History:   Diagnosis Date    Anxiety disorder     Coronary artery disease     GERD (gastroesophageal reflux disease)     Hypercholesterolemia     Hypertension     Hypothyroidism     Obesity     Seizure in childhood     last one age 11    Sleep apnea     does not currently use CPAP    Thoracic aortic aneurysm 03/10/2022    4.5X4.4 Ascending Aorta as of 3/10/2022    Thoracic aortic aneurysm     Thyroid disease          Nutrition Diet History:    Factors affecting nutritional intake: NPO    Food / Restorationist / Culture Preferences:  n/a      Nutrition Prescription Ordered:    Current Diet Order: NPO    Appetite:  Other:  NPO    PO intake: NPO      Labs / Medications / Procedures:    Nutrition Related Medications: NaCl, colace BID, pepcid daily, folic acid daily, lasix daily, zofran PRN, miralax BID, KCl, carafate    Nutrition Related Labs:  8/4: RBC-4.01, Hct-36.3, K-3.3      Anthropometrics:  Height: 5' 7" (1.702 m)  Admit Weight:  Weight: 88 kg (194 lb)  Latest Weight:  92.5 kg (203 lb 14.8 oz)    Wt Readings from Last 5 Encounters:   08/04/22 92.5 kg (203 lb 14.8 oz)   07/11/22 87.7 kg (193 lb 5.5 oz)   07/26/22 88 kg (194 lb)   07/19/22 88 kg (194 lb)   07/07/22 87.5 kg (193 lb)     IBW: 61.4 kg  %IBW: 150.6%  UBW: 88 kg  %Weight Change: 5.1%  Body mass index is 31.94 kg/m².  BMI classification:  Obese Grade I (BMI 30 - 34.9)      Nutrition Narrative:  8/4: Pt NPO since 8/3; Boost plus chocolate order noted on 8/3. Pt reports no n/v/d/c. UBW reported 198# with no recent weight loss. Previous weight and admit weight of 88 kg (194 lb) " on 7/19 and 7/29. Latest weight of 92.5 kg (203 lb); pt receiving lasix. Will continue to monitor weight.   SLP evaluating pt at time of visit and stated pt remains unsafe for PO intake due to respiratory status. Pt can have pudding, however MBS unable to be completed. Will monitor diet advancement.    Monitoring and Evaluation:    Nutrition Monitoring and Evaluation:  food and beverage intake, diet order and weight change    Nutrition Risk:  Level of Nutrition Risk:  Low  Frequency of Follow up:  Dietitian will f/up within 7 days.     Samina Boston, Registration Eligible, Provisional LDN

## 2022-08-04 NOTE — PROGRESS NOTES
[facility] - [facility]  Pulmonary Critical Care Note    Patient Name: Blanquita Montoya  MRN: [id number]  Admission Date: 7/29/2022  Hospital Length of Stay: 6 days  Code Status: Full Code  Attending Provider: Santy Chau MD  Primary Care Provider: Kamila Whittaker MD     Subjective:     HPI:   See 7/29/22 H&P for detailed information. Ms Montoya is 66 CF who presented  07/29/2022, for a resection of ascending aortic aneurysm and repair with 30 mm Dacron graft and ligation of left atrial appendage by Dr. Vega.She was intially admitted to ICU intubated requiring vasopressor support and insulin drip and needed to be epicardially paced for symptomatic bradycardia and later had seizure like-activity with convulsive movements of RUE and RLE since resolved. She was downgraded 7/31 on nasal cannula but has since been progressively requiring more oxygenation, currently on 15L oxymask satting 91%. Apparently patient does well while being awake but starts to desat at night. There may be a component of central sleep apnea given recent CTH fidnigs with suspicious nonhemorrhagic occipitoparietal acute/subacute insult. Chest tubes were removed yesterday. She received her oxycodone earlier in the night morphine x 2 without much improvement. ABG this AM revealed hypoxemia and CXR appears to have worsenened from yesterday with interval increase in R>L pleural effusion.  Upgraded back up to ICU for closer monitoring given increasing oxygenation requirements    Hospital Course/Significant events:  7/29: downgraded  8/1: mediastinal CT removal  8/2: Re-upgraded for increasing O2 requirement from NC to Oxymask    24 Hour Interval History:  Patient seen and examined this morning. Currently on CPAP. Stating that she feels better this morning. Mentation seems improved from previous exams. Overnight, patient went into a fib RVR and was started on amiodarone. She is currently in a fib, rate controlled. Started on  lasix 40 daily, UOP still decreased.     CARLOS  Denies Cp, SOB, N/V/HA, pain anywhere else. States that she has difficulty sleeping.     Past Medical History:   Diagnosis Date    Anxiety disorder     Coronary artery disease     GERD (gastroesophageal reflux disease)     Hypercholesterolemia     Hypertension     Hypothyroidism     Obesity     Seizure in childhood     last one age 11    Sleep apnea     does not currently use CPAP    Thoracic aortic aneurysm 03/10/2022    4.5X4.4 Ascending Aorta as of 3/10/2022    Thoracic aortic aneurysm     Thyroid disease        Past Surgical History:   Procedure Laterality Date    ANGIOGRAM, CORONARY, WITH LEFT HEART CATHETERIZATION N/A 2022    Procedure: Angiogram, Coronary, with Left Heart Cath;  Surgeon: Harry Jj MD;  Location: [facility] CATH LAB;  Service: Cardiology;  Laterality: N/A;    COLONOSCOPY      HYSTERECTOMY      REPAIR OF ASCENDING AORTA N/A 2022    Procedure: REPAIR, AORTA, ASCENDING;  Surgeon: Aelc Vega IV, MD;  Location: [facility] OR;  Service: Cardiovascular;  Laterality: N/A;  ECHO NOTIFIED    RT KNEE         Social History     Socioeconomic History    Marital status:    Tobacco Use    Smoking status: Former Smoker     Types: Cigarettes     Quit date: 2010     Years since quittin.1    Smokeless tobacco: Never Used   Substance and Sexual Activity    Alcohol use: Not Currently    Drug use: Never    Sexual activity: Not Currently     Social Determinants of Health     Financial Resource Strain: Low Risk     Difficulty of Paying Living Expenses: Not hard at all   Food Insecurity: No Food Insecurity    Worried About Running Out of Food in the Last Year: Never true    Ran Out of Food in the Last Year: Never true   Transportation Needs: No Transportation Needs    Lack of Transportation (Medical): No    Lack of Transportation (Non-Medical): No   Physical Activity: Inactive    Days of Exercise per Week: 0 days     Minutes of Exercise per Session: 0 min   Stress: No Stress Concern Present    Feeling of Stress : Not at all   Social Connections: Moderately Isolated    Frequency of Communication with Friends and Family: More than three times a week    Frequency of Social Gatherings with Friends and Family: More than three times a week    Attends Zoroastrianism Services: Never    Active Member of Clubs or Organizations: No    Attends Club or Organization Meetings: Never    Marital Status:    Housing Stability: Unknown    Unable to Pay for Housing in the Last Year: No    Unstable Housing in the Last Year: No           Objective:     Current Outpatient Medications   Medication Instructions    amLODIPine (NORVASC) 10 mg, Oral, Daily    aspirin (ECOTRIN) 81 mg, Oral, Daily    EUTHYROX 100 mcg, Oral, Daily    hydrocortisone 2.5 % cream Topical (Top), Daily PRN    isosorbide mononitrate (IMDUR) 30 mg, Oral, Daily    losartan (COZAAR) 100 mg, Oral, Daily    metoprolol succinate (TOPROL-XL) 25 mg, Oral, Daily    nitroGLYCERIN (NITROSTAT) 0.4 mg, Sublingual    omega-3 fatty acids/fish oil (FISH OIL-OMEGA-3 FATTY ACIDS) 300-1,000 mg capsule 1 capsule, Oral, Daily, Takes 1000mg daily    pantoprazole (PROTONIX) 40 mg, Oral, Daily    rosuvastatin (CRESTOR) 20 mg, Oral, Nightly       Current Inpatient Medications   aspirin  81 mg Oral Daily    docusate sodium  200 mg Oral BID    enoxaparin  40 mg Subcutaneous Daily    famotidine (PF)  20 mg Intravenous Daily    folic acid  1 mg Oral Daily    furosemide (LASIX) injection  40 mg Intravenous Daily    levetiracetam IV  500 mg Intravenous Q12H    LIDOcaine (PF) 10 mg/ml (1%)  1 mL Intradermal Once    loperamide  4 mg Oral Once    metoprolol tartrate  12.5 mg Oral BID    polyethylene glycol  17 g Oral BID    potassium chloride in water  40 mEq Intravenous Once    sucralfate  1 g Oral QID (AC & HS)           Intake/Output Summary (Last 24 hours) at 8/4/2022  0630  Last data filed at 8/4/2022 0600  Gross per 24 hour   Intake 1753.33 ml   Output 1627 ml   Net 126.33 ml       Vital Signs (Most Recent):  Temp: 98.8 °F (37.1 °C) (08/04/22 0400)  Pulse: 94 (08/04/22 0600)  Resp: (!) 23 (08/04/22 0600)  BP: (!) 118/56 (08/04/22 0600)  SpO2: 99 % (08/04/22 0600)  Body mass index is 31.94 kg/m².  Weight: 92.5 kg (203 lb 14.8 oz) Vital Signs (24h Range):  Temp:  [97.3 °F (36.3 °C)-99.5 °F (37.5 °C)] 98.8 °F (37.1 °C)  Pulse:  [] 94  Resp:  [17-44] 23  SpO2:  [92 %-99 %] 99 %  BP: (111-194)/() 118/56     Physical Exam  Constitutional:       General: She is awake. She is not in acute distress.     Appearance: She is obese. She is not diaphoretic.      Interventions: Face mask in place.   Cardiovascular:      Rate and Rhythm: Normal rate and regular rhythm.      Pulses: Normal pulses.      Heart sounds: Normal heart sounds.   Pulmonary:      Breath sounds: Wheezing (wheezing improvement, currently on 9L oxymask) present.   Chest:      Chest wall: Tenderness present.      Comments: Midline surgical incision   Abdominal:      General: Bowel sounds are normal. There is no distension.      Palpations: Abdomen is soft.      Tenderness: There is no abdominal tenderness. There is no guarding or rebound.   Musculoskeletal:      Right lower leg: No edema.      Left lower leg: No edema.   Neurological:      General: No focal deficit present.      Mental Status: She is alert. Mental status is at baseline.   Psychiatric:         Mood and Affect: Mood normal.         Behavior: Behavior is cooperative.       Mechanical ventilation support:  Vent Mode: CPAP (07/29/22 1217)  Ventilator Initiated: Yes (07/29/22 1023)  Set Rate: 16 BPM (07/29/22 1121)  Vt Set: 500 mL (07/29/22 1121)  Pressure Support: 10 cmH20 (07/29/22 1217)  PEEP/CPAP: 5 cmH20 (07/29/22 1217)  Oxygen Concentration (%): 55 (08/04/22 0426)  Peak Airway Pressure: 16 cmH2O (07/29/22 1217)  Plateau Pressure: 8.4 cmH20  (22 1121)  Total Ve: 8.5 mL (22 1217)    Lines/Drains/Airways     Drain  Duration                Urethral Catheter 22 0915 Non-latex 16 Fr. 1 day          Peripheral Intravenous Line  Duration                Peripheral IV - Single Lumen 22 0930 20 G;2 in Anterior;Left Forearm <1 day         Peripheral IV - Single Lumen 22 0400 20 G Right Forearm <1 day                Significant Labs:    Lab Results   Component Value Date    WBC 10.3 2022    HGB 12.0 2022    HCT 36.3 (L) 2022    MCV 90.5 2022     2022         BMP  Lab Results   Component Value Date     2022    K 3.3 (L) 2022    CO2 30 2022    BUN 10.3 2022    CREATININE 0.68 2022    CALCIUM 8.7 2022    EGFRNONAA >60 2022       ABG  Recent Labs   Lab 22  0920 22  1052 22  0502   PH 7.364   < > 7.40   PO2 356*   < > 58*   PCO2 42.8   < > 42   HCO3 24.4   < > 26.0   BE -1  --   --     < > = values in this interval not displayed.       Significant Imagin/2 CXR 1 view: New right-sided effusion, worsened left sided effusion compared to yesterday    No new imaging today    Assessment/Plan:     Assessment    1. Acute Hypoxemia in the setting of recent CVTS and interval development of R > L pleural effusion   2. S/P Ascending aortic aneurysm s/p resection and repair with 30 mm Dacron graft and ligation of left atrial appendage on 2022, chest tube removed 2022  3. Hx Hypertension  4. Hx Hyperlipidemia  5. Hx Hypothyroidism  6. Normocytic Anemia  7.Atrial fibrillation w/ RVR, currently rate controlled  8. Hypokalemia      Plan  -Wean O2 as tolerated, currently tolerating CPAP at the time of examination.   -Continue post-operative management per CV surgery post op protocol.  -Started on lasix 40 daily; UOP still decreased. Did not give bolus overnight d/t concern for volume overload  -Continue to monitor electrolytes and replete  accordingly  -Started on amiodarone overnight for a fib RVR; currently rate controlled in the 80's  -Continue Duo-Nebs q4h and ISS as tolerated  -PT/OT/SLP as tolerated, to be re-evaluated today    DVT Prophylaxis: Lovenox  GI Prophylaxis:  Carafate, Docusate senna and miralax for BM    CODE STATUS: FULL  Primary contact: Daughter     Greater than 30 minutes of critical care was time spent personally by me on the following activities: development of treatment plan with patient or surrogate and bedside caregivers, discussions with consultants, evaluation of patient's response to treatment, examination of patient, ordering and performing treatments and interventions, ordering and review of laboratory studies, ordering and review of radiographic studies, pulse oximetry, re-evaluation of patient's condition.  This critical care time did not overlap with that of any other provider or involve time for any procedures.     Barbie Goodwin MD  Internal Medicine, PGY-2  [facility] - [facility] ICU

## 2022-08-04 NOTE — NURSING
Updated family at bedside. Informed them of next steps. Asked if they had any questions. Answered all questions.

## 2022-08-05 LAB
ANION GAP SERPL CALC-SCNC: 9 MEQ/L
BUN SERPL-MCNC: 14 MG/DL (ref 9.8–20.1)
CALCIUM SERPL-MCNC: 8.4 MG/DL (ref 8.4–10.2)
CHLORIDE SERPL-SCNC: 97 MMOL/L (ref 98–107)
CO2 SERPL-SCNC: 35 MMOL/L (ref 23–31)
CREAT SERPL-MCNC: 0.71 MG/DL (ref 0.55–1.02)
CREAT/UREA NIT SERPL: 20
GLUCOSE SERPL-MCNC: 116 MG/DL (ref 82–115)
POTASSIUM SERPL-SCNC: 3.5 MMOL/L (ref 3.5–5.1)
SODIUM SERPL-SCNC: 141 MMOL/L (ref 136–145)

## 2022-08-05 PROCEDURE — 36415 COLL VENOUS BLD VENIPUNCTURE: CPT

## 2022-08-05 PROCEDURE — 63600175 PHARM REV CODE 636 W HCPCS

## 2022-08-05 PROCEDURE — 25000003 PHARM REV CODE 250: Performed by: STUDENT IN AN ORGANIZED HEALTH CARE EDUCATION/TRAINING PROGRAM

## 2022-08-05 PROCEDURE — 99900031 HC PATIENT EDUCATION (STAT)

## 2022-08-05 PROCEDURE — 27100171 HC OXYGEN HIGH FLOW UP TO 24 HOURS

## 2022-08-05 PROCEDURE — 99900035 HC TECH TIME PER 15 MIN (STAT)

## 2022-08-05 PROCEDURE — 80048 BASIC METABOLIC PNL TOTAL CA: CPT

## 2022-08-05 PROCEDURE — 25000003 PHARM REV CODE 250: Performed by: SPECIALIST

## 2022-08-05 PROCEDURE — 97535 SELF CARE MNGMENT TRAINING: CPT | Mod: CO

## 2022-08-05 PROCEDURE — 97535 SELF CARE MNGMENT TRAINING: CPT

## 2022-08-05 PROCEDURE — 92611 MOTION FLUOROSCOPY/SWALLOW: CPT

## 2022-08-05 PROCEDURE — 25000003 PHARM REV CODE 250: Performed by: INTERNAL MEDICINE

## 2022-08-05 PROCEDURE — 63600175 PHARM REV CODE 636 W HCPCS: Performed by: STUDENT IN AN ORGANIZED HEALTH CARE EDUCATION/TRAINING PROGRAM

## 2022-08-05 PROCEDURE — 97530 THERAPEUTIC ACTIVITIES: CPT | Mod: CQ

## 2022-08-05 PROCEDURE — 63600175 PHARM REV CODE 636 W HCPCS: Performed by: PHYSICIAN ASSISTANT

## 2022-08-05 PROCEDURE — 25000003 PHARM REV CODE 250

## 2022-08-05 PROCEDURE — 20000000 HC ICU ROOM

## 2022-08-05 PROCEDURE — 94799 UNLISTED PULMONARY SVC/PX: CPT

## 2022-08-05 PROCEDURE — 92526 ORAL FUNCTION THERAPY: CPT

## 2022-08-05 RX ORDER — FUROSEMIDE 10 MG/ML
40 INJECTION INTRAMUSCULAR; INTRAVENOUS ONCE
Status: COMPLETED | OUTPATIENT
Start: 2022-08-05 | End: 2022-08-05

## 2022-08-05 RX ORDER — AMIODARONE HYDROCHLORIDE 200 MG/1
200 TABLET ORAL DAILY
Status: DISCONTINUED | OUTPATIENT
Start: 2022-08-05 | End: 2022-08-10 | Stop reason: HOSPADM

## 2022-08-05 RX ADMIN — DOCUSATE SODIUM 200 MG: 100 CAPSULE, LIQUID FILLED ORAL at 08:08

## 2022-08-05 RX ADMIN — POLYETHYLENE GLYCOL 3350 17 G: 17 POWDER, FOR SOLUTION ORAL at 08:08

## 2022-08-05 RX ADMIN — FOLIC ACID 1 MG: 1 TABLET ORAL at 08:08

## 2022-08-05 RX ADMIN — AMIODARONE HYDROCHLORIDE 200 MG: 200 TABLET ORAL at 11:08

## 2022-08-05 RX ADMIN — FUROSEMIDE 40 MG: 10 INJECTION, SOLUTION INTRAMUSCULAR; INTRAVENOUS at 08:08

## 2022-08-05 RX ADMIN — SUCRALFATE 1 G: 1 TABLET ORAL at 04:08

## 2022-08-05 RX ADMIN — SUCRALFATE 1 G: 1 TABLET ORAL at 11:08

## 2022-08-05 RX ADMIN — ASPIRIN 81 MG: 81 TABLET, COATED ORAL at 08:08

## 2022-08-05 RX ADMIN — LEVETIRACETAM 500 MG: 100 INJECTION, SOLUTION INTRAVENOUS at 08:08

## 2022-08-05 RX ADMIN — ENOXAPARIN SODIUM 40 MG: 40 INJECTION SUBCUTANEOUS at 04:08

## 2022-08-05 RX ADMIN — SUCRALFATE 1 G: 1 TABLET ORAL at 08:08

## 2022-08-05 RX ADMIN — FUROSEMIDE 40 MG: 10 INJECTION, SOLUTION INTRAMUSCULAR; INTRAVENOUS at 02:08

## 2022-08-05 RX ADMIN — FAMOTIDINE 20 MG: 10 INJECTION INTRAVENOUS at 08:08

## 2022-08-05 NOTE — PLAN OF CARE
Problem: SLP  Goal: SLP Goal  Description: LTG: Pt will tolerate least restrictive PO diet with no clinical signs/sx aspiration    STGs:  Pt will tolerate ice chips with no clinical signs/sx aspiration.  D/C 8/5/22  Pt will tolerate puree solids with improved bolus formation/transport. MET 8/5/22  Pt will tolerate soft and bite sized solids with improved bolus formation/transport  Pt will complete base of tongue and laryngeal strengthening exercises with minimal cues.  Pt will tolerate thermal stimulation to the anterior faucial pillars with 100% effortful swallows and delay less than 2 seconds.      Outcome: Ongoing, Progressing       POC and goals updated,  Yasmeen

## 2022-08-05 NOTE — PT/OT/SLP PROGRESS
Occupational Therapy  Treatment    Blanquita Montoya   MRN: [id number]   Admitting Diagnosis: <principal problem not specified>    OT Date of Treatment: 08/05/22   OT Start Time: 0843  OT Stop Time: 0906  OT Total Time (min): 23 min     Billable Minutes:  Self Care/Home Management 2  Total Minutes: 23           KEY Visit Number: 2    General Precautions: Standard, aspiration  Orthopedic Precautions:    Braces:           Subjective:  Communicated with RN prior to session.  52HR, 94o2, 125/59  Vapotherm-35fio2, 20LPM    Objective:  Pt. Requiring Min A progressing to SBA for sitting balance EOB no LOB noted. Pt. Performing grooming task (oral hygiene) using R UE for excursion to mouth. Appropriate setup of grooming items noted.  Pt. Requiring Mod A during sit to stand from EOB using RW for UE support with balance, pt. Able to take side steps to HOB with Mod A and constant cueing for step progression.    Functional Mobility:  Bed Mobility:   Supine to sit: Moderate Assistance   Sit to supine: Moderate Assistance   Rolling: Moderate Assistance   Scooting: Moderate Assistance    Transfer Training:   Sit to stand:Moderate Assistance with Rolling Walker .    Patient left with bed in chair position with all lines intact and call button in reach    ASSESSMENT:  Blanquita Motnoya is a 66 y.o. female with a medical diagnosis of <principal problem not specified> Pt. Tolerated session well overall Mod A for mobility, continue POC    Rehab potential is good    Activity tolerance: Good    Discharge recommendations: rehabilitation facility     Equipment recommendations:       GOALS:   Multidisciplinary Problems     Occupational Therapy Goals        Problem: Occupational Therapy    Goal Priority Disciplines Outcome Interventions   Occupational Therapy Goal     OT, PT/OT Ongoing, Progressing    Description: Goals to be met by: dc     Patient will increase functional independence with ADLs by performing:    Grooming while EOB with  Minimal Assistance.  Sitting at edge of bed x10 minutes with Stand-by Assistance.  Stand pivot transfers with Moderate Assistance.  Toilet transfer to bedside commode with Moderate Assistance.  Increased functional strength to 3/5 for R UE.                     Plan:  Patient to be seen daily to address the above listed problems via self-care/home management, therapeutic activities, therapeutic exercises  Plan of Care expires: 08/31/22  Plan of Care reviewed with: patient         08/05/2022

## 2022-08-05 NOTE — PT/OT/SLP PROGRESS
Physical Therapy Treatment    Patient Name:  Blanquita Montoya   MRN:  [id number]    Recommendations:     Discharge Recommendations:  rehabilitation facility   Discharge Equipment Recommendations: other (see comments) (Pending progress)   Barriers to discharge: None    Assessment:     Blanquita Montoya is a 66 y.o. female admitted with a medical diagnosis of resection of thoracic aortic aneurysm.  She presents with the following impairments/functional limitations:  weakness, gait instability, impaired endurance, impaired balance.    Pt remains on vapotherm and off amiodarone currently and with stable HR. Pt is more alert, speaking more clearly, and overall able to participate in therapy well today. Steadily improving assist level.     Rehab Prognosis: Good; patient would benefit from acute skilled PT services to address these deficits and reach maximum level of function.    Recent Surgery: Procedure(s) (LRB):  REPAIR, AORTA, ASCENDING (N/A) 7 Days Post-Op    Plan:     During this hospitalization, patient to be seen daily to address the identified rehab impairments via gait training, therapeutic activities, therapeutic exercises and progress toward the following goals:    · Plan of Care Expires:  09/01/22    Subjective     Chief Complaint: None  Patient/Family Comments/goals: to get stronger  Pain/Comfort:  · Pain Rating 1: other (see comments) (Initially declined pain but then reported burning pain near incision on sternum. Did not rate pain.)  · Pain Addressed 1: Reposition      Objective:     Communicated with RN prior to session.  Patient found HOB elevated with pulse ox (continuous), telemetry, blood pressure cuff, oxygen upon PT entry to room.     General Precautions: Standard, aspiration   Orthopedic Precautions:N/A   Braces: N/A  Respiratory Status: High flow, flow 20 L/min, concentration 35%   Vitals:     HR: 57    BP: 136/59    SPO2: 97%    Functional Mobility:  · Bed Mobility:    · Supine<->sit; min to  mod assist  · Transfers:  · Sit<->stand; min lift assist with RW    · Gait:   · Side steps to HOB with mod assist to manage RW  · Marching in place x 15 reps total, min assist for balance.       AM-PAC 6 CLICK MOBILITY  Turning over in bed (including adjusting bedclothes, sheets and blankets)?: 3  Sitting down on and standing up from a chair with arms (e.g., wheelchair, bedside commode, etc.): 3  Moving from lying on back to sitting on the side of the bed?: 3  Moving to and from a bed to a chair (including a wheelchair)?: 2  Need to walk in hospital room?: 2  Climbing 3-5 steps with a railing?: 1  Basic Mobility Total Score: 14       Therapeutic Activities and Exercises:   LLE LAQ's x 10 reps with cues to improve form    Patient left HOB elevated with all lines intact and call button in reach..    GOALS:   Multidisciplinary Problems     Physical Therapy Goals        Problem: Physical Therapy    Goal Priority Disciplines Outcome Goal Variances Interventions   Physical Therapy Goal     PT, PT/OT Ongoing, Progressing     Description: Goals to be met by: 22    Patient will increase functional independence with mobility by performin. Supine to sit with MInimal Assistance  2. Sit to supine with MInimal Assistance  3. Sit to stand transfer with Moderate Assistance  4. Bed to chair transfer with Moderate Assistance using Rolling Walker vs LRAD  5. Gait : TBD  6. Sitting at edge of bed x15 minutes with Stand-by Assistance                     Time Tracking:     PT Received On: 22  PT Start Time: 1045     PT Stop Time: 1109  PT Total Time (min): 24 min     Billable Minutes: Therapeutic Activity 2 units    Treatment Type: Treatment  PT/PTA: PTA     PTA Visit Number: 2     2022

## 2022-08-05 NOTE — PROCEDURES
"Speech Language Pathology Department  Modified Barium Swallow Study    Patient Name:  Blanquita Montoya   MRN:  [id number]  Diagnosis: aortic aneurysm s/p repair    Recommendations:     General Recommendations:  Dysphagia therapy  Repeat MBS study: 7-10 days or as clinically warranted  Diet recommendations:  Pureed solids, moderately thick liquids   Swallow Strategies/Precautions: small bites/sips, NO straws and medications crushed in puree  General Precautions: Standard, aspiration    History:     A Modified Barium Swallow Study was completed to assess the efficiency of his/her swallow function, rule out aspiration and make recommendations regarding safe dietary consistencies, effective compensatory strategies, and safe eating environment.     Past Medical History:   Diagnosis Date    Anxiety disorder     Coronary artery disease     GERD (gastroesophageal reflux disease)     Hypercholesterolemia     Hypertension     Hypothyroidism     Obesity     Seizure in childhood     last one age 11    Sleep apnea     does not currently use CPAP    Thoracic aortic aneurysm 03/10/2022    4.5X4.4 Ascending Aorta as of 3/10/2022    Thoracic aortic aneurysm     Thyroid disease        Home Diet: Regular and thin liquids  Current Method of Nutrition: NPO    Patient complaint: "I want a coke"    Subjective:     Patient awake and alert.    Respiratory Status: vapotherm, 15L      Fluoroscopic Results:     Oral Musculature Evaluation:   Oral Musculature: WFL   Dentition: edentulous   Voice Prior to PO Intake: adequate    Visualization  · Patient was seen in the lateral view    Oral Phase:    Loss of bolus control    Pharyngeal Phase:    Swallow delay with spill to the pyriform sinuses   Reduced hyolaryngeal excursion   Reduced airway protection   Laryngeal penetration with mildly thick liquids via cup   Base of tongue residue mild   Vallecular residue mild  Consistency Laryngeal Penetration Aspiration Residue Strategy "   Mildly thick liquids via teaspoon none none Base of tongue, vallecular, mild    puree none none Base of tongue, vallecular, mild    Mildly thick liquids via cup During, did NOT clear none Base of tongue, vallecular, mild    moderately thick liquids via cup none none Base of tongue, vallecular, mild                    Cervical Esophageal Phase:    UES appeared to accommodate all bolus types without stasis or retrograde movement visualized          Assessment:     The pt presents with moderate-severe oropharyngeal dysphagia characterized by loss of bolus control, reduced base of tongue retraction, swallow delay with prespill to the pyriform sinuses, and reduced laryngeal elevation resulting in reduced airway protection with laryngeal penetration of mildly thick liquids via cup during the swallow which did NOT clear from the laryngeal vestibule.  Mild oropharyngeal residue was reduced with an additional swallow.  Chewable solids not tested as pt edentulous.  Although no aspiration was visualized, pt is at HIGH risk.  Skilled SLP services warranted to tx dysphagia.    Goals:   Multidisciplinary Problems     SLP Goals        Problem: SLP    Goal Priority Disciplines Outcome   SLP Goal     SLP Ongoing, Progressing   Description: LTG: Pt will tolerate least restrictive PO diet with no clinical signs/sx aspiration    STGs:  Pt will tolerate ice chips with no clinical signs/sx aspiration.  D/C 8/5/22  Pt will tolerate puree solids with improved bolus formation/transport. MET 8/5/22  Pt will tolerate soft and bite sized solids with improved bolus formation/transport  Pt will complete base of tongue and laryngeal strengthening exercises with minimal cues.  Pt will tolerate thermal stimulation to the anterior faucial pillars with 100% effortful swallows and delay less than 2 seconds.                       Plan:     Patient to be seen:  5 x/week   Plan of Care expires:  09/01/22  Plan of Care reviewed with:  patient, family    SLP Follow-Up:  Yes      Time Tracking:     SLP Treatment Date:   08/05/22  Speech Start Time:  1250  Speech Stop Time:  1330     Speech Total Time (min):  40 min    Billable minutes: Motion Fluoroscopic Evaluation, Video Recording, 40 minutes       08/05/2022

## 2022-08-05 NOTE — PROGRESS NOTES
CT SURGERY PROGRESS NOTE  Blanquita Montoya  66 y.o.  1955    Patients Procedure: Procedure(s) (LRB):  REPAIR, AORTA, ASCENDING (N/A)    Subjective  Interval History: Patient given lasix overnight, on lasix daily. Patient continues to improve, continuing to wean vapotherm. Patient NPO pending re-eval by SLP, nutrition following. Only complaint is thirst.    Review of Systems   Constitutional: Negative.    Respiratory: Negative for cough, sputum production and shortness of breath.    Cardiovascular: Negative for chest pain, palpitations, claudication and leg swelling.   Gastrointestinal: Negative for abdominal pain, nausea and vomiting.   Genitourinary: Negative.    Skin: Negative.         Incision C/D/I       Medication List  Infusions   sodium chloride 0.9% 100 mL/hr at 08/01/22 0200    dexmedetomidine (PRECEDEX) infusion Stopped (08/03/22 0400)     Scheduled   aspirin  81 mg Oral Daily    docusate sodium  200 mg Oral BID    enoxaparin  40 mg Subcutaneous Daily    famotidine (PF)  20 mg Intravenous Daily    folic acid  1 mg Oral Daily    furosemide (LASIX) injection  40 mg Intravenous Daily    levetiracetam IV  500 mg Intravenous Q12H    LIDOcaine (PF) 10 mg/ml (1%)  1 mL Intradermal Once    loperamide  4 mg Oral Once    metoprolol tartrate  12.5 mg Oral BID    polyethylene glycol  17 g Oral BID    potassium chloride in water  40 mEq Intravenous Once    sucralfate  1 g Oral QID (AC & HS)       Objective:  Recent Vitals:  Temp:  [98.6 °F (37 °C)-99.3 °F (37.4 °C)] 98.6 °F (37 °C)  Pulse:  [51-86] 52  Resp:  [20-38] 24  SpO2:  [89 %-99 %] 95 %  BP: (119-143)/(57-72) 125/59    Physical Exam  Vitals and nursing note reviewed.   Constitutional:       General: She is awake. She is not in acute distress.     Appearance: Normal appearance. She is not toxic-appearing or diaphoretic.   HENT:      Head: Normocephalic and atraumatic.   Eyes:      Extraocular Movements: Extraocular movements intact.       Pupils: Pupils are equal, round, and reactive to light.   Cardiovascular:      Rate and Rhythm: Normal rate and regular rhythm.      Pulses: Normal pulses.           Radial pulses are 2+ on the right side and 2+ on the left side.        Dorsalis pedis pulses are 2+ on the right side and 2+ on the left side.      Heart sounds: Normal heart sounds.   Pulmonary:      Effort: Pulmonary effort is normal.      Breath sounds: Normal breath sounds.   Abdominal:      General: Abdomen is flat.      Palpations: Abdomen is soft.   Musculoskeletal:      Right lower leg: No edema.      Left lower leg: No edema.   Skin:     General: Skin is warm and dry.      Comments: INCISION C/D/I   Neurological:      General: No focal deficit present.      Mental Status: She is alert and oriented to person, place, and time.   Psychiatric:         Mood and Affect: Mood and affect normal.          I/O last 24 hrs:  Intake/Output - Last 3 Shifts       08/03 0700 08/04 0659 08/04 0700 08/05 0659 08/05 0700 08/06 0659    P.O. 300      I.V. (mL/kg) 198 (2.1) 582 (6.5)     Blood 558.3      IV Piggyback 697      Total Intake(mL/kg) 1753.3 (19) 582 (6.5)     Urine (mL/kg/hr) 1625 (0.7) 2225 (1) 60 (0.2)    Stool 2      Total Output 1627 2225 60    Net +126.3 -1643 -60                 Labs  BMP:   Recent Labs   Lab 08/04/22  0021 08/05/22  0919    141   K 3.3* 3.5   CO2 30 35*   BUN 10.3 14.0   CREATININE 0.68 0.71   CALCIUM 8.7 8.4   MG 1.90  --      CBC:   Recent Labs   Lab 08/04/22  0021   WBC 10.3   RBC 4.01*   HGB 12.0   HCT 36.3*      MCV 90.5   MCH 29.9   MCHC 33.1     All pertinent labs from the last 24 hours have been reviewed.      Imaging:   CXR: X-Ray Chest 1 View    Result Date: 8/2/2022  Increasing small pleural effusions and bibasilar airspace opacities, right greater than left. Electronically signed by: Alison Montemayor Date:    08/02/2022 Time:    06:50  I have reviewed all pertinent imaging results/findings within the  past 24 hours.        ASSESSMENT/PLAN:  - cont ICU care  - wean o2 as able  - IS/OOB  - NPO, reval by SLP for MBS, nutrition follwoing  Case and plan of care discussed with MD Pernell Seymour PA-C

## 2022-08-05 NOTE — PT/OT/SLP PROGRESS
"Speech Language Pathology Department  Dysphagia Therapy    Patient Name:  Blanquita Montoya   MRN:  [id number]  Admitting Diagnosis: ascending aortic aneurysm s/p repair    Recommendations:     General Recommendations:  Modified Barium Swallow Study  Diet recommendations:  NPO, Liquid Diet Level: NPO   Aspiration Precautions: medications crushed in puree    Discharge recommendations:  rehabilitation facility   Barriers to Discharge:  severity of impairment    Subjective     Patient awake and alert.    Patient goals: "I want a coke."     Pain/Comfort:  · Pain Rating 1: 0/10    Respiratory Status: vapotherm, 20L @ 35%fio2    Nursing reports pt without difficulty tolerating PO medications crushed in pudding.    Objective:     Oral Musculature Evaluation:   Oral Musculature: WFL    Consistencies given:  Ice chips-mild bolus holding, no signs/sx of aspiration noted.  Respiratory status remained stable.    Assessment:     Pt continues to present with possible oropharyngeal dysphagia.  SLP rec: continue NPO, ok for meds crushed in pudding.  Will proceed with MBS to assess current swallow function.    POC discussed with pt and family at bedside.  All verbalized understanding to POC.    Goals:   Multidisciplinary Problems     SLP Goals        Problem: SLP    Goal Priority Disciplines Outcome   SLP Goal     SLP Ongoing, Progressing   Description: LTG: Pt will tolerate least restrictive PO diet with no clinical signs/sx aspiration    STGs:  Pt will tolerate ice chips with no clinical signs/sx aspiration  Pt will tolerate puree solids with improved bolus formation/transport                     Plan:   Will proceed with MBS.    Patient to be seen:  daily   Plan of Care expires:  09/01/22  Plan of Care reviewed with:  patient, family   SLP Follow-Up:  Yes       Time Tracking:     SLP Treatment Date:   08/05/22  Speech Start Time:  0950  Speech Stop Time:  1000     Speech Total Time (min):  10 min    Billable minutes:  Treatment of " Swallow Dysfunction, 10 minutes       08/05/2022

## 2022-08-05 NOTE — PROGRESS NOTES
[facility] - [facility] ICU  Cardiology  Progress Note    Patient Name: Blanquita Montoya  MRN: [id number]  Admission Date: 7/29/2022  Hospital Length of Stay: 7 days  Code Status: Full Code   Attending Physician: Alec Vega IV, MD   Primary Care Physician: Kamila Whittaker MD  Expected Discharge Date:   Principal Problem:<principal problem not specified>    Subjective:     Brief HPI: 66 F admitted for resection of ascending thoracic aortic aneurysm and left atrial appendage ligation now POD 6. Overnight, patient converted to a fib RVR. Started on amiodarone drip. Cardiology consulted.      8.4.22: Sitting up in bed. Afib CVR on tele. Patient denies CP, palpitations. + SOB on Vapotherm.  8.5.22: NAD noted. Sitting up in bed. Working with PT. Normal sinus on tele. No longer on amio drip. SOB improving, on Vapotherm 35% FiO2. Denies CP, palpitations.      PMH: HTN, HLD, GERD, MIKI on CPAP, Hypothyroidism, and thoracic ascending aortic aneurysm   PSH: Hysterectomy, resection thoracic aortic aneurysm and left atrial appendage ligation  Family History: CAD in parents     Previous Cardiac Diagnostics:      LHC 7/11/22:  No obstructive coronary artery disease     ECHO 5/21/2021:   Normal left ventricular size and function.  EF= 65-69%  Normal right ventricular size and     ECHO (10/2020):  Left ventricular ejection fraction is measured at approximately 55%.  Mild mitral regurgitation.  Mild to moderate aortic regurgitation present.  Mild tricuspid regurgitation with RVSP estimated at 20 mmHg.  Pulmonic valve was not well visualized.  Normal size left atrium.   Normal right atrial size.  Normal right ventricular size with preserved RV function.  IVC normal.     Review of Systems   Constitutional: Negative for fever.   Respiratory: Positive for shortness of breath.    Cardiovascular: Negative for chest pain, palpitations and leg swelling.     Objective:     Vital Signs (Most Recent):  Temp: 98.6 °F (37 °C)  (08/05/22 0800)  Pulse: (!) 52 (08/05/22 1045)  Resp: (!) 23 (08/05/22 1045)  BP: (!) 136/59 (08/05/22 1045)  SpO2: 96 % (08/05/22 1045) Vital Signs (24h Range):  Temp:  [98.6 °F (37 °C)-99.3 °F (37.4 °C)] 98.6 °F (37 °C)  Pulse:  [51-86] 52  Resp:  [20-38] 23  SpO2:  [89 %-98 %] 96 %  BP: (119-143)/(57-72) 136/59     Weight: 89.3 kg (196 lb 13.9 oz)  Body mass index is 30.83 kg/m².    SpO2: 96 %  O2 Device (Oxygen Therapy): Vapotherm      Intake/Output Summary (Last 24 hours) at 8/5/2022 1200  Last data filed at 8/5/2022 1000  Gross per 24 hour   Intake 582 ml   Output 2010 ml   Net -1428 ml       Lines/Drains/Airways       Drain  Duration                  Urethral Catheter 08/02/22 0915 Non-latex 16 Fr. 3 days              Peripheral Intravenous Line  Duration                  Peripheral IV - Single Lumen 08/03/22 0930 20 G;2 in Anterior;Left Forearm 2 days         Peripheral IV - Single Lumen 08/04/22 0400 20 G Right Forearm 1 day                    Significant Labs:   Recent Results (from the past 72 hour(s))   Basic Metabolic Panel    Collection Time: 08/02/22  6:33 PM   Result Value Ref Range    Sodium Level 139 136 - 145 mmol/L    Potassium Level 3.5 3.5 - 5.1 mmol/L    Chloride 101 98 - 107 mmol/L    Carbon Dioxide 27 23 - 31 mmol/L    Glucose Level 117 (H) 82 - 115 mg/dL    Blood Urea Nitrogen 8.1 (L) 9.8 - 20.1 mg/dL    Creatinine 0.67 0.55 - 1.02 mg/dL    BUN/Creatinine Ratio 12     Calcium Level Total 7.9 (L) 8.4 - 10.2 mg/dL    Estimated GFR-Non  >60 mls/min/1.73/m2    Anion Gap 11.0 mEq/L   Basic Metabolic Panel    Collection Time: 08/03/22  1:52 AM   Result Value Ref Range    Sodium Level 139 136 - 145 mmol/L    Potassium Level 3.5 3.5 - 5.1 mmol/L    Chloride 100 98 - 107 mmol/L    Carbon Dioxide 29 23 - 31 mmol/L    Glucose Level 102 82 - 115 mg/dL    Blood Urea Nitrogen 10.5 9.8 - 20.1 mg/dL    Creatinine 0.71 0.55 - 1.02 mg/dL    BUN/Creatinine Ratio 15     Calcium Level Total 8.5  8.4 - 10.2 mg/dL    Estimated GFR-Non  >60 mls/min/1.73/m2    Anion Gap 10.0 mEq/L   Prepare RBC 2 Units; to give 8/3/22    Collection Time: 08/03/22  8:58 AM   Result Value Ref Range    UNIT NUMBER [id number]     UNIT ABO/RH O POS     DISPENSE STATUS Transfused     Unit Expiration [id number]     Product Code W7712S15     Unit Blood Type Code 5100     CROSSMATCH INTERPRETATION Compatible     UNIT NUMBER [id number]     UNIT ABO/RH O POS     DISPENSE STATUS Transfused     Unit Expiration [id number]     Product Code Z1352V48     Unit Blood Type Code 5100     CROSSMATCH INTERPRETATION Compatible    Type & Screen    Collection Time: 08/03/22  8:58 AM   Result Value Ref Range    Group & Rh O POS     Indirect Kaye GEL NEG    Phosphorus    Collection Time: 08/04/22 12:21 AM   Result Value Ref Range    Phosphorus Level 2.6 2.3 - 4.7 mg/dL   Magnesium    Collection Time: 08/04/22 12:21 AM   Result Value Ref Range    Magnesium Level 1.90 1.60 - 2.60 mg/dL   Comprehensive Metabolic Panel    Collection Time: 08/04/22 12:21 AM   Result Value Ref Range    Sodium Level 141 136 - 145 mmol/L    Potassium Level 3.3 (L) 3.5 - 5.1 mmol/L    Chloride 101 98 - 107 mmol/L    Carbon Dioxide 30 23 - 31 mmol/L    Glucose Level 113 82 - 115 mg/dL    Blood Urea Nitrogen 10.3 9.8 - 20.1 mg/dL    Creatinine 0.68 0.55 - 1.02 mg/dL    Calcium Level Total 8.7 8.4 - 10.2 mg/dL    Protein Total 6.2 5.8 - 7.6 gm/dL    Albumin Level 2.4 (L) 3.4 - 4.8 gm/dL    Globulin 3.8 (H) 2.4 - 3.5 gm/dL    Albumin/Globulin Ratio 0.6 (L) 1.1 - 2.0 ratio    Bilirubin Total 3.6 (H) <=1.5 mg/dL    Alkaline Phosphatase 218 (H) 40 - 150 unit/L    Alanine Aminotransferase 59 (H) 0 - 55 unit/L    Aspartate Aminotransferase 76 (H) 5 - 34 unit/L    Estimated GFR-Non  >60 mls/min/1.73/m2   CBC with Differential    Collection Time: 08/04/22 12:21 AM   Result Value Ref Range    WBC 10.3 4.5 - 11.5 x10(3)/mcL    RBC 4.01 (L) 4.20 -  5.40 x10(6)/mcL    Hgb 12.0 12.0 - 16.0 gm/dL    Hct 36.3 (L) 37.0 - 47.0 %    MCV 90.5 80.0 - 94.0 fL    MCH 29.9 27.0 - 31.0 pg    MCHC 33.1 33.0 - 36.0 mg/dL    RDW 13.6 11.5 - 17.0 %    Platelet 189 130 - 400 x10(3)/mcL    MPV 11.1 (H) 7.4 - 10.4 fL    Neut % 82.1 %    Lymph % 6.7 %    Mono % 9.1 %    Eos % 0.6 %    Basophil % 0.3 %    Lymph # 0.69 0.6 - 4.6 x10(3)/mcL    Neut # 8.5 2.1 - 9.2 x10(3)/mcL    Mono # 0.94 0.1 - 1.3 x10(3)/mcL    Eos # 0.06 0 - 0.9 x10(3)/mcL    Baso # 0.03 0 - 0.2 x10(3)/mcL    IG# 0.12 (H) 0 - 0.04 x10(3)/mcL    IG% 1.2 %    NRBC% 0.5 %   Basic Metabolic Panel    Collection Time: 08/05/22  9:19 AM   Result Value Ref Range    Sodium Level 141 136 - 145 mmol/L    Potassium Level 3.5 3.5 - 5.1 mmol/L    Chloride 97 (L) 98 - 107 mmol/L    Carbon Dioxide 35 (H) 23 - 31 mmol/L    Glucose Level 116 (H) 82 - 115 mg/dL    Blood Urea Nitrogen 14.0 9.8 - 20.1 mg/dL    Creatinine 0.71 0.55 - 1.02 mg/dL    BUN/Creatinine Ratio 20     Calcium Level Total 8.4 8.4 - 10.2 mg/dL    Estimated GFR-Non  >60 mls/min/1.73/m2    Anion Gap 9.0 mEq/L       Telemetry:  Normal sinus    Physical Exam  Constitutional:       General: She is not in acute distress.  Cardiovascular:      Rate and Rhythm: Normal rate. Normal rhythm.      Pulses:           Radial pulses are 2+ on the right side and 2+ on the left side.      Heart sounds: Normal heart sounds. No murmur heard.  Pulmonary:      Effort: Tachypnea present.      Breath sounds: Wheezing present.      Comments: On Vapotherm 35%  FIO2  Chest:      Comments: Dressing over midline sternotomy without bleeding or discharge    Musculoskeletal:      Right lower leg: No edema.      Left lower leg: No edema.   Skin:     General: Skin is warm and dry.   Neurological:      Mental Status: She is alert.   Current Inpatient Medications:    Current Facility-Administered Medications:     0.9%  NaCl infusion (for blood administration), , Intravenous, Q24H  PRN, PAULETTE Fernandez    0.9%  NaCl infusion, , Intravenous, Continuous, Barbie Goodwin MD, Last Rate: 100 mL/hr at 08/01/22 0200, New Bag at 08/01/22 0200    acetaminophen oral solution 650 mg, 650 mg, Per OG tube, Q6H PRN, PAULETTE Hurt    albumin human 5% bottle 12.5 g, 12.5 g, Intravenous, PRN, PAULETTE Hurt, Stopped at 07/29/22 1756    albuterol-ipratropium 2.5 mg-0.5 mg/3 mL nebulizer solution 3 mL, 3 mL, Nebulization, Q4H PRN, Alec Vega IV, MD, 3 mL at 08/04/22 1144    amiodarone tablet 200 mg, 200 mg, Oral, Daily, Eduardo Palma MD, 200 mg at 08/05/22 1141    aspirin EC tablet 81 mg, 81 mg, Oral, Daily, PAULETTE Hurt, 81 mg at 08/05/22 0801    dexmedetomidine (PRECEDEX) 400mcg/100mL 0.9% NaCL infusion, 0-1.4 mcg/kg/hr, Intravenous, Continuous, Santy Chau MD, Paused at 08/03/22 0400    dextrose 50% injection 12.5 g, 12.5 g, Intravenous, PRN, PAULETTE Hurt    dextrose 50% injection 25 g, 25 g, Intravenous, PRN, PAULETTE Hurt    docusate sodium capsule 200 mg, 200 mg, Oral, BID, Alec Vega IV, MD, 200 mg at 08/05/22 0800    enoxaparin injection 40 mg, 40 mg, Subcutaneous, Daily, PAULETTE Hurt, 40 mg at 08/04/22 1800    famotidine (PF) injection 20 mg, 20 mg, Intravenous, Daily, PAULETTE Hurt, 20 mg at 08/05/22 0801    folic acid tablet 1 mg, 1 mg, Oral, Daily, PAULETTE Hurt, 1 mg at 08/05/22 0801    furosemide injection 40 mg, 40 mg, Intravenous, Daily, Alec Vega IV, MD, 40 mg at 08/05/22 0801    guaiFENesin 100 mg/5 ml syrup 200 mg, 200 mg, Oral, Q4H PRN, PAULETTE Fernandez, 200 mg at 08/03/22 1249    hydrALAZINE injection 10 mg, 10 mg, Intravenous, Q2H PRN, Richard Hernández MD, 10 mg at 08/04/22 0207    HYDROcodone-acetaminophen 5-325 mg per tablet 1 tablet, 1 tablet, Oral, Q4H PRN, PAULETTE Hurt    lactulose 10 gram/15 ml solution 20 g, 20 g, Oral, Q6H PRN, PAULETTE Hurt    levETIRAcetam (KEPPRA) 500 mg in dextrose 5 % in water (D5W) 5 %  100 mL IVPB (MB+), 500 mg, Intravenous, Q12H, Pranay Velasco MD, Stopped at 08/05/22 0830    LIDOcaine (PF) 10 mg/ml (1%) injection 10 mg, 1 mL, Intradermal, Once, Malcolm Corona DO    loperamide capsule 4 mg, 4 mg, Oral, Once, PAULETTE Hurt    metoclopramide HCl injection 5 mg, 5 mg, Intravenous, Q6H PRN, PAULETTE Hurt    morphine injection 2 mg, 2 mg, Intravenous, PRN, PAULETTE Hurt, 2 mg at 08/02/22 0450    ondansetron injection 4 mg, 4 mg, Intravenous, Q12H PRN, PAULETTE Hurt, 4 mg at 08/04/22 0432    oxyCODONE immediate release tablet 5 mg, 5 mg, Oral, Q4H PRN, PAULETTE Hurt, 5 mg at 08/03/22 1444    polyethylene glycol packet 17 g, 17 g, Oral, BID, Alec Vega IV, MD, 17 g at 08/05/22 0800    potassium chloride 20 mEq in 100 mL IVPB (FOR CENTRAL LINE ADMINISTRATION ONLY), 40 mEq, Intravenous, Once, Derick Porras MD    simethicone chewable tablet 160 mg, 2 tablet, Oral, TID PRN, Alec Vega IV, MD, 160 mg at 08/01/22 1830    sucralfate tablet 1 g, 1 g, Oral, QID (AC & HS), PAULETTE Hurt, 1 g at 08/05/22 1141    VTE Risk Mitigation (From admission, onward)           Ordered     enoxaparin injection 40 mg  Daily         07/29/22 1046     Place sequential compression device  Until discontinued         07/29/22 1046     IP VTE HIGH RISK PATIENT  Once         07/29/22 1036                    Assessment:     Post-operative atrial fibrillation- resolved back in NSR/Sinus bradycardia  Ascending thoracic aortic aneurysm s/p resection and left atrial appendage ligation   Essential hypertension  Mixed hyperlipidemia  MIKI on CPAP    Plan:     Patient back in sinus rhythm/sinus bradycardia. Amiodarone 200 mg PO daily. Hold beta-blockers due to bradycardia.  No need for anticoagulation at this time as the patient is s/p CROW ligation.  Continue telemetry monitoring.    Rest of care per primary team.   Cardiology to sign off at this time. Please feel free to call if any concerns arise.     Monica TOBIAS  MD Hair  [employer] [facility] Resident, [facility]  Cardiology  [facility] - [facility] ICU  08/05/2022     I have seen the patient, reviewed the resident's progress note, assessment and plan. I have personally interviewed and examined the patient at bedside and agree with the findings.

## 2022-08-06 LAB
ANION GAP SERPL CALC-SCNC: 7 MEQ/L
BUN SERPL-MCNC: 14.2 MG/DL (ref 9.8–20.1)
CALCIUM SERPL-MCNC: 8.5 MG/DL (ref 8.4–10.2)
CHLORIDE SERPL-SCNC: 93 MMOL/L (ref 98–107)
CO2 SERPL-SCNC: 38 MMOL/L (ref 23–31)
CREAT SERPL-MCNC: 0.74 MG/DL (ref 0.55–1.02)
CREAT/UREA NIT SERPL: 19
GLUCOSE SERPL-MCNC: 96 MG/DL (ref 82–115)
POTASSIUM SERPL-SCNC: 3.5 MMOL/L (ref 3.5–5.1)
SODIUM SERPL-SCNC: 138 MMOL/L (ref 136–145)

## 2022-08-06 PROCEDURE — 20000000 HC ICU ROOM

## 2022-08-06 PROCEDURE — 25000003 PHARM REV CODE 250: Performed by: SPECIALIST

## 2022-08-06 PROCEDURE — 63600175 PHARM REV CODE 636 W HCPCS: Performed by: SPECIALIST

## 2022-08-06 PROCEDURE — 63600175 PHARM REV CODE 636 W HCPCS: Performed by: STUDENT IN AN ORGANIZED HEALTH CARE EDUCATION/TRAINING PROGRAM

## 2022-08-06 PROCEDURE — 25000003 PHARM REV CODE 250: Performed by: STUDENT IN AN ORGANIZED HEALTH CARE EDUCATION/TRAINING PROGRAM

## 2022-08-06 PROCEDURE — 80048 BASIC METABOLIC PNL TOTAL CA: CPT

## 2022-08-06 PROCEDURE — 94761 N-INVAS EAR/PLS OXIMETRY MLT: CPT

## 2022-08-06 PROCEDURE — 25000003 PHARM REV CODE 250: Performed by: INTERNAL MEDICINE

## 2022-08-06 PROCEDURE — 63600175 PHARM REV CODE 636 W HCPCS

## 2022-08-06 PROCEDURE — 92526 ORAL FUNCTION THERAPY: CPT

## 2022-08-06 PROCEDURE — 25000003 PHARM REV CODE 250

## 2022-08-06 PROCEDURE — 27000221 HC OXYGEN, UP TO 24 HOURS

## 2022-08-06 PROCEDURE — 25000003 PHARM REV CODE 250: Performed by: PHYSICIAN ASSISTANT

## 2022-08-06 PROCEDURE — 36415 COLL VENOUS BLD VENIPUNCTURE: CPT

## 2022-08-06 RX ADMIN — DOCUSATE SODIUM 200 MG: 100 CAPSULE, LIQUID FILLED ORAL at 08:08

## 2022-08-06 RX ADMIN — ACETAMINOPHEN 650 MG: 650 SOLUTION ORAL at 08:08

## 2022-08-06 RX ADMIN — SUCRALFATE 1 G: 1 TABLET ORAL at 06:08

## 2022-08-06 RX ADMIN — AMIODARONE HYDROCHLORIDE 200 MG: 200 TABLET ORAL at 08:08

## 2022-08-06 RX ADMIN — FOLIC ACID 1 MG: 1 TABLET ORAL at 08:08

## 2022-08-06 RX ADMIN — ASPIRIN 81 MG: 81 TABLET, COATED ORAL at 08:08

## 2022-08-06 RX ADMIN — LEVETIRACETAM 500 MG: 100 INJECTION, SOLUTION INTRAVENOUS at 08:08

## 2022-08-06 RX ADMIN — ENOXAPARIN SODIUM 40 MG: 40 INJECTION SUBCUTANEOUS at 04:08

## 2022-08-06 RX ADMIN — GUAIFENESIN 200 MG: 200 SOLUTION ORAL at 08:08

## 2022-08-06 RX ADMIN — SUCRALFATE 1 G: 1 TABLET ORAL at 11:08

## 2022-08-06 RX ADMIN — POLYETHYLENE GLYCOL 3350 17 G: 17 POWDER, FOR SOLUTION ORAL at 08:08

## 2022-08-06 RX ADMIN — FUROSEMIDE 40 MG: 10 INJECTION, SOLUTION INTRAMUSCULAR; INTRAVENOUS at 08:08

## 2022-08-06 RX ADMIN — FAMOTIDINE 20 MG: 10 INJECTION INTRAVENOUS at 08:08

## 2022-08-06 RX ADMIN — SUCRALFATE 1 G: 1 TABLET ORAL at 08:08

## 2022-08-06 RX ADMIN — SUCRALFATE 1 G: 1 TABLET ORAL at 04:08

## 2022-08-06 NOTE — PROGRESS NOTES
Pt with steady improvement  Afebrile vss  Chest clear  Heart sinus  Labs ok  Cont. Working with PT  Await bed on tele

## 2022-08-06 NOTE — PT/OT/SLP PROGRESS
"Speech Language Pathology Department  Dysphagia Therapy    Patient Name:  Blanquita Montoya   MRN:  [id number]  Admitting Diagnosis: <principal problem not specified>    Recommendations:     General Recommendations:  SLP intervention not indicated  Diet recommendations:  Regular Diet - IDDSI Level 7, Liquid Diet Level: Thin liquids - IDDSI Level 0   Aspiration Precautions: small bites/sips and slow rate    Discharge recommendations:  rehabilitation facility   Barriers to Discharge:  None    Subjective     Patient awake, alert and calm.    Patient goals: "to eat normal food"     Pain/Comfort:  · Pain Rating 1: 0/10    Respiratory Status: 3L nasal cannula    Objective:     Oral Musculature Evaluation:   Oral Musculature: WFL   Dentition: edentulous   Voice Prior to PO Intake: adequate    Respiratory status improved. Patient now on 3L nasal cannula. Tolerated thin liquids, puree, and solids without signs/symptoms of aspiration. Passed 3 oz water screen.     Patient was educated on diet recommendations and swallow precautions. Verbalized good understanding.     Discussed diet upgrade and POC with RN at the end of session.    Assessment:     Patient with functional oropharyngeal swallow for regular diet and thin liquids. No further skilled ST services warranted at this time.    Goals:   LTG: Pt will tolerate least restrictive PO diet with no clinical signs/sx aspiration    STGs:  Pt will tolerate ice chips with no clinical signs/sx aspiration.  D/C 8/5/22  Pt will tolerate puree solids with improved bolus formation/transport. MET 8/5/22  Pt will tolerate soft and bite sized solids with improved bolus formation/transport  Pt will complete base of tongue and laryngeal strengthening exercises with minimal cues. Discontinued 8/6  Pt will tolerate thermal stimulation to the anterior faucial pillars with 100% effortful swallows and delay less than 2 seconds. Discontinued 8/6    Plan:     No further ST services " warranted.    Patient to be seen:  5 x/week   Plan of Care expires:  09/01/22  Plan of Care reviewed with:  patient   SLP Follow-Up:  No       Time Tracking:     SLP Treatment Date:   08/06/22  Speech Start Time:  1230  Speech Stop Time:  1245     Speech Total Time (min):  15 min    Billable minutes:  Treatment of Swallow Dysfunction, 15 minutes       08/06/2022

## 2022-08-07 LAB
ANION GAP SERPL CALC-SCNC: 7 MEQ/L
ANION GAP SERPL CALC-SCNC: 7 MEQ/L
BUN SERPL-MCNC: 12.2 MG/DL (ref 9.8–20.1)
BUN SERPL-MCNC: 9.4 MG/DL (ref 9.8–20.1)
CALCIUM SERPL-MCNC: 7.9 MG/DL (ref 8.4–10.2)
CALCIUM SERPL-MCNC: 8.1 MG/DL (ref 8.4–10.2)
CHLORIDE SERPL-SCNC: 91 MMOL/L (ref 98–107)
CHLORIDE SERPL-SCNC: 93 MMOL/L (ref 98–107)
CO2 SERPL-SCNC: 39 MMOL/L (ref 23–31)
CO2 SERPL-SCNC: 39 MMOL/L (ref 23–31)
CREAT SERPL-MCNC: 0.69 MG/DL (ref 0.55–1.02)
CREAT SERPL-MCNC: 0.7 MG/DL (ref 0.55–1.02)
CREAT/UREA NIT SERPL: 14
CREAT/UREA NIT SERPL: 17
GLUCOSE SERPL-MCNC: 100 MG/DL (ref 82–115)
GLUCOSE SERPL-MCNC: 103 MG/DL (ref 82–115)
POTASSIUM SERPL-SCNC: 2.5 MMOL/L (ref 3.5–5.1)
POTASSIUM SERPL-SCNC: 3.1 MMOL/L (ref 3.5–5.1)
SODIUM SERPL-SCNC: 137 MMOL/L (ref 136–145)
SODIUM SERPL-SCNC: 139 MMOL/L (ref 136–145)

## 2022-08-07 PROCEDURE — 36415 COLL VENOUS BLD VENIPUNCTURE: CPT

## 2022-08-07 PROCEDURE — 94799 UNLISTED PULMONARY SVC/PX: CPT

## 2022-08-07 PROCEDURE — 25000003 PHARM REV CODE 250: Performed by: STUDENT IN AN ORGANIZED HEALTH CARE EDUCATION/TRAINING PROGRAM

## 2022-08-07 PROCEDURE — 63600175 PHARM REV CODE 636 W HCPCS: Performed by: SPECIALIST

## 2022-08-07 PROCEDURE — 63600175 PHARM REV CODE 636 W HCPCS: Performed by: STUDENT IN AN ORGANIZED HEALTH CARE EDUCATION/TRAINING PROGRAM

## 2022-08-07 PROCEDURE — 25000003 PHARM REV CODE 250: Performed by: INTERNAL MEDICINE

## 2022-08-07 PROCEDURE — 97110 THERAPEUTIC EXERCISES: CPT | Mod: CQ

## 2022-08-07 PROCEDURE — 25000003 PHARM REV CODE 250

## 2022-08-07 PROCEDURE — 25000003 PHARM REV CODE 250: Performed by: PHYSICIAN ASSISTANT

## 2022-08-07 PROCEDURE — 80048 BASIC METABOLIC PNL TOTAL CA: CPT

## 2022-08-07 PROCEDURE — 25000003 PHARM REV CODE 250: Performed by: SPECIALIST

## 2022-08-07 PROCEDURE — 20000000 HC ICU ROOM

## 2022-08-07 PROCEDURE — 63600175 PHARM REV CODE 636 W HCPCS

## 2022-08-07 RX ORDER — POTASSIUM CHLORIDE 20 MEQ/1
20 TABLET, EXTENDED RELEASE ORAL ONCE
Status: COMPLETED | OUTPATIENT
Start: 2022-08-07 | End: 2022-08-07

## 2022-08-07 RX ORDER — POTASSIUM CHLORIDE 14.9 MG/ML
20 INJECTION INTRAVENOUS ONCE
Status: COMPLETED | OUTPATIENT
Start: 2022-08-07 | End: 2022-08-07

## 2022-08-07 RX ADMIN — SUCRALFATE 1 G: 1 TABLET ORAL at 04:08

## 2022-08-07 RX ADMIN — POTASSIUM CHLORIDE 20 MEQ: 14.9 INJECTION, SOLUTION INTRAVENOUS at 03:08

## 2022-08-07 RX ADMIN — GUAIFENESIN 200 MG: 200 SOLUTION ORAL at 03:08

## 2022-08-07 RX ADMIN — DOCUSATE SODIUM 200 MG: 100 CAPSULE, LIQUID FILLED ORAL at 08:08

## 2022-08-07 RX ADMIN — ENOXAPARIN SODIUM 40 MG: 40 INJECTION SUBCUTANEOUS at 04:08

## 2022-08-07 RX ADMIN — POLYETHYLENE GLYCOL 3350 17 G: 17 POWDER, FOR SOLUTION ORAL at 08:08

## 2022-08-07 RX ADMIN — SUCRALFATE 1 G: 1 TABLET ORAL at 05:08

## 2022-08-07 RX ADMIN — ASPIRIN 81 MG: 81 TABLET, COATED ORAL at 08:08

## 2022-08-07 RX ADMIN — SUCRALFATE 1 G: 1 TABLET ORAL at 08:08

## 2022-08-07 RX ADMIN — POTASSIUM CHLORIDE 20 MEQ: 1500 TABLET, EXTENDED RELEASE ORAL at 11:08

## 2022-08-07 RX ADMIN — ONDANSETRON 4 MG: 2 INJECTION INTRAMUSCULAR; INTRAVENOUS at 03:08

## 2022-08-07 RX ADMIN — AMIODARONE HYDROCHLORIDE 200 MG: 200 TABLET ORAL at 08:08

## 2022-08-07 RX ADMIN — FOLIC ACID 1 MG: 1 TABLET ORAL at 08:08

## 2022-08-07 RX ADMIN — SUCRALFATE 1 G: 1 TABLET ORAL at 11:08

## 2022-08-07 RX ADMIN — LEVETIRACETAM 500 MG: 100 INJECTION, SOLUTION INTRAVENOUS at 08:08

## 2022-08-07 RX ADMIN — FUROSEMIDE 40 MG: 10 INJECTION, SOLUTION INTRAMUSCULAR; INTRAVENOUS at 08:08

## 2022-08-07 RX ADMIN — FAMOTIDINE 20 MG: 10 INJECTION INTRAVENOUS at 08:08

## 2022-08-07 NOTE — PROGRESS NOTES
CT SURGERY PROGRESS NOTE  Blanquita Montoya  66 y.o.  1955    Patients Procedure: Procedure(s) (LRB):  REPAIR, AORTA, ASCENDING (N/A)    Subjective  Interval History: Patient morning lab showed K of 2.5, given 20 meq IV f/u lab showed 3.1. Ordered 20 meq PO at 1230. Currently with no complaints, continues to feel like she is improving.      Review of Systems   Constitutional: Negative.    Respiratory: Negative for cough, sputum production and shortness of breath.    Cardiovascular: Negative for chest pain, palpitations, claudication and leg swelling.   Gastrointestinal: Negative for abdominal pain, nausea and vomiting.   Genitourinary: Negative.    Skin: Negative.         Incision C/D/I       Medication List  Infusions   sodium chloride 0.9% 100 mL/hr at 08/01/22 0200    dexmedetomidine (PRECEDEX) infusion Stopped (08/03/22 0400)     Scheduled   amiodarone  200 mg Oral Daily    aspirin  81 mg Oral Daily    docusate sodium  200 mg Oral BID    enoxaparin  40 mg Subcutaneous Daily    famotidine (PF)  20 mg Intravenous Daily    folic acid  1 mg Oral Daily    furosemide (LASIX) injection  40 mg Intravenous Daily    levetiracetam IV  500 mg Intravenous Q12H    LIDOcaine (PF) 10 mg/ml (1%)  1 mL Intradermal Once    loperamide  4 mg Oral Once    polyethylene glycol  17 g Oral BID    potassium chloride in water  40 mEq Intravenous Once    sucralfate  1 g Oral QID (AC & HS)       Objective:  Recent Vitals:  Temp:  [98.6 °F (37 °C)-98.9 °F (37.2 °C)] 98.8 °F (37.1 °C)  Pulse:  [46-59] 55  Resp:  [15-31] 28  SpO2:  [93 %-99 %] 97 %  BP: (125-168)/(53-78) 152/78    Physical Exam  Vitals and nursing note reviewed.   Constitutional:       General: She is awake. She is not in acute distress.     Appearance: Normal appearance. She is not toxic-appearing or diaphoretic.   HENT:      Head: Normocephalic and atraumatic.   Eyes:      Extraocular Movements: Extraocular movements intact.      Pupils: Pupils are equal,  round, and reactive to light.   Cardiovascular:      Rate and Rhythm: Normal rate and regular rhythm.      Pulses: Normal pulses.           Radial pulses are 2+ on the right side and 2+ on the left side.        Dorsalis pedis pulses are 2+ on the right side and 2+ on the left side.      Heart sounds: Normal heart sounds.   Pulmonary:      Effort: Pulmonary effort is normal.      Breath sounds: Normal breath sounds.   Musculoskeletal:      Right lower leg: No edema.      Left lower leg: No edema.   Skin:     General: Skin is warm and dry.      Comments: INCISION C/D/I   Neurological:      General: No focal deficit present.      Mental Status: She is alert and oriented to person, place, and time.   Psychiatric:         Mood and Affect: Mood and affect normal.          I/O last 24 hrs:  Intake/Output - Last 3 Shifts       08/05 0700 08/06 0659 08/06 0700 08/07 0659 08/07 0700 08/08 0659    P.O. 1 250     I.V. (mL/kg) 66 (0.7) 210 (2.4)     IV Piggyback 100 300     Total Intake(mL/kg) 167 (1.9) 760 (8.8)     Urine (mL/kg/hr) 1415 (0.7) 1485 (0.7) 80 (0.2)    Total Output 1415 1485 80    Net -1248 -725 -80                 Labs  BMP:   Recent Labs   Lab 08/07/22  0704      K 3.1*   CO2 39*   BUN 9.4*   CREATININE 0.69   CALCIUM 7.9*     CBC: No results for input(s): WBC, RBC, HGB, HCT, PLT, MCV, MCH, MCHC in the last 48 hours.  All pertinent labs from the last 24 hours have been reviewed.      Imaging:   CXR: No results found in the last 24 hours.  I have reviewed all pertinent imaging results/findings within the past 24 hours.        ASSESSMENT/PLAN:  - waiting on telemetry bed  - K repletion  - PT/OT  - IS/OOB/Telemetry    Case and plan of care discussed with MD Pernell Seymour PA-C

## 2022-08-07 NOTE — PT/OT/SLP PROGRESS
Physical Therapy Treatment    Patient Name:  Blanquita Montoya   MRN:  [id number]    Recommendations:     Discharge Recommendations:  rehabilitation facility   Discharge Equipment Recommendations: other (see comments) (Pending progress)     Subjective     Patient awake and alert.     Communicated with NSG prior to session to see if Pt is appropriate for therapy today. Pt greeted and agreed to session.    Objective:     General Precautions: Standard, aspiration   Orthopedic Precautions:N/A   Braces:    Respiratory Status: Nasal cannula, flow 2 L/min     Functional Mobility:  · Bed Mobility: Min Assist  · Sit to Stand: Min Assist  · Transfers: Did Not Test  Equipment Used: Gait belt, Rolling walker    Assessment:     Blanquita Montoya is a 66 y.o. female admitted with a medical diagnosis of <principal problem not specified>.     Treatment Encounter Note:  Patient actively participated with treatment today with no adverse effects. Patient performed bed mobilitiy form supine to EOB, pt sat eob x 10 mins.Pt poerformed stand step t/f from EOB to supine. Patient left supine with all lines intact and call button in reach.    Rehab Prognosis: Good; patient would benefit from acute skilled PT services to address these deficits and reach maximum level of function.    Recent Surgery: Procedure(s) (LRB):  REPAIR, AORTA, ASCENDING (N/A) 9 Days Post-Op  GOALS:   Multidisciplinary Problems     Physical Therapy Goals        Problem: Physical Therapy    Goal Priority Disciplines Outcome Goal Variances Interventions   Physical Therapy Goal     PT, PT/OT Ongoing, Progressing     Description: Goals to be met by: 22    Patient will increase functional independence with mobility by performin. Supine to sit with MInimal Assistance  2. Sit to supine with MInimal Assistance  3. Sit to stand transfer with Moderate Assistance  4. Bed to chair transfer with Moderate Assistance using Rolling Walker vs LRAD  5. Gait : TBD  6. Sitting at  edge of bed x15 minutes with Stand-by Assistance                     Plan:     During this hospitalization, patient to be seen daily to address the identified rehab impairments via gait training, therapeutic activities, therapeutic exercises and progress toward the following goals:    · Plan of Care Expires:  09/01/22    Billable Minutes     Billable Minutes: Therapeutic Activity 23    Treatment Type: Treatment  PT/PTA: PTA     PTA Visit Number: 3     08/07/2022

## 2022-08-08 LAB
ANION GAP SERPL CALC-SCNC: 7 MEQ/L
BUN SERPL-MCNC: 11.5 MG/DL (ref 9.8–20.1)
CALCIUM SERPL-MCNC: 8.2 MG/DL (ref 8.4–10.2)
CHLORIDE SERPL-SCNC: 93 MMOL/L (ref 98–107)
CO2 SERPL-SCNC: 36 MMOL/L (ref 23–31)
CREAT SERPL-MCNC: 0.71 MG/DL (ref 0.55–1.02)
CREAT/UREA NIT SERPL: 16
GFR SERPLBLD CREATININE-BSD FMLA CKD-EPI: >60 MLS/MIN/1.73/M2
GLUCOSE SERPL-MCNC: 110 MG/DL (ref 82–115)
POTASSIUM SERPL-SCNC: 2.9 MMOL/L (ref 3.5–5.1)
SODIUM SERPL-SCNC: 136 MMOL/L (ref 136–145)

## 2022-08-08 PROCEDURE — 25000003 PHARM REV CODE 250: Performed by: SPECIALIST

## 2022-08-08 PROCEDURE — 25000003 PHARM REV CODE 250

## 2022-08-08 PROCEDURE — 94799 UNLISTED PULMONARY SVC/PX: CPT

## 2022-08-08 PROCEDURE — 20000000 HC ICU ROOM

## 2022-08-08 PROCEDURE — 99024 PR POST-OP FOLLOW-UP VISIT: ICD-10-PCS | Mod: POP,,, | Performed by: PHYSICIAN ASSISTANT

## 2022-08-08 PROCEDURE — 99024 POSTOP FOLLOW-UP VISIT: CPT | Mod: POP,,, | Performed by: PHYSICIAN ASSISTANT

## 2022-08-08 PROCEDURE — 80048 BASIC METABOLIC PNL TOTAL CA: CPT

## 2022-08-08 PROCEDURE — 25000003 PHARM REV CODE 250: Performed by: INTERNAL MEDICINE

## 2022-08-08 PROCEDURE — 25000003 PHARM REV CODE 250: Performed by: STUDENT IN AN ORGANIZED HEALTH CARE EDUCATION/TRAINING PROGRAM

## 2022-08-08 PROCEDURE — 97116 GAIT TRAINING THERAPY: CPT | Mod: CQ

## 2022-08-08 PROCEDURE — 94761 N-INVAS EAR/PLS OXIMETRY MLT: CPT

## 2022-08-08 PROCEDURE — 63600175 PHARM REV CODE 636 W HCPCS

## 2022-08-08 PROCEDURE — 27000221 HC OXYGEN, UP TO 24 HOURS

## 2022-08-08 PROCEDURE — 36415 COLL VENOUS BLD VENIPUNCTURE: CPT

## 2022-08-08 PROCEDURE — 63600175 PHARM REV CODE 636 W HCPCS: Performed by: STUDENT IN AN ORGANIZED HEALTH CARE EDUCATION/TRAINING PROGRAM

## 2022-08-08 PROCEDURE — 97530 THERAPEUTIC ACTIVITIES: CPT | Mod: CQ

## 2022-08-08 PROCEDURE — 63600175 PHARM REV CODE 636 W HCPCS: Performed by: SPECIALIST

## 2022-08-08 RX ORDER — POTASSIUM CHLORIDE 20 MEQ/1
40 TABLET, EXTENDED RELEASE ORAL EVERY 6 HOURS
Status: COMPLETED | OUTPATIENT
Start: 2022-08-08 | End: 2022-08-08

## 2022-08-08 RX ADMIN — DOCUSATE SODIUM 200 MG: 100 CAPSULE, LIQUID FILLED ORAL at 08:08

## 2022-08-08 RX ADMIN — FOLIC ACID 1 MG: 1 TABLET ORAL at 08:08

## 2022-08-08 RX ADMIN — POTASSIUM CHLORIDE 40 MEQ: 1500 TABLET, EXTENDED RELEASE ORAL at 10:08

## 2022-08-08 RX ADMIN — AMIODARONE HYDROCHLORIDE 200 MG: 200 TABLET ORAL at 08:08

## 2022-08-08 RX ADMIN — SUCRALFATE 1 G: 1 TABLET ORAL at 06:08

## 2022-08-08 RX ADMIN — ASPIRIN 81 MG: 81 TABLET, COATED ORAL at 08:08

## 2022-08-08 RX ADMIN — FAMOTIDINE 20 MG: 10 INJECTION INTRAVENOUS at 08:08

## 2022-08-08 RX ADMIN — LEVETIRACETAM 500 MG: 100 INJECTION, SOLUTION INTRAVENOUS at 08:08

## 2022-08-08 RX ADMIN — POTASSIUM CHLORIDE 40 MEQ: 1500 TABLET, EXTENDED RELEASE ORAL at 05:08

## 2022-08-08 RX ADMIN — FUROSEMIDE 40 MG: 10 INJECTION, SOLUTION INTRAMUSCULAR; INTRAVENOUS at 10:08

## 2022-08-08 RX ADMIN — SUCRALFATE 1 G: 1 TABLET ORAL at 11:08

## 2022-08-08 RX ADMIN — POLYETHYLENE GLYCOL 3350 17 G: 17 POWDER, FOR SOLUTION ORAL at 08:08

## 2022-08-08 RX ADMIN — SUCRALFATE 1 G: 1 TABLET ORAL at 08:08

## 2022-08-08 RX ADMIN — ENOXAPARIN SODIUM 40 MG: 40 INJECTION SUBCUTANEOUS at 05:08

## 2022-08-08 RX ADMIN — SUCRALFATE 1 G: 1 TABLET ORAL at 05:08

## 2022-08-08 NOTE — PLAN OF CARE
Swingbed referral has been submitted to [facility] referral via care [facility].

## 2022-08-08 NOTE — PT/OT/SLP PROGRESS
Physical Therapy Treatment    Patient Name:  Blanquita Montoya   MRN:  [id number]    Recommendations:     Discharge Recommendations:  rehabilitation facility   Discharge Equipment Recommendations: walker, rolling   Barriers to discharge: None    Assessment:     Blanquita Montoya is a 66 y.o. female admitted with a medical diagnosis of thoracic artery aneurysm, sz, ichemic CVA.  She presents with the following impairments/functional limitations:  weakness, gait instability, impaired endurance, impaired balance.    Pt sitting up in bed with family member present upon arrival. Highly motivated to participate and get OOB. Tolerated tx session well. Noted difficulty following multi-step verbal commands.     Rehab Prognosis: Good; patient would benefit from acute skilled PT services to address these deficits and reach maximum level of function.    Recent Surgery: Procedure(s) (LRB):  REPAIR, AORTA, ASCENDING (N/A) 10 Days Post-Op    Plan:     During this hospitalization, patient to be seen daily to address the identified rehab impairments via gait training, therapeutic activities, therapeutic exercises and progress toward the following goals:    · Plan of Care Expires:  09/01/22    Subjective     Chief Complaint: none  Patient/Family Comments/goals: To get stronger  Pain/Comfort:  · Pain Rating 1: 0/10      Objective:     Communicated with RN prior to session.  Patient found HOB elevated with pulse ox (continuous), telemetry, blood pressure cuff upon PT entry to room.     General Precautions: Standard, aspiration   Orthopedic Precautions:N/A   Braces: N/A  Respiratory Status: Nasal cannula, flow 2 L/min   HR 49   /59  SPO2 91-98%    Functional Mobility:  · Bed Mobility:    · Supine to sit with min assist; cues to maintain sternal pxns  · Transfers:    · Sit<->stand x 3 trials; cues to maintain sternal pxns with frequent corrections d/t poor carryover  · Gait:   · 70' with slow silvino, shuffling steps initially but  responded to cues to correct however required demonstration as well as VC's, chair following. Intermittent NBOS with RLE crossing to midline.       Patient left up in chair with all lines intact and call button in reach..    GOALS:   Multidisciplinary Problems     Physical Therapy Goals        Problem: Physical Therapy    Goal Priority Disciplines Outcome Goal Variances Interventions   Physical Therapy Goal     PT, PT/OT Ongoing, Progressing     Description: Goals to be met by: 22    Patient will increase functional independence with mobility by performin. Supine to sit with MInimal Assistance  2. Sit to supine with MInimal Assistance  3. Sit to stand transfer with Moderate Assistance  4. Bed to chair transfer with Moderate Assistance using Rolling Walker vs LRAD  5. Gait : TBD  6. Sitting at edge of bed x15 minutes with Stand-by Assistance                     Time Tracking:     PT Received On: 22  PT Start Time: 957     PT Stop Time: 0  PT Total Time (min): 33 min     Billable Minutes: Gait Training 1 unit and Therapeutic Activity 1 unit    Treatment Type: Treatment  PT/PTA: PTA     PTA Visit Number: 4     2022

## 2022-08-08 NOTE — PROGRESS NOTES
Blanquita Montoya is a 66 y.o. female patient.   1. Ascending aortic aneurysm    2. Aortic aneurysm    3. CAD (coronary artery disease)    4. Irregular heart rhythm      Past Medical History:   Diagnosis Date    Anxiety disorder     Coronary artery disease     GERD (gastroesophageal reflux disease)     Hypercholesterolemia     Hypertension     Hypothyroidism     Obesity     Seizure in childhood     last one age 11    Sleep apnea     does not currently use CPAP    Thoracic aortic aneurysm 03/10/2022    4.5X4.4 Ascending Aorta as of 3/10/2022    Thoracic aortic aneurysm     Thyroid disease      No past surgical history pertinent negatives on file.  Scheduled Meds:   amiodarone  200 mg Oral Daily    aspirin  81 mg Oral Daily    docusate sodium  200 mg Oral BID    enoxaparin  40 mg Subcutaneous Daily    famotidine (PF)  20 mg Intravenous Daily    folic acid  1 mg Oral Daily    furosemide (LASIX) injection  40 mg Intravenous Daily    levetiracetam IV  500 mg Intravenous Q12H    LIDOcaine (PF) 10 mg/ml (1%)  1 mL Intradermal Once    loperamide  4 mg Oral Once    polyethylene glycol  17 g Oral BID    potassium chloride  40 mEq Oral Q6H    sucralfate  1 g Oral QID (AC & HS)     Continuous Infusions:   sodium chloride 0.9% 100 mL/hr at 08/01/22 0200     PRN Meds:sodium chloride, acetaminophen, albumin human 5%, albuterol-ipratropium, barium sulfate, dextrose 50%, dextrose 50%, guaiFENesin 100 mg/5 ml, hydrALAZINE, HYDROcodone-acetaminophen, lactulose 10 gram/15 ml, metoclopramide HCl, morphine, ondansetron, oxyCODONE, simethicone    Review of patient's allergies indicates:   Allergen Reactions    Meperidine      Other reaction(s): unknown    Tramadol Hives     Other reaction(s): sweating     Active Hospital Problems    Diagnosis  POA    Ischemic embolic stroke [I63.9]  Yes      Resolved Hospital Problems   No resolved problems to display.     Blood pressure 128/62, pulse (!) 55, temperature 98.7  "°F (37.1 °C), temperature source Oral, resp. rate (!) 21, height 5' 7" (1.702 m), weight 86 kg (189 lb 9.5 oz), SpO2 (!) 93 %, not currently breastfeeding.    Subjective:    Awake. Alert. Sitting up in chair    Objective:   AFVSS  Heart: RRR  Lungs: respirations nonlabored, clear  Incision: c/d/i  Cxr: The heart is normal in size.  There are increased bibasilar airspace opacities with small pleural effusions, right greater than left.  There is no definite visible pneumothorax.      Assesment/Plan:   S/P:  Ascending aortic aneurysm repair  Ischemic CVA    Continue PT/OT - recommending rehab  Consult case management              PAULETTE Case  8/8/2022    "

## 2022-08-09 LAB — POTASSIUM SERPL-SCNC: 4 MMOL/L (ref 3.5–5.1)

## 2022-08-09 PROCEDURE — 11000001 HC ACUTE MED/SURG PRIVATE ROOM

## 2022-08-09 PROCEDURE — 25000003 PHARM REV CODE 250: Performed by: INTERNAL MEDICINE

## 2022-08-09 PROCEDURE — 99024 PR POST-OP FOLLOW-UP VISIT: ICD-10-PCS | Mod: POP,,, | Performed by: PHYSICIAN ASSISTANT

## 2022-08-09 PROCEDURE — 63600175 PHARM REV CODE 636 W HCPCS

## 2022-08-09 PROCEDURE — 97116 GAIT TRAINING THERAPY: CPT | Mod: CQ

## 2022-08-09 PROCEDURE — 97535 SELF CARE MNGMENT TRAINING: CPT | Mod: CO

## 2022-08-09 PROCEDURE — 36415 COLL VENOUS BLD VENIPUNCTURE: CPT | Performed by: SPECIALIST

## 2022-08-09 PROCEDURE — 25000003 PHARM REV CODE 250

## 2022-08-09 PROCEDURE — 99024 POSTOP FOLLOW-UP VISIT: CPT | Mod: POP,,, | Performed by: PHYSICIAN ASSISTANT

## 2022-08-09 PROCEDURE — 84132 ASSAY OF SERUM POTASSIUM: CPT | Performed by: SPECIALIST

## 2022-08-09 PROCEDURE — 63600175 PHARM REV CODE 636 W HCPCS: Performed by: PHYSICIAN ASSISTANT

## 2022-08-09 PROCEDURE — 25000003 PHARM REV CODE 250: Performed by: STUDENT IN AN ORGANIZED HEALTH CARE EDUCATION/TRAINING PROGRAM

## 2022-08-09 PROCEDURE — 25000003 PHARM REV CODE 250: Performed by: SPECIALIST

## 2022-08-09 PROCEDURE — 63600175 PHARM REV CODE 636 W HCPCS: Performed by: STUDENT IN AN ORGANIZED HEALTH CARE EDUCATION/TRAINING PROGRAM

## 2022-08-09 RX ORDER — POTASSIUM CHLORIDE 14.9 MG/ML
40 INJECTION INTRAVENOUS ONCE
Status: COMPLETED | OUTPATIENT
Start: 2022-08-09 | End: 2022-08-09

## 2022-08-09 RX ADMIN — POTASSIUM CHLORIDE 40 MEQ: 14.9 INJECTION, SOLUTION INTRAVENOUS at 12:08

## 2022-08-09 RX ADMIN — DOCUSATE SODIUM 200 MG: 100 CAPSULE, LIQUID FILLED ORAL at 08:08

## 2022-08-09 RX ADMIN — SUCRALFATE 1 G: 1 TABLET ORAL at 05:08

## 2022-08-09 RX ADMIN — ENOXAPARIN SODIUM 40 MG: 40 INJECTION SUBCUTANEOUS at 04:08

## 2022-08-09 RX ADMIN — FOLIC ACID 1 MG: 1 TABLET ORAL at 08:08

## 2022-08-09 RX ADMIN — FAMOTIDINE 20 MG: 10 INJECTION INTRAVENOUS at 08:08

## 2022-08-09 RX ADMIN — ASPIRIN 81 MG: 81 TABLET, COATED ORAL at 08:08

## 2022-08-09 RX ADMIN — SUCRALFATE 1 G: 1 TABLET ORAL at 08:08

## 2022-08-09 RX ADMIN — SUCRALFATE 1 G: 1 TABLET ORAL at 04:08

## 2022-08-09 RX ADMIN — LEVETIRACETAM 500 MG: 100 INJECTION, SOLUTION INTRAVENOUS at 08:08

## 2022-08-09 RX ADMIN — AMIODARONE HYDROCHLORIDE 200 MG: 200 TABLET ORAL at 08:08

## 2022-08-09 RX ADMIN — POLYETHYLENE GLYCOL 3350 17 G: 17 POWDER, FOR SOLUTION ORAL at 08:08

## 2022-08-09 RX ADMIN — SUCRALFATE 1 G: 1 TABLET ORAL at 11:08

## 2022-08-09 NOTE — CONSULTS
Patient was not seen today, discussed case with Dr. Rodgers    Discussed case with nurse, patient had a seizure in the 90s and was on phenobarb, at some point she was changed to keppra and wants to be switched back to phenobarb.    Instructed nursing to have patient follow up outpatient with neurologist to titrate and change medications, she does not have any active seizures or seizure like activity. AEDs can be managed outpatient.  Thank you

## 2022-08-09 NOTE — PLAN OF CARE
Problem: Adult Inpatient Plan of Care  Goal: Plan of Care Review  Outcome: Ongoing, Progressing  Goal: Patient-Specific Goal (Individualized)  Outcome: Ongoing, Progressing  Goal: Absence of Hospital-Acquired Illness or Injury  Outcome: Ongoing, Progressing  Goal: Optimal Comfort and Wellbeing  Outcome: Ongoing, Progressing  Goal: Readiness for Transition of Care  Outcome: Ongoing, Progressing     Problem: Impaired Wound Healing  Goal: Optimal Wound Healing  Outcome: Ongoing, Progressing     Problem: Infection  Goal: Absence of Infection Signs and Symptoms  Outcome: Ongoing, Progressing     Problem: Communication Impairment (Mechanical Ventilation, Invasive)  Goal: Effective Communication  Outcome: Ongoing, Progressing

## 2022-08-09 NOTE — PT/OT/SLP PROGRESS
Occupational Therapy  Treatment    Blanquita Montoya   MRN: [id number]   Admitting Diagnosis: <principal problem not specified>    OT Date of Treatment: 08/09/22   OT Start Time: 1040  OT Stop Time: 1054  OT Total Time (min): 14 min     Billable Minutes:  Self Care/Home Management 1  Total Minutes: 14     OT/KEY: KEY     KEY Visit Number: 3    General Precautions: Standard, aspiration  Orthopedic Precautions:    Braces:           Subjective:  Communicated with RN prior to session.  Alert  Motivated     Objective:  Pt. Ambulating to sink using RW for UE support with balance, Min A with no LOB noted. Pt. Standing at sink with CGA during grooming task (oral hygiene) with appropriate preparation noted.   Pt. Requiring Min A during toilet t/f for safe descend onto low surface.  Adherence given to sternal pxns using cardiac bear.    Patient left up in chair with all lines intact and call button in reach    ASSESSMENT:  Blanquita Montoya is a 66 y.o. female with a medical diagnosis of <principal problem not specified> Pt. Tolerated session well overall good adherence to sternal pxns, continue POC    Rehab potential is good    Activity tolerance: Good    Discharge recommendations: rehabilitation facility     Equipment recommendations: walker, rolling     GOALS:   Multidisciplinary Problems     Occupational Therapy Goals        Problem: Occupational Therapy    Goal Priority Disciplines Outcome Interventions   Occupational Therapy Goal     OT, PT/OT Ongoing, Progressing    Description: Goals to be met by: dc     Patient will increase functional independence with ADLs by performing:    Grooming while EOB with Minimal Assistance.  Sitting at edge of bed x10 minutes with Stand-by Assistance.  Stand pivot transfers with Moderate Assistance.  Toilet transfer to bedside commode with Moderate Assistance.  Increased functional strength to 3/5 for R UE.                     Plan:  Patient to be seen daily to address the above listed  problems via self-care/home management, therapeutic activities, therapeutic exercises  Plan of Care expires: 08/31/22  Plan of Care reviewed with: patient         08/09/2022

## 2022-08-09 NOTE — PLAN OF CARE
Received call from Renetta with [facility] who states bed is available for pt to admit today. Per pt's nurse her potassium is too low today for d/c. Spoke with Renetta and informed her that we will try for d/c tomorrow pending increased potassium and repeat labs.

## 2022-08-09 NOTE — PT/OT/SLP PROGRESS
Physical Therapy Treatment    Patient Name:  Blanquita Montoya   MRN:  [id number]    Recommendations:     Discharge Recommendations:  rehabilitation facility   Discharge Equipment Recommendations: walker, rolling   Barriers to discharge: None    Assessment:     Pt sitting in recliner, pleasant and agreeable to tx session. Pt tolerated tx session well. Improving endurance. Noted some spatial awareness deficits.     Rehab Prognosis: Good; patient would benefit from acute skilled PT services to address these deficits and reach maximum level of function.    Recent Surgery: Procedure(s) (LRB):  REPAIR, AORTA, ASCENDING (N/A) 11 Days Post-Op    Plan:     During this hospitalization, patient to be seen daily to address the identified rehab impairments via gait training, therapeutic activities, therapeutic exercises and progress toward the following goals:    · Plan of Care Expires:  09/01/22    Subjective     Chief Complaint: None  Patient/Family Comments/goals: to get stronger  Pain/Comfort:  · Pain Rating 1: 0/10      Objective:     Communicated with Rn prior to session.  Patient found up in chair with pulse ox (continuous), blood pressure cuff, telemetry upon PT entry to room.     General Precautions: Standard, aspiration   Orthopedic Precautions:N/A   Braces: N/A  Respiratory Status: Room air SPO2 89-96%  HR 57  /60  Functional Mobility:  · Transfers:    · Sit<->stand; min assist from recliner and commode with frequent cues to maintain sternal pxns  · Gait: 200' with slow silvino, intermittent veering, min assist at RW with cues to improve heel strike at IC. 3 brief standing rest breaks.       Patient left up in chair with all lines intact and call button in reach..    GOALS:   Multidisciplinary Problems     Physical Therapy Goals        Problem: Physical Therapy    Goal Priority Disciplines Outcome Goal Variances Interventions   Physical Therapy Goal     PT, PT/OT Ongoing, Progressing     Description: Goals to be  met by: 22    Patient will increase functional independence with mobility by performin. Supine to sit with MInimal Assistance  2. Sit to supine with MInimal Assistance  3. Sit to stand transfer with Moderate Assistance  4. Bed to chair transfer with Moderate Assistance using Rolling Walker vs LRAD  5. Gait : TBD  6. Sitting at edge of bed x15 minutes with Stand-by Assistance                     Time Tracking:     PT Received On: 22  PT Start Time: 1101     PT Stop Time: 1124  PT Total Time (min): 23 min     Billable Minutes: Gait Training 2 units    Treatment Type: Treatment  PT/PTA: PTA     PTA Visit Number: 5     2022

## 2022-08-10 ENCOUNTER — HOSPITAL ENCOUNTER (INPATIENT)
Facility: HOSPITAL | Age: 67
LOS: 5 days | Discharge: HOME HEALTH | DRG: 949 | End: 2022-08-15
Attending: STUDENT IN AN ORGANIZED HEALTH CARE EDUCATION/TRAINING PROGRAM | Admitting: STUDENT IN AN ORGANIZED HEALTH CARE EDUCATION/TRAINING PROGRAM
Payer: MEDICARE

## 2022-08-10 VITALS
WEIGHT: 192.88 LBS | BODY MASS INDEX: 30.27 KG/M2 | SYSTOLIC BLOOD PRESSURE: 158 MMHG | RESPIRATION RATE: 18 BRPM | OXYGEN SATURATION: 97 % | HEART RATE: 56 BPM | TEMPERATURE: 98 F | DIASTOLIC BLOOD PRESSURE: 73 MMHG | HEIGHT: 67 IN

## 2022-08-10 DIAGNOSIS — M19.90 OSTEOARTHRITIS, UNSPECIFIED OSTEOARTHRITIS TYPE, UNSPECIFIED SITE: Primary | ICD-10-CM

## 2022-08-10 DIAGNOSIS — T81.41XA SUPERFICIAL INCISIONAL SURGICAL SITE INFECTION: ICD-10-CM

## 2022-08-10 DIAGNOSIS — I25.10 CAD (CORONARY ARTERY DISEASE): ICD-10-CM

## 2022-08-10 DIAGNOSIS — G47.33 OBSTRUCTIVE SLEEP APNEA SYNDROME: ICD-10-CM

## 2022-08-10 DIAGNOSIS — I71.21 ASCENDING AORTIC ANEURYSM: ICD-10-CM

## 2022-08-10 DIAGNOSIS — M85.80 OSTEOPENIA, UNSPECIFIED LOCATION: ICD-10-CM

## 2022-08-10 DIAGNOSIS — I10 HYPERTENSION, UNSPECIFIED TYPE: ICD-10-CM

## 2022-08-10 DIAGNOSIS — I71.9 AORTIC ANEURYSM: ICD-10-CM

## 2022-08-10 PROBLEM — E07.9 THYROID DISEASE: Status: ACTIVE | Noted: 2022-05-26

## 2022-08-10 LAB
ANION GAP SERPL CALC-SCNC: 6 MEQ/L
BUN SERPL-MCNC: 9 MG/DL (ref 9.8–20.1)
CALCIUM SERPL-MCNC: 8.3 MG/DL (ref 8.4–10.2)
CHLORIDE SERPL-SCNC: 99 MMOL/L (ref 98–107)
CO2 SERPL-SCNC: 32 MMOL/L (ref 23–31)
CREAT SERPL-MCNC: 0.79 MG/DL (ref 0.55–1.02)
CREAT/UREA NIT SERPL: 11
GFR SERPLBLD CREATININE-BSD FMLA CKD-EPI: >60 MLS/MIN/1.73/M2
GLUCOSE SERPL-MCNC: 100 MG/DL (ref 82–115)
MAGNESIUM SERPL-MCNC: 2.2 MG/DL (ref 1.6–2.6)
POTASSIUM SERPL-SCNC: 4.2 MMOL/L (ref 3.5–5.1)
SODIUM SERPL-SCNC: 137 MMOL/L (ref 136–145)

## 2022-08-10 PROCEDURE — 11000004 HC SNF PRIVATE

## 2022-08-10 PROCEDURE — 94660 CPAP INITIATION&MGMT: CPT

## 2022-08-10 PROCEDURE — 25000003 PHARM REV CODE 250

## 2022-08-10 PROCEDURE — 99900031 HC PATIENT EDUCATION (STAT)

## 2022-08-10 PROCEDURE — 25000003 PHARM REV CODE 250: Performed by: INTERNAL MEDICINE

## 2022-08-10 PROCEDURE — 99024 PR POST-OP FOLLOW-UP VISIT: ICD-10-PCS | Mod: POP,,, | Performed by: PHYSICIAN ASSISTANT

## 2022-08-10 PROCEDURE — 25000003 PHARM REV CODE 250: Performed by: STUDENT IN AN ORGANIZED HEALTH CARE EDUCATION/TRAINING PROGRAM

## 2022-08-10 PROCEDURE — 80048 BASIC METABOLIC PNL TOTAL CA: CPT | Performed by: PHYSICIAN ASSISTANT

## 2022-08-10 PROCEDURE — 63600175 PHARM REV CODE 636 W HCPCS: Performed by: STUDENT IN AN ORGANIZED HEALTH CARE EDUCATION/TRAINING PROGRAM

## 2022-08-10 PROCEDURE — 36415 COLL VENOUS BLD VENIPUNCTURE: CPT | Performed by: PHYSICIAN ASSISTANT

## 2022-08-10 PROCEDURE — 83735 ASSAY OF MAGNESIUM: CPT | Performed by: SPECIALIST

## 2022-08-10 PROCEDURE — 99900035 HC TECH TIME PER 15 MIN (STAT)

## 2022-08-10 PROCEDURE — 27000190 HC CPAP FULL FACE MASK W/VALVE

## 2022-08-10 PROCEDURE — 94760 N-INVAS EAR/PLS OXIMETRY 1: CPT

## 2022-08-10 PROCEDURE — 97110 THERAPEUTIC EXERCISES: CPT

## 2022-08-10 PROCEDURE — 99024 POSTOP FOLLOW-UP VISIT: CPT | Mod: POP,,, | Performed by: PHYSICIAN ASSISTANT

## 2022-08-10 RX ORDER — IBUPROFEN 200 MG
24 TABLET ORAL
Status: DISCONTINUED | OUTPATIENT
Start: 2022-08-10 | End: 2022-08-15 | Stop reason: HOSPADM

## 2022-08-10 RX ORDER — ASPIRIN 81 MG/1
81 TABLET ORAL DAILY
Status: DISCONTINUED | OUTPATIENT
Start: 2022-08-11 | End: 2022-08-15 | Stop reason: HOSPADM

## 2022-08-10 RX ORDER — GLUCAGON 1 MG
1 KIT INJECTION
Status: DISCONTINUED | OUTPATIENT
Start: 2022-08-10 | End: 2022-08-15 | Stop reason: HOSPADM

## 2022-08-10 RX ORDER — SUCRALFATE 1 G/1
1 TABLET ORAL
Status: DISCONTINUED | OUTPATIENT
Start: 2022-08-10 | End: 2022-08-15 | Stop reason: HOSPADM

## 2022-08-10 RX ORDER — LEVETIRACETAM 500 MG/1
500 TABLET ORAL 2 TIMES DAILY
Status: DISCONTINUED | OUTPATIENT
Start: 2022-08-10 | End: 2022-08-10 | Stop reason: HOSPADM

## 2022-08-10 RX ORDER — ATORVASTATIN CALCIUM 40 MG/1
40 TABLET, FILM COATED ORAL DAILY
Status: DISCONTINUED | OUTPATIENT
Start: 2022-08-11 | End: 2022-08-15 | Stop reason: HOSPADM

## 2022-08-10 RX ORDER — BISACODYL 10 MG
10 SUPPOSITORY, RECTAL RECTAL DAILY PRN
Status: DISCONTINUED | OUTPATIENT
Start: 2022-08-10 | End: 2022-08-15 | Stop reason: HOSPADM

## 2022-08-10 RX ORDER — AMIODARONE HYDROCHLORIDE 200 MG/1
200 TABLET ORAL DAILY
Status: DISCONTINUED | OUTPATIENT
Start: 2022-08-11 | End: 2022-08-15 | Stop reason: HOSPADM

## 2022-08-10 RX ORDER — GUAIFENESIN 100 MG/5ML
200 SOLUTION ORAL EVERY 4 HOURS PRN
Qty: 100 ML | Refills: 0 | Status: ON HOLD | OUTPATIENT
Start: 2022-08-10 | End: 2022-08-15 | Stop reason: HOSPADM

## 2022-08-10 RX ORDER — FAMOTIDINE 10 MG/ML
20 INJECTION INTRAVENOUS DAILY
Status: DISCONTINUED | OUTPATIENT
Start: 2022-08-11 | End: 2022-08-15 | Stop reason: HOSPADM

## 2022-08-10 RX ORDER — SIMETHICONE 80 MG
2 TABLET,CHEWABLE ORAL 3 TIMES DAILY PRN
Status: DISCONTINUED | OUTPATIENT
Start: 2022-08-10 | End: 2022-08-15 | Stop reason: HOSPADM

## 2022-08-10 RX ORDER — MORPHINE SULFATE 4 MG/ML
2 INJECTION, SOLUTION INTRAMUSCULAR; INTRAVENOUS
Status: DISCONTINUED | OUTPATIENT
Start: 2022-08-10 | End: 2022-08-15 | Stop reason: HOSPADM

## 2022-08-10 RX ORDER — GUAIFENESIN 100 MG/5ML
200 SOLUTION ORAL EVERY 4 HOURS PRN
Status: DISCONTINUED | OUTPATIENT
Start: 2022-08-10 | End: 2022-08-15 | Stop reason: HOSPADM

## 2022-08-10 RX ORDER — LACTULOSE 10 G/15ML
20 SOLUTION ORAL EVERY 6 HOURS PRN
Status: DISCONTINUED | OUTPATIENT
Start: 2022-08-10 | End: 2022-08-15 | Stop reason: HOSPADM

## 2022-08-10 RX ORDER — DOCUSATE SODIUM 100 MG/1
200 CAPSULE, LIQUID FILLED ORAL 2 TIMES DAILY
Status: DISCONTINUED | OUTPATIENT
Start: 2022-08-10 | End: 2022-08-15 | Stop reason: HOSPADM

## 2022-08-10 RX ORDER — INSULIN ASPART 100 [IU]/ML
0-5 INJECTION, SOLUTION INTRAVENOUS; SUBCUTANEOUS
Status: DISCONTINUED | OUTPATIENT
Start: 2022-08-10 | End: 2022-08-15 | Stop reason: HOSPADM

## 2022-08-10 RX ORDER — IPRATROPIUM BROMIDE AND ALBUTEROL SULFATE 2.5; .5 MG/3ML; MG/3ML
3 SOLUTION RESPIRATORY (INHALATION) EVERY 4 HOURS PRN
Status: DISCONTINUED | OUTPATIENT
Start: 2022-08-10 | End: 2022-08-15 | Stop reason: HOSPADM

## 2022-08-10 RX ORDER — ONDANSETRON 2 MG/ML
4 INJECTION INTRAMUSCULAR; INTRAVENOUS EVERY 12 HOURS PRN
Status: DISCONTINUED | OUTPATIENT
Start: 2022-08-10 | End: 2022-08-15 | Stop reason: HOSPADM

## 2022-08-10 RX ORDER — FOLIC ACID 1 MG/1
1 TABLET ORAL DAILY
Status: DISCONTINUED | OUTPATIENT
Start: 2022-08-11 | End: 2022-08-15 | Stop reason: HOSPADM

## 2022-08-10 RX ORDER — ACETAMINOPHEN 325 MG/1
650 TABLET ORAL EVERY 4 HOURS PRN
Status: DISCONTINUED | OUTPATIENT
Start: 2022-08-10 | End: 2022-08-15 | Stop reason: HOSPADM

## 2022-08-10 RX ORDER — AMIODARONE HYDROCHLORIDE 200 MG/1
200 TABLET ORAL DAILY
Qty: 30 TABLET | Refills: 11 | Status: ON HOLD | OUTPATIENT
Start: 2022-08-11 | End: 2022-08-15 | Stop reason: SDUPTHER

## 2022-08-10 RX ORDER — HYDROCODONE BITARTRATE AND ACETAMINOPHEN 5; 325 MG/1; MG/1
1 TABLET ORAL EVERY 4 HOURS PRN
Status: DISCONTINUED | OUTPATIENT
Start: 2022-08-10 | End: 2022-08-15 | Stop reason: HOSPADM

## 2022-08-10 RX ORDER — LEVETIRACETAM 500 MG/1
500 TABLET ORAL 2 TIMES DAILY
Status: DISCONTINUED | OUTPATIENT
Start: 2022-08-10 | End: 2022-08-15 | Stop reason: HOSPADM

## 2022-08-10 RX ORDER — POLYETHYLENE GLYCOL 3350 17 G/17G
17 POWDER, FOR SOLUTION ORAL 2 TIMES DAILY
Status: DISCONTINUED | OUTPATIENT
Start: 2022-08-10 | End: 2022-08-12

## 2022-08-10 RX ORDER — IBUPROFEN 200 MG
16 TABLET ORAL
Status: DISCONTINUED | OUTPATIENT
Start: 2022-08-10 | End: 2022-08-15 | Stop reason: HOSPADM

## 2022-08-10 RX ORDER — ENOXAPARIN SODIUM 100 MG/ML
40 INJECTION SUBCUTANEOUS EVERY 24 HOURS
Status: DISCONTINUED | OUTPATIENT
Start: 2022-08-10 | End: 2022-08-15 | Stop reason: HOSPADM

## 2022-08-10 RX ORDER — HYDRALAZINE HYDROCHLORIDE 20 MG/ML
10 INJECTION INTRAMUSCULAR; INTRAVENOUS EVERY 8 HOURS PRN
Status: DISCONTINUED | OUTPATIENT
Start: 2022-08-10 | End: 2022-08-15 | Stop reason: HOSPADM

## 2022-08-10 RX ORDER — LEVOTHYROXINE SODIUM 100 UG/1
100 TABLET ORAL DAILY
Status: DISCONTINUED | OUTPATIENT
Start: 2022-08-11 | End: 2022-08-15 | Stop reason: HOSPADM

## 2022-08-10 RX ADMIN — DOCUSATE SODIUM 200 MG: 100 CAPSULE, LIQUID FILLED ORAL at 08:08

## 2022-08-10 RX ADMIN — SUCRALFATE 1 G: 1 TABLET ORAL at 08:08

## 2022-08-10 RX ADMIN — ASPIRIN 81 MG: 81 TABLET, COATED ORAL at 09:08

## 2022-08-10 RX ADMIN — ONDANSETRON 4 MG: 2 INJECTION INTRAMUSCULAR; INTRAVENOUS at 10:08

## 2022-08-10 RX ADMIN — POLYETHYLENE GLYCOL 3350 17 G: 17 POWDER, FOR SOLUTION ORAL at 08:08

## 2022-08-10 RX ADMIN — FOLIC ACID 1 MG: 1 TABLET ORAL at 09:08

## 2022-08-10 RX ADMIN — AMIODARONE HYDROCHLORIDE 200 MG: 200 TABLET ORAL at 09:08

## 2022-08-10 RX ADMIN — ACETAMINOPHEN 650 MG: 325 TABLET ORAL at 11:08

## 2022-08-10 RX ADMIN — LEVETIRACETAM 500 MG: 500 TABLET, FILM COATED ORAL at 08:08

## 2022-08-10 RX ADMIN — LEVETIRACETAM 500 MG: 100 INJECTION, SOLUTION INTRAVENOUS at 09:08

## 2022-08-10 RX ADMIN — FAMOTIDINE 20 MG: 10 INJECTION INTRAVENOUS at 09:08

## 2022-08-10 RX ADMIN — ENOXAPARIN SODIUM 40 MG: 40 INJECTION SUBCUTANEOUS at 06:08

## 2022-08-10 RX ADMIN — SUCRALFATE 1 G: 1 TABLET ORAL at 09:08

## 2022-08-10 NOTE — PT/OT/SLP PROGRESS
Occupational Therapy   Treatment    Name: Blanquita Montoya  MRN: [id number]  Admitting Diagnosis:  Ischemic embolic stroke, s/p ascending aortic aneurysm repair  Procedure: Resection of ascending aortic aneurysm and repair with 30 mm Dacron interposition graft; ligation of left atrial appendage on 7/29    Recommendations:     Discharge Recommendations: rehabilitation facility  Discharge Equipment Recommendations:  walker, rolling, dressing AEDs    Assessment:     Blanquita Montoya is a 66 y.o. female with a medical diagnosis of Ischemic embolic stroke.  She presents with back pain. Performance deficits affecting function are weakness, impaired endurance, impaired self care skills, impaired functional mobility, impaired balance, decreased safety awareness, pain.     Rehab Prognosis:  Good; patient would benefit from acute skilled OT services to address these deficits and reach maximum level of function.       Plan:     Patient to be seen daily to address the above listed problems via self-care/home management, therapeutic activities, therapeutic exercises  · Plan of Care Expires: 08/31/22  · Plan of Care Reviewed with: patient, spouse    Subjective     Pain/Comfort:  · Pain Rating 1: 7/10  · Location 1: back    Objective:     Communicated with: RN prior to session.  Patient found up in chair with telemetry upon OT entry to room.    General Precautions: Standard, fall, sternal   Respiratory Status: Room air   Vitals:  BP: 139/68 mmHg  NV: 50 and 52 at rest. Pt's HR increased to 124 bpm with activity, but pt was able to recover from 124 to 60 bpm in <1min.  O2: 96%     Occupational Performance:     Functional Mobility/Transfers:  · Patient completed Toilet Transfer Step Transfer technique with contact guard assistance with  RW for balance only during gait.    Activities of Daily Living:  · Lower Body Dressing: Pt donned B socks with mod A provided while seated in recliner.  · Toileting: Pt performed task with CGA and  vcs provided. Pt was not wearing LB clothing item during task.    Treatment & Education:  OT provided re-ed on sternal pxns to increase pt's safety and indep with ADLs. Pt verbalized good understanding, but required vcs to adhere to pxns during session. Pt performed sit to stand exercise x5 with mod A (x1) progressing to CGA provided. Pt participated in exercise to increase strength and endurance of BLEs and standing balance needed for ADL/IADLs.    Patient left up in chair with call button in reach and pt's  presentEducation:      GOALS:   Multidisciplinary Problems     Occupational Therapy Goals        Problem: Occupational Therapy    Goal Priority Disciplines Outcome Interventions   Occupational Therapy Goal     OT, PT/OT Ongoing, Progressing    Description: Goals to be met by: dc     Patient will increase functional independence with ADLs by performing:    Grooming while EOB with Minimal Assistance.  Sitting at edge of bed x10 minutes with Stand-by Assistance.  Stand pivot transfers with Moderate Assistance.  Toilet transfer to bedside commode with Moderate Assistance.  Increased functional strength to 3/5 for R UE.                     Time Tracking:     OT Date of Treatment: 8/10/22  OT Start Time: 1046  OT Stop Time: 1108  OT Total Time (min): 22 min    Billable Minutes:Therapeutic Exercise 22 mins    OT/KEY: OT     KEY Visit Number: 4    8/10/2022

## 2022-08-10 NOTE — NURSING
Nurse from [facility] called and said pt had appt on 8/17/2022 to take out sutures, but if pt is still here by the 17th, nurse or wound care can take out sutures.

## 2022-08-10 NOTE — ASSESSMENT & PLAN NOTE
-Record review reveals patient's last TSH approximately 1 month ago was 6.28, does not appear to have been on thyroid medication in the hospital  -resume home dose of levothyroxine 100 mcg  -repeat TSH in 4-6 weeks

## 2022-08-10 NOTE — PLAN OF CARE
Problem: Adult Inpatient Plan of Care  Goal: Plan of Care Review  Outcome: Ongoing, Progressing  Flowsheets (Taken 8/10/2022 8225)  Plan of Care Reviewed With: patient  Goal: Patient-Specific Goal (Individualized)  Outcome: Ongoing, Progressing  Goal: Absence of Hospital-Acquired Illness or Injury  Outcome: Ongoing, Progressing  Goal: Optimal Comfort and Wellbeing  Outcome: Ongoing, Progressing  Goal: Readiness for Transition of Care  Outcome: Ongoing, Progressing     Problem: Impaired Wound Healing  Goal: Optimal Wound Healing  Outcome: Ongoing, Progressing     Problem: Fall Injury Risk  Goal: Absence of Fall and Fall-Related Injury  Outcome: Ongoing, Progressing

## 2022-08-10 NOTE — ASSESSMENT & PLAN NOTE
-several changes performed in the hospital, patient no longer on her home medications  -continue with amiodarone, resume her home medications as condition requires

## 2022-08-10 NOTE — ASSESSMENT & PLAN NOTE
-s/p resection of ascending thoracic aortic aneurysm with 30 mm Dacron interposition and left atrial appendage ligation with Dr. Vega 7/29/22

## 2022-08-10 NOTE — H&P
[facility]  [facility] Medicine  History & Physical    Patient Name: Blanquita Montoya  MRN: [id number]  Patient Class: IP- SNF  Admission Date: 8/10/2022  Attending Physician: Thairy G Reyes, DO   Primary Care Provider: Kamila Whittaker MD         Patient information was obtained from patient, past medical records and ER records.     Subjective:     Principal Problem:Ascending aortic aneurysm    Chief Complaint: No chief complaint on file.       HPI: 67 yo F w/ past medical history of Anxiety disorder, Coronary artery disease, GERD (gastroesophageal reflux disease), Hypercholesterolemia, Hypertension, Hypothyroidism, Thoracic aortic aneurysm who presented 07/29/2022, for a resection of ascending aortic aneurysm and repair with 30 mm Dacron graft and ligation of left atrial appendage by Dr. Vega. Pt's post op period was complicated by need for pacing for symptomatic bradycardia, seizure like-activity with convulsive movements of RUE and RLE (now on keppra), nonhemorrhagic occipitoparietal acute/subacute insult,  and AF RVR now on amiodarone 200 mg PO daily. Hold beta-blockers due to bradycardia. No need for anticoagulation at this time as the patient is s/p CROW ligation.    Pt reports she lives home alone with her , previously independent. Arrives able to ambulate 100ft approximately. However having RLEx weakness.       Past Medical History:   Diagnosis Date    Anxiety disorder     Coronary artery disease     GERD (gastroesophageal reflux disease)     Hypercholesterolemia     Hypertension     Hypothyroidism     Obesity     Seizure in childhood     last one age 11    Sleep apnea     does not currently use CPAP    Thoracic aortic aneurysm 03/10/2022    4.5X4.4 Ascending Aorta as of 3/10/2022    Thoracic aortic aneurysm     Thyroid disease        Past Surgical History:   Procedure Laterality Date    ANGIOGRAM, CORONARY, WITH LEFT HEART CATHETERIZATION N/A 07/11/2022     Procedure: Angiogram, Coronary, with Left Heart Cath;  Surgeon: Harry Jj MD;  Location: [facility] CATH LAB;  Service: Cardiology;  Laterality: N/A;    COLONOSCOPY      HYSTERECTOMY      REPAIR OF ASCENDING AORTA N/A 7/29/2022    Procedure: REPAIR, AORTA, ASCENDING;  Surgeon: Alec Vega IV, MD;  Location: [facility] OR;  Service: Cardiovascular;  Laterality: N/A;  ECHO NOTIFIED    RT KNEE         Review of patient's allergies indicates:   Allergen Reactions    Meperidine      Other reaction(s): unknown    Tramadol Hives     Other reaction(s): sweating       Current Facility-Administered Medications on File Prior to Encounter   Medication    [DISCONTINUED] 0.9%  NaCl infusion (for blood administration)    [DISCONTINUED] 0.9%  NaCl infusion    [DISCONTINUED] acetaminophen oral solution 650 mg    [DISCONTINUED] albumin human 5% bottle 12.5 g    [DISCONTINUED] albuterol-ipratropium 2.5 mg-0.5 mg/3 mL nebulizer solution 3 mL    [DISCONTINUED] amiodarone tablet 200 mg    [DISCONTINUED] aspirin EC tablet 81 mg    [DISCONTINUED] barium sulfate (VARIBAR PUDDING) oral paste 1 mL    [DISCONTINUED] dextrose 50% injection 12.5 g    [DISCONTINUED] dextrose 50% injection 25 g    [DISCONTINUED] docusate sodium capsule 200 mg    [DISCONTINUED] enoxaparin injection 40 mg    [DISCONTINUED] famotidine (PF) injection 20 mg    [DISCONTINUED] folic acid tablet 1 mg    [DISCONTINUED] guaiFENesin 100 mg/5 ml syrup 200 mg    [DISCONTINUED] hydrALAZINE injection 10 mg    [DISCONTINUED] HYDROcodone-acetaminophen 5-325 mg per tablet 1 tablet    [DISCONTINUED] lactulose 10 gram/15 ml solution 20 g    [DISCONTINUED] levETIRAcetam (KEPPRA) 500 mg in dextrose 5 % in water (D5W) 5 % 100 mL IVPB (MB+)    [DISCONTINUED] levETIRAcetam tablet 500 mg    [DISCONTINUED] LIDOcaine (PF) 10 mg/ml (1%) injection 10 mg    [DISCONTINUED] loperamide capsule 4 mg    [DISCONTINUED] metoclopramide HCl injection 5 mg     [DISCONTINUED] morphine injection 2 mg    [DISCONTINUED] ondansetron injection 4 mg    [DISCONTINUED] oxyCODONE immediate release tablet 5 mg    [DISCONTINUED] polyethylene glycol packet 17 g    [DISCONTINUED] simethicone chewable tablet 160 mg    [DISCONTINUED] sucralfate tablet 1 g     Current Outpatient Medications on File Prior to Encounter   Medication Sig    [START ON 2022] amiodarone (PACERONE) 200 MG Tab Take 1 tablet (200 mg total) by mouth once daily.    amLODIPine (NORVASC) 10 MG tablet Take 10 mg by mouth once daily.    aspirin (ECOTRIN) 81 MG EC tablet Take 81 mg by mouth once daily.    EUTHYROX 100 mcg tablet Take 100 mcg by mouth once daily.    guaiFENesin 100 mg/5 ml (ROBITUSSIN) 100 mg/5 mL syrup Take 10 mLs (200 mg total) by mouth every 4 (four) hours as needed for Congestion.    hydrocortisone 2.5 % cream Apply topically daily as needed (as needed for itching).    isosorbide mononitrate (IMDUR) 30 MG 24 hr tablet Take 30 mg by mouth once daily.    losartan (COZAAR) 100 MG tablet Take 100 mg by mouth once daily.    metoprolol succinate (TOPROL-XL) 25 MG 24 hr tablet Take 1 tablet (25 mg total) by mouth once daily. (Patient not taking: No sig reported)    nitroGLYCERIN (NITROSTAT) 0.4 MG SL tablet Place 0.4 mg under the tongue.    omega-3 fatty acids/fish oil (FISH OIL-OMEGA-3 FATTY ACIDS) 300-1,000 mg capsule Take 1 capsule by mouth once daily. Takes 1000mg daily    pantoprazole (PROTONIX) 40 MG tablet Take 40 mg by mouth once daily.    rosuvastatin (CRESTOR) 10 MG tablet Take 20 mg by mouth every evening.     Family History       Problem Relation (Age of Onset)    Heart disease Mother, Sister    Lung cancer Father    Seizures Sister    Uterine cancer Mother          Tobacco Use    Smoking status: Former Smoker     Types: Cigarettes     Quit date: 2010     Years since quittin.1    Smokeless tobacco: Never Used   Substance and Sexual Activity    Alcohol use: Not  Currently    Drug use: Never    Sexual activity: Not Currently     Review of Systems   Constitutional:  Negative for fatigue and fever.   HENT:  Negative for congestion, ear pain, sinus pain and sore throat.    Eyes:  Negative for pain, discharge and redness.   Respiratory:  Negative for cough, shortness of breath and wheezing.    Cardiovascular:  Negative for chest pain, palpitations and leg swelling.   Gastrointestinal:  Negative for abdominal pain, diarrhea, nausea and vomiting.   Endocrine: Negative for cold intolerance and heat intolerance.   Genitourinary:  Negative for dysuria, frequency and urgency.   Musculoskeletal:  Positive for arthralgias (thorax pain with transfers). Negative for back pain, joint swelling and neck pain.   Skin:  Negative for rash.   Neurological:  Positive for weakness (R JAIMIE). Negative for dizziness, numbness and headaches.   Hematological:  Does not bruise/bleed easily.   Objective:     Vital Signs (Most Recent):  Temp: 98.2 °F (36.8 °C) (08/10/22 1700)  Pulse: 60 (08/10/22 1700)  Resp: 18 (08/10/22 1700)  BP: 139/72 (08/10/22 1700)  SpO2: 95 % (08/10/22 1700) Vital Signs (24h Range):  Temp:  [98.2 °F (36.8 °C)-98.9 °F (37.2 °C)] 98.2 °F (36.8 °C)  Pulse:  [55-64] 60  Resp:  [18] 18  SpO2:  [92 %-97 %] 95 %  BP: (136-169)/(69-74) 139/72     Weight: 88.6 kg (195 lb 5.2 oz)  Body mass index is 30.59 kg/m².    Physical Exam  Constitutional:       General: She is not in acute distress.     Appearance: Normal appearance. She is normal weight.   HENT:      Mouth/Throat:      Mouth: Mucous membranes are moist.      Pharynx: Oropharynx is clear. No oropharyngeal exudate or posterior oropharyngeal erythema.   Eyes:      Extraocular Movements: Extraocular movements intact.      Conjunctiva/sclera: Conjunctivae normal.      Pupils: Pupils are equal, round, and reactive to light.   Neck:      Thyroid: No thyromegaly.   Cardiovascular:      Rate and Rhythm: Normal rate and regular rhythm.       Heart sounds: No murmur heard.    No friction rub. No gallop.   Pulmonary:      Breath sounds: Normal breath sounds.   Abdominal:      General: Bowel sounds are normal. There is no distension.      Palpations: Abdomen is soft.      Tenderness: There is no abdominal tenderness.   Lymphadenopathy:      Cervical: No cervical adenopathy.   Skin:     General: Skin is warm.      Findings: No rash.             Comments: Well healing midline thorax surgical scar, sutures in place at chest tube insertion site   Neurological:      General: No focal deficit present.      Mental Status: She is oriented to person, place, and time.      Cranial Nerves: No cranial nerve deficit.   Psychiatric:         Mood and Affect: Mood is not anxious or depressed. Affect is not inappropriate.         CRANIAL NERVES     CN III, IV, VI   Pupils are equal, round, and reactive to light.     Significant Labs: All pertinent labs within the past 24 hours have been reviewed.    Significant Imaging: I have reviewed all pertinent imaging results/findings within the past 24 hours.    Assessment/Plan:     * Ascending aortic aneurysm  -s/p resection of ascending thoracic aortic aneurysm with 30 mm Dacron interposition and left atrial appendage ligation with Dr. 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  -patient previously on phenobarbital as a child, had seizure-like activity in the postop period, discharged on Keppra  -continue Keppra  -Patient has been advised she should not drive until she is at least  6 months seizure-free, will need to remain on Keppra for at least 1 year, longer if recurrent seizures  -patient inquired about phenobarbital as she is familiar with that drug, outpatient neurology recommended that she follow up outpatient for change or titration of medication      Ischemic embolic stroke  -no intervention performed by Neurology  -Found on CT head performed 07/30/2022 which showed no acute intracranial hemorrhage, localized hypodensity in the right occipital parietal cortex, chronic microvascular disease  -aspirin, atorvastatin      VTE Risk Mitigation (From admission, onward)         Ordered     enoxaparin injection 40 mg  Daily         08/10/22 1815     Place sequential compression device  Until discontinued         08/10/22 1815                   Thairy G Reyes, DO  Department of Hospital Medicine   [facility]

## 2022-08-10 NOTE — DISCHARGE SUMMARY
[facility] 6th Floor Medical Telemetry  Discharge Note  Short Stay    Procedure(s) (LRB):  REPAIR, AORTA, ASCENDING (N/A)    OUTCOME: Patient tolerated treatment/procedure well without complication and is now ready for discharge.    DISPOSITION: Home or Self Care    FINAL DIAGNOSIS:  Ischemic embolic stroke    FOLLOWUP: In clinic    DISCHARGE INSTRUCTIONS:  No discharge procedures on file.      Clinical Reference Documents Added to Patient Instructions       Document    CARDIAC REHAB INFORMATION (ENGLISH)          TIME SPENT ON DISCHARGE: 15 minutes

## 2022-08-10 NOTE — ASSESSMENT & PLAN NOTE
-patient previously on phenobarbital as a child, had seizure-like activity in the postop period, discharged on Keppra  -continue Keppra  -Patient has been advised she should not drive until she is at least 6 months seizure-free, will need to remain on Keppra for at least 1 year, longer if recurrent seizures  -patient inquired about phenobarbital as she is familiar with that drug, outpatient neurology recommended that she follow up outpatient for change or titration of medication

## 2022-08-10 NOTE — HPI
67 yo F w/ past medical history of Anxiety disorder, Coronary artery disease, GERD (gastroesophageal reflux disease), Hypercholesterolemia, Hypertension, Hypothyroidism, Thoracic aortic aneurysm who presented 07/29/2022, for a resection of ascending aortic aneurysm and repair with 30 mm Dacron graft and ligation of left atrial appendage by Dr. Vega. Pt's post op period was complicated by need for pacing for symptomatic bradycardia, seizure like-activity with convulsive movements of RUE and RLE (now on keppra), nonhemorrhagic occipitoparietal acute/subacute insult,  and AF RVR now on amiodarone 200 mg PO daily. Hold beta-blockers due to bradycardia. No need for anticoagulation at this time as the patient is s/p CROW ligation.    Pt reports she lives home alone with her , previously independent. Arrives able to ambulate 100ft approximately. However having RLEx weakness.

## 2022-08-10 NOTE — ASSESSMENT & PLAN NOTE
-no intervention performed by Neurology  -Found on CT head performed 07/30/2022 which showed no acute intracranial hemorrhage, localized hypodensity in the right occipital parietal cortex, chronic microvascular disease  -aspirin, atorvastatin

## 2022-08-10 NOTE — SUBJECTIVE & OBJECTIVE
Past Medical History:   Diagnosis Date    Anxiety disorder     Coronary artery disease     GERD (gastroesophageal reflux disease)     Hypercholesterolemia     Hypertension     Hypothyroidism     Obesity     Seizure in childhood     last one age 11    Sleep apnea     does not currently use CPAP    Thoracic aortic aneurysm 03/10/2022    4.5X4.4 Ascending Aorta as of 3/10/2022    Thoracic aortic aneurysm     Thyroid disease        Past Surgical History:   Procedure Laterality Date    ANGIOGRAM, CORONARY, WITH LEFT HEART CATHETERIZATION N/A 07/11/2022    Procedure: Angiogram, Coronary, with Left Heart Cath;  Surgeon: Harry Jj MD;  Location: [facility] CATH LAB;  Service: Cardiology;  Laterality: N/A;    COLONOSCOPY      HYSTERECTOMY      REPAIR OF ASCENDING AORTA N/A 7/29/2022    Procedure: REPAIR, AORTA, ASCENDING;  Surgeon: Alec Vega IV, MD;  Location: [facility] OR;  Service: Cardiovascular;  Laterality: N/A;  ECHO NOTIFIED    RT KNEE         Review of patient's allergies indicates:   Allergen Reactions    Meperidine      Other reaction(s): unknown    Tramadol Hives     Other reaction(s): sweating       Current Facility-Administered Medications on File Prior to Encounter   Medication    [DISCONTINUED] 0.9%  NaCl infusion (for blood administration)    [DISCONTINUED] 0.9%  NaCl infusion    [DISCONTINUED] acetaminophen oral solution 650 mg    [DISCONTINUED] albumin human 5% bottle 12.5 g    [DISCONTINUED] albuterol-ipratropium 2.5 mg-0.5 mg/3 mL nebulizer solution 3 mL    [DISCONTINUED] amiodarone tablet 200 mg    [DISCONTINUED] aspirin EC tablet 81 mg    [DISCONTINUED] barium sulfate (VARIBAR PUDDING) oral paste 1 mL    [DISCONTINUED] dextrose 50% injection 12.5 g    [DISCONTINUED] dextrose 50% injection 25 g    [DISCONTINUED] docusate sodium capsule 200 mg    [DISCONTINUED] enoxaparin injection 40 mg    [DISCONTINUED] famotidine (PF) injection 20 mg    [DISCONTINUED] folic acid tablet 1 mg    [DISCONTINUED]  guaiFENesin 100 mg/5 ml syrup 200 mg    [DISCONTINUED] hydrALAZINE injection 10 mg    [DISCONTINUED] HYDROcodone-acetaminophen 5-325 mg per tablet 1 tablet    [DISCONTINUED] lactulose 10 gram/15 ml solution 20 g    [DISCONTINUED] levETIRAcetam (KEPPRA) 500 mg in dextrose 5 % in water (D5W) 5 % 100 mL IVPB (MB+)    [DISCONTINUED] levETIRAcetam tablet 500 mg    [DISCONTINUED] LIDOcaine (PF) 10 mg/ml (1%) injection 10 mg    [DISCONTINUED] loperamide capsule 4 mg    [DISCONTINUED] metoclopramide HCl injection 5 mg    [DISCONTINUED] morphine injection 2 mg    [DISCONTINUED] ondansetron injection 4 mg    [DISCONTINUED] oxyCODONE immediate release tablet 5 mg    [DISCONTINUED] polyethylene glycol packet 17 g    [DISCONTINUED] simethicone chewable tablet 160 mg    [DISCONTINUED] sucralfate tablet 1 g     Current Outpatient Medications on File Prior to Encounter   Medication Sig    [START ON 8/11/2022] amiodarone (PACERONE) 200 MG Tab Take 1 tablet (200 mg total) by mouth once daily.    amLODIPine (NORVASC) 10 MG tablet Take 10 mg by mouth once daily.    aspirin (ECOTRIN) 81 MG EC tablet Take 81 mg by mouth once daily.    EUTHYROX 100 mcg tablet Take 100 mcg by mouth once daily.    guaiFENesin 100 mg/5 ml (ROBITUSSIN) 100 mg/5 mL syrup Take 10 mLs (200 mg total) by mouth every 4 (four) hours as needed for Congestion.    hydrocortisone 2.5 % cream Apply topically daily as needed (as needed for itching).    isosorbide mononitrate (IMDUR) 30 MG 24 hr tablet Take 30 mg by mouth once daily.    losartan (COZAAR) 100 MG tablet Take 100 mg by mouth once daily.    metoprolol succinate (TOPROL-XL) 25 MG 24 hr tablet Take 1 tablet (25 mg total) by mouth once daily. (Patient not taking: No sig reported)    nitroGLYCERIN (NITROSTAT) 0.4 MG SL tablet Place 0.4 mg under the tongue.    omega-3 fatty acids/fish oil (FISH OIL-OMEGA-3 FATTY ACIDS) 300-1,000 mg capsule Take 1 capsule by mouth once daily. Takes 1000mg daily    pantoprazole  (PROTONIX) 40 MG tablet Take 40 mg by mouth once daily.    rosuvastatin (CRESTOR) 10 MG tablet Take 20 mg by mouth every evening.     Family History       Problem Relation (Age of Onset)    Heart disease Mother, Sister    Lung cancer Father    Seizures Sister    Uterine cancer Mother          Tobacco Use    Smoking status: Former Smoker     Types: Cigarettes     Quit date: 2010     Years since quittin.1    Smokeless tobacco: Never Used   Substance and Sexual Activity    Alcohol use: Not Currently    Drug use: Never    Sexual activity: Not Currently     Review of Systems   Constitutional:  Negative for fatigue and fever.   HENT:  Negative for congestion, ear pain, sinus pain and sore throat.    Eyes:  Negative for pain, discharge and redness.   Respiratory:  Negative for cough, shortness of breath and wheezing.    Cardiovascular:  Negative for chest pain, palpitations and leg swelling.   Gastrointestinal:  Negative for abdominal pain, diarrhea, nausea and vomiting.   Endocrine: Negative for cold intolerance and heat intolerance.   Genitourinary:  Negative for dysuria, frequency and urgency.   Musculoskeletal:  Positive for arthralgias (thorax pain with transfers). Negative for back pain, joint swelling and neck pain.   Skin:  Negative for rash.   Neurological:  Positive for weakness (R JAIMIE). Negative for dizziness, numbness and headaches.   Hematological:  Does not bruise/bleed easily.   Objective:     Vital Signs (Most Recent):  Temp: 98.2 °F (36.8 °C) (08/10/22 1700)  Pulse: 60 (08/10/22 1700)  Resp: 18 (08/10/22 1700)  BP: 139/72 (08/10/22 1700)  SpO2: 95 % (08/10/22 1700) Vital Signs (24h Range):  Temp:  [98.2 °F (36.8 °C)-98.9 °F (37.2 °C)] 98.2 °F (36.8 °C)  Pulse:  [55-64] 60  Resp:  [18] 18  SpO2:  [92 %-97 %] 95 %  BP: (136-169)/(69-74) 139/72     Weight: 88.6 kg (195 lb 5.2 oz)  Body mass index is 30.59 kg/m².    Physical Exam  Constitutional:       General: She is not in acute distress.      Appearance: Normal appearance. She is normal weight.   HENT:      Mouth/Throat:      Mouth: Mucous membranes are moist.      Pharynx: Oropharynx is clear. No oropharyngeal exudate or posterior oropharyngeal erythema.   Eyes:      Extraocular Movements: Extraocular movements intact.      Conjunctiva/sclera: Conjunctivae normal.      Pupils: Pupils are equal, round, and reactive to light.   Neck:      Thyroid: No thyromegaly.   Cardiovascular:      Rate and Rhythm: Normal rate and regular rhythm.      Heart sounds: No murmur heard.    No friction rub. No gallop.   Pulmonary:      Breath sounds: Normal breath sounds.   Abdominal:      General: Bowel sounds are normal. There is no distension.      Palpations: Abdomen is soft.      Tenderness: There is no abdominal tenderness.   Lymphadenopathy:      Cervical: No cervical adenopathy.   Skin:     General: Skin is warm.      Findings: No rash.             Comments: Well healing midline thorax surgical scar, sutures in place at chest tube insertion site   Neurological:      General: No focal deficit present.      Mental Status: She is oriented to person, place, and time.      Cranial Nerves: No cranial nerve deficit.   Psychiatric:         Mood and Affect: Mood is not anxious or depressed. Affect is not inappropriate.         CRANIAL NERVES     CN III, IV, VI   Pupils are equal, round, and reactive to light.     Significant Labs: All pertinent labs within the past 24 hours have been reviewed.    Significant Imaging: I have reviewed all pertinent imaging results/findings within the past 24 hours.

## 2022-08-11 LAB
ALBUMIN SERPL-MCNC: 2 GM/DL (ref 3.4–4.8)
ALBUMIN/GLOB SERPL: 0.6 RATIO (ref 1.1–2)
ALP SERPL-CCNC: 151 UNIT/L (ref 40–150)
ALT SERPL-CCNC: 44 UNIT/L (ref 0–55)
APPEARANCE UR: CLEAR
AST SERPL-CCNC: 26 UNIT/L (ref 5–34)
BACTERIA #/AREA URNS AUTO: ABNORMAL /HPF
BASOPHILS # BLD AUTO: 0.07 X10(3)/MCL (ref 0–0.2)
BASOPHILS NFR BLD AUTO: 0.8 %
BILIRUB UR QL STRIP.AUTO: NEGATIVE MG/DL
BILIRUBIN DIRECT+TOT PNL SERPL-MCNC: 0.7 MG/DL
BUN SERPL-MCNC: 9.2 MG/DL (ref 9.8–20.1)
CALCIUM SERPL-MCNC: 8.4 MG/DL (ref 8.4–10.2)
CHLORIDE SERPL-SCNC: 99 MMOL/L (ref 98–107)
CO2 SERPL-SCNC: 29 MMOL/L (ref 23–31)
COLOR UR AUTO: YELLOW
CREAT SERPL-MCNC: 0.78 MG/DL (ref 0.55–1.02)
EOSINOPHIL # BLD AUTO: 0.32 X10(3)/MCL (ref 0–0.9)
EOSINOPHIL NFR BLD AUTO: 3.5 %
ERYTHROCYTE [DISTWIDTH] IN BLOOD BY AUTOMATED COUNT: 14.1 % (ref 11.5–17)
GFR SERPLBLD CREATININE-BSD FMLA CKD-EPI: >60 MLS/MIN/1.73/M2
GLOBULIN SER-MCNC: 3.6 GM/DL (ref 2.4–3.5)
GLUCOSE SERPL-MCNC: 107 MG/DL (ref 82–115)
GLUCOSE UR QL STRIP.AUTO: NEGATIVE MG/DL
HCT VFR BLD AUTO: 35.9 % (ref 37–47)
HGB BLD-MCNC: 11.2 GM/DL (ref 12–16)
IMM GRANULOCYTES # BLD AUTO: 0.07 X10(3)/MCL (ref 0–0.04)
IMM GRANULOCYTES NFR BLD AUTO: 0.8 %
KETONES UR QL STRIP.AUTO: NEGATIVE MG/DL
LEUKOCYTE ESTERASE UR QL STRIP.AUTO: NEGATIVE UNIT/L
LYMPHOCYTES # BLD AUTO: 1.3 X10(3)/MCL (ref 0.6–4.6)
LYMPHOCYTES NFR BLD AUTO: 14.3 %
MAGNESIUM SERPL-MCNC: 2.2 MG/DL (ref 1.6–2.6)
MCH RBC QN AUTO: 29.5 PG (ref 27–31)
MCHC RBC AUTO-ENTMCNC: 31.2 MG/DL (ref 33–36)
MCV RBC AUTO: 94.5 FL (ref 80–94)
MONOCYTES # BLD AUTO: 0.76 X10(3)/MCL (ref 0.1–1.3)
MONOCYTES NFR BLD AUTO: 8.4 %
NEUTROPHILS # BLD AUTO: 6.6 X10(3)/MCL (ref 2.1–9.2)
NEUTROPHILS NFR BLD AUTO: 72.2 %
NITRITE UR QL STRIP.AUTO: NEGATIVE
PH UR STRIP.AUTO: 7.5 [PH]
PHOSPHATE SERPL-MCNC: 4.1 MG/DL (ref 2.3–4.7)
PLATELET # BLD AUTO: 286 X10(3)/MCL (ref 130–400)
PMV BLD AUTO: 10.6 FL (ref 7.4–10.4)
POTASSIUM SERPL-SCNC: 4.1 MMOL/L (ref 3.5–5.1)
PROT SERPL-MCNC: 5.6 GM/DL (ref 5.8–7.6)
PROT UR QL STRIP.AUTO: NEGATIVE MG/DL
RBC # BLD AUTO: 3.8 X10(6)/MCL (ref 4.2–5.4)
RBC #/AREA URNS AUTO: ABNORMAL /HPF
RBC UR QL AUTO: ABNORMAL UNIT/L
SODIUM SERPL-SCNC: 138 MMOL/L (ref 136–145)
SP GR UR STRIP.AUTO: 1.01
SQUAMOUS #/AREA URNS AUTO: ABNORMAL /HPF
T4 FREE SERPL-MCNC: 0.57 NG/DL (ref 0.7–1.48)
TSH SERPL-ACNC: 28.41 UIU/ML (ref 0.35–4.94)
UROBILINOGEN UR STRIP-ACNC: 0.2 MG/DL
WBC # SPEC AUTO: 9.1 X10(3)/MCL (ref 4.5–11.5)
WBC #/AREA URNS AUTO: ABNORMAL /HPF

## 2022-08-11 PROCEDURE — 97162 PT EVAL MOD COMPLEX 30 MIN: CPT

## 2022-08-11 PROCEDURE — 84443 ASSAY THYROID STIM HORMONE: CPT | Performed by: STUDENT IN AN ORGANIZED HEALTH CARE EDUCATION/TRAINING PROGRAM

## 2022-08-11 PROCEDURE — 97165 OT EVAL LOW COMPLEX 30 MIN: CPT

## 2022-08-11 PROCEDURE — 99900035 HC TECH TIME PER 15 MIN (STAT)

## 2022-08-11 PROCEDURE — 97116 GAIT TRAINING THERAPY: CPT

## 2022-08-11 PROCEDURE — 84100 ASSAY OF PHOSPHORUS: CPT | Performed by: STUDENT IN AN ORGANIZED HEALTH CARE EDUCATION/TRAINING PROGRAM

## 2022-08-11 PROCEDURE — 27000190 HC CPAP FULL FACE MASK W/VALVE

## 2022-08-11 PROCEDURE — 63600175 PHARM REV CODE 636 W HCPCS: Performed by: STUDENT IN AN ORGANIZED HEALTH CARE EDUCATION/TRAINING PROGRAM

## 2022-08-11 PROCEDURE — 11000004 HC SNF PRIVATE

## 2022-08-11 PROCEDURE — 25000003 PHARM REV CODE 250: Performed by: STUDENT IN AN ORGANIZED HEALTH CARE EDUCATION/TRAINING PROGRAM

## 2022-08-11 PROCEDURE — 80053 COMPREHEN METABOLIC PANEL: CPT | Performed by: STUDENT IN AN ORGANIZED HEALTH CARE EDUCATION/TRAINING PROGRAM

## 2022-08-11 PROCEDURE — 94760 N-INVAS EAR/PLS OXIMETRY 1: CPT

## 2022-08-11 PROCEDURE — 81001 URINALYSIS AUTO W/SCOPE: CPT | Performed by: STUDENT IN AN ORGANIZED HEALTH CARE EDUCATION/TRAINING PROGRAM

## 2022-08-11 PROCEDURE — 83735 ASSAY OF MAGNESIUM: CPT | Performed by: STUDENT IN AN ORGANIZED HEALTH CARE EDUCATION/TRAINING PROGRAM

## 2022-08-11 PROCEDURE — 84439 ASSAY OF FREE THYROXINE: CPT | Performed by: STUDENT IN AN ORGANIZED HEALTH CARE EDUCATION/TRAINING PROGRAM

## 2022-08-11 PROCEDURE — 85025 COMPLETE CBC W/AUTO DIFF WBC: CPT | Performed by: STUDENT IN AN ORGANIZED HEALTH CARE EDUCATION/TRAINING PROGRAM

## 2022-08-11 RX ORDER — LIDOCAINE HYDROCHLORIDE 20 MG/ML
15 SOLUTION OROPHARYNGEAL ONCE
Status: COMPLETED | OUTPATIENT
Start: 2022-08-11 | End: 2022-08-11

## 2022-08-11 RX ORDER — DIPHENHYDRAMINE HCL 25 MG
25 CAPSULE ORAL EVERY 6 HOURS PRN
Status: DISCONTINUED | OUTPATIENT
Start: 2022-08-11 | End: 2022-08-15 | Stop reason: HOSPADM

## 2022-08-11 RX ORDER — MAG HYDROX/ALUMINUM HYD/SIMETH 200-200-20
30 SUSPENSION, ORAL (FINAL DOSE FORM) ORAL ONCE
Status: COMPLETED | OUTPATIENT
Start: 2022-08-11 | End: 2022-08-11

## 2022-08-11 RX ADMIN — SUCRALFATE 1 G: 1 TABLET ORAL at 04:08

## 2022-08-11 RX ADMIN — LIDOCAINE HYDROCHLORIDE 15 ML: 20 SOLUTION ORAL; TOPICAL at 01:08

## 2022-08-11 RX ADMIN — LEVETIRACETAM 500 MG: 500 TABLET, FILM COATED ORAL at 09:08

## 2022-08-11 RX ADMIN — ENOXAPARIN SODIUM 40 MG: 40 INJECTION SUBCUTANEOUS at 04:08

## 2022-08-11 RX ADMIN — ASPIRIN 81 MG: 81 TABLET, COATED ORAL at 09:08

## 2022-08-11 RX ADMIN — SUCRALFATE 1 G: 1 TABLET ORAL at 08:08

## 2022-08-11 RX ADMIN — ATORVASTATIN CALCIUM 40 MG: 40 TABLET, FILM COATED ORAL at 09:08

## 2022-08-11 RX ADMIN — LEVETIRACETAM 500 MG: 500 TABLET, FILM COATED ORAL at 08:08

## 2022-08-11 RX ADMIN — SUCRALFATE 1 G: 1 TABLET ORAL at 11:08

## 2022-08-11 RX ADMIN — FOLIC ACID 1 MG: 1 TABLET ORAL at 09:08

## 2022-08-11 RX ADMIN — SUCRALFATE 1 G: 1 TABLET ORAL at 06:08

## 2022-08-11 RX ADMIN — LEVOTHYROXINE SODIUM 100 MCG: 0.1 TABLET ORAL at 09:08

## 2022-08-11 RX ADMIN — ALUMINUM HYDROXIDE, MAGNESIUM HYDROXIDE, AND SIMETHICONE 30 ML: 200; 200; 20 SUSPENSION ORAL at 01:08

## 2022-08-11 RX ADMIN — DOCUSATE SODIUM 200 MG: 100 CAPSULE, LIQUID FILLED ORAL at 09:08

## 2022-08-11 RX ADMIN — FAMOTIDINE 20 MG: 10 INJECTION, SOLUTION INTRAVENOUS at 09:08

## 2022-08-11 RX ADMIN — POLYETHYLENE GLYCOL 3350 17 G: 17 POWDER, FOR SOLUTION ORAL at 09:08

## 2022-08-11 RX ADMIN — DOCUSATE SODIUM 200 MG: 100 CAPSULE, LIQUID FILLED ORAL at 08:08

## 2022-08-11 NOTE — PT/OT/SLP EVAL
Occupational Therapy   Evaluation    Name: Blanquita Montoya  MRN: [id number]  Admitting Diagnosis:  Ascending aortic aneurysm      History:   Blanquita Montoya is a 66 y.o. female with a medical diagnosis of Ascending aortic aneurysm.    Past Medical History:   Diagnosis Date    Anxiety disorder     Coronary artery disease     GERD (gastroesophageal reflux disease)     Hypercholesterolemia     Hypertension     Hypothyroidism     Obesity     Seizure in childhood     last one age 11    Sleep apnea     does not currently use CPAP    Thoracic aortic aneurysm 03/10/2022    4.5X4.4 Ascending Aorta as of 3/10/2022    Thoracic aortic aneurysm     Thyroid disease        Past Surgical History:   Procedure Laterality Date    ANGIOGRAM, CORONARY, WITH LEFT HEART CATHETERIZATION N/A 07/11/2022    Procedure: Angiogram, Coronary, with Left Heart Cath;  Surgeon: Harry Jj MD;  Location: [facility] CATH LAB;  Service: Cardiology;  Laterality: N/A;    COLONOSCOPY      HYSTERECTOMY      REPAIR OF ASCENDING AORTA N/A 7/29/2022    Procedure: REPAIR, AORTA, ASCENDING;  Surgeon: Alec Vega IV, MD;  Location: [facility] OR;  Service: Cardiovascular;  Laterality: N/A;  ECHO NOTIFIED    RT KNEE         Subjective     Chief Complaint: Pt reports slight nauseau  Patient/Family Comments/goals: Safe return to PLOF     Occupational Profile:  Lives with:    Home Environment: single level home with 5 steps to enter, walk-in shower with no bench  Previous level of function: independent   Equipment Used at Home:  none      Pain/Comfort:  · Pain Rating 1: 0/10    Blood Pressure:     Objective:     Communicated with: Nursing prior to session.  Patient found up in chair with   spouse at bedside upon OT entry to room.    General Precautions: Standard, sternal   Orthopedic Precautions:    Braces:    Respiratory Status: Room air    Occupational Performance:    Mobility  Assist level Comments    Bed mobility independent with  increased time    Transfer independent with increased time    Functional mobility contact guard    Sit to stand transitions contact guard    Other:          Activities of Daily Living Assist level Comments    Feeding independent    Grooming/hygiene independent    Bathing supervision    Upper body dressing independent with increased time    Lower body dressing independent with increased time    Toileting supervision    Toilet transfer supervision    Adaptive equipment training     Other:         Patient left up in chair with call button in reach, chair alarm on and  present    Assessment:     She presents with no ADL deficits at this time. Pt demonstrated mild endurance deficits which are planned to be addressed by PT.        Plan:     Patient does not warrant OT services at this time due to: independent level with all ADL  · Plan of Care Reviewed with: patient, spouse      Recommendations:     Discharge Recommendations: home health PT, home  Discharge Equipment Recommendations:  none      Time Tracking:     OT Date of Treatment: 08/11/22  OT Start Time: 1035  OT Stop Time: 1050  OT Total Time (min): 15 min    Billable Minutes:Evaluation 15    8/11/2022

## 2022-08-11 NOTE — HOSPITAL COURSE
Patient admitted for rehabilitation after ascending aortic aneurysm repair and left atrial appendage ligation.  Doing well with physical therapy however complaining of all over body pruritus, p.r.n. Benadryl.  Patient states she does not know if she was getting levothyroxine while inpatient prior to arriving at our facility, TSH elevated.  Suspect patient has been without her thyroid medication for quite some time, resume home dose and will need follow-up of TSH in 4-6 weeks. Clinda started for suspicion of SSTI at R groin catheter access site, US showing small cystic area- may be postprocedural.  No pseudoaneurysm evident.  Wound culture Of right groin wound grew Klebsiella and Proteus, both sensitive to ciprofloxacin therefore will discharge with Cipro for 10 days.

## 2022-08-11 NOTE — PT/OT/SLP PROGRESS
Physical Therapy Treatment Note           Name: Blanquita Montoya    : 1955 (66 y.o.)  MRN: [id number]           TREATMENT SUMMARY AND RECOMMENDATIONS:    PT Received On: 22  PT Start Time: 1439     PT Stop Time: 1455  PT Total Time (min): 16 min     Subjective Assessment:  x No complaints  Lethargic   x Awake, alert, cooperative  Uncooperative    Agitated  c/o pain    Appropriate  c/o fatigue    Confused x Treated at bedside     Emotionally labile  Treated in gym/dept.    Impulsive  Other:    Flat affect       Therapy Precautions:    Cognitive deficits  Spinal precautions    Collar - hard x Sternal precautions    Collar - soft   TLSO    Fall risk  LSO    Hip precautions - posterior  Knee immobilizer    Hip precautions - anterior  WBAT    Impaired communication  Partial weightbearing    Oxygen  TTWB    PEG tube  NWB   x Visual deficits(R visual field)  Other:    Hearing deficits          Treatment Objectives:     Mobility Training:   Assist level Comments    Bed mobility     Transfer CGA Sit to stands from EOB and toilet   Gait CGA X 125 feet using RW, no WC following. Pt needed cues for object negotiation. Pt ran into 2 objects on the R.    Sit to stand transitions     Sitting balance     Standing balance      Wheelchair mobility     Car transfer     Other:          Therapeutic Exercise:   Exercise Sets Reps Comments                               Additional Comments:  PT to progress ambulation and stair negotiation.     Assessment: Patient tolerated session well, mild fatigue.    PT Plan: Continue progressing towards set goals and with current POC  Revisions made to plan of care: No    GOALS:   Multidisciplinary Problems     Physical Therapy Goals        Problem: Physical Therapy    Goal Priority Disciplines Outcome Goal Variances Interventions   Physical Therapy Goal     PT, PT/OT      Description: Goals to be met by: 22  Patient will increase functional independence with mobility by  performing:    . Gait  x 350 feet with Supervision using No Assistive Device without deviating towards the R and normal obstacle negotiation.    . Ascend/descend 4 stairs with bilateral Handrails Supervision using No Assistive Device.                      Skilled PT Minutes Provided: 16, gait   Communication with Treatment Team:     Equipment recommendations:       At end of treatment, patient remained:   Up in chair     Up in wheelchair in room   x In bed    With alarm activated    Bed rails up    Call bell in reach    x Family/friends present    Restraints secured properly    In bathroom with CNA/RN notified    Nurse aware    In gym with therapist/tech    Other:

## 2022-08-11 NOTE — PLAN OF CARE
[facility] - Medical Surgical Unit  Initial Discharge Assessment       Primary Care Provider: Kamila Whittaker MD    Admission Diagnosis: Aortic aneurysm [I71.9]    Admission Date: 8/10/2022  Expected Discharge Date:     Discharge Barriers Identified: None    Payor: MEDICARE / Plan: MEDICARE PART A & B / Product Type: Government /     Extended Emergency Contact Information  Primary Emergency Contact: Hector Montoya  Mobile Phone: [phone]  Relation: Spouse  Preferred language: English   needed? No  Secondary Emergency Contact: Miladys Urbina  Mobile Phone: [phone]  Relation: Daughter  Preferred language: English   needed? No    Discharge Plan A: Home Health, Home with family         [facility] - [place], [place] - [address]  [address] [address]  Phone: [phone] Fax: [phone]    [facility] [address] [place] [address] Floor 1  [address] Floor 1  [facility] [address]  Phone: [phone] Fax: [phone]      Initial Assessment (most recent)     Adult Discharge Assessment - 08/11/22 1841        Discharge Assessment    Assessment Type Discharge Planning Assessment     Confirmed/corrected address, phone number and insurance Yes     Confirmed Demographics Correct on Facesheet     Source of Information patient     If unable to respond/provide information was family/caregiver contacted? Yes     Contact Name/Number HECTOR SPOUSE      Communicated ADAM with patient/caregiver Date not available/Unable to determine     Reason For Admission CVA     Lives With spouse     Facility Arrived From: [facility] [place]     Do you expect to return to your current living situation? Yes     Do you have help at home or someone to help you manage your care at home? Yes     Who are your caregiver(s) and their phone number(s)? HECTOR SPOUSE      Prior to hospitilization cognitive status:  Alert/Oriented     Current cognitive status: Alert/Oriented     Walking or Climbing Stairs Difficulty stair climbing difficulty, requires equipment     Dressing/Bathing Difficulty bathing difficulty, requires equipment     Home Accessibility wheelchair accessible     Home Layout Able to live on 1st floor     Equipment Currently Used at Home bedside commode;walker, standard     Readmission within 30 days? No     Patient currently being followed by outpatient case management? No     Do you currently have service(s) that help you manage your care at home? No     Do you take prescription medications? Yes     Do you have prescription coverage? Yes     Do you have any problems affording any of your prescribed medications? TBD     Is the patient taking medications as prescribed? yes     Who is going to help you get home at discharge? DARSHANA SPOUSE      How do you get to doctors appointments? family or friend will provide     Are you on dialysis? No     Do you take coumadin? No     Discharge Plan A Home Health;Home with family     DME Needed Upon Discharge  walker, standard     Discharge Plan discussed with: Spouse/sig other     Name(s) and Number(s) DARSHANA SPOUSE      Discharge Barriers Identified None

## 2022-08-11 NOTE — SUBJECTIVE & OBJECTIVE
Interval History:  Complaining of nausea today, mostly related with laying down suspect component of gastric reflux.    Review of Systems   Constitutional:  Negative for fatigue and fever.   HENT:  Negative for congestion, sore throat and tinnitus.    Respiratory:  Negative for chest tightness, shortness of breath and wheezing.    Cardiovascular:  Negative for chest pain and leg swelling.   Gastrointestinal:  Positive for nausea. Negative for diarrhea and rectal pain.   Endocrine: Negative for cold intolerance and heat intolerance.   Genitourinary:  Negative for dysuria and urgency.   Musculoskeletal:  Negative for back pain.   Skin:  Negative for wound.   Neurological:  Negative for dizziness, syncope and weakness.   Hematological:  Does not bruise/bleed easily.   Psychiatric/Behavioral:  Negative for agitation. The patient is not nervous/anxious.    Objective:     Vital Signs (Most Recent):  Temp: 98.1 °F (36.7 °C) (08/11/22 1125)  Pulse: (!) 48 (08/11/22 1125)  Resp: 19 (08/11/22 0351)  BP: 121/64 (08/11/22 1125)  SpO2: 96 % (08/11/22 1125)   Vital Signs (24h Range):  Temp:  [97.3 °F (36.3 °C)-99.7 °F (37.6 °C)] 98.1 °F (36.7 °C)  Pulse:  [48-85] 48  Resp:  [15-24] 19  SpO2:  [94 %-98 %] 96 %  BP: (107-151)/(62-73) 121/64     Weight: 86.7 kg (191 lb 2.2 oz)  Body mass index is 29.94 kg/m².    Intake/Output Summary (Last 24 hours) at 8/11/2022 1610  Last data filed at 8/11/2022 0900  Gross per 24 hour   Intake 240 ml   Output --   Net 240 ml      Physical Exam  Constitutional:       General: She is not in acute distress.     Appearance: She is normal weight.   HENT:      Head: Normocephalic and atraumatic.      Nose: No congestion.   Neck:     Cardiovascular:      Rate and Rhythm: Normal rate and regular rhythm.      Heart sounds: No murmur heard.  Pulmonary:      Effort: Pulmonary effort is normal.      Breath sounds: Normal breath sounds.   Chest:       Abdominal:      General: Bowel sounds are normal. There is  no distension.      Palpations: Abdomen is soft. There is no mass.      Tenderness: There is no abdominal tenderness.   Musculoskeletal:         General: Normal range of motion.   Skin:     General: Skin is warm.      Findings: No lesion or rash.   Neurological:      General: No focal deficit present.      Cranial Nerves: No cranial nerve deficit.   Psychiatric:         Mood and Affect: Mood normal.         Behavior: Behavior normal.       Significant Labs: All pertinent labs within the past 24 hours have been reviewed.    Significant Imaging: I have reviewed all pertinent imaging results/findings within the past 24 hours.

## 2022-08-11 NOTE — PROGRESS NOTES
"[facility] - Medical Surgical Unit  Adult Nutrition  Progress Note    SUMMARY       Recommendations    Recommendation/Intervention: 1. continue heart healthy diet. 2. continue boost plus with meals.  Goals: Meet 75% nutrition needs  Nutrition Goal Status: new    Assessment and Plan    No new Assessment & Plan notes have been filed under this hospital service since the last note was generated.  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 Pt intake is good, reviewed I/O's 100%. Pt on heart healthy with boost plus with meals.    Nutrition Risk Screen    Nutrition Risk Screen: no indicators present    Nutrition/Diet History         Anthropometrics    Temp: 98.1 °F (36.7 °C)  Height Method: Stated  Height: 5' 7" (170.2 cm)  Height (inches): 67 in  Weight Method: Bed Scale  Weight: 86.7 kg (191 lb 2.2 oz)  Weight (lb): 191.14 lb  Ideal Body Weight (IBW), Female: 135 lb  % Ideal Body Weight, Female (lb): 141.59 %  BMI (Calculated): 29.9  BMI Grade: 25 - 29.9 - overweight       Lab/Procedures/Meds    Pertinent Labs Reviewed: reviewed  Pertinent Labs Comments: 8/11/22: TP 5.6 L, Alb 2.0 L  Pertinent Medications Reviewed: reviewed    Physical Findings/Assessment         Estimated/Assessed Needs    Weight Used For Calorie Calculations: 86.7 kg (191 lb 2.2 oz)  Energy Calorie Requirements (kcal): 2167.5 (25 kcal/kg/CBW)  Energy Need Method: Kcal/kg  Protein Requirements: 86.88 (1.0 gm/kg/CBW)  Weight Used For Protein Calculations: 86.7 kg (191 lb 2.2 oz)     Estimated Fluid Requirement Method: RDA Method  RDA Method " (mL): 2167.5         Nutrition Prescription Ordered    Current Diet Order: heart healthy  Oral Nutrition Supplement: boost plus with meals    Evaluation of Received Nutrient/Fluid Intake    Tolerance: tolerating    % Meal Intake: 100%  Nutrition Risk    Level of Risk/Frequency of Follow-up: low - moderate     Monitor and Evaluation    Food and Nutrient Intake: food and beverage intake  Food and Nutrient Adminstration: diet order     Nutrition Follow-Up    RD Follow-up?: Yes

## 2022-08-11 NOTE — PLAN OF CARE
Problem: Adult Inpatient Plan of Care  Goal: Plan of Care Review  Outcome: Ongoing, Progressing  Goal: Patient-Specific Goal (Individualized)  Outcome: Ongoing, Progressing  Goal: Absence of Hospital-Acquired Illness or Injury  Outcome: Ongoing, Progressing  Intervention: Identify and Manage Fall Risk  Flowsheets (Taken 8/11/2022 1650)  Safety Promotion/Fall Prevention:   assistive device/personal item within reach   bed alarm set   chair alarm set   commode/urinal/bedpan at bedside   nonskid shoes/socks when out of bed   instructed to call staff for mobility   in recliner, wheels locked   lighting adjusted   medications reviewed  Intervention: Prevent Skin Injury  Flowsheets (Taken 8/11/2022 1650)  Body Position: position maintained  Skin Protection:   silicone foam dressing in place   protective footwear used  Intervention: Prevent and Manage VTE (Venous Thromboembolism) Risk  Flowsheets (Taken 8/11/2022 1650)  Activity Management: Up in chair - L3  VTE Prevention/Management:   ambulation promoted   bleeding precations maintained   bleeding risk assessed  Range of Motion: active ROM (range of motion) encouraged  Intervention: Prevent Infection  Flowsheets (Taken 8/11/2022 1650)  Infection Prevention:   cohorting utilized   environmental surveillance performed   equipment surfaces disinfected   hand hygiene promoted   personal protective equipment utilized   rest/sleep promoted   single patient room provided  Goal: Optimal Comfort and Wellbeing  Outcome: Ongoing, Progressing  Intervention: Monitor Pain and Promote Comfort  Flowsheets (Taken 8/11/2022 1650)  Pain Management Interventions:   medication offered but refused   pain management plan reviewed with patient/caregiver   quiet environment facilitated  Intervention: Provide Person-Centered Care  Flowsheets (Taken 8/11/2022 1650)  Trust Relationship/Rapport:   care explained   choices provided   questions answered   questions encouraged   reassurance  provided   thoughts/feelings acknowledged  Goal: Readiness for Transition of Care  Outcome: Ongoing, Progressing     Problem: Impaired Wound Healing  Goal: Optimal Wound Healing  Outcome: Ongoing, Progressing  Intervention: Promote Wound Healing  Flowsheets (Taken 8/11/2022 1650)  Oral Nutrition Promotion:   rest periods promoted   safe use of adaptive equipment encouraged   physical activity promoted  Activity Management: Up in chair - L3  Pain Management Interventions:   medication offered but refused   pain management plan reviewed with patient/caregiver   quiet environment facilitated     Problem: Fall Injury Risk  Goal: Absence of Fall and Fall-Related Injury  Outcome: Ongoing, Progressing  Intervention: Identify and Manage Contributors  Flowsheets (Taken 8/11/2022 1650)  Self-Care Promotion:   independence encouraged   meal set-up provided   safe use of adaptive equipment encouraged  Medication Review/Management: medications reviewed  Intervention: Promote Injury-Free Environment  Flowsheets (Taken 8/11/2022 1650)  Safety Promotion/Fall Prevention:   assistive device/personal item within reach   bed alarm set   chair alarm set   commode/urinal/bedpan at bedside   nonskid shoes/socks when out of bed   instructed to call staff for mobility   in recliner, wheels locked   lighting adjusted   medications reviewed

## 2022-08-11 NOTE — PROGRESS NOTES
[facility] - Medical Surgical Unit  [facility] Medicine  Progress Note    Patient Name: Blanquita Montoya  MRN: [id number]  Patient Class: IP- Swing   Admission Date: 8/10/2022  Length of Stay: 1 days  Attending Physician: Thairy G Reyes, DO  Primary Care Provider: Kamila Whittaker MD        Subjective:     Principal Problem:Ascending aortic aneurysm  Acute Condition:         HPI:  65 yo F w/ past medical history of Anxiety disorder, Coronary artery disease, GERD (gastroesophageal reflux disease), Hypercholesterolemia, Hypertension, Hypothyroidism, Thoracic aortic aneurysm who presented 07/29/2022, for a resection of ascending aortic aneurysm and repair with 30 mm Dacron graft and ligation of left atrial appendage by Dr. Vega. Pt's post op period was complicated by need for pacing for symptomatic bradycardia, seizure like-activity with convulsive movements of RUE and RLE (now on keppra), nonhemorrhagic occipitoparietal acute/subacute insult,  and AF RVR now on amiodarone 200 mg PO daily. Hold beta-blockers due to bradycardia. No need for anticoagulation at this time as the patient is s/p CROW ligation.    Pt reports she lives home alone with her , previously independent. Arrives able to ambulate 100ft approximately. However having RLEx weakness.       Overview/Hospital Course:  Patient admitted for rehabilitation after ascending aortic aneurysm repair and left atrial appendage ligation.  Doing well with physical therapy however complaining of all over body pruritus, p.r.n. Benadryl.  Patient states she does not know if she was getting levothyroxine while inpatient prior to arriving at our facility, TSH elevated.  Suspect patient has been without her thyroid medication for quite some time, resume home dose and will need follow-up of TSH in 4-6 weeks.      Interval History:  Complaining of nausea today, mostly related with laying down suspect component of gastric reflux.    Review of Systems    Constitutional:  Negative for fatigue and fever.   HENT:  Negative for congestion, sore throat and tinnitus.    Respiratory:  Negative for chest tightness, shortness of breath and wheezing.    Cardiovascular:  Negative for chest pain and leg swelling.   Gastrointestinal:  Positive for nausea. Negative for diarrhea and rectal pain.   Endocrine: Negative for cold intolerance and heat intolerance.   Genitourinary:  Negative for dysuria and urgency.   Musculoskeletal:  Negative for back pain.   Skin:  Negative for wound.   Neurological:  Negative for dizziness, syncope and weakness.   Hematological:  Does not bruise/bleed easily.   Psychiatric/Behavioral:  Negative for agitation. The patient is not nervous/anxious.    Objective:     Vital Signs (Most Recent):  Temp: 98.1 °F (36.7 °C) (08/11/22 1125)  Pulse: (!) 48 (08/11/22 1125)  Resp: 19 (08/11/22 0351)  BP: 121/64 (08/11/22 1125)  SpO2: 96 % (08/11/22 1125)   Vital Signs (24h Range):  Temp:  [97.3 °F (36.3 °C)-99.7 °F (37.6 °C)] 98.1 °F (36.7 °C)  Pulse:  [48-85] 48  Resp:  [15-24] 19  SpO2:  [94 %-98 %] 96 %  BP: (107-151)/(62-73) 121/64     Weight: 86.7 kg (191 lb 2.2 oz)  Body mass index is 29.94 kg/m².    Intake/Output Summary (Last 24 hours) at 8/11/2022 1610  Last data filed at 8/11/2022 0900  Gross per 24 hour   Intake 240 ml   Output --   Net 240 ml      Physical Exam  Constitutional:       General: She is not in acute distress.     Appearance: She is normal weight.   HENT:      Head: Normocephalic and atraumatic.      Nose: No congestion.   Neck:     Cardiovascular:      Rate and Rhythm: Normal rate and regular rhythm.      Heart sounds: No murmur heard.  Pulmonary:      Effort: Pulmonary effort is normal.      Breath sounds: Normal breath sounds.   Chest:       Abdominal:      General: Bowel sounds are normal. There is no distension.      Palpations: Abdomen is soft. There is no mass.      Tenderness: There is no abdominal tenderness.   Musculoskeletal:          General: Normal range of motion.   Skin:     General: Skin is warm.      Findings: No lesion or rash.   Neurological:      General: No focal deficit present.      Cranial Nerves: No cranial nerve deficit.   Psychiatric:         Mood and Affect: Mood normal.         Behavior: Behavior normal.       Significant Labs: All pertinent labs within the past 24 hours have been reviewed.    Significant Imaging: I have reviewed all pertinent imaging results/findings within the past 24 hours.      Assessment/Plan:      * Ascending aortic aneurysm  -s/p resection of ascending thoracic aortic aneurysm with 30 mm Dacron interposition and left atrial appendage ligation with Dr. 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  -patient previously on phenobarbital as a child, had seizure-like activity in the postop period, discharged on Keppra  -continue Keppra  -Patient has been advised she should not drive until she is at least 6 months seizure-free, will need to remain on Keppra for at least 1 year, longer if recurrent seizures  -patient inquired about phenobarbital as she is familiar with that drug, outpatient neurology recommended that she follow up outpatient for change or titration of medication      Ischemic embolic stroke  -no intervention performed by Neurology  -Found on CT head performed 07/30/2022 which showed no acute intracranial hemorrhage, localized hypodensity in the right occipital parietal cortex, chronic microvascular disease  -aspirin, atorvastatin      VTE Risk  Mitigation (From admission, onward)         Ordered     enoxaparin injection 40 mg  Daily         08/10/22 1815     Place sequential compression device  Until discontinued         08/10/22 1815                Discharge Planning   ADAM:      Code Status: Full Code   Is the patient medically ready for discharge?:     Reason for patient still in hospital (select all that apply): PT / OT recommendations                     Thairy G Reyes, DO  Department of Hospital Medicine   [facility]

## 2022-08-11 NOTE — PT/OT/SLP EVAL
Physical Therapy Acute Care Evaluation    Patient Name:  Blanquita Montoya   MRN:  [id number]    History:     Past Medical History:   Diagnosis Date    Anxiety disorder     Coronary artery disease     GERD (gastroesophageal reflux disease)     Hypercholesterolemia     Hypertension     Hypothyroidism     Obesity     Seizure in childhood     last one age 11    Sleep apnea     does not currently use CPAP    Thoracic aortic aneurysm 03/10/2022    4.5X4.4 Ascending Aorta as of 3/10/2022    Thoracic aortic aneurysm     Thyroid disease        Past Surgical History:   Procedure Laterality Date    ANGIOGRAM, CORONARY, WITH LEFT HEART CATHETERIZATION N/A 07/11/2022    Procedure: Angiogram, Coronary, with Left Heart Cath;  Surgeon: Harry Jj MD;  Location: [facility] CATH LAB;  Service: Cardiology;  Laterality: N/A;    COLONOSCOPY      HYSTERECTOMY      REPAIR OF ASCENDING AORTA N/A 7/29/2022    Procedure: REPAIR, AORTA, ASCENDING;  Surgeon: Alec Vega IV, MD;  Location: [facility] OR;  Service: Cardiovascular;  Laterality: N/A;  ECHO NOTIFIED    RT KNEE           Subjective     Chief Complaint: Endurance  Patient/Family Comments/goals: improve mobility to return home.   Pain/Comfort:  · Pain Rating 1: 0/10  · Pain Rating Post-Intervention 1: 0/10      Living Environment:  Lives with:   Home Environment: 4 steps to enter home with B rail, was I with driving, working, cooking, house care. No AD at home.   Previous level of function: I  Equipment used at home:  .  DME owned (not currently used): none.        Objective:     Communicated with PT and nursing prior to session.  Patient found up in chair with  (up in chair)  upon PT entry to room.    General Precautions: Standard, other (see comments) (Sternal)   Orthopedic Precautions:    Braces:    Respiratory Status: Room air     Vitals Value    Room air      Oxygen (L)     Blood pressure     Heart rate         Exams:  · RLE ROM: WFL  · RLE Strength:  Deficits: 4/5 with mild coordination deficits   · LLE ROM: WNL  · LLE Strength: WNL    Functional Mobility:  · Gait x 100 feet at CGA with obstacle negotiation deficits towards the R, leading to running into 2 objects with the R side of the RW. Pt does report visual field deficits following CVA. Balance: Standing, Fair- to Fair.     Therapeutic Activities and Exercises:   CGA for sit to stands due to sternal precautions.     Additional information: Unable to complete steps today due to sternal precautions, but will progress.     Patient left up in chair with  present.      Assessment:     Blanquita Montoya is a 66 y.o. female admitted with a medical diagnosis of Ascending aortic aneurysm.  She presents with the following impairments/functional limitations:  weakness, impaired endurance, gait instability, impaired balance, visual deficits warranting skilled PT to progress towards PLOF.    Rehab Prognosis: Good; patient would benefit from acute skilled PT services to address these deficits and reach maximum level of function.    Recent Surgery: * No surgery found *        Recommendations:     Discharge Recommendations:  home   Discharge Equipment Recommendations: walker, rolling       Plan:     During this hospitalization, patient to be seen 6 x/week to address the identified rehab impairments via gait training, therapeutic activities, therapeutic exercises, neuromuscular re-education and progress toward the following goals:    GOALS:   Multidisciplinary Problems     Physical Therapy Goals        Problem: Physical Therapy    Goal Priority Disciplines Outcome Goal Variances Interventions   Physical Therapy Goal     PT, PT/OT      Description: Goals to be met by: 9/8/22  Patient will increase functional independence with mobility by performing:    . Gait  x 350 feet with Supervision using No Assistive Device without deviating towards the R and normal obstacle negotiation.    . Ascend/descend 4 stairs with  bilateral Handrails Supervision using No Assistive Device.                      Time Tracking:       PT Start Time: 1038     PT Stop Time: 1058  PT Total Time (min): 20 min     Billable Minutes: Evaluation 20 08/11/2022

## 2022-08-12 PROBLEM — T81.41XA SUPERFICIAL INCISIONAL SURGICAL SITE INFECTION: Status: ACTIVE | Noted: 2022-08-12

## 2022-08-12 PROCEDURE — 25000003 PHARM REV CODE 250: Performed by: STUDENT IN AN ORGANIZED HEALTH CARE EDUCATION/TRAINING PROGRAM

## 2022-08-12 PROCEDURE — 97116 GAIT TRAINING THERAPY: CPT | Mod: CQ

## 2022-08-12 PROCEDURE — 63600175 PHARM REV CODE 636 W HCPCS: Performed by: STUDENT IN AN ORGANIZED HEALTH CARE EDUCATION/TRAINING PROGRAM

## 2022-08-12 PROCEDURE — 94760 N-INVAS EAR/PLS OXIMETRY 1: CPT

## 2022-08-12 PROCEDURE — 87070 CULTURE OTHR SPECIMN AEROBIC: CPT | Performed by: STUDENT IN AN ORGANIZED HEALTH CARE EDUCATION/TRAINING PROGRAM

## 2022-08-12 PROCEDURE — 27000221 HC OXYGEN, UP TO 24 HOURS

## 2022-08-12 PROCEDURE — 97530 THERAPEUTIC ACTIVITIES: CPT | Mod: CQ

## 2022-08-12 PROCEDURE — 94660 CPAP INITIATION&MGMT: CPT

## 2022-08-12 PROCEDURE — 11000004 HC SNF PRIVATE

## 2022-08-12 RX ORDER — CLINDAMYCIN HYDROCHLORIDE 150 MG/1
450 CAPSULE ORAL EVERY 8 HOURS
Status: DISCONTINUED | OUTPATIENT
Start: 2022-08-12 | End: 2022-08-15 | Stop reason: HOSPADM

## 2022-08-12 RX ORDER — POLYETHYLENE GLYCOL 3350 17 G/17G
17 POWDER, FOR SOLUTION ORAL 2 TIMES DAILY PRN
Status: DISCONTINUED | OUTPATIENT
Start: 2022-08-12 | End: 2022-08-15 | Stop reason: HOSPADM

## 2022-08-12 RX ORDER — LACTOBACILLUS ACIDOPHILUS 500MM CELL
1 CAPSULE ORAL
Status: DISCONTINUED | OUTPATIENT
Start: 2022-08-12 | End: 2022-08-14

## 2022-08-12 RX ADMIN — SUCRALFATE 1 G: 1 TABLET ORAL at 11:08

## 2022-08-12 RX ADMIN — DOCUSATE SODIUM 200 MG: 100 CAPSULE, LIQUID FILLED ORAL at 09:08

## 2022-08-12 RX ADMIN — CLINDAMYCIN HYDROCHLORIDE 450 MG: 150 CAPSULE ORAL at 09:08

## 2022-08-12 RX ADMIN — ASPIRIN 81 MG: 81 TABLET, COATED ORAL at 09:08

## 2022-08-12 RX ADMIN — CLINDAMYCIN HYDROCHLORIDE 450 MG: 150 CAPSULE ORAL at 01:08

## 2022-08-12 RX ADMIN — AMIODARONE HYDROCHLORIDE 200 MG: 200 TABLET ORAL at 09:08

## 2022-08-12 RX ADMIN — SUCRALFATE 1 G: 1 TABLET ORAL at 04:08

## 2022-08-12 RX ADMIN — SUCRALFATE 1 G: 1 TABLET ORAL at 09:08

## 2022-08-12 RX ADMIN — Medication 1 CAPSULE: at 04:08

## 2022-08-12 RX ADMIN — LEVETIRACETAM 500 MG: 500 TABLET, FILM COATED ORAL at 09:08

## 2022-08-12 RX ADMIN — ENOXAPARIN SODIUM 40 MG: 40 INJECTION SUBCUTANEOUS at 04:08

## 2022-08-12 RX ADMIN — Medication 1 CAPSULE: at 01:08

## 2022-08-12 RX ADMIN — FAMOTIDINE 20 MG: 10 INJECTION, SOLUTION INTRAVENOUS at 09:08

## 2022-08-12 RX ADMIN — LEVOTHYROXINE SODIUM 100 MCG: 0.1 TABLET ORAL at 06:08

## 2022-08-12 RX ADMIN — ATORVASTATIN CALCIUM 40 MG: 40 TABLET, FILM COATED ORAL at 09:08

## 2022-08-12 RX ADMIN — FOLIC ACID 1 MG: 1 TABLET ORAL at 09:08

## 2022-08-12 RX ADMIN — SUCRALFATE 1 G: 1 TABLET ORAL at 06:08

## 2022-08-12 RX ADMIN — ACETAMINOPHEN 650 MG: 325 TABLET ORAL at 09:08

## 2022-08-12 NOTE — PROGRESS NOTES
Assessment perfomed.  Midline sternal incision present, completely epithelialized.  2 purse sutures distal to incision line noted.   Recommend suture removal if ok with md.   R groin incision from angiogram on 7/11/22 noted with slough and eschar to incision line.  Scant serous drainage present.   Culture obtained per md order.  Medihoney and mesalt with cover dressing applied.  Recommend daily dressing changes to site.  Pt is independent with bed mobility.  Reinforced importance of frequent repositioning for pressure prevention while hospitalized.  Pt verbalized understanding.     08/12/22 1300        Wound 07/29/22 1030 Other (comment) midline Sternal #1   Date First Assessed/Time First Assessed: 07/29/22 1030   Pre-existing: No  Primary Wound Type: Other (comment)  Orientation: midline  Location: Sternal  Wound Number: #1   Wound Image    Wound WDL WDL   Dressing Appearance Open to air   Drainage Amount None   Appearance Intact;Closed/resurfaced;Sutures intact  (2 sutures distal to incision line)   Tissue loss description Not applicable   Periwound Area Intact   Wound Edges Approximated        Incision/Site 08/11/22 Groin angiogram puncture   Date First Assessed: 08/11/22   Location: Groin  Incision Type: angiogram puncture   Wound Image    Dressing Appearance Open to air   Drainage Amount Scant   Drainage Characteristics/Odor Serous;No odor   Appearance Red;Tan;Gray;Black;Eschar;Slough   Black (%), Wound Tissue Color 40 %   Periwound Area Intact   Wound Edges Defined   Wound Length (cm) 0.5 cm   Wound Width (cm) 6 cm   Wound Depth (cm) 0.1 cm   Wound Volume (cm^3) 0.3 cm^3   Wound Surface Area (cm^2) 3 cm^2   Care Cleansed with:;Sterile normal saline;Other (see comments)  (cultured)   Dressing Honey;Sodium chloride impregnated;Island/border

## 2022-08-12 NOTE — PLAN OF CARE
Patient requesting home health services. Freedom of choice signed. Referral sent to [facility] care via [facility]

## 2022-08-12 NOTE — ASSESSMENT & PLAN NOTE
-at catherization access site  -pending US results  -empiric clinda  -wound culture   -wound care

## 2022-08-12 NOTE — PLAN OF CARE
Problem: Adult Inpatient Plan of Care  Goal: Plan of Care Review  Outcome: Ongoing, Progressing  Flowsheets (Taken 8/12/2022 0047)  Plan of Care Reviewed With: patient  Goal: Absence of Hospital-Acquired Illness or Injury  Outcome: Ongoing, Progressing  Intervention: Identify and Manage Fall Risk  Flowsheets (Taken 8/12/2022 0047)  Safety Promotion/Fall Prevention:   assistive device/personal item within reach   bed alarm set   nonskid shoes/socks when out of bed   side rails raised x 3   supervised activity   Supervised toileting - stay within arms reach   instructed to call staff for mobility  Intervention: Prevent Skin Injury  Flowsheets (Taken 8/12/2022 0047)  Body Position: position changed independently  Intervention: Prevent and Manage VTE (Venous Thromboembolism) Risk  Flowsheets (Taken 8/12/2022 0047)  Activity Management: Walk with assistive devise and /or staff member - L3  VTE Prevention/Management: ambulation promoted  Intervention: Prevent Infection  Flowsheets (Taken 8/12/2022 0047)  Infection Prevention:   hand hygiene promoted   rest/sleep promoted  Goal: Optimal Comfort and Wellbeing  Outcome: Ongoing, Progressing  Intervention: Monitor Pain and Promote Comfort  Flowsheets (Taken 8/12/2022 0047)  Pain Management Interventions:   medication offered   pain management plan reviewed with patient/caregiver   pillow support provided     Problem: Impaired Wound Healing  Goal: Optimal Wound Healing  Outcome: Ongoing, Progressing  Intervention: Promote Wound Healing  Flowsheets (Taken 8/12/2022 0047)  Sleep/Rest Enhancement: regular sleep/rest pattern promoted  Activity Management: Walk with assistive devise and /or staff member - L3  Pain Management Interventions:   medication offered   pain management plan reviewed with patient/caregiver   pillow support provided     Problem: Fall Injury Risk  Goal: Absence of Fall and Fall-Related Injury  Outcome: Ongoing, Progressing  Intervention: Identify and  Manage Contributors  Flowsheets (Taken 8/12/2022 0047)  Self-Care Promotion:   independence encouraged   safe use of adaptive equipment encouraged  Intervention: Promote Injury-Free Environment  Flowsheets (Taken 8/12/2022 0047)  Safety Promotion/Fall Prevention:   assistive device/personal item within reach   bed alarm set   nonskid shoes/socks when out of bed   side rails raised x 3   supervised activity   Supervised toileting - stay within arms reach   instructed to call staff for mobility

## 2022-08-12 NOTE — SUBJECTIVE & OBJECTIVE
Interval History:  States nausea and pruritus have resolved.    Review of Systems   Constitutional:  Negative for fatigue and fever.   HENT:  Negative for congestion, sore throat and tinnitus.    Respiratory:  Negative for chest tightness, shortness of breath and wheezing.    Cardiovascular:  Negative for chest pain and leg swelling.   Gastrointestinal:  Positive for nausea. Negative for diarrhea and rectal pain.   Endocrine: Negative for cold intolerance and heat intolerance.   Genitourinary:  Negative for dysuria and urgency.   Musculoskeletal:  Negative for back pain.   Skin:  Negative for wound.   Neurological:  Negative for dizziness, syncope and weakness.   Hematological:  Does not bruise/bleed easily.   Psychiatric/Behavioral:  Negative for agitation. The patient is not nervous/anxious.    Objective:     Vital Signs (Most Recent):  Temp: 98.2 °F (36.8 °C) (08/12/22 1100)  Pulse: (!) 58 (08/12/22 1100)  Resp: 19 (08/12/22 1100)  BP: 133/67 (08/12/22 1100)  SpO2: 95 % (08/12/22 1100)   Vital Signs (24h Range):  Temp:  [97.9 °F (36.6 °C)-98.9 °F (37.2 °C)] 98.2 °F (36.8 °C)  Pulse:  [48-58] 58  Resp:  [18-19] 19  SpO2:  [93 %-97 %] 95 %  BP: (133-156)/(66-74) 133/67     Weight: 86.7 kg (191 lb 2.2 oz)  Body mass index is 29.94 kg/m².    Intake/Output Summary (Last 24 hours) at 8/12/2022 1430  Last data filed at 8/12/2022 1200  Gross per 24 hour   Intake 1440 ml   Output --   Net 1440 ml        Physical Exam  Constitutional:       General: She is not in acute distress.     Appearance: She is normal weight.   HENT:      Head: Normocephalic and atraumatic.      Nose: No congestion.   Neck:     Cardiovascular:      Rate and Rhythm: Normal rate and regular rhythm.      Heart sounds: No murmur heard.  Pulmonary:      Effort: Pulmonary effort is normal.      Breath sounds: Normal breath sounds.   Chest:       Abdominal:      General: Bowel sounds are normal. There is no distension.      Palpations: Abdomen is soft.  There is no mass.      Tenderness: There is no abdominal tenderness.       Musculoskeletal:         General: Normal range of motion.   Skin:     General: Skin is warm.      Findings: No lesion or rash.      Comments: L pointer finger blood blister   Neurological:      General: No focal deficit present.      Cranial Nerves: No cranial nerve deficit.   Psychiatric:         Mood and Affect: Mood normal.         Behavior: Behavior normal.       Significant Labs: All pertinent labs within the past 24 hours have been reviewed.    Significant Imaging: I have reviewed all pertinent imaging results/findings within the past 24 hours.

## 2022-08-12 NOTE — PROGRESS NOTES
[facility] - [facility] Surgical [facility]  [facility] Medicine  Progress Note    Patient Name: Blanquita Montoya  MRN: [id number]  Patient Class: IP- Swing   Admission Date: 8/10/2022  Length of Stay: 2 days  Attending Physician: Thairy G Reyes, DO  Primary Care Provider: Kamila Whittaker MD        Subjective:     Principal Problem:Ascending aortic aneurysm  Acute Condition: SSTI        HPI:  65 yo F w/ past medical history of Anxiety disorder, Coronary artery disease, GERD (gastroesophageal reflux disease), Hypercholesterolemia, Hypertension, Hypothyroidism, Thoracic aortic aneurysm who presented 07/29/2022, for a resection of ascending aortic aneurysm and repair with 30 mm Dacron graft and ligation of left atrial appendage by Dr. Vega. Pt's post op period was complicated by need for pacing for symptomatic bradycardia, seizure like-activity with convulsive movements of RUE and RLE (now on keppra), nonhemorrhagic occipitoparietal acute/subacute insult,  and AF RVR now on amiodarone 200 mg PO daily. Hold beta-blockers due to bradycardia. No need for anticoagulation at this time as the patient is s/p CROW ligation.    Pt reports she lives home alone with her , previously independent. Arrives able to ambulate 100ft approximately. However having RLEx weakness.       Overview/Hospital Course:  Patient admitted for rehabilitation after ascending aortic aneurysm repair and left atrial appendage ligation.  Doing well with physical therapy however complaining of all over body pruritus, p.r.n. Benadryl.  Patient states she does not know if she was getting levothyroxine while inpatient prior to arriving at our facility, TSH elevated.  Suspect patient has been without her thyroid medication for quite some time, resume home dose and will need follow-up of TSH in 4-6 weeks.      Interval History:  States nausea and pruritus have resolved.    Review of Systems   Constitutional:  Negative for fatigue and fever.   HENT:   Negative for congestion, sore throat and tinnitus.    Respiratory:  Negative for chest tightness, shortness of breath and wheezing.    Cardiovascular:  Negative for chest pain and leg swelling.   Gastrointestinal:  Positive for nausea. Negative for diarrhea and rectal pain.   Endocrine: Negative for cold intolerance and heat intolerance.   Genitourinary:  Negative for dysuria and urgency.   Musculoskeletal:  Negative for back pain.   Skin:  Negative for wound.   Neurological:  Negative for dizziness, syncope and weakness.   Hematological:  Does not bruise/bleed easily.   Psychiatric/Behavioral:  Negative for agitation. The patient is not nervous/anxious.    Objective:     Vital Signs (Most Recent):  Temp: 98.2 °F (36.8 °C) (08/12/22 1100)  Pulse: (!) 58 (08/12/22 1100)  Resp: 19 (08/12/22 1100)  BP: 133/67 (08/12/22 1100)  SpO2: 95 % (08/12/22 1100)   Vital Signs (24h Range):  Temp:  [97.9 °F (36.6 °C)-98.9 °F (37.2 °C)] 98.2 °F (36.8 °C)  Pulse:  [48-58] 58  Resp:  [18-19] 19  SpO2:  [93 %-97 %] 95 %  BP: (133-156)/(66-74) 133/67     Weight: 86.7 kg (191 lb 2.2 oz)  Body mass index is 29.94 kg/m².    Intake/Output Summary (Last 24 hours) at 8/12/2022 1430  Last data filed at 8/12/2022 1200  Gross per 24 hour   Intake 1440 ml   Output --   Net 1440 ml        Physical Exam  Constitutional:       General: She is not in acute distress.     Appearance: She is normal weight.   HENT:      Head: Normocephalic and atraumatic.      Nose: No congestion.   Neck:     Cardiovascular:      Rate and Rhythm: Normal rate and regular rhythm.      Heart sounds: No murmur heard.  Pulmonary:      Effort: Pulmonary effort is normal.      Breath sounds: Normal breath sounds.   Chest:       Abdominal:      General: Bowel sounds are normal. There is no distension.      Palpations: Abdomen is soft. There is no mass.      Tenderness: There is no abdominal tenderness.       Musculoskeletal:         General: Normal range of motion.   Skin:      General: Skin is warm.      Findings: No lesion or rash.      Comments: L pointer finger blood blister   Neurological:      General: No focal deficit present.      Cranial Nerves: No cranial nerve deficit.   Psychiatric:         Mood and Affect: Mood normal.         Behavior: Behavior normal.       Significant Labs: All pertinent labs within the past 24 hours have been reviewed.    Significant Imaging: I have reviewed all pertinent imaging results/findings within the past 24 hours.      Assessment/Plan:      * Ascending aortic aneurysm  -s/p resection of ascending thoracic aortic aneurysm with 30 mm Dacron interposition and left atrial appendage ligation with Dr. 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  -patient previously on phenobarbital as a child, had seizure-like activity in the postop period, discharged on Keppra  -continue Keppra  -Patient has been advised she should not drive until she is at least 6 months seizure-free, will need to remain on Keppra for at least 1 year, longer if recurrent seizures  -patient inquired about phenobarbital as she is familiar with that drug, outpatient neurology recommended that she follow up outpatient for change or titration of medication      Ischemic embolic stroke  -no intervention performed by Neurology  -Found on CT head performed 07/30/2022 which showed no acute intracranial hemorrhage, localized hypodensity in the right occipital parietal cortex, chronic microvascular disease  -aspirin, atorvastatin    Superficial  incisional surgical site infection  -at catherization access site  -pending US results  -empiric clinda  -wound culture   -wound care        VTE Risk Mitigation (From admission, onward)         Ordered     enoxaparin injection 40 mg  Daily         08/10/22 1815     Place sequential compression device  Until discontinued         08/10/22 1815                Discharge Planning   ADAM:      Code Status: Full Code   Is the patient medically ready for discharge?:     Reason for patient still in hospital (select all that apply): Treatment and PT / OT recommendations  Discharge Plan A: Home Health, Home with family                  Thairy G Reyes, DO  Department of Hospital Medicine   [facility]

## 2022-08-12 NOTE — PT/OT/SLP PROGRESS
Physical Therapy Treatment Note           Name: Blanquita Montoya    : 1955 (66 y.o.)  MRN: [id number]           TREATMENT SUMMARY AND RECOMMENDATIONS:    PT Received On: 22  PT Start Time: 1000     PT Stop Time: 1025  PT Total Time (min): 25 min     Subjective Assessment:   No complaints  Lethargic    Awake, alert, cooperative  Uncooperative    Agitated  c/o pain    Appropriate  c/o fatigue    Confused x Treated at bedside     Emotionally labile  Treated in gym/dept.    Impulsive  Other:    Flat affect       Therapy Precautions:    Cognitive deficits  Spinal precautions    Collar - hard x Sternal precautions    Collar - soft   TLSO   x Fall risk  LSO    Hip precautions - posterior  Knee immobilizer    Hip precautions - anterior  WBAT    Impaired communication  Partial weightbearing    Oxygen  TTWB    PEG tube  NWB    Visual deficits  Other:    Hearing deficits          Treatment Objectives:     Mobility Training:   Assist level Comments    Bed mobility     Transfer     Gait SBA Ambon firm surface with  ft    Sit to stand transitions SBA Sit-stand x 5 with hands on knees   Sitting balance     Standing balance      Wheelchair mobility     Car transfer     Other:  CGA Step usp 5 reps x 2 on 4 inch box with no UE support with VC for correct technique       Therapeutic Exercise:   Exercise Sets Reps Comments   B LE exer long and short sitting  20 reps  In all planes to promote muscle strength and ROM                          Additional Comments:  Sternal precautions maintained    Assessment: Patient tolerated session good.    PT Plan: continue  Revisions made to plan of care: No    GOALS:   Multidisciplinary Problems     Physical Therapy Goals        Problem: Physical Therapy    Goal Priority Disciplines Outcome Goal Variances Interventions   Physical Therapy Goal     PT, PT/OT      Description: Goals to be met by: 22  Patient will increase functional independence with mobility by  performing:    . Gait  x 350 feet with Supervision using No Assistive Device without deviating towards the R and normal obstacle negotiation.    . Ascend/descend 4 stairs with bilateral Handrails Supervision using No Assistive Device.                      Skilled PT Minutes Provided: 25   Communication with Treatment Team:     Equipment recommendations:       At end of treatment, patient remained:  x Up in chair     Up in wheelchair in room    In bed   x With alarm activated    Bed rails up   X Call bell in reach     Family/friends present    Restraints secured properly    In bathroom with CNA/RN notified    Nurse aware    In gym with therapist/tech    Other:

## 2022-08-12 NOTE — PT/OT/SLP PROGRESS
Name: Blanquita Montoya    : 1955 (66 y.o.)  MRN: [id number]           Patient Unavailable      Patient unable to be seen at this time secondary to: attempted to see pt but preoccupied upon arrival- will see pt tomorrow

## 2022-08-12 NOTE — PT/OT/SLP PROGRESS
Name: Blanquita Montoya    : 1955 (66 y.o.)  MRN: [id number]      Patient is currently performing gait with PT using a RW at [facility]. Patient is able to ambulate up to 120 feet.    DME Recommendations:      Rolling walker: Patient would benefit from a rolling walker upon discharge to increase safety, decrease fall risk, and improve mobility/transfers. Patient is currently unable to independently and functionally walk for household distances of >100 feet without use of rolling walker. Patient would benefit from a rolling walker within his/her home environment to increase safety with transfers and gait and decrease risk of falls and subsequent injury.

## 2022-08-12 NOTE — PLAN OF CARE
Problem: Adult Inpatient Plan of Care  Goal: Plan of Care Review  Outcome: Ongoing, Progressing  Flowsheets (Taken 8/12/2022 1740)  Plan of Care Reviewed With:   patient   family  Goal: Absence of Hospital-Acquired Illness or Injury  Outcome: Ongoing, Progressing  Intervention: Identify and Manage Fall Risk  Flowsheets (Taken 8/12/2022 1740)  Safety Promotion/Fall Prevention:   assistive device/personal item within reach   bed alarm set   family to remain at bedside   side rails raised x 3   instructed to call staff for mobility   supervised activity  Intervention: Prevent Skin Injury  Flowsheets (Taken 8/12/2022 1740)  Body Position: supine  Skin Protection: incontinence pads utilized  Intervention: Prevent and Manage VTE (Venous Thromboembolism) Risk  Flowsheets (Taken 8/12/2022 1740)  Activity Management: Ambulated to bathroom - L4  VTE Prevention/Management:   ambulation promoted   bleeding precations maintained   bleeding risk assessed   bleeding risk factor(s) identified, provider notified  Intervention: Prevent Infection  Flowsheets (Taken 8/12/2022 1740)  Infection Prevention:   environmental surveillance performed   hand hygiene promoted   rest/sleep promoted   single patient room provided  Goal: Optimal Comfort and Wellbeing  Outcome: Ongoing, Progressing  Intervention: Monitor Pain and Promote Comfort  Flowsheets (Taken 8/12/2022 1740)  Pain Management Interventions:   quiet environment facilitated   relaxation techniques promoted   medication offered but refused  Intervention: Provide Person-Centered Care  Flowsheets (Taken 8/12/2022 1740)  Trust Relationship/Rapport:   care explained   choices provided   emotional support provided   empathic listening provided   thoughts/feelings acknowledged   reassurance provided   questions encouraged   questions answered     Problem: Impaired Wound Healing  Goal: Optimal Wound Healing  Outcome: Ongoing, Progressing  Intervention: Promote Wound Healing  Flowsheets  (Taken 8/12/2022 1740)  Oral Nutrition Promotion:   nutritional therapy counseling provided   physical activity promoted   rest periods promoted  Sleep/Rest Enhancement:   awakenings minimized   family presence promoted   noise level reduced   regular sleep/rest pattern promoted   relaxation techniques promoted   room darkened  Activity Management: Ambulated to bathroom - L4  Pain Management Interventions:   quiet environment facilitated   relaxation techniques promoted   medication offered but refused     Problem: Fall Injury Risk  Goal: Absence of Fall and Fall-Related Injury  Outcome: Ongoing, Progressing  Intervention: Identify and Manage Contributors  Flowsheets (Taken 8/12/2022 1740)  Self-Care Promotion:   independence encouraged   BADL personal objects within reach   meal set-up provided  Medication Review/Management:   high-risk medications identified   medications reviewed  Intervention: Promote Injury-Free Environment  Flowsheets (Taken 8/12/2022 1740)  Safety Promotion/Fall Prevention:   assistive device/personal item within reach   bed alarm set   family to remain at bedside   side rails raised x 3   instructed to call staff for mobility   supervised activity

## 2022-08-13 PROCEDURE — 97116 GAIT TRAINING THERAPY: CPT

## 2022-08-13 PROCEDURE — 25000003 PHARM REV CODE 250: Performed by: STUDENT IN AN ORGANIZED HEALTH CARE EDUCATION/TRAINING PROGRAM

## 2022-08-13 PROCEDURE — 27000221 HC OXYGEN, UP TO 24 HOURS

## 2022-08-13 PROCEDURE — 94660 CPAP INITIATION&MGMT: CPT

## 2022-08-13 PROCEDURE — 94760 N-INVAS EAR/PLS OXIMETRY 1: CPT

## 2022-08-13 PROCEDURE — 11000004 HC SNF PRIVATE

## 2022-08-13 PROCEDURE — 97530 THERAPEUTIC ACTIVITIES: CPT

## 2022-08-13 PROCEDURE — 63600175 PHARM REV CODE 636 W HCPCS: Performed by: STUDENT IN AN ORGANIZED HEALTH CARE EDUCATION/TRAINING PROGRAM

## 2022-08-13 RX ADMIN — DOCUSATE SODIUM 200 MG: 100 CAPSULE, LIQUID FILLED ORAL at 08:08

## 2022-08-13 RX ADMIN — Medication 1 CAPSULE: at 11:08

## 2022-08-13 RX ADMIN — SUCRALFATE 1 G: 1 TABLET ORAL at 09:08

## 2022-08-13 RX ADMIN — LEVETIRACETAM 500 MG: 500 TABLET, FILM COATED ORAL at 08:08

## 2022-08-13 RX ADMIN — CLINDAMYCIN HYDROCHLORIDE 450 MG: 150 CAPSULE ORAL at 09:08

## 2022-08-13 RX ADMIN — SUCRALFATE 1 G: 1 TABLET ORAL at 11:08

## 2022-08-13 RX ADMIN — ATORVASTATIN CALCIUM 40 MG: 40 TABLET, FILM COATED ORAL at 08:08

## 2022-08-13 RX ADMIN — ENOXAPARIN SODIUM 40 MG: 40 INJECTION SUBCUTANEOUS at 04:08

## 2022-08-13 RX ADMIN — Medication 1 CAPSULE: at 04:08

## 2022-08-13 RX ADMIN — CLINDAMYCIN HYDROCHLORIDE 450 MG: 150 CAPSULE ORAL at 06:08

## 2022-08-13 RX ADMIN — AMIODARONE HYDROCHLORIDE 200 MG: 200 TABLET ORAL at 08:08

## 2022-08-13 RX ADMIN — SUCRALFATE 1 G: 1 TABLET ORAL at 04:08

## 2022-08-13 RX ADMIN — SUCRALFATE 1 G: 1 TABLET ORAL at 06:08

## 2022-08-13 RX ADMIN — Medication 1 CAPSULE: at 07:08

## 2022-08-13 RX ADMIN — DOCUSATE SODIUM 200 MG: 100 CAPSULE, LIQUID FILLED ORAL at 09:08

## 2022-08-13 RX ADMIN — CLINDAMYCIN HYDROCHLORIDE 450 MG: 150 CAPSULE ORAL at 01:08

## 2022-08-13 RX ADMIN — FOLIC ACID 1 MG: 1 TABLET ORAL at 08:08

## 2022-08-13 RX ADMIN — LEVETIRACETAM 500 MG: 500 TABLET, FILM COATED ORAL at 09:08

## 2022-08-13 RX ADMIN — ASPIRIN 81 MG: 81 TABLET, COATED ORAL at 08:08

## 2022-08-13 RX ADMIN — FAMOTIDINE 20 MG: 10 INJECTION, SOLUTION INTRAVENOUS at 08:08

## 2022-08-13 RX ADMIN — LEVOTHYROXINE SODIUM 100 MCG: 0.1 TABLET ORAL at 06:08

## 2022-08-13 NOTE — SUBJECTIVE & OBJECTIVE
Interval History:  No events overnight    Review of Systems   Constitutional:  Negative for fatigue and fever.   HENT:  Negative for congestion, sore throat and tinnitus.    Respiratory:  Negative for chest tightness, shortness of breath and wheezing.    Cardiovascular:  Negative for chest pain and leg swelling.   Gastrointestinal:  Negative for diarrhea, nausea and rectal pain.   Endocrine: Negative for cold intolerance and heat intolerance.   Genitourinary:  Negative for dysuria and urgency.   Musculoskeletal:  Negative for back pain.   Skin:  Negative for wound.   Neurological:  Negative for dizziness, syncope and weakness.   Hematological:  Does not bruise/bleed easily.   Psychiatric/Behavioral:  Negative for agitation. The patient is not nervous/anxious.    Objective:     Vital Signs (Most Recent):  Temp: 97.4 °F (36.3 °C) (08/13/22 0802)  Pulse: 70 (08/13/22 0802)  Resp: 18 (08/13/22 0730)  BP: 138/71 (08/13/22 0802)  SpO2: 96 % (08/13/22 0802)   Vital Signs (24h Range):  Temp:  [97.4 °F (36.3 °C)-99 °F (37.2 °C)] 97.4 °F (36.3 °C)  Pulse:  [56-70] 70  Resp:  [18-19] 18  SpO2:  [94 %-98 %] 96 %  BP: (133-138)/(64-73) 138/71     Weight: 86.7 kg (191 lb 2.2 oz)  Body mass index is 29.94 kg/m².    Intake/Output Summary (Last 24 hours) at 8/13/2022 1042  Last data filed at 8/13/2022 0800  Gross per 24 hour   Intake 1680 ml   Output --   Net 1680 ml        Physical Exam  Constitutional:       General: She is not in acute distress.     Appearance: She is normal weight.   HENT:      Head: Normocephalic and atraumatic.      Nose: No congestion.   Neck:     Cardiovascular:      Rate and Rhythm: Normal rate and regular rhythm.      Heart sounds: No murmur heard.  Pulmonary:      Effort: Pulmonary effort is normal.      Breath sounds: Normal breath sounds.   Chest:       Abdominal:      General: Bowel sounds are normal. There is no distension.      Palpations: Abdomen is soft. There is no mass.      Tenderness: There is  no abdominal tenderness.       Musculoskeletal:         General: Normal range of motion.   Skin:     General: Skin is warm.      Findings: No lesion or rash.      Comments: L pointer finger blood blister   Neurological:      General: No focal deficit present.      Cranial Nerves: No cranial nerve deficit.   Psychiatric:         Mood and Affect: Mood normal.         Behavior: Behavior normal.       Significant Labs: All pertinent labs within the past 24 hours have been reviewed.    Significant Imaging: I have reviewed all pertinent imaging results/findings within the past 24 hours.

## 2022-08-13 NOTE — PLAN OF CARE
Problem: Adult Inpatient Plan of Care  Goal: Plan of Care Review  Outcome: Ongoing, Progressing  Flowsheets (Taken 8/12/2022 2326)  Plan of Care Reviewed With: patient  Goal: Absence of Hospital-Acquired Illness or Injury  Outcome: Ongoing, Progressing  Intervention: Identify and Manage Fall Risk  Flowsheets (Taken 8/12/2022 2326)  Safety Promotion/Fall Prevention:   assistive device/personal item within reach   bed alarm set   Fall Risk reviewed with patient/family   muscle strengthening facilitated   nonskid shoes/socks when out of bed   supervised activity   Supervised toileting - stay within arms reach   instructed to call staff for mobility  Intervention: Prevent Skin Injury  Flowsheets (Taken 8/12/2022 2326)  Body Position:   position changed independently   weight shifting  Intervention: Prevent and Manage VTE (Venous Thromboembolism) Risk  Flowsheets (Taken 8/12/2022 2326)  Activity Management: Walk with assistive devise and /or staff member - L3  VTE Prevention/Management: ambulation promoted  Intervention: Prevent Infection  Flowsheets (Taken 8/12/2022 2326)  Infection Prevention:   hand hygiene promoted   rest/sleep promoted  Goal: Optimal Comfort and Wellbeing  Outcome: Ongoing, Progressing  Intervention: Monitor Pain and Promote Comfort  Flowsheets (Taken 8/12/2022 2326)  Pain Management Interventions:   pain management plan reviewed with patient/caregiver   medication offered  Intervention: Provide Person-Centered Care  Flowsheets (Taken 8/12/2022 2326)  Trust Relationship/Rapport:   care explained   choices provided     Problem: Impaired Wound Healing  Goal: Optimal Wound Healing  Outcome: Ongoing, Progressing  Intervention: Promote Wound Healing  Flowsheets (Taken 8/12/2022 2326)  Oral Nutrition Promotion:   calorie-dense foods provided   calorie-dense liquids provided   physical activity promoted   safe use of adaptive equipment encouraged  Sleep/Rest Enhancement:   awakenings minimized   regular  sleep/rest pattern promoted  Activity Management: Walk with assistive devise and /or staff member - L3  Pain Management Interventions:   pain management plan reviewed with patient/caregiver   medication offered     Problem: Fall Injury Risk  Goal: Absence of Fall and Fall-Related Injury  Outcome: Ongoing, Progressing  Intervention: Identify and Manage Contributors  Flowsheets (Taken 8/12/2022 2326)  Self-Care Promotion:   independence encouraged   safe use of adaptive equipment encouraged  Intervention: Promote Injury-Free Environment  Flowsheets (Taken 8/12/2022 2326)  Safety Promotion/Fall Prevention:   assistive device/personal item within reach   bed alarm set   Fall Risk reviewed with patient/family   muscle strengthening facilitated   nonskid shoes/socks when out of bed   supervised activity   Supervised toileting - stay within arms reach   instructed to call staff for mobility

## 2022-08-13 NOTE — PROGRESS NOTES
[facility]  [facility] Medicine  Progress Note    Patient Name: Blanquita Montoya  MRN: [id number]  Patient Class: IP- Swing   Admission Date: 8/10/2022  Length of Stay: 3 days  Attending Physician: Thairy G Reyes, DO  Primary Care Provider: Kamila Whittaker MD        Subjective:     Principal Problem:Ascending aortic aneurysm  Acute Condition: SSTI        HPI:  67 yo F w/ past medical history of Anxiety disorder, Coronary artery disease, GERD (gastroesophageal reflux disease), Hypercholesterolemia, Hypertension, Hypothyroidism, Thoracic aortic aneurysm who presented 07/29/2022, for a resection of ascending aortic aneurysm and repair with 30 mm Dacron graft and ligation of left atrial appendage by Dr. Vega. Pt's post op period was complicated by need for pacing for symptomatic bradycardia, seizure like-activity with convulsive movements of RUE and RLE (now on keppra), nonhemorrhagic occipitoparietal acute/subacute insult,  and AF RVR now on amiodarone 200 mg PO daily. Hold beta-blockers due to bradycardia. No need for anticoagulation at this time as the patient is s/p CROW ligation.    Pt reports she lives home alone with her , previously independent. Arrives able to ambulate 100ft approximately. However having RLEx weakness.       Overview/Hospital Course:  Patient admitted for rehabilitation after ascending aortic aneurysm repair and left atrial appendage ligation.  Doing well with physical therapy however complaining of all over body pruritus, p.r.n. Benadryl.  Patient states she does not know if she was getting levothyroxine while inpatient prior to arriving at our facility, TSH elevated.  Suspect patient has been without her thyroid medication for quite some time, resume home dose and will need follow-up of TSH in 4-6 weeks. Clinda started for suspicion of SSTI at R groin catheter access site, US showing small cystic area- may be postprocedural.  No pseudoaneurysm  evident. Follow cultures which are growing GNR.       Interval History:  No events overnight    Review of Systems   Constitutional:  Negative for fatigue and fever.   HENT:  Negative for congestion, sore throat and tinnitus.    Respiratory:  Negative for chest tightness, shortness of breath and wheezing.    Cardiovascular:  Negative for chest pain and leg swelling.   Gastrointestinal:  Negative for diarrhea, nausea and rectal pain.   Endocrine: Negative for cold intolerance and heat intolerance.   Genitourinary:  Negative for dysuria and urgency.   Musculoskeletal:  Negative for back pain.   Skin:  Negative for wound.   Neurological:  Negative for dizziness, syncope and weakness.   Hematological:  Does not bruise/bleed easily.   Psychiatric/Behavioral:  Negative for agitation. The patient is not nervous/anxious.    Objective:     Vital Signs (Most Recent):  Temp: 97.4 °F (36.3 °C) (08/13/22 0802)  Pulse: 70 (08/13/22 0802)  Resp: 18 (08/13/22 0730)  BP: 138/71 (08/13/22 0802)  SpO2: 96 % (08/13/22 0802)   Vital Signs (24h Range):  Temp:  [97.4 °F (36.3 °C)-99 °F (37.2 °C)] 97.4 °F (36.3 °C)  Pulse:  [56-70] 70  Resp:  [18-19] 18  SpO2:  [94 %-98 %] 96 %  BP: (133-138)/(64-73) 138/71     Weight: 86.7 kg (191 lb 2.2 oz)  Body mass index is 29.94 kg/m².    Intake/Output Summary (Last 24 hours) at 8/13/2022 1042  Last data filed at 8/13/2022 0800  Gross per 24 hour   Intake 1680 ml   Output --   Net 1680 ml        Physical Exam  Constitutional:       General: She is not in acute distress.     Appearance: She is normal weight.   HENT:      Head: Normocephalic and atraumatic.      Nose: No congestion.   Neck:     Cardiovascular:      Rate and Rhythm: Normal rate and regular rhythm.      Heart sounds: No murmur heard.  Pulmonary:      Effort: Pulmonary effort is normal.      Breath sounds: Normal breath sounds.   Chest:       Abdominal:      General: Bowel sounds are normal. There is no distension.      Palpations: Abdomen  is soft. There is no mass.      Tenderness: There is no abdominal tenderness.       Musculoskeletal:         General: Normal range of motion.   Skin:     General: Skin is warm.      Findings: No lesion or rash.      Comments: L pointer finger blood blister   Neurological:      General: No focal deficit present.      Cranial Nerves: No cranial nerve deficit.   Psychiatric:         Mood and Affect: Mood normal.         Behavior: Behavior normal.       Significant Labs: All pertinent labs within the past 24 hours have been reviewed.    Significant Imaging: I have reviewed all pertinent imaging results/findings within the past 24 hours.      Assessment/Plan:      * Ascending aortic aneurysm  -s/p resection of ascending thoracic aortic aneurysm with 30 mm Dacron interposition and left atrial appendage ligation with Dr. 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  -patient previously on phenobarbital as a child, had seizure-like activity in the postop period, discharged on Keppra  -continue Keppra  -Patient has been advised she should not drive until she is at least 6 months seizure-free, will need to remain on Keppra for at least 1 year, longer if recurrent seizures  -patient inquired about phenobarbital as she is familiar with that drug, outpatient neurology recommended that she follow up outpatient for change or titration of medication      Ischemic embolic stroke  -no intervention performed by Neurology  -Found on CT head performed 07/30/2022 which showed no acute  intracranial hemorrhage, localized hypodensity in the right occipital parietal cortex, chronic microvascular disease  -aspirin, atorvastatin    Superficial incisional surgical site infection  -at catherization access site  -US shows cyst, no pseudoaneurysm   -empiric clinda  -wound culture   -wound care        VTE Risk Mitigation (From admission, onward)         Ordered     enoxaparin injection 40 mg  Daily         08/10/22 1815     Place sequential compression device  Until discontinued         08/10/22 1815                Discharge Planning   ADAM:      Code Status: Full Code   Is the patient medically ready for discharge?:     Reason for patient still in hospital (select all that apply): Laboratory test  Discharge Plan A: Home Health, Home with family                  Thairy G Reyes, DO  Department of Hospital Medicine   [facility]

## 2022-08-13 NOTE — PLAN OF CARE
Problem: Adult Inpatient Plan of Care  Goal: Plan of Care Review  Outcome: Ongoing, Progressing  Flowsheets (Taken 8/13/2022 1309)  Plan of Care Reviewed With: patient  Goal: Absence of Hospital-Acquired Illness or Injury  Outcome: Ongoing, Progressing  Intervention: Identify and Manage Fall Risk  Flowsheets (Taken 8/13/2022 1309)  Safety Promotion/Fall Prevention:   assistive device/personal item within reach   chair alarm set   in recliner, wheels locked   supervised activity   side rails raised x 3  Intervention: Prevent Skin Injury  Flowsheets (Taken 8/13/2022 1309)  Body Position: position changed independently  Skin Protection: incontinence pads utilized  Intervention: Prevent and Manage VTE (Venous Thromboembolism) Risk  Flowsheets (Taken 8/13/2022 1309)  Activity Management:   Ambulated in white - L4   Ambulated to bathroom - L4  VTE Prevention/Management: ambulation promoted  Intervention: Prevent Infection  Flowsheets (Taken 8/13/2022 1309)  Infection Prevention:   hand hygiene promoted   personal protective equipment utilized   rest/sleep promoted   single patient room provided  Goal: Optimal Comfort and Wellbeing  Outcome: Ongoing, Progressing  Intervention: Monitor Pain and Promote Comfort  Flowsheets (Taken 8/13/2022 1309)  Pain Management Interventions:   relaxation techniques promoted   quiet environment facilitated  Intervention: Provide Person-Centered Care  Flowsheets (Taken 8/13/2022 1309)  Trust Relationship/Rapport:   care explained   choices provided   emotional support provided   empathic listening provided   questions answered   questions encouraged   reassurance provided   thoughts/feelings acknowledged     Problem: Impaired Wound Healing  Goal: Optimal Wound Healing  Outcome: Ongoing, Progressing  Intervention: Promote Wound Healing  Flowsheets (Taken 8/13/2022 1309)  Oral Nutrition Promotion:   social interaction promoted   safe use of adaptive equipment encouraged  Sleep/Rest  Enhancement:   consistent schedule promoted   regular sleep/rest pattern promoted   relaxation techniques promoted   room darkened  Activity Management:   Ambulated in white - L4   Ambulated to bathroom - L4  Pain Management Interventions:   relaxation techniques promoted   quiet environment facilitated     Problem: Fall Injury Risk  Goal: Absence of Fall and Fall-Related Injury  Outcome: Ongoing, Progressing  Intervention: Identify and Manage Contributors  Flowsheets (Taken 8/13/2022 1309)  Self-Care Promotion: independence encouraged  Medication Review/Management:   high-risk medications identified   medications reviewed  Intervention: Promote Injury-Free Environment  Flowsheets (Taken 8/13/2022 1309)  Safety Promotion/Fall Prevention:   assistive device/personal item within reach   chair alarm set   in recliner, wheels locked   supervised activity   side rails raised x 3

## 2022-08-13 NOTE — PT/OT/SLP PROGRESS
Physical Therapy Treatment Note           Name: Blanquita Montoya    : 1955 (66 y.o.)  MRN: [id number]           TREATMENT SUMMARY AND RECOMMENDATIONS:    PT Received On: 22  PT Start Time: 1230     PT Stop Time: 1245  PT Total Time (min): 15 min     Subjective Assessment:   No complaints  Lethargic   x Awake, alert, cooperative  Uncooperative    Agitated  c/o pain    Appropriate  c/o fatigue    Confused x Treated at bedside     Emotionally labile  Treated in gym/dept.    Impulsive  Other:    Flat affect       Therapy Precautions:    Cognitive deficits  Spinal precautions    Collar - hard x Sternal precautions    Collar - soft   TLSO   x Fall risk  LSO    Hip precautions - posterior  Knee immobilizer    Hip precautions - anterior  WBAT    Impaired communication  Partial weightbearing    Oxygen  TTWB    PEG tube  NWB    Visual deficits  Other:    Hearing deficits          Treatment Objectives:     Mobility Training:   Assist level Comments    Bed mobility     Transfer CGA Sit to stand form recliner   Gait CGA amb level surface x200ft with RW   Sit to stand transitions     Sitting balance     Standing balance      Wheelchair mobility     Car transfer     Other:          Therapeutic Exercise:   Exercise Sets Reps Comments                               Additional Comments:  C/o mild dizziness at end of gait training, resolved upon sitting.     Assessment: Patient tolerated session well.    PT Plan: cont POC  Revisions made to plan of care: No    GOALS:   Multidisciplinary Problems     Physical Therapy Goals        Problem: Physical Therapy    Goal Priority Disciplines Outcome Goal Variances Interventions   Physical Therapy Goal     PT, PT/OT      Description: Goals to be met by: 22  Patient will increase functional independence with mobility by performing:    . Gait  x 350 feet with Supervision using No Assistive Device without deviating towards the R and normal obstacle negotiation.    .  Ascend/descend 4 stairs with bilateral Handrails Supervision using No Assistive Device.                      Skilled PT Minutes Provided: 15   Communication with Treatment Team:     Equipment recommendations:       At end of treatment, patient remained:  x Up in chair     Up in wheelchair in room    In bed   x With alarm activated    Bed rails up   x Call bell in reach    x Family/friends present    Restraints secured properly    In bathroom with CNA/RN notified   x Nurse aware    In gym with therapist/tech    Other:

## 2022-08-13 NOTE — PT/OT/SLP PROGRESS
Physical Therapy Treatment Note           Name: Blanquita Montoya    : 1955 (66 y.o.)  MRN: [id number]           TREATMENT SUMMARY AND RECOMMENDATIONS:    PT Received On: 22  PT Start Time: 900     PT Stop Time: 920  PT Total Time (min): 20 min     Subjective Assessment:   No complaints  Lethargic   X Awake, alert, cooperative  Uncooperative    Agitated  c/o pain    Appropriate  c/o fatigue    Confused x Treated at bedside     Emotionally labile  Treated in gym/dept.    Impulsive  Other:    Flat affect       Therapy Precautions:    Cognitive deficits  Spinal precautions    Collar - hard x Sternal precautions    Collar - soft   TLSO   x Fall risk  LSO    Hip precautions - posterior  Knee immobilizer    Hip precautions - anterior  WBAT    Impaired communication  Partial weightbearing    Oxygen  TTWB    PEG tube  NWB    Visual deficits  Other:    Hearing deficits          Treatment Objectives:     Mobility Training:   Assist level Comments    Bed mobility     Transfer     Gait SBA  amb firm surface with RW x175 ft   Sit to stand transitions SBA Sit-stand 2x5 with arms across chest holding sternal pillow   Sitting balance     Standing balance      Wheelchair mobility     Car transfer     Other:          Therapeutic Exercise:   Exercise Sets Reps Comments                               Additional Comments:  Sternal precautions maintained    Assessment: Patient tolerated session well.    PT Plan: cont POC  Revisions made to plan of care: No    GOALS:   Multidisciplinary Problems     Physical Therapy Goals        Problem: Physical Therapy    Goal Priority Disciplines Outcome Goal Variances Interventions   Physical Therapy Goal     PT, PT/OT      Description: Goals to be met by: 22  Patient will increase functional independence with mobility by performing:    . Gait  x 350 feet with Supervision using No Assistive Device without deviating towards the R and normal obstacle negotiation.    .  Ascend/descend 4 stairs with bilateral Handrails Supervision using No Assistive Device.                      Skilled PT Minutes Provided: 20   Communication with Treatment Team:     Equipment recommendations:       At end of treatment, patient remained:  X Up in chair     Up in wheelchair in room    In bed   x With alarm activated    Bed rails up   x Call bell in reach    x Family/friends present    Restraints secured properly    In bathroom with CNA/RN notified   x Nurse aware    In gym with therapist/tech    Other:

## 2022-08-13 NOTE — ASSESSMENT & PLAN NOTE
-at catherization access site  -US shows cyst, no pseudoaneurysm   -empiric clinda  -wound culture   -wound care

## 2022-08-14 PROCEDURE — 63600175 PHARM REV CODE 636 W HCPCS: Performed by: STUDENT IN AN ORGANIZED HEALTH CARE EDUCATION/TRAINING PROGRAM

## 2022-08-14 PROCEDURE — 94760 N-INVAS EAR/PLS OXIMETRY 1: CPT

## 2022-08-14 PROCEDURE — 27000221 HC OXYGEN, UP TO 24 HOURS

## 2022-08-14 PROCEDURE — 11000004 HC SNF PRIVATE

## 2022-08-14 PROCEDURE — 25000003 PHARM REV CODE 250: Performed by: STUDENT IN AN ORGANIZED HEALTH CARE EDUCATION/TRAINING PROGRAM

## 2022-08-14 RX ORDER — LACTOBACILLUS ACIDOPHILUS 500MM CELL
1 CAPSULE ORAL
Status: DISCONTINUED | OUTPATIENT
Start: 2022-08-14 | End: 2022-08-15 | Stop reason: HOSPADM

## 2022-08-14 RX ADMIN — AMIODARONE HYDROCHLORIDE 200 MG: 200 TABLET ORAL at 08:08

## 2022-08-14 RX ADMIN — SUCRALFATE 1 G: 1 TABLET ORAL at 11:08

## 2022-08-14 RX ADMIN — SUCRALFATE 1 G: 1 TABLET ORAL at 04:08

## 2022-08-14 RX ADMIN — CLINDAMYCIN HYDROCHLORIDE 450 MG: 150 CAPSULE ORAL at 09:08

## 2022-08-14 RX ADMIN — ASPIRIN 81 MG: 81 TABLET, COATED ORAL at 08:08

## 2022-08-14 RX ADMIN — SUCRALFATE 1 G: 1 TABLET ORAL at 06:08

## 2022-08-14 RX ADMIN — LEVOTHYROXINE SODIUM 100 MCG: 0.1 TABLET ORAL at 06:08

## 2022-08-14 RX ADMIN — DOCUSATE SODIUM 200 MG: 100 CAPSULE, LIQUID FILLED ORAL at 09:08

## 2022-08-14 RX ADMIN — LEVETIRACETAM 500 MG: 500 TABLET, FILM COATED ORAL at 09:08

## 2022-08-14 RX ADMIN — FOLIC ACID 1 MG: 1 TABLET ORAL at 08:08

## 2022-08-14 RX ADMIN — FAMOTIDINE 20 MG: 10 INJECTION, SOLUTION INTRAVENOUS at 08:08

## 2022-08-14 RX ADMIN — CLINDAMYCIN HYDROCHLORIDE 450 MG: 150 CAPSULE ORAL at 01:08

## 2022-08-14 RX ADMIN — ATORVASTATIN CALCIUM 40 MG: 40 TABLET, FILM COATED ORAL at 08:08

## 2022-08-14 RX ADMIN — CLINDAMYCIN HYDROCHLORIDE 450 MG: 150 CAPSULE ORAL at 06:08

## 2022-08-14 RX ADMIN — DOCUSATE SODIUM 200 MG: 100 CAPSULE, LIQUID FILLED ORAL at 08:08

## 2022-08-14 RX ADMIN — ENOXAPARIN SODIUM 40 MG: 40 INJECTION SUBCUTANEOUS at 04:08

## 2022-08-14 RX ADMIN — LEVETIRACETAM 500 MG: 500 TABLET, FILM COATED ORAL at 08:08

## 2022-08-14 RX ADMIN — SUCRALFATE 1 G: 1 TABLET ORAL at 09:08

## 2022-08-14 NOTE — PLAN OF CARE
Problem: Adult Inpatient Plan of Care  Goal: Plan of Care Review  Outcome: Ongoing, Progressing  Flowsheets (Taken 8/13/2022 1946)  Plan of Care Reviewed With: patient  Goal: Absence of Hospital-Acquired Illness or Injury  Outcome: Ongoing, Progressing  Intervention: Identify and Manage Fall Risk  Flowsheets (Taken 8/13/2022 1946)  Safety Promotion/Fall Prevention:   assistive device/personal item within reach   Fall Risk reviewed with patient/family   nonskid shoes/socks when out of bed   side rails raised x 3   supervised activity   Supervised toileting - stay within arms reach   instructed to call staff for mobility  Intervention: Prevent Skin Injury  Flowsheets (Taken 8/13/2022 1946)  Body Position: position changed independently  Intervention: Prevent and Manage VTE (Venous Thromboembolism) Risk  Flowsheets (Taken 8/13/2022 1946)  Activity Management: Walk with assistive devise and /or staff member - L3  VTE Prevention/Management: ambulation promoted  Intervention: Prevent Infection  Flowsheets (Taken 8/13/2022 1946)  Infection Prevention:   hand hygiene promoted   rest/sleep promoted  Goal: Optimal Comfort and Wellbeing  Outcome: Ongoing, Progressing  Intervention: Monitor Pain and Promote Comfort  Flowsheets (Taken 8/13/2022 1946)  Pain Management Interventions:   pain management plan reviewed with patient/caregiver   medication offered  Intervention: Provide Person-Centered Care  Flowsheets (Taken 8/13/2022 1946)  Trust Relationship/Rapport:   care explained   questions encouraged   thoughts/feelings acknowledged   reassurance provided   questions answered   empathic listening provided     Problem: Impaired Wound Healing  Goal: Optimal Wound Healing  Outcome: Ongoing, Progressing  Intervention: Promote Wound Healing  Flowsheets (Taken 8/13/2022 1946)  Oral Nutrition Promotion:   calorie-dense foods provided   calorie-dense liquids provided   physical activity promoted   safe use of adaptive equipment  encouraged  Sleep/Rest Enhancement:   awakenings minimized   regular sleep/rest pattern promoted  Activity Management: Walk with assistive devise and /or staff member - L3  Pain Management Interventions:   pain management plan reviewed with patient/caregiver   medication offered     Problem: Fall Injury Risk  Goal: Absence of Fall and Fall-Related Injury  Outcome: Ongoing, Progressing  Intervention: Identify and Manage Contributors  Flowsheets (Taken 8/13/2022 1946)  Self-Care Promotion:   independence encouraged   safe use of adaptive equipment encouraged  Intervention: Promote Injury-Free Environment  Flowsheets (Taken 8/13/2022 1946)  Safety Promotion/Fall Prevention:   assistive device/personal item within reach   Fall Risk reviewed with patient/family   nonskid shoes/socks when out of bed   side rails raised x 3   supervised activity   Supervised toileting - stay within arms reach   instructed to call staff for mobility

## 2022-08-14 NOTE — PLAN OF CARE
[facility]  Discharge Reassessment    Primary Care Provider: Kamila Whittaker MD    Expected Discharge Date:     Reassessment (most recent)     Discharge Reassessment - 08/14/22 0850        Discharge Reassessment    Assessment Type Discharge Planning Reassessment     Did the patient's condition or plan change since previous assessment? No     Discharge Plan discussed with: Spouse/sig other     Name(s) and Number(s) Hector Johnson  spouse     Communicated ADAM with patient/caregiver Date not available/Unable to determine     Discharge Plan A Home Health;Home with family     DME Needed Upon Discharge  walker, rolling     Discharge Barriers Identified None     Why the patient remains in the hospital Requires continued medical care        Post-Acute Status    Post-Acute Authorization Home Health     Home Health Status Pending medical clearance/testing     Hospital Resources/Appts/Education Provided Provided patient/caregiver with written discharge plan information     Discharge Delays None known at this time

## 2022-08-14 NOTE — PLAN OF CARE
Problem: Adult Inpatient Plan of Care  Goal: Plan of Care Review  Outcome: Ongoing, Progressing  Flowsheets (Taken 8/14/2022 1709)  Plan of Care Reviewed With:   patient   spouse  Goal: Absence of Hospital-Acquired Illness or Injury  Outcome: Ongoing, Progressing  Intervention: Identify and Manage Fall Risk  Flowsheets (Taken 8/14/2022 1709)  Safety Promotion/Fall Prevention:   assistive device/personal item within reach   chair alarm set   in recliner, wheels locked   nonskid shoes/socks when out of bed   instructed to call staff for mobility  Intervention: Prevent Skin Injury  Flowsheets (Taken 8/14/2022 1709)  Body Position: position changed independently  Skin Protection: incontinence pads utilized  Intervention: Prevent and Manage VTE (Venous Thromboembolism) Risk  Flowsheets (Taken 8/14/2022 1709)  Activity Management: Ambulated to bathroom - L4  VTE Prevention/Management:   bleeding risk assessed   bleeding precations maintained   ambulation promoted  Intervention: Prevent Infection  Flowsheets (Taken 8/14/2022 1709)  Infection Prevention:   hand hygiene promoted   personal protective equipment utilized   rest/sleep promoted   single patient room provided  Goal: Optimal Comfort and Wellbeing  Outcome: Ongoing, Progressing  Intervention: Monitor Pain and Promote Comfort  Flowsheets (Taken 8/14/2022 1709)  Pain Management Interventions:   relaxation techniques promoted   quiet environment facilitated  Intervention: Provide Person-Centered Care  Flowsheets (Taken 8/14/2022 1709)  Trust Relationship/Rapport:   care explained   choices provided   emotional support provided   empathic listening provided   thoughts/feelings acknowledged   reassurance provided   questions encouraged   questions answered     Problem: Impaired Wound Healing  Goal: Optimal Wound Healing  Outcome: Ongoing, Progressing  Intervention: Promote Wound Healing  Flowsheets (Taken 8/14/2022 1709)  Oral Nutrition Promotion: calorie-dense foods  provided  Sleep/Rest Enhancement:   awakenings minimized   regular sleep/rest pattern promoted  Activity Management: Ambulated to bathroom - L4  Pain Management Interventions:   relaxation techniques promoted   quiet environment facilitated     Problem: Fall Injury Risk  Goal: Absence of Fall and Fall-Related Injury  Outcome: Ongoing, Progressing  Intervention: Identify and Manage Contributors  Flowsheets (Taken 8/14/2022 1709)  Self-Care Promotion: independence encouraged  Medication Review/Management: medications reviewed  Intervention: Promote Injury-Free Environment  Flowsheets (Taken 8/14/2022 1709)  Safety Promotion/Fall Prevention:   assistive device/personal item within reach   chair alarm set   in recliner, wheels locked   nonskid shoes/socks when out of bed   instructed to call staff for mobility

## 2022-08-14 NOTE — PROGRESS NOTES
[facility]  [facility]  Progress Note    Patient Name: Blanquita Montoya  MRN: [id number]  Patient Class: IP- Swing   Admission Date: 8/10/2022  Length of Stay: 4 days  Attending Physician: Thairy G Reyes, DO  Primary Care Provider: Kamila Whittaker MD        Subjective:     Principal Problem:Ascending aortic aneurysm  Acute Condition: SSTI        HPI:  65 yo F w/ past medical history of Anxiety disorder, Coronary artery disease, GERD (gastroesophageal reflux disease), Hypercholesterolemia, Hypertension, Hypothyroidism, Thoracic aortic aneurysm who presented 07/29/2022, for a resection of ascending aortic aneurysm and repair with 30 mm Dacron graft and ligation of left atrial appendage by Dr. Vega. Pt's post op period was complicated by need for pacing for symptomatic bradycardia, seizure like-activity with convulsive movements of RUE and RLE (now on keppra), nonhemorrhagic occipitoparietal acute/subacute insult,  and AF RVR now on amiodarone 200 mg PO daily. Hold beta-blockers due to bradycardia. No need for anticoagulation at this time as the patient is s/p CROW ligation.    Pt reports she lives home alone with her , previously independent. Arrives able to ambulate 100ft approximately. However having RLEx weakness.       Overview/Hospital Course:  Patient admitted for rehabilitation after ascending aortic aneurysm repair and left atrial appendage ligation.  Doing well with physical therapy however complaining of all over body pruritus, p.r.n. Benadryl.  Patient states she does not know if she was getting levothyroxine while inpatient prior to arriving at our facility, TSH elevated.  Suspect patient has been without her thyroid medication for quite some time, resume home dose and will need follow-up of TSH in 4-6 weeks. Clinda started for suspicion of SSTI at R groin catheter access site, US showing small cystic area- may be postprocedural.  No pseudoaneurysm  evident. Follow cultures which are growing GNR.       Interval History:  Eager to go home.     Review of Systems   Constitutional:  Negative for fatigue and fever.   HENT:  Negative for congestion, sore throat and tinnitus.    Respiratory:  Negative for chest tightness, shortness of breath and wheezing.    Cardiovascular:  Negative for chest pain and leg swelling.   Gastrointestinal:  Negative for diarrhea, nausea and rectal pain.   Endocrine: Negative for cold intolerance and heat intolerance.   Genitourinary:  Negative for dysuria and urgency.   Musculoskeletal:  Negative for back pain.   Skin:  Negative for wound.   Neurological:  Negative for dizziness, syncope and weakness.   Hematological:  Does not bruise/bleed easily.   Psychiatric/Behavioral:  Negative for agitation. The patient is not nervous/anxious.    Objective:     Vital Signs (Most Recent):  Temp: 98.4 °F (36.9 °C) (08/14/22 0719)  Pulse: 66 (08/14/22 0730)  Resp: 18 (08/14/22 0730)  BP: 121/69 (08/14/22 0719)  SpO2: 97 % (08/14/22 0730)   Vital Signs (24h Range):  Temp:  [97.7 °F (36.5 °C)-99.2 °F (37.3 °C)] 98.4 °F (36.9 °C)  Pulse:  [57-72] 66  Resp:  [18-19] 18  SpO2:  [94 %-98 %] 97 %  BP: (121-137)/(69-76) 121/69     Weight: 86.7 kg (191 lb 2.2 oz)  Body mass index is 29.94 kg/m².    Intake/Output Summary (Last 24 hours) at 8/14/2022 1010  Last data filed at 8/14/2022 0800  Gross per 24 hour   Intake 1320 ml   Output --   Net 1320 ml        Physical Exam  Constitutional:       General: She is not in acute distress.     Appearance: She is normal weight.   HENT:      Head: Normocephalic and atraumatic.      Nose: No congestion.   Neck:     Cardiovascular:      Rate and Rhythm: Normal rate and regular rhythm.      Heart sounds: No murmur heard.  Pulmonary:      Effort: Pulmonary effort is normal.      Breath sounds: Normal breath sounds.   Chest:       Abdominal:      General: Bowel sounds are normal. There is no distension.      Palpations:  Abdomen is soft. There is no mass.      Tenderness: There is no abdominal tenderness.       Musculoskeletal:         General: Normal range of motion.   Skin:     General: Skin is warm.      Findings: No lesion or rash.      Comments: L pointer finger blood blister   Neurological:      General: No focal deficit present.      Cranial Nerves: No cranial nerve deficit.   Psychiatric:         Mood and Affect: Mood normal.         Behavior: Behavior normal.       Significant Labs: All pertinent labs within the past 24 hours have been reviewed.    Significant Imaging: I have reviewed all pertinent imaging results/findings within the past 24 hours.      Assessment/Plan:      * Ascending aortic aneurysm  -s/p resection of ascending thoracic aortic aneurysm with 30 mm Dacron interposition and left atrial appendage ligation with Dr. 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  -patient previously on phenobarbital as a child, had seizure-like activity in the postop period, discharged on Keppra  -continue Keppra  -Patient has been advised she should not drive until she is at least 6 months seizure-free, will need to remain on Keppra for at least 1 year, longer if recurrent seizures  -patient inquired about phenobarbital as she is familiar with that drug, outpatient neurology recommended that she follow up outpatient for change or titration of medication      Ischemic embolic stroke  -no intervention performed by Neurology  -Found on CT head performed 07/30/2022 which showed no  acute intracranial hemorrhage, localized hypodensity in the right occipital parietal cortex, chronic microvascular disease  -aspirin, atorvastatin    Superficial incisional surgical site infection  -at catherization access site  -US shows cyst, no pseudoaneurysm   -empiric clinda  -wound culture   -wound care        VTE Risk Mitigation (From admission, onward)         Ordered     enoxaparin injection 40 mg  Daily         08/10/22 1815     Place sequential compression device  Until discontinued         08/10/22 1815                Discharge Planning   ADAM:      Code Status: Full Code   Is the patient medically ready for discharge?:     Reason for patient still in hospital (select all that apply): Laboratory test and Treatment  Discharge Plan A: Home Health, Home with family   Discharge Delays: None known at this time              Thairy G Reyes, DO  Department of Hospital Medicine   [facility]

## 2022-08-14 NOTE — SUBJECTIVE & OBJECTIVE
Interval History:  Eager to go home.     Review of Systems   Constitutional:  Negative for fatigue and fever.   HENT:  Negative for congestion, sore throat and tinnitus.    Respiratory:  Negative for chest tightness, shortness of breath and wheezing.    Cardiovascular:  Negative for chest pain and leg swelling.   Gastrointestinal:  Negative for diarrhea, nausea and rectal pain.   Endocrine: Negative for cold intolerance and heat intolerance.   Genitourinary:  Negative for dysuria and urgency.   Musculoskeletal:  Negative for back pain.   Skin:  Negative for wound.   Neurological:  Negative for dizziness, syncope and weakness.   Hematological:  Does not bruise/bleed easily.   Psychiatric/Behavioral:  Negative for agitation. The patient is not nervous/anxious.    Objective:     Vital Signs (Most Recent):  Temp: 98.4 °F (36.9 °C) (08/14/22 0719)  Pulse: 66 (08/14/22 0730)  Resp: 18 (08/14/22 0730)  BP: 121/69 (08/14/22 0719)  SpO2: 97 % (08/14/22 0730)   Vital Signs (24h Range):  Temp:  [97.7 °F (36.5 °C)-99.2 °F (37.3 °C)] 98.4 °F (36.9 °C)  Pulse:  [57-72] 66  Resp:  [18-19] 18  SpO2:  [94 %-98 %] 97 %  BP: (121-137)/(69-76) 121/69     Weight: 86.7 kg (191 lb 2.2 oz)  Body mass index is 29.94 kg/m².    Intake/Output Summary (Last 24 hours) at 8/14/2022 1010  Last data filed at 8/14/2022 0800  Gross per 24 hour   Intake 1320 ml   Output --   Net 1320 ml        Physical Exam  Constitutional:       General: She is not in acute distress.     Appearance: She is normal weight.   HENT:      Head: Normocephalic and atraumatic.      Nose: No congestion.   Neck:     Cardiovascular:      Rate and Rhythm: Normal rate and regular rhythm.      Heart sounds: No murmur heard.  Pulmonary:      Effort: Pulmonary effort is normal.      Breath sounds: Normal breath sounds.   Chest:       Abdominal:      General: Bowel sounds are normal. There is no distension.      Palpations: Abdomen is soft. There is no mass.      Tenderness: There is  no abdominal tenderness.       Musculoskeletal:         General: Normal range of motion.   Skin:     General: Skin is warm.      Findings: No lesion or rash.      Comments: L pointer finger blood blister   Neurological:      General: No focal deficit present.      Cranial Nerves: No cranial nerve deficit.   Psychiatric:         Mood and Affect: Mood normal.         Behavior: Behavior normal.       Significant Labs: All pertinent labs within the past 24 hours have been reviewed.    Significant Imaging: I have reviewed all pertinent imaging results/findings within the past 24 hours.

## 2022-08-15 ENCOUNTER — TELEPHONE (OUTPATIENT)
Dept: INTERNAL MEDICINE | Facility: CLINIC | Age: 67
End: 2022-08-15
Payer: MEDICARE

## 2022-08-15 VITALS
DIASTOLIC BLOOD PRESSURE: 71 MMHG | BODY MASS INDEX: 30.13 KG/M2 | SYSTOLIC BLOOD PRESSURE: 151 MMHG | HEART RATE: 54 BPM | TEMPERATURE: 99 F | WEIGHT: 192 LBS | RESPIRATION RATE: 18 BRPM | HEIGHT: 67 IN | OXYGEN SATURATION: 95 %

## 2022-08-15 LAB
BACTERIA SPEC CULT: ABNORMAL
BACTERIA SPEC CULT: ABNORMAL

## 2022-08-15 PROCEDURE — 25000003 PHARM REV CODE 250: Performed by: STUDENT IN AN ORGANIZED HEALTH CARE EDUCATION/TRAINING PROGRAM

## 2022-08-15 RX ORDER — FOLIC ACID 1 MG/1
1 TABLET ORAL DAILY
Qty: 30 TABLET | Refills: 0 | Status: SHIPPED | OUTPATIENT
Start: 2022-08-16 | End: 2022-09-22 | Stop reason: ALTCHOICE

## 2022-08-15 RX ORDER — LEVETIRACETAM 500 MG/1
500 TABLET ORAL 2 TIMES DAILY
Qty: 60 TABLET | Refills: 11 | Status: SHIPPED | OUTPATIENT
Start: 2022-08-15 | End: 2022-09-22

## 2022-08-15 RX ORDER — ASPIRIN 81 MG/1
81 TABLET ORAL DAILY
Qty: 30 TABLET | Refills: 3 | Status: SHIPPED | OUTPATIENT
Start: 2022-08-15 | End: 2022-09-22 | Stop reason: SDUPTHER

## 2022-08-15 RX ORDER — ROSUVASTATIN CALCIUM 10 MG/1
20 TABLET, COATED ORAL NIGHTLY
Qty: 60 TABLET | Refills: 0 | Status: SHIPPED | OUTPATIENT
Start: 2022-08-15 | End: 2022-09-22 | Stop reason: SDUPTHER

## 2022-08-15 RX ORDER — LEVOTHYROXINE SODIUM 100 UG/1
100 TABLET ORAL DAILY
Qty: 30 TABLET | Refills: 0 | Status: SHIPPED | OUTPATIENT
Start: 2022-08-15 | End: 2022-08-26

## 2022-08-15 RX ORDER — CIPROFLOXACIN 500 MG/1
750 TABLET ORAL EVERY 12 HOURS
Qty: 30 TABLET | Refills: 0 | Status: SHIPPED | OUTPATIENT
Start: 2022-08-15 | End: 2022-08-25

## 2022-08-15 RX ORDER — AMIODARONE HYDROCHLORIDE 200 MG/1
200 TABLET ORAL DAILY
Qty: 30 TABLET | Refills: 11 | Status: SHIPPED | OUTPATIENT
Start: 2022-08-15 | End: 2022-09-22 | Stop reason: ALTCHOICE

## 2022-08-15 RX ADMIN — LEVETIRACETAM 500 MG: 500 TABLET, FILM COATED ORAL at 08:08

## 2022-08-15 RX ADMIN — FOLIC ACID 1 MG: 1 TABLET ORAL at 08:08

## 2022-08-15 RX ADMIN — ASPIRIN 81 MG: 81 TABLET, COATED ORAL at 08:08

## 2022-08-15 RX ADMIN — DOCUSATE SODIUM 200 MG: 100 CAPSULE, LIQUID FILLED ORAL at 08:08

## 2022-08-15 RX ADMIN — AMIODARONE HYDROCHLORIDE 200 MG: 200 TABLET ORAL at 08:08

## 2022-08-15 RX ADMIN — FAMOTIDINE 20 MG: 10 INJECTION, SOLUTION INTRAVENOUS at 08:08

## 2022-08-15 RX ADMIN — LEVOTHYROXINE SODIUM 100 MCG: 0.1 TABLET ORAL at 06:08

## 2022-08-15 RX ADMIN — SUCRALFATE 1 G: 1 TABLET ORAL at 06:08

## 2022-08-15 RX ADMIN — ATORVASTATIN CALCIUM 40 MG: 40 TABLET, FILM COATED ORAL at 08:08

## 2022-08-15 RX ADMIN — CLINDAMYCIN HYDROCHLORIDE 450 MG: 150 CAPSULE ORAL at 06:08

## 2022-08-15 NOTE — PLAN OF CARE
Problem: Adult Inpatient Plan of Care  Goal: Plan of Care Review  Outcome: Ongoing, Progressing  Flowsheets (Taken 8/15/2022 0038)  Plan of Care Reviewed With: patient  Goal: Absence of Hospital-Acquired Illness or Injury  Outcome: Ongoing, Progressing  Intervention: Identify and Manage Fall Risk  Flowsheets (Taken 8/15/2022 0038)  Safety Promotion/Fall Prevention:   assistive device/personal item within reach   bed alarm set   nonskid shoes/socks when out of bed   side rails raised x 3   supervised activity   Supervised toileting - stay within arms reach   instructed to call staff for mobility  Intervention: Prevent Skin Injury  Flowsheets (Taken 8/15/2022 0038)  Body Position: position changed independently  Intervention: Prevent and Manage VTE (Venous Thromboembolism) Risk  Flowsheets (Taken 8/15/2022 0038)  Activity Management: Walk with assistive devise and /or staff member - L3  VTE Prevention/Management: ambulation promoted  Intervention: Prevent Infection  Flowsheets (Taken 8/15/2022 0038)  Infection Prevention:   cohorting utilized   rest/sleep promoted     Problem: Impaired Wound Healing  Goal: Optimal Wound Healing  Outcome: Ongoing, Progressing  Intervention: Promote Wound Healing  Flowsheets (Taken 8/15/2022 0038)  Oral Nutrition Promotion:   calorie-dense foods provided   calorie-dense liquids provided   physical activity promoted   safe use of adaptive equipment encouraged  Sleep/Rest Enhancement: regular sleep/rest pattern promoted  Activity Management: Walk with assistive devise and /or staff member - L3     Problem: Fall Injury Risk  Goal: Absence of Fall and Fall-Related Injury  Outcome: Ongoing, Progressing  Intervention: Identify and Manage Contributors  Flowsheets (Taken 8/15/2022 0038)  Self-Care Promotion:   independence encouraged   safe use of adaptive equipment encouraged  Intervention: Promote Injury-Free Environment  Flowsheets (Taken 8/15/2022 0038)  Safety Promotion/Fall  Prevention:   assistive device/personal item within reach   bed alarm set   nonskid shoes/socks when out of bed   side rails raised x 3   supervised activity   Supervised toileting - stay within arms reach   instructed to call staff for mobility

## 2022-08-15 NOTE — ASSESSMENT & PLAN NOTE
-patient previously on phenobarbital as a child, had seizure-like activity in the postop period, discharged on Keppra  -continue Keppra  -Patient has been advised she should not drive until she is at least 6 months seizure-free, will need to remain on Keppra for at least 1 year, longer if recurrent seizures  -patient inquired about phenobarbital as she is familiar with that drug, neurology recommended that she follow up outpatient for change or titration of medication

## 2022-08-15 NOTE — NURSING
D/C instructions given to pt. Verbalized understanding. All belongings returned to pt and fly. Pt to POV per WC by hospital staff.

## 2022-08-15 NOTE — DISCHARGE SUMMARY
[facility]  Hospital Medicine  Discharge Summary      Patient Name: Blanquita Montoya  MRN: [id number]  Patient Class: IP- Swing  Admission Date: 8/10/2022  Hospital Length of Stay: 5 days  Discharge Date and Time:  08/15/2022 10:21 AM  Attending Physician: Thairy G Reyes, DO   Discharging Provider: Thairy G Reyes, DO  Primary Care Provider: Kamila Whittaker MD      HPI:   65 yo F w/ past medical history of Anxiety disorder, Coronary artery disease, GERD (gastroesophageal reflux disease), Hypercholesterolemia, Hypertension, Hypothyroidism, Thoracic aortic aneurysm who presented 07/29/2022, for a resection of ascending aortic aneurysm and repair with 30 mm Dacron graft and ligation of left atrial appendage by Dr. Vega. Pt's post op period was complicated by need for pacing for symptomatic bradycardia, seizure like-activity with convulsive movements of RUE and RLE (now on keppra), nonhemorrhagic occipitoparietal acute/subacute insult,  and AF RVR now on amiodarone 200 mg PO daily. Hold beta-blockers due to bradycardia. No need for anticoagulation at this time as the patient is s/p CROW ligation.    Pt reports she lives home alone with her , previously independent. Arrives able to ambulate 100ft approximately. However having RLEx weakness.       * No surgery found *      Hospital Course:   Patient admitted for rehabilitation after ascending aortic aneurysm repair and left atrial appendage ligation.  Doing well with physical therapy however complaining of all over body pruritus, p.r.n. Benadryl.  Patient states she does not know if she was getting levothyroxine while inpatient prior to arriving at our facility, TSH elevated.  Suspect patient has been without her thyroid medication for quite some time, resume home dose and will need follow-up of TSH in 4-6 weeks. Clinda started for suspicion of SSTI at R groin catheter access site, US showing small cystic area- may be  postprocedural.  No pseudoaneurysm evident.  Wound culture Of right groin wound grew Klebsiella and Proteus, both sensitive to ciprofloxacin therefore will discharge with Cipro for 10 days.       Goals of Care Treatment Preferences:  Code Status: Full Code      Consults:     * Ascending aortic aneurysm  -s/p resection of ascending thoracic aortic aneurysm with 30 mm Dacron interposition and left atrial appendage ligation with Dr. 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  -patient previously on phenobarbital as a child, had seizure-like activity in the postop period, discharged on Keppra  -continue Keppra  -Patient has been advised she should not drive until she is at least 6 months seizure-free, will need to remain on Keppra for at least 1 year, longer if recurrent seizures  -patient inquired about phenobarbital as she is familiar with that drug, neurology recommended that she follow up outpatient for change or titration of medication      Ischemic embolic stroke  -no intervention performed by Neurology  -Found on CT head performed 07/30/2022 which showed no acute intracranial hemorrhage, localized hypodensity in the right occipital parietal cortex, chronic microvascular disease  -aspirin, atorvastatin    Superficial incisional surgical site infection  -at catherization access site  -US shows cyst, no pseudoaneurysm   -cipro for 10 days         Final Active Diagnoses:    Diagnosis Date Noted POA    PRINCIPAL PROBLEM:  Ascending aortic aneurysm [I71.2] 05/26/2022 Yes    Aortic  "valve regurgitation [I35.1] 05/26/2022 Yes    Hypothyroidism [E03.9] 05/26/2022 Yes    Hypertension [I10] 05/26/2022 Yes    Seizure in childhood [R56.9]  Yes    Ischemic embolic stroke [I63.9] 07/31/2022 Yes    Superficial incisional surgical site infection [T81.41XA] 08/12/2022 Yes      Problems Resolved During this Admission:    Diagnosis Date Noted Date Resolved POA    Aortic aneurysm [I71.9] 08/10/2022 08/10/2022 Yes       Discharged Condition: stable    Disposition: Home or Self Care    Follow Up:   Follow-up Information     Kamila Whittaker MD Follow up.    Specialty: Internal Medicine  Contact information:  [address] [facility] [address]  [phone]             Alec Vega Iv, MD Follow up.    Specialty: Cardiothoracic Surgery  Why: Suture Removal 8- @ 9am  Follow up 8- 9am  Contact information:  [address]  [address]  [facility] [address]  [phone]             [facility].    Why: Walker will be deliver to hospital before discharge  Contact information:  [facility] [address]  [phone]             [facility] Follow up.    Specialties: Home Health Services, Home Therapy Services, Home Living Aide Services  Contact information:  [address] [facility] [address]  [phone]                     Patient Instructions:      WALKER FOR HOME USE     Order Specific Question Answer Comments   Type of Walker: Adult (5'4"-6'6")    With wheels? Yes    Height: 5' 7" (1.702 m)    Weight: 86.7 kg (191 lb 2.2 oz)    Length of need (1-99 months): 99    Please check all that apply: Patient's condition impairs ambulation.    Please check all that apply: Patient needs help to get in and out of chair.    Please check all that apply: Walker will be used for gait training.    Please check all that apply: Patient is unable to safely ambulate without equipment.      Ambulatory referral/consult to Home Health "   Standing Status: Future   Referral Priority: Routine Referral Type: Home Health   Referral Reason: Specialty Services Required   Requested Specialty: Home Health Services   Number of Visits Requested: 1       Significant Diagnostic Studies: Labs: BMP: No results for input(s): GLU, NA, K, CL, CO2, BUN, CREATININE, CALCIUM, MG in the last 48 hours.    Pending Diagnostic Studies:     None         Medications:  Reconciled Home Medications:      Medication List      START taking these medications    ciprofloxacin HCl 500 MG tablet  Commonly known as: CIPRO  Take 1.5 tablets (750 mg total) by mouth every 12 (twelve) hours. for 10 days     folic acid 1 MG tablet  Commonly known as: FOLVITE  Take 1 tablet (1 mg total) by mouth once daily.  Start taking on: August 16, 2022     levETIRAcetam 500 MG Tab  Commonly known as: KEPPRA  Take 1 tablet (500 mg total) by mouth 2 (two) times daily.        CONTINUE taking these medications    amiodarone 200 MG Tab  Commonly known as: PACERONE  Take 1 tablet (200 mg total) by mouth once daily.     aspirin 81 MG EC tablet  Commonly known as: ECOTRIN  Take 1 tablet (81 mg total) by mouth once daily.     EUTHYROX 100 MCG tablet  Generic drug: levothyroxine  Take 1 tablet (100 mcg total) by mouth once daily.     rosuvastatin 10 MG tablet  Commonly known as: CRESTOR  Take 2 tablets (20 mg total) by mouth every evening.        STOP taking these medications    amLODIPine 10 MG tablet  Commonly known as: NORVASC     fish oil-omega-3 fatty acids 300-1,000 mg capsule     guaiFENesin 100 mg/5 ml 100 mg/5 mL syrup  Commonly known as: ROBITUSSIN     hydrocortisone 2.5 % cream     isosorbide mononitrate 30 MG 24 hr tablet  Commonly known as: IMDUR     losartan 100 MG tablet  Commonly known as: COZAAR     metoprolol succinate 25 MG 24 hr tablet  Commonly known as: TOPROL-XL     nitroGLYCERIN 0.4 MG SL tablet  Commonly known as: NITROSTAT     pantoprazole 40 MG tablet  Commonly known as: PROTONIX             Indwelling Lines/Drains at time of discharge:   Lines/Drains/Airways     None                 Time spent on the discharge of patient: 40 minutes         Thairy G Reyes, DO  Department of Hospital Medicine  [facility]

## 2022-08-15 NOTE — TELEPHONE ENCOUNTER
[facility] in [place] called. They are requesting a post rodriguez visit, but pt wasn't seen here.

## 2022-08-15 NOTE — ASSESSMENT & PLAN NOTE
-several changes performed in the hospital, patient no longer on her home medications  -continue with amiodarone and discharge with current regimen

## 2022-08-15 NOTE — DISCHARGE INSTRUCTIONS
Keep all follow up appointments, Take prescribed medications as instructed. Call PCP or go to nearest ER for new or worsening symptoms.

## 2022-08-15 NOTE — PLAN OF CARE
[facility]  Discharge Final Note    Primary Care Provider: Kamila Whittaker MD    Expected Discharge Date: 8/15/2022    Final Discharge Note (most recent)     Final Note - 08/15/22 1447        Final Note    Assessment Type Final Discharge Note     Anticipated Discharge Disposition Home-Health Care Sv     What phone number can be called within the next 1-3 days to see how you are doing after discharge? [phone]     Hospital Resources/Appts/Education Provided Provided patient/caregiver with written discharge plan information        Post-Acute Status    Post-Acute Authorization Home Health     Home Health Status Set-up Complete/Auth obtained     Discharge Delays None known at this time                 Important Message from Medicare             Contact Info     Kamila Whittaker MD   Specialty: Internal Medicine   Relationship: PCP - General    [facility] [address]   Phone: [phone]       Next Steps: Go on 8/25/2022    Instructions: Follow up appointment with Dr. MARILUZ Whittaker on Thursday, Augest 25, 2022 @ 1:10 pm.  Spoke to Oscar.    Alec Vega Iv, MD   Specialty: Cardiothoracic Surgery    [facility]  [address]  [address]  [place] [address]   Phone: [phone]       Next Steps: Follow up    Instructions: Suture Removal 8- @ 9am  Follow up 8- 9am    [facility]    [address] [place] [address]   Phone: [phone]       Next Steps: Follow up    Instructions: Walker will be deliver to hospital before discharge    [facility]   Specialty: Home Health Services, Home Therapy Services, Home Living Aide Services    [address] [place] [address] A  [facility] [address]   Phone: [phone]       Next Steps: Follow up

## 2022-08-15 NOTE — PLAN OF CARE
Patient refused Walker when it was delivered despite education on importance of use. [facility] notified of this and will  Walker

## 2022-08-16 NOTE — TELEPHONE ENCOUNTER
GOOD MORNING DR. CUELLAR,     HOME HEALTH IS IN NEED OF ORDERS FOR ALL SERVICES TO INCLIDE PT AND OT.        PLEASE ADVISE.

## 2022-08-16 NOTE — TELEPHONE ENCOUNTER
----- Message from Allegra Holguin sent at 8/16/2022  9:13 AM CDT -----  Regarding: Home health after discharge from [facility]  Call from [facility]/Deanna/Nurse needing order for home health to evaluate and treat for  all services including - skilled nursing , PT and OT.  Patient discharged from [facility] on 8/15.  Noted appointment on 8/25 in IM.  Order must be written by MADELYN han MD.  Thank you.  Verónica Holguin/Outpatient Clinic    [phone]

## 2022-08-17 NOTE — DISCHARGE SUMMARY
[facility] 6th Floor Medical Telemetry  Discharge Note  Short Stay    Procedure(s) (LRB):  REPAIR, AORTA, ASCENDING (N/A)    OUTCOME: Patient tolerated treatment/procedure well without complication and is now ready for discharge.    DISPOSITION: Home or Self Care    FINAL DIAGNOSIS:  Ischemic embolic stroke    FOLLOWUP: In clinic    DISCHARGE INSTRUCTIONS:  No discharge procedures on file.      Clinical Reference Documents Added to Patient Instructions       Document    CARDIAC REHAB INFORMATION (ENGLISH)          TIME SPENT ON DISCHARGE: 15 minutes

## 2022-08-17 NOTE — PT/OT/SLP DISCHARGE
Physical Therapy Discharge Summary    Name: Blanquita Montoya  MRN: [id number]   Principal Problem: Ascending aortic aneurysm     Patient Discharged from acute Physical Therapy on 8/15.  Please refer to prior PT noted date on 8/13/22 for functional status.     Assessment:     Patient appropriate for care in another setting.    Objective:     GOALS:   Multidisciplinary Problems     Physical Therapy Goals        Problem: Physical Therapy    Goal Priority Disciplines Outcome Goal Variances Interventions   Physical Therapy Goal     PT, PT/OT      Description: Goals to be met by: 9/8/22  Patient will increase functional independence with mobility by performing:    . Gait  x 350 feet with Supervision using No Assistive Device without deviating towards the R and normal obstacle negotiation.    . Ascend/descend 4 stairs with bilateral Handrails Supervision using No Assistive Device.                      Reasons for Discontinuation of Therapy Services  Transfer to alternate level of care. and Home with       Plan:     Patient Discharged to: Home with Home Health Service.      8/17/2022

## 2022-08-19 ENCOUNTER — HOSPITAL ENCOUNTER (INPATIENT)
Facility: HOSPITAL | Age: 67
LOS: 3 days | Discharge: HOME | DRG: 176 | End: 2022-08-22
Attending: EMERGENCY MEDICINE | Admitting: INTERNAL MEDICINE
Payer: MEDICARE

## 2022-08-19 DIAGNOSIS — I26.99 PE (PULMONARY THROMBOEMBOLISM): ICD-10-CM

## 2022-08-19 DIAGNOSIS — R07.9 CHEST PAIN: ICD-10-CM

## 2022-08-19 DIAGNOSIS — I82.409 DVT (DEEP VENOUS THROMBOSIS): ICD-10-CM

## 2022-08-19 LAB
ALBUMIN SERPL-MCNC: 3 GM/DL (ref 3.4–4.8)
ALBUMIN/GLOB SERPL: 0.8 RATIO (ref 1.1–2)
ALP SERPL-CCNC: 193 UNIT/L (ref 40–150)
ALT SERPL-CCNC: 30 UNIT/L (ref 0–55)
AST SERPL-CCNC: 19 UNIT/L (ref 5–34)
BASOPHILS # BLD AUTO: 0.08 X10(3)/MCL (ref 0–0.2)
BASOPHILS NFR BLD AUTO: 1 %
BILIRUBIN DIRECT+TOT PNL SERPL-MCNC: 0.8 MG/DL
BNP BLD-MCNC: 157.3 PG/ML
BUN SERPL-MCNC: 9 MG/DL (ref 9.8–20.1)
CALCIUM SERPL-MCNC: 9.4 MG/DL (ref 8.4–10.2)
CHLORIDE SERPL-SCNC: 102 MMOL/L (ref 98–107)
CO2 SERPL-SCNC: 27 MMOL/L (ref 23–31)
CREAT SERPL-MCNC: 1.01 MG/DL (ref 0.55–1.02)
EOSINOPHIL # BLD AUTO: 0.32 X10(3)/MCL (ref 0–0.9)
EOSINOPHIL NFR BLD AUTO: 4.1 %
ERYTHROCYTE [DISTWIDTH] IN BLOOD BY AUTOMATED COUNT: 13.4 % (ref 11.5–17)
GFR SERPLBLD CREATININE-BSD FMLA CKD-EPI: >60 MLS/MIN/1.73/M2
GLOBULIN SER-MCNC: 3.7 GM/DL (ref 2.4–3.5)
GLUCOSE SERPL-MCNC: 111 MG/DL (ref 82–115)
HCT VFR BLD AUTO: 41.5 % (ref 37–47)
HGB BLD-MCNC: 12.9 GM/DL (ref 12–16)
IMM GRANULOCYTES # BLD AUTO: 0.03 X10(3)/MCL (ref 0–0.04)
IMM GRANULOCYTES NFR BLD AUTO: 0.4 %
INR BLD: 1.12 (ref 0–1.3)
LYMPHOCYTES # BLD AUTO: 0.94 X10(3)/MCL (ref 0.6–4.6)
LYMPHOCYTES NFR BLD AUTO: 12.1 %
MCH RBC QN AUTO: 29.7 PG (ref 27–31)
MCHC RBC AUTO-ENTMCNC: 31.1 MG/DL (ref 33–36)
MCV RBC AUTO: 95.4 FL (ref 80–94)
MONOCYTES # BLD AUTO: 0.59 X10(3)/MCL (ref 0.1–1.3)
MONOCYTES NFR BLD AUTO: 7.6 %
NEUTROPHILS # BLD AUTO: 5.8 X10(3)/MCL (ref 2.1–9.2)
NEUTROPHILS NFR BLD AUTO: 74.8 %
NRBC BLD AUTO-RTO: 0 %
PLATELET # BLD AUTO: 367 X10(3)/MCL (ref 130–400)
PMV BLD AUTO: 10.9 FL (ref 7.4–10.4)
POCT GLUCOSE: 103 MG/DL (ref 70–110)
POCT GLUCOSE: 137 MG/DL (ref 70–110)
POTASSIUM SERPL-SCNC: 5.1 MMOL/L (ref 3.5–5.1)
PROT SERPL-MCNC: 6.7 GM/DL (ref 5.8–7.6)
PROTHROMBIN TIME: 14.3 SECONDS (ref 12.5–14.5)
RBC # BLD AUTO: 4.35 X10(6)/MCL (ref 4.2–5.4)
SARS-COV-2 RDRP RESP QL NAA+PROBE: NEGATIVE
SODIUM SERPL-SCNC: 137 MMOL/L (ref 136–145)
TROPONIN I SERPL-MCNC: 0.01 NG/ML (ref 0–0.04)
WBC # SPEC AUTO: 7.8 X10(3)/MCL (ref 4.5–11.5)

## 2022-08-19 PROCEDURE — 93010 EKG 12-LEAD: ICD-10-PCS | Mod: ,,, | Performed by: INTERNAL MEDICINE

## 2022-08-19 PROCEDURE — 99285 EMERGENCY DEPT VISIT HI MDM: CPT | Mod: 25

## 2022-08-19 PROCEDURE — 36415 COLL VENOUS BLD VENIPUNCTURE: CPT | Performed by: STUDENT IN AN ORGANIZED HEALTH CARE EDUCATION/TRAINING PROGRAM

## 2022-08-19 PROCEDURE — 25000003 PHARM REV CODE 250: Performed by: INTERNAL MEDICINE

## 2022-08-19 PROCEDURE — 93005 ELECTROCARDIOGRAM TRACING: CPT

## 2022-08-19 PROCEDURE — 63600175 PHARM REV CODE 636 W HCPCS: Performed by: EMERGENCY MEDICINE

## 2022-08-19 PROCEDURE — 83880 ASSAY OF NATRIURETIC PEPTIDE: CPT | Performed by: STUDENT IN AN ORGANIZED HEALTH CARE EDUCATION/TRAINING PROGRAM

## 2022-08-19 PROCEDURE — 11000001 HC ACUTE MED/SURG PRIVATE ROOM

## 2022-08-19 PROCEDURE — 85025 COMPLETE CBC W/AUTO DIFF WBC: CPT | Performed by: STUDENT IN AN ORGANIZED HEALTH CARE EDUCATION/TRAINING PROGRAM

## 2022-08-19 PROCEDURE — 93010 ELECTROCARDIOGRAM REPORT: CPT | Mod: 76,,, | Performed by: INTERNAL MEDICINE

## 2022-08-19 PROCEDURE — 25000003 PHARM REV CODE 250: Performed by: NURSE PRACTITIONER

## 2022-08-19 PROCEDURE — 25500020 PHARM REV CODE 255: Performed by: EMERGENCY MEDICINE

## 2022-08-19 PROCEDURE — 84484 ASSAY OF TROPONIN QUANT: CPT | Performed by: STUDENT IN AN ORGANIZED HEALTH CARE EDUCATION/TRAINING PROGRAM

## 2022-08-19 PROCEDURE — 93010 ELECTROCARDIOGRAM REPORT: CPT | Mod: ,,, | Performed by: INTERNAL MEDICINE

## 2022-08-19 PROCEDURE — 80053 COMPREHEN METABOLIC PANEL: CPT | Performed by: STUDENT IN AN ORGANIZED HEALTH CARE EDUCATION/TRAINING PROGRAM

## 2022-08-19 PROCEDURE — 85610 PROTHROMBIN TIME: CPT | Performed by: STUDENT IN AN ORGANIZED HEALTH CARE EDUCATION/TRAINING PROGRAM

## 2022-08-19 PROCEDURE — 87635 SARS-COV-2 COVID-19 AMP PRB: CPT | Performed by: EMERGENCY MEDICINE

## 2022-08-19 RX ORDER — ACETAMINOPHEN 325 MG/1
650 TABLET ORAL EVERY 4 HOURS PRN
Status: DISCONTINUED | OUTPATIENT
Start: 2022-08-19 | End: 2022-08-22 | Stop reason: HOSPADM

## 2022-08-19 RX ORDER — ATORVASTATIN CALCIUM 40 MG/1
40 TABLET, FILM COATED ORAL NIGHTLY
Refills: 0 | Status: DISCONTINUED | OUTPATIENT
Start: 2022-08-19 | End: 2022-08-22 | Stop reason: HOSPADM

## 2022-08-19 RX ORDER — GLUCAGON 1 MG
1 KIT INJECTION
Status: DISCONTINUED | OUTPATIENT
Start: 2022-08-19 | End: 2022-08-22 | Stop reason: HOSPADM

## 2022-08-19 RX ORDER — LEVOTHYROXINE SODIUM 100 UG/1
100 TABLET ORAL DAILY
Status: DISCONTINUED | OUTPATIENT
Start: 2022-08-19 | End: 2022-08-22 | Stop reason: HOSPADM

## 2022-08-19 RX ORDER — LEVETIRACETAM 500 MG/1
500 TABLET ORAL 2 TIMES DAILY
Status: DISCONTINUED | OUTPATIENT
Start: 2022-08-19 | End: 2022-08-22 | Stop reason: HOSPADM

## 2022-08-19 RX ORDER — ENOXAPARIN SODIUM 100 MG/ML
1 INJECTION SUBCUTANEOUS EVERY 24 HOURS
Status: DISCONTINUED | OUTPATIENT
Start: 2022-08-19 | End: 2022-08-21

## 2022-08-19 RX ORDER — ONDANSETRON 2 MG/ML
4 INJECTION INTRAMUSCULAR; INTRAVENOUS EVERY 8 HOURS PRN
Status: DISCONTINUED | OUTPATIENT
Start: 2022-08-19 | End: 2022-08-22 | Stop reason: HOSPADM

## 2022-08-19 RX ORDER — SODIUM CHLORIDE 0.9 % (FLUSH) 0.9 %
10 SYRINGE (ML) INJECTION EVERY 12 HOURS PRN
Status: DISCONTINUED | OUTPATIENT
Start: 2022-08-19 | End: 2022-08-22 | Stop reason: HOSPADM

## 2022-08-19 RX ORDER — NALOXONE HCL 0.4 MG/ML
0.02 VIAL (ML) INJECTION
Status: DISCONTINUED | OUTPATIENT
Start: 2022-08-19 | End: 2022-08-22 | Stop reason: HOSPADM

## 2022-08-19 RX ORDER — FOLIC ACID 1 MG/1
1 TABLET ORAL DAILY
Status: DISCONTINUED | OUTPATIENT
Start: 2022-08-19 | End: 2022-08-22 | Stop reason: HOSPADM

## 2022-08-19 RX ORDER — TALC
6 POWDER (GRAM) TOPICAL NIGHTLY PRN
Status: DISCONTINUED | OUTPATIENT
Start: 2022-08-19 | End: 2022-08-22 | Stop reason: HOSPADM

## 2022-08-19 RX ORDER — DIPHENHYDRAMINE HCL 25 MG
25 CAPSULE ORAL EVERY 6 HOURS PRN
Status: DISCONTINUED | OUTPATIENT
Start: 2022-08-19 | End: 2022-08-22 | Stop reason: HOSPADM

## 2022-08-19 RX ORDER — ASPIRIN 81 MG/1
81 TABLET ORAL DAILY
Status: DISCONTINUED | OUTPATIENT
Start: 2022-08-19 | End: 2022-08-22 | Stop reason: HOSPADM

## 2022-08-19 RX ORDER — INSULIN ASPART 100 [IU]/ML
0-5 INJECTION, SOLUTION INTRAVENOUS; SUBCUTANEOUS
Status: DISCONTINUED | OUTPATIENT
Start: 2022-08-19 | End: 2022-08-22 | Stop reason: HOSPADM

## 2022-08-19 RX ORDER — AMIODARONE HYDROCHLORIDE 200 MG/1
200 TABLET ORAL DAILY
Status: DISCONTINUED | OUTPATIENT
Start: 2022-08-19 | End: 2022-08-22 | Stop reason: HOSPADM

## 2022-08-19 RX ORDER — IBUPROFEN 200 MG
16 TABLET ORAL
Status: DISCONTINUED | OUTPATIENT
Start: 2022-08-19 | End: 2022-08-22 | Stop reason: HOSPADM

## 2022-08-19 RX ORDER — IBUPROFEN 200 MG
24 TABLET ORAL
Status: DISCONTINUED | OUTPATIENT
Start: 2022-08-19 | End: 2022-08-22 | Stop reason: HOSPADM

## 2022-08-19 RX ORDER — CIPROFLOXACIN 500 MG/1
500 TABLET ORAL EVERY 12 HOURS
Status: DISCONTINUED | OUTPATIENT
Start: 2022-08-19 | End: 2022-08-22 | Stop reason: HOSPADM

## 2022-08-19 RX ORDER — SIMETHICONE 80 MG
1 TABLET,CHEWABLE ORAL 4 TIMES DAILY PRN
Status: DISCONTINUED | OUTPATIENT
Start: 2022-08-19 | End: 2022-08-22 | Stop reason: HOSPADM

## 2022-08-19 RX ADMIN — IOPAMIDOL 100 ML: 755 INJECTION, SOLUTION INTRAVENOUS at 10:08

## 2022-08-19 RX ADMIN — ASPIRIN 81 MG: 81 TABLET, COATED ORAL at 03:08

## 2022-08-19 RX ADMIN — CIPROFLOXACIN 500 MG: 500 TABLET, FILM COATED ORAL at 10:08

## 2022-08-19 RX ADMIN — FOLIC ACID 1 MG: 1 TABLET ORAL at 03:08

## 2022-08-19 RX ADMIN — AMIODARONE HYDROCHLORIDE 200 MG: 200 TABLET ORAL at 03:08

## 2022-08-19 RX ADMIN — LEVOTHYROXINE SODIUM 100 MCG: 100 TABLET ORAL at 03:08

## 2022-08-19 RX ADMIN — DIPHENHYDRAMINE HYDROCHLORIDE 25 MG: 25 CAPSULE ORAL at 10:08

## 2022-08-19 RX ADMIN — LEVETIRACETAM 500 MG: 500 TABLET, FILM COATED ORAL at 10:08

## 2022-08-19 RX ADMIN — ENOXAPARIN SODIUM 90 MG: 100 INJECTION SUBCUTANEOUS at 04:08

## 2022-08-19 NOTE — H&P
"[facility] Hospital Medicine History & Physical Examination       Patient Name: Blanquita Montoya  MRN: [id number]  Patient Class: IP- Inpatient   /Admission Date: 8/19/2022   Admitting Physician: OLVIN Service   Length of Stay: 0  Attending Physician: Dr. DIVINE Nixon  Primary Care Provider: Kamila Whittaker MD  Face-to-Face encounter date: 08/19/2022  Code Status:Full code  Chief Complaint: Chest Pain (AASI: Pt. States she went to sleep at 1900 last night and woke up and 4am with difficulty speaking, global weakness, and midline chest pain. Chest pain now resolved after 324mg ASA, SLG NTG x 2, and 1" NTG paste. Strength equal in all 4 extremities, PMS intact. Speech slowed. Denies drugs/ETOH. Hx ascending aortic aneurysm repair 7/29. Hx HTN, HLD, CAD, and previous CVA w/o deficits. On cipro for R groin infection. )        Patient information was obtained from patient, patient's family, past medical records and ER records.     HISTORY OF PRESENT ILLNESS:   Blanquita Montoya is a 66 y.o. female who PMH includes HTN, HLD, pre diabetes, CAD, CVA  With no residual deficits, seizures, /AAA s/p repair on 7/29/2022, hypothyroidism; presented to the ED at  [facility] on 8/19/2022 with a primary complaint of waking up this am with difficulty speaking, chest pain, and generalized weakness. Pt reports the symptoms started at 4 am when she woke up this am; no reports of new food or medication. She is on oral abx for skin infection to right groin at her angiogram site. Pt recently has AAA repair on 7/29/2022. No reports of N/V/D fever, cough, congestion, or any sick contacts. PT received ASA, SL NTG, and nitro paste to her chest wall which her chest pain has resolved at the time of rounds. Labs revealed slight elevation in her alkaline phosphatase at 193, .3; troponin 0.012; other indices unremarkable.  Chest x-ray revealed no acute cardiopulmonary process identified. CT of the head without " contrast revealed no acute intracranial findings or significant interval change compared to last month.  CTA of the chest PE with contrast revealed small pulmonary embolus involving the posterior basilar segment branch of the right lower lobe artery.  At the time around patient is feeling much better and her chest pain had resolved.  Patient was started on weight based 1 milligram/kilogram Lovenox therapy.  Patient is admitted to hospital medicine services for further management.    PAST MEDICAL HISTORY:     Past Medical History:   Diagnosis Date    Anxiety disorder     Coronary artery disease     GERD (gastroesophageal reflux disease)     Hypercholesterolemia     Hypertension     Hypothyroidism     Obesity     Seizure in childhood     last one age 11    Sleep apnea     does not currently use CPAP    Thoracic aortic aneurysm 03/10/2022    4.5X4.4 Ascending Aorta as of 3/10/2022    Thoracic aortic aneurysm     Thyroid disease        PAST SURGICAL HISTORY:     Past Surgical History:   Procedure Laterality Date    ANGIOGRAM, CORONARY, WITH LEFT HEART CATHETERIZATION N/A 2022    Procedure: Angiogram, Coronary, with Left Heart Cath;  Surgeon: Harry Jj MD;  Location: [facility] CATH LAB;  Service: Cardiology;  Laterality: N/A;    COLONOSCOPY      HYSTERECTOMY      REPAIR OF ASCENDING AORTA N/A 2022    Procedure: REPAIR, AORTA, ASCENDING;  Surgeon: Alec Vega IV, MD;  Location: [facility] OR;  Service: Cardiovascular;  Laterality: N/A;  ECHO NOTIFIED    RT KNEE         ALLERGIES:   Meperidine and Tramadol    FAMILY HISTORY:   Reviewed and non-significant to present medical condition    SOCIAL HISTORY:     Social History     Tobacco Use    Smoking status: Former Smoker     Types: Cigarettes     Quit date: 2010     Years since quittin.1    Smokeless tobacco: Never Used   Substance Use Topics    Alcohol use: Not Currently        HOME MEDICATIONS:   As documented  Prior to Admission  medications    Medication Sig Start Date End Date Taking? Authorizing Provider   amiodarone (PACERONE) 200 MG Tab Take 1 tablet (200 mg total) by mouth once daily. 8/15/22 8/15/23  Thairy G Reyes, DO   aspirin (ECOTRIN) 81 MG EC tablet Take 1 tablet (81 mg total) by mouth once daily. 8/15/22 9/14/22  Thairy G Reyes, DO   ciprofloxacin HCl (CIPRO) 500 MG tablet Take 1.5 tablets (750 mg total) by mouth every 12 (twelve) hours. for 10 days 8/15/22 8/25/22  Thairy G Reyes, DO   EUTHYROX 100 mcg tablet Take 1 tablet (100 mcg total) by mouth once daily. 8/15/22 9/14/22  Thairy G Reyes, DO   folic acid (FOLVITE) 1 MG tablet Take 1 tablet (1 mg total) by mouth once daily. 8/16/22 8/16/23  Thairy G Reyes, DO   levETIRAcetam (KEPPRA) 500 MG Tab Take 1 tablet (500 mg total) by mouth 2 (two) times daily. 8/15/22 8/15/23  Thairy G Reyes, DO   rosuvastatin (CRESTOR) 10 MG tablet Take 2 tablets (20 mg total) by mouth every evening. 8/15/22 9/14/22  Thairy G Reyes, DO   amLODIPine (NORVASC) 10 MG tablet Take 10 mg by mouth once daily. 4/5/22 8/15/22  Historical Provider   hydrocortisone 2.5 % cream Apply topically daily as needed (as needed for itching). 6/17/22 8/15/22  Richard Littlejohn MD   isosorbide mononitrate (IMDUR) 30 MG 24 hr tablet Take 30 mg by mouth once daily. 4/21/22 8/15/22  Historical Provider   losartan (COZAAR) 100 MG tablet Take 100 mg by mouth once daily. 5/17/22 8/15/22  Historical Provider   metoprolol succinate (TOPROL-XL) 25 MG 24 hr tablet Take 1 tablet (25 mg total) by mouth once daily.  Patient not taking: No sig reported 6/14/22 8/15/22  Julio Cesar Young MD   nitroGLYCERIN (NITROSTAT) 0.4 MG SL tablet Place 0.4 mg under the tongue. 9/9/21 8/15/22  Historical Provider   omega-3 fatty acids/fish oil (FISH OIL-OMEGA-3 FATTY ACIDS) 300-1,000 mg capsule Take 1 capsule by mouth once daily. Takes 1000mg daily  8/15/22  Historical Provider   pantoprazole (PROTONIX) 40 MG tablet Take 40 mg by mouth once  daily. 3/24/22 8/15/22  Historical Provider       REVIEW OF SYSTEMS:   Except as documented, all other systems reviewed and negative     PHYSICAL EXAM:     VITAL SIGNS: 24 HRS MIN & MAX LAST   Temp  Min: 98.8 °F (37.1 °C)  Max: 98.8 °F (37.1 °C) 98.8 °F (37.1 °C)   BP  Min: 134/72  Max: 160/74 (!) 160/74   Pulse  Min: 56  Max: 77  (!) 57   Resp  Min: 16  Max: 22 19   SpO2  Min: 95 %  Max: 97 % 97 %       General appearance:  Chronically ill-appearing looks older than stated age; nontoxic, NAD   HENT: Atraumatic head. Moist mucous membranes of oral cavity.  Eyes: PERRL  Neck: Supple.   Lungs: Clear to auscultation bilaterally. No wheezing present.  Symmetrical expansion, unlabored respirations  Heart: Regular rate and rhythm. systolic murmur, Capillary refill brisk , peripheral pulses palpable   Abdomen: Soft, non-distended, non-tender. No rebound tenderness/guarding. Bowel sounds are normal.   Extremities: ROJAS.  Skin: warm and dry, right groin scabbed over wound with mild surrounding erythema, mild TTP no drainage  Neuro: Motor and sensory exams grossly intact. No focal deficits  Psych/mental status: Appropriate mood and affect. Responds appropriately to questions.     LABS AND IMAGING:     Recent Labs   Lab 08/19/22  0705   WBC 7.8   RBC 4.35   HGB 12.9   HCT 41.5   MCV 95.4*   MCH 29.7   MCHC 31.1*   RDW 13.4      MPV 10.9*       Recent Labs   Lab 08/19/22  0705      K 5.1   CO2 27   BUN 9.0*   CREATININE 1.01   CALCIUM 9.4   ALBUMIN 3.0*   ALKPHOS 193*   ALT 30   AST 19   BILITOT 0.8       Microbiology Results (last 7 days)     ** No results found for the last 168 hours. **           CTA Chest Aorta Non Coronary  Narrative: EXAMINATION:  CT angiogram chest with contrast    CLINICAL HISTORY:  Shortness of breath respiratory distress    TECHNIQUE:  CT angiogram chest performed after intravenous contrast using routine protocol.  3D reconstructions sequences obtained and reviewed.  DLP 1167.  Automated  exposure control used.    COMPARISON:  05/26/2022    FINDINGS:  There is a small filling defect pulmonary embolus within the posterior basilar segment of the right lower lobe artery.  No other pulmonary arterial system filling defects evident.  Postop changes indicate prior CABG.  Severe atherosclerotic calcification.  No mediastinal mass, lymphadenopathy, fluid collection, or hematoma.  No hilar enlargement.  Soft tissues of the surrounding chest wall are normal.  Visualized upper abdominal organs are normal.  Scattered areas of linear atelectasis, scarring noted at the lung bases.  No infiltrate or pulmonary nodule evident.  Impression: Small pulmonary embolus involving the posterior basilar segmental branch of the right lower lobar artery.    Results reported verbally to the ordering physician at the time of the report.    Electronically signed by: Jessica Angel MD  Date:    08/19/2022  Time:    10:38  X-Ray Chest PA And Lateral  Narrative: EXAMINATION:  XR CHEST PA AND LATERAL    CLINICAL HISTORY:  Chest Pain;    TECHNIQUE:  Two views of the chest    COMPARISON:  08/02/2022    FINDINGS:  Trace left effusion remains.    The cardiomediastinal silhouette is within normal limits with postsurgical changes of median sternotomy.    No acute osseous abnormality.  Impression: No acute cardiopulmonary process.    Electronically signed by: Lb Gracia  Date:    08/19/2022  Time:    08:08  CT Head Without Contrast  Narrative: EXAMINATION:  CT HEAD WITHOUT CONTRAST    CLINICAL HISTORY:  Neuro deficit, acute, stroke suspected;    TECHNIQUE:  CT imaging of the head performed from the skull base to the vertex without intravenous contrast.  mGycm. Automatic exposure control, adjustment of mA/kV or iterative reconstruction technique was used to reduce radiation.    COMPARISON:  30 July 2022    FINDINGS:  There is no acute cortical infarct, hemorrhage or mass lesion.  No new parenchymal attenuation abnormality.  The  ventricles are not enlarged.    Visualized paranasal sinuses and mastoid air cells are clear.  Impression: No acute intracranial findings or significant interval change compared to last month.    Electronically signed by: Pranay Mccormack  Date:    08/19/2022  Time:    07:07        ASSESSMENT & PLAN:   ASSESSMENT:  Acute chest pain- resolved  Pulmonary embolism- RLL  AAA- s/p repair 7/29/2022  Right groin wound- angiogram site; present on admission  HTN- essential  CAD- s/p CABG  Prediabetes- currently euglycemic  Hx of CVA with no residual deficits  Seizures- no reports of recent seizures  HLD  Weakness    PLAN:  Continue with Lovenox 1 milligram/kilogram BID subcu   Trend out troponin levels   Fall precautions   Accu-Cheks and sliding scale   Resume home medication as deemed necessary   Seizure precautions   Repeat labs in the am  Venous NIVA BLE     VTE Prophylaxis: Lovenox weight based for DVT prophylaxis and will be advised to be as mobile as possible   Patient condition: Fair  __________________________________________________________________________  INPATIENT LIST OF MEDICATIONS     Scheduled Meds:   enoxaparin  1 mg/kg Subcutaneous Daily     Continuous Infusions:  PRN Meds:.acetaminophen, dextrose 10%, dextrose 10%, glucagon (human recombinant), glucose, glucose, insulin aspart U-100, melatonin, naloxone, ondansetron, simethicone, sodium chloride 0.9%      IRadha FNP have reviewed and discussed the case with Dr. DIVINE Nixon.  Please see the following addendum for further assessment and plan from there attending GIANA Parish   08/19/2022    ________________________________________________________________________________    MD Addendum:    I Dr. Nati Nixon performed substantive portion of the visit, personally performed a face to face evaluation of the patient and have reviewed and agree with NP/PA documentation, treatment and plan & MDM.     66-year-old woman with  the above-mentioned medical history, recently seen for aortic aneurysm on 07/29/2022 underwent repair by Dr. Vega.  Her postop stay was complicated with symptomatic bradycardia, seizure like convulsive activity, nonhemorrhagic acute subacute infarction and atrial fibrillation requiring amiodarone and CROW ligation & she did not require anticoagulation.  Patient was discharged to rehab and then to home.  She presents today with acute onset difficulty in speaking, generalized weakness, chest tightness.  Chest tightness relieved with aspirin and nitroglycerin.  On arrival she was borderline bradycardic and was doing well on room air.  Hypertensive as well.  Labs revealed nothing really significant.  BNP was mildly elevated and troponin was within normal limits.  CT head was unremarkable.  CT thorax reveals small pulmonary embolus involving the posterior basilar segmental branch of the right lower lobar artery.  Initiated on Lovenox and admitted to hospital medicine service for further management.  When seen and examined at bedside patient was alert, comfortably resting.  Reports feeling better.  Denies any chest pain shortness for breath palpitations at this moment.  Agree with the HPI, exam, plan mentioned above.  Will continue full-dose Lovenox and also obtain lower extremity Doppler to rule out DVT.  Tender abdomen.  Increase patient to stay compliant with anticoagulation.  Switch to p.o. Eliquis eventually. Resume other apt home meds.

## 2022-08-19 NOTE — NURSING
Visited upon request regarding right groin incisional  wound, which demonstrates well approximated  suture line with small area at central insicion with soft scab/slough , wound is non tender , non fluctuant without drainage ,no periwound erythema or warmth ,  no evidence of dehiscence, appears healing . Cleansed  With saline and dressed  With silver alginate under bordered foam dressing. Discussed same with assigned nurse.

## 2022-08-19 NOTE — ED PROVIDER NOTES
"Encounter Date: 8/19/2022    SCRIBE #1 NOTE: I, Eunice Plaza, am scribing for, and in the presence of,  Delmer Cheng MD. I have scribed the following portions of the note - Other sections scribed: HPI, POS, PE.       History     Chief Complaint   Patient presents with    Chest Pain     AASI: Pt. States she went to sleep at 1900 last night and woke up and 4am with difficulty speaking, global weakness, and midline chest pain. Chest pain now resolved after 324mg ASA, SLG NTG x 2, and 1" NTG paste. Strength equal in all 4 extremities, PMS intact. Speech slowed. Denies drugs/ETOH. Hx ascending aortic aneurysm repair 7/29. Hx HTN, HLD, CAD, and previous CVA w/o deficits. On cipro for R groin infection.      66 year old female presents to ED via EMS with speaking difficulties, global weakness, and chest pain starting this morning at 0400. The pt has an extensive hx of CAD, seizures, CVA, CAD, GERD, HTN, hypothyroidism, and ascending aortic repair after aortic aneurysm on 7/29/2022. The pt states she took nitro and Asprin, which eased her chest pain and her speech as since improved. She is currently only complaining of generalized weakness. She is also expressing concern over infection in her angiogram scar in her R groin, which she is still on antibiotics for.         The history is provided by the patient and the spouse. No  was used.   Chest Pain  The current episode started several hours ago. Pertinent negatives for primary symptoms include no fever, no shortness of breath, no cough, no wheezing, no abdominal pain, no nausea, no vomiting and no dizziness.   Associated symptoms include weakness (global).   Her past medical history is significant for aortic aneurysm, CAD, hypertension, sleep apnea and thyroid problem.     Review of patient's allergies indicates:   Allergen Reactions    Meperidine      Other reaction(s): unknown    Tramadol Hives     Other reaction(s): sweating     Past " Medical History:   Diagnosis Date    Anxiety disorder     Coronary artery disease     GERD (gastroesophageal reflux disease)     Hypercholesterolemia     Hypertension     Hypothyroidism     Obesity     Seizure in childhood     last one age 11    Sleep apnea     does not currently use CPAP    Thoracic aortic aneurysm 03/10/2022    4.5X4.4 Ascending Aorta as of 3/10/2022    Thoracic aortic aneurysm     Thyroid disease      Past Surgical History:   Procedure Laterality Date    ANGIOGRAM, CORONARY, WITH LEFT HEART CATHETERIZATION N/A 2022    Procedure: Angiogram, Coronary, with Left Heart Cath;  Surgeon: Harry Jj MD;  Location: [facility] CATH LAB;  Service: Cardiology;  Laterality: N/A;    COLONOSCOPY      HYSTERECTOMY      REPAIR OF ASCENDING AORTA N/A 2022    Procedure: REPAIR, AORTA, ASCENDING;  Surgeon: Alec Vega IV, MD;  Location: [facility] OR;  Service: Cardiovascular;  Laterality: N/A;  ECHO NOTIFIED    RT KNEE       Family History   Problem Relation Age of Onset    Heart disease Mother     Uterine cancer Mother     Lung cancer Father     Seizures Sister     Heart disease Sister      Social History     Tobacco Use    Smoking status: Former Smoker     Types: Cigarettes     Quit date: 2010     Years since quittin.1    Smokeless tobacco: Never Used   Substance Use Topics    Alcohol use: Not Currently    Drug use: Never     Review of Systems   Constitutional: Negative for chills and fever.   HENT: Negative for congestion and ear pain.    Eyes: Negative for discharge.   Respiratory: Negative for cough, shortness of breath and wheezing.    Cardiovascular: Positive for chest pain. Negative for leg swelling.   Gastrointestinal: Negative for abdominal pain, constipation, diarrhea, nausea and vomiting.   Genitourinary: Negative for dysuria, flank pain and frequency.   Musculoskeletal: Negative for back pain and joint swelling.   Skin: Negative for rash.   Neurological:  Positive for speech difficulty and weakness (global). Negative for dizziness and headaches.   Psychiatric/Behavioral: Negative for agitation, confusion and hallucinations.       Physical Exam     Initial Vitals [08/19/22 0616]   BP Pulse Resp Temp SpO2   134/72 77 16 98.8 °F (37.1 °C) 95 %      MAP       --         Physical Exam    HENT:   Head: Normocephalic.   Eyes: EOM are normal. Right eye exhibits no discharge. Left eye exhibits no discharge. No scleral icterus.   Neck: Neck supple.   Cardiovascular: Normal rate, regular rhythm and normal heart sounds.   Pulmonary/Chest: Breath sounds normal. No stridor. No respiratory distress. She has no wheezes. She has no rhonchi. She has no rales.   Abdominal: She exhibits no distension. There is no abdominal tenderness. There is no rebound and no guarding.   Musculoskeletal:         General: No tenderness or edema. Normal range of motion.      Cervical back: Neck supple.     Neurological: She is alert and oriented to person, place, and time. She has normal strength.   No slurred speech.   No pronator drift. 5/5 bilateral strength.   No asymmetrical facial lag.    Skin: Skin is dry. No pallor.   Linear scab in the R groin, no induration, no drainage.   Psychiatric: She has a normal mood and affect. Her behavior is normal. Judgment and thought content normal.         ED Course   Procedures  Labs Reviewed   COMPREHENSIVE METABOLIC PANEL - Abnormal; Notable for the following components:       Result Value    Blood Urea Nitrogen 9.0 (*)     Albumin Level 3.0 (*)     Globulin 3.7 (*)     Albumin/Globulin Ratio 0.8 (*)     Alkaline Phosphatase 193 (*)     All other components within normal limits   B-TYPE NATRIURETIC PEPTIDE - Abnormal; Notable for the following components:    Natriuretic Peptide 157.3 (*)     All other components within normal limits   CBC WITH DIFFERENTIAL - Abnormal; Notable for the following components:    MCV 95.4 (*)     MCHC 31.1 (*)     MPV 10.9 (*)      All other components within normal limits   TROPONIN I - Normal   PROTIME-INR - Normal   CBC W/ AUTO DIFFERENTIAL    Narrative:     The following orders were created for panel order CBC auto differential.  Procedure                               Abnormality         Status                     ---------                               -----------         ------                     CBC with Differential[[id number]]        Abnormal            Final result                 Please view results for these tests on the individual orders.   SARS-COV-2 RNA AMPLIFICATION, QUAL          Imaging Results          CTA Chest Aorta Non Coronary (Final result)  Result time 08/19/22 10:38:39    Final result by Yazan Angel MD (08/19/22 10:38:39)                 Impression:      Small pulmonary embolus involving the posterior basilar segmental branch of the right lower lobar artery.    Results reported verbally to the ordering physician at the time of the report.      Electronically signed by: Jessica Angel MD  Date:    08/19/2022  Time:    10:38             Narrative:    EXAMINATION:  CT angiogram chest with contrast    CLINICAL HISTORY:  Shortness of breath respiratory distress    TECHNIQUE:  CT angiogram chest performed after intravenous contrast using routine protocol.  3D reconstructions sequences obtained and reviewed.  DLP 1167.  Automated exposure control used.    COMPARISON:  05/26/2022    FINDINGS:  There is a small filling defect pulmonary embolus within the posterior basilar segment of the right lower lobe artery.  No other pulmonary arterial system filling defects evident.  Postop changes indicate prior CABG.  Severe atherosclerotic calcification.  No mediastinal mass, lymphadenopathy, fluid collection, or hematoma.  No hilar enlargement.  Soft tissues of the surrounding chest wall are normal.  Visualized upper abdominal organs are normal.  Scattered areas of linear atelectasis, scarring noted at the lung bases.  No  infiltrate or pulmonary nodule evident.                               CT Head Without Contrast (Final result)  Result time 08/19/22 07:07:14    Final result by Pranay Mccormack MD (08/19/22 07:07:14)                 Impression:      No acute intracranial findings or significant interval change compared to last month.      Electronically signed by: Pranay Mccormack  Date:    08/19/2022  Time:    07:07             Narrative:    EXAMINATION:  CT HEAD WITHOUT CONTRAST    CLINICAL HISTORY:  Neuro deficit, acute, stroke suspected;    TECHNIQUE:  CT imaging of the head performed from the skull base to the vertex without intravenous contrast.  mGycm. Automatic exposure control, adjustment of mA/kV or iterative reconstruction technique was used to reduce radiation.    COMPARISON:  30 July 2022    FINDINGS:  There is no acute cortical infarct, hemorrhage or mass lesion.  No new parenchymal attenuation abnormality.  The ventricles are not enlarged.    Visualized paranasal sinuses and mastoid air cells are clear.                               X-Ray Chest PA And Lateral (Final result)  Result time 08/19/22 08:08:36    Final result by Lb Gracia MD (08/19/22 08:08:36)                 Impression:      No acute cardiopulmonary process.      Electronically signed by: Lb Gracia  Date:    08/19/2022  Time:    08:08             Narrative:    EXAMINATION:  XR CHEST PA AND LATERAL    CLINICAL HISTORY:  Chest Pain;    TECHNIQUE:  Two views of the chest    COMPARISON:  08/02/2022    FINDINGS:  Trace left effusion remains.    The cardiomediastinal silhouette is within normal limits with postsurgical changes of median sternotomy.    No acute osseous abnormality.                                 Medications   enoxaparin injection 90 mg (has no administration in time range)   iopamidoL (ISOVUE-370) injection 100 mL (100 mLs Intravenous Given 8/19/22 1014)     Medical Decision Making:   Clinical Tests:   Lab Tests: Ordered and  Reviewed  Radiological Study: Ordered and Reviewed          Scribe Attestation:   Scribe #1: I performed the above scribed service and the documentation accurately describes the services I performed. I attest to the accuracy of the note.    Attending Attestation:           Physician Attestation for Scribe:  Physician Attestation Statement for Scribe #1: I, Chano Cheng MD, reviewed documentation, as scribed by Eunice Plaza in my presence, and it is both accurate and complete.             ED Course as of 08/19/22 1155   Fri Aug 19, 2022   1041 Paged Dr. Vega [MM]   1051 Consulted with Dr. Vega's nurse: she will inquire about blood thinners [MM]   1052 Dr. Vega Oked Lovenox [MM]   1057 Paged hospitalist [MM]   1114 Consulted hospitalist: admitted [MM]      ED Course User Index  [MM] Nadira Moon             Clinical Impression:   Final diagnoses:  [R07.9] Chest pain  [I26.99] PE (pulmonary thromboembolism)          ED Disposition Condition    Admit               Chano Cheng MD  08/19/22 1159

## 2022-08-20 LAB
ALBUMIN SERPL-MCNC: 2.8 GM/DL (ref 3.4–4.8)
ALBUMIN/GLOB SERPL: 0.8 RATIO (ref 1.1–2)
ALP SERPL-CCNC: 178 UNIT/L (ref 40–150)
ALT SERPL-CCNC: 26 UNIT/L (ref 0–55)
AST SERPL-CCNC: 18 UNIT/L (ref 5–34)
BASOPHILS # BLD AUTO: 0.05 X10(3)/MCL (ref 0–0.2)
BASOPHILS NFR BLD AUTO: 0.7 %
BILIRUBIN DIRECT+TOT PNL SERPL-MCNC: 0.7 MG/DL
BUN SERPL-MCNC: 11.7 MG/DL (ref 9.8–20.1)
CALCIUM SERPL-MCNC: 9 MG/DL (ref 8.4–10.2)
CHLORIDE SERPL-SCNC: 100 MMOL/L (ref 98–107)
CO2 SERPL-SCNC: 30 MMOL/L (ref 23–31)
CREAT SERPL-MCNC: 0.99 MG/DL (ref 0.55–1.02)
EOSINOPHIL # BLD AUTO: 0.31 X10(3)/MCL (ref 0–0.9)
EOSINOPHIL NFR BLD AUTO: 4.3 %
ERYTHROCYTE [DISTWIDTH] IN BLOOD BY AUTOMATED COUNT: 13.5 % (ref 11.5–17)
GFR SERPLBLD CREATININE-BSD FMLA CKD-EPI: >60 MLS/MIN/1.73/M2
GLOBULIN SER-MCNC: 3.5 GM/DL (ref 2.4–3.5)
GLUCOSE SERPL-MCNC: 110 MG/DL (ref 82–115)
HCT VFR BLD AUTO: 38.2 % (ref 37–47)
HGB BLD-MCNC: 12 GM/DL (ref 12–16)
IMM GRANULOCYTES # BLD AUTO: 0.03 X10(3)/MCL (ref 0–0.04)
IMM GRANULOCYTES NFR BLD AUTO: 0.4 %
LYMPHOCYTES # BLD AUTO: 0.85 X10(3)/MCL (ref 0.6–4.6)
LYMPHOCYTES NFR BLD AUTO: 11.7 %
MAGNESIUM SERPL-MCNC: 2.2 MG/DL (ref 1.6–2.6)
MCH RBC QN AUTO: 29.8 PG (ref 27–31)
MCHC RBC AUTO-ENTMCNC: 31.4 MG/DL (ref 33–36)
MCV RBC AUTO: 94.8 FL (ref 80–94)
MONOCYTES # BLD AUTO: 0.73 X10(3)/MCL (ref 0.1–1.3)
MONOCYTES NFR BLD AUTO: 10.1 %
NEUTROPHILS # BLD AUTO: 5.3 X10(3)/MCL (ref 2.1–9.2)
NEUTROPHILS NFR BLD AUTO: 72.8 %
NRBC BLD AUTO-RTO: 0 %
PLATELET # BLD AUTO: 312 X10(3)/MCL (ref 130–400)
PMV BLD AUTO: 10.4 FL (ref 7.4–10.4)
POCT GLUCOSE: 133 MG/DL (ref 70–110)
POTASSIUM SERPL-SCNC: 5.1 MMOL/L (ref 3.5–5.1)
PROT SERPL-MCNC: 6.3 GM/DL (ref 5.8–7.6)
RBC # BLD AUTO: 4.03 X10(6)/MCL (ref 4.2–5.4)
SODIUM SERPL-SCNC: 135 MMOL/L (ref 136–145)
T3FREE SERPL-MCNC: 1.44 PG/ML (ref 1.57–3.91)
T4 FREE SERPL-MCNC: 0.76 NG/DL (ref 0.7–1.48)
TSH SERPL-ACNC: 29 UIU/ML (ref 0.35–4.94)
WBC # SPEC AUTO: 7.3 X10(3)/MCL (ref 4.5–11.5)

## 2022-08-20 PROCEDURE — 84481 FREE ASSAY (FT-3): CPT | Performed by: INTERNAL MEDICINE

## 2022-08-20 PROCEDURE — 36415 COLL VENOUS BLD VENIPUNCTURE: CPT | Performed by: NURSE PRACTITIONER

## 2022-08-20 PROCEDURE — 63600175 PHARM REV CODE 636 W HCPCS: Performed by: EMERGENCY MEDICINE

## 2022-08-20 PROCEDURE — 11000001 HC ACUTE MED/SURG PRIVATE ROOM

## 2022-08-20 PROCEDURE — 84439 ASSAY OF FREE THYROXINE: CPT | Performed by: INTERNAL MEDICINE

## 2022-08-20 PROCEDURE — 80053 COMPREHEN METABOLIC PANEL: CPT | Performed by: NURSE PRACTITIONER

## 2022-08-20 PROCEDURE — 83735 ASSAY OF MAGNESIUM: CPT | Performed by: INTERNAL MEDICINE

## 2022-08-20 PROCEDURE — 25000003 PHARM REV CODE 250: Performed by: NURSE PRACTITIONER

## 2022-08-20 PROCEDURE — 84443 ASSAY THYROID STIM HORMONE: CPT | Performed by: INTERNAL MEDICINE

## 2022-08-20 PROCEDURE — 25000003 PHARM REV CODE 250: Performed by: INTERNAL MEDICINE

## 2022-08-20 PROCEDURE — 85025 COMPLETE CBC W/AUTO DIFF WBC: CPT | Performed by: NURSE PRACTITIONER

## 2022-08-20 PROCEDURE — 84075 ASSAY ALKALINE PHOSPHATASE: CPT | Performed by: NURSE PRACTITIONER

## 2022-08-20 RX ORDER — FAMOTIDINE 20 MG/1
20 TABLET, FILM COATED ORAL 2 TIMES DAILY
Status: DISCONTINUED | OUTPATIENT
Start: 2022-08-20 | End: 2022-08-22 | Stop reason: HOSPADM

## 2022-08-20 RX ADMIN — ATORVASTATIN CALCIUM 40 MG: 40 TABLET, FILM COATED ORAL at 09:08

## 2022-08-20 RX ADMIN — ASPIRIN 81 MG: 81 TABLET, COATED ORAL at 08:08

## 2022-08-20 RX ADMIN — LEVOTHYROXINE SODIUM 100 MCG: 100 TABLET ORAL at 08:08

## 2022-08-20 RX ADMIN — LEVETIRACETAM 500 MG: 500 TABLET, FILM COATED ORAL at 09:08

## 2022-08-20 RX ADMIN — AMIODARONE HYDROCHLORIDE 200 MG: 200 TABLET ORAL at 08:08

## 2022-08-20 RX ADMIN — ENOXAPARIN SODIUM 90 MG: 100 INJECTION SUBCUTANEOUS at 05:08

## 2022-08-20 RX ADMIN — FOLIC ACID 1 MG: 1 TABLET ORAL at 08:08

## 2022-08-20 RX ADMIN — CIPROFLOXACIN 500 MG: 500 TABLET, FILM COATED ORAL at 08:08

## 2022-08-20 RX ADMIN — LEVETIRACETAM 500 MG: 500 TABLET, FILM COATED ORAL at 08:08

## 2022-08-20 RX ADMIN — FAMOTIDINE 20 MG: 20 TABLET, FILM COATED ORAL at 09:08

## 2022-08-20 RX ADMIN — CIPROFLOXACIN 500 MG: 500 TABLET, FILM COATED ORAL at 09:08

## 2022-08-20 RX ADMIN — FAMOTIDINE 20 MG: 20 TABLET, FILM COATED ORAL at 12:08

## 2022-08-20 NOTE — PLAN OF CARE
Problem: Adult Inpatient Plan of Care  Goal: Plan of Care Review  8/20/2022 0119 by Lindsay Multani RN  Outcome: Ongoing, Progressing  8/20/2022 0108 by Lindsay Multani RN  Outcome: Ongoing, Progressing  Goal: Patient-Specific Goal (Individualized)  8/20/2022 0119 by Lindsay Multani RN  Outcome: Ongoing, Progressing  8/20/2022 0108 by Lindsay Multani RN  Outcome: Ongoing, Progressing  Goal: Absence of Hospital-Acquired Illness or Injury  8/20/2022 0119 by Lindsay Multani RN  Outcome: Ongoing, Progressing  8/20/2022 0108 by Lindsay Multani RN  Outcome: Ongoing, Progressing     Problem: Impaired Wound Healing  Goal: Optimal Wound Healing  8/20/2022 0119 by Lindsay Multani RN  Outcome: Ongoing, Progressing  8/20/2022 0108 by Lindsay Multani RN  Outcome: Ongoing, Progressing     Problem: Fall Injury Risk  Goal: Absence of Fall and Fall-Related Injury  8/20/2022 0119 by Lindsay Multani RN  Outcome: Ongoing, Progressing  8/20/2022 0108 by Lindsay Multani RN  Outcome: Ongoing, Progressing

## 2022-08-20 NOTE — PROGRESS NOTES
[facility] Hospital Medicine Progress Note        Chief Complaint: Inpatient Follow-up for PE    HPI:   Blanquita Montoya is a 66 y.o. female who PMH includes HTN, HLD, pre diabetes, CAD, CVA  With no residual deficits, seizures, /AAA s/p repair on 7/29/2022, hypothyroidism; presented to the ED at  [facility] on 8/19/2022 with a primary complaint of waking up this am with difficulty speaking, chest pain, and generalized weakness. Pt reports the symptoms started at 4 am when she woke up this am; no reports of new food or medication. She is on oral abx for skin infection to right groin at her angiogram site. Pt recently has AAA repair on 7/29/2022. No reports of N/V/D fever, cough, congestion, or any sick contacts. PT received ASA, SL NTG, and nitro paste to her chest wall which her chest pain has resolved at the time of rounds. Labs revealed slight elevation in her alkaline phosphatase at 193, .3; troponin 0.012; other indices unremarkable.  Chest x-ray revealed no acute cardiopulmonary process identified. CT of the head without contrast revealed no acute intracranial findings or significant interval change compared to last month.  CTA of the chest PE with contrast revealed small pulmonary embolus involving the posterior basilar segment branch of the right lower lobe artery.  At the time around patient is feeling much better and her chest pain had resolved.  Patient was started on weight based 1 milligram/kilogram Lovenox therapy.  Patient is admitted to hospital medicine services for further management.     Interval Hx:   Afebrile overnight.  Hemodynamically stable this morning.  Comfortably resting.  Doppler studies reveal DVT.  Continued on full-dose anticoagulation.  Labs this morning show stable hemoglobin platelets.  TSH elevated.  Objective/physical exam:  Vitals:    08/19/22 2000 08/20/22 0000 08/20/22 0055 08/20/22 0308   BP: (!) 159/77 129/69 129/69 130/74   BP Location:    Right arm    Patient Position:   Lying    Pulse: 61 66 66 67   Resp: 18 18 18 18   Temp: 98.4 °F (36.9 °C) 98.3 °F (36.8 °C) 98.3 °F (36.8 °C) 99.1 °F (37.3 °C)   TempSrc:   Oral    SpO2: 96% 98% 98% 97%   Weight:       Height:         General: In no acute distress, afebrile  Respiratory: Clear to auscultation bilaterally  Cardiovascular: S1, S2, no appreciable murmur  Abdomen: Soft, nontender, BS +  MSK: Warm, no lower extremity edema, no clubbing or cyanosis  Neurologic: Alert and oriented x4, moving all extremities with good strength     Lab Results   Component Value Date     (L) 08/20/2022    K 5.1 08/20/2022    CO2 30 08/20/2022    BUN 11.7 08/20/2022    CREATININE 0.99 08/20/2022    CALCIUM 9.0 08/20/2022    EGFRNONAA >60 08/07/2022      Lab Results   Component Value Date    ALT 26 08/20/2022    AST 18 08/20/2022    ALKPHOS 178 (H) 08/20/2022    BILITOT 0.7 08/20/2022      Lab Results   Component Value Date    WBC 7.3 08/20/2022    HGB 12.0 08/20/2022    HCT 38.2 08/20/2022    MCV 94.8 (H) 08/20/2022     08/20/2022           Medications:   amiodarone  200 mg Oral Daily    aspirin  81 mg Oral Daily    atorvastatin  40 mg Oral QHS    ciprofloxacin HCl  500 mg Oral Q12H    enoxaparin  1 mg/kg Subcutaneous Daily    folic acid  1 mg Oral Daily    levETIRAcetam  500 mg Oral BID    levothyroxine  100 mcg Oral Daily      acetaminophen, dextrose 10%, dextrose 10%, diphenhydrAMINE, glucagon (human recombinant), glucose, glucose, insulin aspart U-100, melatonin, naloxone, ondansetron, simethicone, sodium chloride 0.9%     Assessment/Plan:  Acute chest pain- resolved  Pulmonary embolism- RLL  Right posterior tibial vein DVT, superficial thrombosis right  vein  AAA- s/p repair 7/29/2022  Right groin wound- angiogram site; present on admission  HTN- essential  CAD- s/p CABG  Prediabetes- currently euglycemic  Hx of CVA with no residual deficits  Seizures- no reports of recent  seizures  HLD  Weakness    Plan:   -will continue full-dose Lovenox and transition to Eliquis from tomorrow   -encourage incentive spirometer use, pulmonary toileting   -hypothyroidism uncontrolled.  Check T3 and T4  -continue other medical management including home meds, insulin scale    Famotidine  Labs  Full code    Critical care diagnosis:  Acute PE requiring full-dose anticoagulation  Critical care time: 50 minutes          Adalid Nixon MD

## 2022-08-21 PROBLEM — I26.99 PULMONARY EMBOLISM: Status: ACTIVE | Noted: 2022-08-21

## 2022-08-21 LAB
ALBUMIN SERPL-MCNC: 2.9 GM/DL (ref 3.4–4.8)
ALBUMIN/GLOB SERPL: 0.8 RATIO (ref 1.1–2)
ALP SERPL-CCNC: 177 UNIT/L (ref 40–150)
ALT SERPL-CCNC: 24 UNIT/L (ref 0–55)
AST SERPL-CCNC: 17 UNIT/L (ref 5–34)
BASOPHILS # BLD AUTO: 0.06 X10(3)/MCL (ref 0–0.2)
BASOPHILS NFR BLD AUTO: 1 %
BILIRUBIN DIRECT+TOT PNL SERPL-MCNC: 0.7 MG/DL
BUN SERPL-MCNC: 12.7 MG/DL (ref 9.8–20.1)
CALCIUM SERPL-MCNC: 9.2 MG/DL (ref 8.4–10.2)
CHLORIDE SERPL-SCNC: 102 MMOL/L (ref 98–107)
CO2 SERPL-SCNC: 28 MMOL/L (ref 23–31)
CREAT SERPL-MCNC: 1 MG/DL (ref 0.55–1.02)
EOSINOPHIL # BLD AUTO: 0.29 X10(3)/MCL (ref 0–0.9)
EOSINOPHIL NFR BLD AUTO: 4.9 %
ERYTHROCYTE [DISTWIDTH] IN BLOOD BY AUTOMATED COUNT: 13.5 % (ref 11.5–17)
GFR SERPLBLD CREATININE-BSD FMLA CKD-EPI: >60 MLS/MIN/1.73/M2
GLOBULIN SER-MCNC: 3.6 GM/DL (ref 2.4–3.5)
GLUCOSE SERPL-MCNC: 106 MG/DL (ref 82–115)
HCT VFR BLD AUTO: 38.3 % (ref 37–47)
HGB BLD-MCNC: 12.3 GM/DL (ref 12–16)
IMM GRANULOCYTES # BLD AUTO: 0.02 X10(3)/MCL (ref 0–0.04)
IMM GRANULOCYTES NFR BLD AUTO: 0.3 %
LYMPHOCYTES # BLD AUTO: 1 X10(3)/MCL (ref 0.6–4.6)
LYMPHOCYTES NFR BLD AUTO: 16.8 %
MAGNESIUM SERPL-MCNC: 2.2 MG/DL (ref 1.6–2.6)
MCH RBC QN AUTO: 29.6 PG (ref 27–31)
MCHC RBC AUTO-ENTMCNC: 32.1 MG/DL (ref 33–36)
MCV RBC AUTO: 92.3 FL (ref 80–94)
MONOCYTES # BLD AUTO: 0.55 X10(3)/MCL (ref 0.1–1.3)
MONOCYTES NFR BLD AUTO: 9.2 %
NEUTROPHILS # BLD AUTO: 4 X10(3)/MCL (ref 2.1–9.2)
NEUTROPHILS NFR BLD AUTO: 67.8 %
NRBC BLD AUTO-RTO: 0 %
PLATELET # BLD AUTO: 306 X10(3)/MCL (ref 130–400)
PMV BLD AUTO: 10.8 FL (ref 7.4–10.4)
POTASSIUM SERPL-SCNC: 5.1 MMOL/L (ref 3.5–5.1)
PROT SERPL-MCNC: 6.5 GM/DL (ref 5.8–7.6)
RBC # BLD AUTO: 4.15 X10(6)/MCL (ref 4.2–5.4)
SODIUM SERPL-SCNC: 135 MMOL/L (ref 136–145)
WBC # SPEC AUTO: 6 X10(3)/MCL (ref 4.5–11.5)

## 2022-08-21 PROCEDURE — 36415 COLL VENOUS BLD VENIPUNCTURE: CPT | Performed by: INTERNAL MEDICINE

## 2022-08-21 PROCEDURE — 25000003 PHARM REV CODE 250: Performed by: INTERNAL MEDICINE

## 2022-08-21 PROCEDURE — 85025 COMPLETE CBC W/AUTO DIFF WBC: CPT | Performed by: INTERNAL MEDICINE

## 2022-08-21 PROCEDURE — 80053 COMPREHEN METABOLIC PANEL: CPT | Performed by: INTERNAL MEDICINE

## 2022-08-21 PROCEDURE — 25000003 PHARM REV CODE 250: Performed by: NURSE PRACTITIONER

## 2022-08-21 PROCEDURE — 83735 ASSAY OF MAGNESIUM: CPT | Performed by: INTERNAL MEDICINE

## 2022-08-21 PROCEDURE — 11000001 HC ACUTE MED/SURG PRIVATE ROOM

## 2022-08-21 RX ADMIN — CIPROFLOXACIN 500 MG: 500 TABLET, FILM COATED ORAL at 09:08

## 2022-08-21 RX ADMIN — RIVAROXABAN 15 MG: 15 TABLET, FILM COATED ORAL at 05:08

## 2022-08-21 RX ADMIN — ASPIRIN 81 MG: 81 TABLET, COATED ORAL at 09:08

## 2022-08-21 RX ADMIN — FOLIC ACID 1 MG: 1 TABLET ORAL at 09:08

## 2022-08-21 RX ADMIN — AMIODARONE HYDROCHLORIDE 200 MG: 200 TABLET ORAL at 09:08

## 2022-08-21 RX ADMIN — RIVAROXABAN 15 MG: 15 TABLET, FILM COATED ORAL at 09:08

## 2022-08-21 RX ADMIN — FAMOTIDINE 20 MG: 20 TABLET, FILM COATED ORAL at 09:08

## 2022-08-21 RX ADMIN — LEVOTHYROXINE SODIUM 100 MCG: 100 TABLET ORAL at 09:08

## 2022-08-21 RX ADMIN — LEVETIRACETAM 500 MG: 500 TABLET, FILM COATED ORAL at 09:08

## 2022-08-21 RX ADMIN — ATORVASTATIN CALCIUM 40 MG: 40 TABLET, FILM COATED ORAL at 09:08

## 2022-08-21 NOTE — PROGRESS NOTES
[facility] Hospital Medicine Progress Note        Chief Complaint: Inpatient Follow-up for PE    HPI:   Blanquita Montoya is a 66 y.o. female who PMH includes HTN, HLD, pre diabetes, CAD, CVA  With no residual deficits, seizures, /AAA s/p repair on 7/29/2022, hypothyroidism; presented to the ED at  [facility] on 8/19/2022 with a primary complaint of waking up this am with difficulty speaking, chest pain, and generalized weakness. Pt reports the symptoms started at 4 am when she woke up this am; no reports of new food or medication. She is on oral abx for skin infection to right groin at her angiogram site. Pt recently has AAA repair on 7/29/2022. No reports of N/V/D fever, cough, congestion, or any sick contacts. PT received ASA, SL NTG, and nitro paste to her chest wall which her chest pain has resolved at the time of rounds. Labs revealed slight elevation in her alkaline phosphatase at 193, .3; troponin 0.012; other indices unremarkable.  Chest x-ray revealed no acute cardiopulmonary process identified. CT of the head without contrast revealed no acute intracranial findings or significant interval change compared to last month.  CTA of the chest PE with contrast revealed small pulmonary embolus involving the posterior basilar segment branch of the right lower lobe artery.  At the time around patient is feeling much better and her chest pain had resolved.  Patient was started on weight based 1 milligram/kilogram Lovenox therapy.  Patient is admitted to hospital medicine services for further management.  Doppler of lower extremities revealed right posterior tibial vein DVT, superficial thrombosis in the right  vein.  Wound care was continued.     Interval Hx:     Afebrile.  Hemodynamically stable.  Alert and oriented.  Comfortably resting in the bed.  Family was at bedside.  Complains of right going burning sensation, lethargy and tiredness otherwise no new complaints or  concerns.  Tolerating diet.    Objective/physical exam:  Vitals:    08/20/22 1514 08/20/22 1921 08/20/22 2306 08/21/22 0352   BP: 132/78 129/77 (!) 155/74 (!) 147/71   BP Location:       Patient Position:  Lying     Pulse: 62 61 (!) 58 60   Resp: 18 19     Temp: 99 °F (37.2 °C) 98 °F (36.7 °C) 97.9 °F (36.6 °C) 97.8 °F (36.6 °C)   TempSrc: Oral Oral Oral Oral   SpO2: 96% 95% 97% (!) 92%   Weight:       Height:         General: In no acute distress, afebrile  Respiratory: Clear to auscultation bilaterally  Cardiovascular: S1, S2, no appreciable murmur  Abdomen: Soft, nontender, BS +  MSK: Warm, no lower extremity edema, no clubbing or cyanosis  Neurologic: Alert and oriented x4, moving all extremities with good strength     Lab Results   Component Value Date     (L) 08/20/2022    K 5.1 08/20/2022    CO2 30 08/20/2022    BUN 11.7 08/20/2022    CREATININE 0.99 08/20/2022    CALCIUM 9.0 08/20/2022    EGFRNONAA >60 08/07/2022      Lab Results   Component Value Date    ALT 26 08/20/2022    AST 18 08/20/2022    ALKPHOS 178 (H) 08/20/2022    BILITOT 0.7 08/20/2022      Lab Results   Component Value Date    WBC 7.3 08/20/2022    HGB 12.0 08/20/2022    HCT 38.2 08/20/2022    MCV 94.8 (H) 08/20/2022     08/20/2022           Medications:   amiodarone  200 mg Oral Daily    aspirin  81 mg Oral Daily    atorvastatin  40 mg Oral QHS    ciprofloxacin HCl  500 mg Oral Q12H    famotidine  20 mg Oral BID    folic acid  1 mg Oral Daily    levETIRAcetam  500 mg Oral BID    levothyroxine  100 mcg Oral Daily    rivaroxaban  15 mg Oral BID WM      acetaminophen, dextrose 10%, dextrose 10%, diphenhydrAMINE, glucagon (human recombinant), glucose, glucose, insulin aspart U-100, melatonin, naloxone, ondansetron, simethicone, sodium chloride 0.9%     Assessment/Plan:  Acute chest pain- resolved  Pulmonary embolism- RLL  Right posterior tibial vein DVT, superficial thrombosis right  vein  AAA- s/p repair  7/29/2022  Right groin wound- angiogram site; present on admission  HTN- essential  CAD- s/p CABG  Prediabetes- currently euglycemic  Hx of CVA with no residual deficits  Seizures- no reports of recent seizures  HLD  Weakness    Plan:   -will transition to Xarelto today    -encourage incentive spirometer use, pulmonary toileting   -hypothyroidism uncontrolled. Needs outpatient follow up  -continue wound care  -continue other medical management including home meds, insulin scale  -encourage ambulation as tolerated    Famotidine  Labs  Full code  Possible discharge tomorrow            Adalid Nixon MD

## 2022-08-22 VITALS
TEMPERATURE: 98 F | BODY MASS INDEX: 29.79 KG/M2 | RESPIRATION RATE: 16 BRPM | SYSTOLIC BLOOD PRESSURE: 139 MMHG | HEART RATE: 64 BPM | DIASTOLIC BLOOD PRESSURE: 81 MMHG | HEIGHT: 67 IN | OXYGEN SATURATION: 98 % | WEIGHT: 189.81 LBS

## 2022-08-22 DIAGNOSIS — I63.9 CEREBROVASCULAR ACCIDENT (CVA), UNSPECIFIED MECHANISM: Primary | ICD-10-CM

## 2022-08-22 LAB
ANION GAP SERPL CALC-SCNC: 4 MEQ/L
BASOPHILS # BLD AUTO: 0.05 X10(3)/MCL (ref 0–0.2)
BASOPHILS NFR BLD AUTO: 0.8 %
BUN SERPL-MCNC: 12.6 MG/DL (ref 9.8–20.1)
CALCIUM SERPL-MCNC: 9.1 MG/DL (ref 8.4–10.2)
CHLORIDE SERPL-SCNC: 102 MMOL/L (ref 98–107)
CO2 SERPL-SCNC: 29 MMOL/L (ref 23–31)
CREAT SERPL-MCNC: 0.94 MG/DL (ref 0.55–1.02)
CREAT/UREA NIT SERPL: 13
EOSINOPHIL # BLD AUTO: 0.42 X10(3)/MCL (ref 0–0.9)
EOSINOPHIL NFR BLD AUTO: 6.5 %
ERYTHROCYTE [DISTWIDTH] IN BLOOD BY AUTOMATED COUNT: 13.6 % (ref 11.5–17)
GFR SERPLBLD CREATININE-BSD FMLA CKD-EPI: >60 MLS/MIN/1.73/M2
GLUCOSE SERPL-MCNC: 100 MG/DL (ref 82–115)
HCT VFR BLD AUTO: 39.5 % (ref 37–47)
HGB BLD-MCNC: 12.5 GM/DL (ref 12–16)
IMM GRANULOCYTES # BLD AUTO: 0.03 X10(3)/MCL (ref 0–0.04)
IMM GRANULOCYTES NFR BLD AUTO: 0.5 %
LYMPHOCYTES # BLD AUTO: 1.06 X10(3)/MCL (ref 0.6–4.6)
LYMPHOCYTES NFR BLD AUTO: 16.4 %
MAGNESIUM SERPL-MCNC: 2.1 MG/DL (ref 1.6–2.6)
MCH RBC QN AUTO: 30 PG (ref 27–31)
MCHC RBC AUTO-ENTMCNC: 31.6 MG/DL (ref 33–36)
MCV RBC AUTO: 95 FL (ref 80–94)
MONOCYTES # BLD AUTO: 0.62 X10(3)/MCL (ref 0.1–1.3)
MONOCYTES NFR BLD AUTO: 9.6 %
NEUTROPHILS # BLD AUTO: 4.3 X10(3)/MCL (ref 2.1–9.2)
NEUTROPHILS NFR BLD AUTO: 66.2 %
NRBC BLD AUTO-RTO: 0 %
PLATELET # BLD AUTO: 299 X10(3)/MCL (ref 130–400)
PMV BLD AUTO: 11 FL (ref 7.4–10.4)
POTASSIUM SERPL-SCNC: 4.4 MMOL/L (ref 3.5–5.1)
RBC # BLD AUTO: 4.16 X10(6)/MCL (ref 4.2–5.4)
SODIUM SERPL-SCNC: 135 MMOL/L (ref 136–145)
WBC # SPEC AUTO: 6.5 X10(3)/MCL (ref 4.5–11.5)

## 2022-08-22 PROCEDURE — 25000003 PHARM REV CODE 250: Performed by: NURSE PRACTITIONER

## 2022-08-22 PROCEDURE — 85025 COMPLETE CBC W/AUTO DIFF WBC: CPT | Performed by: INTERNAL MEDICINE

## 2022-08-22 PROCEDURE — 36415 COLL VENOUS BLD VENIPUNCTURE: CPT | Performed by: INTERNAL MEDICINE

## 2022-08-22 PROCEDURE — 80048 BASIC METABOLIC PNL TOTAL CA: CPT | Performed by: INTERNAL MEDICINE

## 2022-08-22 PROCEDURE — 83735 ASSAY OF MAGNESIUM: CPT | Performed by: INTERNAL MEDICINE

## 2022-08-22 PROCEDURE — 25000003 PHARM REV CODE 250: Performed by: INTERNAL MEDICINE

## 2022-08-22 RX ORDER — FAMOTIDINE 20 MG/1
20 TABLET, FILM COATED ORAL 2 TIMES DAILY
Qty: 60 TABLET | Refills: 1 | Status: SHIPPED | OUTPATIENT
Start: 2022-08-22 | End: 2022-09-22 | Stop reason: ALTCHOICE

## 2022-08-22 RX ADMIN — AMIODARONE HYDROCHLORIDE 200 MG: 200 TABLET ORAL at 08:08

## 2022-08-22 RX ADMIN — RIVAROXABAN 15 MG: 15 TABLET, FILM COATED ORAL at 08:08

## 2022-08-22 RX ADMIN — LEVETIRACETAM 500 MG: 500 TABLET, FILM COATED ORAL at 08:08

## 2022-08-22 RX ADMIN — FOLIC ACID 1 MG: 1 TABLET ORAL at 08:08

## 2022-08-22 RX ADMIN — ASPIRIN 81 MG: 81 TABLET, COATED ORAL at 08:08

## 2022-08-22 RX ADMIN — CIPROFLOXACIN 500 MG: 500 TABLET, FILM COATED ORAL at 08:08

## 2022-08-22 RX ADMIN — FAMOTIDINE 20 MG: 20 TABLET, FILM COATED ORAL at 08:08

## 2022-08-22 RX ADMIN — LEVOTHYROXINE SODIUM 100 MCG: 100 TABLET ORAL at 08:08

## 2022-08-22 NOTE — DISCHARGE SUMMARY
[facility] - 4th Floor [facility] Medicine  Discharge Summary      Patient Name: Blanquita Montoya  MRN: [id number]  Patient Class: IP- Inpatient  Admission Date: 8/19/2022  Hospital Length of Stay: 3 days  Discharge Date and Time:  08/22/2022 10:23 AM  Attending Physician: Adalid Nixon MD   Discharging Provider: Adalid Nixon MD  Primary Care Provider: Kamila Whittaker MD    HPI:   Blanquita Montoya is a 66 y.o. female who PMH includes HTN, HLD, pre diabetes, CAD, CVA  With no residual deficits, seizures, /AAA s/p repair on 7/29/2022, hypothyroidism; presented to the ED at  [facility] on 8/19/2022 with a primary complaint of waking up this am with difficulty speaking, chest pain, and generalized weakness. Pt reports the symptoms started at 4 am when she woke up this am; no reports of new food or medication. She is on oral abx for skin infection to right groin at her angiogram site. Pt recently has AAA repair on 7/29/2022. No reports of N/V/D fever, cough, congestion, or any sick contacts. PT received ASA, SL NTG, and nitro paste to her chest wall which her chest pain has resolved at the time of rounds. Labs revealed slight elevation in her alkaline phosphatase at 193, .3; troponin 0.012; other indices unremarkable.  Chest x-ray revealed no acute cardiopulmonary process identified. CT of the head without contrast revealed no acute intracranial findings or significant interval change compared to last month.  CTA of the chest PE with contrast revealed small pulmonary embolus involving the posterior basilar segment branch of the right lower lobe artery.  At the time around patient is feeling much better and her chest pain had resolved.  Patient was started on weight based 1 milligram/kilogram Lovenox therapy.  Patient is admitted to hospital medicine services for further management. FD Lovenox was started. Doppler of lower extremities revealed right posterior tibial vein  DVT, superficial thrombosis in the right  vein.  Wound care was continued. Anticoagulation transitioned to Xarelto and she did well. Will be discharged to home today. All medications were reconciled, scripts were provided. She will follow with PCp.     Pulmonary embolism- RLL  Acute chest pain- resolved  Right posterior tibial vein DVT, superficial thrombosis right  vein  AAA- s/p repair 7/29/2022  Right groin wound- angiogram site; present on admission  HTN- essential  CAD- s/p CABG  Prediabetes- currently euglycemic  Hx of CVA with no residual deficits  Seizures- no reports of recent seizures  HLD  Weakness    Goals of Care Treatment Preferences:  Code Status: Full Code      Consults:     No new Assessment & Plan notes have been filed under this hospital service since the last note was generated.  Service: Hospital Medicine    Final Active Diagnoses:    Diagnosis Date Noted POA    PRINCIPAL PROBLEM:  Pulmonary embolism [I26.99] 08/21/2022 Yes      Problems Resolved During this Admission:       Discharged Condition: good    Disposition: home    Follow Up:   Follow-up Information     Kamila Whittaker MD Follow up in 1 week(s).    Specialty: Internal Medicine  Contact information:  [address] [facility] [place] [address] [phone]                       Patient Instructions:   No discharge procedures on file.    Significant Diagnostic Studies: Labs:   CMP   Recent Labs   Lab 08/21/22  0641 08/22/22  0445   * 135*   K 5.1 4.4   CO2 28 29   BUN 12.7 12.6   CREATININE 1.00 0.94   CALCIUM 9.2 9.1   ALBUMIN 2.9*  --    BILITOT 0.7  --    ALKPHOS 177*  --    AST 17  --    ALT 24  --        Pending Diagnostic Studies:     None         Medications:  Reconciled Home Medications:      Medication List      START taking these medications    famotidine 20 MG tablet  Commonly known as: PEPCID  Take 1 tablet (20 mg total) by mouth 2 (two) times daily.     * rivaroxaban 15 mg Tab  Commonly known as:  XARELTO  Take 1 tablet (15 mg total) by mouth 2 (two) times daily with meals. for 20 days     * rivaroxaban 10 mg Tab  Commonly known as: XARELTO  Take 2 tablets (20 mg total) by mouth daily with dinner or evening meal.  Start taking on: September 12, 2022         * This list has 2 medication(s) that are the same as other medications prescribed for you. Read the directions carefully, and ask your doctor or other care provider to review them with you.            CONTINUE taking these medications    amiodarone 200 MG Tab  Commonly known as: PACERONE  Take 1 tablet (200 mg total) by mouth once daily.     aspirin 81 MG EC tablet  Commonly known as: ECOTRIN  Take 1 tablet (81 mg total) by mouth once daily.     ciprofloxacin HCl 500 MG tablet  Commonly known as: CIPRO  Take 1.5 tablets (750 mg total) by mouth every 12 (twelve) hours. for 10 days     EUTHYROX 100 MCG tablet  Generic drug: levothyroxine  Take 1 tablet (100 mcg total) by mouth once daily.     folic acid 1 MG tablet  Commonly known as: FOLVITE  Take 1 tablet (1 mg total) by mouth once daily.     levETIRAcetam 500 MG Tab  Commonly known as: KEPPRA  Take 1 tablet (500 mg total) by mouth 2 (two) times daily.     rosuvastatin 10 MG tablet  Commonly known as: CRESTOR  Take 2 tablets (20 mg total) by mouth every evening.        STOP taking these medications    amLODIPine 10 MG tablet  Commonly known as: NORVASC     fish oil-omega-3 fatty acids 300-1,000 mg capsule     hydrocortisone 2.5 % cream     isosorbide mononitrate 30 MG 24 hr tablet  Commonly known as: IMDUR     losartan 100 MG tablet  Commonly known as: COZAAR     metoprolol succinate 25 MG 24 hr tablet  Commonly known as: TOPROL-XL     nitroGLYCERIN 0.4 MG SL tablet  Commonly known as: NITROSTAT     pantoprazole 40 MG tablet  Commonly known as: PROTONIX            Indwelling Lines/Drains at time of discharge:   Lines/Drains/Airways     None                 Time spent on the discharge of patient: 35  minutes         Adalid Nixon MD  Department of Hospital Medicine  [facility] - 4th Floor Medical Telemetry

## 2022-08-23 ENCOUNTER — PATIENT OUTREACH (OUTPATIENT)
Dept: ADMINISTRATIVE | Facility: CLINIC | Age: 67
End: 2022-08-23
Payer: MEDICARE

## 2022-08-23 NOTE — PROGRESS NOTES
C3 nurse spoke with Blanquita Montoya for a TCC post hospital discharge follow up call. The patient has a scheduled [facility] appointment with Kamila Whittaker MD on 8/25/22 @ 9:10.

## 2022-08-24 NOTE — DISCHARGE SUMMARY
[facility] 6th Floor Medical Telemetry  Discharge Note  Short Stay    Procedure(s) (LRB):  REPAIR, AORTA, ASCENDING (N/A)    OUTCOME: Patient tolerated treatment/procedure well without complication and is now ready for discharge.    DISPOSITION: Home or Self Care    FINAL DIAGNOSIS:  Ischemic embolic stroke    FOLLOWUP: In clinic    DISCHARGE INSTRUCTIONS:  No discharge procedures on file.      Clinical Reference Documents Added to Patient Instructions       Document    CARDIAC REHAB INFORMATION (ENGLISH)          TIME SPENT ON DISCHARGE: 15 minutes

## 2022-08-26 ENCOUNTER — OFFICE VISIT (OUTPATIENT)
Dept: INTERNAL MEDICINE | Facility: CLINIC | Age: 67
End: 2022-08-26
Payer: MEDICARE

## 2022-08-26 VITALS
HEART RATE: 65 BPM | BODY MASS INDEX: 29.98 KG/M2 | OXYGEN SATURATION: 98 % | RESPIRATION RATE: 18 BRPM | TEMPERATURE: 98 F | WEIGHT: 191 LBS | DIASTOLIC BLOOD PRESSURE: 68 MMHG | SYSTOLIC BLOOD PRESSURE: 132 MMHG | HEIGHT: 67 IN

## 2022-08-26 DIAGNOSIS — R56.9 SEIZURES: ICD-10-CM

## 2022-08-26 DIAGNOSIS — B99.9 INFECTION: ICD-10-CM

## 2022-08-26 DIAGNOSIS — E03.9 HYPOTHYROIDISM, UNSPECIFIED TYPE: Primary | ICD-10-CM

## 2022-08-26 DIAGNOSIS — I26.99 PULMONARY EMBOLISM, UNSPECIFIED CHRONICITY, UNSPECIFIED PULMONARY EMBOLISM TYPE, UNSPECIFIED WHETHER ACUTE COR PULMONALE PRESENT: ICD-10-CM

## 2022-08-26 DIAGNOSIS — S31.109S WOUND OF RIGHT GROIN, SEQUELA: ICD-10-CM

## 2022-08-26 DIAGNOSIS — Z86.79 H/O ABDOMINAL AORTIC ANEURYSM: ICD-10-CM

## 2022-08-26 DIAGNOSIS — I49.9 CARDIAC ARRHYTHMIA, UNSPECIFIED CARDIAC ARRHYTHMIA TYPE: ICD-10-CM

## 2022-08-26 PROCEDURE — 99215 OFFICE O/P EST HI 40 MIN: CPT | Mod: PBBFAC

## 2022-08-26 RX ORDER — LEVOTHYROXINE SODIUM 112 UG/1
112 TABLET ORAL
Qty: 30 TABLET | Refills: 5 | Status: SHIPPED | OUTPATIENT
Start: 2022-08-26 | End: 2022-09-22 | Stop reason: SDUPTHER

## 2022-08-26 RX ORDER — CEFUROXIME AXETIL 500 MG/1
500 TABLET ORAL 2 TIMES DAILY
Qty: 20 TABLET | Refills: 0 | Status: SHIPPED | OUTPATIENT
Start: 2022-08-26 | End: 2022-09-05

## 2022-08-26 NOTE — PROGRESS NOTES
[facility] INTERNAL MEDICINE CLINIC NOTE    ATTENDING: Abner Jacome MD    INTERN: Anton Redding MD    Chief complaint:  Follow-up    66-year-old female with a past medical history of hypertension, hyperlipidemia, CAD, CVA with no residual deficits, seizures, AAA repair on 07/29/2022, and hypothyroidism who presents to the clinic today for a follow-up.  The patient reports having a AAA repair on 07/29 and states following the surgery she had a seizure and CVA.  CT head was significant for a hypodense nonhemorrhagic right occipital parietal cortex lesion.  She was hospitalized and discharged on 08/10/2022.  The patient returned to the emergency room on 08/19/2022 after waking up and feeling weak and dizzy.  The patient was found to have a pulmonary embolism in the right lower lobe of the lung and a right lower extremity DVT.    In the clinic today, the patient reports feeling fatigued and unbalanced since discharge from the hospital on 08/10/2022.  She also complained of swelling and purulent discharge from coronary angiogram site to the right groin.  Coronary angiogram was completed 06/23/2022 and the patient has been taking Cipro since.  Additionally, the patient was found to have a TSH of 28 on 08/20/2022.  The patient reports intermittent fevers at home and no other complaints.  She denies chest pain, abdominal pain, nausea, vomiting, diarrhea, chills, numbness, tingling, back pain, dysuria, hematuria, and any other symptoms.  She has post surgery follow-up on Tuesday of next week.    ROS:  Pertinent positives include fatigue and being unbalanced.  Intermittent fevers at home.  Wound to the right groin with purulent discharge.  Pertinent negatives include chest pain, abdominal pain, nausea, vomiting, diarrhea, chills, numbness, tingling, back pain, dysuria, hematuria.  All other systems are negative.    Physical exam:  Vitals:    08/26/22 0822 08/26/22 0930 08/26/22 0944   BP: (!) 144/74 (!) 154/72 132/68   BP  "Location: Right arm Left arm Right arm   Patient Position: Sitting Lying Standing   BP Method: X-Large (Automatic) Large (Manual) Large (Manual)   Pulse: 65     Resp: 18     Temp: 97.7 °F (36.5 °C)     TempSrc: Oral     SpO2: 98%     Weight: 86.6 kg (191 lb)     Height: 5' 7" (1.702 m)       Elderly appearing female in no apparent distress.  Nontoxic and non ill-appearing.  Skin findings show a wound to the right groin with a whitish green purulent discharge.  This wound is erythematous and mildly swollen.  No bruising, petechiae, or other wounds.  No ulceration.  Abdominal exam reveals no tenderness, rebound, distention, guarding, or mass.  Normoactive bowel sounds.  No hepatosplenomegaly.  Cardiovascular exam reveals a regular rate and rhythm.  No murmurs, rubs, or gallops.  Normal S1 and S2.  No JVD.  Pulmonary exam reveals no wheezing, rales, or rhonchi.  Chest effort is normal.  Musculoskeletal exam reveals no edema, tenderness, or rash.  Strength is 5/5 in all extremities.  Hand  strength is 5/5 bilaterally.  Neurologic exam reveals a normal judgment and behavior.  Alert and oriented x4.  GCS of 15.  HEENT exam reveals moist mucous membranes.  Oropharynx is clear.  Extraocular movements are intact.  Pupils are equal round reactive to light.  Normocephalic and atraumatic.  Orthostatics performed:  154/72 lying down and standing up from a lying position is 132/68    CMP: Recent Labs   Lab 05/21/21  0402 05/22/21  0542 09/23/21  1215 03/10/22  0757 08/19/22  0705 08/20/22  0428 08/21/22  0641 08/22/22  0445   Sodium Level 137 137 136   < > 137 135 L 135 L 135 L   Potassium Level 4.4 4.1 3.8   < > 5.1 5.1 5.1 4.4   Chloride 102 104 100   < > 102 100 102 102   Carbon Dioxide 28 27 28   < > 27 30 28 29   Blood Urea Nitrogen 11.8 10.7 9.1 L   < > 9.0 L 11.7 12.7 12.6   Creatinine 0.81 0.82 0.95   < > 1.01 0.99 1.00 0.94   Glucose Level 116 H 102 171 H   < > 111 110 106 100   Calcium Level Total 9.4 9.2 9.4   < > " 9.4 9.0 9.2 9.1   Albumin Level 3.3 L 3.5 3.8   < > 3.0 L 2.8 L 2.9 L  --    Bilirubin Total 0.6 1.0 0.6   < > 0.8 0.7 0.7  --    Bilirubin Direct 0.2 0.3 0.2  --   --   --   --   --    Bilirubin Indirect 0.40 0.70 0.40  --   --   --   --   --    Aspartate Aminotransferase 16 15 16   < > 19 18 17  --    Alanine Aminotransferase 17 15 18   < > 30 26 24  --    Alkaline Phosphatase 96 92 115   < > 193 H 178 H 177 H  --     < > = values in this interval not displayed.     CBC:   Recent Labs   Lab 08/20/22  0428 08/21/22  0641 08/22/22  0445   WBC 7.3 6.0 6.5   Neut # 5.3 4.0 4.3   RBC 4.03 L 4.15 L 4.16 L   Hgb 12.0 12.3 12.5   Hct 38.2 38.3 39.5   Platelet 312 306 299   MCV 94.8 H 92.3 95.0 H   RDW 13.5 13.5 13.6     FLP:   Recent Labs   Lab 09/23/20  1455 05/21/21  0402 09/23/21  1215   Cholesterol Total 199 187 214 H   HDL Cholesterol 51 42 47   LDL Cholesterol 98 124.00 138.00   Triglyceride 248 H 105 143 H     DM:   Recent Labs   Lab 09/23/20  1455 12/03/20  0800 09/23/21  1215 03/10/22  0757 07/07/22  0723 07/27/22  0847 08/20/22  0428 08/21/22  0641 08/22/22  0445   Hemoglobin A1c 6.2  --  5.7  --  5.8  --   --   --   --    Creatinine 1.00   < > 0.95   < > 0.80   < > 0.99 1.00 0.94    < > = values in this interval not displayed.     Thyroid:   Recent Labs   Lab 07/07/22  0723 08/11/22  0547 08/20/22  0428   Thyroid Stimulating Hormone 6.2832 H 28.4120 H 28.9961 H   Thyroxine Free 0.87 0.57 L 0.76   T3 Free  --   --  1.44 L     Anemia:   Recent Labs   Lab 08/29/19  0750 02/18/20  1433 08/22/22  0445   Hgb 14.6   < > 12.5   POC Hematocrit  --    < >  --    Hct 45.1   < > 39.5   Vitamin B12 Level 400  --   --     < > = values in this interval not displayed.       Assessment and plan:  Wound to the right groin  -patient has a wound to the right groin status post coronary angiogram on 06/23/2022  -this wound seems infected with purulent discharge draining from the wound and erythema surrounding the wound  -patient  failed Cipro and was placed on cefuroxime 500 mg b.i.d. for 10 days  -was informed that if symptoms do not improve to go to the emergency room  -the patient does not meet SIRS criteria today in the clinic, however, if she fails outpatient antibiotics then advised to go to the emergency room    Pulmonary embolus and DVT  -patient is currently on Xarelto 15 mg b.i.d. until 09/12/2022  -she will then be transitioned to 10 mg b.i.d. Xarelto which has already been sent to [facility] pharmacy for 09/12/2022  -patient was advised to go to the ED for any new or worsening symptoms including shortness of breath or chest pain  -will monitor    Seizures  -the patient had a seizure following operation on 07/29/2022 and was placed on Keppra 500 mg b.i.d.  -that was her only seizure and she has been seizure-free since; seizure prior to this was in 1994  -will continue current regimen of Keppra  -referral sent to Neurology    Hypothyroidism  -last TSH on 08/20/2022 was 28.9; 1 month ago TSH was about 6  -increased Synthroid to 112 mcg daily, this was sent to [facility] pharmacy and she will begin this regimen today  -will monitor TSH for next visit in 1 month; will also get a CBC and CMP prior to visit next month    AAA repair on 07/29/2022  -patient has an appointment with surgery next Tuesday  -patient is on amiodarone due to an episode of atrial fibrillation she had in the ambulance EN route to the hospital  -referral sent to Cardiology  -patient has also been generally weak since the procedure so referral was sent to physical therapy  -will monitor    Hypertension  -orthostatics show significant drop in blood pressure from lying to standing position  -unsure why antihypertensives were discontinued  -will hold antihypertensive medication due to orthostatic hypotension  -initial blood pressure in the clinic today was 132/68 with a pulse of 65    CVA with no residual deficits  -the patient had a CVA following her procedure on  07/29/2022  -MRI result revealed a hypodense lesion in the right occipital parietal cortex eye was nonhemorrhagic and acute  -referral sent to neurology to evaluate this as well; will monitor    Screenings  -Mammogram due next month; will address at next visit in 1 month  -EGD and colonoscopy done last year both within normal limits; next colonoscopy in 5 years  -DEXA scan done 2020 which revealed osteopenia with a T-score of -2.1; will repeat this year  -Patient follows OB Gyn  -HIV nonreactive in 2021  -unable to locate hepatitis-C screening; will address at next visit in 1 month    Vaccinations   -flu vaccine should be administered at next visit in 1 month  -Shingrix vaccine completed in December of 2022  -COVID vaccine x2 advised to get the booster at local pharmacy  -Tdap up-to-date administered 2017  -PCV 13 administered 2019; PCV 23 needs to be updated    To be followed next clinic visit in 1 month:   -mammogram scheduling  -hepatitis-C screening  -PCV 23 administration  -TSH, CBC, and CMP  -flu vaccination  -DEXA scheduling  -assessment of wound to the right groin    Disposition:  Follow-up in clinic in 1 month.    Anton Redding MD  [facility] Internal Medicine HO-1

## 2022-08-30 ENCOUNTER — OFFICE VISIT (OUTPATIENT)
Dept: CARDIAC SURGERY | Facility: CLINIC | Age: 67
End: 2022-08-30
Payer: MEDICARE

## 2022-08-30 VITALS
SYSTOLIC BLOOD PRESSURE: 158 MMHG | HEIGHT: 67 IN | WEIGHT: 189 LBS | HEART RATE: 57 BPM | DIASTOLIC BLOOD PRESSURE: 74 MMHG | BODY MASS INDEX: 29.66 KG/M2

## 2022-08-30 DIAGNOSIS — Z86.79 S/P ASCENDING AORTIC ANEURYSM REPAIR: Primary | ICD-10-CM

## 2022-08-30 DIAGNOSIS — Z98.890 S/P ASCENDING AORTIC ANEURYSM REPAIR: Primary | ICD-10-CM

## 2022-08-30 PROCEDURE — 99024 POSTOP FOLLOW-UP VISIT: CPT | Mod: ,,, | Performed by: SPECIALIST

## 2022-08-30 PROCEDURE — 99024 PR POST-OP FOLLOW-UP VISIT: ICD-10-PCS | Mod: ,,, | Performed by: SPECIALIST

## 2022-08-30 NOTE — PROGRESS NOTES
Patient returns 1 month out from resection of an ascending aortic aneurysm.  She had a postoperative cerebrovascular event.  She is doing much better from all all of the above.  She seems to be fully recovered from her cerebrovascular event.  She was back in the hospital apparently with what she says was a pulmonary embolism.  She is currently on blood thinners.  She had also been experiencing some fatigue but with this was apparently felt to be due to an abnormality in her thyroid medication which has since been increased and she is feeling much better.  Her lungs are clear to auscultation bilaterally   Heart has a regular rate and rhythm without murmurs or gallops   Abdomen soft and nontender   Sternum is stable and the sternal wound has healed nicely.  The right groin wound is healing satisfactory with only a superficial scab that is not infected   Overall patient is doing well   Return to clinic p.r.n.  Follow up long-term with cardiology and her primary care doctor's

## 2022-09-01 ENCOUNTER — TELEPHONE (OUTPATIENT)
Dept: INTERNAL MEDICINE | Facility: CLINIC | Age: 67
End: 2022-09-01
Payer: MEDICARE

## 2022-09-06 ENCOUNTER — EXTERNAL HOME HEALTH (OUTPATIENT)
Dept: HOME HEALTH SERVICES | Facility: HOSPITAL | Age: 67
End: 2022-09-06
Payer: MEDICARE

## 2022-09-06 NOTE — DISCHARGE SUMMARY
[facility] 6th Floor Medical Telemetry  Cardiothoracic Surgery  Discharge Summary      Patient Name: Blanquita Montoya  MRN: [id number]  Admission Date: 7/29/2022  Hospital Length of Stay: 12 days  Discharge Date and Time: 8/10/2022  4:07 PM  Attending Physician: No att. providers found   Discharging Provider: PAULETTE Fernandez  Primary Care Provider: Kamila Whittaker MD    HPI:   No notes on file    Procedure(s) (LRB):  REPAIR, AORTA, ASCENDING (N/A)      Indwelling Lines/Drains at time of discharge:   Lines/Drains/Airways     None               Hospital Course: No notes on file    Goals of Care Treatment Preferences:  Code Status: Full Code      Consults (From admission, onward)        Status Ordering Provider     Inpatient consult to Neurology  Once        Provider:  North Lmab MD    Completed VICENTE BRYAN     Inpatient consult to Cardiology  Once        Provider:  Umer Pace MD    Completed POOJA BECERRA     Inpatient consult to Neurology  Once        Provider:  North Lamb MD    Completed ANNA NINA          Significant Diagnostic Studies: Labs: All labs within the past 24 hours have been reviewed    Pending Diagnostic Studies:     None          No new Assessment & Plan notes have been filed under this hospital service since the last note was generated.  Service: Cardiothoracic Surgery    Final Active Diagnoses:    Diagnosis Date Noted POA    PRINCIPAL PROBLEM:  Ischemic embolic stroke [I63.9] 07/31/2022 Yes      Problems Resolved During this Admission:      Discharged Condition: good    Disposition: Home or Self Care    Follow Up:   Follow-up Information     Alec Vega Iv, MD Follow up in 1 week(s).    Specialty: Cardiothoracic Surgery  Why: -suture removal: August 17th @ 9am  -follow up: August 30th 8am  Contact information:  [address] [place] [address]  [address]  [facility] [address]  [phone]                       Patient Instructions:   No discharge  procedures on file.  Medications:  Reconciled Home Medications:      Medication List      STOP taking these medications    albuterol 90 mcg/actuation inhaler  Commonly known as: PROVENTIL/VENTOLIN HFA     amLODIPine 10 MG tablet  Commonly known as: NORVASC     fish oil-omega-3 fatty acids 300-1,000 mg capsule     FLUTICASONE PROPIONATE INHL     hydrocortisone 2.5 % cream     isosorbide mononitrate 30 MG 24 hr tablet  Commonly known as: IMDUR     losartan 100 MG tablet  Commonly known as: COZAAR     metoprolol succinate 25 MG 24 hr tablet  Commonly known as: TOPROL-XL     nitroGLYCERIN 0.4 MG SL tablet  Commonly known as: NITROSTAT     pantoprazole 40 MG tablet  Commonly known as: PROTONIX          Time spent on the discharge of patient: 15 minutes    PAULETTE Fernandez  Cardiothoracic Surgery  [facility] - 6th Floor Medical Telemetry

## 2022-09-07 ENCOUNTER — DOCUMENT SCAN (OUTPATIENT)
Dept: HOME HEALTH SERVICES | Facility: HOSPITAL | Age: 67
End: 2022-09-07
Payer: MEDICARE

## 2022-09-07 NOTE — ADDENDUM NOTE
Addended by: ROSALVA HSU on: 6/30/2022 11:05 AM     Modules accepted: Orders     Ipledge Number (Optional): [id number] Ipledge Number (Optional): [id number]

## 2022-09-07 NOTE — TELEPHONE ENCOUNTER
Notified patient that referral was placed for wound care. Stated wound was drying up and no further drainage noted, but is still open. Denies fever or [place], ED precautions given.

## 2022-09-08 ENCOUNTER — TELEPHONE (OUTPATIENT)
Dept: CARDIOLOGY | Facility: CLINIC | Age: 67
End: 2022-09-08
Payer: MEDICARE

## 2022-09-08 NOTE — TELEPHONE ENCOUNTER
Call received from patient and home health nurse FREDDY Donovan. Patient stated she stopped taking her Amiodarone and her Keppra on her own 4 days ago due to dizziness and lethargy. Pt stated that since stopping the medications she is not experiencing any symptoms. Pts BP  is 140/60; 140/70 and Pulse 70;60. Patient has a follow-up appt scheduled for 9/20/22 at 13:00. Will send message to physician for further recommendations. Home Health nurse contact number is [phone].

## 2022-09-14 ENCOUNTER — LAB VISIT (OUTPATIENT)
Dept: LAB | Facility: HOSPITAL | Age: 67
End: 2022-09-14
Attending: STUDENT IN AN ORGANIZED HEALTH CARE EDUCATION/TRAINING PROGRAM
Payer: MEDICARE

## 2022-09-14 DIAGNOSIS — I20.0 UNSTABLE ANGINA PECTORIS: ICD-10-CM

## 2022-09-14 DIAGNOSIS — R73.03 PREDIABETES: ICD-10-CM

## 2022-09-14 DIAGNOSIS — I10 HYPERTENSION, UNSPECIFIED TYPE: ICD-10-CM

## 2022-09-14 DIAGNOSIS — E78.5 HYPERLIPIDEMIA, UNSPECIFIED HYPERLIPIDEMIA TYPE: ICD-10-CM

## 2022-09-14 DIAGNOSIS — E03.9 HYPOTHYROIDISM, UNSPECIFIED TYPE: ICD-10-CM

## 2022-09-14 DIAGNOSIS — G47.33 OBSTRUCTIVE SLEEP APNEA SYNDROME: ICD-10-CM

## 2022-09-14 DIAGNOSIS — E55.9 VITAMIN D DEFICIENCY: ICD-10-CM

## 2022-09-14 DIAGNOSIS — K21.9 GASTROESOPHAGEAL REFLUX DISEASE, UNSPECIFIED WHETHER ESOPHAGITIS PRESENT: ICD-10-CM

## 2022-09-14 DIAGNOSIS — I71.21 ASCENDING AORTIC ANEURYSM: Primary | ICD-10-CM

## 2022-09-14 LAB
ALBUMIN SERPL-MCNC: 3.6 GM/DL (ref 3.4–4.8)
ALBUMIN/GLOB SERPL: 0.9 RATIO (ref 1.1–2)
ALP SERPL-CCNC: 146 UNIT/L (ref 40–150)
ALT SERPL-CCNC: 16 UNIT/L (ref 0–55)
APPEARANCE UR: CLEAR
AST SERPL-CCNC: 14 UNIT/L (ref 5–34)
BACTERIA #/AREA URNS AUTO: ABNORMAL /HPF
BASOPHILS # BLD AUTO: 0.04 X10(3)/MCL (ref 0–0.2)
BASOPHILS NFR BLD AUTO: 0.4 %
BILIRUB UR QL STRIP.AUTO: NEGATIVE MG/DL
BILIRUBIN DIRECT+TOT PNL SERPL-MCNC: 1 MG/DL
BUN SERPL-MCNC: 10.2 MG/DL (ref 9.8–20.1)
CALCIUM SERPL-MCNC: 9.8 MG/DL (ref 8.4–10.2)
CHLORIDE SERPL-SCNC: 100 MMOL/L (ref 98–107)
CHOLEST SERPL-MCNC: 120 MG/DL
CHOLEST/HDLC SERPL: 3 {RATIO} (ref 0–5)
CO2 SERPL-SCNC: 29 MMOL/L (ref 23–31)
COLOR UR AUTO: ABNORMAL
CREAT SERPL-MCNC: 0.8 MG/DL (ref 0.55–1.02)
DEPRECATED CALCIDIOL+CALCIFEROL SERPL-MC: 30.9 NG/ML (ref 30–80)
EOSINOPHIL # BLD AUTO: 0.17 X10(3)/MCL (ref 0–0.9)
EOSINOPHIL NFR BLD AUTO: 1.9 %
ERYTHROCYTE [DISTWIDTH] IN BLOOD BY AUTOMATED COUNT: 14.1 % (ref 11.5–17)
EST. AVERAGE GLUCOSE BLD GHB EST-MCNC: 105.4 MG/DL
GFR SERPLBLD CREATININE-BSD FMLA CKD-EPI: >60 MLS/MIN/1.73/M2
GLOBULIN SER-MCNC: 4 GM/DL (ref 2.4–3.5)
GLUCOSE SERPL-MCNC: 120 MG/DL (ref 82–115)
GLUCOSE UR QL STRIP.AUTO: NORMAL MG/DL
HBA1C MFR BLD: 5.3 %
HCT VFR BLD AUTO: 40.5 % (ref 37–47)
HDLC SERPL-MCNC: 48 MG/DL (ref 35–60)
HGB BLD-MCNC: 12.9 GM/DL (ref 12–16)
HYALINE CASTS #/AREA URNS LPF: ABNORMAL /LPF
IMM GRANULOCYTES # BLD AUTO: 0.04 X10(3)/MCL (ref 0–0.04)
IMM GRANULOCYTES NFR BLD AUTO: 0.4 %
KETONES UR QL STRIP.AUTO: NEGATIVE MG/DL
LDLC SERPL CALC-MCNC: 61 MG/DL (ref 50–140)
LEUKOCYTE ESTERASE UR QL STRIP.AUTO: NEGATIVE UNIT/L
LYMPHOCYTES # BLD AUTO: 1.43 X10(3)/MCL (ref 0.6–4.6)
LYMPHOCYTES NFR BLD AUTO: 15.7 %
MCH RBC QN AUTO: 29.4 PG (ref 27–31)
MCHC RBC AUTO-ENTMCNC: 31.9 MG/DL (ref 33–36)
MCV RBC AUTO: 92.3 FL (ref 80–94)
MONOCYTES # BLD AUTO: 0.8 X10(3)/MCL (ref 0.1–1.3)
MONOCYTES NFR BLD AUTO: 8.8 %
NEUTROPHILS # BLD AUTO: 6.6 X10(3)/MCL (ref 2.1–9.2)
NEUTROPHILS NFR BLD AUTO: 72.8 %
NITRITE UR QL STRIP.AUTO: NEGATIVE
NRBC BLD AUTO-RTO: 0 %
PH UR STRIP.AUTO: 6 [PH]
PLATELET # BLD AUTO: 204 X10(3)/MCL (ref 130–400)
PMV BLD AUTO: 11 FL (ref 7.4–10.4)
POTASSIUM SERPL-SCNC: 4.4 MMOL/L (ref 3.5–5.1)
PROT SERPL-MCNC: 7.6 GM/DL (ref 5.8–7.6)
PROT UR QL STRIP.AUTO: NEGATIVE MG/DL
RBC # BLD AUTO: 4.39 X10(6)/MCL (ref 4.2–5.4)
RBC #/AREA URNS AUTO: ABNORMAL /HPF
RBC UR QL AUTO: ABNORMAL UNIT/L
SODIUM SERPL-SCNC: 136 MMOL/L (ref 136–145)
SP GR UR STRIP.AUTO: 1.01
SQUAMOUS #/AREA URNS LPF: ABNORMAL /HPF
T4 FREE SERPL-MCNC: 1.66 NG/DL (ref 0.7–1.48)
TRIGL SERPL-MCNC: 57 MG/DL (ref 37–140)
TSH SERPL-ACNC: 0.52 UIU/ML (ref 0.35–4.94)
UROBILINOGEN UR STRIP-ACNC: NORMAL MG/DL
VIT B12 SERPL-MCNC: 444 PG/ML (ref 213–816)
VLDLC SERPL CALC-MCNC: 11 MG/DL
WBC # SPEC AUTO: 9.1 X10(3)/MCL (ref 4.5–11.5)
WBC #/AREA URNS AUTO: ABNORMAL /HPF

## 2022-09-14 PROCEDURE — 84439 ASSAY OF FREE THYROXINE: CPT

## 2022-09-14 PROCEDURE — 82306 VITAMIN D 25 HYDROXY: CPT

## 2022-09-14 PROCEDURE — 84443 ASSAY THYROID STIM HORMONE: CPT

## 2022-09-14 PROCEDURE — 80061 LIPID PANEL: CPT

## 2022-09-14 PROCEDURE — 82570 ASSAY OF URINE CREATININE: CPT

## 2022-09-14 PROCEDURE — 82607 VITAMIN B-12: CPT | Mod: GA

## 2022-09-14 PROCEDURE — 83036 HEMOGLOBIN GLYCOSYLATED A1C: CPT

## 2022-09-14 PROCEDURE — 36415 COLL VENOUS BLD VENIPUNCTURE: CPT

## 2022-09-14 PROCEDURE — 81001 URINALYSIS AUTO W/SCOPE: CPT

## 2022-09-14 PROCEDURE — 85025 COMPLETE CBC W/AUTO DIFF WBC: CPT

## 2022-09-14 PROCEDURE — 80053 COMPREHEN METABOLIC PANEL: CPT

## 2022-09-15 ENCOUNTER — HOSPITAL ENCOUNTER (OUTPATIENT)
Dept: WOUND CARE | Facility: HOSPITAL | Age: 67
Discharge: HOME | End: 2022-09-15
Attending: NURSE PRACTITIONER
Payer: MEDICARE

## 2022-09-15 VITALS
TEMPERATURE: 98 F | SYSTOLIC BLOOD PRESSURE: 162 MMHG | DIASTOLIC BLOOD PRESSURE: 74 MMHG | RESPIRATION RATE: 18 BRPM | HEART RATE: 100 BPM

## 2022-09-15 DIAGNOSIS — Z79.01 CURRENT USE OF LONG TERM ANTICOAGULATION: ICD-10-CM

## 2022-09-15 DIAGNOSIS — Z86.73 HISTORY OF CVA (CEREBROVASCULAR ACCIDENT) WITHOUT RESIDUAL DEFICITS: ICD-10-CM

## 2022-09-15 DIAGNOSIS — S31.109A WOUND OF RIGHT GROIN, INITIAL ENCOUNTER: Primary | ICD-10-CM

## 2022-09-15 DIAGNOSIS — Z95.1 S/P CABG (CORONARY ARTERY BYPASS GRAFT): ICD-10-CM

## 2022-09-15 DIAGNOSIS — Z86.79 S/P ASCENDING AORTIC ANEURYSM REPAIR: ICD-10-CM

## 2022-09-15 DIAGNOSIS — Z86.711 HISTORY OF PULMONARY EMBOLISM: ICD-10-CM

## 2022-09-15 DIAGNOSIS — Z98.890 S/P ASCENDING AORTIC ANEURYSM REPAIR: ICD-10-CM

## 2022-09-15 DIAGNOSIS — Z86.718 HISTORY OF DEEP VENOUS THROMBOSIS (DVT) OF DISTAL VEIN OF RIGHT LOWER EXTREMITY: ICD-10-CM

## 2022-09-15 PROCEDURE — 99211 OFF/OP EST MAY X REQ PHY/QHP: CPT

## 2022-09-15 PROCEDURE — 99213 PR OFFICE/OUTPT VISIT, EST, LEVL III, 20-29 MIN: ICD-10-PCS | Mod: ,,, | Performed by: NURSE PRACTITIONER

## 2022-09-15 PROCEDURE — 99213 OFFICE O/P EST LOW 20 MIN: CPT | Mod: ,,, | Performed by: NURSE PRACTITIONER

## 2022-09-15 NOTE — PROGRESS NOTES
CHIEF COMPLAINT:    Wound to right groin from angiogram      HISTORY OF  PRESENT ILLNESS:  66 y.o. White female being seen today for evaluation and management of wound to right groin, following angiogram, in late July 2022.  Underwent CABG and AAA repair on 7/29/2022 at [facility] by Dr. Sherwood.  Hospitalized 15 days in ICU post-operatively, where she developed a DVT to RLE, pulmonary emboli, and had a CVA.  Initially had speech impairment from CVA; however, that has resolved, without residual effects.   Ambulates without assistive device.  Current wound care:  cleaning with soap and water and covering with gauze daily.  Being followed by [facility].  In-home physical therapy finished today.  40 pk/year hx smoking.  Quit smoking 10 years ago.  Followed by Kamila Whittaker MD for PCP.  Followed by [facility] cardiology.   X 48 years; two grown children. History includes:        Past Medical History:   Diagnosis Date    Anxiety disorder     Coronary artery disease     GERD (gastroesophageal reflux disease)     Hypercholesterolemia     Hypertension     Hypothyroidism     Obesity     Seizure in childhood     last one age 11    Sleep apnea     does not currently use CPAP    Thoracic aortic aneurysm 03/10/2022    4.5X4.4 Ascending Aorta as of 3/10/2022    Thoracic aortic aneurysm     Thyroid disease       Past Surgical History:   Procedure Laterality Date    ANGIOGRAM, CORONARY, WITH LEFT HEART CATHETERIZATION N/A 07/11/2022    Procedure: Angiogram, Coronary, with Left Heart Cath;  Surgeon: Harry Jj MD;  Location: [facility] CATH LAB;  Service: Cardiology;  Laterality: N/A;    COLONOSCOPY      HYSTERECTOMY      REPAIR OF ASCENDING AORTA N/A 7/29/2022    Procedure: REPAIR, AORTA, ASCENDING;  Surgeon: Alec Vega IV, MD;  Location: [facility] OR;  Service: Cardiovascular;  Laterality: N/A;  ECHO NOTIFIED    RT KNEE        Social History     Socioeconomic History    Marital status:    Tobacco Use    Smoking  status: Former     Types: Cigarettes     Quit date: 2010     Years since quittin.2    Smokeless tobacco: Never   Substance and Sexual Activity    Alcohol use: Not Currently    Drug use: Never    Sexual activity: Not Currently     Social Determinants of Health     Financial Resource Strain: Low Risk     Difficulty of Paying Living Expenses: Not hard at all   Food Insecurity: No Food Insecurity    Worried About Running Out of Food in the Last Year: Never true    Ran Out of Food in the Last Year: Never true   Transportation Needs: No Transportation Needs    Lack of Transportation (Medical): No    Lack of Transportation (Non-Medical): No   Physical Activity: Inactive    Days of Exercise per Week: 0 days    Minutes of Exercise per Session: 0 min   Stress: No Stress Concern Present    Feeling of Stress : Not at all   Social Connections: Moderately Isolated    Frequency of Communication with Friends and Family: More than three times a week    Frequency of Social Gatherings with Friends and Family: More than three times a week    Attends Protestant Services: Never    Active Member of Clubs or Organizations: No    Attends Club or Organization Meetings: Never    Marital Status:    Housing Stability: Unknown    Unable to Pay for Housing in the Last Year: No    Unstable Housing in the Last Year: No       Review of Most Recent Wound Care-Related Labs:  Lab Results   Component Value Date/Time    WBC 9.1 2022 08:55 AM    RBC 4.39 2022 08:55 AM    HGB 12.9 2022 08:55 AM    HCT 40.5 2022 08:55 AM    HCT 28 (L) 2022 09:20 AM    MCV 92.3 2022 08:55 AM    MCH 29.4 2022 08:55 AM    CREATININE 0.80 2022 08:55 AM    HGBA1C 5.3 2022 08:55 AM        Today 9/15/2022:  Still feeling weak following surgery and LTACH stay.   is very supportive of her recovery and physical needs.  Wound to right groin looks better than it did, when she was discharged home from LTACH.   Wound size is smaller; and, there is some pink tissue showing through.  Denies odors and drainage.  Incision to chest is completely healed.  Other than groin wound, no rashes or skin breakdown.  Experiences numbness and tingling to left thigh and leg, following recent long-term hospitalization.        REVIEW OF SYSTEMS:  Except as stated in HPI, all other 10 body systems normal.     Current Outpatient Medications   Medication Sig Dispense Refill    amiodarone (PACERONE) 200 MG Tab Take 1 tablet (200 mg total) by mouth once daily. 30 tablet 11    aspirin (ECOTRIN) 81 MG EC tablet Take 1 tablet (81 mg total) by mouth once daily. 30 tablet 3    famotidine (PEPCID) 20 MG tablet Take 1 tablet (20 mg total) by mouth 2 (two) times daily. 60 tablet 1    folic acid (FOLVITE) 1 MG tablet Take 1 tablet (1 mg total) by mouth once daily. 30 tablet 0    levETIRAcetam (KEPPRA) 500 MG Tab Take 1 tablet (500 mg total) by mouth 2 (two) times daily. 60 tablet 11    levothyroxine (SYNTHROID) 112 MCG tablet Take 1 tablet (112 mcg total) by mouth before breakfast. 30 tablet 5    rivaroxaban (XARELTO) 10 mg Tab Take 2 tablets (20 mg total) by mouth daily with dinner or evening meal. 60 tablet 2    rivaroxaban (XARELTO) 10 mg Tab Take 1 tablet (10 mg total) by mouth 2 (two) times daily with meals. 60 tablet 5    rosuvastatin (CRESTOR) 10 MG tablet Take 2 tablets (20 mg total) by mouth every evening. 60 tablet 0     No current facility-administered medications for this encounter.         PHYSICAL EXAMINATION AND VITAL SIGNS:    Vitals:    09/15/22 1245   BP: (!) 162/74   Pulse: 100   Resp: 18   Temp: 98.2 °F (36.8 °C)     There is no height or weight on file to calculate BMI.      General:  Hypertensive, asymptomatic with, afebrile.  Well-nourished, in no acute distress.   Respiratory: Breathing even, quiet, and unlabored.  No coughing.  Musculoskeletal:  Generalized mild weakness in all extremities; full range of motion of all extremities.   Arrived via wheelchair today, due to decreased endurance.    Neurologic:  A&O X 3.  Cranial nerves grossly intact.      Psychiatric:  Calm, cooperative.  Mood and effect normal.  Responses appropriate.   Integumentary:    Healed surgical incision to chest.      Full-thickness linear wound to right groin:  50% pink smooth tissue.  50% thin and moist slough.   Mild erythema around wound bed, with rolled wound edges.             Incision/Site 08/11/22 Groin angiogram puncture (Active)   08/11/22    Present Prior to Hospital Arrival?:    Side:    Location: Groin   Orientation:    Incision Type: angiogram puncture   Closure Method:    Additional Comments:    Removal Indication and Assessment:    Wound Outcome:    Removal Indications:    Wound Image   09/15/22 1250   Drainage Amount Moderate 09/15/22 1250   Drainage Characteristics/Odor Sanguineous 09/15/22 1250   Red (%), Wound Tissue Color 50 % 09/15/22 1250   Yellow (%), Wound Tissue Color 50 % 09/15/22 1250   Wound Length (cm) 4 cm 09/15/22 1250   Wound Width (cm) 0.5 cm 09/15/22 1250   Wound Depth (cm) 0.4 cm 09/15/22 1250   Wound Volume (cm^3) 0.8 cm^3 09/15/22 1250   Wound Surface Area (cm^2) 2 cm^2 09/15/22 1250                 ASSESSMENT AND PLAN:    S/P CABG and AAA repair:  Surgery done 7/29/2022 @ [facility] by Dr. Sherwood  Scheduled to see [facility] cardiologist Tuesday of next week  Still feeling weak.  Finished inhome PT with Nena DIAZ today.   Chest surgical incision healed.      Full-thickness wound to right groin:  Etiology:  angiogram done 8 weeks ago.  No s/s of localized infection; however, there is some periwound inflammation, which is most likely coming from slough inside wound.  Significant healing from photo in chart, while inpatient.   Wound is quite tender to touch.  Ms. Montoya is prescribed ASA and Xarelto for pulmonary emboli and DVT to RLE.  Will use conservative debridement measures today.   Wound Care Today/New Wound Care Treatment Plan:  Wound to  be cleaned with Vashe, Dakins 0.25%, or Microcyn and patted dry.  Apply Mesalt into wound bed, followed by secondary dressing.  To be changed QOD.    Teaching provided on debridement purpose, expected outcomes, and how Mesalt works for debridement of wounds.  Scheduled to see [facility] cardiology Tuesday of next week; will see her in clinic after that visit.                Return to clinic on 9/20/2022.  Teaching provided on s/s to call wound clinic for promptly.  [facility].

## 2022-09-16 PROBLEM — Z86.73 HISTORY OF CVA (CEREBROVASCULAR ACCIDENT) WITHOUT RESIDUAL DEFICITS: Status: ACTIVE | Noted: 2022-09-16

## 2022-09-16 PROBLEM — Z86.711 HISTORY OF PULMONARY EMBOLISM: Status: ACTIVE | Noted: 2022-09-16

## 2022-09-16 PROBLEM — Z95.1 S/P CABG (CORONARY ARTERY BYPASS GRAFT): Status: ACTIVE | Noted: 2022-09-16

## 2022-09-16 PROBLEM — Z98.890 S/P ASCENDING AORTIC ANEURYSM REPAIR: Status: ACTIVE | Noted: 2022-09-16

## 2022-09-16 PROBLEM — Z86.79 S/P ASCENDING AORTIC ANEURYSM REPAIR: Status: ACTIVE | Noted: 2022-09-16

## 2022-09-16 PROBLEM — Z79.01 CURRENT USE OF LONG TERM ANTICOAGULATION: Status: ACTIVE | Noted: 2022-09-16

## 2022-09-16 PROBLEM — Z86.718 HISTORY OF DEEP VENOUS THROMBOSIS (DVT) OF DISTAL VEIN OF RIGHT LOWER EXTREMITY: Status: ACTIVE | Noted: 2022-09-16

## 2022-09-16 PROBLEM — Q24.5 ANOMALOUS ORIGIN OF RIGHT CORONARY ARTERY: Status: RESOLVED | Noted: 2022-05-26 | Resolved: 2022-09-16

## 2022-09-16 PROBLEM — I71.21 ASCENDING AORTIC ANEURYSM: Status: RESOLVED | Noted: 2022-05-26 | Resolved: 2022-09-16

## 2022-09-16 PROBLEM — S31.103A NON-HEALING OPEN WOUND OF RIGHT GROIN: Status: ACTIVE | Noted: 2022-09-16

## 2022-09-20 ENCOUNTER — HOSPITAL ENCOUNTER (OUTPATIENT)
Dept: WOUND CARE | Facility: HOSPITAL | Age: 67
Discharge: HOME | End: 2022-09-20
Attending: NURSE PRACTITIONER
Payer: MEDICARE

## 2022-09-20 ENCOUNTER — OFFICE VISIT (OUTPATIENT)
Dept: CARDIOLOGY | Facility: CLINIC | Age: 67
End: 2022-09-20
Payer: MEDICARE

## 2022-09-20 VITALS
SYSTOLIC BLOOD PRESSURE: 145 MMHG | RESPIRATION RATE: 20 BRPM | DIASTOLIC BLOOD PRESSURE: 77 MMHG | TEMPERATURE: 98 F | HEIGHT: 67 IN | OXYGEN SATURATION: 99 % | WEIGHT: 187.63 LBS | BODY MASS INDEX: 29.45 KG/M2 | HEART RATE: 71 BPM

## 2022-09-20 VITALS
HEART RATE: 72 BPM | TEMPERATURE: 98 F | DIASTOLIC BLOOD PRESSURE: 81 MMHG | SYSTOLIC BLOOD PRESSURE: 173 MMHG | RESPIRATION RATE: 18 BRPM

## 2022-09-20 DIAGNOSIS — I10 HYPERTENSION, UNSPECIFIED TYPE: ICD-10-CM

## 2022-09-20 DIAGNOSIS — Z79.01 CURRENT USE OF LONG TERM ANTICOAGULATION: ICD-10-CM

## 2022-09-20 DIAGNOSIS — I63.9 ISCHEMIC EMBOLIC STROKE: Primary | ICD-10-CM

## 2022-09-20 DIAGNOSIS — Z86.79 S/P ASCENDING AORTIC ANEURYSM REPAIR: ICD-10-CM

## 2022-09-20 DIAGNOSIS — Z98.890 S/P ASCENDING AORTIC ANEURYSM REPAIR: ICD-10-CM

## 2022-09-20 DIAGNOSIS — S31.109D WOUND OF RIGHT GROIN, SUBSEQUENT ENCOUNTER: Primary | ICD-10-CM

## 2022-09-20 DIAGNOSIS — I26.99 PULMONARY EMBOLISM, UNSPECIFIED CHRONICITY, UNSPECIFIED PULMONARY EMBOLISM TYPE, UNSPECIFIED WHETHER ACUTE COR PULMONALE PRESENT: ICD-10-CM

## 2022-09-20 DIAGNOSIS — Z86.718 HISTORY OF DEEP VENOUS THROMBOSIS (DVT) OF DISTAL VEIN OF RIGHT LOWER EXTREMITY: ICD-10-CM

## 2022-09-20 DIAGNOSIS — G47.33 OBSTRUCTIVE SLEEP APNEA SYNDROME: ICD-10-CM

## 2022-09-20 DIAGNOSIS — Z86.711 HISTORY OF PULMONARY EMBOLISM: ICD-10-CM

## 2022-09-20 DIAGNOSIS — Z95.1 S/P CABG (CORONARY ARTERY BYPASS GRAFT): ICD-10-CM

## 2022-09-20 DIAGNOSIS — R73.03 PREDIABETES: ICD-10-CM

## 2022-09-20 DIAGNOSIS — E78.5 HYPERLIPIDEMIA, UNSPECIFIED HYPERLIPIDEMIA TYPE: ICD-10-CM

## 2022-09-20 DIAGNOSIS — Z86.73 HISTORY OF CVA (CEREBROVASCULAR ACCIDENT) WITHOUT RESIDUAL DEFICITS: ICD-10-CM

## 2022-09-20 PROBLEM — S31.109A RIGHT GROIN WOUND: Status: ACTIVE | Noted: 2022-09-20

## 2022-09-20 PROCEDURE — 99213 PR OFFICE/OUTPT VISIT, EST, LEVL III, 20-29 MIN: ICD-10-PCS | Mod: ,,, | Performed by: NURSE PRACTITIONER

## 2022-09-20 PROCEDURE — 99215 OFFICE O/P EST HI 40 MIN: CPT | Mod: 25,PBBFAC | Performed by: INTERNAL MEDICINE

## 2022-09-20 PROCEDURE — 99213 OFFICE O/P EST LOW 20 MIN: CPT | Mod: ,,, | Performed by: NURSE PRACTITIONER

## 2022-09-20 PROCEDURE — 99211 OFF/OP EST MAY X REQ PHY/QHP: CPT | Mod: 27

## 2022-09-20 NOTE — PROGRESS NOTES
[facility] CARDIOLOGY CLINIC  CHIEF COMPLAINT:   Chief Complaint   Patient presents with    f/u denies chest pain has TORRES                    Review of patient's allergies indicates:   Allergen Reactions    Tramadol Hives     Other reaction(s): sweating    Meperidine Rash     Other reaction(s): unknown                                          HPI:  Blanquita Montoya 66 y.o. 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     Her blood pressure has been relatively controlled outpatient. She states it ranges in the 120-140/80 with some lower readings in the 1 teens. She is on Losartan 100 mg and Norvasc 10 mg.     She has been on ASA and statin for her CVA history. Denies bleeding complications.     She reports a DVT/PE which was post operative and appears to be provoked. She was prescribed Xarelto. No bleeding complications.     Post operatively she had AF following her ascending aortic aneurysm repair. She was placed on Amiodarone but that has since been discontinued. She is on AC already for her DVT/PE.                                                                                                                                                                                                                                                                                                                                                                                                                                                                                       CARDIAC TESTING:  No results found for this or any previous visit.    No results found for this or any previous visit.     Results for orders placed during the hospital encounter of 07/11/22    Cardiac catheterization    Conclusion  · The pre-procedure left ventricular end diastolic pressure was 10.  · The estimated  blood loss was none.  · There was non-obstructive coronary artery disease..    The procedure log was documented by Documenter: Larisa Deng RN and verified by Harry Jj MD.    FINDINGS  Anomalous origin of RCA    RECOMMENDATIONS  Medical management  Risk factor modifications  Activity -- avoid straining with affected limb for one week  Exercise -- as tolerated  Make follow-up appointment with CV surgery for aneurysm repair    Date: 7/11/2022  Time: 11:13 AM       Patient Active Problem List   Diagnosis    Aortic valve regurgitation    Gastroesophageal reflux disease    Hyperlipidemia    Hypertension    Hypothyroidism    Obstructive sleep apnea syndrome    Vitamin D deficiency    Prediabetes    Osteopenia    Osteoarthritis    Insomnia    Ischemic embolic stroke    Seizure in childhood    Superficial incisional surgical site infection    Pulmonary embolism    S/P CABG (coronary artery bypass graft)    S/P ascending aortic aneurysm repair    Non-healing open wound of right groin    History of CVA (cerebrovascular accident) without residual deficits    History of pulmonary embolism    History of deep venous thrombosis (DVT) of distal vein of right lower extremity    Current use of long term anticoagulation     Past Surgical History:   Procedure Laterality Date    ANGIOGRAM, CORONARY, WITH LEFT HEART CATHETERIZATION N/A 07/11/2022    Procedure: Angiogram, Coronary, with Left Heart Cath;  Surgeon: Harry Jj MD;  Location: [facility] CATH LAB;  Service: Cardiology;  Laterality: N/A;    COLONOSCOPY      HYSTERECTOMY      REPAIR OF ASCENDING AORTA N/A 7/29/2022    Procedure: REPAIR, AORTA, ASCENDING;  Surgeon: Alec Vega IV, MD;  Location: [facility];  Service: Cardiovascular;  Laterality: N/A;  ECHO NOTIFIED    RT KNEE       Social History     Socioeconomic History    Marital status:    Tobacco Use    Smoking status: Former     Types: Cigarettes     Quit date: 6/28/2010     Years since quitting:  12.2    Smokeless tobacco: Never   Substance and Sexual Activity    Alcohol use: Not Currently    Drug use: Never    Sexual activity: Not Currently     Social Determinants of Health     Financial Resource Strain: Low Risk     Difficulty of Paying Living Expenses: Not hard at all   Food Insecurity: No Food Insecurity    Worried About Running Out of Food in the Last Year: Never true    Ran Out of Food in the Last Year: Never true   Transportation Needs: No Transportation Needs    Lack of Transportation (Medical): No    Lack of Transportation (Non-Medical): No   Physical Activity: Inactive    Days of Exercise per Week: 0 days    Minutes of Exercise per Session: 0 min   Stress: No Stress Concern Present    Feeling of Stress : Not at all   Social Connections: Moderately Isolated    Frequency of Communication with Friends and Family: More than three times a week    Frequency of Social Gatherings with Friends and Family: More than three times a week    Attends [employer] Services: Never    Active Member of Clubs or Organizations: No    Attends Club or Organization Meetings: Never    Marital Status:    Housing Stability: Unknown    Unable to Pay for Housing in the Last Year: No    Unstable Housing in the Last Year: No        Family History   Problem Relation Age of Onset    Heart disease Mother     Uterine cancer Mother     Lung cancer Father     Seizures Sister     Heart disease Sister          Current Outpatient Medications:     aspirin (ECOTRIN) 81 MG EC tablet, Take 1 tablet (81 mg total) by mouth once daily., Disp: 30 tablet, Rfl: 3    levothyroxine (SYNTHROID) 112 MCG tablet, Take 1 tablet (112 mcg total) by mouth before breakfast., Disp: 30 tablet, Rfl: 5    rivaroxaban (XARELTO) 10 mg Tab, Take 2 tablets (20 mg total) by mouth daily with dinner or evening meal., Disp: 60 tablet, Rfl: 2    rosuvastatin (CRESTOR) 10 MG tablet, Take 2 tablets (20 mg total) by mouth every evening., Disp: 60 tablet, Rfl: 0     "amiodarone (PACERONE) 200 MG Tab, Take 1 tablet (200 mg total) by mouth once daily. (Patient not taking: Reported on 9/20/2022), Disp: 30 tablet, Rfl: 11    famotidine (PEPCID) 20 MG tablet, Take 1 tablet (20 mg total) by mouth 2 (two) times daily. (Patient not taking: Reported on 9/20/2022), Disp: 60 tablet, Rfl: 1    folic acid (FOLVITE) 1 MG tablet, Take 1 tablet (1 mg total) by mouth once daily. (Patient not taking: Reported on 9/20/2022), Disp: 30 tablet, Rfl: 0    levETIRAcetam (KEPPRA) 500 MG Tab, Take 1 tablet (500 mg total) by mouth 2 (two) times daily. (Patient not taking: Reported on 9/20/2022), Disp: 60 tablet, Rfl: 11    rivaroxaban (XARELTO) 10 mg Tab, Take 1 tablet (10 mg total) by mouth 2 (two) times daily with meals. (Patient not taking: Reported on 9/20/2022), Disp: 60 tablet, Rfl: 5     ROS:                                                                                                                                                                             Negative ROS unless stated in HPI.     Blood pressure (!) 145/77, pulse 71, temperature 98.1 °F (36.7 °C), temperature source Oral, resp. rate 20, height 5' 7.32" (1.71 m), weight 85.1 kg (187 lb 9.8 oz), SpO2 99 %.   PE:  Physical Exam  Vitals reviewed.   Constitutional:       Appearance: She is obese.   HENT:      Head: Normocephalic and atraumatic.      Right Ear: External ear normal.      Left Ear: External ear normal.      Nose: Nose normal.      Mouth/Throat:      Mouth: Mucous membranes are moist.   Eyes:      Conjunctiva/sclera: Conjunctivae normal.   Cardiovascular:      Rate and Rhythm: Normal rate and regular rhythm.      Pulses: Normal pulses.      Heart sounds: Normal heart sounds. No murmur heard.  Pulmonary:      Effort: Pulmonary effort is normal. No respiratory distress.      Breath sounds: Normal breath sounds. No stridor. No wheezing, rhonchi or rales.   Chest:      Chest wall: No tenderness.   Abdominal:      General: " Abdomen is flat. Bowel sounds are normal. There is no distension.      Palpations: Abdomen is soft.   Musculoskeletal:      Cervical back: Neck supple.      Right lower leg: Edema present.      Left lower leg: No edema.      Comments: Trace RLE edema.    Skin:     General: Skin is warm and dry.      Capillary Refill: Capillary refill takes less than 2 seconds.   Neurological:      Mental Status: She is alert and oriented to person, place, and time.   Psychiatric:         Mood and Affect: Mood normal.         Behavior: Behavior normal.        I have personally reviewed her recent labs.     ASSESSMENT/PLAN:    Ascending Aortic Aneurysm s/p Repair  -Performed on 7/29/2022.  -Control blood pressure.   -Optimize risk factors.   -In the future consider outpatient imaging to assess graft repair.     2.   HTN  -Blood pressure intermittently at goal. Blood pressure logs at next office visit.   -Continue Losartan 100 mg and Norvasc 10 mg daily.   -If additional medication is needed for treatment at thiazide.     3.   HLD  -Continue high intensity statin.     4.   History of CVA  -Continue MAPT and high intensity statin.     5.   Post Op AF  -Initially placed on Amiodarone but since discontinued.   -Had CROW ligation but still potential risk for cardioembolic CVA. On Xarelto with no bleeding complications. CHADSVASc 5.   -Plan for outpatient monitoring to assess AF burden.     6.   DVT/PE  -Per her history it sounds provoked as she states it occurred post operatively. This typically is cared for with a 3 month course.

## 2022-09-20 NOTE — PROGRESS NOTES
TODAY'S VISIT NOTE WAS IMPORTED FROM LAST WOUND CLINIC VISIT OF 9/15/2022.  I ATTEST THAT I REVIEWED THE HPI, ROS, LABS, WOUND-RELATED IMAGING, PHYSICAL EXAMINATION, EVALUATION AND PLAN SECTIONS OF IMPORTED NOTE AND REVISED TODAY'S VISIT NOTE TO REFLECT TODAY'S NEW ASSESSMENT FINDINGS AND TODAY'S UPDATES TO WOUND TREATMENT PLAN.          CHIEF COMPLAINT:    Wound to right groin from angiogram      HISTORY OF  PRESENT ILLNESS:  66 y.o. White female being seen today for follow-up of wound to right groin, following angiogram, in late July 2022.  Underwent CABG and AAA repair on 7/29/2022 at [facility] by Dr. Sherwood.  Hospitalized 15 days in ICU post-operatively, where she developed a DVT to RLE, pulmonary emboli, and had a CVA.  Initially had speech impairment from CVA; however, that has resolved, without residual effects.   Ambulates without assistive device.  Current wound care:  cleaning with Vashe, followed by [facility], followed by secondary dressing.  Being done QOD.   Being followed by [facility].   40 pk/year hx smoking.  Quit smoking 10 years ago.  Followed by Kamila Whittaker MD for PCP.  Followed by [facility] cardiology.   X 48 years; two grown children. History includes:        Past Medical History:   Diagnosis Date    Anxiety disorder     Coronary artery disease     Current use of long term anticoagulation 9/16/2022    GERD (gastroesophageal reflux disease)     History of CVA (cerebrovascular accident) without residual deficits 9/16/2022    History of deep venous thrombosis (DVT) of distal vein of right lower extremity 9/16/2022    History of pulmonary embolism 9/16/2022    Hypercholesterolemia     Hypertension     Hypothyroidism     Non-healing open wound of right groin 9/16/2022    Obesity     S/P ascending aortic aneurysm repair 9/16/2022    S/P CABG (coronary artery bypass graft) 9/16/2022    Seizure in childhood     last one age 11    Sleep apnea     does not currently use CPAP    Thoracic aortic  aneurysm 03/10/2022    4.5X4.4 Ascending Aorta as of 3/10/2022    Thoracic aortic aneurysm     Thyroid disease       Past Surgical History:   Procedure Laterality Date    ANGIOGRAM, CORONARY, WITH LEFT HEART CATHETERIZATION N/A 2022    Procedure: Angiogram, Coronary, with Left Heart Cath;  Surgeon: Harry Jj MD;  Location: [facility] CATH LAB;  Service: Cardiology;  Laterality: N/A;    COLONOSCOPY      HYSTERECTOMY      REPAIR OF ASCENDING AORTA N/A 2022    Procedure: REPAIR, AORTA, ASCENDING;  Surgeon: Alec Vega IV, MD;  Location: [facility] OR;  Service: Cardiovascular;  Laterality: N/A;  ECHO NOTIFIED    RT KNEE        Social History     Socioeconomic History    Marital status:    Tobacco Use    Smoking status: Former     Types: Cigarettes     Quit date: 2010     Years since quittin.2    Smokeless tobacco: Never   Substance and Sexual Activity    Alcohol use: Not Currently    Drug use: Never    Sexual activity: Not Currently     Social Determinants of Health     Financial Resource Strain: Low Risk     Difficulty of Paying Living Expenses: Not hard at all   Food Insecurity: No Food Insecurity    Worried About Running Out of Food in the Last Year: Never true    Ran Out of Food in the Last Year: Never true   Transportation Needs: No Transportation Needs    Lack of Transportation (Medical): No    Lack of Transportation (Non-Medical): No   Physical Activity: Inactive    Days of Exercise per Week: 0 days    Minutes of Exercise per Session: 0 min   Stress: No Stress Concern Present    Feeling of Stress : Not at all   Social Connections: Moderately Isolated    Frequency of Communication with Friends and Family: More than three times a week    Frequency of Social Gatherings with Friends and Family: More than three times a week    Attends Yazidism Services: Never    Active Member of Clubs or Organizations: No    Attends Club or Organization Meetings: Never    Marital Status:     Housing Stability: Unknown    Unable to Pay for Housing in the Last Year: No    Unstable Housing in the Last Year: No       Review of Most Recent Wound Care-Related Labs:  Lab Results   Component Value Date/Time    WBC 9.1 09/14/2022 08:55 AM    RBC 4.39 09/14/2022 08:55 AM    HGB 12.9 09/14/2022 08:55 AM    HCT 40.5 09/14/2022 08:55 AM    HCT 28 (L) 07/29/2022 09:20 AM    MCV 92.3 09/14/2022 08:55 AM    MCH 29.4 09/14/2022 08:55 AM    CREATININE 0.80 09/14/2022 08:55 AM    HGBA1C 5.3 09/14/2022 08:55 AM          Today 9/20/2022:  Seen by [facility] cardiology before wound clinic appointment.  Says BP is elevated because of walking from cardiology clinic to wound clinic.  Cardiology monitoring BP medications closely per MsFrancis Lindsay.  No reported complications with wound care or current treatment plan.  Has all wound care supplies in home.  Denies fevers, chills, wound odor, wound redness, wound pain, or yellow/green drainage.  No new rashes, lesions, or skin breakdown.  Believes wound is smaller from last visit.   Cardiology instructed her to resume Norvasc 10 mg daily and to continue Losartan 100 mg daily.     9/15/2022:  Still feeling weak following surgery and LTACH stay.   is very supportive of her recovery and physical needs.  Wound to right groin looks better than it did, when she was discharged home from LTACH.  Wound size is smaller; and, there is some pink tissue showing through.  Denies odors and drainage.  Incision to chest is completely healed.  Other than groin wound, no rashes or skin breakdown.  Experiences numbness and tingling to left thigh and leg, following recent long-term hospitalization.        REVIEW OF SYSTEMS:  Except as stated in HPI, all other 10 body systems normal.     Current Outpatient Medications   Medication Sig Dispense Refill    amiodarone (PACERONE) 200 MG Tab Take 1 tablet (200 mg total) by mouth once daily. (Patient not taking: Reported on 9/20/2022) 30 tablet 11    aspirin  (ECOTRIN) 81 MG EC tablet Take 1 tablet (81 mg total) by mouth once daily. 30 tablet 3    famotidine (PEPCID) 20 MG tablet Take 1 tablet (20 mg total) by mouth 2 (two) times daily. (Patient not taking: Reported on 9/20/2022) 60 tablet 1    folic acid (FOLVITE) 1 MG tablet Take 1 tablet (1 mg total) by mouth once daily. (Patient not taking: Reported on 9/20/2022) 30 tablet 0    levETIRAcetam (KEPPRA) 500 MG Tab Take 1 tablet (500 mg total) by mouth 2 (two) times daily. (Patient not taking: Reported on 9/20/2022) 60 tablet 11    levothyroxine (SYNTHROID) 112 MCG tablet Take 1 tablet (112 mcg total) by mouth before breakfast. 30 tablet 5    rivaroxaban (XARELTO) 10 mg Tab Take 2 tablets (20 mg total) by mouth daily with dinner or evening meal. 60 tablet 2    rivaroxaban (XARELTO) 10 mg Tab Take 1 tablet (10 mg total) by mouth 2 (two) times daily with meals. (Patient not taking: Reported on 9/20/2022) 60 tablet 5    rosuvastatin (CRESTOR) 10 MG tablet Take 2 tablets (20 mg total) by mouth every evening. 60 tablet 0     No current facility-administered medications for this encounter.         PHYSICAL EXAMINATION AND VITAL SIGNS:    Vitals:    09/20/22 1411   BP: (!) 173/81   Pulse: 72   Resp: 18   Temp: 98.2 °F (36.8 °C)     There is no height or weight on file to calculate BMI.      General:  Hypertensive, asymptomatic with, afebrile.  Well-nourished, in no acute distress.   Respiratory: Breathing even, quiet, and unlabored.  No coughing.  Musculoskeletal:  Generalized mild weakness in all extremities; full range of motion of all extremities.    Neurologic:  A&O X 3.  Cranial nerves grossly intact.      Psychiatric:  Calm, cooperative.  Mood and effect normal.  Responses appropriate.   Integumentary:      Full-thickness linear wound to right groin:  95% pink smooth tissue.  5% scattered thin and moist slough.   Mild erythema around wound bed has resolved.  Wound edges less rolled and just about flush with wound bed.    Wound dimensions smaller from last visit.            Incision/Site 08/11/22 Groin angiogram puncture (Active)   08/11/22    Present Prior to Hospital Arrival?:    Side:    Location: Groin   Orientation:    Incision Type: angiogram puncture   Closure Method:    Additional Comments:    Removal Indication and Assessment:    Wound Outcome:    Removal Indications:    Dressing Appearance Dry;Intact 09/20/22 1412   Drainage Amount Moderate 09/20/22 1412   Drainage Characteristics/Odor Serosanguineous 09/20/22 1412   Red (%), Wound Tissue Color 90 % 09/20/22 1412   Yellow (%), Wound Tissue Color 10 % 09/20/22 1412   Wound Edges Defined 09/20/22 1412   Wound Length (cm) 0.4 cm 09/20/22 1412   Wound Width (cm) 3 cm 09/20/22 1412   Wound Depth (cm) 0.2 cm 09/20/22 1412   Wound Volume (cm^3) 0.24 cm^3 09/20/22 1412   Wound Surface Area (cm^2) 1.2 cm^2 09/20/22 1412                   ASSESSMENT AND PLAN:    S/P CABG and AAA repair:  Surgery done 7/29/2022 @ [facility] by Dr. Sherwood  Seen by [facility] cardiology earlier today.  Amlodipine 10 mg daily resumed.  To continue Losartan 100 mg daily.     Chest surgical incision healed.      Full-thickness wound to right groin:  Etiology:  angiogram done 8 weeks ago.  Responded very well to [facility] over the past week for conservative debridement.  Minimal slough remaining today, with smaller wound measurements.   Wound Care Today/New Wound Care Treatment Plan:  Wound to be cleaned with Vashe, Dakins 0.25%, or Microcyn and patted dry.  Apply plain collagen into wound bed, followed by secondary dressing.  To be changed QOD.                  Return to clinic in one week.  Teaching reinforced on s/s to call wound clinic for promptly.  Copy of today's visit note faxed to [id number].

## 2022-09-20 NOTE — PROGRESS NOTES
Cardiology Attending    I have discussed Ms. Blanquita Montoya including her symptoms, findings, and management plan with the cardiology fellow.  Please see the Cardiology Clinic Note for details.   .        Harry Jj MD

## 2022-09-22 ENCOUNTER — OFFICE VISIT (OUTPATIENT)
Dept: INTERNAL MEDICINE | Facility: CLINIC | Age: 67
End: 2022-09-22
Payer: MEDICARE

## 2022-09-22 VITALS
BODY MASS INDEX: 29.34 KG/M2 | HEART RATE: 75 BPM | TEMPERATURE: 98 F | WEIGHT: 186.94 LBS | HEIGHT: 67 IN | OXYGEN SATURATION: 96 % | DIASTOLIC BLOOD PRESSURE: 72 MMHG | RESPIRATION RATE: 18 BRPM | SYSTOLIC BLOOD PRESSURE: 126 MMHG

## 2022-09-22 DIAGNOSIS — Z12.31 ENCOUNTER FOR SCREENING MAMMOGRAM FOR MALIGNANT NEOPLASM OF BREAST: ICD-10-CM

## 2022-09-22 DIAGNOSIS — Z76.0 MEDICATION REFILL: ICD-10-CM

## 2022-09-22 DIAGNOSIS — Z86.73 HISTORY OF CVA (CEREBROVASCULAR ACCIDENT) WITHOUT RESIDUAL DEFICITS: Primary | ICD-10-CM

## 2022-09-22 DIAGNOSIS — I26.99 PULMONARY EMBOLISM, UNSPECIFIED CHRONICITY, UNSPECIFIED PULMONARY EMBOLISM TYPE, UNSPECIFIED WHETHER ACUTE COR PULMONALE PRESENT: ICD-10-CM

## 2022-09-22 DIAGNOSIS — Z12.39 ENCOUNTER FOR SCREENING FOR MALIGNANT NEOPLASM OF BREAST, UNSPECIFIED SCREENING MODALITY: ICD-10-CM

## 2022-09-22 DIAGNOSIS — K21.9 GASTROESOPHAGEAL REFLUX DISEASE, UNSPECIFIED WHETHER ESOPHAGITIS PRESENT: ICD-10-CM

## 2022-09-22 DIAGNOSIS — E78.5 HYPERLIPIDEMIA, UNSPECIFIED HYPERLIPIDEMIA TYPE: ICD-10-CM

## 2022-09-22 DIAGNOSIS — I63.9 ISCHEMIC EMBOLIC STROKE: ICD-10-CM

## 2022-09-22 DIAGNOSIS — Z23 NEED FOR VACCINATION: ICD-10-CM

## 2022-09-22 DIAGNOSIS — E03.9 HYPOTHYROIDISM, UNSPECIFIED TYPE: ICD-10-CM

## 2022-09-22 DIAGNOSIS — Z86.79 S/P ASCENDING AORTIC ANEURYSM REPAIR: ICD-10-CM

## 2022-09-22 DIAGNOSIS — Z86.718 HISTORY OF DEEP VENOUS THROMBOSIS (DVT) OF DISTAL VEIN OF RIGHT LOWER EXTREMITY: ICD-10-CM

## 2022-09-22 DIAGNOSIS — I10 HYPERTENSION, UNSPECIFIED TYPE: ICD-10-CM

## 2022-09-22 DIAGNOSIS — Z98.890 S/P ASCENDING AORTIC ANEURYSM REPAIR: ICD-10-CM

## 2022-09-22 PROCEDURE — 99214 OFFICE O/P EST MOD 30 MIN: CPT | Mod: PBBFAC

## 2022-09-22 PROCEDURE — G0008 ADMIN INFLUENZA VIRUS VAC: HCPCS | Mod: PBBFAC

## 2022-09-22 PROCEDURE — 90662 IIV NO PRSV INCREASED AG IM: CPT | Mod: PBBFAC

## 2022-09-22 RX ORDER — AMLODIPINE BESYLATE 10 MG/1
10 TABLET ORAL DAILY
COMMUNITY
End: 2022-09-22 | Stop reason: SDUPTHER

## 2022-09-22 RX ORDER — LEVOTHYROXINE SODIUM 100 UG/1
100 TABLET ORAL
Qty: 90 TABLET | Refills: 3 | Status: SHIPPED | OUTPATIENT
Start: 2022-09-22 | End: 2023-04-04 | Stop reason: SDUPTHER

## 2022-09-22 RX ORDER — AMLODIPINE BESYLATE 10 MG/1
10 TABLET ORAL DAILY
Qty: 90 TABLET | Refills: 3 | Status: SHIPPED | OUTPATIENT
Start: 2022-09-22 | End: 2023-10-17 | Stop reason: SDUPTHER

## 2022-09-22 RX ORDER — LOSARTAN POTASSIUM 100 MG/1
100 TABLET ORAL DAILY
Qty: 90 TABLET | Refills: 3 | Status: SHIPPED | OUTPATIENT
Start: 2022-09-22 | End: 2022-12-13 | Stop reason: SDUPTHER

## 2022-09-22 RX ORDER — ASPIRIN 81 MG/1
81 TABLET ORAL DAILY
Qty: 90 TABLET | Refills: 3 | Status: SHIPPED | OUTPATIENT
Start: 2022-09-22 | End: 2023-09-21 | Stop reason: SDUPTHER

## 2022-09-22 RX ORDER — ROSUVASTATIN CALCIUM 20 MG/1
20 TABLET, COATED ORAL NIGHTLY
Qty: 90 TABLET | Refills: 3 | Status: SHIPPED | OUTPATIENT
Start: 2022-09-22 | End: 2022-12-13 | Stop reason: SDUPTHER

## 2022-09-22 RX ORDER — AMIODARONE HYDROCHLORIDE 200 MG/1
TABLET ORAL DAILY
COMMUNITY
End: 2022-09-22

## 2022-09-22 RX ORDER — LOSARTAN POTASSIUM 100 MG/1
100 TABLET ORAL DAILY
COMMUNITY
End: 2022-09-22 | Stop reason: SDUPTHER

## 2022-09-22 NOTE — PROGRESS NOTES
"  [facility] INTERNAL MEDICINE  OUTPATIENT OFFICE VISIT NOTE    SUBJECTIVE:      HPI: Blanquita Montoya is a 66-year-old  female with PMH of hypothyroidism, MIKI on CPAP, hypertension, hyperlipidemia, nonobstructive CAD, prediabetes, GERD, osteopenia, vitamin D deficiency, insomnia, and 4.9 cm stable ascending thoracic aortic aneurysm who presents to IM clinic for follow-up.      Thoracic AAA repaired. Post op course complicated with ?seizure, DVT/PE, and CVA. Currently on Xarelto 20 for past month. No longer taking keppra or amiodarone.  Mammo ordered  Flu today   Reports sob-ordered echo   Meds refilled  No other complaints at this time  Complaint with medication     Lives in [place] with .  Works at a grocery store.  Denies tobacco, alcohol, or drug use.  Previous 2 PPD smoker, quit about 12 years ago    ROS:  (+) SOB  (-) palpitations, CP, SOB, dizziness, syncope, edema, PND, orthopnea, claudication, cough, fever, chills, abdominal pain, vomiting, diarrhea, dysuria, bleeding    OBJECTIVE:     Vital signs:   /72 (BP Location: Right arm, Patient Position: Sitting, BP Method: Large (Manual))   Pulse 75   Temp 98.4 °F (36.9 °C) (Oral)   Resp 18   Ht 5' 7.32" (1.71 m)   Wt 84.8 kg (186 lb 15.2 oz)   SpO2 96%   BMI 29.00 kg/m²      Physical Examination:  General:  Well-nourished, well-developed, no acute distress  HEENT: Normocephalic, atraumatic. EOMI.  MMM  Neck: Supple. No obvious JVD.  Normal range of motion.  Respiratory: CTAB.  No obvious crackles wheeze or rhonchi.  Normal work of breathing.  Cardiovascular:  RRR.  No obvious M/G/R. Warm extremities.  Peripheral pulses intact.  No edema.  Gastrointestinal:  Soft, nontender, nondistended.  Normal bowel sounds.  Neurologic: ANOx3.  Answering questions/following commands appropriately.  No FND    Current Outpatient Medications:     rivaroxaban (XARELTO) 10 mg Tab, Take 2 tablets (20 mg total) by mouth daily with dinner or evening meal., " Disp: 60 tablet, Rfl: 2    amLODIPine (NORVASC) 10 MG tablet, Take 1 tablet (10 mg total) by mouth once daily., Disp: 90 tablet, Rfl: 3    aspirin (ECOTRIN) 81 MG EC tablet, Take 1 tablet (81 mg total) by mouth once daily., Disp: 90 tablet, Rfl: 3    levothyroxine (SYNTHROID) 100 MCG tablet, Take 1 tablet (100 mcg total) by mouth before breakfast., Disp: 90 tablet, Rfl: 3    losartan (COZAAR) 100 MG tablet, Take 1 tablet (100 mg total) by mouth once daily., Disp: 90 tablet, Rfl: 3    rosuvastatin (CRESTOR) 20 MG tablet, Take 1 tablet (20 mg total) by mouth every evening., Disp: 90 tablet, Rfl: 3     ASSESSMENT & PLAN:     Hypertension  -controlled, Continue amlodipine 10 and losartan 100, refilled  -Intolerant to Procardia in the past secondary to side effects of headache/dizziness; prev on Toprol and Imdur   -Educated on DASH diet and exercise as tolerated     Hyperlipidemia  -LDL 61  -previously endorsed myalgias on simvastatin and pravastatin  -Continue aspirin 81 and Crestor 20     Thoracic ascending aortic aneurysm s/p repair with CT surgery  Nonobstructive coronary artery disease  -Follows with cardiology  -Echocardiogram: 5/21/2021: LVEF 65%, no significant valve pathology, normal RV size and function; repeat pending   -repeat echo ordered to eval SOB    Post op course with PE/DVT and CVA   -continue Xarelto 20 mg daily x3 months  -aspirin/statin    Osteopenia  History of Vitamin D deficiency  -Lumbar osteopenia, T score -2.1  -Continue multivitamin with calcium and vitamin D  -Plan to repeat DEXA scan next visit      GERD/gastritis  -Controlled on Protonix 40 daily prn   -EGD: 5/21/2021: with normal esophagus and gastritis. Discontinue NSAIDs.  -Colonoscopy from 10/2021 with diverticulosis of the sigmoid colon, otherwise normal, repeat 5 years     MIKI on CPAP  -Endorses intermittent compliance with CPAP, continue     Hypothyroidism  -continue Synthroid 100 MCG daily  -repeat TSH free T4 next visit       Insomnia  -Continue melatonin PRN     Routine health maintenance  Mammogram: 9/2021, WNL, repeat ordered   DEXA: 9/2020, osteopenia of lumbar vertebrae, plan to repeat next visit   Colon cancer screening: Colonoscopy from 10/2021 with diverticulosis of the sigmoid colon, otherwise normal, repeat 5 years in 2026  GYN: Follows with gynecology      Vaccines:  Flu vaccine, 2/8/2022, repeat today   Pneumonia 23 on 7/2019  Tdap on 3/2017  Zoster series completed 12/2020  COVID-19: Vaccinated x2     Antione Whittaker MD

## 2022-09-23 ENCOUNTER — DOCUMENT SCAN (OUTPATIENT)
Dept: HOME HEALTH SERVICES | Facility: HOSPITAL | Age: 67
End: 2022-09-23
Payer: MEDICARE

## 2022-09-26 ENCOUNTER — DOCUMENTATION ONLY (OUTPATIENT)
Dept: ADMINISTRATIVE | Facility: HOSPITAL | Age: 67
End: 2022-09-26
Payer: MEDICARE

## 2022-09-26 ENCOUNTER — DOCUMENT SCAN (OUTPATIENT)
Dept: HOME HEALTH SERVICES | Facility: HOSPITAL | Age: 67
End: 2022-09-26
Payer: MEDICARE

## 2022-09-28 NOTE — PROGRESS NOTES
Attending Addendum:   Patient seen and examined in clinic. Management and Plan were discussed with resident. Care was reasonable and necessary.   Lexy Olson MD  [facility]

## 2022-09-29 ENCOUNTER — HOSPITAL ENCOUNTER (OUTPATIENT)
Dept: WOUND CARE | Facility: HOSPITAL | Age: 67
Discharge: HOME | End: 2022-09-29
Attending: NURSE PRACTITIONER
Payer: MEDICARE

## 2022-09-29 VITALS
TEMPERATURE: 98 F | DIASTOLIC BLOOD PRESSURE: 84 MMHG | SYSTOLIC BLOOD PRESSURE: 153 MMHG | HEART RATE: 84 BPM | RESPIRATION RATE: 18 BRPM

## 2022-09-29 DIAGNOSIS — Z79.01 CURRENT USE OF LONG TERM ANTICOAGULATION: ICD-10-CM

## 2022-09-29 DIAGNOSIS — Z86.711 HISTORY OF PULMONARY EMBOLISM: ICD-10-CM

## 2022-09-29 DIAGNOSIS — Z86.718 HISTORY OF DEEP VENOUS THROMBOSIS (DVT) OF DISTAL VEIN OF RIGHT LOWER EXTREMITY: ICD-10-CM

## 2022-09-29 DIAGNOSIS — S31.109D WOUND OF RIGHT GROIN, SUBSEQUENT ENCOUNTER: Primary | ICD-10-CM

## 2022-09-29 DIAGNOSIS — Z98.890 S/P ASCENDING AORTIC ANEURYSM REPAIR: ICD-10-CM

## 2022-09-29 DIAGNOSIS — Z86.79 S/P ASCENDING AORTIC ANEURYSM REPAIR: ICD-10-CM

## 2022-09-29 DIAGNOSIS — Z86.73 HISTORY OF CVA (CEREBROVASCULAR ACCIDENT) WITHOUT RESIDUAL DEFICITS: ICD-10-CM

## 2022-09-29 DIAGNOSIS — Z95.1 S/P CABG (CORONARY ARTERY BYPASS GRAFT): ICD-10-CM

## 2022-09-29 PROCEDURE — 99211 OFF/OP EST MAY X REQ PHY/QHP: CPT

## 2022-09-29 PROCEDURE — 99213 OFFICE O/P EST LOW 20 MIN: CPT | Mod: ,,, | Performed by: NURSE PRACTITIONER

## 2022-09-29 PROCEDURE — 99213 PR OFFICE/OUTPT VISIT, EST, LEVL III, 20-29 MIN: ICD-10-PCS | Mod: ,,, | Performed by: NURSE PRACTITIONER

## 2022-09-29 NOTE — PROGRESS NOTES
TODAY'S VISIT NOTE WAS IMPORTED FROM LAST WOUND CLINIC VISIT OF 9/20/2022.  I ATTEST THAT I REVIEWED THE HPI, ROS, LABS, WOUND-RELATED IMAGING, PHYSICAL EXAMINATION, EVALUATION AND PLAN SECTIONS OF IMPORTED NOTE AND REVISED TODAY'S VISIT NOTE TO REFLECT TODAY'S NEW ASSESSMENT FINDINGS AND TODAY'S UPDATES TO WOUND TREATMENT PLAN.          CHIEF COMPLAINT:    Wound to right groin from angiogram      HISTORY OF  PRESENT ILLNESS:  66 y.o. White female being seen today for follow-up of wound to right groin, following angiogram, in late July 2022.  Underwent CABG and AAA repair on 7/29/2022 at [facility] by Dr. Sherwood.  Hospitalized 15 days in ICU post-operatively, where she developed a DVT to RLE, pulmonary emboli, and had a CVA.  Initially had speech impairment from CVA; however, that has resolved, without residual effects.   Ambulates without assistive device.  Current wound care:  cleaning with Vashe, followed by plain collagen, followed by secondary dressing.  Being changed QOD.   Followed by [facility].   40 pk/year hx smoking.  Quit smoking 10 years ago.  Followed by Kamila Whittaker MD for PCP.  Followed by [facility] cardiology.   X 48 years; two grown children. History includes:        Past Medical History:   Diagnosis Date    Anxiety disorder     Coronary artery disease     Current use of long term anticoagulation 9/16/2022    GERD (gastroesophageal reflux disease)     History of CVA (cerebrovascular accident) without residual deficits 9/16/2022    History of deep venous thrombosis (DVT) of distal vein of right lower extremity 9/16/2022    History of pulmonary embolism 9/16/2022    Hypercholesterolemia     Hypertension     Hypothyroidism     Non-healing open wound of right groin 9/16/2022    Obesity     S/P ascending aortic aneurysm repair 9/16/2022    S/P CABG (coronary artery bypass graft) 9/16/2022    Seizure in childhood     last one age 11    Sleep apnea     does not currently use CPAP    Thoracic  aortic aneurysm 03/10/2022    4.5X4.4 Ascending Aorta as of 3/10/2022    Thoracic aortic aneurysm     Thyroid disease       Past Surgical History:   Procedure Laterality Date    ANGIOGRAM, CORONARY, WITH LEFT HEART CATHETERIZATION N/A 2022    Procedure: Angiogram, Coronary, with Left Heart Cath;  Surgeon: Harry Jj MD;  Location: [facility] CATH LAB;  Service: Cardiology;  Laterality: N/A;    COLONOSCOPY  10/14/2021    Dr. Cho Person    HYSTERECTOMY      REPAIR OF ASCENDING AORTA N/A 2022    Procedure: REPAIR, AORTA, ASCENDING;  Surgeon: Alec Vega IV, MD;  Location: [facility] OR;  Service: Cardiovascular;  Laterality: N/A;  ECHO NOTIFIED    RT KNEE        Social History     Socioeconomic History    Marital status:    Tobacco Use    Smoking status: Former     Types: Cigarettes     Quit date: 2010     Years since quittin.2    Smokeless tobacco: Never   Substance and Sexual Activity    Alcohol use: Not Currently    Drug use: Never    Sexual activity: Not Currently     Social Determinants of Health     Financial Resource Strain: Low Risk     Difficulty of Paying Living Expenses: Not hard at all   Food Insecurity: No Food Insecurity    Worried About Running Out of Food in the Last Year: Never true    Ran Out of Food in the Last Year: Never true   Transportation Needs: No Transportation Needs    Lack of Transportation (Medical): No    Lack of Transportation (Non-Medical): No   Physical Activity: Inactive    Days of Exercise per Week: 0 days    Minutes of Exercise per Session: 0 min   Stress: No Stress Concern Present    Feeling of Stress : Not at all   Social Connections: Moderately Isolated    Frequency of Communication with Friends and Family: More than three times a week    Frequency of Social Gatherings with Friends and Family: More than three times a week    Attends Worship Services: Never    Active Member of Clubs or Organizations: No    Attends Club or Organization Meetings:  Never    Marital Status:    Housing Stability: Unknown    Unable to Pay for Housing in the Last Year: No    Unstable Housing in the Last Year: No       Review of Most Recent Wound Care-Related Labs:  Lab Results   Component Value Date/Time    WBC 9.1 09/14/2022 08:55 AM    RBC 4.39 09/14/2022 08:55 AM    HGB 12.9 09/14/2022 08:55 AM    HCT 40.5 09/14/2022 08:55 AM    HCT 28 (L) 07/29/2022 09:20 AM    MCV 92.3 09/14/2022 08:55 AM    MCH 29.4 09/14/2022 08:55 AM    CREATININE 0.80 09/14/2022 08:55 AM    HGBA1C 5.3 09/14/2022 08:55 AM          Today 09/29/2022:  Feeling stronger since last clinic visit.   continues to be supportive of recovery.  Still not driving.  No reported complications with wound care or current treatment plan.  Has all wound care supplies in home.  Denies fevers, chills, wound odor, wound redness, wound pain, or yellow/green drainage.  No new rashes, lesions, or skin breakdown.  Wound smaller from my last assessment.  Pleased with wound healing progress.      9/20/2022:  Seen by [facility] cardiology before wound clinic appointment.  Says BP is elevated because of walking from cardiology clinic to wound clinic.  Cardiology monitoring BP medications closely per Ms. Montoya.  No reported complications with wound care or current treatment plan.  Has all wound care supplies in home.  Denies fevers, chills, wound odor, wound redness, wound pain, or yellow/green drainage.  No new rashes, lesions, or skin breakdown.  Believes wound is smaller from last visit.   Cardiology instructed her to resume Norvasc 10 mg daily and to continue Losartan 100 mg daily.     9/15/2022:  Still feeling weak following surgery and LTACH stay.   is very supportive of her recovery and physical needs.  Wound to right groin looks better than it did, when she was discharged home from LTACH.  Wound size is smaller; and, there is some pink tissue showing through.  Denies odors and drainage.  Incision to chest is  completely healed.  Other than groin wound, no rashes or skin breakdown.  Experiences numbness and tingling to left thigh and leg, following recent long-term hospitalization.        REVIEW OF SYSTEMS:  Except as stated in HPI, all other 10 body systems normal.     Current Outpatient Medications   Medication Sig Dispense Refill    amLODIPine (NORVASC) 10 MG tablet Take 1 tablet (10 mg total) by mouth once daily. 90 tablet 3    aspirin (ECOTRIN) 81 MG EC tablet Take 1 tablet (81 mg total) by mouth once daily. 90 tablet 3    levothyroxine (SYNTHROID) 100 MCG tablet Take 1 tablet (100 mcg total) by mouth before breakfast. 90 tablet 3    losartan (COZAAR) 100 MG tablet Take 1 tablet (100 mg total) by mouth once daily. 90 tablet 3    rivaroxaban (XARELTO) 10 mg Tab Take 2 tablets (20 mg total) by mouth daily with dinner or evening meal. 60 tablet 2    rosuvastatin (CRESTOR) 20 MG tablet Take 1 tablet (20 mg total) by mouth every evening. 90 tablet 3     No current facility-administered medications for this encounter.         PHYSICAL EXAMINATION AND VITAL SIGNS:    Vitals:    09/29/22 0828   BP: (!) 153/84   Pulse: 84   Resp: 18   Temp: 98.2 °F (36.8 °C)     There is no height or weight on file to calculate BMI.      General:  Hypertensive, asymptomatic with, afebrile.  Well-nourished, in no acute distress.   Respiratory: Breathing even, quiet, and unlabored.  No coughing.  Musculoskeletal:  Generalized mild weakness in all extremities; full range of motion of all extremities.    Neurologic:  A&O X 3.  Cranial nerves grossly intact.      Psychiatric:  Calm, cooperative.  Mood and effect normal.  Responses appropriate.   Integumentary:      Partial-thickness linear wound to right groin:  100% pink smooth tissue.  Wound dimensions smaller from last visit.  No erythema, purulent drainage, periwound edema, odors, induration, bogginess, or fluctuance.              Incision/Site 08/11/22 Groin angiogram puncture (Active)    08/11/22    Present Prior to Hospital Arrival?:    Side:    Location: Groin   Orientation:    Incision Type: angiogram puncture   Closure Method:    Additional Comments:    Removal Indication and Assessment:    Wound Outcome:    Removal Indications:    Wound Image   09/29/22 0828   Drainage Amount Small 09/29/22 0828   Drainage Characteristics/Odor Serous 09/29/22 0828   Red (%), Wound Tissue Color 100 % 09/29/22 0828   Wound Edges Defined 09/29/22 0828   Wound Length (cm) 0.2 cm 09/29/22 0828   Wound Width (cm) 1 cm 09/29/22 0828   Wound Depth (cm) 0.1 cm 09/29/22 0828   Wound Volume (cm^3) 0.02 cm^3 09/29/22 0828   Wound Surface Area (cm^2) 0.2 cm^2 09/29/22 0828                     ASSESSMENT AND PLAN:    S/P CABG and AAA repair:  Surgery done 7/29/2022 @ [facility] by Dr. Sherwood  Followed by [facility] cardiology, who is monitoring HTN and antihypertensives.   Chest surgical incision healed.      Partial-thickness wound to right groin:  Etiology:  angiogram  Responding very well to plain collagen.  No s/s of localized infectioin or delayed wound healing.     Wound Care Today/Continued Wound Care Treatment Plan:  Wound cleaned with Vashe and patted dry.  Applied plain collagen into wound bed, followed by secondary dressing.  To be changed QOD.    Teaching provided on remodeling phase of wound healing, length process can take, and risks of wound reopening during remodeling process.                  Return to clinic in two weeks.  Teaching reinforced on s/s to call wound clinic for promptly.       Breath sounds clear and equal bilaterally.

## 2022-10-03 ENCOUNTER — HOSPITAL ENCOUNTER (EMERGENCY)
Facility: HOSPITAL | Age: 67
Discharge: HOME | End: 2022-10-03
Attending: EMERGENCY MEDICINE
Payer: MEDICARE

## 2022-10-03 VITALS
OXYGEN SATURATION: 97 % | DIASTOLIC BLOOD PRESSURE: 73 MMHG | BODY MASS INDEX: 29.58 KG/M2 | HEART RATE: 73 BPM | SYSTOLIC BLOOD PRESSURE: 139 MMHG | RESPIRATION RATE: 26 BRPM | TEMPERATURE: 98 F | HEIGHT: 67 IN | WEIGHT: 188.5 LBS

## 2022-10-03 DIAGNOSIS — R53.1 WEAKNESS: ICD-10-CM

## 2022-10-03 DIAGNOSIS — R09.1 PLEURISY: Primary | ICD-10-CM

## 2022-10-03 LAB
ALBUMIN SERPL-MCNC: 3 GM/DL (ref 3.4–4.8)
ALBUMIN/GLOB SERPL: 0.8 RATIO (ref 1.1–2)
ALP SERPL-CCNC: 140 UNIT/L (ref 40–150)
ALT SERPL-CCNC: 28 UNIT/L (ref 0–55)
APPEARANCE UR: CLEAR
AST SERPL-CCNC: 18 UNIT/L (ref 5–34)
BACTERIA #/AREA URNS AUTO: ABNORMAL /HPF
BASOPHILS # BLD AUTO: 0.04 X10(3)/MCL (ref 0–0.2)
BASOPHILS NFR BLD AUTO: 0.5 %
BILIRUB UR QL STRIP.AUTO: NEGATIVE MG/DL
BILIRUBIN DIRECT+TOT PNL SERPL-MCNC: 0.7 MG/DL
BNP BLD-MCNC: 208.8 PG/ML
BUN SERPL-MCNC: 9.2 MG/DL (ref 9.8–20.1)
CALCIUM SERPL-MCNC: 9.2 MG/DL (ref 8.4–10.2)
CHLORIDE SERPL-SCNC: 101 MMOL/L (ref 98–107)
CO2 SERPL-SCNC: 28 MMOL/L (ref 23–31)
COLOR UR AUTO: ABNORMAL
CREAT SERPL-MCNC: 0.8 MG/DL (ref 0.55–1.02)
D DIMER PPP IA.FEU-MCNC: 3.25 UG/ML FEU (ref 0–0.5)
EOSINOPHIL # BLD AUTO: 0.14 X10(3)/MCL (ref 0–0.9)
EOSINOPHIL NFR BLD AUTO: 1.7 %
ERYTHROCYTE [DISTWIDTH] IN BLOOD BY AUTOMATED COUNT: 14 % (ref 11.5–17)
GFR SERPLBLD CREATININE-BSD FMLA CKD-EPI: >60 MLS/MIN/1.73/M2
GLOBULIN SER-MCNC: 4 GM/DL (ref 2.4–3.5)
GLUCOSE SERPL-MCNC: 114 MG/DL (ref 82–115)
GLUCOSE UR QL STRIP.AUTO: NORMAL MG/DL
HCT VFR BLD AUTO: 37.2 % (ref 37–47)
HGB BLD-MCNC: 12 GM/DL (ref 12–16)
HYALINE CASTS #/AREA URNS LPF: ABNORMAL /LPF
IMM GRANULOCYTES # BLD AUTO: 0.03 X10(3)/MCL (ref 0–0.04)
IMM GRANULOCYTES NFR BLD AUTO: 0.4 %
KETONES UR QL STRIP.AUTO: NEGATIVE MG/DL
LEUKOCYTE ESTERASE UR QL STRIP.AUTO: NEGATIVE UNIT/L
LYMPHOCYTES # BLD AUTO: 1 X10(3)/MCL (ref 0.6–4.6)
LYMPHOCYTES NFR BLD AUTO: 12.3 %
MCH RBC QN AUTO: 28.8 PG (ref 27–31)
MCHC RBC AUTO-ENTMCNC: 32.3 MG/DL (ref 33–36)
MCV RBC AUTO: 89.2 FL (ref 80–94)
MONOCYTES # BLD AUTO: 0.8 X10(3)/MCL (ref 0.1–1.3)
MONOCYTES NFR BLD AUTO: 9.8 %
NEUTROPHILS # BLD AUTO: 6.1 X10(3)/MCL (ref 2.1–9.2)
NEUTROPHILS NFR BLD AUTO: 75.3 %
NITRITE UR QL STRIP.AUTO: NEGATIVE
NRBC BLD AUTO-RTO: 0 %
PH UR STRIP.AUTO: 6 [PH]
PLATELET # BLD AUTO: 220 X10(3)/MCL (ref 130–400)
PMV BLD AUTO: 11 FL (ref 7.4–10.4)
POTASSIUM SERPL-SCNC: 3.7 MMOL/L (ref 3.5–5.1)
PROT SERPL-MCNC: 7 GM/DL (ref 5.8–7.6)
PROT UR QL STRIP.AUTO: NEGATIVE MG/DL
RBC # BLD AUTO: 4.17 X10(6)/MCL (ref 4.2–5.4)
RBC #/AREA URNS AUTO: ABNORMAL /HPF
RBC UR QL AUTO: ABNORMAL UNIT/L
SODIUM SERPL-SCNC: 138 MMOL/L (ref 136–145)
SP GR UR STRIP.AUTO: 1.01
SQUAMOUS #/AREA URNS LPF: ABNORMAL /HPF
TROPONIN I SERPL-MCNC: <0.01 NG/ML (ref 0–0.04)
TSH SERPL-ACNC: 0.4 UIU/ML (ref 0.35–4.94)
UROBILINOGEN UR STRIP-ACNC: NORMAL MG/DL
WBC # SPEC AUTO: 8.1 X10(3)/MCL (ref 4.5–11.5)
WBC #/AREA URNS AUTO: ABNORMAL /HPF

## 2022-10-03 PROCEDURE — 25500020 PHARM REV CODE 255: Performed by: EMERGENCY MEDICINE

## 2022-10-03 PROCEDURE — 84443 ASSAY THYROID STIM HORMONE: CPT | Performed by: EMERGENCY MEDICINE

## 2022-10-03 PROCEDURE — 83880 ASSAY OF NATRIURETIC PEPTIDE: CPT | Performed by: EMERGENCY MEDICINE

## 2022-10-03 PROCEDURE — 93005 ELECTROCARDIOGRAM TRACING: CPT

## 2022-10-03 PROCEDURE — 85025 COMPLETE CBC W/AUTO DIFF WBC: CPT | Performed by: EMERGENCY MEDICINE

## 2022-10-03 PROCEDURE — 84484 ASSAY OF TROPONIN QUANT: CPT | Performed by: EMERGENCY MEDICINE

## 2022-10-03 PROCEDURE — 36415 COLL VENOUS BLD VENIPUNCTURE: CPT | Performed by: EMERGENCY MEDICINE

## 2022-10-03 PROCEDURE — 96360 HYDRATION IV INFUSION INIT: CPT

## 2022-10-03 PROCEDURE — 80053 COMPREHEN METABOLIC PANEL: CPT | Performed by: EMERGENCY MEDICINE

## 2022-10-03 PROCEDURE — 96361 HYDRATE IV INFUSION ADD-ON: CPT

## 2022-10-03 PROCEDURE — 25000003 PHARM REV CODE 250: Performed by: EMERGENCY MEDICINE

## 2022-10-03 PROCEDURE — 85379 FIBRIN DEGRADATION QUANT: CPT | Performed by: EMERGENCY MEDICINE

## 2022-10-03 PROCEDURE — 81001 URINALYSIS AUTO W/SCOPE: CPT | Performed by: EMERGENCY MEDICINE

## 2022-10-03 PROCEDURE — 99285 EMERGENCY DEPT VISIT HI MDM: CPT | Mod: 25

## 2022-10-03 RX ORDER — NAPROXEN 500 MG/1
500 TABLET ORAL 2 TIMES DAILY WITH MEALS
Qty: 28 TABLET | Refills: 0 | Status: SHIPPED | OUTPATIENT
Start: 2022-10-03 | End: 2022-10-17

## 2022-10-03 RX ADMIN — SODIUM CHLORIDE 1000 ML: 9 INJECTION, SOLUTION INTRAVENOUS at 07:10

## 2022-10-03 RX ADMIN — IOPAMIDOL 100 ML: 755 INJECTION, SOLUTION INTRAVENOUS at 10:10

## 2022-10-03 NOTE — ED PROVIDER NOTES
"Encounter Date: 10/3/2022       History     Chief Complaint   Patient presents with    Weakness     Pt c/o weakness for over a 1 week. History of CABG this year. Seen by her PCP 2 weeks ago and told she if feels bad to come to the ER.     Presents with two weeks of generalized weakness unchanged over this time interval. Seen by pmd and cardiology two weeks ago, instructed visit the ED if not feeling better. Patient reports she has felt unwell since early august after she had a CABG and subsequently diagnosed CVA (symptoms now improved) and PE (symptoms of chest pain now resolved, taking xarelto regularly). Patient endorsing she thinks it really "might just be my anxiety but wanted to make sure nothing's wrong"      Neurologic Problem  The primary symptoms include dizziness. Primary symptoms do not include headaches, syncope, loss of consciousness, altered mental status, seizures, visual change, paresthesias, focal weakness, loss of sensation, speech change, memory loss, fever, nausea or vomiting. Illness onset: 2 weeks ago. The episode lasted 14 days. The symptoms are unchanged. The neurological symptoms are diffuse. Context: has felt weak since recent bout of medical care.   She describes the dizziness as faintness. The dizziness began more than 1 week ago. The dizziness has been unchanged since its onset. The dizziness is associated with a recent illness. Dizziness also occurs with weakness. Dizziness does not occur with nausea or vomiting.   Additional symptoms include weakness. Additional symptoms do not include neck stiffness, pain, lower back pain, leg pain or loss of balance. Medical issues do not include seizures, cerebral vascular accident or cancer.   Review of patient's allergies indicates:   Allergen Reactions    Tramadol Hives     Other reaction(s): sweating    Meperidine Rash     Other reaction(s): unknown     Past Medical History:   Diagnosis Date    Anxiety disorder     Coronary artery disease     " Current use of long term anticoagulation 2022    GERD (gastroesophageal reflux disease)     History of CVA (cerebrovascular accident) without residual deficits 2022    History of deep venous thrombosis (DVT) of distal vein of right lower extremity 2022    History of pulmonary embolism 2022    Hypercholesterolemia     Hypertension     Hypothyroidism     Non-healing open wound of right groin 2022    Obesity     S/P ascending aortic aneurysm repair 2022    S/P CABG (coronary artery bypass graft) 2022    Seizure in childhood     last one age 11    Sleep apnea     does not currently use CPAP    Thoracic aortic aneurysm 03/10/2022    4.5X4.4 Ascending Aorta as of 3/10/2022    Thoracic aortic aneurysm     Thyroid disease      Past Surgical History:   Procedure Laterality Date    ANGIOGRAM, CORONARY, WITH LEFT HEART CATHETERIZATION N/A 2022    Procedure: Angiogram, Coronary, with Left Heart Cath;  Surgeon: Harry Jj MD;  Location: [facility] CATH LAB;  Service: Cardiology;  Laterality: N/A;    COLONOSCOPY  10/14/2021    Dr. Marquis Fitch    HYSTERECTOMY      REPAIR OF ASCENDING AORTA N/A 2022    Procedure: REPAIR, AORTA, ASCENDING;  Surgeon: Alec Vega IV, MD;  Location: [facility];  Service: Cardiovascular;  Laterality: N/A;  ECHO NOTIFIED    RT KNEE       Family History   Problem Relation Age of Onset    Heart disease Mother     Uterine cancer Mother     Lung cancer Father     Seizures Sister     Heart disease Sister      Social History     Tobacco Use    Smoking status: Former     Types: Cigarettes     Quit date: 2010     Years since quittin.2    Smokeless tobacco: Never   Substance Use Topics    Alcohol use: Not Currently    Drug use: Never     Review of Systems   Constitutional:  Negative for fever.   Cardiovascular:  Negative for syncope.   Gastrointestinal:  Negative for nausea and vomiting.   Musculoskeletal:  Negative for neck stiffness.   Neurological:   Positive for dizziness and weakness. Negative for speech change, focal weakness, seizures, loss of consciousness, headaches, paresthesias and loss of balance.   Psychiatric/Behavioral:  Negative for memory loss.    All other systems reviewed and are negative.    Physical Exam     Initial Vitals [10/03/22 0706]   BP Pulse Resp Temp SpO2   (!) 167/78 78 18 97.5 °F (36.4 °C) 100 %      MAP       --         Physical Exam    Nursing note and vitals reviewed.  Constitutional: She appears well-developed and well-nourished. She is not diaphoretic. No distress.   HENT:   Head: Normocephalic and atraumatic.   Eyes: EOM are normal. Pupils are equal, round, and reactive to light. Right eye exhibits no discharge. Left eye exhibits no discharge.   Neck: Neck supple. No thyromegaly present. No tracheal deviation present. No JVD present.   Normal range of motion.  Cardiovascular:  Normal rate, regular rhythm, normal heart sounds and intact distal pulses.     Exam reveals no gallop and no friction rub.       No murmur heard.  Pulmonary/Chest: Breath sounds normal. No stridor. No respiratory distress. She has no wheezes. She has no rhonchi. She has no rales. She exhibits no tenderness.   Abdominal: Abdomen is soft. Bowel sounds are normal. She exhibits no distension. There is no abdominal tenderness. There is no rebound and no guarding.   Musculoskeletal:         General: No tenderness or edema. Normal range of motion.      Cervical back: Normal range of motion and neck supple.     Lymphadenopathy:     She has no cervical adenopathy.   Neurological: She is alert and oriented to person, place, and time. She displays normal reflexes. No cranial nerve deficit or sensory deficit. GCS score is 15. GCS eye subscore is 4. GCS verbal subscore is 5. GCS motor subscore is 6.   Skin: Skin is warm and dry. Capillary refill takes less than 2 seconds.   Psychiatric: She has a normal mood and affect. Her behavior is normal. Judgment and thought  content normal.       ED Course   Procedures  Labs Reviewed   COMPREHENSIVE METABOLIC PANEL - Abnormal; Notable for the following components:       Result Value    Blood Urea Nitrogen 9.2 (*)     Albumin Level 3.0 (*)     Globulin 4.0 (*)     Albumin/Globulin Ratio 0.8 (*)     All other components within normal limits   URINALYSIS, REFLEX TO URINE CULTURE - Abnormal; Notable for the following components:    Color, UA Light-Yellow (*)     Blood, UA 1+ (*)     Squamous Epithelial Cells, UA Few (*)     All other components within normal limits   B-TYPE NATRIURETIC PEPTIDE - Abnormal; Notable for the following components:    Natriuretic Peptide 208.8 (*)     All other components within normal limits   D DIMER, QUANTITATIVE - Abnormal; Notable for the following components:    D-Dimer 3.25 (*)     All other components within normal limits   CBC WITH DIFFERENTIAL - Abnormal; Notable for the following components:    RBC 4.17 (*)     MCHC 32.3 (*)     MPV 11.0 (*)     All other components within normal limits   TROPONIN I - Normal   TSH - Normal   CBC W/ AUTO DIFFERENTIAL    Narrative:     The following orders were created for panel order CBC auto differential.  Procedure                               Abnormality         Status                     ---------                               -----------         ------                     CBC with Differential[[id number]]        Abnormal            Final result                 Please view results for these tests on the individual orders.   EXTRA TUBES    Narrative:     The following orders were created for panel order EXTRA TUBES.  Procedure                               Abnormality         Status                     ---------                               -----------         ------                     Red Top Hold[[id number]]                                                                Gold Top Hold[[id number]]                                    In process                   Please  view results for these tests on the individual orders.   GOLD TOP HOLD     EKG Readings: (Independently Interpreted)   NSR at 73, non acute and non ischemic appearing; similar to recent previous EKG's   ECG Results              EKG 12-lead (Weakness) Age > 50 (In process)  Result time 10/03/22 07:15:45      In process by Interface, Lab In [facility] (10/03/22 07:15:45)                   Narrative:    Test Reason : R53.1,    Vent. Rate : 073 BPM     Atrial Rate : 073 BPM     P-R Int : 162 ms          QRS Dur : 080 ms      QT Int : 380 ms       P-R-T Axes : 073 075 148 degrees     QTc Int : 418 ms    Normal sinus rhythm  ST and T wave abnormality, consider inferior ischemia  ST and T wave abnormality, consider anterolateral ischemia  Abnormal ECG  When compared with ECG of 19-AUG-2022 11:42,  Questionable change in The axis  Nonspecific T wave abnormality now evident in Inferior leads  T wave inversion more evident in Anterior leads    Referred By:             Confirmed By:                                   Imaging Results              CTA Chest Non-Coronary (PE Studies) (Final result)  Result time 10/03/22 11:10:55      Final result by Pranay Mccormack MD (10/03/22 11:10:55)                   Impression:      1. No pulmonary embolus seen today.  2. A small focal area of posterior mediastinal or pericardial fluid is new compared to August 2022.  This may be postprocedural if interval surgery. I cannot exclude infection.  3. Small bilateral pleural effusions.      Electronically signed by: Pranay Mccormack  Date:    10/03/2022  Time:    11:10               Narrative:    EXAMINATION:  CTA CHEST NON CORONARY (PE STUDIES)    CLINICAL HISTORY:  Pulmonary embolism (PE) suspected, positive D-dimer; weakness.    TECHNIQUE:  Helical acquisition through the chest with IV contrast targeting the pulmonary arteries. Multiplanar and 3D MIP reconstructed images were provided for review. DLP 1007 mGycm. Automatic exposure control, adjustment  of mA/kV or iterative reconstruction technique was used to reduce radiation.    COMPARISON:  19 August 2022.    FINDINGS:  There is good opacification of the pulmonary arterial tree. No pulmonary embolus is seen today.  There is no aortic dissection.    Some slightly larger subcarinal lymph nodes are seen, these may be reactive.  No axillary adenopathy.    Heart is not enlarged.  There are changes of CABG.  There is a small focal of pericardial or posterior mediastinal fluid extending from images 53-70 series 2.  This is new compared to prior.    There are small bilateral pleural effusions, left larger than right.  There is some left basilar atelectasis.  No opacities suspicious for pneumonia.    There are no acute findings in the imaged upper abdomen.  Moderate degenerative change of the spine.                                       CT Head Without Contrast (Final result)  Result time 10/03/22 09:26:05      Final result by Alison Montemayor MD (10/03/22 09:26:05)                   Impression:      1. No acute intracranial abnormality.  2. Chronic microvascular ischemic changes.      Electronically signed by: Alison Montemayor  Date:    10/03/2022  Time:    09:26               Narrative:    EXAMINATION:  CT HEAD WITHOUT CONTRAST    CLINICAL HISTORY:  Stroke, follow up;generalized weakness, subacute, reportedly recent CVA (post operative);    TECHNIQUE:  Axial scans were obtained from skull base to the vertex.    Coronal and sagittal reconstructions obtained from the axial data.    Automatic exposure control was utilized to limit radiation dose.    Contrast: None    Radiation Dose:    Total DLP: 948 mGy*cm    COMPARISON:  CT head dated 08/19/2022    FINDINGS:  There is no acute intracranial hemorrhage or edema. The gray-white matter differentiation is preserved.  There are patchy hypodensities in the subcortical and periventricular white matter with small area of encephalomalacia in the right occipital lobe.  The  gray matter differentiation is otherwise preserved.    There is no mass effect or midline shift. The ventricles and sulci are normal in size. The basal cisterns are patent. There is no abnormal extra-axial fluid collection.    The calvarium and skull base are intact. The visualized paranasal sinuses and the mastoid air cells are clear.                                       X-Ray Chest AP Portable (Final result)  Result time 10/03/22 08:20:08      Final result by Lb Gracia MD (10/03/22 08:20:08)                   Impression:      No acute cardiopulmonary process.      Electronically signed by: Lb Gracia  Date:    10/03/2022  Time:    08:20               Narrative:    EXAMINATION:  XR CHEST AP PORTABLE    CLINICAL HISTORY:  Weakness    TECHNIQUE:  Single view of the chest    COMPARISON:  08/19/2022    FINDINGS:  No focal opacification, pleural effusion, or pneumothorax.    The cardiomediastinal silhouette is unchanged with postsurgical change.    No acute osseous abnormality.                                    X-Rays:   Independently Interpreted Readings:   Chest X-Ray: No acute abnormal    Head CT: No acute abnormal   Other Readings:  Cta chest, small effusion, post operative  Medications   sodium chloride 0.9% bolus 1,000 mL (0 mLs Intravenous Stopped 10/3/22 0909)   iopamidoL (ISOVUE-370) injection 100 mL (100 mLs Intravenous Given 10/3/22 1051)     Medical Decision Making:   History:   Old Medical Records: I decided to obtain old medical records.  Old Records Summarized: other records.       <> Summary of Records: Previous records demonstrate recent admission for CABG and subsequent PE for which she is taking xarelto.  Initial Assessment:   Features most compatible with ongoing deconditioning since previous protracted admission. Nevertheless risk found sufficient to obtain expanded evaluation with objective data to diminish probability an emergent process could remain occult.   Differential Diagnosis:    Deconditioning, acs/cad, PE, CVA/TIA, hypovlemia/dehydration, electrolyte derangement, MILY  Independently Interpreted Test(s):   I have ordered and independently interpreted X-rays - see prior notes.  I have ordered and independently interpreted EKG Reading(s) - see prior notes  Clinical Tests:   Lab Tests: Ordered and Reviewed  Radiological Study: Ordered and Reviewed  Medical Tests: Ordered and Reviewed  ED Management:  Clinical course in the ED stable. Objective data resulted and reassuring. CT does demonstrate small effusion, expected given post operative state. Plan trial NSAID. Home with anticipatory guidance, return precautions, fu instructions. Dc in stable condition without event.           ED Course as of 10/03/22 1147   Mon Oct 03, 2022   0840 BNP mildly elevated; also elevated relative to nearline comparisons ; [CT]   0841 Negative troponin ; [CT]   0911 Negative ua ; [CT]   0956 Dimer significantly elevated ; [CT]      ED Course User Index  [CT] Vincent Savage MD                   Clinical Impression:   Final diagnoses:  [R53.1] Weakness  [R09.1] Pleurisy (Primary)        ED Disposition Condition    Discharge Stable          ED Prescriptions       Medication Sig Dispense Start Date End Date Auth. Provider    naproxen (NAPROSYN) 500 MG tablet Take 1 tablet (500 mg total) by mouth 2 (two) times daily with meals. for 14 days 28 tablet 10/3/2022 10/17/2022 Vincent Savage MD          Follow-up Information       Follow up With Specialties Details Why Contact Info    [facility] Emergency Medicine  As needed, If symptoms worsen [address] [facility] [address] [phone]             Vincent Savage MD  10/03/22 6997

## 2022-10-04 ENCOUNTER — TELEPHONE (OUTPATIENT)
Dept: INTERNAL MEDICINE | Facility: CLINIC | Age: 67
End: 2022-10-04
Payer: MEDICARE

## 2022-10-04 DIAGNOSIS — R06.02 SOB (SHORTNESS OF BREATH): Primary | ICD-10-CM

## 2022-10-04 NOTE — TELEPHONE ENCOUNTER
Pt called, name and  verified. Pt informed may take as needed as prescribed. Pt verbalized 100% understanding. Pt reported having some pain in abdominal she believes id  d/t wound site internally and decrease energy level. Please review and call pt , per her request for some questions she needs answered. Thanks

## 2022-10-04 NOTE — TELEPHONE ENCOUNTER
Patient called stating she went to the ED yesterday and they prescribed naproxen but she isn't sure if she can take that while on blood thinners. Please advise.

## 2022-10-05 RX ORDER — FUROSEMIDE 20 MG/1
20 TABLET ORAL DAILY PRN
Qty: 30 TABLET | Refills: 0 | Status: SHIPPED | OUTPATIENT
Start: 2022-10-05 | End: 2022-12-13 | Stop reason: SDUPTHER

## 2022-10-05 NOTE — TELEPHONE ENCOUNTER
I called patient. Discussed ER labs and images. Sent prescription for Lasix to her pharmacy. Remains pending echo. I notified her to call back if worsening. Thank you.

## 2022-10-10 NOTE — TELEPHONE ENCOUNTER
"----- Message from Lanette Blankenship MA sent at 5/24/2022  1:24 PM CDT -----  Pt was just seen 4/21/22 by Dr. Meraz. She has not been taking her Imdur as prescribed because it makes her nauseated. Please give pt a call thanks!    [phone]          Contacted patient concerning her phone call from 05/24/22. Pt stated she cannot switch positions (standing to lying) without a "tightness" to chest. Pt asked if she was taking Nitro during her chest pain incidineces. Pt stated, "Yes, I've been taking them almost every day and chest pain stops after 1 or 2." This nurse informed pt that she needed to go to the ED for further evaluation. Pt verbalized understanding.       " Yes

## 2022-10-13 ENCOUNTER — HOSPITAL ENCOUNTER (OUTPATIENT)
Dept: WOUND CARE | Facility: HOSPITAL | Age: 67
Discharge: HOME | End: 2022-10-13
Attending: NURSE PRACTITIONER
Payer: MEDICARE

## 2022-10-13 ENCOUNTER — DOCUMENT SCAN (OUTPATIENT)
Dept: HOME HEALTH SERVICES | Facility: HOSPITAL | Age: 67
End: 2022-10-13
Payer: MEDICARE

## 2022-10-13 VITALS
DIASTOLIC BLOOD PRESSURE: 89 MMHG | HEART RATE: 69 BPM | SYSTOLIC BLOOD PRESSURE: 150 MMHG | RESPIRATION RATE: 18 BRPM | TEMPERATURE: 98 F

## 2022-10-13 DIAGNOSIS — Z95.1 S/P CABG (CORONARY ARTERY BYPASS GRAFT): ICD-10-CM

## 2022-10-13 DIAGNOSIS — S31.109D WOUND OF RIGHT GROIN, SUBSEQUENT ENCOUNTER: Primary | ICD-10-CM

## 2022-10-13 DIAGNOSIS — Z86.79 S/P ASCENDING AORTIC ANEURYSM REPAIR: ICD-10-CM

## 2022-10-13 DIAGNOSIS — Z86.711 HISTORY OF PULMONARY EMBOLISM: ICD-10-CM

## 2022-10-13 DIAGNOSIS — Z86.718 HISTORY OF DEEP VENOUS THROMBOSIS (DVT) OF DISTAL VEIN OF RIGHT LOWER EXTREMITY: ICD-10-CM

## 2022-10-13 DIAGNOSIS — Z79.01 CURRENT USE OF LONG TERM ANTICOAGULATION: ICD-10-CM

## 2022-10-13 DIAGNOSIS — Z98.890 S/P ASCENDING AORTIC ANEURYSM REPAIR: ICD-10-CM

## 2022-10-13 DIAGNOSIS — Z86.73 HISTORY OF CVA (CEREBROVASCULAR ACCIDENT) WITHOUT RESIDUAL DEFICITS: ICD-10-CM

## 2022-10-13 PROCEDURE — 99213 OFFICE O/P EST LOW 20 MIN: CPT | Mod: ,,, | Performed by: NURSE PRACTITIONER

## 2022-10-13 PROCEDURE — 99213 PR OFFICE/OUTPT VISIT, EST, LEVL III, 20-29 MIN: ICD-10-PCS | Mod: ,,, | Performed by: NURSE PRACTITIONER

## 2022-10-13 PROCEDURE — 99211 OFF/OP EST MAY X REQ PHY/QHP: CPT

## 2022-10-13 NOTE — PROGRESS NOTES
TODAY'S VISIT NOTE WAS IMPORTED FROM LAST WOUND CLINIC VISIT OF 9/29/2022.  I ATTEST THAT I REVIEWED THE HPI, ROS, LABS, WOUND-RELATED IMAGING, PHYSICAL EXAMINATION, EVALUATION AND PLAN SECTIONS OF IMPORTED NOTE AND REVISED TODAY'S VISIT NOTE TO REFLECT TODAY'S NEW ASSESSMENT FINDINGS AND TODAY'S UPDATES TO WOUND TREATMENT PLAN.          CHIEF COMPLAINT:    Wound to right groin from angiogram      HISTORY OF  PRESENT ILLNESS:  66 y.o. White female being seen today for follow-up of wound to right groin, following angiogram, in late July 2022.  Underwent CABG and AAA repair on 7/29/2022 at [facility] by Dr. Sherwood.  Hospitalized 15 days in ICU post-operatively, where she developed a DVT to RLE, pulmonary emboli, and had a CVA.  Initially had speech impairment from CVA; however, that has resolved, without residual effects.   Ambulates without assistive device.  Current wound care to right groin:  cleaning with Vashe, followed by plain collagen, followed by secondary dressing.  Being changed QOD.   Followed by [facility].   40 pk/year hx smoking.  Quit smoking 10 years ago.  Followed by Kamila Whittaker MD for PCP.  Followed by [facility] cardiology.   X 48 years; two grown children.     Seen in ER 10/3/2022 for dizziness and worsening weakness X one week.  EKG was similar to recent EKGs.  CT of chest showed small bilateral pleural effusions.  She was given Lasix in ER, as well as RX for home use on an as-needed basis.  She is weighing daily, per self-report.  History includes:        Past Medical History:   Diagnosis Date    Anxiety disorder     Coronary artery disease     Current use of long term anticoagulation 9/16/2022    GERD (gastroesophageal reflux disease)     History of CVA (cerebrovascular accident) without residual deficits 9/16/2022    History of deep venous thrombosis (DVT) of distal vein of right lower extremity 9/16/2022    History of pulmonary embolism 9/16/2022    Hypercholesterolemia      Hypertension     Hypothyroidism     Non-healing open wound of right groin 2022    Obesity     S/P ascending aortic aneurysm repair 2022    S/P CABG (coronary artery bypass graft) 2022    Seizure in childhood     last one age 11    Sleep apnea     does not currently use CPAP    Thoracic aortic aneurysm 03/10/2022    4.5X4.4 Ascending Aorta as of 3/10/2022    Thoracic aortic aneurysm     Thyroid disease       Past Surgical History:   Procedure Laterality Date    ANGIOGRAM, CORONARY, WITH LEFT HEART CATHETERIZATION N/A 2022    Procedure: Angiogram, Coronary, with Left Heart Cath;  Surgeon: Harry Jj MD;  Location: [facility] CATH LAB;  Service: Cardiology;  Laterality: N/A;    COLONOSCOPY  10/14/2021    Dr. Cho Person    HYSTERECTOMY      REPAIR OF ASCENDING AORTA N/A 2022    Procedure: REPAIR, AORTA, ASCENDING;  Surgeon: Alec Vega IV, MD;  Location: [facility] OR;  Service: Cardiovascular;  Laterality: N/A;  ECHO NOTIFIED    RT KNEE        Social History     Socioeconomic History    Marital status:    Tobacco Use    Smoking status: Former     Types: Cigarettes     Quit date: 2010     Years since quittin.3    Smokeless tobacco: Never   Substance and Sexual Activity    Alcohol use: Not Currently    Drug use: Never    Sexual activity: Not Currently     Social Determinants of Health     Financial Resource Strain: Low Risk     Difficulty of Paying Living Expenses: Not hard at all   Food Insecurity: No Food Insecurity    Worried About Running Out of Food in the Last Year: Never true    Ran Out of Food in the Last Year: Never true   Transportation Needs: No Transportation Needs    Lack of Transportation (Medical): No    Lack of Transportation (Non-Medical): No   Physical Activity: Inactive    Days of Exercise per Week: 0 days    Minutes of Exercise per Session: 0 min   Stress: No Stress Concern Present    Feeling of Stress : Not at all   Social Connections: Moderately  Isolated    Frequency of Communication with Friends and Family: More than three times a week    Frequency of Social Gatherings with Friends and Family: More than three times a week    Attends Jew Services: Never    Active Member of Clubs or Organizations: No    Attends Club or Organization Meetings: Never    Marital Status:    Housing Stability: Unknown    Unable to Pay for Housing in the Last Year: No    Unstable Housing in the Last Year: No       Review of Most Recent Wound Care-Related Labs:  Lab Results   Component Value Date/Time    WBC 8.1 10/03/2022 07:39 AM    RBC 4.17 (L) 10/03/2022 07:39 AM    HGB 12.0 10/03/2022 07:39 AM    HCT 37.2 10/03/2022 07:39 AM    HCT 28 (L) 07/29/2022 09:20 AM    MCV 89.2 10/03/2022 07:39 AM    MCH 28.8 10/03/2022 07:39 AM    CREATININE 0.80 10/03/2022 07:39 AM    HGBA1C 5.3 09/14/2022 08:55 AM          Today 10/13/2022:  Feeling better since she went to ER and was given Lasix.  States she is weighing self daily, as instructed, and knows when to take the Lasix as-needed.  No reported complications with wound care or current treatment plan.  Has all wound care supplies in home.  Denies fevers, chills, wound odor, wound redness, wound pain, or yellow/green drainage.  No new rashes, lesions, or skin breakdown.  Still not driving.      09/29/2022:  Feeling stronger since last clinic visit.   continues to be supportive of recovery.  Still not driving.  No reported complications with wound care or current treatment plan.  Has all wound care supplies in home.  Denies fevers, chills, wound odor, wound redness, wound pain, or yellow/green drainage.  No new rashes, lesions, or skin breakdown.  Wound smaller from my last assessment.  Pleased with wound healing progress.      9/20/2022:  Seen by [facility] cardiology before wound clinic appointment.  Says BP is elevated because of walking from cardiology clinic to wound clinic.  Cardiology monitoring BP medications closely per  Ms. Montoya.  No reported complications with wound care or current treatment plan.  Has all wound care supplies in home.  Denies fevers, chills, wound odor, wound redness, wound pain, or yellow/green drainage.  No new rashes, lesions, or skin breakdown.  Believes wound is smaller from last visit.   Cardiology instructed her to resume Norvasc 10 mg daily and to continue Losartan 100 mg daily.     9/15/2022:  Still feeling weak following surgery and LTACH stay.   is very supportive of her recovery and physical needs.  Wound to right groin looks better than it did, when she was discharged home from LTACH.  Wound size is smaller; and, there is some pink tissue showing through.  Denies odors and drainage.  Incision to chest is completely healed.  Other than groin wound, no rashes or skin breakdown.  Experiences numbness and tingling to left thigh and leg, following recent long-term hospitalization.        REVIEW OF SYSTEMS:  Except as stated in HPI, all other 10 body systems normal.     Current Outpatient Medications   Medication Sig Dispense Refill    amLODIPine (NORVASC) 10 MG tablet Take 1 tablet (10 mg total) by mouth once daily. 90 tablet 3    aspirin (ECOTRIN) 81 MG EC tablet Take 1 tablet (81 mg total) by mouth once daily. 90 tablet 3    furosemide (LASIX) 20 MG tablet Take 1 tablet (20 mg total) by mouth daily as needed (as needed for shortness of breath). 30 tablet 0    levothyroxine (SYNTHROID) 100 MCG tablet Take 1 tablet (100 mcg total) by mouth before breakfast. 90 tablet 3    losartan (COZAAR) 100 MG tablet Take 1 tablet (100 mg total) by mouth once daily. 90 tablet 3    naproxen (NAPROSYN) 500 MG tablet Take 1 tablet (500 mg total) by mouth 2 (two) times daily with meals. for 14 days 28 tablet 0    rivaroxaban (XARELTO) 10 mg Tab Take 2 tablets (20 mg total) by mouth daily with dinner or evening meal. 60 tablet 2    rosuvastatin (CRESTOR) 20 MG tablet Take 1 tablet (20 mg total) by mouth every  evening. 90 tablet 3     No current facility-administered medications for this encounter.         PHYSICAL EXAMINATION AND VITAL SIGNS:    Vitals:    10/13/22 0933   BP: (!) 150/89   Pulse: 69   Resp: 18   Temp: 98.2 °F (36.8 °C)       There is no height or weight on file to calculate BMI.      General:  Hypertensive, asymptomatic with, afebrile.  Well-nourished, in no acute distress.   Respiratory: Breathing even, quiet, and unlabored.  No coughing.  Musculoskeletal:  Generalized mild weakness in all extremities; full range of motion of all extremities.    Neurologic:  A&O X 3.  Cranial nerves grossly intact.      Psychiatric:  Calm, cooperative.  Mood and effect normal.  Responses appropriate.   Integumentary:      Partial-thickness linear wound to right groin:  Wound is 100% epithelialized in a dimple-like healing fashion.  No erythema, purulent drainage, periwound edema, odors, induration, bogginess, or fluctuance.              Incision/Site 08/11/22 Groin angiogram puncture (Active)   08/11/22    Present Prior to Hospital Arrival?:    Side:    Location: Groin   Orientation:    Incision Type: angiogram puncture   Closure Method:    Additional Comments:    Removal Indication and Assessment:    Wound Outcome:    Removal Indications:    Wound Image   10/13/22 0929   Dressing Appearance Dry;Intact 10/13/22 0929   Drainage Amount None 10/13/22 0929   Wound Edges Approximated 10/13/22 0929   Wound Length (cm) 0 cm 10/13/22 0929   Wound Width (cm) 0 cm 10/13/22 0929   Wound Depth (cm) 0 cm 10/13/22 0929   Wound Volume (cm^3) 0 cm^3 10/13/22 0929   Wound Surface Area (cm^2) 0 cm^2 10/13/22 0929                   ASSESSMENT AND PLAN:    Weakness and dizziness:  Improved with ER administration of lasix.  Voices understanding of importance of continued daily weights for ongoing monitoring of fluid balance.  Prescribed Lasix 20 mg daily, as-needed, by ER physician.   Teaching provided on role fluid overload, AEB 2-3 lb  weight gain in one day, correlates with weakness, dizziness, and SOB.      S/P CABG and AAA repair:  Surgery done 7/29/2022 @ [facility] by Dr. Sherwood  Followed by [facility] cardiology, who is monitoring HTN and antihypertensives.   Chest surgical incision healed.      Partial-thickness wound to right groin:  Etiology:  angiogram  Wound is healed today.    Teaching reinforced on remodeling phase of wound healing, length process can take, and risks of wound reopening during remodeling process.                  We will see Ms. Montoya on an as-needed basis for any future wound-related issues.

## 2022-11-01 ENCOUNTER — TELEPHONE (OUTPATIENT)
Dept: INTERNAL MEDICINE | Facility: CLINIC | Age: 67
End: 2022-11-01

## 2022-11-01 NOTE — TELEPHONE ENCOUNTER
Venus with [facility] called to inform Dr Whittaker that pt is now taking Rosuvastatin, pt c/o issues with her legs and weakness. Venus asking would Dr like to try something different. Her contact is [phone]. Thanks.

## 2022-11-07 ENCOUNTER — EXTERNAL HOME HEALTH (OUTPATIENT)
Dept: HOME HEALTH SERVICES | Facility: HOSPITAL | Age: 67
End: 2022-11-07
Payer: MEDICARE

## 2022-11-11 ENCOUNTER — EXTERNAL HOME HEALTH (OUTPATIENT)
Dept: HOME HEALTH SERVICES | Facility: HOSPITAL | Age: 67
End: 2022-11-11
Payer: MEDICARE

## 2022-11-14 PROBLEM — I63.9 ISCHEMIC EMBOLIC STROKE: Status: RESOLVED | Noted: 2022-07-31 | Resolved: 2022-11-14

## 2022-11-15 ENCOUNTER — HOSPITAL ENCOUNTER (OUTPATIENT)
Dept: RADIOLOGY | Facility: HOSPITAL | Age: 67
Discharge: HOME | End: 2022-11-15
Attending: STUDENT IN AN ORGANIZED HEALTH CARE EDUCATION/TRAINING PROGRAM
Payer: MEDICARE

## 2022-11-15 ENCOUNTER — HOSPITAL ENCOUNTER (OUTPATIENT)
Dept: CARDIOLOGY | Facility: HOSPITAL | Age: 67
Discharge: HOME | End: 2022-11-15
Attending: STUDENT IN AN ORGANIZED HEALTH CARE EDUCATION/TRAINING PROGRAM
Payer: MEDICARE

## 2022-11-15 VITALS
BODY MASS INDEX: 29.51 KG/M2 | WEIGHT: 188 LBS | SYSTOLIC BLOOD PRESSURE: 151 MMHG | DIASTOLIC BLOOD PRESSURE: 75 MMHG | HEIGHT: 67 IN

## 2022-11-15 DIAGNOSIS — K21.9 GASTROESOPHAGEAL REFLUX DISEASE, UNSPECIFIED WHETHER ESOPHAGITIS PRESENT: ICD-10-CM

## 2022-11-15 DIAGNOSIS — Z86.73 HISTORY OF CVA (CEREBROVASCULAR ACCIDENT) WITHOUT RESIDUAL DEFICITS: ICD-10-CM

## 2022-11-15 DIAGNOSIS — Z12.31 ENCOUNTER FOR SCREENING MAMMOGRAM FOR MALIGNANT NEOPLASM OF BREAST: ICD-10-CM

## 2022-11-15 DIAGNOSIS — I10 HYPERTENSION, UNSPECIFIED TYPE: ICD-10-CM

## 2022-11-15 DIAGNOSIS — I63.9 ISCHEMIC EMBOLIC STROKE: ICD-10-CM

## 2022-11-15 DIAGNOSIS — Z86.718 HISTORY OF DEEP VENOUS THROMBOSIS (DVT) OF DISTAL VEIN OF RIGHT LOWER EXTREMITY: ICD-10-CM

## 2022-11-15 DIAGNOSIS — E03.9 HYPOTHYROIDISM, UNSPECIFIED TYPE: ICD-10-CM

## 2022-11-15 DIAGNOSIS — Z98.890 S/P ASCENDING AORTIC ANEURYSM REPAIR: ICD-10-CM

## 2022-11-15 DIAGNOSIS — Z23 NEED FOR VACCINATION: ICD-10-CM

## 2022-11-15 DIAGNOSIS — Z86.79 S/P ASCENDING AORTIC ANEURYSM REPAIR: ICD-10-CM

## 2022-11-15 DIAGNOSIS — Z12.39 ENCOUNTER FOR SCREENING FOR MALIGNANT NEOPLASM OF BREAST, UNSPECIFIED SCREENING MODALITY: ICD-10-CM

## 2022-11-15 DIAGNOSIS — E78.5 HYPERLIPIDEMIA, UNSPECIFIED HYPERLIPIDEMIA TYPE: ICD-10-CM

## 2022-11-15 DIAGNOSIS — I26.99 PULMONARY EMBOLISM, UNSPECIFIED CHRONICITY, UNSPECIFIED PULMONARY EMBOLISM TYPE, UNSPECIFIED WHETHER ACUTE COR PULMONALE PRESENT: ICD-10-CM

## 2022-11-15 PROCEDURE — 77067 SCR MAMMO BI INCL CAD: CPT | Mod: 26,,, | Performed by: RADIOLOGY

## 2022-11-15 PROCEDURE — 77067 MAMMO DIGITAL SCREENING BILAT WITH TOMO: ICD-10-PCS | Mod: 26,,, | Performed by: RADIOLOGY

## 2022-11-15 PROCEDURE — 77063 BREAST TOMOSYNTHESIS BI: CPT | Mod: 26,,, | Performed by: RADIOLOGY

## 2022-11-15 PROCEDURE — 93306 TTE W/DOPPLER COMPLETE: CPT

## 2022-11-15 PROCEDURE — 77067 SCR MAMMO BI INCL CAD: CPT | Mod: TC

## 2022-11-15 PROCEDURE — 77063 MAMMO DIGITAL SCREENING BILAT WITH TOMO: ICD-10-PCS | Mod: 26,,, | Performed by: RADIOLOGY

## 2022-11-18 LAB
AV INDEX (PROSTH): 0.75
AV MEAN GRADIENT: 6 MMHG
AV PEAK GRADIENT: 10 MMHG
AV REGURGITATION PRESSURE HALF TIME: 531.25 MS
AV VALVE AREA: 2.28 CM2
AV VELOCITY RATIO: 0.81
BSA FOR ECHO PROCEDURE: 2.01 M2
CV ECHO LV RWT: 0.44 CM
DOP CALC AO PEAK VEL: 1.62 M/S
DOP CALC AO VTI: 38.8 CM
DOP CALC LVOT AREA: 3 CM2
DOP CALC LVOT DIAMETER: 1.97 CM
DOP CALC LVOT PEAK VEL: 1.32 M/S
DOP CALC LVOT STROKE VOLUME: 88.35 CM3
DOP CALC MV VTI: 28.3 CM
DOP CALCLVOT PEAK VEL VTI: 29 CM
E WAVE DECELERATION TIME: 198.72 MSEC
E/A RATIO: 2
E/E' RATIO: 7.82 M/S
ECHO LV POSTERIOR WALL: 0.96 CM (ref 0.6–1.1)
EJECTION FRACTION: 50 %
FRACTIONAL SHORTENING: 31 % (ref 28–44)
HR MV ECHO: 60 BPM
INTERVENTRICULAR SEPTUM: 1.15 CM (ref 0.6–1.1)
LEFT ATRIUM SIZE: 3.8 CM
LEFT ATRIUM VOLUME INDEX MOD: 32 ML/M2
LEFT ATRIUM VOLUME MOD: 63 CM3
LEFT INTERNAL DIMENSION IN SYSTOLE: 2.98 CM (ref 2.1–4)
LEFT VENTRICLE DIASTOLIC VOLUME INDEX: 43.27 ML/M2
LEFT VENTRICLE DIASTOLIC VOLUME: 85.24 ML
LEFT VENTRICLE MASS INDEX: 79 G/M2
LEFT VENTRICLE SYSTOLIC VOLUME INDEX: 17.5 ML/M2
LEFT VENTRICLE SYSTOLIC VOLUME: 34.5 ML
LEFT VENTRICULAR INTERNAL DIMENSION IN DIASTOLE: 4.35 CM (ref 3.5–6)
LEFT VENTRICULAR MASS: 156.41 G
LV LATERAL E/E' RATIO: 6.14 M/S
LV SEPTAL E/E' RATIO: 10.75 M/S
LVOT MG: 3.13 MMHG
LVOT MV: 0.82 CM/S
MV MEAN GRADIENT: 1 MMHG
MV PEAK A VEL: 0.43 M/S
MV PEAK E VEL: 0.86 M/S
MV PEAK GRADIENT: 4 MMHG
MV VALVE AREA BY CONTINUITY EQUATION: 3.12 CM2
PISA AR MAX VEL: 3.57 M/S
PISA MRMAX VEL: 5.49 M/S
PISA TR MAX VEL: 2.63 M/S
RA PRESSURE: 3 MMHG
RA WIDTH: 4.5 CM
TDI LATERAL: 0.14 M/S
TDI SEPTAL: 0.08 M/S
TDI: 0.11 M/S
TR MAX PG: 28 MMHG
TRICUSPID ANNULAR PLANE SYSTOLIC EXCURSION: 1.53 CM
TV REST PULMONARY ARTERY PRESSURE: 31 MMHG

## 2022-11-21 PROBLEM — I26.99 PULMONARY EMBOLISM: Status: RESOLVED | Noted: 2022-08-21 | Resolved: 2022-11-21

## 2022-12-13 ENCOUNTER — OFFICE VISIT (OUTPATIENT)
Dept: INTERNAL MEDICINE | Facility: CLINIC | Age: 67
End: 2022-12-13
Payer: MEDICARE

## 2022-12-13 VITALS
BODY MASS INDEX: 30.86 KG/M2 | RESPIRATION RATE: 20 BRPM | HEIGHT: 67 IN | SYSTOLIC BLOOD PRESSURE: 148 MMHG | DIASTOLIC BLOOD PRESSURE: 77 MMHG | WEIGHT: 196.63 LBS | HEART RATE: 63 BPM | OXYGEN SATURATION: 100 % | TEMPERATURE: 98 F

## 2022-12-13 DIAGNOSIS — Z86.73 HISTORY OF CVA (CEREBROVASCULAR ACCIDENT) WITHOUT RESIDUAL DEFICITS: ICD-10-CM

## 2022-12-13 DIAGNOSIS — M96.841 POSTPROCEDURAL HEMATOMA OF A MUSCULOSKELETAL STRUCTURE FOLLOWING OTHER PROCEDURE: ICD-10-CM

## 2022-12-13 DIAGNOSIS — Z76.0 MEDICATION REFILL: ICD-10-CM

## 2022-12-13 DIAGNOSIS — Z86.79 S/P ASCENDING AORTIC ANEURYSM REPAIR: ICD-10-CM

## 2022-12-13 DIAGNOSIS — K21.9 GASTROESOPHAGEAL REFLUX DISEASE, UNSPECIFIED WHETHER ESOPHAGITIS PRESENT: ICD-10-CM

## 2022-12-13 DIAGNOSIS — E78.5 HYPERLIPIDEMIA, UNSPECIFIED HYPERLIPIDEMIA TYPE: ICD-10-CM

## 2022-12-13 DIAGNOSIS — R10.31 RIGHT GROIN PAIN: ICD-10-CM

## 2022-12-13 DIAGNOSIS — I10 HYPERTENSION, UNSPECIFIED TYPE: Primary | ICD-10-CM

## 2022-12-13 DIAGNOSIS — M19.90 ARTHRITIS: ICD-10-CM

## 2022-12-13 DIAGNOSIS — Z86.718 HISTORY OF DEEP VENOUS THROMBOSIS (DVT) OF DISTAL VEIN OF RIGHT LOWER EXTREMITY: ICD-10-CM

## 2022-12-13 DIAGNOSIS — E03.9 HYPOTHYROIDISM, UNSPECIFIED TYPE: ICD-10-CM

## 2022-12-13 DIAGNOSIS — G47.33 OBSTRUCTIVE SLEEP APNEA SYNDROME: ICD-10-CM

## 2022-12-13 DIAGNOSIS — Z98.890 S/P ASCENDING AORTIC ANEURYSM REPAIR: ICD-10-CM

## 2022-12-13 DIAGNOSIS — R06.02 SOB (SHORTNESS OF BREATH): ICD-10-CM

## 2022-12-13 PROCEDURE — 99214 OFFICE O/P EST MOD 30 MIN: CPT | Mod: PBBFAC

## 2022-12-13 RX ORDER — ROSUVASTATIN CALCIUM 5 MG/1
5 TABLET, COATED ORAL NIGHTLY
Qty: 90 TABLET | Refills: 3 | Status: SHIPPED | OUTPATIENT
Start: 2022-12-13 | End: 2023-05-29

## 2022-12-13 RX ORDER — METOPROLOL SUCCINATE 25 MG/1
12.5 TABLET, EXTENDED RELEASE ORAL DAILY
Qty: 45 TABLET | Refills: 3 | Status: SHIPPED | OUTPATIENT
Start: 2022-12-13 | End: 2023-05-29

## 2022-12-13 RX ORDER — LOSARTAN POTASSIUM 50 MG/1
50 TABLET ORAL DAILY
Qty: 90 TABLET | Refills: 3 | Status: SHIPPED | OUTPATIENT
Start: 2022-12-13 | End: 2023-04-03

## 2022-12-13 RX ORDER — FUROSEMIDE 20 MG/1
20 TABLET ORAL DAILY PRN
Qty: 30 TABLET | Refills: 3 | Status: SHIPPED | OUTPATIENT
Start: 2022-12-13 | End: 2023-09-21 | Stop reason: SDUPTHER

## 2022-12-13 RX ORDER — HYDROXYZINE HYDROCHLORIDE 25 MG/1
25 TABLET, FILM COATED ORAL 4 TIMES DAILY PRN
Qty: 90 TABLET | Refills: 1 | Status: SHIPPED | OUTPATIENT
Start: 2022-12-13 | End: 2023-09-21 | Stop reason: SINTOL

## 2022-12-13 RX ORDER — ISOSORBIDE MONONITRATE 30 MG/1
30 TABLET, EXTENDED RELEASE ORAL DAILY
COMMUNITY
End: 2022-12-13

## 2022-12-13 RX ORDER — PANTOPRAZOLE SODIUM 40 MG/1
40 TABLET, DELAYED RELEASE ORAL DAILY
COMMUNITY
End: 2023-05-29

## 2022-12-13 RX ORDER — HYDROXYZINE HYDROCHLORIDE 25 MG/1
25 TABLET, FILM COATED ORAL 4 TIMES DAILY PRN
COMMUNITY
End: 2022-12-13 | Stop reason: SDUPTHER

## 2022-12-13 NOTE — PROGRESS NOTES
"  [facility] INTERNAL MEDICINE  OUTPATIENT OFFICE VISIT NOTE    SUBJECTIVE:      HPI: Blanquita Montoya is a 66-year-old  female with PMH of hypothyroidism, MIKI on CPAP, hypertension, hyperlipidemia, nonobstructive CAD, prediabetes, GERD, osteopenia, vitamin D deficiency, insomnia, and Thoracic AAA repair in 2022 post op course complicated with ?seizure, DVT/PE, and CVA who presents to IM clinic for follow-up and medication refills/management.    Finished 3 months of Xarelto   Endorsing right groin numbness/pain from angiogram site, ordered ultrasound  endorsing myalgias with current dose of Crestor, reducing  Meds refilled  No other complaints at this time  Complaint with medication  Reviewed echo, continue arb, start beta-blocker     Lives in [place] with .  Works at a grocery store.  Denies tobacco, alcohol, or drug use.  Previous 2 PPD smoker, quit about 12 years ago    TTE 11/2022    The left ventricle is normal in size with concentric remodeling and low normal systolic function.  The estimated ejection fraction is 50%.  Normal left ventricular diastolic function.  Normal right ventricular size with normal right ventricular systolic function.  Mild left atrial enlargement.  Mild aortic regurgitation.  Mild mitral regurgitation.  Mild to moderate tricuspid regurgitation.  There is no pulmonary hypertension.  The estimated PA systolic pressure is 31 mmHg.  Normal central venous pressure (3 mmHg).    ROS:  (+)   (-) palpitations, CP, SOB, dizziness, syncope, edema, PND, orthopnea, claudication, cough, fever, chills, abdominal pain, vomiting, diarrhea, dysuria, bleeding    OBJECTIVE:     Vital signs:   BP (!) 148/77 (BP Location: Left arm, Patient Position: Sitting, BP Method: Large (Automatic))   Pulse 63   Temp 98.1 °F (36.7 °C) (Oral)   Resp 20   Ht 5' 7.01" (1.702 m)   Wt 89.2 kg (196 lb 9.6 oz)   SpO2 100%   BMI 30.78 kg/m²      Physical Examination:  General:  Well-nourished, " well-developed, no acute distress  HEENT: Normocephalic, atraumatic. EOMI.  MMM  Neck: Supple. No obvious JVD.  Normal range of motion.  Respiratory: CTAB.  No obvious crackles wheeze or rhonchi.  Normal work of breathing.  Cardiovascular:  RRR.  No obvious M/G/R. Warm extremities.  Peripheral pulses intact.  No edema.  Gastrointestinal:  Soft, nontender, nondistended.  Normal bowel sounds.  Neurologic: ANOx3.  Answering questions/following commands appropriately.  No FND    Current Outpatient Medications:     amLODIPine (NORVASC) 10 MG tablet, Take 1 tablet (10 mg total) by mouth once daily., Disp: 90 tablet, Rfl: 3    aspirin (ECOTRIN) 81 MG EC tablet, Take 1 tablet (81 mg total) by mouth once daily., Disp: 90 tablet, Rfl: 3    levothyroxine (SYNTHROID) 100 MCG tablet, Take 1 tablet (100 mcg total) by mouth before breakfast., Disp: 90 tablet, Rfl: 3    pantoprazole (PROTONIX) 40 MG tablet, Take 40 mg by mouth once daily., Disp: , Rfl:     furosemide (LASIX) 20 MG tablet, Take 1 tablet (20 mg total) by mouth daily as needed (as needed for shortness of breath)., Disp: 30 tablet, Rfl: 3    hydrOXYzine HCL (ATARAX) 25 MG tablet, Take 1 tablet (25 mg total) by mouth 4 (four) times daily as needed for Itching or Anxiety., Disp: 90 tablet, Rfl: 1    losartan (COZAAR) 50 MG tablet, Take 1 tablet (50 mg total) by mouth once daily., Disp: 90 tablet, Rfl: 3    metoprolol succinate (TOPROL-XL) 25 MG 24 hr tablet, Take 0.5 tablets (12.5 mg total) by mouth once daily., Disp: 45 tablet, Rfl: 3    rosuvastatin (CRESTOR) 5 MG tablet, Take 1 tablet (5 mg total) by mouth every evening., Disp: 90 tablet, Rfl: 3     ASSESSMENT & PLAN:     Hypertension  -controlled, Continue amlodipine 10 and losartan 50, refilled  -start Toprol 12.5 daily  -Intolerant to Procardia/imdur in the past secondary to side effects of headache/dizziness  -Educated on DASH diet and exercise as tolerated     Hyperlipidemia  -LDL 61  -previously endorsed  myalgias on simvastatin and pravastatin and lipitor  -Continue aspirin 81 and Crestor 5     Thoracic ascending aortic aneurysm s/p repair with CT surgery  Nonobstructive coronary artery disease  -Follows with cardiology  -Echocardiogram from 11/15/2022 as above with EF 50%  -p.r.n. Lasix 20    Post op course with PE/DVT and CVA   -finished Xarelto 20 mg daily x3 months  -aspirin/statin    Right groin  -ultrasound ordered to evaluate pain and neuropathy to rule out postop angiogram complication    Osteopenia  History of Vitamin D deficiency  -Lumbar osteopenia, T score -2.1  -Continue multivitamin with calcium and vitamin D  -Plan to repeat DEXA scan next visit      GERD/gastritis  -Controlled on Protonix 40 daily prn   -EGD: 5/21/2021: with normal esophagus and gastritis. Discontinue NSAIDs.  -Colonoscopy from 10/2021 with diverticulosis of the sigmoid colon, otherwise normal, repeat 5 years     MIKI on CPAP  -Endorses intermittent compliance with CPAP, continue     Hypothyroidism  -continue Synthroid 100 MCG daily  -repeat thyroid function next visit     Insomnia  -Continue melatonin PRN     Routine health maintenance  Mammogram: 11/22, WNL  DEXA: 9/2020, osteopenia of lumbar vertebrae, plan to repeat next visit   Colon cancer screening: Colonoscopy from 10/2021 with diverticulosis of the sigmoid colon, otherwise normal, repeat 5 years in 2026  GYN: Follows with gynecology      Vaccines:  Flu vaccine, UTD  Pneumonia 23 on 7/2019  Tdap on 3/2017  Zoster series completed 12/2020  COVID-19: Vaccinated x2     Antione Whittaker MD

## 2022-12-21 ENCOUNTER — HOSPITAL ENCOUNTER (OUTPATIENT)
Dept: RADIOLOGY | Facility: HOSPITAL | Age: 67
Discharge: HOME | End: 2022-12-21
Attending: STUDENT IN AN ORGANIZED HEALTH CARE EDUCATION/TRAINING PROGRAM
Payer: MEDICARE

## 2022-12-21 DIAGNOSIS — M96.841 POSTPROCEDURAL HEMATOMA OF A MUSCULOSKELETAL STRUCTURE FOLLOWING OTHER PROCEDURE: ICD-10-CM

## 2022-12-21 DIAGNOSIS — R10.31 RIGHT GROIN PAIN: ICD-10-CM

## 2022-12-21 PROCEDURE — 76882 US LMTD JT/FCL EVL NVASC XTR: CPT | Mod: TC,RT

## 2022-12-23 ENCOUNTER — DOCUMENT SCAN (OUTPATIENT)
Dept: HOME HEALTH SERVICES | Facility: HOSPITAL | Age: 67
End: 2022-12-23
Payer: MEDICARE

## 2023-01-07 NOTE — Clinical Note
The procedural consent was signed. A history and physical note was completed in the chart.  08-Apr-2022

## 2023-01-24 ENCOUNTER — OFFICE VISIT (OUTPATIENT)
Dept: CARDIOLOGY | Facility: CLINIC | Age: 68
End: 2023-01-24
Payer: MEDICARE

## 2023-01-24 VITALS
HEIGHT: 67 IN | HEART RATE: 65 BPM | SYSTOLIC BLOOD PRESSURE: 140 MMHG | RESPIRATION RATE: 20 BRPM | OXYGEN SATURATION: 98 % | WEIGHT: 204.38 LBS | BODY MASS INDEX: 32.08 KG/M2 | DIASTOLIC BLOOD PRESSURE: 72 MMHG | TEMPERATURE: 98 F

## 2023-01-24 DIAGNOSIS — Z87.891 HISTORY OF TOBACCO USE: ICD-10-CM

## 2023-01-24 DIAGNOSIS — E78.5 HYPERLIPIDEMIA, UNSPECIFIED HYPERLIPIDEMIA TYPE: Primary | ICD-10-CM

## 2023-01-24 DIAGNOSIS — I67.89 OTHER CEREBROVASCULAR DISEASE: ICD-10-CM

## 2023-01-24 DIAGNOSIS — I10 HYPERTENSION, UNSPECIFIED TYPE: ICD-10-CM

## 2023-01-24 DIAGNOSIS — I48.91 ATRIAL FIBRILLATION, UNSPECIFIED TYPE: ICD-10-CM

## 2023-01-24 DIAGNOSIS — R00.2 PALPITATIONS: ICD-10-CM

## 2023-01-24 DIAGNOSIS — Z86.73 HISTORY OF CVA (CEREBROVASCULAR ACCIDENT) WITHOUT RESIDUAL DEFICITS: ICD-10-CM

## 2023-01-24 DIAGNOSIS — Z98.890 S/P ASCENDING AORTIC ANEURYSM REPAIR: ICD-10-CM

## 2023-01-24 DIAGNOSIS — Z86.79 S/P ASCENDING AORTIC ANEURYSM REPAIR: ICD-10-CM

## 2023-01-24 PROCEDURE — 99215 OFFICE O/P EST HI 40 MIN: CPT | Mod: PBBFAC | Performed by: STUDENT IN AN ORGANIZED HEALTH CARE EDUCATION/TRAINING PROGRAM

## 2023-01-24 NOTE — PROGRESS NOTES
[facility]   Cariology Clinic - Follow Up     Date of Visit: 1/24/2023  Reason for Visit/Chief Complaint:   Chief Complaint    Occ. SOB on exertion no C/P          I have explained to Ms. Blanquita Montoya that I am not a cardiologist. She understands that I am an internal medicine physician seeing patients in the cardiology clinic. Ms. Blanquita Montoya has expressed understanding of this fact and is willing to proceed with this visit.     The patient was discussed with the cardiologist at the time of the appointment.     History of Present Illness:      Blanquita Montoya is a 67 y.o. female with a PMH significant for ascending aortic aneurysm s/p repair, post op AF, HTN, HLD, non-obstructive CAD on coronary angiogram, history of CVA without deficits, and MIKI who presents to cardiology clinic for follow up. She was diagnosed with Afib post-operatively. She presents today with continue TORRES and SOB. She also complains of claudication symptoms.     CP:  The patient has no chest discomfort.      SOB:  The patient has shortness of breath Has TORRES    EDEMA:  The patient denies edema.        ORTHOPNEA:  The patient denies orthopnea.  No PND.      SYNCOPE:  The patient has near syncope.  No syncope.   Has dizziness.    PALPITATIONS:  The patient has no palpitations.    LEVEL OF EXERTION:  The patient does light house work and has symptoms with this level of exertion.  The patient's level of exertion is limited.    Past Medical History:        Past Medical History:   Diagnosis Date    Anxiety disorder     Coronary artery disease     Current use of long term anticoagulation 9/16/2022    GERD (gastroesophageal reflux disease)     History of CVA (cerebrovascular accident) without residual deficits 9/16/2022    History of deep venous thrombosis (DVT) of distal vein of right lower extremity 9/16/2022    History of pulmonary embolism 9/16/2022    Hypercholesterolemia     Hypertension     Hypothyroidism      Non-healing open wound of right groin 2022    Obesity     S/P ascending aortic aneurysm repair 2022    S/P CABG (coronary artery bypass graft) 2022    Seizure in childhood     last one age 11    Sleep apnea     does not currently use CPAP    Thoracic aortic aneurysm 03/10/2022    4.5X4.4 Ascending Aorta as of 3/10/2022    Thoracic aortic aneurysm     Thyroid disease        Surgical History:        Past Surgical History:   Procedure Laterality Date    ANGIOGRAM, CORONARY, WITH LEFT HEART CATHETERIZATION N/A 2022    Procedure: Angiogram, Coronary, with Left Heart Cath;  Surgeon: Harry Jj MD;  Location: [facility] CATH LAB;  Service: Cardiology;  Laterality: N/A;    COLONOSCOPY  10/14/2021    Dr. Cho Person    HYSTERECTOMY      REPAIR OF ASCENDING AORTA N/A 2022    Procedure: REPAIR, AORTA, ASCENDING;  Surgeon: Alec Vega IV, MD;  Location: [facility] OR;  Service: Cardiovascular;  Laterality: N/A;  ECHO NOTIFIED    RT KNEE         Family History:        Family History   Problem Relation Age of Onset    Heart disease Mother     Uterine cancer Mother     Lung cancer Father     Seizures Sister     Heart disease Sister        Social History:        Social History     Tobacco Use    Smoking status: Former     Types: Cigarettes     Quit date: 2010     Years since quittin.5    Smokeless tobacco: Never   Substance Use Topics    Alcohol use: Not Currently    Drug use: Not Currently       Allergies:         Review of patient's allergies indicates:   Allergen Reactions    Tramadol Hives     Other reaction(s): sweating    Meperidine Rash     Other reaction(s): unknown       Medications:        Current Outpatient Medications   Medication Sig Dispense Refill    amLODIPine (NORVASC) 10 MG tablet Take 1 tablet (10 mg total) by mouth once daily. 90 tablet 3    aspirin (ECOTRIN) 81 MG EC tablet Take 1 tablet (81 mg total) by mouth once daily. 90 tablet 3    furosemide (LASIX) 20 MG tablet  Take 1 tablet (20 mg total) by mouth daily as needed (as needed for shortness of breath). 30 tablet 3    hydrOXYzine HCL (ATARAX) 25 MG tablet Take 1 tablet (25 mg total) by mouth 4 (four) times daily as needed for Itching or Anxiety. 90 tablet 1    levothyroxine (SYNTHROID) 100 MCG tablet Take 1 tablet (100 mcg total) by mouth before breakfast. 90 tablet 3    losartan (COZAAR) 50 MG tablet Take 1 tablet (50 mg total) by mouth once daily. 90 tablet 3    metoprolol succinate (TOPROL-XL) 25 MG 24 hr tablet Take 0.5 tablets (12.5 mg total) by mouth once daily. 45 tablet 3    multivit-min/iron/folic/lutein (CENTRUM SILVER WOMEN ORAL) Take 1 tablet by mouth Daily.      pantoprazole (PROTONIX) 40 MG tablet Take 40 mg by mouth once daily.      rosuvastatin (CRESTOR) 5 MG tablet Take 1 tablet (5 mg total) by mouth every evening. 90 tablet 3     No current facility-administered medications for this visit.       I have reviewed and updated the patient's medications, allergies, past medical history, surgical history, social history and family history as needed.    Review of Systems:      Review of Systems   Constitutional:  Negative for chills, diaphoresis and fever.   HENT:  Negative for hearing loss and nosebleeds.    Eyes:  Negative for blurred vision.   Respiratory:  Positive for shortness of breath.    Cardiovascular:  Positive for claudication. Negative for chest pain, palpitations, orthopnea and PND.   Gastrointestinal:  Negative for nausea and vomiting.   Genitourinary:  Negative for dysuria.   Musculoskeletal:  Negative for myalgias.   Skin:  Negative for rash.   Neurological:  Positive for dizziness. Negative for headaches.     Objective:        Vitals:    01/24/23 0900   BP: 130/70   Pulse: (!) 57   Resp: 20   Temp: 97.7 °F (36.5 °C)     Wt Readings from Last 3 Encounters:   01/24/23 92.7 kg (204 lb 6.4 oz)   12/13/22 89.2 kg (196 lb 9.6 oz)   11/15/22 85.3 kg (188 lb)     Temp Readings from Last 3 Encounters:  "  01/24/23 97.7 °F (36.5 °C) (Oral)   12/13/22 98.1 °F (36.7 °C) (Oral)   10/13/22 98.2 °F (36.8 °C)     BP Readings from Last 3 Encounters:   01/24/23 130/70   12/13/22 (!) 148/77   11/15/22 (!) 151/75     Pulse Readings from Last 3 Encounters:   01/24/23 (!) 57   12/13/22 63   10/13/22 69       Vitals:    01/24/23 0900   BP: 130/70   BP Location: Left arm   Patient Position: Sitting   BP Method: Medium (Automatic)   Pulse: (!) 57   Resp: 20   Temp: 97.7 °F (36.5 °C)   TempSrc: Oral   SpO2: 98%   Weight: 92.7 kg (204 lb 6.4 oz)   Height: 5' 7" (1.702 m)     Body mass index is 32.01 kg/m².    Physical Exam  Constitutional:       Appearance: She is well-developed.   HENT:      Head: Normocephalic and atraumatic.   Eyes:      Conjunctiva/sclera: Conjunctivae normal.   Neck:      Vascular: No carotid bruit or JVD.   Cardiovascular:      Rate and Rhythm: Normal rate and regular rhythm.      Chest Wall: PMI is not displaced.      Pulses: Normal pulses and intact distal pulses.      Heart sounds: No midsystolic click. No murmur heard.    No friction rub. No gallop.   Pulmonary:      Effort: Pulmonary effort is normal.      Breath sounds: Normal breath sounds.   Abdominal:      General: Abdomen is flat. Bowel sounds are normal.   Musculoskeletal:      Right lower leg: No edema.      Left lower leg: No edema.   Skin:     General: Skin is warm and dry.   Neurological:      Mental Status: She is alert. Mental status is at baseline.   Psychiatric:         Mood and Affect: Mood normal.         Behavior: Behavior normal. Behavior is cooperative.         Judgment: Judgment normal.        Labs:      I have reviewed the following labs below:      CBC:  Lab Results   Component Value Date    WBC 8.1 10/03/2022    HGB 12.0 10/03/2022    HCT 37.2 10/03/2022     10/03/2022    MCV 89.2 10/03/2022    RDW 14.0 10/03/2022     BMP:  Lab Results   Component Value Date     10/03/2022    K 3.7 10/03/2022    CO2 28 10/03/2022    BUN " 9.2 (L) 10/03/2022    CALCIUM 9.2 10/03/2022    MG 2.10 08/22/2022    PHOS 4.1 08/11/2022     LFTs:  Lab Results   Component Value Date    ALBUMIN 3.0 (L) 10/03/2022    BILITOT 0.7 10/03/2022    AST 18 10/03/2022    ALKPHOS 140 10/03/2022    ALT 28 10/03/2022     FLP:  Cholesterol Total   Date Value Ref Range Status   09/14/2022 120 <=200 mg/dL Final     HDL Cholesterol   Date Value Ref Range Status   09/14/2022 48 35 - 60 mg/dL Final     LDL Cholesterol   Date Value Ref Range Status   09/14/2022 61.00 50.00 - 140.00 mg/dL Final     Triglyceride   Date Value Ref Range Status   09/14/2022 57 37 - 140 mg/dL Final     DM:  Lab Results   Component Value Date    HGBA1C 5.3 09/14/2022    HGBA1C 5.8 07/07/2022    HGBA1C 5.7 09/23/2021    CREATININE 0.80 10/03/2022     Thyroid:  Lab Results   Component Value Date    TSH 0.4039 10/03/2022     Anemia:  Lab Results   Component Value Date    HPOOHDZL44 444 09/14/2022    FOLATE 6.7 02/14/2018     Coags:  Lab Results   Component Value Date    INR 1.12 08/19/2022    PROTIME 14.3 08/19/2022      Cardiac:  Lab Results   Component Value Date    TROPONINI <0.010 10/03/2022    TROPONINI 0.012 08/19/2022    TROPONINI <0.010 05/26/2022    TROPONINI <0.010 05/20/2021    TROPONINI <0.010 05/20/2021    .8 (H) 10/03/2022    .3 (H) 08/19/2022    .6 (H) 05/26/2022       Cardiac Studies/Imaging:        I have reviewed the following studies below:        Echocardiogram  Results for orders placed during the hospital encounter of 11/15/22    Echo    Interpretation Summary  · The left ventricle is normal in size with concentric remodeling and low normal systolic function.  · The estimated ejection fraction is 50%.  · Normal left ventricular diastolic function.  · Normal right ventricular size with normal right ventricular systolic function.  · Mild left atrial enlargement.  · Mild aortic regurgitation.  · Mild mitral regurgitation.  · Mild to moderate tricuspid  regurgitation.  · There is no pulmonary hypertension.  · The estimated PA systolic pressure is 31 mmHg.  · Normal central venous pressure (3 mmHg).         Coronary Angiogram  Results for orders placed during the hospital encounter of 07/11/22    Cardiac catheterization    Conclusion  · The pre-procedure left ventricular end diastolic pressure was 10.  · The estimated blood loss was none.  · There was non-obstructive coronary artery disease..    The procedure log was documented by Documenter: Larisa Deng RN and verified by Harry Jj MD.    FINDINGS  Anomalous origin of RCA    RECOMMENDATIONS  Medical management  Risk factor modifications  Activity -- avoid straining with affected limb for one week  Exercise -- as tolerated  Make follow-up appointment with CV surgery for aneurysm repair    Date: 7/11/2022  Time: 11:13 AM         Assessment & Plan:      67 y.o. female with the following medical problems:    TORRES and SOB  - Workup as above is negative  - Will D/C beta blocker   - patient given instructions to check HR daily and if >80 bpm to call the clinic   - Will recommend PFTs to be completed by primary care     Ascending Aortic Aneurysm s/p Repair  -Performed on 7/29/2022  - BP well controlled   -Optimize risk factors.   -In the future consider outpatient imaging to assess graft repair  -stopping metoprolol given symptms      3. HTN  -Blood pressure intermittently at goal  -Continue Losartan 100 mg and Norvasc 10 mg daily.   -If additional medication is needed for treatment at thiazide.      4. HLD  -Continue high intensity statin.      5.  History of CVA  -Continue MAPT and high intensity statin  - US carotid ordered     6. Post Op AF  -Initially placed on Amiodarone but since discontinued.   -Had CROW ligation but still potential risk for cardioembolic CVA.   - CHADSVASc 5  - Xarelto discontinued after 3 months   -Plan for outpatient monitoring to assess AF burden to determine the need for continued  Xarelto    7. Post Op DVT  -Pt told she only needed to  be on Xarelto for 3 months       Return to clinic in general cards in three months.      Future Appointments   Date Time Provider Department Center   4/3/2023  9:10 AM RESIDENT 3, [facility] INTERNAL MEDICINE [facility] VIKAS Horton DO  Internal Medicine Physician   Department of Medicine   [facility]    01/24/2023 9:05 AM

## 2023-01-31 RX ORDER — ALBUTEROL SULFATE 90 UG/1
1-2 AEROSOL, METERED RESPIRATORY (INHALATION) EVERY 6 HOURS PRN
Qty: 18 G | Refills: 3 | Status: SHIPPED | OUTPATIENT
Start: 2023-01-31

## 2023-01-31 RX ORDER — DICLOFENAC SODIUM 10 MG/G
2 GEL TOPICAL 3 TIMES DAILY PRN
Qty: 100 G | Refills: 3 | Status: SHIPPED | OUTPATIENT
Start: 2023-01-31 | End: 2024-01-11 | Stop reason: SDUPTHER

## 2023-02-02 ENCOUNTER — HOSPITAL ENCOUNTER (OUTPATIENT)
Dept: RADIOLOGY | Facility: HOSPITAL | Age: 68
Discharge: HOME | End: 2023-02-02
Attending: STUDENT IN AN ORGANIZED HEALTH CARE EDUCATION/TRAINING PROGRAM
Payer: MEDICARE

## 2023-02-02 DIAGNOSIS — I67.89 OTHER CEREBROVASCULAR DISEASE: ICD-10-CM

## 2023-02-02 DIAGNOSIS — Z86.73 HISTORY OF CVA (CEREBROVASCULAR ACCIDENT) WITHOUT RESIDUAL DEFICITS: ICD-10-CM

## 2023-02-02 LAB
LEFT CBA DIAS: 9 CM/S
LEFT CBA SYS: 38 CM/S
LEFT CCA DIST DIAS: 16 CM/S
LEFT CCA DIST SYS: 58 CM/S
LEFT CCA MID DIAS: 13 CM/S
LEFT CCA MID SYS: 61 CM/S
LEFT CCA PROX DIAS: 14 CM/S
LEFT CCA PROX SYS: 66 CM/S
LEFT ECA DIAS: 14 CM/S
LEFT ECA SYS: 61 CM/S
LEFT ICA DIST DIAS: 16 CM/S
LEFT ICA DIST SYS: 62 CM/S
LEFT ICA MID DIAS: 23 CM/S
LEFT ICA MID SYS: 76 CM/S
LEFT ICA PROX DIAS: 14 CM/S
LEFT ICA PROX SYS: 55 CM/S
LEFT VERTEBRAL DIAS: 12 CM/S
LEFT VERTEBRAL SYS: 56 CM/S
OHS CV CAROTID RIGHT ICA EDV HIGHEST: 22
OHS CV CAROTID ULTRASOUND LEFT ICA/CCA RATIO: 1.31
OHS CV CAROTID ULTRASOUND RIGHT ICA/CCA RATIO: 1.82
OHS CV PV CAROTID LEFT HIGHEST CCA: 66
OHS CV PV CAROTID LEFT HIGHEST ICA: 76
OHS CV PV CAROTID RIGHT HIGHEST CCA: 85
OHS CV PV CAROTID RIGHT HIGHEST ICA: 80
OHS CV US CAROTID LEFT HIGHEST EDV: 23
RIGHT CBA DIAS: 14 CM/S
RIGHT CBA SYS: 51 CM/S
RIGHT CCA DIST DIAS: 14 CM/S
RIGHT CCA DIST SYS: 44 CM/S
RIGHT CCA MID DIAS: 18 CM/S
RIGHT CCA MID SYS: 71 CM/S
RIGHT CCA PROX DIAS: 14 CM/S
RIGHT CCA PROX SYS: 85 CM/S
RIGHT ECA DIAS: 15 CM/S
RIGHT ECA SYS: 98 CM/S
RIGHT ICA DIST DIAS: 22 CM/S
RIGHT ICA DIST SYS: 80 CM/S
RIGHT ICA MID DIAS: 18 CM/S
RIGHT ICA MID SYS: 54 CM/S
RIGHT ICA PROX DIAS: 12 CM/S
RIGHT ICA PROX SYS: 44 CM/S
RIGHT VERTEBRAL DIAS: 10 CM/S
RIGHT VERTEBRAL SYS: 49 CM/S

## 2023-02-02 PROCEDURE — 93880 EXTRACRANIAL BILAT STUDY: CPT

## 2023-02-06 ENCOUNTER — TELEPHONE (OUTPATIENT)
Dept: CARDIOLOGY | Facility: CLINIC | Age: 68
End: 2023-02-06
Payer: MEDICARE

## 2023-02-06 NOTE — TELEPHONE ENCOUNTER
Per DR Callie Horton's instructions called to inform patient that her carotid US was WNL.         DO HAJA Valdez [facility] Cardiology Clinical Support Staff  Please let Ms. Juares know her carotid US is within normal limits.

## 2023-03-08 ENCOUNTER — HOSPITAL ENCOUNTER (OUTPATIENT)
Dept: CARDIOLOGY | Facility: HOSPITAL | Age: 68
Discharge: HOME | End: 2023-03-08
Attending: STUDENT IN AN ORGANIZED HEALTH CARE EDUCATION/TRAINING PROGRAM
Payer: MEDICARE

## 2023-03-08 DIAGNOSIS — I48.91 ATRIAL FIBRILLATION, UNSPECIFIED TYPE: ICD-10-CM

## 2023-03-08 DIAGNOSIS — R00.2 PALPITATIONS: ICD-10-CM

## 2023-03-08 PROCEDURE — 93229 REMOTE 30 DAY ECG TECH SUPP: CPT

## 2023-03-22 ENCOUNTER — TELEPHONE (OUTPATIENT)
Dept: INTERNAL MEDICINE | Facility: CLINIC | Age: 68
End: 2023-03-22
Payer: MEDICARE

## 2023-03-22 DIAGNOSIS — Z01.89 ROUTINE LAB DRAW: Primary | ICD-10-CM

## 2023-03-22 DIAGNOSIS — I10 HYPERTENSION, UNSPECIFIED TYPE: ICD-10-CM

## 2023-03-22 NOTE — TELEPHONE ENCOUNTER
Venus with  [facility] called requesting a call back nurse Lacey concerning Mrs Montoya. Please Advise

## 2023-03-23 NOTE — TELEPHONE ENCOUNTER
Please call Ms. Donovan with [facility] in regards to Ms. MonsalveMontoya @ [phone]. Thanks

## 2023-03-27 NOTE — TELEPHONE ENCOUNTER
Good Morning. There are no orders for these labs to fax over. Are you planning to order this labs ?

## 2023-03-30 ENCOUNTER — LAB REQUISITION (OUTPATIENT)
Dept: LAB | Facility: HOSPITAL | Age: 68
End: 2023-03-30
Attending: STUDENT IN AN ORGANIZED HEALTH CARE EDUCATION/TRAINING PROGRAM
Payer: MEDICARE

## 2023-03-30 DIAGNOSIS — Z01.89 ENCOUNTER FOR OTHER SPECIFIED SPECIAL EXAMINATIONS: ICD-10-CM

## 2023-03-30 LAB
ALBUMIN SERPL-MCNC: 3.7 G/DL (ref 3.4–4.8)
ALBUMIN/GLOB SERPL: 1.2 RATIO (ref 1.1–2)
ALP SERPL-CCNC: 111 UNIT/L (ref 40–150)
ALT SERPL-CCNC: 23 UNIT/L (ref 0–55)
AST SERPL-CCNC: 20 UNIT/L (ref 5–34)
BASOPHILS # BLD AUTO: 0.07 X10(3)/MCL (ref 0–0.2)
BASOPHILS NFR BLD AUTO: 0.9 %
BILIRUBIN DIRECT+TOT PNL SERPL-MCNC: 0.3 MG/DL
BNP BLD-MCNC: 93.3 PG/ML
BUN SERPL-MCNC: 14.7 MG/DL (ref 9.8–20.1)
CALCIUM SERPL-MCNC: 9.3 MG/DL (ref 8.4–10.2)
CHLORIDE SERPL-SCNC: 102 MMOL/L (ref 98–107)
CO2 SERPL-SCNC: 26 MMOL/L (ref 23–31)
CREAT SERPL-MCNC: 0.85 MG/DL (ref 0.55–1.02)
EOSINOPHIL # BLD AUTO: 0.3 X10(3)/MCL (ref 0–0.9)
EOSINOPHIL NFR BLD AUTO: 3.9 %
ERYTHROCYTE [DISTWIDTH] IN BLOOD BY AUTOMATED COUNT: 13.6 % (ref 11.5–17)
GFR SERPLBLD CREATININE-BSD FMLA CKD-EPI: >60 MLS/MIN/1.73/M2
GLOBULIN SER-MCNC: 3.1 GM/DL (ref 2.4–3.5)
GLUCOSE SERPL-MCNC: 113 MG/DL (ref 82–115)
HCT VFR BLD AUTO: 43.2 % (ref 37–47)
HGB BLD-MCNC: 13.9 G/DL (ref 12–16)
IMM GRANULOCYTES # BLD AUTO: 0 X10(3)/MCL (ref 0–0.04)
IMM GRANULOCYTES NFR BLD AUTO: 0 %
LYMPHOCYTES # BLD AUTO: 1.44 X10(3)/MCL (ref 0.6–4.6)
LYMPHOCYTES NFR BLD AUTO: 18.9 %
MCH RBC QN AUTO: 29 PG (ref 27–31)
MCHC RBC AUTO-ENTMCNC: 32.2 G/DL (ref 33–36)
MCV RBC AUTO: 90 FL (ref 80–94)
MONOCYTES # BLD AUTO: 0.46 X10(3)/MCL (ref 0.1–1.3)
MONOCYTES NFR BLD AUTO: 6 %
NEUTROPHILS # BLD AUTO: 5.36 X10(3)/MCL (ref 2.1–9.2)
NEUTROPHILS NFR BLD AUTO: 70.3 %
PLATELET # BLD AUTO: 187 X10(3)/MCL (ref 130–400)
PMV BLD AUTO: 12 FL (ref 7.4–10.4)
POTASSIUM SERPL-SCNC: 4 MMOL/L (ref 3.5–5.1)
PROT SERPL-MCNC: 6.8 GM/DL (ref 5.8–7.6)
RBC # BLD AUTO: 4.8 X10(6)/MCL (ref 4.2–5.4)
SODIUM SERPL-SCNC: 139 MMOL/L (ref 136–145)
WBC # SPEC AUTO: 7.6 X10(3)/MCL (ref 4.5–11.5)

## 2023-03-30 PROCEDURE — 85025 COMPLETE CBC W/AUTO DIFF WBC: CPT | Performed by: STUDENT IN AN ORGANIZED HEALTH CARE EDUCATION/TRAINING PROGRAM

## 2023-03-30 PROCEDURE — 80053 COMPREHEN METABOLIC PANEL: CPT | Performed by: STUDENT IN AN ORGANIZED HEALTH CARE EDUCATION/TRAINING PROGRAM

## 2023-03-30 PROCEDURE — 83880 ASSAY OF NATRIURETIC PEPTIDE: CPT | Performed by: STUDENT IN AN ORGANIZED HEALTH CARE EDUCATION/TRAINING PROGRAM

## 2023-04-03 ENCOUNTER — OFFICE VISIT (OUTPATIENT)
Dept: INTERNAL MEDICINE | Facility: CLINIC | Age: 68
End: 2023-04-03
Payer: MEDICARE

## 2023-04-03 ENCOUNTER — HOSPITAL ENCOUNTER (OUTPATIENT)
Dept: PULMONOLOGY | Facility: HOSPITAL | Age: 68
Discharge: HOME | End: 2023-04-03
Payer: MEDICARE

## 2023-04-03 VITALS
OXYGEN SATURATION: 98 % | DIASTOLIC BLOOD PRESSURE: 73 MMHG | HEART RATE: 53 BPM | WEIGHT: 204.38 LBS | RESPIRATION RATE: 18 BRPM | TEMPERATURE: 98 F | BODY MASS INDEX: 32.01 KG/M2 | SYSTOLIC BLOOD PRESSURE: 132 MMHG

## 2023-04-03 DIAGNOSIS — Z86.79 S/P ASCENDING AORTIC ANEURYSM REPAIR: ICD-10-CM

## 2023-04-03 DIAGNOSIS — R06.02 SOB (SHORTNESS OF BREATH): ICD-10-CM

## 2023-04-03 DIAGNOSIS — Z86.73 HISTORY OF CVA (CEREBROVASCULAR ACCIDENT) WITHOUT RESIDUAL DEFICITS: ICD-10-CM

## 2023-04-03 DIAGNOSIS — M85.80 OSTEOPENIA, UNSPECIFIED LOCATION: ICD-10-CM

## 2023-04-03 DIAGNOSIS — E03.9 HYPOTHYROIDISM, UNSPECIFIED TYPE: ICD-10-CM

## 2023-04-03 DIAGNOSIS — I82.511 CHRONIC EMBOLISM AND THROMBOSIS OF RIGHT FEMORAL VEIN: ICD-10-CM

## 2023-04-03 DIAGNOSIS — I10 HYPERTENSION, UNSPECIFIED TYPE: ICD-10-CM

## 2023-04-03 DIAGNOSIS — R73.03 PREDIABETES: ICD-10-CM

## 2023-04-03 DIAGNOSIS — Z86.718 HISTORY OF DEEP VENOUS THROMBOSIS (DVT) OF DISTAL VEIN OF RIGHT LOWER EXTREMITY: Primary | ICD-10-CM

## 2023-04-03 DIAGNOSIS — E78.5 HYPERLIPIDEMIA, UNSPECIFIED HYPERLIPIDEMIA TYPE: ICD-10-CM

## 2023-04-03 DIAGNOSIS — G47.00 INSOMNIA, UNSPECIFIED TYPE: ICD-10-CM

## 2023-04-03 DIAGNOSIS — K21.9 GASTROESOPHAGEAL REFLUX DISEASE, UNSPECIFIED WHETHER ESOPHAGITIS PRESENT: ICD-10-CM

## 2023-04-03 DIAGNOSIS — M19.90 OSTEOARTHRITIS, UNSPECIFIED OSTEOARTHRITIS TYPE, UNSPECIFIED SITE: ICD-10-CM

## 2023-04-03 DIAGNOSIS — H66.90 EAR INFECTION: ICD-10-CM

## 2023-04-03 DIAGNOSIS — J44.9 CHRONIC OBSTRUCTIVE PULMONARY DISEASE, UNSPECIFIED COPD TYPE: ICD-10-CM

## 2023-04-03 DIAGNOSIS — G47.33 OBSTRUCTIVE SLEEP APNEA SYNDROME: ICD-10-CM

## 2023-04-03 DIAGNOSIS — J02.9 SORE THROAT: ICD-10-CM

## 2023-04-03 DIAGNOSIS — Z98.890 S/P ASCENDING AORTIC ANEURYSM REPAIR: ICD-10-CM

## 2023-04-03 PROBLEM — T81.41XA SUPERFICIAL INCISIONAL SURGICAL SITE INFECTION: Status: RESOLVED | Noted: 2022-08-12 | Resolved: 2023-04-03

## 2023-04-03 PROBLEM — Z87.891 HISTORY OF TOBACCO USE: Status: RESOLVED | Noted: 2023-01-24 | Resolved: 2023-04-03

## 2023-04-03 PROBLEM — I35.1 AORTIC VALVE REGURGITATION: Status: RESOLVED | Noted: 2022-05-26 | Resolved: 2023-04-03

## 2023-04-03 PROBLEM — S31.109A RIGHT GROIN WOUND: Status: RESOLVED | Noted: 2022-09-20 | Resolved: 2023-04-03

## 2023-04-03 PROBLEM — S31.103A NON-HEALING OPEN WOUND OF RIGHT GROIN: Status: RESOLVED | Noted: 2022-09-16 | Resolved: 2023-04-03

## 2023-04-03 PROBLEM — Z79.01 CURRENT USE OF LONG TERM ANTICOAGULATION: Status: RESOLVED | Noted: 2022-09-16 | Resolved: 2023-04-03

## 2023-04-03 PROCEDURE — 94060 EVALUATION OF WHEEZING: CPT

## 2023-04-03 PROCEDURE — 94729 DIFFUSING CAPACITY: CPT

## 2023-04-03 PROCEDURE — 94640 AIRWAY INHALATION TREATMENT: CPT

## 2023-04-03 PROCEDURE — 94726 PLETHYSMOGRAPHY LUNG VOLUMES: CPT

## 2023-04-03 PROCEDURE — 99215 OFFICE O/P EST HI 40 MIN: CPT | Mod: PBBFAC,25

## 2023-04-03 RX ORDER — AMOXICILLIN AND CLAVULANATE POTASSIUM 500; 125 MG/1; MG/1
1 TABLET, FILM COATED ORAL 2 TIMES DAILY
Qty: 10 TABLET | Refills: 0 | Status: SHIPPED | OUTPATIENT
Start: 2023-04-03 | End: 2023-04-08

## 2023-04-03 RX ORDER — FLUTICASONE PROPIONATE 100 UG/1
1 POWDER, METERED RESPIRATORY (INHALATION) 2 TIMES DAILY
Qty: 60 EACH | Refills: 6 | Status: SHIPPED | OUTPATIENT
Start: 2023-04-03 | End: 2023-04-04

## 2023-04-03 NOTE — PROGRESS NOTES
[facility] INTERNAL MEDICINE  OUTPATIENT OFFICE VISIT NOTE    SUBJECTIVE:      HPI: Blanquita Montoya is a 67-year-old  female with PMH of hypothyroidism, MIKI on CPAP, hypertension, hyperlipidemia, nonobstructive CAD, prediabetes, GERD, osteopenia, vitamin D deficiency, insomnia, and Thoracic AAA repair in 2022 post op course complicated with ?seizure, DVT/PE, and CVA who presents to IM clinic for follow-up and medication refills/management.    Finished 3 months of Xarelto previously   R leg pain and swelling - repeat US DVT   SOB - PFT scheduled today   Start Flovent   Sore throat and right ear pain - abx   No other complaints at this time  No longer on ARB or BB  Has been taking half of synthroid   Declines PT referral      Lives in [place] with .  Works at a grocery store.  Denies tobacco, alcohol, or drug use.  Previous 2 PPD smoker, quit about 12 years ago    TTE 11/2022    The left ventricle is normal in size with concentric remodeling and low normal systolic function.  The estimated ejection fraction is 50%.  Normal left ventricular diastolic function.  Normal right ventricular size with normal right ventricular systolic function.  Mild left atrial enlargement.  Mild aortic regurgitation.  Mild mitral regurgitation.  Mild to moderate tricuspid regurgitation.  There is no pulmonary hypertension.  The estimated PA systolic pressure is 31 mmHg.  Normal central venous pressure (3 mmHg).    ROS:  (+)   (-) palpitations, CP, SOB, dizziness, syncope, edema, PND, orthopnea, claudication, cough, fever, chills, abdominal pain, vomiting, diarrhea, dysuria, bleeding    OBJECTIVE:     Vital signs:   /73 (BP Location: Left arm, Patient Position: Sitting, BP Method: Large (Automatic))   Pulse (!) 53   Temp 98.1 °F (36.7 °C) (Oral)   Resp 18   Wt 92.7 kg (204 lb 6.4 oz)   SpO2 98%   BMI 32.01 kg/m²      Physical Examination:  General:  Well-nourished, well-developed, no acute distress  HEENT:  Normocephalic, atraumatic. EOMI.  MMM  Neck: Supple. No obvious JVD.  Normal range of motion.  Respiratory: CTAB.  No obvious crackles wheeze or rhonchi.  Normal work of breathing.  Cardiovascular:  RRR.  No obvious M/G/R. Warm extremities.  Peripheral pulses intact.  No edema.  Gastrointestinal:  Soft, nontender, nondistended.  Normal bowel sounds.  Neurologic: ANOx3.  Answering questions/following commands appropriately.  No FND    Current Outpatient Medications:     albuterol (VENTOLIN HFA) 90 mcg/actuation inhaler, Inhale 1-2 puffs into the lungs every 6 (six) hours as needed for Wheezing or Shortness of Breath. Rescue, Disp: 18 g, Rfl: 3    amLODIPine (NORVASC) 10 MG tablet, Take 1 tablet (10 mg total) by mouth once daily., Disp: 90 tablet, Rfl: 3    aspirin (ECOTRIN) 81 MG EC tablet, Take 1 tablet (81 mg total) by mouth once daily., Disp: 90 tablet, Rfl: 3    furosemide (LASIX) 20 MG tablet, Take 1 tablet (20 mg total) by mouth daily as needed (as needed for shortness of breath)., Disp: 30 tablet, Rfl: 3    hydrOXYzine HCL (ATARAX) 25 MG tablet, Take 1 tablet (25 mg total) by mouth 4 (four) times daily as needed for Itching or Anxiety., Disp: 90 tablet, Rfl: 1    levothyroxine (SYNTHROID) 100 MCG tablet, Take 1 tablet (100 mcg total) by mouth before breakfast., Disp: 90 tablet, Rfl: 3    multivit-min/iron/folic/lutein (CENTRUM SILVER WOMEN ORAL), Take 1 tablet by mouth Daily., Disp: , Rfl:     pantoprazole (PROTONIX) 40 MG tablet, Take 40 mg by mouth once daily., Disp: , Rfl:     rosuvastatin (CRESTOR) 5 MG tablet, Take 1 tablet (5 mg total) by mouth every evening., Disp: 90 tablet, Rfl: 3    amoxicillin-clavulanate 500-125mg (AUGMENTIN) 500-125 mg Tab, Take 1 tablet (500 mg total) by mouth 2 (two) times daily. for 5 days, Disp: 10 tablet, Rfl: 0    diclofenac sodium (VOLTAREN) 1 % Gel, Apply 2 g topically 3 (three) times daily as needed (pain). (Patient not taking: Reported on 4/3/2023), Disp: 100 g, Rfl: 3     fluticasone propionate (FLOVENT DISKUS) 100 mcg/actuation inhaler, Inhale 1 puff (100 mcg total) into the lungs 2 (two) times daily. Controller, Disp: 60 each, Rfl: 6     ASSESSMENT & PLAN:     SOB  Hx of Tobacco use   -cont. Albuterol prn   -pfts today   -start flovent     Sore throat/ear pain   -augmentin x 5 days     Hypertension  -controlled, Continue amlodipine 10   -no longer on ARB or BB  -Intolerant to Procardia/imdur in the past secondary to side effects of headache/dizziness  -Educated on DASH diet and exercise as tolerated     Hyperlipidemia  -repeat labs   -previously endorsed myalgias on simvastatin and pravastatin and lipitor  -Continue aspirin 81 and Crestor 5     Thoracic ascending aortic aneurysm s/p repair with CT surgery  Nonobstructive coronary artery disease  -Follows with cardiology  -Echocardiogram from 11/15/2022 as above with EF 50%  -p.r.n. Lasix 20    Post op course with PE/DVT and CVA   -finished Xarelto 20 mg daily x3 months  -aspirin/statin  -repeating US RLE and ddimer to determine need to restart AC     Osteopenia  History of Vitamin D deficiency  -Lumbar osteopenia, T score -2.1  -Continue multivitamin with calcium and vitamin D  -Plan to repeat DEXA scan next visit      GERD/gastritis  -Controlled on Protonix 40 daily prn   -EGD: 5/21/2021: with normal esophagus and gastritis. Discontinue NSAIDs.  -Colonoscopy from 10/2021 with diverticulosis of the sigmoid colon, otherwise normal, repeat 5 years     MIKI on CPAP  -Endorses intermittent compliance with CPAP, continue     Hypothyroidism  -continue Synthroid 50 MCG daily (Rx'ed 100-patient taking 50)  -repeat thyroid function ordered      Insomnia  -Continue melatonin/vistaril PRN     Routine health maintenance  Mammogram: 11/22, WNL  DEXA: 9/2020, osteopenia of lumbar vertebrae, plan to repeat next visit   Colon cancer screening: Colonoscopy from 10/2021 with diverticulosis of the sigmoid colon, otherwise normal, repeat 5 years in  2026  GYN: Follows with gynecology      Vaccines:  Flu vaccine, UTD  Pneumonia 23 on 7/2019  Tdap on 3/2017  Zoster series completed 12/2020  COVID-19: Vaccinated x2     Antione Whittaker MD

## 2023-04-04 LAB
DLCO SINGLE BREATH LLN: 17.7
DLCO SINGLE BREATH PRE REF: 56.2 %
DLCO SINGLE BREATH REF: 23.44
DLCOC SBVA LLN: 2.99
DLCOC SBVA REF: 4.31
DLCOC SINGLE BREATH LLN: 17.7
DLCOC SINGLE BREATH REF: 23.44
DLCOVA LLN: 2.99
DLCOVA PRE REF: 74.3 %
DLCOVA PRE: 3.2 ML/(MIN*MMHG*L) (ref 2.99–5.62)
DLCOVA REF: 4.31
ERV LLN: -16449.29
ERV PRE REF: 124.3 %
ERV REF: 0.71
FEF 25 75 CHG: 32.8 %
FEF 25 75 LLN: 0.97
FEF 25 75 POST REF: 57.3 %
FEF 25 75 PRE REF: 43.2 %
FEF 25 75 REF: 2.06
FET100 CHG: -3.9 %
FEV1 CHG: 16.2 %
FEV1 FVC CHG: 3.2 %
FEV1 FVC LLN: 65
FEV1 FVC POST REF: 96.5 %
FEV1 FVC PRE REF: 93.5 %
FEV1 FVC REF: 78
FEV1 LLN: 1.83
FEV1 POST REF: 62.4 %
FEV1 PRE REF: 53.7 %
FEV1 REF: 2.49
FRCPLETH LLN: 2.06
FRCPLETH PREREF: 124.9 %
FRCPLETH REF: 2.88
FVC CHG: 12.7 %
FVC LLN: 2.37
FVC POST REF: 64 %
FVC PRE REF: 56.8 %
FVC REF: 3.22
IVC PRE: 2.48 L (ref 2.37–4.12)
IVC SINGLE BREATH LLN: 2.37
IVC SINGLE BREATH PRE REF: 76.9 %
IVC SINGLE BREATH REF: 3.22
MVV LLN: 82
MVV PRE REF: 64 %
MVV REF: 97
PEF CHG: 3.9 %
PEF LLN: 4.39
PEF POST REF: 71.1 %
PEF PRE REF: 68.4 %
PEF REF: 6.26
POST FEF 25 75: 1.18 L/S (ref 0.97–3.6)
POST FET 100: 6.7 SEC
POST FEV1 FVC: 75.24 % (ref 64.99–89.1)
POST FEV1: 1.55 L (ref 1.83–3.13)
POST FVC: 2.06 L (ref 2.37–4.12)
POST PEF: 4.45 L/S (ref 4.39–8.14)
PRE DLCO: 13.16 ML/(MIN*MMHG) (ref 17.7–29.17)
PRE ERV: 0.89 L (ref -16449.29–16450.71)
PRE FEF 25 75: 0.89 L/S (ref 0.97–3.6)
PRE FET 100: 6.97 SEC
PRE FEV1 FVC: 72.93 % (ref 64.99–89.1)
PRE FEV1: 1.34 L (ref 1.83–3.13)
PRE FRC PL: 3.6 L (ref 2.06–3.7)
PRE FVC: 1.83 L (ref 2.37–4.12)
PRE MVV: 61.98 L/MIN (ref 82.35–111.42)
PRE PEF: 4.29 L/S (ref 4.39–8.14)
PRE RV: 2.71 L (ref 1.59–2.74)
PRE TLC: 4.97 L (ref 4.45–6.43)
RAW LLN: 3.06
RAW PRE REF: 66.5 %
RAW PRE: 2.03 CMH2O*S/L (ref 3.06–3.06)
RAW REF: 3.06
RV LLN: 1.59
RV PRE REF: 125.1 %
RV REF: 2.17
RVTLC LLN: 32
RVTLC PRE REF: 129.7 %
RVTLC PRE: 54.58 % (ref 32.49–51.67)
RVTLC REF: 42
TLC LLN: 4.45
TLC PRE REF: 91.3 %
TLC REF: 5.44
VA PRE: 4.11 L (ref 5.29–5.29)
VA SINGLE BREATH LLN: 5.29
VA SINGLE BREATH PRE REF: 77.7 %
VA SINGLE BREATH REF: 5.29
VC LLN: 2.37
VC PRE REF: 70.1 %
VC PRE: 2.26 L (ref 2.37–4.12)
VC REF: 3.22

## 2023-04-04 RX ORDER — LEVOTHYROXINE SODIUM 75 UG/1
75 TABLET ORAL
Qty: 90 TABLET | Refills: 3 | Status: SHIPPED | OUTPATIENT
Start: 2023-04-04 | End: 2024-01-11 | Stop reason: SDUPTHER

## 2023-04-04 RX ORDER — IPRATROPIUM BROMIDE AND ALBUTEROL SULFATE 2.5; .5 MG/3ML; MG/3ML
3 SOLUTION RESPIRATORY (INHALATION) EVERY 4 HOURS PRN
Qty: 75 ML | Refills: 6 | Status: SHIPPED | OUTPATIENT
Start: 2023-04-04

## 2023-04-04 RX ORDER — FLUTICASONE PROPIONATE AND SALMETEROL 113; 14 UG/1; UG/1
1 POWDER, METERED RESPIRATORY (INHALATION) 2 TIMES DAILY
Qty: 1 EACH | Refills: 6 | Status: SHIPPED | OUTPATIENT
Start: 2023-04-04 | End: 2023-09-21

## 2023-04-06 DIAGNOSIS — Z86.718 HISTORY OF DEEP VENOUS THROMBOSIS (DVT) OF DISTAL VEIN OF RIGHT LOWER EXTREMITY: Primary | ICD-10-CM

## 2023-04-06 DIAGNOSIS — I82.511 CHRONIC EMBOLISM AND THROMBOSIS OF RIGHT FEMORAL VEIN: ICD-10-CM

## 2023-04-12 LAB
CREAT UR-MCNC: 71.5 MG/DL (ref 47–110)
PROT UR STRIP-MCNC: <6.8 MG/DL

## 2023-04-25 ENCOUNTER — OFFICE VISIT (OUTPATIENT)
Dept: CARDIOLOGY | Facility: CLINIC | Age: 68
End: 2023-04-25
Payer: MEDICARE

## 2023-04-25 VITALS
HEIGHT: 67 IN | HEART RATE: 63 BPM | SYSTOLIC BLOOD PRESSURE: 130 MMHG | OXYGEN SATURATION: 99 % | RESPIRATION RATE: 20 BRPM | WEIGHT: 202 LBS | BODY MASS INDEX: 31.71 KG/M2 | DIASTOLIC BLOOD PRESSURE: 66 MMHG | TEMPERATURE: 98 F

## 2023-04-25 DIAGNOSIS — Z86.73 HISTORY OF CVA (CEREBROVASCULAR ACCIDENT) WITHOUT RESIDUAL DEFICITS: Primary | ICD-10-CM

## 2023-04-25 DIAGNOSIS — E03.9 HYPOTHYROIDISM, UNSPECIFIED TYPE: ICD-10-CM

## 2023-04-25 DIAGNOSIS — Z86.718 HISTORY OF DEEP VENOUS THROMBOSIS (DVT) OF DISTAL VEIN OF RIGHT LOWER EXTREMITY: ICD-10-CM

## 2023-04-25 DIAGNOSIS — Z86.79 S/P ASCENDING AORTIC ANEURYSM REPAIR: ICD-10-CM

## 2023-04-25 DIAGNOSIS — I10 PRIMARY HYPERTENSION: ICD-10-CM

## 2023-04-25 DIAGNOSIS — R06.02 SOB (SHORTNESS OF BREATH): ICD-10-CM

## 2023-04-25 DIAGNOSIS — Z86.711 HISTORY OF PULMONARY EMBOLISM: ICD-10-CM

## 2023-04-25 DIAGNOSIS — I82.511 CHRONIC EMBOLISM AND THROMBOSIS OF RIGHT FEMORAL VEIN: ICD-10-CM

## 2023-04-25 DIAGNOSIS — Z98.890 S/P ASCENDING AORTIC ANEURYSM REPAIR: ICD-10-CM

## 2023-04-25 DIAGNOSIS — E78.2 MIXED HYPERLIPIDEMIA: ICD-10-CM

## 2023-04-25 DIAGNOSIS — Z95.1 S/P CABG (CORONARY ARTERY BYPASS GRAFT): ICD-10-CM

## 2023-04-25 PROCEDURE — 99214 OFFICE O/P EST MOD 30 MIN: CPT | Mod: PBBFAC | Performed by: INTERNAL MEDICINE

## 2023-04-25 NOTE — PROGRESS NOTES
04/25/2023 11:08 AM    Subjective:     CHIEF COMPLAINT:   Chief Complaint   Patient presents with    f/u sts has had some chest discomfort and right breast pain                           HPI:    Ms. Blanquita Montoya is a 67 y.o. female.   She had ascending aortic aneurysm s/p repair, post op AF, HTN, HLD, non-obstructive CAD on coronary angiogram, history of CVA without deficits, and MIKI .  She had a DVT/PE which was post operative and appears to be provoked.She is on eliquis.     Today the patient comes for a follow-up appointment.    CP:  The patient has no chest discomfort.      SOB:  The patient denies shortness of breath Has TORRES    EDEMA:  The patient denies edema.        ORTHOPNEA:  The patient denies orthopnea.  No PND.      SYNCOPE:  The patient denies near syncope.  No syncope.   Gets lightheaded in the mornings.     PALPITATIONS:  The patient has palpitations.    LEVEL OF EXERTION:  The patient does house work.  The patient has symptoms with this level of exertion.  The patient's level of exertion is fair.  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  (MI, Abnormal stress test, Angina, Use of NTG, MI on EKG)   Denies CHF  Has TIA or CVA.  Is not being treated with insulin.   Creatinine is not > 2 mg/dL   Lab Results   Component Value Date    CREATININE 0.83 04/03/2023     The patient's RCRI is 1.  Based on this score the patient's 30-day risk of death, MI, or cardiac arrest with surgery is 6.0%.    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 Cho Person    HYSTERECTOMY      REPAIR OF ASCENDING AORTA N/A 2022    Procedure: REPAIR, AORTA, ASCENDING;  Surgeon: Alec Vega IV, MD;  Location: [facility] OR;  Service: Cardiovascular;  Laterality: N/A;  ECHO NOTIFIED    RT KNEE         Social History     Socioeconomic History    Marital status:    Tobacco Use    Smoking status: Former     Types: Cigarettes     Quit date: 2010     Years since quittin.8    Smokeless tobacco: Never   Substance and Sexual Activity    Alcohol use: Not Currently    Drug use: Not Currently    Sexual activity: Not Currently     Social Determinants of Health     Financial Resource Strain: Low Risk     Difficulty of Paying Living Expenses: Not hard at all   Food Insecurity: No Food Insecurity    Worried About Running Out of Food in the Last Year: Never true    Ran Out of Food in the Last Year: Never true   Transportation Needs: No Transportation Needs    Lack of Transportation (Medical): No    Lack of Transportation (Non-Medical): No   Physical Activity: Inactive    Days of Exercise per Week: 0 days    Minutes of Exercise per Session: 0 min   Stress: No Stress Concern Present    Feeling of Stress : Not at all   Social Connections: Moderately Isolated    Frequency of Communication with Friends and Family: More than three times a week    Frequency of Social Gatherings  with Friends and Family: More than three times a week    Attends [employer] Services: Never    Active Member of Clubs or Organizations: No    Attends Club or Organization Meetings: Never    Marital Status:    Housing Stability: Unknown    Unable to Pay for Housing in the Last Year: No    Unstable Housing in the Last Year: No       Family History   Problem Relation Age of Onset    Heart disease Mother     Uterine cancer Mother     Lung cancer Father     Seizures Sister     Heart disease Sister      Review of patient's allergies indicates:   Allergen Reactions    Tramadol Hives     Other reaction(s): sweating    Meperidine Rash     Other reaction(s): unknown       Current Medications    Current Outpatient Medications   Medication Instructions    albuterol (VENTOLIN HFA) 90 mcg/actuation inhaler 1-2 puffs, Inhalation, Every 6 hours PRN, Rescue    albuterol-ipratropium (DUO-NEB) 2.5 mg-0.5 mg/3 mL nebulizer solution 3 mLs, Nebulization, Every 4 hours PRN    amLODIPine (NORVASC) 10 mg, Oral, Daily    apixaban (ELIQUIS) 5 mg, Oral, 2 times daily    aspirin (ECOTRIN) 81 mg, Oral, Daily    diclofenac sodium (VOLTAREN) 2 g, Topical (Top), 3 times daily PRN    fluticasone propion-salmeteroL 113-14 mcg/actuation AePB 1 puff, Inhalation, 2 times daily    furosemide (LASIX) 20 mg, Oral, Daily PRN    hydrOXYzine HCL (ATARAX) 25 mg, Oral, 4 times daily PRN    levothyroxine (SYNTHROID) 75 mcg, Oral, Before breakfast    multivit-min/iron/folic/lutein (CENTRUM SILVER WOMEN ORAL) 1 tablet, Oral, Daily    pantoprazole (PROTONIX) 40 mg, Oral, Daily    rosuvastatin (CRESTOR) 5 mg, Oral, Nightly         ROS:     GEN   No fever  Has weakness in legs.  RESP  No SOB  No wheezing  SKIN  + pruritus  No rash  CARD  No CP  + palpitations        VASC  No cyanosis  + claudication  HEM   No adenopathy  + easy bruising   GI  + heart burn  No melena    NEURO  + dizziness  No syncope    Objective:     PE:  Blood pressure (!) 148/79, pulse 63,  "temperature 98.2 °F (36.8 °C), temperature source Oral, resp. rate 20, height 5' 7" (1.702 m), weight 91.6 kg (202 lb), SpO2 99 %.     BP Readings from Last 3 Encounters:   04/25/23 (!) 148/79   04/03/23 132/73   01/24/23 (!) 140/72        Pulse Readings from Last 3 Encounters:   04/25/23 63   04/03/23 (!) 53   01/24/23 65        Temp Readings from Last 3 Encounters:   04/25/23 98.2 °F (36.8 °C) (Oral)   04/03/23 98.1 °F (36.7 °C) (Oral)   01/24/23 97.7 °F (36.5 °C) (Oral)       Wt Readings from Last 3 Encounters:   04/25/23 91.6 kg (202 lb)   04/03/23 92.7 kg (204 lb 6.4 oz)   01/24/23 92.7 kg (204 lb 6.4 oz)         GENERAL:  Ambulates with difficulty  CONSTITUTIONAL:  No acute distress.  Not ill appearing.  NECK:  No cervical adenopathy.  No carotid bruit.  CARDIOVASCULAR:  Normal rate.  Regular rhythm.  No murmur.  PULMONARY:  No respiratory distress.  No wheezing.  No crackles.  ABDOMINAL:  No distention.  No tenderness.  MUSCULOSKELETAL:  No deformity.  No edema  SKIN:  No bruising.  No rash.  NEUROLOGICAL:  Oriented x 3.  No weakness.                                                                                                                                                                                                                                                                                                                                                                                                                                                                               CARDIAC TESTING:  Echocardiogram  Results for orders placed during the hospital encounter of 11/15/22    Echo    Interpretation Summary  · The left ventricle is normal in size with concentric remodeling and low normal systolic function.  · The estimated ejection fraction is 50%.  · Normal left ventricular diastolic function.  · Normal right ventricular size with normal right ventricular systolic function.  · Mild left atrial " enlargement.  · Mild aortic regurgitation.  · Mild mitral regurgitation.  · Mild to moderate tricuspid regurgitation.  · There is no pulmonary hypertension.  · The estimated PA systolic pressure is 31 mmHg.  · Normal central venous pressure (3 mmHg).      Stress Test  No results found for this or any previous visit.      Coronary Angiogram  Results for orders placed during the hospital encounter of 07/11/22    Cardiac catheterization    Conclusion  · The pre-procedure left ventricular end diastolic pressure was 10.  · The estimated blood loss was none.  · There was non-obstructive coronary artery disease..    The procedure log was documented by Documenter: Larisa Deng RN and verified by Harry Jj MD.    FINDINGS  Anomalous origin of RCA    RECOMMENDATIONS  Medical management  Risk factor modifications  Activity -- avoid straining with affected limb for one week  Exercise -- as tolerated  Make follow-up appointment with CV surgery for aneurysm repair    Date: 7/11/2022  Time: 11:13 AM     Last BMP  Lab Results   Component Value Date     04/03/2023    K 4.0 04/03/2023    CO2 29 04/03/2023    BUN 15.1 04/03/2023    CREATININE 0.83 04/03/2023    CALCIUM 9.5 04/03/2023    EGFRNORACEVR >60 04/03/2023       Lab Results   Component Value Date    CREATININE 0.83 04/03/2023    CREATININE 0.85 03/30/2023    CREATININE 0.80 10/03/2022     Last CBC     Lab Results   Component Value Date    WBC 7.0 04/03/2023    HGB 14.3 04/03/2023    HCT 44.6 04/03/2023    MCV 90.8 04/03/2023     04/03/2023     Last lipids    Lab Results   Component Value Date    CHOL 175 04/03/2023    CHOL 120 09/14/2022    CHOL 214 (H) 09/23/2021    HDL 55 04/03/2023    HDL 48 09/14/2022    HDL 47 09/23/2021    LDL 96.00 04/03/2023    LDL 61.00 09/14/2022    .00 09/23/2021    TRIG 118 04/03/2023    TRIG 57 09/14/2022    TRIG 143 (H) 09/23/2021    TOTALCHOLEST 3 04/03/2023    TOTALCHOLEST 3 09/14/2022    TOTALCHOLEST 5  09/23/2021       LFT   No components found for: LFT    Assessment:     1. History of CVA (cerebrovascular accident) without residual deficits    2. SOB (shortness of breath)    3. Mixed hyperlipidemia    4. Primary hypertension    5. S/P CABG (coronary artery bypass graft)    6. S/P ascending aortic aneurysm repair    7. History of pulmonary embolism    8. History of deep venous thrombosis (DVT) of distal vein of right lower extremity    9. 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 Hypothyroidism, unspecified type      Pre-Op Cardiac Risk Assessment:  Please see RCRI above.    10 Year Cardiovascular Risk:  The ASCVD Risk score (Catherine BRAXTON, et al., 2019) failed to calculate for the following reasons:    The patient has a prior MI or stroke diagnosis    BMI:  Body mass index is 31.64 kg/m².  Patient is obese (BMI 30-34.9)  Tobacco:  denied  Alcohol:  none  Substance abuse:  none   Last PCP visit:  Patient does not have a PCP or has not yet seen their PCP    Plan:     Medications:  refills are done by PCP    Diet:  Avoid processed foods and drinks, sugar, salt, fried/greasy foods, and fast foods    Recommend 10 servings of fruits and vegetables per day    Exercise:  activities as tolerated    Nurse visit for BP check    Follow up:  4 months    Harry Jj MD  Cardiology Attending     Future Appointments   Date Time Provider Department Center   5/12/2023 10:[facility]

## 2023-05-08 ENCOUNTER — HOSPITAL ENCOUNTER (EMERGENCY)
Facility: HOSPITAL | Age: 68
Discharge: HOME | End: 2023-05-08
Attending: FAMILY MEDICINE
Payer: MEDICARE

## 2023-05-08 VITALS
OXYGEN SATURATION: 97 % | DIASTOLIC BLOOD PRESSURE: 89 MMHG | SYSTOLIC BLOOD PRESSURE: 148 MMHG | WEIGHT: 202 LBS | HEART RATE: 62 BPM | TEMPERATURE: 98 F | BODY MASS INDEX: 31.64 KG/M2 | RESPIRATION RATE: 18 BRPM

## 2023-05-08 DIAGNOSIS — R07.89 ATYPICAL CHEST PAIN: ICD-10-CM

## 2023-05-08 DIAGNOSIS — N64.4 BREAST PAIN, RIGHT: ICD-10-CM

## 2023-05-08 DIAGNOSIS — R07.89 CHEST WALL PAIN: Primary | ICD-10-CM

## 2023-05-08 LAB
ALBUMIN SERPL-MCNC: 3.5 G/DL (ref 3.4–4.8)
ALBUMIN/GLOB SERPL: 1.1 RATIO (ref 1.1–2)
ALP SERPL-CCNC: 99 UNIT/L (ref 40–150)
ALT SERPL-CCNC: 22 UNIT/L (ref 0–55)
AST SERPL-CCNC: 16 UNIT/L (ref 5–34)
BASOPHILS # BLD AUTO: 0.04 X10(3)/MCL
BASOPHILS NFR BLD AUTO: 0.8 %
BILIRUBIN DIRECT+TOT PNL SERPL-MCNC: 0.4 MG/DL
BUN SERPL-MCNC: 10.8 MG/DL (ref 9.8–20.1)
CALCIUM SERPL-MCNC: 8.9 MG/DL (ref 8.4–10.2)
CHLORIDE SERPL-SCNC: 105 MMOL/L (ref 98–107)
CO2 SERPL-SCNC: 25 MMOL/L (ref 23–31)
CREAT SERPL-MCNC: 0.87 MG/DL (ref 0.55–1.02)
EOSINOPHIL # BLD AUTO: 0.24 X10(3)/MCL (ref 0–0.9)
EOSINOPHIL NFR BLD AUTO: 4.6 %
ERYTHROCYTE [DISTWIDTH] IN BLOOD BY AUTOMATED COUNT: 13.2 % (ref 11.5–17)
GFR SERPLBLD CREATININE-BSD FMLA CKD-EPI: >60 MLS/MIN/1.73/M2
GLOBULIN SER-MCNC: 3.2 GM/DL (ref 2.4–3.5)
GLUCOSE SERPL-MCNC: 176 MG/DL (ref 82–115)
HCT VFR BLD AUTO: 43.3 % (ref 37–47)
HGB BLD-MCNC: 14 G/DL (ref 12–16)
IMM GRANULOCYTES # BLD AUTO: 0.01 X10(3)/MCL (ref 0–0.04)
IMM GRANULOCYTES NFR BLD AUTO: 0.2 %
LYMPHOCYTES # BLD AUTO: 0.99 X10(3)/MCL (ref 0.6–4.6)
LYMPHOCYTES NFR BLD AUTO: 18.8 %
MCH RBC QN AUTO: 29.4 PG (ref 27–31)
MCHC RBC AUTO-ENTMCNC: 32.3 G/DL (ref 33–36)
MCV RBC AUTO: 90.8 FL (ref 80–94)
MONOCYTES # BLD AUTO: 0.41 X10(3)/MCL (ref 0.1–1.3)
MONOCYTES NFR BLD AUTO: 7.8 %
NEUTROPHILS # BLD AUTO: 3.58 X10(3)/MCL (ref 2.1–9.2)
NEUTROPHILS NFR BLD AUTO: 67.8 %
NRBC BLD AUTO-RTO: 0 %
PLATELET # BLD AUTO: 174 X10(3)/MCL (ref 130–400)
PMV BLD AUTO: 11.8 FL (ref 7.4–10.4)
POTASSIUM SERPL-SCNC: 3.7 MMOL/L (ref 3.5–5.1)
PROT SERPL-MCNC: 6.7 GM/DL (ref 5.8–7.6)
RBC # BLD AUTO: 4.77 X10(6)/MCL (ref 4.2–5.4)
SODIUM SERPL-SCNC: 138 MMOL/L (ref 136–145)
TROPONIN I SERPL-MCNC: 0.01 NG/ML (ref 0–0.04)
WBC # SPEC AUTO: 5.27 X10(3)/MCL (ref 4.5–11.5)

## 2023-05-08 PROCEDURE — 80053 COMPREHEN METABOLIC PANEL: CPT | Performed by: FAMILY MEDICINE

## 2023-05-08 PROCEDURE — 99285 EMERGENCY DEPT VISIT HI MDM: CPT | Mod: 25

## 2023-05-08 PROCEDURE — 93005 ELECTROCARDIOGRAM TRACING: CPT

## 2023-05-08 PROCEDURE — 85025 COMPLETE CBC W/AUTO DIFF WBC: CPT | Performed by: FAMILY MEDICINE

## 2023-05-08 PROCEDURE — 84484 ASSAY OF TROPONIN QUANT: CPT | Performed by: FAMILY MEDICINE

## 2023-05-08 PROCEDURE — 25000003 PHARM REV CODE 250: Performed by: FAMILY MEDICINE

## 2023-05-08 RX ORDER — KETOROLAC TROMETHAMINE 10 MG/1
10 TABLET, FILM COATED ORAL
Status: COMPLETED | OUTPATIENT
Start: 2023-05-08 | End: 2023-05-08

## 2023-05-08 RX ADMIN — KETOROLAC TROMETHAMINE 10 MG: 10 TABLET, FILM COATED ORAL at 08:05

## 2023-05-08 NOTE — ED PROVIDER NOTES
Encounter Date: 5/8/2023       History     Chief Complaint   Patient presents with    Chest Pain     Chest pain x2 weeks, pt reports right breast pain as well. Pt reports she reported chest pain to cardiologist Dr. Mcdonald and no test were performed, pt reports she had open heart sx last July for aneurysm. +SOB.      66 y/o F presents to the ED with complaint of chest pain and right breast pain.onset 2 weeks ago.  Pt denies sob, ha, fever, chills, dizziness, weakness, abdominal pain, N/V/D, syncopal episode.   Reviewing documentation pt had a normal left heart cath in 07/2022 and a ascending aorta repair in 7/2022 by Dr. Vega.      The history is provided by the patient. No  was used.   Review of patient's allergies indicates:   Allergen Reactions    Tramadol Hives     Other reaction(s): sweating    Meperidine Rash     Other reaction(s): unknown     Past Medical History:   Diagnosis Date    Anxiety disorder     Coronary artery disease     Current use of long term anticoagulation 9/16/2022    GERD (gastroesophageal reflux disease)     History of CVA (cerebrovascular accident) without residual deficits 9/16/2022    History of deep venous thrombosis (DVT) of distal vein of right lower extremity 9/16/2022    History of pulmonary embolism 9/16/2022    Hypercholesterolemia     Hypertension     Hypothyroidism     Non-healing open wound of right groin 9/16/2022    Obesity     Right groin wound 9/20/2022    S/P ascending aortic aneurysm repair 9/16/2022    S/P CABG (coronary artery bypass graft) 9/16/2022    Seizure in childhood     last one age 11    Sleep apnea     does not currently use CPAP    Thoracic aortic aneurysm 03/10/2022    4.5X4.4 Ascending Aorta as of 3/10/2022    Thoracic aortic aneurysm     Thyroid disease      Past Surgical History:   Procedure Laterality Date    ANGIOGRAM, CORONARY, WITH LEFT HEART CATHETERIZATION N/A 07/11/2022    Procedure: Angiogram, Coronary, with Left Heart Cath;   Surgeon: Harry Jj MD;  Location: [facility] CATH LAB;  Service: Cardiology;  Laterality: N/A;    COLONOSCOPY  10/14/2021    Dr. Cho Person    HYSTERECTOMY      REPAIR OF ASCENDING AORTA N/A 2022    Procedure: REPAIR, AORTA, ASCENDING;  Surgeon: Alec Vega IV, MD;  Location: [facility] OR;  Service: Cardiovascular;  Laterality: N/A;  ECHO NOTIFIED    RT KNEE       Family History   Problem Relation Age of Onset    Heart disease Mother     Uterine cancer Mother     Lung cancer Father     Seizures Sister     Heart disease Sister      Social History     Tobacco Use    Smoking status: Former     Types: Cigarettes     Quit date: 2010     Years since quittin.8    Smokeless tobacco: Never   Substance Use Topics    Alcohol use: Not Currently    Drug use: Not Currently     Review of Systems   Constitutional:  Negative for chills, fatigue and fever.   Eyes:  Negative for visual disturbance.   Respiratory:  Negative for cough, shortness of breath and stridor.         Right breast pain    Cardiovascular:  Positive for chest pain. Negative for palpitations and leg swelling.   Gastrointestinal:  Negative for abdominal pain, nausea and vomiting.   Musculoskeletal:  Negative for myalgias.   Neurological:  Negative for dizziness, tremors, syncope, speech difficulty, weakness, light-headedness, numbness and headaches.     Physical Exam     Initial Vitals [23 0718]   BP Pulse Resp Temp SpO2   (!) 184/96 78 18 98.2 °F (36.8 °C) 99 %      MAP       --         Physical Exam    Nursing note and vitals reviewed.  Constitutional: She appears well-developed and well-nourished. No distress.   HENT:   Head: Normocephalic and atraumatic.   Eyes: Conjunctivae are normal.   Cardiovascular:  Normal heart sounds and intact distal pulses.           Pulmonary/Chest: Breath sounds normal. No respiratory distress. She has no wheezes.   Right breast- within normal limits,   areola-within normal limits, no axillary  lymphadenopathy.  No signs of infection on right breast.  No nipple drainage, no tenderness noted    Left chest wall - tenderness upon palpation.     Abdominal: Abdomen is soft. Bowel sounds are normal. There is no abdominal tenderness. There is no rebound and no guarding.   Musculoskeletal:         General: Normal range of motion.     Neurological: She is alert and oriented to person, place, and time. Gait normal.   Skin: Skin is warm and dry. Capillary refill takes less than 2 seconds.   Psychiatric: She has a normal mood and affect. Her behavior is normal. Judgment and thought content normal.       ED Course   Procedures  Labs Reviewed   COMPREHENSIVE METABOLIC PANEL - Abnormal; Notable for the following components:       Result Value    Glucose Level 176 (*)     All other components within normal limits   CBC WITH DIFFERENTIAL - Abnormal; Notable for the following components:    MCHC 32.3 (*)     MPV 11.8 (*)     All other components within normal limits   TROPONIN I - Normal   CBC W/ AUTO DIFFERENTIAL    Narrative:     The following orders were created for panel order CBC auto differential.  Procedure                               Abnormality         Status                     ---------                               -----------         ------                     CBC with Differential[[id number]]        Abnormal            Final result                 Please view results for these tests on the individual orders.   EXTRA TUBES    Narrative:     The following orders were created for panel order EXTRA TUBES.  Procedure                               Abnormality         Status                     ---------                               -----------         ------                     Light Blue Top Hold[[id number]]                              In process                 Gold Top Hold[[id number]]                                    In process                 Pink Top Hold[[id number]]                                    In  process                   Please view results for these tests on the individual orders.   LIGHT BLUE TOP HOLD   GOLD TOP HOLD   PINK TOP HOLD     EKG Readings: (Independently Interpreted)   Initial Reading: No STEMI. Rhythm: Normal Sinus Rhythm. Heart Rate: 63. Ectopy: No Ectopy. Other Impression: Nonspecific ST and T-wave   ECG Results              EKG 12-lead (In process)  Result time 05/08/23 07:53:36      In process by Interface, Lab In [facility] (05/08/23 07:53:36)                   Narrative:    Test Reason : R07.89,    Vent. Rate : 063 BPM     Atrial Rate : 063 BPM     P-R Int : 172 ms          QRS Dur : 080 ms      QT Int : 406 ms       P-R-T Axes : 079 075 101 degrees     QTc Int : 415 ms    Normal sinus rhythm  Nonspecific ST and T wave abnormality  Abnormal ECG  When compared with ECG of 03-OCT-2022 07:10,  T wave inversion no longer evident in Anterior leads    Referred By:  ED           Confirmed By:                                   Imaging Results              X-Ray Chest PA And Lateral (Final result)  Result time 05/08/23 08:20:33      Final result by Lb Gracia MD (05/08/23 08:20:33)                   Impression:      No acute cardiopulmonary process.      Electronically signed by: Lb Gracia  Date:    05/08/2023  Time:    08:20               Narrative:    EXAMINATION:  XR CHEST PA AND LATERAL    CLINICAL HISTORY:  Other chest pain    TECHNIQUE:  Two views of the chest    COMPARISON:  10/03/2022    FINDINGS:  No focal opacification, pleural effusion, or pneumothorax.    The cardiomediastinal silhouette is within normal limits with postsurgical changes of median sternotomy.    No acute osseous abnormality.                                       Medications   ketorolac tablet 10 mg (10 mg Oral Given 5/8/23 0805)     Medical Decision Making:   History:   Old Medical Records: I decided to obtain old medical records.  Old Records Summarized: records from previous admission(s) and other  records.  Clinical Tests:   Lab Tests: Reviewed  Radiological Study: Reviewed  Medical Tests: Reviewed  ED Management:  Workup in ED is essentially normal.  Patient presents with chest pain that is reproducible on physical exam.  Patient also with right breast pain however no signs of infection at this time.  Her chest pain has been going on for 2 weeks and at this time is less likely cardiac.  Re-examined patient multiple times in the ED patient reports she is feeling better treatment in ED. offered pain prescriptions however patient declines at this time.  This patient presents with chest pain that is very unlikely angina or acute coronary syndrome. The emergency department evaluation has not identified any cause for suspicion that this chest pain has a cardiac etiology. Based on their history, EKG (which showed no evidence of ischemia or infarction) and imaging, in addition to the patient's physical exam, I see no evidence at this time for a malignant etiology for the patient's chest pain. There is no acute evidence for pulmonary embolus, acute myocardial infarction, pneumothorax, Boerhaeve syndrome, cardiac tamponade, thoracic artery dissection, or any other emergent cardiac, pulmonary or aortic pathology. Given the low pre-test probability for cardiac etiology of chest pain and the absence of any sign of ischemia or infarction, discharge for outpatient follow-up and further evaluation is reasonable.    I have explained to the patient that even though a cardiac problem is very unlikely, follow-up and further testing is required to reduce further the already small uncertainty that exists. Other life-threatening diagnoses have been considered. The patient understands the need to return immediately if their symptoms worsen or they develop any new symptoms, and not to engage in any significant exertional activity until follow-up is obtained.      The patient is resting comfortably and is in no acute distress.  Blanquita  MICHELINE Montoya states that symptoms have improved after treatment within ER. Discussed test results, shared treatment plan, specific conditions for return, and importance of follow up with patient and family.  Patient declines prescriptions at this time.  The patient understands and agrees with the plan as discussed. Answered all patient questions at this time.  Blanquita Montoya has remained hemodynamically stable throughout the ED course and is appropriate for discharge home.                           Clinical Impression:   Final diagnoses:  [R07.89] Atypical chest pain  [N64.4] Breast pain, right  [R07.89] Chest wall pain (Primary)        ED Disposition Condition    Discharge Stable          ED Prescriptions    None       Follow-up Information       Follow up With Specialties Details Why Contact Info    Kamila Whittaker MD Internal Medicine In 2 days  [address] [facility] [id number] [phone]      [facility] Emergency Medicine  As needed, If symptoms worsen [address] [facility] [id number] [phone]             Jordy Birch MD  05/08/23 [id number]

## 2023-05-12 ENCOUNTER — HOSPITAL ENCOUNTER (OUTPATIENT)
Dept: RADIOLOGY | Facility: HOSPITAL | Age: 68
Discharge: HOME | End: 2023-05-12
Attending: STUDENT IN AN ORGANIZED HEALTH CARE EDUCATION/TRAINING PROGRAM
Payer: MEDICARE

## 2023-05-12 DIAGNOSIS — I82.511 CHRONIC EMBOLISM AND THROMBOSIS OF RIGHT FEMORAL VEIN: ICD-10-CM

## 2023-05-12 DIAGNOSIS — Z86.718 HISTORY OF DEEP VENOUS THROMBOSIS (DVT) OF DISTAL VEIN OF RIGHT LOWER EXTREMITY: ICD-10-CM

## 2023-05-12 PROCEDURE — 93971 EXTREMITY STUDY: CPT | Mod: RT

## 2023-05-24 ENCOUNTER — TELEPHONE (OUTPATIENT)
Dept: INTERNAL MEDICINE | Facility: CLINIC | Age: 68
End: 2023-05-24
Payer: MEDICARE

## 2023-05-29 ENCOUNTER — OFFICE VISIT (OUTPATIENT)
Dept: INTERNAL MEDICINE | Facility: CLINIC | Age: 68
End: 2023-05-29
Payer: MEDICARE

## 2023-05-29 VITALS
OXYGEN SATURATION: 98 % | HEART RATE: 57 BPM | TEMPERATURE: 98 F | RESPIRATION RATE: 20 BRPM | WEIGHT: 203.81 LBS | DIASTOLIC BLOOD PRESSURE: 70 MMHG | BODY MASS INDEX: 31.92 KG/M2 | SYSTOLIC BLOOD PRESSURE: 132 MMHG

## 2023-05-29 DIAGNOSIS — Z23 NEED FOR VACCINATION: Primary | ICD-10-CM

## 2023-05-29 DIAGNOSIS — R06.02 SOB (SHORTNESS OF BREATH): ICD-10-CM

## 2023-05-29 DIAGNOSIS — R73.09 ABNORMAL NON-FASTING GLUCOSE: ICD-10-CM

## 2023-05-29 DIAGNOSIS — I10 HYPERTENSION, UNSPECIFIED TYPE: ICD-10-CM

## 2023-05-29 DIAGNOSIS — N64.4 BREAST PAIN: ICD-10-CM

## 2023-05-29 DIAGNOSIS — E03.9 HYPOTHYROIDISM, UNSPECIFIED TYPE: ICD-10-CM

## 2023-05-29 DIAGNOSIS — Z01.89 ROUTINE LAB DRAW: ICD-10-CM

## 2023-05-29 DIAGNOSIS — E78.5 HYPERLIPIDEMIA, UNSPECIFIED HYPERLIPIDEMIA TYPE: ICD-10-CM

## 2023-05-29 DIAGNOSIS — R73.03 PREDIABETES: ICD-10-CM

## 2023-05-29 PROBLEM — J44.9 CHRONIC OBSTRUCTIVE PULMONARY DISEASE: Status: RESOLVED | Noted: 2023-04-03 | Resolved: 2023-05-29

## 2023-05-29 PROBLEM — Z86.711 HISTORY OF PULMONARY EMBOLISM: Status: RESOLVED | Noted: 2022-09-16 | Resolved: 2023-05-29

## 2023-05-29 PROBLEM — I82.511 CHRONIC EMBOLISM AND THROMBOSIS OF RIGHT FEMORAL VEIN: Status: RESOLVED | Noted: 2023-04-03 | Resolved: 2023-05-29

## 2023-05-29 PROBLEM — Z95.1 S/P CABG (CORONARY ARTERY BYPASS GRAFT): Status: RESOLVED | Noted: 2022-09-16 | Resolved: 2023-05-29

## 2023-05-29 PROBLEM — Z86.718 HISTORY OF DEEP VENOUS THROMBOSIS (DVT) OF DISTAL VEIN OF RIGHT LOWER EXTREMITY: Status: RESOLVED | Noted: 2022-09-16 | Resolved: 2023-05-29

## 2023-05-29 PROCEDURE — 99215 OFFICE O/P EST HI 40 MIN: CPT | Mod: PBBFAC,25

## 2023-05-29 PROCEDURE — 90677 PCV20 VACCINE IM: CPT | Mod: PBBFAC

## 2023-05-29 RX ORDER — NITROGLYCERIN 0.4 MG/1
0.4 TABLET SUBLINGUAL EVERY 5 MIN PRN
Qty: 30 TABLET | Refills: 3 | Status: SHIPPED | OUTPATIENT
Start: 2023-05-29

## 2023-05-29 RX ORDER — EZETIMIBE 10 MG/1
10 TABLET ORAL DAILY
Qty: 90 TABLET | Refills: 3 | Status: SHIPPED | OUTPATIENT
Start: 2023-05-29 | End: 2023-09-21 | Stop reason: SDUPTHER

## 2023-05-29 NOTE — PROGRESS NOTES
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  Starting Zetia.  States she went for evaluation of right breast burning tingling and pain.  Denies fevers chills or nipple discharge or rash.  Recent mammo normal   Ordered breast ultrasounds     Lives in [place] with .  Works at a grocery store.  Denies tobacco, alcohol, or drug use.  Previous 2 PPD smoker, quit about 14 years ago    TTE 11/2022    The left ventricle is normal in size with concentric remodeling and low normal systolic function.  The estimated ejection fraction is 50%.  Normal left ventricular diastolic function.  Normal right ventricular size with normal right ventricular systolic function.  Mild left atrial enlargement.  Mild aortic regurgitation.  Mild mitral regurgitation.  Mild to moderate tricuspid regurgitation.  There is no pulmonary hypertension.  The estimated PA systolic pressure is 31 mmHg.  Normal central venous pressure (3 mmHg).    ROS:  (+)   (-) palpitations, CP, SOB, dizziness, syncope, edema, PND, orthopnea, claudication, cough, fever, chills, abdominal pain, vomiting, diarrhea, dysuria, bleeding    OBJECTIVE:     Vital signs:   /70 (BP Location: Left arm, Patient Position: Sitting, BP Method: Large (Manual))   Pulse (!) 57   Temp 98.4 °F (36.9 °C) (Oral)   Resp 20   Wt 92.4 kg  (203 lb 12.8 oz)   SpO2 98%   BMI 31.92 kg/m²      Physical Examination:  General:  Well-nourished, well-developed, no acute distress  HEENT: Normocephalic, atraumatic. EOMI.  MMM  Neck: Supple. No obvious JVD.  Normal range of motion.  Respiratory: CTAB.  No obvious crackles wheeze or rhonchi.  Cardiovascular:  RRR.  No obvious M/G/R. Warm extremities.  Peripheral pulses intact.  No edema.  Gastrointestinal:  Soft, nontender, nondistended.  Normal bowel sounds.  Neurologic: ANOx3.  Answering questions/following commands appropriately.  No FND    Current Outpatient Medications:     albuterol (VENTOLIN HFA) 90 mcg/actuation inhaler, Inhale 1-2 puffs into the lungs every 6 (six) hours as needed for Wheezing or Shortness of Breath. Rescue, Disp: 18 g, Rfl: 3    albuterol-ipratropium (DUO-NEB) 2.5 mg-0.5 mg/3 mL nebulizer solution, Take 3 mLs by nebulization every 4 (four) hours as needed for Wheezing or Shortness of Breath., Disp: 75 mL, Rfl: 6    amLODIPine (NORVASC) 10 MG tablet, Take 1 tablet (10 mg total) by mouth once daily., Disp: 90 tablet, Rfl: 3    aspirin (ECOTRIN) 81 MG EC tablet, Take 1 tablet (81 mg total) by mouth once daily., Disp: 90 tablet, Rfl: 3    diclofenac sodium (VOLTAREN) 1 % Gel, Apply 2 g topically 3 (three) times daily as needed (pain)., Disp: 100 g, Rfl: 3    fluticasone propion-salmeteroL 113-14 mcg/actuation AePB, Inhale 1 puff into the lungs 2 (two) times a day., Disp: 1 each, Rfl: 6    furosemide (LASIX) 20 MG tablet, Take 1 tablet (20 mg total) by mouth daily as needed (as needed for shortness of breath)., Disp: 30 tablet, Rfl: 3    hydrOXYzine HCL (ATARAX) 25 MG tablet, Take 1 tablet (25 mg total) by mouth 4 (four) times daily as needed for Itching or Anxiety., Disp: 90 tablet, Rfl: 1    levothyroxine (SYNTHROID) 75 MCG tablet, Take 1 tablet (75 mcg total) by mouth before breakfast., Disp: 90 tablet, Rfl: 3    multivit-min/iron/folic/lutein (CENTRUM SILVER WOMEN ORAL), Take 1 tablet  by mouth Daily., Disp: , Rfl:     ezetimibe (ZETIA) 10 mg tablet, Take 1 tablet (10 mg total) by mouth once daily., Disp: 90 tablet, Rfl: 3    nitroGLYCERIN (NITROSTAT) 0.4 MG SL tablet, Place 1 tablet (0.4 mg total) under the tongue every 5 (five) minutes as needed for Chest pain., Disp: 30 tablet, Rfl: 3     ASSESSMENT & PLAN:     SOB on exertion  Hx of Tobacco use   -PFT from April 2023 with no obstruction or restriction however significant bronchodilator response noted  -performed inhaler technique   -continue Salmeterol/fluticasone b.i.d.   -albuterol and DuoNeb p.r.n.   -encouraged inhaler use prior to physical activity  -compression stockings/Lasix as needed    Hypertension  -Continue amlodipine 10   -no longer on ARB  -Intolerant to Toprol/Procardia/imdur in the past secondary to side effects of headache/dizziness  -Educated on DASH diet and exercise as tolerated     Hyperlipidemia  History of CVA as below  -previously endorsed myalgias on Crestor, simvastatin, pravastatin, and lipitor  -Continue aspirin 81  -started Zetia 10 daily     Thoracic ascending aortic aneurysm s/p repair with CT surgery  Nonobstructive coronary artery disease  -Follows with cardiology  -Echocardiogram from 11/15/2022 as above with EF 50%  -nonobstructive coronary disease from angiogram in July 2022    Post op course with PE/DVT and CVA   -finished Xarelto 20 mg daily x3 months    Osteopenia  History of Vitamin D deficiency  -Lumbar osteopenia, T score -2.1  -Continue multivitamin with calcium and vitamin D  -Plan to repeat DEXA scan next visit      GERD/gastritis  -EGD: 5/21/2021: with normal esophagus and gastritis. Discontinue NSAIDs.  -Colonoscopy from 10/2021 with diverticulosis of the sigmoid colon, otherwise normal, repeat 5 years  -as needed Pepcid/Tums   -no longer uses Protonix     MIKI on CPAP  -Endorses intermittent compliance with CPAP, continue     Hypothyroidism  -continue Synthroid 75  -repeat thyroid function ordered       Insomnia  -Continue melatonin/vistaril PRN    Breast pain   -recent visit to ER for breast numbness tingling and pain   -no abnormality detected   -ordered bilateral ultrasounds     Routine health maintenance  Mammogram: 11/22, WNL  DEXA: 9/2020, osteopenia of lumbar vertebrae, plan to repeat next visit   Colon cancer screening: Colonoscopy from 10/2021 with diverticulosis of the sigmoid colon, otherwise normal, repeat 5 years in 2026  GYN: Follows with gynecology      Vaccines:  Flu vaccine, UTD  Pneumonia 20: Today   Tdap on 3/2017  Zoster series completed 12/2020  COVID-19: Vaccinated x2     Antione Whittaker MD   Yes

## 2023-06-07 ENCOUNTER — TELEPHONE (OUTPATIENT)
Dept: INTERNAL MEDICINE | Facility: CLINIC | Age: 68
End: 2023-06-07
Payer: MEDICARE

## 2023-06-07 DIAGNOSIS — N64.4 BREAST PAIN: Primary | ICD-10-CM

## 2023-06-07 NOTE — TELEPHONE ENCOUNTER
Rafal from the [facility] called stating pt need orders for a Diagnostic mammogram and [place]  because she is over the age of 39. Please send in diagnotic mammogram/[facility]  [facility] to [facility]

## 2023-06-22 ENCOUNTER — HOSPITAL ENCOUNTER (OUTPATIENT)
Dept: RADIOLOGY | Facility: HOSPITAL | Age: 68
Discharge: HOME | End: 2023-06-22
Attending: STUDENT IN AN ORGANIZED HEALTH CARE EDUCATION/TRAINING PROGRAM
Payer: MEDICARE

## 2023-06-22 DIAGNOSIS — N64.4 BREAST PAIN: ICD-10-CM

## 2023-06-22 PROCEDURE — 77066 DX MAMMO INCL CAD BI: CPT | Mod: TC

## 2023-06-22 PROCEDURE — 76642 US BREAST BILATERAL LIMITED: ICD-10-PCS | Mod: 26,50,, | Performed by: RADIOLOGY

## 2023-06-22 PROCEDURE — 77062 MAMMO DIGITAL DIAGNOSTIC BILAT WITH TOMO: ICD-10-PCS | Mod: 26,,, | Performed by: RADIOLOGY

## 2023-06-22 PROCEDURE — 76642 ULTRASOUND BREAST LIMITED: CPT | Mod: TC,50

## 2023-06-22 PROCEDURE — 77066 DX MAMMO INCL CAD BI: CPT | Mod: 26,,, | Performed by: RADIOLOGY

## 2023-06-22 PROCEDURE — 77062 BREAST TOMOSYNTHESIS BI: CPT | Mod: 26,,, | Performed by: RADIOLOGY

## 2023-06-22 PROCEDURE — 76642 ULTRASOUND BREAST LIMITED: CPT | Mod: 26,50,, | Performed by: RADIOLOGY

## 2023-06-22 PROCEDURE — 77066 MAMMO DIGITAL DIAGNOSTIC BILAT WITH TOMO: ICD-10-PCS | Mod: 26,,, | Performed by: RADIOLOGY

## 2023-07-13 ENCOUNTER — DOCUMENT SCAN (OUTPATIENT)
Dept: HOME HEALTH SERVICES | Facility: HOSPITAL | Age: 68
End: 2023-07-13
Payer: MEDICARE

## 2023-08-16 LAB
INR PPP: 1.1
PROTHROMBIN TIME: 13.7 SECONDS (ref 11.4–14)

## 2023-08-22 NOTE — PROGRESS NOTES
CHIEF COMPLAINT:   Chief Complaint   Patient presents with    Follow-up     4 mos f/u pain from repaired valve under left arm causing breast pain denies cardiac targets does not take nitro when it happens                                                  HPI:  Blanquita Montoya 67 y.o. 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  At last appointment patient denied any cardiac complaints.  Today patient denies any cardiac complaints of chest pain, shortness of breath, palpitations, lightheadedness, dizziness, syncope, PND, or orthopnea.  She does endorse a burning pain that is constant that starts at her incision site and radiates to her left breast and underneath her left arm to her left back.  She states that the pain is constant but is relieved with ibuprofen and ice.  She states that her PCP ordered a breast ultrasound and mammogram which both resulted normal.  Advised patient to discuss this again with her primary care, however it does sound like she could possibly have some postop nerve pain or even shingles given the dermatomal distribution.  Also states that she was unable to tolerate Zetia she states that it made her dizzy, weak, and lightheaded.  She states that she is able to complete her ADLs without any issues or ischemic symptoms.  She states that she exercises every day and goes for walks every morning.  She reports compliance with all medications.  She denies any tobacco use.                                                                                                                                                                                                                                                                                                                                                                                                        CARDIAC TESTING:  Results for orders placed during the hospital encounter of 11/15/22    Echo    Interpretation Summary  · The left ventricle is normal in size with concentric remodeling and low normal systolic function.  · The estimated ejection fraction is 50%.  · Normal left ventricular diastolic function.  · Normal right ventricular size with normal right ventricular systolic function.  · Mild left atrial enlargement.  · Mild aortic regurgitation.  · Mild mitral regurgitation.  · Mild to moderate tricuspid regurgitation.  · There is no pulmonary hypertension.  · The estimated PA systolic pressure is 31 mmHg.  · Normal central venous pressure (3 mmHg).    No results found for this or any previous visit.     Results for orders placed during the hospital encounter of 07/11/22    Cardiac catheterization    Conclusion  · The pre-procedure left ventricular end diastolic pressure was 10.  · The estimated blood loss was none.  · There was non-obstructive coronary artery disease..    The procedure log was documented by Documenter: Larisa Deng RN and verified by Harry Jj MD.    FINDINGS  Anomalous origin of RCA    RECOMMENDATIONS  Medical management  Risk factor modifications  Activity -- avoid straining with affected limb for one week  Exercise -- as tolerated  Make follow-up appointment with CV surgery for aneurysm repair    Date: 7/11/2022  Time: 11:13 AM       Patient Active Problem List   Diagnosis    Gastroesophageal reflux disease    Hyperlipidemia    Hypertension    Hypothyroidism    Obstructive sleep apnea syndrome    Vitamin D deficiency    Prediabetes    Osteopenia    Osteoarthritis    Insomnia    S/P ascending aortic aneurysm repair    History of CVA (cerebrovascular accident) without residual deficits    SOB (shortness of breath)     Past Surgical History:   Procedure Laterality Date    ANGIOGRAM, CORONARY, WITH LEFT  HEART CATHETERIZATION N/A 2022    Procedure: Angiogram, Coronary, with Left Heart Cath;  Surgeon: Harry Jj MD;  Location: [facility] CATH LAB;  Service: Cardiology;  Laterality: N/A;    COLONOSCOPY  10/14/2021    Dr. Cho Person    HYSTERECTOMY      REPAIR OF ASCENDING AORTA N/A 2022    Procedure: REPAIR, AORTA, ASCENDING;  Surgeon: Alec Vega IV, MD;  Location: [facility] OR;  Service: Cardiovascular;  Laterality: N/A;  ECHO NOTIFIED    RT KNEE       Social History     Socioeconomic History    Marital status:    Tobacco Use    Smoking status: Former     Current packs/day: 0.00     Types: Cigarettes     Quit date: 2010     Years since quittin.1    Smokeless tobacco: Never   Substance and Sexual Activity    Alcohol use: Not Currently    Drug use: Not Currently    Sexual activity: Not Currently     Social Determinants of Health     Financial Resource Strain: Low Risk  (2022)    Overall Financial Resource Strain (CARDIA)     Difficulty of Paying Living Expenses: Not hard at all   Food Insecurity: No Food Insecurity (2022)    Hunger Vital Sign     Worried About Running Out of Food in the Last Year: Never true     Ran Out of Food in the Last Year: Never true   Transportation Needs: No Transportation Needs (2022)    PRAPARE - Transportation     Lack of Transportation (Medical): No     Lack of Transportation (Non-Medical): No   Physical Activity: Inactive (2022)    Exercise Vital Sign     Days of Exercise per Week: 0 days     Minutes of Exercise per Session: 0 min   Stress: No Stress Concern Present (2022)    [employer] - Occupational Stress Questionnaire     Feeling of Stress : Not at all   Social Connections: Moderately Isolated (2022)    Social Connection and Isolation Panel [NHANES]     Frequency of Communication with Friends and Family: More than three times a week     Frequency of Social Gatherings with Friends and Family:  "More than three times a week     Attends [employer] Services: Never     Active Member of Clubs or Organizations: No     Attends Club or Organization Meetings: Never     Marital Status:    Housing Stability: Unknown (6/28/2022)    Housing Stability Vital Sign     Unable to Pay for Housing in the Last Year: No     Unstable Housing in the Last Year: No        Family History   Problem Relation Age of Onset    Heart disease Mother     Uterine cancer Mother     Lung cancer Father     Seizures Sister     Heart disease Sister      Review of patient's allergies indicates:   Allergen Reactions    Tramadol Hives     Other reaction(s): sweating    Meperidine Rash     Other reaction(s): unknown         ROS:  Review of Systems   Constitutional: Negative.    HENT: Negative.     Eyes: Negative.    Respiratory: Negative.  Negative for shortness of breath.    Cardiovascular: Negative.  Negative for chest pain, palpitations, orthopnea, claudication, leg swelling and PND.   Gastrointestinal: Negative.    Genitourinary: Negative.    Musculoskeletal: Negative.    Skin: Negative.    Neurological:  Positive for tingling.        Burning pain on left breast, radiating into left underarm/back   Endo/Heme/Allergies: Negative.    Psychiatric/Behavioral: Negative.                                                                                                                                                                                  Negative except as stated in the history of present illness. See HPI for details.    PHYSICAL EXAM:  Visit Vitals  BP (!) 160/66 (BP Location: Right arm, Patient Position: Sitting, BP Method: Medium (Automatic))   Pulse 67   Temp 97 °F (36.1 °C)   Resp 18   Ht 5' 7" (1.702 m)   Wt 91.4 kg (201 lb 8 oz)   SpO2 97%   BMI 31.56 kg/m²       Physical Exam  HENT:      Head: Normocephalic.      Nose: Nose normal.      Mouth/Throat:      Mouth: Mucous membranes are moist.   Eyes:      Extraocular Movements: " Extraocular movements intact.   Neck:      Vascular: No carotid bruit.   Cardiovascular:      Rate and Rhythm: Normal rate and regular rhythm.      Pulses: Normal pulses.      Heart sounds: Normal heart sounds.   Pulmonary:      Effort: Pulmonary effort is normal.   Abdominal:      General: There is no distension.   Musculoskeletal:         General: Normal range of motion.      Cervical back: Normal range of motion.      Right lower leg: Edema (Mild) present.      Left lower leg: Edema (Mild) present.   Skin:     General: Skin is warm.   Neurological:      General: No focal deficit present.      Mental Status: She is alert.   Psychiatric:         Mood and Affect: Mood normal.         Current Outpatient Medications   Medication Instructions    albuterol (VENTOLIN HFA) 90 mcg/actuation inhaler 1-2 puffs, Inhalation, Every 6 hours PRN, Rescue    albuterol-ipratropium (DUO-NEB) 2.5 mg-0.5 mg/3 mL nebulizer solution 3 mLs, Nebulization, Every 4 hours PRN    amLODIPine (NORVASC) 10 mg, Oral, Daily    aspirin (ECOTRIN) 81 mg, Oral, Daily    diclofenac sodium (VOLTAREN) 2 g, Topical (Top), 3 times daily PRN    ezetimibe (ZETIA) 10 mg, Oral, Daily    fluticasone propion-salmeteroL 113-14 mcg/actuation AePB 1 puff, Inhalation, 2 times daily    furosemide (LASIX) 20 mg, Oral, Daily PRN    hydrOXYzine HCL (ATARAX) 25 mg, Oral, 4 times daily PRN    levothyroxine (SYNTHROID) 75 mcg, Oral, Before breakfast    multivit-min/iron/folic/lutein (CENTRUM SILVER WOMEN ORAL) 1 tablet, Oral, Daily    nitroGLYCERIN (NITROSTAT) 0.4 mg, Sublingual, Every 5 min PRN    pravastatin (PRAVACHOL) 10 mg, Oral, Daily        All medications, laboratory studies, cardiac diagnostic imaging reviewed.     Lab Results   Component Value Date    LDL 96.00 04/03/2023    LDL 61.00 09/14/2022    TRIG 118 04/03/2023    TRIG 57 09/14/2022    CREATININE 0.85 05/29/2023    MG 2.10 08/22/2022    K 4.0 05/29/2023        ASSESSMENT/PLAN:  TORRES and SOB  - Denies any  recent TORRES or SOB  - Workup as above is negative  - Will recommend PFTs to be completed by primary care      Ascending Aortic Aneurysm s/p Repair  -Performed on 7/29/2022  - Is reporting burning pain starting at incision site and radiating into left breast and under left arm into the left back  - PCP ordered mammogram and breast US which were both unremarkable   - BP above goal today, states that BP is normal at home, some degree of white coat syndrome   - In the future consider outpatient imaging to assess graft repair     HTN  - Blood pressure elevated today - 147/60  - Continue Norvasc 10 mg daily.   - Will have patient complete BP log and call with BP in 2 weeks  - Will consider addition of ACE/ARB or Thiazide if needed  - Counseled on low sodium, heart healthy diet and exercise as tolerated     HLD  - Patient reports that she is unable to tolerate statins due to severe skin reaction and she is also unable to tolerate Zetia due to feelings of lightheadedness weakness, dizziness  - Will trial on lowest dose of Pravastatin 10 mg daily given low side effect profile  - Counseled on importance of low cholesterol, heart healthy diet, and exercise as tolerated     History of CVA  - Continue MAPT and Pravastatin   - US carotid with less than 50% stenosis in R ICA and L ICA     Post Op AF  - Initially placed on Amiodarone but since discontinued.   - Had CROW ligation but still potential risk for cardioembolic CVA.   - CHADSVASc 5  - Xarelto discontinued after 3 months   - Currently not on anticoagulation  - Most recent EKG, May 2023, with NSR, no evidence of AF  - In normal rhythm/rate on auscultation today      Post Op DVT  - Pt told she only needed to  be on Xarelto for 3 months   - Is no longer on anticoagulation       Case discussed with Dr. Jj  Will trial patient on Pravastatin 10 mg daily  NV for BP check in 2 weeks (call)  Follow up in cardiology clinic in 4 months or sooner if needed   Follow up with PCP as  directed

## 2023-08-25 ENCOUNTER — OFFICE VISIT (OUTPATIENT)
Dept: CARDIOLOGY | Facility: CLINIC | Age: 68
End: 2023-08-25
Payer: MEDICARE

## 2023-08-25 VITALS
OXYGEN SATURATION: 97 % | DIASTOLIC BLOOD PRESSURE: 60 MMHG | BODY MASS INDEX: 31.63 KG/M2 | RESPIRATION RATE: 18 BRPM | HEIGHT: 67 IN | HEART RATE: 67 BPM | SYSTOLIC BLOOD PRESSURE: 147 MMHG | WEIGHT: 201.5 LBS | TEMPERATURE: 97 F

## 2023-08-25 DIAGNOSIS — Z86.79 S/P ASCENDING AORTIC ANEURYSM REPAIR: ICD-10-CM

## 2023-08-25 DIAGNOSIS — Z98.890 S/P ASCENDING AORTIC ANEURYSM REPAIR: ICD-10-CM

## 2023-08-25 DIAGNOSIS — I10 PRIMARY HYPERTENSION: ICD-10-CM

## 2023-08-25 DIAGNOSIS — E78.2 MIXED HYPERLIPIDEMIA: Primary | ICD-10-CM

## 2023-08-25 PROCEDURE — 99215 OFFICE O/P EST HI 40 MIN: CPT | Mod: PBBFAC

## 2023-08-25 RX ORDER — PRAVASTATIN SODIUM 10 MG/1
10 TABLET ORAL DAILY
Qty: 90 TABLET | Refills: 3 | Status: SHIPPED | OUTPATIENT
Start: 2023-08-25 | End: 2023-09-21

## 2023-08-25 NOTE — PATIENT INSTRUCTIONS
Will trial patient on Pravastatin 10 mg daily  NV for BP check in 2 weeks (call)  Follow up in cardiology clinic in 4 months or sooner if needed   Follow up with PCP as directed

## 2023-09-21 ENCOUNTER — OFFICE VISIT (OUTPATIENT)
Dept: INTERNAL MEDICINE | Facility: CLINIC | Age: 68
End: 2023-09-21
Payer: MEDICARE

## 2023-09-21 VITALS
BODY MASS INDEX: 31.76 KG/M2 | OXYGEN SATURATION: 100 % | HEART RATE: 64 BPM | WEIGHT: 202.81 LBS | SYSTOLIC BLOOD PRESSURE: 134 MMHG | RESPIRATION RATE: 20 BRPM | TEMPERATURE: 98 F | DIASTOLIC BLOOD PRESSURE: 68 MMHG

## 2023-09-21 DIAGNOSIS — Z78.9 STATIN INTOLERANCE: ICD-10-CM

## 2023-09-21 DIAGNOSIS — I73.9 CLAUDICATION OF BOTH LOWER EXTREMITIES: ICD-10-CM

## 2023-09-21 DIAGNOSIS — K21.9 GASTROESOPHAGEAL REFLUX DISEASE, UNSPECIFIED WHETHER ESOPHAGITIS PRESENT: ICD-10-CM

## 2023-09-21 DIAGNOSIS — Z76.0 MEDICATION REFILL: Primary | ICD-10-CM

## 2023-09-21 DIAGNOSIS — I82.511 CHRONIC EMBOLISM AND THROMBOSIS OF RIGHT FEMORAL VEIN: ICD-10-CM

## 2023-09-21 DIAGNOSIS — Z23 NEED FOR VACCINATION: ICD-10-CM

## 2023-09-21 DIAGNOSIS — J44.9 CHRONIC OBSTRUCTIVE PULMONARY DISEASE, UNSPECIFIED COPD TYPE: ICD-10-CM

## 2023-09-21 DIAGNOSIS — N64.4 BREAST PAIN: ICD-10-CM

## 2023-09-21 DIAGNOSIS — F17.200 HAS BEEN SMOKING TOBACCO FOR 30 YEARS OR MORE: ICD-10-CM

## 2023-09-21 DIAGNOSIS — Z86.79 S/P ASCENDING AORTIC ANEURYSM REPAIR: ICD-10-CM

## 2023-09-21 DIAGNOSIS — I10 HYPERTENSION, UNSPECIFIED TYPE: ICD-10-CM

## 2023-09-21 DIAGNOSIS — Z98.890 S/P ASCENDING AORTIC ANEURYSM REPAIR: ICD-10-CM

## 2023-09-21 DIAGNOSIS — Z86.73 HISTORY OF CVA (CEREBROVASCULAR ACCIDENT) WITHOUT RESIDUAL DEFICITS: ICD-10-CM

## 2023-09-21 DIAGNOSIS — E78.2 MIXED HYPERLIPIDEMIA: ICD-10-CM

## 2023-09-21 DIAGNOSIS — F17.211 NICOTINE DEPENDENCE, CIGARETTES, IN REMISSION: ICD-10-CM

## 2023-09-21 DIAGNOSIS — E03.9 HYPOTHYROIDISM, UNSPECIFIED TYPE: ICD-10-CM

## 2023-09-21 DIAGNOSIS — E78.5 HYPERLIPIDEMIA, UNSPECIFIED HYPERLIPIDEMIA TYPE: ICD-10-CM

## 2023-09-21 DIAGNOSIS — Z78.0 ASYMPTOMATIC MENOPAUSAL STATE: ICD-10-CM

## 2023-09-21 DIAGNOSIS — M85.80 OSTEOPENIA, UNSPECIFIED LOCATION: ICD-10-CM

## 2023-09-21 DIAGNOSIS — Z00.00 HEALTHCARE MAINTENANCE: ICD-10-CM

## 2023-09-21 DIAGNOSIS — R06.02 SOB (SHORTNESS OF BREATH): ICD-10-CM

## 2023-09-21 PROCEDURE — G0008 ADMIN INFLUENZA VIRUS VAC: HCPCS | Mod: PBBFAC

## 2023-09-21 PROCEDURE — 99215 OFFICE O/P EST HI 40 MIN: CPT | Mod: PBBFAC,25

## 2023-09-21 RX ORDER — PRAVASTATIN SODIUM 10 MG/1
10 TABLET ORAL EVERY OTHER DAY
Qty: 45 TABLET | Refills: 3
Start: 2023-09-21 | End: 2024-09-20

## 2023-09-21 RX ORDER — FUROSEMIDE 20 MG/1
20 TABLET ORAL DAILY PRN
Qty: 30 TABLET | Refills: 3 | Status: SHIPPED | OUTPATIENT
Start: 2023-09-21 | End: 2024-03-22 | Stop reason: SDUPTHER

## 2023-09-21 RX ORDER — EZETIMIBE 10 MG/1
10 TABLET ORAL DAILY
Qty: 90 TABLET | Refills: 3 | Status: SHIPPED | OUTPATIENT
Start: 2023-09-21 | End: 2023-12-06 | Stop reason: SDUPTHER

## 2023-09-21 RX ORDER — ISOSORBIDE MONONITRATE 30 MG/1
30 TABLET, EXTENDED RELEASE ORAL DAILY
COMMUNITY
End: 2023-09-21 | Stop reason: ALTCHOICE

## 2023-09-21 RX ORDER — ASPIRIN 81 MG/1
81 TABLET ORAL DAILY
Qty: 90 TABLET | Refills: 3 | Status: ON HOLD | OUTPATIENT
Start: 2023-09-21 | End: 2024-03-27

## 2023-09-21 RX ORDER — LISINOPRIL 10 MG/1
10 TABLET ORAL DAILY
Qty: 90 TABLET | Refills: 3 | Status: SHIPPED | OUTPATIENT
Start: 2023-09-21 | End: 2024-09-20

## 2023-09-21 RX ORDER — GABAPENTIN 300 MG/1
300 CAPSULE ORAL 3 TIMES DAILY
Qty: 270 CAPSULE | Refills: 3 | Status: SHIPPED | OUTPATIENT
Start: 2023-09-21 | End: 2024-01-11 | Stop reason: SDUPTHER

## 2023-09-21 RX ORDER — FLUTICASONE PROPIONATE AND SALMETEROL 113; 14 UG/1; UG/1
1 POWDER, METERED RESPIRATORY (INHALATION) 2 TIMES DAILY
Qty: 1 EACH | Refills: 6 | Status: SHIPPED | OUTPATIENT
Start: 2023-09-21

## 2023-09-21 NOTE — PROGRESS NOTES
IM Clinic Note    Patient Name: Blanquita Montoya  YOB: 1955   MRN: [id number]  Date: 09/21/2023  Home Address: [facility] [place] [address]      Subjective:     Chief Complaint:   Chief Complaint   Patient presents with    Follow-up     Image results. Burning around left breast and swelling x's last 3 months- when to urgent care - no improvement. Pravastatin- stopped x's 4 days - leg and stomach cramps-weakness and SOB         HPI:  Blanquita Montoya 67 y.o. female  She had ascending aortic aneurysm s/p repair with post op DVT/PT (previously on Eliquis), post op AF, HTN, HLD, non-obstructive CAD on coronary angiogram, history of CVA without deficits, MIKI, hypothyroidism, Vit D defciency, GERD, Osteopenia, and prediabetes who presents to clinic today for follow up. She reports no change in breast pain as well as leg pain with exertion. YUE in 2022 normal. Feels that breast pain may be post-surgical vs. Post herpatic pain due to shingles. States she continued to take Imdur. Needs medication refills.       Care Team   -[facility] Cardiology- Last appointment on 8/25/23. May need to assess graft repair with imaging. Recommended ACE-I/ARB Next appointment 12/26/23.     Available notes and lab results since last visit were reviewed; Breast ultrasound and mammogram which both resulted normal. Imdur dced due to dizziness and HA.    Past Medical History:   Diagnosis Date    Anxiety disorder     Coronary artery disease     Current use of long term anticoagulation 9/16/2022    GERD (gastroesophageal reflux disease)     History of CVA (cerebrovascular accident) without residual deficits 9/16/2022    History of deep venous thrombosis (DVT) of distal vein of right lower extremity 9/16/2022    History of deep venous thrombosis (DVT) of distal vein of right lower extremity 9/16/2022    History of pulmonary embolism 9/16/2022    History of pulmonary embolism 9/16/2022    Hypercholesterolemia     Hypertension      Hypothyroidism     Non-healing open wound of right groin 9/16/2022    Obesity     Right groin wound 9/20/2022    S/P ascending aortic aneurysm repair 9/16/2022    S/P CABG (coronary artery bypass graft) 9/16/2022    Seizure in childhood     last one age 11    Sleep apnea     does not currently use CPAP    Thoracic aortic aneurysm 03/10/2022    4.5X4.4 Ascending Aorta as of 3/10/2022    Thoracic aortic aneurysm     Thyroid disease         Past Surgical History:   Procedure Laterality Date    ANGIOGRAM, CORONARY, WITH LEFT HEART CATHETERIZATION N/A 07/11/2022    Procedure: Angiogram, Coronary, with Left Heart Cath;  Surgeon: Harry Jj MD;  Location: [facility] CATH LAB;  Service: Cardiology;  Laterality: N/A;    COLONOSCOPY  10/14/2021    Dr. Marquis Fitch    HYSTERECTOMY      REPAIR OF ASCENDING AORTA N/A 07/29/2022    Procedure: REPAIR, AORTA, ASCENDING;  Surgeon: Alec Vega IV, MD;  Location: [facility];  Service: Cardiovascular;  Laterality: N/A;  ECHO NOTIFIED    RT KNEE         Allergy:  Review of patient's allergies indicates:   Allergen Reactions    Tramadol Hives     Other reaction(s): sweating    Meperidine Rash     Other reaction(s): unknown        Current Medications:    Current Outpatient Medications:     albuterol (VENTOLIN HFA) 90 mcg/actuation inhaler, Inhale 1-2 puffs into the lungs every 6 (six) hours as needed for Wheezing or Shortness of Breath. Rescue, Disp: 18 g, Rfl: 3    albuterol-ipratropium (DUO-NEB) 2.5 mg-0.5 mg/3 mL nebulizer solution, Take 3 mLs by nebulization every 4 (four) hours as needed for Wheezing or Shortness of Breath., Disp: 75 mL, Rfl: 6    amLODIPine (NORVASC) 10 MG tablet, Take 1 tablet (10 mg total) by mouth once daily., Disp: 90 tablet, Rfl: 3    aspirin (ECOTRIN) 81 MG EC tablet, Take 1 tablet (81 mg total) by mouth once daily., Disp: 90 tablet, Rfl: 3    furosemide (LASIX) 20 MG tablet, Take 1 tablet (20 mg total) by mouth daily as needed (as needed for shortness of  breath)., Disp: 30 tablet, Rfl: 3    hydrOXYzine HCL (ATARAX) 25 MG tablet, Take 1 tablet (25 mg total) by mouth 4 (four) times daily as needed for Itching or Anxiety., Disp: 90 tablet, Rfl: 1    isosorbide mononitrate (IMDUR) 30 MG 24 hr tablet, Take 30 mg by mouth once daily., Disp: , Rfl:     levothyroxine (SYNTHROID) 75 MCG tablet, Take 1 tablet (75 mcg total) by mouth before breakfast., Disp: 90 tablet, Rfl: 3    multivit-min/iron/folic/lutein (CENTRUM SILVER WOMEN ORAL), Take 1 tablet by mouth Daily., Disp: , Rfl:     nitroGLYCERIN (NITROSTAT) 0.4 MG SL tablet, Place 1 tablet (0.4 mg total) under the tongue every 5 (five) minutes as needed for Chest pain., Disp: 30 tablet, Rfl: 3    pravastatin (PRAVACHOL) 10 MG tablet, Take 1 tablet (10 mg total) by mouth once daily., Disp: 90 tablet, Rfl: 3    diclofenac sodium (VOLTAREN) 1 % Gel, Apply 2 g topically 3 (three) times daily as needed (pain). (Patient not taking: Reported on 9/21/2023), Disp: 100 g, Rfl: 3    ezetimibe (ZETIA) 10 mg tablet, Take 1 tablet (10 mg total) by mouth once daily. (Patient not taking: Reported on 9/21/2023), Disp: 90 tablet, Rfl: 3    fluticasone propion-salmeteroL 113-14 mcg/actuation AePB, Inhale 1 puff into the lungs 2 (two) times a day. (Patient not taking: Reported on 9/21/2023), Disp: 1 each, Rfl: 6     Social History:   reports that she quit smoking about 13 years ago. Her smoking use included cigarettes. She has never used smokeless tobacco. She reports that she does not currently use alcohol. She reports that she does not currently use drugs.   Smoking 2PPD starting in teenage years and stopped 12 years ago. No current.     Family History   Problem Relation Age of Onset    Heart disease Mother     Uterine cancer Mother     Lung cancer Father     Seizures Sister     Heart disease Sister         Immunization History   Administered Date(s) Administered    COVID-19, MRNA, LN-S, PF (MODERNA FULL 0.5 ML DOSE) 03/03/2021, 03/31/2021     Influenza - Quadrivalent 10/24/2019    Influenza - Quadrivalent - High Dose - PF (65 years and older) 12/17/2020, 02/08/2022, 09/22/2022    Influenza - Trivalent - Recombinant - PF 02/14/2018    Pneumococcal Conjugate - 20 Valent 05/29/2023    Pneumococcal Polysaccharide - 23 Valent 07/30/2019    Tdap 03/07/2017    Zoster Recombinant 09/23/2020, 12/17/2020       Review of Systems   Constitutional:  Negative for chills, fever, malaise/fatigue and weight loss.   HENT:  Negative for congestion and sore throat.    Eyes:  Negative for blurred vision and double vision.   Respiratory:  Negative for cough and shortness of breath.    Cardiovascular:  Positive for claudication. Negative for chest pain and palpitations.   Gastrointestinal:  Negative for abdominal pain, constipation, diarrhea, heartburn, nausea and vomiting.   Genitourinary:  Negative for dysuria and urgency.   Musculoskeletal:  Positive for back pain, myalgias and neck pain. Negative for falls.   Neurological:  Negative for dizziness, loss of consciousness and headaches.   Psychiatric/Behavioral:  Negative for depression and suicidal ideas. The patient is not nervous/anxious.        Objective:      Physical Exam  Vitals:    09/21/23 0759   BP: 134/68   BP Location: Left arm   Patient Position: Sitting   BP Method: Large (Automatic)   Pulse: 64   Resp: 20   Temp: 98.2 °F (36.8 °C)   TempSrc: Oral   SpO2: 100%   Weight: 92 kg (202 lb 12.8 oz)        Wt Readings from Last 3 Encounters:   09/21/23 92 kg (202 lb 12.8 oz)   08/25/23 91.4 kg (201 lb 8 oz)   05/29/23 92.4 kg (203 lb 12.8 oz)       Physical Exam  Vitals and nursing note reviewed.   Constitutional:       General: She is not in acute distress.  Eyes:      General:         Right eye: No discharge.   Neck:      Vascular: No carotid bruit.   Cardiovascular:      Rate and Rhythm: Regular rhythm. Bradycardia present.      Heart sounds: No murmur heard.  Pulmonary:      Effort: Pulmonary effort is normal.  No respiratory distress.      Breath sounds: Wheezing present.   Abdominal:      General: Abdomen is flat. Bowel sounds are normal.      Palpations: Abdomen is soft.   Musculoskeletal:         General: No swelling or tenderness.      Cervical back: Normal range of motion.      Right lower leg: No edema.      Left lower leg: No edema.   Skin:     General: Skin is warm and dry.      Capillary Refill: Capillary refill takes less than 2 seconds.      Findings: No lesion or rash.   Neurological:      Mental Status: She is alert and oriented to person, place, and time.          Body mass index is 31.76 kg/m².      Lab Visit on 05/29/2023   Component Date Value Ref Range Status    Sodium Level 05/29/2023 139  136 - 145 mmol/L Final    Potassium Level 05/29/2023 4.0  3.5 - 5.1 mmol/L Final    Chloride 05/29/2023 105  98 - 107 mmol/L Final    Carbon Dioxide 05/29/2023 28  23 - 31 mmol/L Final    Glucose Level 05/29/2023 95  82 - 115 mg/dL Final    Blood Urea Nitrogen 05/29/2023 14.5  9.8 - 20.1 mg/dL Final    Creatinine 05/29/2023 0.85  0.55 - 1.02 mg/dL Final    Calcium Level Total 05/29/2023 9.6  8.4 - 10.2 mg/dL Final    Protein Total 05/29/2023 7.8 (H)  5.8 - 7.6 gm/dL Final    Albumin Level 05/29/2023 4.1  3.4 - 4.8 g/dL Final    Globulin 05/29/2023 3.7 (H)  2.4 - 3.5 gm/dL Final    Albumin/Globulin Ratio 05/29/2023 1.1  1.1 - 2.0 ratio Final    Bilirubin Total 05/29/2023 0.5  <=1.5 mg/dL Final    Alkaline Phosphatase 05/29/2023 104  40 - 150 unit/L Final    Alanine Aminotransferase 05/29/2023 22  0 - 55 unit/L Final    Aspartate Aminotransferase 05/29/2023 20  5 - 34 unit/L Final    eGFR 05/29/2023 >60  mls/min/1.73/m2 Final    Thyroid Stimulating Hormone 05/29/2023 1.451  0.350 - 4.940 uIU/mL Final    Thyroxine Free 05/29/2023 1.04  0.70 - 1.48 ng/dL Final    Hemoglobin A1c 05/29/2023 5.8  <=7.0 % Final    Estimated Average Glucose 05/29/2023 119.8  mg/dL Final   Admission on 05/08/2023, Discharged on 05/08/2023    Component Date Value Ref Range Status    Sodium Level 05/08/2023 138  136 - 145 mmol/L Final    Potassium Level 05/08/2023 3.7  3.5 - 5.1 mmol/L Final    Chloride 05/08/2023 105  98 - 107 mmol/L Final    Carbon Dioxide 05/08/2023 25  23 - 31 mmol/L Final    Glucose Level 05/08/2023 176 (H)  82 - 115 mg/dL Final    Blood Urea Nitrogen 05/08/2023 10.8  9.8 - 20.1 mg/dL Final    Creatinine 05/08/2023 0.87  0.55 - 1.02 mg/dL Final    Calcium Level Total 05/08/2023 8.9  8.4 - 10.2 mg/dL Final    Protein Total 05/08/2023 6.7  5.8 - 7.6 gm/dL Final    Albumin Level 05/08/2023 3.5  3.4 - 4.8 g/dL Final    Globulin 05/08/2023 3.2  2.4 - 3.5 gm/dL Final    Albumin/Globulin Ratio 05/08/2023 1.1  1.1 - 2.0 ratio Final    Bilirubin Total 05/08/2023 0.4  <=1.5 mg/dL Final    Alkaline Phosphatase 05/08/2023 99  40 - 150 unit/L Final    Alanine Aminotransferase 05/08/2023 22  0 - 55 unit/L Final    Aspartate Aminotransferase 05/08/2023 16  5 - 34 unit/L Final    eGFR 05/08/2023 >60  mls/min/1.73/m2 Final    Troponin-I 05/08/2023 0.011  0.000 - 0.045 ng/mL Final    WBC 05/08/2023 5.27  4.50 - 11.50 x10(3)/mcL Final    RBC 05/08/2023 4.77  4.20 - 5.40 x10(6)/mcL Final    Hgb 05/08/2023 14.0  12.0 - 16.0 g/dL Final    Hct 05/08/2023 43.3  37.0 - 47.0 % Final    MCV 05/08/2023 90.8  80.0 - 94.0 fL Final    MCH 05/08/2023 29.4  27.0 - 31.0 pg Final    MCHC 05/08/2023 32.3 (L)  33.0 - 36.0 g/dL Final    RDW 05/08/2023 13.2  11.5 - 17.0 % Final    Platelet 05/08/2023 174  130 - 400 x10(3)/mcL Final    MPV 05/08/2023 11.8 (H)  7.4 - 10.4 fL Final    Neut % 05/08/2023 67.8  % Final    Lymph % 05/08/2023 18.8  % Final    Mono % 05/08/2023 7.8  % Final    Eos % 05/08/2023 4.6  % Final    Basophil % 05/08/2023 0.8  % Final    Lymph # 05/08/2023 0.99  0.6 - 4.6 x10(3)/mcL Final    Neut # 05/08/2023 3.58  2.1 - 9.2 x10(3)/mcL Final    Mono # 05/08/2023 0.41  0.1 - 1.3 x10(3)/mcL Final    Eos # 05/08/2023 0.24  0 - 0.9 x10(3)/mcL Final    Baso  # 05/08/2023 0.04  <=0.2 x10(3)/mcL Final    IG# 05/08/2023 0.01  0 - 0.04 x10(3)/mcL Final    IG% 05/08/2023 0.2  % Final    NRBC% 05/08/2023 0.0  % Final   Hospital Outpatient Visit on 04/03/2023   Component Date Value Ref Range Status    Post FVC 04/04/2023 2.06 (L)  2.37 - 4.12 L Preliminary    Post FEV1 04/04/2023 1.55 (L)  1.83 - 3.13 L Preliminary    Post FEV1 FVC 04/04/2023 75.24  64.99 - 89.10 % Preliminary    Post FEF 25 75 04/04/2023 1.18  0.97 - 3.60 L/s Preliminary    Post PEF 04/04/2023 4.45  4.39 - 8.14 L/s Preliminary    Post  04/04/2023 6.70  sec Preliminary    Pre DLCO 04/04/2023 13.16 (L)  17.70 - 29.17 ml/(min*mmHg) Preliminary    DLCOVA PRE 04/04/2023 3.20  2.99 - 5.62 ml/(min*mmHg*L) Preliminary    VA PRE 04/04/2023 4.11 (L)  5.29 - 5.29 L Preliminary    IVC PRE 04/04/2023 2.48  2.37 - 4.12 L Preliminary    Pre TLC 04/04/2023 4.97  4.45 - 6.43 L Preliminary    VC PRE 04/04/2023 2.26 (L)  2.37 - 4.12 L Preliminary    Pre FRC PL 04/04/2023 3.60  2.06 - 3.70 L Preliminary    Pre ERV 04/04/2023 0.89  -85282.29 - 42815.71 L Preliminary    Pre RV 04/04/2023 2.71  1.59 - 2.74 L Preliminary    RVTLC PRE 04/04/2023 54.58 (H)  32.49 - 51.67 % Preliminary    Raw PRE 04/04/2023 2.03 (L)  3.06 - 3.06 cmH2O*s/L Preliminary    Pre FVC 04/04/2023 1.83 (L)  2.37 - 4.12 L Preliminary    Pre FEV1 04/04/2023 1.34 (L)  1.83 - 3.13 L Preliminary    Pre FEV1 FVC 04/04/2023 72.93  64.99 - 89.10 % Preliminary    Pre FEF 25 75 04/04/2023 0.89 (L)  0.97 - 3.60 L/s Preliminary    Pre PEF 04/04/2023 4.29 (L)  4.39 - 8.14 L/s Preliminary    Pre  04/04/2023 6.97  sec Preliminary    Pre MVV 04/04/2023 61.98 (L)  82.35 - 111.42 L/min Preliminary    FVC Ref 04/04/2023 3.22   Preliminary    FVC LLN 04/04/2023 2.37   Preliminary    FVC Pre Ref 04/04/2023 56.8  % Preliminary    FVC Post Ref 04/04/2023 64.0  % Preliminary    FVC Chg 04/04/2023 12.7  % Preliminary    FEV1 Ref 04/04/2023 2.49   Preliminary    FEV1 LLN  04/04/2023 1.83   Preliminary    FEV1 Pre Ref 04/04/2023 53.7  % Preliminary    FEV1 Post Ref 04/04/2023 62.4  % Preliminary    FEV1 Chg 04/04/2023 16.2  % Preliminary    FEV1 FVC Ref 04/04/2023 78   Preliminary    FEV1 FVC LLN 04/04/2023 65   Preliminary    FEV1 FVC Pre Ref 04/04/2023 93.5  % Preliminary    FEV1 FVC Post Ref 04/04/2023 96.5  % Preliminary    FEV1 FVC Chg 04/04/2023 3.2  % Preliminary    FEF 25 75 Ref 04/04/2023 2.06   Preliminary    FEF 25 75 LLN 04/04/2023 0.97   Preliminary    FEF 25 75 Pre Ref 04/04/2023 43.2  % Preliminary    FEF 25 75 Post Ref 04/04/2023 57.3  % Preliminary    FEF 25 75 Chg 04/04/2023 32.8  % Preliminary    PEF Ref 04/04/2023 6.26   Preliminary    PEF LLN 04/04/2023 4.39   Preliminary    PEF Pre Ref 04/04/2023 68.4  % Preliminary    PEF Post Ref 04/04/2023 71.1  % Preliminary    PEF Chg 04/04/2023 3.9  % Preliminary    [id number] Chg 04/04/2023 -3.9  % Preliminary    MVV Ref 04/04/2023 97   Preliminary    MVV LLN 04/04/2023 82   Preliminary    MVV Pre Ref 04/04/2023 64.0  % Preliminary    TLC Ref 04/04/2023 5.44   Preliminary    TLC LLN 04/04/2023 4.45   Preliminary    TLC Pre Ref 04/04/2023 91.3  % Preliminary    VC Ref 04/04/2023 3.22   Preliminary    VC LLN 04/04/2023 2.37   Preliminary    VC Pre Ref 04/04/2023 70.1  % Preliminary    FRCpleth Ref 04/04/2023 2.88   Preliminary    FRCpleth LLN 04/04/2023 2.06   Preliminary    FRCpleth PreRef 04/04/2023 124.9  % Preliminary    ERV Ref 04/04/2023 0.71   Preliminary    ERV LLN 04/04/2023 -02991.29   Preliminary    ERV Pre Ref 04/04/2023 124.3  % Preliminary    RV Ref 04/04/2023 2.17   Preliminary    RV LLN 04/04/2023 1.59   Preliminary    RV Pre Ref 04/04/2023 125.1  % Preliminary    RVTLC Ref 04/04/2023 42   Preliminary    RVTLC LLN 04/04/2023 32   Preliminary    RVTLC Pre Ref 04/04/2023 129.7  % Preliminary    Raw Ref 04/04/2023 3.06   Preliminary    Raw LLN 04/04/2023 3.06   Preliminary    Raw Pre Ref 04/04/2023 66.5  %  Preliminary    DLCO Single Breath Ref 04/04/2023 23.44   Preliminary    DLCO Single Breath LLN 04/04/2023 17.70   Preliminary    DLCO Single Breath Pre Ref 04/04/2023 56.2  % Preliminary    DLCOc Single Breath Ref 04/04/2023 23.44   Preliminary    DLCOc Single Breath LLN 04/04/2023 17.70   Preliminary    DLCOVA Ref 04/04/2023 4.31   Preliminary    DLCOVA LLN 04/04/2023 2.99   Preliminary    DLCOVA Pre Ref 04/04/2023 74.3  % Preliminary    DLCOc SBVA Ref 04/04/2023 4.31   Preliminary    DLCOc SBVA LLN 04/04/2023 2.99   Preliminary    VA Single Breath Ref 04/04/2023 5.29   Preliminary    VA Single Breath LLN 04/04/2023 5.29   Preliminary    VA Single Breath Pre Ref 04/04/2023 77.7  % Preliminary    IVC Single Breath Ref 04/04/2023 3.22   Preliminary    IVC Single Breath LLN 04/04/2023 2.37   Preliminary    IVC Single Breath Pre Ref 04/04/2023 76.9  % Preliminary   Lab Visit on 04/03/2023   Component Date Value Ref Range Status    Sodium Level 04/03/2023 138  136 - 145 mmol/L Final    Potassium Level 04/03/2023 4.0  3.5 - 5.1 mmol/L Final    Chloride 04/03/2023 103  98 - 107 mmol/L Final    Carbon Dioxide 04/03/2023 29  23 - 31 mmol/L Final    Glucose Level 04/03/2023 89  82 - 115 mg/dL Final    Blood Urea Nitrogen 04/03/2023 15.1  9.8 - 20.1 mg/dL Final    Creatinine 04/03/2023 0.83  0.55 - 1.02 mg/dL Final    Calcium Level Total 04/03/2023 9.5  8.4 - 10.2 mg/dL Final    Protein Total 04/03/2023 7.5  5.8 - 7.6 gm/dL Final    Albumin Level 04/03/2023 3.9  3.4 - 4.8 g/dL Final    Globulin 04/03/2023 3.6 (H)  2.4 - 3.5 gm/dL Final    Albumin/Globulin Ratio 04/03/2023 1.1  1.1 - 2.0 ratio Final    Bilirubin Total 04/03/2023 0.4  <=1.5 mg/dL Final    Alkaline Phosphatase 04/03/2023 112  40 - 150 unit/L Final    Alanine Aminotransferase 04/03/2023 26  0 - 55 unit/L Final    Aspartate Aminotransferase 04/03/2023 22  5 - 34 unit/L Final    eGFR 04/03/2023 >60  mls/min/1.73/m2 Final    Natriuretic Peptide 04/03/2023 142.0 (H)   <=100.0 pg/mL Final    Hemoglobin A1c 04/03/2023 5.9  <=7.0 % Final    Estimated Average Glucose 04/03/2023 122.6  mg/dL Final    Cholesterol Total 04/03/2023 175  <=200 mg/dL Final    HDL Cholesterol 04/03/2023 55  35 - 60 mg/dL Final    Triglyceride 04/03/2023 118  37 - 140 mg/dL Final    Cholesterol/HDL Ratio 04/03/2023 3  0 - 5 Final    Very Low Density Lipoprotein 04/03/2023 24   Final    LDL Cholesterol 04/03/2023 96.00  50.00 - 140.00 mg/dL Final    Vit D 25 OH 04/03/2023 31.3  30.0 - 80.0 ng/mL Final    Thyroid Stimulating Hormone 04/03/2023 16.519 (H)  0.350 - 4.940 uIU/mL Final    Thyroxine Free 04/03/2023 0.75  0.70 - 1.48 ng/dL Final    D-Dimer 04/03/2023 1.44 (H)  0.00 - 0.50 ug/mL FEU Final    D-dimer values of less than 0.5ug/mL FEU in adult patients with a clinically low pre-test probability of developing a DVT yield  a 99% negative predictive value.  D-dimer increases naturally with age, therefore the negative predictive value in patients older than 80 is 21 - 31%.    D-Dimer testing at [facility] and [facility] is used as an aid in the diagnosis of VTE/PE. The D-Dimer test must be used with one or more additional tests such as imaging studies to evaluate VTE/PE    WBC 04/03/2023 7.0  4.5 - 11.5 x10(3)/mcL Final    RBC 04/03/2023 4.91  4.20 - 5.40 x10(6)/mcL Final    Hgb 04/03/2023 14.3  12.0 - 16.0 g/dL Final    Hct 04/03/2023 44.6  37.0 - 47.0 % Final    MCV 04/03/2023 90.8  80.0 - 94.0 fL Final    MCH 04/03/2023 29.1  27.0 - 31.0 pg Final    MCHC 04/03/2023 32.1 (L)  33.0 - 36.0 g/dL Final    RDW 04/03/2023 13.8  11.5 - 17.0 % Final    Platelet 04/03/2023 188  130 - 400 x10(3)/mcL Final    MPV 04/03/2023 11.2 (H)  7.4 - 10.4 fL Final    Neut % 04/03/2023 68.1  % Final    Lymph % 04/03/2023 20.3  % Final    Mono % 04/03/2023 6.3  % Final    Eos % 04/03/2023 4.3  % Final    Basophil % 04/03/2023 0.7  % Final    Lymph # 04/03/2023 1.41  0.6 - 4.6 x10(3)/mcL Final     Neut # 04/03/2023 4.73  2.1 - 9.2 x10(3)/mcL Final    Mono # 04/03/2023 0.44  0.1 - 1.3 x10(3)/mcL Final    Eos # 04/03/2023 0.30  0 - 0.9 x10(3)/mcL Final    Baso # 04/03/2023 0.05  0 - 0.2 x10(3)/mcL Final    IG# 04/03/2023 0.02  0 - 0.04 x10(3)/mcL Final    IG% 04/03/2023 0.3  % Final    NRBC% 04/03/2023 0.0  % Final   Lab Requisition on 03/30/2023   Component Date Value Ref Range Status    Sodium Level 03/30/2023 139  136 - 145 mmol/L Final    Potassium Level 03/30/2023 4.0  3.5 - 5.1 mmol/L Final    Chloride 03/30/2023 102  98 - 107 mmol/L Final    Carbon Dioxide 03/30/2023 26  23 - 31 mmol/L Final    Glucose Level 03/30/2023 113  82 - 115 mg/dL Final    Blood Urea Nitrogen 03/30/2023 14.7  9.8 - 20.1 mg/dL Final    Creatinine 03/30/2023 0.85  0.55 - 1.02 mg/dL Final    Calcium Level Total 03/30/2023 9.3  8.4 - 10.2 mg/dL Final    Protein Total 03/30/2023 6.8  5.8 - 7.6 gm/dL Final    Albumin Level 03/30/2023 3.7  3.4 - 4.8 g/dL Final    Globulin 03/30/2023 3.1  2.4 - 3.5 gm/dL Final    Albumin/Globulin Ratio 03/30/2023 1.2  1.1 - 2.0 ratio Final    Bilirubin Total 03/30/2023 0.3  <=1.5 mg/dL Final    Alkaline Phosphatase 03/30/2023 111  40 - 150 unit/L Final    Alanine Aminotransferase 03/30/2023 23  0 - 55 unit/L Final    Aspartate Aminotransferase 03/30/2023 20  5 - 34 unit/L Final    eGFR 03/30/2023 >60  mls/min/1.73/m2 Final    WBC 03/30/2023 7.6  4.5 - 11.5 x10(3)/mcL Final    RBC 03/30/2023 4.80  4.20 - 5.40 x10(6)/mcL Final    Hgb 03/30/2023 13.9  12.0 - 16.0 g/dL Final    Hct 03/30/2023 43.2  37.0 - 47.0 % Final    MCV 03/30/2023 90.0  80.0 - 94.0 fL Final    MCH 03/30/2023 29.0  27.0 - 31.0 pg Final    MCHC 03/30/2023 32.2 (L)  33.0 - 36.0 g/dL Final    RDW 03/30/2023 13.6  11.5 - 17.0 % Final    Platelet 03/30/2023 187  130 - 400 x10(3)/mcL Final    MPV 03/30/2023 12.0 (H)  7.4 - 10.4 fL Final    Neut % 03/30/2023 70.3  % Final    Lymph % 03/30/2023 18.9  % Final    Mono % 03/30/2023 6.0  % Final     Eos % 03/30/2023 3.9  % Final    Basophil % 03/30/2023 0.9  % Final    Lymph # 03/30/2023 1.44  0.6 - 4.6 x10(3)/mcL Final    Neut # 03/30/2023 5.36  2.1 - 9.2 x10(3)/mcL Final    Mono # 03/30/2023 0.46  0.1 - 1.3 x10(3)/mcL Final    Eos # 03/30/2023 0.30  0 - 0.9 x10(3)/mcL Final    Baso # 03/30/2023 0.07  0 - 0.2 x10(3)/mcL Final    IG# 03/30/2023 0.00  0 - 0.04 x10(3)/mcL Final    IG% 03/30/2023 0.0  % Final    Natriuretic Peptide 03/30/2023 93.3  <=100.0 pg/mL Final           Assessment:   Medication Refills   Hypertension  Hyperlipidemia  Statin Intolerance   - Continue Amlodipine 10mg daily, lasix 20mg daily P, lisinopril 10mg daily, Zetia,   - Instructed on lifestyle modifications including physical activity as tolerated 30 min per day x 5 days per week and low sodium 2g, low cholesterol diet    - Continue pravastatin 10mg every other day due to statin intolerance and Zetia 10mg added back to medication regimen     Thoracic ascending aortic aneurysm s/p repair with CT surgery  Nonobstructive coronary artery disease s/p CABG  Post op course with PE/DVT and CVA   HFpEF w EF 50%   - Continue to f/u with [facility] cardiology as recommended   - Continue ASA 81mg qd, remain off of Xarelto   - Has rescue SL Nitro  - Reiterated Imdur was discontinued due to dizziness and HA      L Breast Pain   -Suspect neuropathic nature, postsurgical vs. Post-herpatic vs. Cervical radiculopathy   -Started Gabapentin 300mg TID for relief   - Varicellar Ab IgG    Osteopenia  History of Vitamin D deficiency  -Lumbar osteopenia, T score -2.1  -Continue multivitamin with calcium and vitamin D OTC   - Repeat DEXA and Lumbarthoraco XR      GERD/gastritis  -Continue Protonix 40mg qd   -Avoid NSAIDs due to risk of aggravated GERD/gastritis      MIKI on CPAP  -Continue to monitor      Hypothyroidism  -Continue Synthroid 50 MCG daily      Insomnia  - Continue melatonin  - Discontinued Vistaril due to intermittent dizziness and HA      Routine  health maintenance  Need for Vaccination   -Mammogram: 11/22, WNL  -DEXA: 9/2020, osteopenia of lumbar vertebrae, plan to repeat next visit   -Colon cancer screening: Colonoscopy from 10/2021 with diverticulosis of the sigmoid colon, otherwise normal, repeat 5 years in 2026  -GYN: Follows with gynecology  -Vaccines:    -Flu vaccine, UTD    -Pneumonia 23 on 7/2019    -Tdap on 3/2017    -Zoster series completed 12/2020    -COVID-19: Vaccinated x2    ED precautions given, patient verbalized understanding.         Disposition: RTC in 4 months or sooner if needed.     Patient case and plan of care discussed with Dr. Ramos.       Gary Morris MD   Internal Medicine - -1

## 2023-09-28 ENCOUNTER — HOSPITAL ENCOUNTER (OUTPATIENT)
Dept: RADIOLOGY | Facility: HOSPITAL | Age: 68
Discharge: HOME | End: 2023-09-28
Payer: MEDICARE

## 2023-09-28 DIAGNOSIS — M85.80 OSTEOPENIA, UNSPECIFIED LOCATION: ICD-10-CM

## 2023-09-28 DIAGNOSIS — Z78.0 ASYMPTOMATIC MENOPAUSAL STATE: ICD-10-CM

## 2023-09-28 PROCEDURE — 77080 DXA BONE DENSITY AXIAL: CPT | Mod: TC

## 2023-09-30 ENCOUNTER — TELEPHONE (OUTPATIENT)
Dept: INTERNAL MEDICINE | Facility: CLINIC | Age: 68
End: 2023-09-30

## 2023-09-30 DIAGNOSIS — M85.88 OSTEOPENIA OF LUMBAR SPINE: Primary | ICD-10-CM

## 2023-10-01 NOTE — TELEPHONE ENCOUNTER
DEXA scan completed on 9/28/23 significant for stable lumbar spine osteopenia, however patient is not on Vit D3-Ca 600-20 (800u) supplementation. Called patient today to discuss need for this. Gave her option of sending to pharmacy as a prescription.       Frax Score: Negative- no need to start Bisphosphonate at this time.   Major Osteoporotic Fx: 8.2  Hip Fx: 0.7    Repeat DEXA due on 9/28/26.             Gary Morris MD   [facility] Internal Medicine HO-1

## 2023-10-04 ENCOUNTER — TELEPHONE (OUTPATIENT)
Dept: ADMINISTRATIVE | Facility: HOSPITAL | Age: 68
End: 2023-10-04

## 2023-10-04 NOTE — TELEPHONE ENCOUNTER
TELEPHONE MESSAGE:       HI MS. DUGAN,    MS. ALAMO IS ASKING TO SPEAK  WITH YOU.  I TRIED HER A FEW MINUTES AGO, BUT SHE DID NOT ANSWER.

## 2023-10-05 NOTE — TELEPHONE ENCOUNTER
Pt called, name and  verified. Pt reported that the pravastatin is making her have leg cramps, muscle weakness and light-headedness. Pt reported she didn't take the medication last night and today she did not experience any of those symptoms. Pt is requesting an call back from you in regards to this matter. No further questions or concerns noted. Call ended.  Pt review and advise.

## 2023-10-06 ENCOUNTER — TELEPHONE (OUTPATIENT)
Dept: INTERNAL MEDICINE | Facility: CLINIC | Age: 68
End: 2023-10-06
Payer: MEDICARE

## 2023-10-06 NOTE — TELEPHONE ENCOUNTER
----- Message from Roxane Spears sent at 10/6/2023  2:40 PM CDT -----  Regarding: Diagnosis Code  Good afternoon,   I am getting a warning for the orders of CV  US Venous Insufficiency Left  and CV US Lower Venous Right because of the diagnosis code of I73.9 which is peripheral vascular disease, unspecified. We tend to get these warning because the diagnosis definition has the word unspecified or other in it. Unfortunately, we do not get a replacement code when the warning shows up. Please have provider order test with different diagnosis code.     Kindest regards,

## 2023-10-06 NOTE — TELEPHONE ENCOUNTER
Called patient to discuss pravastatin intolerance. Reminded her to take every other day. Will add daily Coenzyme Q10 10mg to regimen.     In regards to most recent DEXA scan, she will need to start daily Calcium 600mg-Vit D 20 mcgs supplement to treat her osteoporosis.     These supplements can be picked up over the counter at her pharmacy. [facility] has both required dosages.

## 2023-10-17 DIAGNOSIS — Z12.31 ENCOUNTER FOR SCREENING MAMMOGRAM FOR MALIGNANT NEOPLASM OF BREAST: ICD-10-CM

## 2023-10-17 DIAGNOSIS — Z86.73 HISTORY OF CVA (CEREBROVASCULAR ACCIDENT) WITHOUT RESIDUAL DEFICITS: ICD-10-CM

## 2023-10-17 DIAGNOSIS — Z23 NEED FOR VACCINATION: ICD-10-CM

## 2023-10-17 DIAGNOSIS — E03.9 HYPOTHYROIDISM, UNSPECIFIED TYPE: ICD-10-CM

## 2023-10-17 DIAGNOSIS — I10 HYPERTENSION, UNSPECIFIED TYPE: ICD-10-CM

## 2023-10-17 DIAGNOSIS — I63.9 ISCHEMIC EMBOLIC STROKE: ICD-10-CM

## 2023-10-17 DIAGNOSIS — Z12.39 ENCOUNTER FOR SCREENING FOR MALIGNANT NEOPLASM OF BREAST, UNSPECIFIED SCREENING MODALITY: ICD-10-CM

## 2023-10-17 DIAGNOSIS — Z86.79 S/P ASCENDING AORTIC ANEURYSM REPAIR: ICD-10-CM

## 2023-10-17 DIAGNOSIS — E78.5 HYPERLIPIDEMIA, UNSPECIFIED HYPERLIPIDEMIA TYPE: ICD-10-CM

## 2023-10-17 DIAGNOSIS — Z98.890 S/P ASCENDING AORTIC ANEURYSM REPAIR: ICD-10-CM

## 2023-10-17 DIAGNOSIS — K21.9 GASTROESOPHAGEAL REFLUX DISEASE, UNSPECIFIED WHETHER ESOPHAGITIS PRESENT: ICD-10-CM

## 2023-10-17 DIAGNOSIS — I26.99 PULMONARY EMBOLISM, UNSPECIFIED CHRONICITY, UNSPECIFIED PULMONARY EMBOLISM TYPE, UNSPECIFIED WHETHER ACUTE COR PULMONALE PRESENT: ICD-10-CM

## 2023-10-17 DIAGNOSIS — Z86.718 HISTORY OF DEEP VENOUS THROMBOSIS (DVT) OF DISTAL VEIN OF RIGHT LOWER EXTREMITY: ICD-10-CM

## 2023-10-17 RX ORDER — AMLODIPINE BESYLATE 10 MG/1
10 TABLET ORAL DAILY
Qty: 90 TABLET | Refills: 3 | Status: SHIPPED | OUTPATIENT
Start: 2023-10-17

## 2023-11-03 ENCOUNTER — OFFICE VISIT (OUTPATIENT)
Dept: URGENT CARE | Facility: CLINIC | Age: 68
End: 2023-11-03
Payer: MEDICARE

## 2023-11-03 VITALS
OXYGEN SATURATION: 98 % | HEART RATE: 54 BPM | DIASTOLIC BLOOD PRESSURE: 80 MMHG | BODY MASS INDEX: 32.38 KG/M2 | TEMPERATURE: 98 F | RESPIRATION RATE: 16 BRPM | HEIGHT: 67 IN | WEIGHT: 206.31 LBS | SYSTOLIC BLOOD PRESSURE: 144 MMHG

## 2023-11-03 DIAGNOSIS — J32.0 MAXILLARY SINUSITIS, UNSPECIFIED CHRONICITY: Primary | ICD-10-CM

## 2023-11-03 DIAGNOSIS — R09.89 SYMPTOMS OF UPPER RESPIRATORY INFECTION (URI): ICD-10-CM

## 2023-11-03 LAB
CTP QC/QA: YES
FLUAV AG NPH QL: NEGATIVE
FLUBV AG NPH QL: NEGATIVE
MOLECULAR STREP A: NEGATIVE
SARS-COV-2 RDRP RESP QL NAA+PROBE: NEGATIVE

## 2023-11-03 PROCEDURE — 87635 SARS-COV-2 COVID-19 AMP PRB: CPT | Mod: PBBFAC

## 2023-11-03 PROCEDURE — 99214 OFFICE O/P EST MOD 30 MIN: CPT | Mod: S$PBB,,,

## 2023-11-03 PROCEDURE — 99214 PR OFFICE/OUTPT VISIT, EST, LEVL IV, 30-39 MIN: ICD-10-PCS | Mod: S$PBB,,,

## 2023-11-03 PROCEDURE — 87651 STREP A DNA AMP PROBE: CPT | Mod: PBBFAC

## 2023-11-03 PROCEDURE — 99214 OFFICE O/P EST MOD 30 MIN: CPT | Mod: PBBFAC

## 2023-11-03 PROCEDURE — 87804 INFLUENZA ASSAY W/OPTIC: CPT | Mod: 59,PBBFAC

## 2023-11-03 RX ORDER — LORATADINE 10 MG/1
10 TABLET ORAL DAILY
Qty: 30 TABLET | Refills: 0 | Status: SHIPPED | OUTPATIENT
Start: 2023-11-03 | End: 2024-01-11

## 2023-11-03 RX ORDER — AMOXICILLIN 875 MG/1
875 TABLET, FILM COATED ORAL EVERY 12 HOURS
Qty: 20 TABLET | Refills: 0 | Status: SHIPPED | OUTPATIENT
Start: 2023-11-03 | End: 2023-11-13

## 2023-11-03 RX ORDER — PROMETHAZINE HYDROCHLORIDE AND DEXTROMETHORPHAN HYDROBROMIDE 6.25; 15 MG/5ML; MG/5ML
5 SYRUP ORAL EVERY 6 HOURS PRN
Qty: 118 ML | Refills: 0 | Status: SHIPPED | OUTPATIENT
Start: 2023-11-03 | End: 2023-11-13

## 2023-11-03 RX ORDER — FLUTICASONE PROPIONATE 50 MCG
2 SPRAY, SUSPENSION (ML) NASAL DAILY
Qty: 18.2 ML | Refills: 0 | Status: SHIPPED | OUTPATIENT
Start: 2023-11-03

## 2023-11-03 NOTE — PROGRESS NOTES
"Subjective:      Patient ID: Blanquita Montoya is a 67 y.o. female.    Vitals:  height is 5' 7" (1.702 m) and weight is 93.6 kg (206 lb 4.8 oz). Her temperature is 97.7 °F (36.5 °C). Her blood pressure is 144/80 (abnormal) and her pulse is 54 (abnormal). Her respiration is 16 and oxygen saturation is 98%.     Chief Complaint: URI (Cough, fever, back pain x 1 week.)    PT states cough, fever, back pain from coughing for a week. Also states sinus pain and congestion.  recently sick.    URI   Associated symptoms include congestion, coughing and sinus pain.       Constitution: Positive for fever.   HENT:  Positive for congestion, sinus pain and sinus pressure.    Neck: neck negative.   Cardiovascular: Negative.    Eyes: Negative.    Respiratory:  Positive for cough.    Gastrointestinal: Negative.    Genitourinary: Negative.    Musculoskeletal: Negative.    Skin: Negative.    Neurological: Negative.       Objective:     Physical Exam   Constitutional: She is oriented to person, place, and time. normal  HENT:   Head: Normocephalic.   Ears:   Right Ear: Tympanic membrane, external ear and ear canal normal.   Left Ear: Tympanic membrane, external ear and ear canal normal.   Nose: Congestion present.   Mouth/Throat: Uvula is midline and mucous membranes are normal. Mucous membranes are moist. Oropharynx is clear.   Eyes: Conjunctivae are normal. Pupils are equal, round, and reactive to light.   Neck: Neck supple.   Cardiovascular: Normal rate, regular rhythm, normal heart sounds and normal pulses.   Pulmonary/Chest: Effort normal and breath sounds normal.   Abdominal: Normal appearance. Soft.   Musculoskeletal: Normal range of motion.         General: Normal range of motion.   Neurological: She is alert and oriented to person, place, and time.   Skin: Skin is warm and dry.   Vitals reviewed.    Results for orders placed or performed in visit on 11/03/23   POCT COVID-19 Rapid Screening   Result Value Ref Range    POC " Rapid COVID Negative Negative     Acceptable Yes    POCT Influenza A/B   Result Value Ref Range    Rapid Influenza A Ag Negative Negative    Rapid Influenza B Ag Negative Negative     Acceptable Yes    POCT Strep A, Molecular   Result Value Ref Range    Molecular Strep A, POC Negative Negative     Acceptable Yes        Assessment:     1. Maxillary sinusitis, unspecified chronicity    2. Symptoms of upper respiratory infection (URI)        Plan:       Maxillary sinusitis, unspecified chronicity  -     amoxicillin (AMOXIL) 875 MG tablet; Take 1 tablet (875 mg total) by mouth every 12 (twelve) hours. for 10 days  Dispense: 20 tablet; Refill: 0    Symptoms of upper respiratory infection (URI)  -     POCT COVID-19 Rapid Screening  -     POCT Influenza A/B  -     POCT Strep A, Molecular  -     promethazine-dextromethorphan (PROMETHAZINE-DM) 6.25-15 mg/5 mL Syrp; Take 5 mLs by mouth every 6 (six) hours as needed (cough).  Dispense: 118 mL; Refill: 0  -     fluticasone propionate (FLONASE) 50 mcg/actuation nasal spray; 2 sprays (100 mcg total) by Each Nostril route once daily.  Dispense: 18.2 mL; Refill: 0  -     loratadine (CLARITIN) 10 mg tablet; Take 1 tablet (10 mg total) by mouth once daily.  Dispense: 30 tablet; Refill: 0      Please drink plenty of fluids.  Please get plenty of rest.    Take over the counter Tylenol (Acetaminophen) and/or Motrin (Ibuprofen) as directed for control of pain and/or fever.  Please follow up with your primary care doctor.     ER precautions given, patient verbalized understanding.     Please see provided patient education for guidance.    Follow up with PCP or return to clinic if symptoms worsen or do not improve.

## 2023-12-06 DIAGNOSIS — E78.5 HYPERLIPIDEMIA, UNSPECIFIED HYPERLIPIDEMIA TYPE: ICD-10-CM

## 2023-12-07 RX ORDER — EZETIMIBE 10 MG/1
10 TABLET ORAL DAILY
Qty: 90 TABLET | Refills: 3 | Status: SHIPPED | OUTPATIENT
Start: 2023-12-07

## 2023-12-08 NOTE — PROGRESS NOTES
CHIEF COMPLAINT:   Chief Complaint   Patient presents with    Follow-up     4 mos f/u denies cardiac targets                                                  HPI:  Blanquita Montoya 67 y.o. 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  At last appointment patient denied any cardiac complaints. However, she did endorse a burning pain that is constant that starts at her incision site and radiates to her left breast and underneath her left arm to her left back.  She states that the pain is constant but is relieved with ibuprofen and ice. She stated that her PCP ordered a breast ultrasound and mammogram which both resulted normal.  Advised patient to discuss this again with her primary care, however it does sound like she could possibly have some postop nerve pain or even shingles given the dermatomal distribution.    Today the patient states that she is feeling well overall.  She states that her previous complaints of burning have resolved.  She denies any cardiac complaints such as chest pain, shortness of breath, palpitations, lightheadedness, dizziness, syncope, PND, or orthopnea.  She states that she is able to complete her ADLs without any issues or ischemic symptoms.  She states that she exercises daily and goes for walks every morning.  She reports compliance with all her current medications of note, she was started on pravastatin at last office visit due to low side effect profile and inability to tolerate any other statins.  She states that she became lightheaded and dizzy on this medication.  Since then, pravastatin was discontinued and patient was started on Zetia per PCP.  She was also started on Lisinopril which she is tolerating well.  He is any recent tobacco use.    Today patient did  complain of fatigue and weight gain today.  She states that she is currently taking her thyroid medication as described but she feels as though her levels may be off.  Advised her to notify PCP of her symptoms as they may need to repeat labs and make medication adjustments as appropriate.                                                                                                                                                                                                                                                                                                                  CARDIAC TESTING:  Results for orders placed during the hospital encounter of 11/15/22    Echo    Interpretation Summary  · The left ventricle is normal in size with concentric remodeling and low normal systolic function.  · The estimated ejection fraction is 50%.  · Normal left ventricular diastolic function.  · Normal right ventricular size with normal right ventricular systolic function.  · Mild left atrial enlargement.  · Mild aortic regurgitation.  · Mild mitral regurgitation.  · Mild to moderate tricuspid regurgitation.  · There is no pulmonary hypertension.  · The estimated PA systolic pressure is 31 mmHg.  · Normal central venous pressure (3 mmHg).    No results found for this or any previous visit.     Results for orders placed during the hospital encounter of 07/11/22    Cardiac catheterization    Conclusion  · The pre-procedure left ventricular end diastolic pressure was 10.  · The estimated blood loss was none.  · There was non-obstructive coronary artery disease..    The procedure log was documented by Documenter: Larisa Deng RN and verified by Harry Jj MD.    FINDINGS  Anomalous origin of RCA    RECOMMENDATIONS  Medical management  Risk factor modifications  Activity -- avoid straining with affected limb for one week  Exercise -- as tolerated  Make follow-up appointment with CV surgery for aneurysm  repair    Date: 2022  Time: 11:13 AM       Patient Active Problem List   Diagnosis    Gastroesophageal reflux disease    Hyperlipidemia    Hypertension    Hypothyroidism    Obstructive sleep apnea syndrome    Vitamin D deficiency    Prediabetes    Osteopenia    Osteoarthritis    Insomnia    S/P ascending aortic aneurysm repair    History of CVA (cerebrovascular accident) without residual deficits    SOB (shortness of breath)     Past Surgical History:   Procedure Laterality Date    ANGIOGRAM, CORONARY, WITH LEFT HEART CATHETERIZATION N/A 2022    Procedure: Angiogram, Coronary, with Left Heart Cath;  Surgeon: Harry Jj MD;  Location: [facility] CATH LAB;  Service: Cardiology;  Laterality: N/A;    COLONOSCOPY  10/14/2021    Dr. Marquis Fitch    HYSTERECTOMY      REPAIR OF ASCENDING AORTA N/A 2022    Procedure: REPAIR, AORTA, ASCENDING;  Surgeon: Alec Vega IV, MD;  Location: [facility];  Service: Cardiovascular;  Laterality: N/A;  ECHO NOTIFIED    RT KNEE       Social History     Socioeconomic History    Marital status:    Tobacco Use    Smoking status: Former     Current packs/day: 0.00     Types: Cigarettes     Quit date: 2010     Years since quittin.4    Smokeless tobacco: Never   Substance and Sexual Activity    Alcohol use: Not Currently    Drug use: Not Currently    Sexual activity: Not Currently     Social Determinants of Health     Financial Resource Strain: Low Risk  (2022)    Overall Financial Resource Strain (CARDIA)     Difficulty of Paying Living Expenses: Not hard at all   Food Insecurity: No Food Insecurity (2022)    Hunger Vital Sign     Worried About Running Out of Food in the Last Year: Never true     Ran Out of Food in the Last Year: Never true   Transportation Needs: No Transportation Needs (2022)    PRAPARE - Transportation     Lack of Transportation (Medical): No     Lack of Transportation (Non-Medical): No   Physical Activity: Inactive  (6/28/2022)    Exercise Vital Sign     Days of Exercise per Week: 0 days     Minutes of Exercise per Session: 0 min   Stress: No Stress Concern Present (6/28/2022)    [employer] - Occupational Stress Questionnaire     Feeling of Stress : Not at all   Social Connections: Moderately Isolated (6/28/2022)    Social Connection and Isolation Panel [NHANES]     Frequency of Communication with Friends and Family: More than three times a week     Frequency of Social Gatherings with Friends and Family: More than three times a week     Attends Restorationist Services: Never     Active Member of Clubs or Organizations: No     Attends Club or Organization Meetings: Never     Marital Status:    Housing Stability: Unknown (6/28/2022)    Housing Stability Vital Sign     Unable to Pay for Housing in the Last Year: No     Unstable Housing in the Last Year: No        Family History   Problem Relation Age of Onset    Heart disease Mother     Uterine cancer Mother     Lung cancer Father     Seizures Sister     Heart disease Sister      Review of patient's allergies indicates:   Allergen Reactions    Tramadol Hives     Other reaction(s): sweating    Meperidine Rash     Other reaction(s): unknown         ROS:  Review of Systems   Constitutional: Negative.    HENT: Negative.     Eyes: Negative.    Respiratory: Negative.  Negative for shortness of breath.    Cardiovascular: Negative.  Negative for chest pain, palpitations, orthopnea, claudication, leg swelling and PND.   Gastrointestinal: Negative.    Genitourinary: Negative.    Musculoskeletal: Negative.    Skin: Negative.    Neurological:  Negative for tingling.   Endo/Heme/Allergies: Negative.    Psychiatric/Behavioral: Negative.                                                                                                                                                                                  Negative except as stated in the history of present  "illness. See HPI for details.    PHYSICAL EXAM:  Visit Vitals  BP (!) 148/78 (BP Location: Right arm, Patient Position: Sitting, BP Method: Medium (Automatic))   Pulse 62   Temp 98.6 °F (37 °C)   Resp 18   Ht 5' 7" (1.702 m)   Wt 93.5 kg (206 lb 2.1 oz)   SpO2 97%   BMI 32.28 kg/m²         Physical Exam  HENT:      Head: Normocephalic.      Nose: Nose normal.      Mouth/Throat:      Mouth: Mucous membranes are moist.   Eyes:      Extraocular Movements: Extraocular movements intact.   Neck:      Vascular: No carotid bruit.   Cardiovascular:      Rate and Rhythm: Normal rate and regular rhythm.      Pulses: Normal pulses.      Heart sounds: Normal heart sounds.   Pulmonary:      Effort: Pulmonary effort is normal.   Abdominal:      General: There is no distension.   Musculoskeletal:         General: Normal range of motion.      Cervical back: Normal range of motion.      Right lower leg: Edema (Mild) present.      Left lower leg: Edema (Mild) present.   Skin:     General: Skin is warm.   Neurological:      General: No focal deficit present.      Mental Status: She is alert.   Psychiatric:         Mood and Affect: Mood normal.         Current Outpatient Medications   Medication Instructions    albuterol (VENTOLIN HFA) 90 mcg/actuation inhaler 1-2 puffs, Inhalation, Every 6 hours PRN, Rescue    albuterol-ipratropium (DUO-NEB) 2.5 mg-0.5 mg/3 mL nebulizer solution 3 mLs, Nebulization, Every 4 hours PRN    amLODIPine (NORVASC) 10 mg, Oral, Daily    aspirin (ECOTRIN) 81 mg, Oral, Daily    diclofenac sodium (VOLTAREN) 2 g, Topical (Top), 3 times daily PRN    ezetimibe (ZETIA) 10 mg, Oral, Daily    fluticasone propion-salmeteroL 113-14 mcg/actuation AePB 1 puff, Inhalation, 2 times daily    fluticasone propionate (FLONASE) 100 mcg, Each Nostril, Daily    furosemide (LASIX) 20 mg, Oral, Daily PRN    gabapentin (NEURONTIN) 300 mg, Oral, 3 times daily    levothyroxine (SYNTHROID) 75 mcg, Oral, Before breakfast    lisinopriL 10 " mg, Oral, Daily    loratadine (CLARITIN) 10 mg, Oral, Daily    multivit-min/iron/folic/lutein (CENTRUM SILVER WOMEN ORAL) 1 tablet, Oral, Daily    nitroGLYCERIN (NITROSTAT) 0.4 mg, Sublingual, Every 5 min PRN    pravastatin (PRAVACHOL) 10 mg, Oral, Every other day        All medications, laboratory studies, cardiac diagnostic imaging reviewed.     Lab Results   Component Value Date    LDL 96.00 04/03/2023    LDL 61.00 09/14/2022    TRIG 118 04/03/2023    TRIG 57 09/14/2022    CREATININE 0.95 09/21/2023    MG 2.10 08/22/2022    K 4.1 09/21/2023        ASSESSMENT/PLAN:  TORRES and SOB  - Denies any recent TORRES or SOB  - Workup as above is negative  - Will recommend PFTs to be completed by primary care      Ascending Aortic Aneurysm s/p Repair  -Performed on 7/29/2022  - previous complaints of burning patient state have resolved  - BP above goal today, states that BP is normal at home, some degree of white coat syndrome   - In the future consider outpatient imaging to assess graft repair     HTN  - Blood pressure elevated today - 148/78  - Continue Norvasc 10 mg daily.   - Lisinopril 10 MG added by PCP- tolerating well  - Will have patient log BP at home for the next 2-3 weeks and call with readings.  Patient states that her BP is typically normal at home.  Has some degree of white coat syndrome.  - Counseled on low sodium, heart healthy diet and exercise as tolerated     HLD  - Patient reports that she is unable to tolerate statins due to severe skin reaction   - Was trialed on Zetia per PCP and tolerating well without side effects  - Will have patient complete FLP/CMP prior next office visit  - Counseled on importance of low cholesterol, heart healthy diet, and exercise as tolerated     History of CVA  - Continue MAPT and Zetia  - US carotid with less than 50% stenosis in R ICA and L ICA     Post Op AF  - Asymptomatic, denies tachycardia, palpitations, lightheadedness, dizziness  - Initially placed on Amiodarone but  since discontinued.   - Had CROW ligation but still potential risk for cardioembolic CVA.   - CHADSVASc 5  - Xarelto discontinued after 3 months   - Currently not on anticoagulation  - Most recent EKG, May 2023, with NSR, no evidence of AF  - In normal rhythm/rate on auscultation today      Post Op DVT  - Pt told she only needed to  be on Xarelto for 3 months   - Is no longer on anticoagulation   - no further DVT      Complete CMP/FLP   Follow up in cardiology clinic in 4 months or sooner if needed  Follow up with PCP as directed

## 2023-12-12 ENCOUNTER — OFFICE VISIT (OUTPATIENT)
Dept: CARDIOLOGY | Facility: CLINIC | Age: 68
End: 2023-12-12
Payer: MEDICARE

## 2023-12-12 VITALS
BODY MASS INDEX: 32.35 KG/M2 | RESPIRATION RATE: 18 BRPM | SYSTOLIC BLOOD PRESSURE: 148 MMHG | DIASTOLIC BLOOD PRESSURE: 78 MMHG | OXYGEN SATURATION: 97 % | TEMPERATURE: 99 F | WEIGHT: 206.13 LBS | HEIGHT: 67 IN | HEART RATE: 62 BPM

## 2023-12-12 DIAGNOSIS — E78.2 MIXED HYPERLIPIDEMIA: Primary | ICD-10-CM

## 2023-12-12 DIAGNOSIS — Z98.890 S/P ASCENDING AORTIC ANEURYSM REPAIR: ICD-10-CM

## 2023-12-12 DIAGNOSIS — G47.33 OBSTRUCTIVE SLEEP APNEA SYNDROME: ICD-10-CM

## 2023-12-12 DIAGNOSIS — R06.02 SOB (SHORTNESS OF BREATH): ICD-10-CM

## 2023-12-12 DIAGNOSIS — Z86.79 S/P ASCENDING AORTIC ANEURYSM REPAIR: ICD-10-CM

## 2023-12-12 DIAGNOSIS — I10 PRIMARY HYPERTENSION: ICD-10-CM

## 2023-12-12 PROCEDURE — 99215 OFFICE O/P EST HI 40 MIN: CPT | Mod: PBBFAC

## 2023-12-12 PROCEDURE — 99214 OFFICE O/P EST MOD 30 MIN: CPT | Mod: S$PBB,,,

## 2023-12-12 PROCEDURE — 99214 PR OFFICE/OUTPT VISIT, EST, LEVL IV, 30-39 MIN: ICD-10-PCS | Mod: S$PBB,,,

## 2023-12-12 NOTE — PATIENT INSTRUCTIONS
Complete CMP/FLP   Follow up in cardiology clinic in 4 months or sooner if needed  Follow up with PCP as directed

## 2023-12-19 DIAGNOSIS — F17.211 NICOTINE DEPENDENCE, CIGARETTES, IN REMISSION: ICD-10-CM

## 2023-12-19 DIAGNOSIS — F17.200 HAS BEEN SMOKING TOBACCO FOR 30 YEARS OR MORE: Primary | ICD-10-CM

## 2023-12-19 DIAGNOSIS — J44.9 CHRONIC OBSTRUCTIVE PULMONARY DISEASE, UNSPECIFIED COPD TYPE: ICD-10-CM

## 2023-12-31 DIAGNOSIS — I73.9 PVD (PERIPHERAL VASCULAR DISEASE) WITH CLAUDICATION: Primary | ICD-10-CM

## 2023-12-31 DIAGNOSIS — Z86.718 HISTORY OF DEEP VENOUS THROMBOSIS (DVT) OF DISTAL VEIN OF RIGHT LOWER EXTREMITY: ICD-10-CM

## 2023-12-31 DIAGNOSIS — I87.2 VENOUS INSUFFICIENCY (CHRONIC) (PERIPHERAL): ICD-10-CM

## 2024-01-11 ENCOUNTER — HOSPITAL ENCOUNTER (OUTPATIENT)
Dept: RADIOLOGY | Facility: HOSPITAL | Age: 69
Discharge: HOME | End: 2024-01-11
Payer: MEDICARE

## 2024-01-11 ENCOUNTER — OFFICE VISIT (OUTPATIENT)
Dept: INTERNAL MEDICINE | Facility: CLINIC | Age: 69
End: 2024-01-11
Payer: MEDICARE

## 2024-01-11 VITALS
HEART RATE: 63 BPM | SYSTOLIC BLOOD PRESSURE: 131 MMHG | DIASTOLIC BLOOD PRESSURE: 67 MMHG | BODY MASS INDEX: 32.98 KG/M2 | WEIGHT: 210.63 LBS | RESPIRATION RATE: 20 BRPM | OXYGEN SATURATION: 99 % | TEMPERATURE: 98 F

## 2024-01-11 DIAGNOSIS — Z76.0 MEDICATION REFILL: ICD-10-CM

## 2024-01-11 DIAGNOSIS — K21.9 GASTROESOPHAGEAL REFLUX DISEASE, UNSPECIFIED WHETHER ESOPHAGITIS PRESENT: ICD-10-CM

## 2024-01-11 DIAGNOSIS — Z86.718 HISTORY OF DEEP VENOUS THROMBOSIS (DVT) OF DISTAL VEIN OF RIGHT LOWER EXTREMITY: ICD-10-CM

## 2024-01-11 DIAGNOSIS — I12.9 HYPERTENSIVE CHRONIC KIDNEY DISEASE WITH STAGE 1 THROUGH STAGE 4 CHRONIC KIDNEY DISEASE, OR UNSPECIFIED CHRONIC KIDNEY DISEASE: ICD-10-CM

## 2024-01-11 DIAGNOSIS — G89.29 CHRONIC BILATERAL LOW BACK PAIN WITH RIGHT-SIDED SCIATICA: ICD-10-CM

## 2024-01-11 DIAGNOSIS — I10 HYPERTENSION, UNSPECIFIED TYPE: ICD-10-CM

## 2024-01-11 DIAGNOSIS — M85.88 OSTEOPENIA OF LUMBAR SPINE: ICD-10-CM

## 2024-01-11 DIAGNOSIS — M17.11 PRIMARY OSTEOARTHRITIS OF RIGHT KNEE: ICD-10-CM

## 2024-01-11 DIAGNOSIS — E03.9 HYPOTHYROIDISM, UNSPECIFIED TYPE: ICD-10-CM

## 2024-01-11 DIAGNOSIS — Z87.891 PERSONAL HISTORY OF SMOKING: ICD-10-CM

## 2024-01-11 DIAGNOSIS — R63.5 WEIGHT GAIN: ICD-10-CM

## 2024-01-11 DIAGNOSIS — M17.11 PRIMARY OSTEOARTHRITIS OF RIGHT KNEE: Primary | ICD-10-CM

## 2024-01-11 DIAGNOSIS — M54.41 CHRONIC BILATERAL LOW BACK PAIN WITH RIGHT-SIDED SCIATICA: ICD-10-CM

## 2024-01-11 PROCEDURE — 72100 X-RAY EXAM L-S SPINE 2/3 VWS: CPT | Mod: TC

## 2024-01-11 PROCEDURE — 71271 CT THORAX LUNG CANCER SCR C-: CPT | Mod: TC

## 2024-01-11 PROCEDURE — 99215 OFFICE O/P EST HI 40 MIN: CPT | Mod: PBBFAC,25

## 2024-01-11 PROCEDURE — 73564 X-RAY EXAM KNEE 4 OR MORE: CPT | Mod: TC,RT

## 2024-01-11 RX ORDER — DICLOFENAC SODIUM 10 MG/G
2 GEL TOPICAL 3 TIMES DAILY PRN
Qty: 100 G | Refills: 3 | Status: SHIPPED | OUTPATIENT
Start: 2024-01-11

## 2024-01-11 RX ORDER — LEVOTHYROXINE SODIUM 75 UG/1
75 TABLET ORAL
Qty: 90 TABLET | Refills: 3 | Status: SHIPPED | OUTPATIENT
Start: 2024-04-01 | End: 2024-01-11 | Stop reason: CLARIF

## 2024-01-11 RX ORDER — GABAPENTIN 300 MG/1
300 CAPSULE ORAL 3 TIMES DAILY
Qty: 270 CAPSULE | Refills: 3 | Status: SHIPPED | OUTPATIENT
Start: 2024-01-11 | End: 2025-01-10

## 2024-01-11 RX ORDER — LEVOTHYROXINE SODIUM 75 UG/1
75 TABLET ORAL
Qty: 90 TABLET | Refills: 3 | Status: SHIPPED | OUTPATIENT
Start: 2024-04-01

## 2024-01-11 RX ORDER — PANTOPRAZOLE SODIUM 40 MG/1
40 TABLET, DELAYED RELEASE ORAL DAILY
Qty: 90 TABLET | Refills: 3 | Status: SHIPPED | OUTPATIENT
Start: 2024-01-11 | End: 2025-01-10

## 2024-01-11 NOTE — PROGRESS NOTES
"IM Clinic Note    Patient Name: Blanquita Montoya  YOB: 1955   MRN: [id number]  Date: 01/11/2024  Home Address: [place] [address]      Subjective:     Chief Complaint:   Chief Complaint   Patient presents with    Follow-up     Muscle spasm daily - thinks it d/t her Zetia x's 2-3 months.  Back pain  and when she bend over she feels like she have a ball- rolling in abdomen x' 1 months         HPI:  Blanquita Montoya 67 y.o. female  She had ascending aortic aneurysm s/p repair with post op DVT/PT (previously on Eliquis), post op AF, HTN, HLD, non-obstructive CAD on coronary angiogram, history of CVA without deficits, MIKI, hypothyroidism, Vit D defciency, GERD, Osteopenia, and prediabetes who presents to clinic today for follow up. She reports no change in breast pain as well as leg pain with exertion. Needs medication refills.     Chronic Low Back Pain   Back Spasms   - chronic back pain with spasms that wrap around from back to stomach that have worsened over the last month   - does have history afebrile seizures, once in childhood and once when she was 15  - topical icy  which helps   - ice pack which helps   - lidocaine patches dont help   - tylenol does not help   - has not tried NSAIDs due to elevated blood pressures in the past   - current pain level 7, can be 10 during exacerbations   - denies gait instability, falls, bowel and urinary incontinence     Right Knee Pain   Hx of R knee OA   Hx of DVT RLE   - chronic   -previously evaluated by [facility] and on PRN follow up basis   - has tried steroid injections with them however this only provided relief for 1 month   - per patient, has been told she is "bone on bone" and will require replacement, no recent xray imaging     Care Team   [facility] Cardiology- Last appointment on 12/12/23. May need to assess graft repair with imaging. Recommended PFT however PFT from 4/4/23 wnl. Next appointment 4/15/24.   [facility] Gynecology: Follows " for women's health. Last appointment 6/2021, no next scheduled appointment     Health Care Maintenance   -Mammogram: 11/22, WNL  -DEXA: 9/2023, osteopenia of lumbar vertebrae  -Colon cancer screening: Colonoscopy from 10/2021 with diverticulosis of the sigmoid colon, otherwise normal, repeat 5 years in 2026  -Vaccines:    -Flu vaccine, UTD    -Pneumonia 23 on 7/2019    -Tdap on 3/2017    -Zoster series completed 12/2020    -COVID-19: Vaccinated x2    Available notes and lab results since last visit on 9/21/23 were reviewed; CT lung screening completed today. DEXA done on 9/28/23 consistent with osteopenia of lumbar spine. Thoracic xray was not completed. Started on Gabapentin 300 TID, Lisinopril 10mg, and pravastatin 10mg every other day. Discontinued Imdur.     Past Medical History:   Diagnosis Date    Anxiety disorder     Coronary artery disease     Current use of long term anticoagulation 9/16/2022    GERD (gastroesophageal reflux disease)     History of CVA (cerebrovascular accident) without residual deficits 9/16/2022    History of deep venous thrombosis (DVT) of distal vein of right lower extremity 9/16/2022    History of deep venous thrombosis (DVT) of distal vein of right lower extremity 9/16/2022    History of pulmonary embolism 9/16/2022    History of pulmonary embolism 9/16/2022    Hypercholesterolemia     Hypertension     Hypothyroidism     Non-healing open wound of right groin 9/16/2022    Obesity     Right groin wound 9/20/2022    S/P ascending aortic aneurysm repair 9/16/2022    S/P CABG (coronary artery bypass graft) 9/16/2022    Seizure in childhood     last one age 11    Sleep apnea     does not currently use CPAP    Thoracic aortic aneurysm 03/10/2022    4.5X4.4 Ascending Aorta as of 3/10/2022    Thoracic aortic aneurysm     Thyroid disease         Past Surgical History:   Procedure Laterality Date    ANGIOGRAM, CORONARY, WITH LEFT HEART CATHETERIZATION N/A 07/11/2022    Procedure: Angiogram,  Coronary, with Left Heart Cath;  Surgeon: Harry Jj MD;  Location: [facility] CATH LAB;  Service: Cardiology;  Laterality: N/A;    COLONOSCOPY  10/14/2021    Dr. Marquis Fitch    HYSTERECTOMY      REPAIR OF ASCENDING AORTA N/A 07/29/2022    Procedure: REPAIR, AORTA, ASCENDING;  Surgeon: lAec Vega IV, MD;  Location: [facility] OR;  Service: Cardiovascular;  Laterality: N/A;  ECHO NOTIFIED    RT KNEE         Allergy:  Review of patient's allergies indicates:   Allergen Reactions    Tramadol Hives     Other reaction(s): sweating    Meperidine Rash     Other reaction(s): unknown        Current Medications:    Current Outpatient Medications:     albuterol (VENTOLIN HFA) 90 mcg/actuation inhaler, Inhale 1-2 puffs into the lungs every 6 (six) hours as needed for Wheezing or Shortness of Breath. Rescue, Disp: 18 g, Rfl: 3    albuterol-ipratropium (DUO-NEB) 2.5 mg-0.5 mg/3 mL nebulizer solution, Take 3 mLs by nebulization every 4 (four) hours as needed for Wheezing or Shortness of Breath., Disp: 75 mL, Rfl: 6    amLODIPine (NORVASC) 10 MG tablet, Take 1 tablet (10 mg total) by mouth once daily., Disp: 90 tablet, Rfl: 3    aspirin (ECOTRIN) 81 MG EC tablet, Take 1 tablet (81 mg total) by mouth once daily., Disp: 90 tablet, Rfl: 3    ezetimibe (ZETIA) 10 mg tablet, Take 1 tablet (10 mg total) by mouth once daily., Disp: 90 tablet, Rfl: 3    fluticasone propionate (FLONASE) 50 mcg/actuation nasal spray, 2 sprays (100 mcg total) by Each Nostril route once daily., Disp: 18.2 mL, Rfl: 0    furosemide (LASIX) 20 MG tablet, Take 1 tablet (20 mg total) by mouth daily as needed (as needed for shortness of breath)., Disp: 30 tablet, Rfl: 3    lisinopriL 10 MG tablet, Take 1 tablet (10 mg total) by mouth once daily., Disp: 90 tablet, Rfl: 3    multivit-min/iron/folic/lutein (CENTRUM SILVER WOMEN ORAL), Take 1 tablet by mouth Daily., Disp: , Rfl:     nitroGLYCERIN (NITROSTAT) 0.4 MG SL tablet, Place 1 tablet (0.4 mg total) under the  tongue every 5 (five) minutes as needed for Chest pain., Disp: 30 tablet, Rfl: 3    diclofenac sodium (VOLTAREN) 1 % Gel, Apply 2 g topically 3 (three) times daily as needed (pain)., Disp: 100 g, Rfl: 3    fluticasone propion-salmeteroL 113-14 mcg/actuation AePB, Inhale 1 puff into the lungs 2 (two) times a day. (Patient not taking: Reported on 1/11/2024), Disp: 1 each, Rfl: 6    gabapentin (NEURONTIN) 300 MG capsule, Take 1 capsule (300 mg total) by mouth 3 (three) times daily., Disp: 270 capsule, Rfl: 3    [START ON 4/1/2024] levothyroxine (SYNTHROID) 75 MCG tablet, Take 1 tablet (75 mcg total) by mouth before breakfast., Disp: 90 tablet, Rfl: 3    pantoprazole (PROTONIX) 40 MG tablet, Take 1 tablet (40 mg total) by mouth once daily., Disp: 90 tablet, Rfl: 3    pravastatin (PRAVACHOL) 10 MG tablet, Take 1 tablet (10 mg total) by mouth every other day. (Patient not taking: Reported on 11/3/2023), Disp: 45 tablet, Rfl: 3     Social History:   reports that she quit smoking about 13 years ago. Her smoking use included cigarettes. She has never used smokeless tobacco. She reports that she does not currently use alcohol. She reports that she does not currently use drugs.   Smoking 2PPD starting in teenage years and stopped 12 years ago. No current.     Family History   Problem Relation Age of Onset    Heart disease Mother     Uterine cancer Mother     Lung cancer Father     Seizures Sister     Heart disease Sister         Immunization History   Administered Date(s) Administered    COVID-19, MRNA, LN-S, PF (MODERNA FULL 0.5 ML DOSE) 03/03/2021, 03/31/2021    Influenza (FLUAD) - Quadrivalent - Adjuvanted - PF *Preferred* (65+) 09/21/2023    Influenza - Quadrivalent 10/24/2019    Influenza - Quadrivalent - High Dose - PF (65 years and older) 12/17/2020, 02/08/2022, 09/22/2022    Influenza - Trivalent - Recombinant - PF 02/14/2018    Pneumococcal Conjugate - 20 Valent 05/29/2023    Pneumococcal Polysaccharide - 23 Valent  07/30/2019    Tdap 03/07/2017    Zoster Recombinant 09/23/2020, 12/17/2020       Review of Systems   Constitutional:  Negative for chills, fever, malaise/fatigue and weight loss.   HENT:  Negative for congestion and sore throat.    Eyes:  Negative for blurred vision and double vision.   Respiratory:  Negative for cough and shortness of breath.    Cardiovascular:  Positive for claudication. Negative for chest pain and palpitations.   Gastrointestinal:  Negative for abdominal pain, constipation, diarrhea, heartburn, nausea and vomiting.   Genitourinary:  Negative for dysuria and urgency.   Musculoskeletal:  Positive for back pain, myalgias and neck pain. Negative for falls.   Neurological:  Negative for dizziness, loss of consciousness and headaches.   Psychiatric/Behavioral:  Negative for depression and suicidal ideas. The patient is not nervous/anxious.        Objective:      Physical Exam  Vitals:    01/11/24 1317   BP: 131/67   BP Location: Left arm   Patient Position: Sitting   BP Method: Large (Automatic)   Pulse: 63   Resp: 20   Temp: 98.2 °F (36.8 °C)   TempSrc: Oral   SpO2: 99%   Weight: 95.5 kg (210 lb 9.6 oz)          Wt Readings from Last 3 Encounters:   01/11/24 95.5 kg (210 lb 9.6 oz)   12/12/23 93.5 kg (206 lb 2.1 oz)   11/03/23 93.6 kg (206 lb 4.8 oz)       Physical Exam  Vitals and nursing note reviewed.   Constitutional:       General: She is not in acute distress.  Eyes:      General:         Right eye: No discharge.   Neck:      Vascular: No carotid bruit.   Cardiovascular:      Rate and Rhythm: Regular rhythm. Bradycardia present.      Heart sounds: No murmur heard.  Pulmonary:      Effort: Pulmonary effort is normal. No respiratory distress.      Breath sounds: Wheezing present.   Abdominal:      General: Abdomen is flat. Bowel sounds are normal.      Palpations: Abdomen is soft.   Musculoskeletal:         General: No swelling or tenderness.      Cervical back: Normal range of motion.      Right  knee: Swelling, effusion and bony tenderness present. No erythema or crepitus.      Right lower leg: No edema.      Left lower leg: No edema.      Comments: + medial joint line tenderness    Skin:     General: Skin is warm and dry.      Capillary Refill: Capillary refill takes less than 2 seconds.      Findings: No lesion or rash.   Neurological:      Mental Status: She is alert and oriented to person, place, and time.          Body mass index is 32.98 kg/m².      Office Visit on 11/03/2023   Component Date Value Ref Range Status    POC Rapid COVID 11/03/2023 Negative  Negative Final     Acceptable 11/03/2023 Yes   Final    Rapid Influenza A Ag 11/03/2023 Negative  Negative Final    Rapid Influenza B Ag 11/03/2023 Negative  Negative Final     Acceptable 11/03/2023 Yes   Final    Molecular Strep A, POC 11/03/2023 Negative  Negative Final     Acceptable 11/03/2023 Yes   Final   Lab Visit on 09/21/2023   Component Date Value Ref Range Status    Sodium Level 09/21/2023 139  136 - 145 mmol/L Final    Potassium Level 09/21/2023 4.1  3.5 - 5.1 mmol/L Final    Chloride 09/21/2023 104  98 - 107 mmol/L Final    Carbon Dioxide 09/21/2023 28  23 - 31 mmol/L Final    Glucose Level 09/21/2023 113  82 - 115 mg/dL Final    Blood Urea Nitrogen 09/21/2023 11.3  9.8 - 20.1 mg/dL Final    Creatinine 09/21/2023 0.95  0.55 - 1.02 mg/dL Final    Calcium Level Total 09/21/2023 9.7  8.4 - 10.2 mg/dL Final    Protein Total 09/21/2023 7.6  5.8 - 7.6 gm/dL Final    Albumin Level 09/21/2023 3.9  3.4 - 4.8 g/dL Final    Globulin 09/21/2023 3.7 (H)  2.4 - 3.5 gm/dL Final    Albumin/Globulin Ratio 09/21/2023 1.1  1.1 - 2.0 ratio Final    Bilirubin Total 09/21/2023 0.7  <=1.5 mg/dL Final    Alkaline Phosphatase 09/21/2023 113  40 - 150 unit/L Final    Alanine Aminotransferase 09/21/2023 18  0 - 55 unit/L Final    Aspartate Aminotransferase 09/21/2023 18  5 - 34 unit/L Final    eGFR 09/21/2023 >60   mls/min/1.73/m2 Final    TSH 09/21/2023 0.478  0.350 - 4.940 uIU/mL Final    WBC 09/21/2023 6.79  4.50 - 11.50 x10(3)/mcL Final    RBC 09/21/2023 5.09  4.20 - 5.40 x10(6)/mcL Final    Hgb 09/21/2023 14.8  12.0 - 16.0 g/dL Final    Hct 09/21/2023 45.7  37.0 - 47.0 % Final    MCV 09/21/2023 89.8  80.0 - 94.0 fL Final    MCH 09/21/2023 29.1  27.0 - 31.0 pg Final    MCHC 09/21/2023 32.4 (L)  33.0 - 36.0 g/dL Final    RDW 09/21/2023 13.2  11.5 - 17.0 % Final    Platelet 09/21/2023 168  130 - 400 x10(3)/mcL Final    MPV 09/21/2023 11.8 (H)  7.4 - 10.4 fL Final    Neut % 09/21/2023 68.3  % Final    Lymph % 09/21/2023 20.0  % Final    Mono % 09/21/2023 7.8  % Final    Eos % 09/21/2023 2.9  % Final    Basophil % 09/21/2023 0.7  % Final    Lymph # 09/21/2023 1.36  0.6 - 4.6 x10(3)/mcL Final    Neut # 09/21/2023 4.63  2.1 - 9.2 x10(3)/mcL Final    Mono # 09/21/2023 0.53  0.1 - 1.3 x10(3)/mcL Final    Eos # 09/21/2023 0.20  0 - 0.9 x10(3)/mcL Final    Baso # 09/21/2023 0.05  <=0.2 x10(3)/mcL Final    IG# 09/21/2023 0.02  0 - 0.04 x10(3)/mcL Final    IG% 09/21/2023 0.3  % Final    NRBC% 09/21/2023 0.0  % Final           Assessment:   Chronic low back pain w right sided sciatica   Back Spasms   Lumbar Osteopenia  History of Vitamin D deficiency  R Knee OA   -Due to personal and family history of unprovoked afebrile seizures, will avoid muscle relaxer for now   - Lumbar osteopenia, T score -2.9 which is slightly progressed to prior studies   - Continue multivitamin with calcium and vitamin D OTC   - Thoracolumbar and R knee XR imaging ordered   - Referral to orthopedics for right knee OA and physical therapy placed for back and knees   - Provided home exercise handouts   - Tylenol 1g TID for 3 days, opical hot or cold therapy, oral glucosamine 1500 mg/day, and topical capsaicin  - Refilled Gabapentin 300 TID and prescribed Diclofenac topical gel sent to [facility] pharmacy   -Lumbar MRI at next appointment if conservative management  fails     GERD/gastritis  Medication Refill   - Continue Protonix 40mg qd   - Avoid NSAIDs due to risk of aggravated GERD/gastritis     BMI 32   Obese   - Referred to [facility] for dietary recommendations     Hypothyroidism  - Refilled Synthroid 75 MCG daily     L Breast Pain   -Suspect neuropathic nature, postsurgical vs. Post-herpatic vs. Cervical radiculopathy   - Varicellar Ab IgG still pending      Hypertension  - Continue Amlodipine 10mg daily, lasix 20mg daily P, lisinopril 10mg daily, Zetia,   - Instructed on lifestyle modifications including physical activity as tolerated 30 min per day x 5 days per week and low sodium 2g, low cholesterol diet      Hyperlipidemia  Statin Intolerance   - Continue pravastatin 10mg every other day due to statin intolerance and Zetia 10mg added back to medication regimen     Thoracic ascending aortic aneurysm s/p repair with CT surgery  Nonobstructive coronary artery disease s/p CABG  Post op course with PE/DVT and CVA   HFpEF w EF 50%   - Continue to f/u with [facility] cardiology as recommended   - Continue ASA 81mg qd  - Has rescue SL Nitro  - Reiterated Imdur was discontinued due to dizziness and HA     MIKI on CPAP  -Continue to monitor     Insomnia  - Continue melatonin  - Discontinued Vistaril due to intermittent dizziness and HA       ED precautions given, patient verbalized understanding.         Disposition: RTC in 6 months or sooner if needed.     Patient case and plan of care discussed and patient assessed with Dr. Richard Morris MD   Internal Medicine - -

## 2024-01-22 ENCOUNTER — HOSPITAL ENCOUNTER (OUTPATIENT)
Dept: RADIOLOGY | Facility: HOSPITAL | Age: 69
Discharge: HOME | End: 2024-01-22
Attending: FAMILY MEDICINE
Payer: MEDICARE

## 2024-01-22 ENCOUNTER — OFFICE VISIT (OUTPATIENT)
Dept: ORTHOPEDICS | Facility: CLINIC | Age: 69
End: 2024-01-22
Payer: MEDICARE

## 2024-01-22 VITALS
HEART RATE: 64 BPM | HEIGHT: 67 IN | BODY MASS INDEX: 32.65 KG/M2 | SYSTOLIC BLOOD PRESSURE: 125 MMHG | DIASTOLIC BLOOD PRESSURE: 73 MMHG | WEIGHT: 208 LBS

## 2024-01-22 DIAGNOSIS — M17.11 PRIMARY OSTEOARTHRITIS OF RIGHT KNEE: Primary | ICD-10-CM

## 2024-01-22 DIAGNOSIS — M17.11 PRIMARY OSTEOARTHRITIS OF RIGHT KNEE: ICD-10-CM

## 2024-01-22 PROCEDURE — 73564 X-RAY EXAM KNEE 4 OR MORE: CPT | Mod: TC,RT

## 2024-01-22 PROCEDURE — 99214 OFFICE O/P EST MOD 30 MIN: CPT | Mod: PBBFAC

## 2024-01-22 NOTE — PROGRESS NOTES
"Subjective:    Patient ID: Blanquita Montoya is a 68 y.o. female  who presented to [facility] for new visit.    Chief Complaint: Pain of the Right Knee    History of Present Illness:  Blanquita Montoya presented today with with knee pain involving the right knee for over 9 years that has worsened over the past 3 years. Pain is located overlying surgical scar, medial to patella, at MPFL. Pain is 8/10 at its worst. Quality of pain is described as burning and aching, sometimes has shocking radiation pain down leg to toes and up leg to lower back. Inciting event: none known. Pain is aggravated by extended standing and walking.  Patient has had prior knee problems, in 1980 pt had surgery to remove cartilage over medial knee and to remove golf ball sized soft tissue mass on posterior aspect of knee. Evaluation to date: plain films. Treatment to date: bracing, topical analgesics, oral analgesics (tylenol), CSI (over 3 years ago without much relief), VSI (over 3 years ago without much relief), home exercises, gabapentin and ice/heat. Pt has formal PT session tomorrow setup by PCP, previously has participated in PT 5 years ago. Pt has mild relief with ice packs. Expectations for today's visit includes referral for surgery. PCP is Gary Morris.    Knee Review of Systems:  Swelling?  yes  Instability?  no, does endorse some buckling  Mechanical sx?  no  <30 min AM stiffness? no  Limited ROM? yes  Fever/Chills? no    Comorbid Conditions  CAD  HTN  HLD  MIKI  Obesity (BMI 32.58 today)     Objective:      Physical Exam:    /73   Pulse 64   Ht 5' 7" (1.702 m)   Wt 94.3 kg (208 lb)   BMI 32.58 kg/m²     Ortho/SPM Exam    Appearance:  Antalgic  FWB  Alignment: Left: normal Right: normal   Soft tissue swelling: Left: no Right: no  Effusion: Left:  Negative Right: Negative  Erythema: Left no Right: no  Ecchymosis: Left: no Right: no  Atrophy: Left: no Right: " yes    Palpation:  Knee Tenderness: Left: None Right:  overlying surgical scar at MPFL    Range of motion:  Flexion (140): Left:  130 Right: 130  Extension (0): Left: 0 Right:  30, active; 0, passive    Strength:  Extension: Left 5/5  Pain: no     Right 4/5 Pain: yes  Flexion: Left 5/5 Pain: no Right   4/5 Pain: yes    Special Tests:  Ballotable Effusion:Left: Negative Right: Negative   Fluid Wave: Left: Negative Right: Negative   Crepitus: Left: Negative Right: Positive   Patellar grind test: Left: Negative  Right: Negative  Apprehension test: Left: Negative Right: Negative   Varus: @ 0, Left Negative Right: Negative.  @ 30, Left Negative  Right Negative   Valgus: @ 0, Left Negative Right: Negative.  @ 30, Left Negative  Right Negative  Lachman: Left: Negative Right: Negative   Ant Drawer: Left: Negative Right: Negative   Posterior Drawer: Left: Negative Right: Negative   Dial Test: Left: Not performed Right: Not performed   Stacey: Left: Negative Right: Negative   Apley's: Left: Not performed Right: Not performed  Thessaly's: Left: Not performed Right: Not performed     General appearance: NAD  Peripheral pulses: normal bilaterally   Reflexes: Left: normal Right normal   Sensation: normal    Labs:  Last A1c: 5.8     Imaging:   Previous images reviewed.  X-rays ordered and performed today: yes  # of views: 4 Laterality: right  My Interpretation:  Tricompartmental degenerative changes w/ presence of osteophytes and subchondral sclerosis     Assessment:        Encounter Diagnoses   Code Name Primary?    M17.11 Primary osteoarthritis of right knee Yes        Plan:      MDM: Prior external referring provider notes reviewed. Prior external referring provider studies reviewed.   Dx: right knee degenerative osteoarthritis in acute severe exacerbation   Treatment Plan: Discussed with patient diagnosis and treatment recommendations. Handout given. Recommend conservative treatment to include: avoidance of aggravating  activity, significant modification of daily activities, hot/cold therapies, topical and oral medications, braces, HEP/PT/OT, and injections. She has failed conservative therapy including significant modification of ADLs, topical/oral medications, physical therapy, CIS and VS injections.  She is interested in consultation with Orthopedic Surgeon.  Imaging: radiological studies ordered and independently reviewed; discussed with patient; agree with radiologist interpretation.   Weight Management: is paramount. Recommend at least 10% of total body weight loss if your BMI is 30-34.9. A BMI of <24.9 may provide further relief..   Procedure: recommend referral to orthopedics for discussion about total joint replacement  Activity: Activity as tolerated; HEP to include aerobic conditioning and strength training with non-painful activity. ROM/STG exercises. Proper footware; assistive devises to avoid limping.   Therapy: Physical Therapy; Home physical therapy exercise handouts provided to patient  Medication: START over-the-counter acetaminophen (Tylenol 1000 mg three times per day as needed)  CONTINUE Voltaren Gel 1% as prescribed. Please see your primary care physician for further refills.  RTC: PRN; call if any issues.

## 2024-02-01 ENCOUNTER — HOSPITAL ENCOUNTER (OUTPATIENT)
Dept: RADIOLOGY | Facility: HOSPITAL | Age: 69
Discharge: HOME | End: 2024-02-01
Payer: MEDICARE

## 2024-02-01 ENCOUNTER — PATIENT MESSAGE (OUTPATIENT)
Dept: INTERNAL MEDICINE | Facility: CLINIC | Age: 69
End: 2024-02-01
Payer: MEDICARE

## 2024-02-01 DIAGNOSIS — I87.2 VENOUS INSUFFICIENCY (CHRONIC) (PERIPHERAL): ICD-10-CM

## 2024-02-01 DIAGNOSIS — I73.9 PVD (PERIPHERAL VASCULAR DISEASE) WITH CLAUDICATION: ICD-10-CM

## 2024-02-01 DIAGNOSIS — Z86.718 HISTORY OF DEEP VENOUS THROMBOSIS (DVT) OF DISTAL VEIN OF RIGHT LOWER EXTREMITY: ICD-10-CM

## 2024-02-01 LAB
LEFT GREAT SAPHENOUS DISTAL THIGH DIA: 0.32 CM
LEFT GREAT SAPHENOUS DISTAL THIGH REFLUX: 0 MS
LEFT GREAT SAPHENOUS JUNCTION DIA: 0.76 CM
LEFT GREAT SAPHENOUS JUNCTION REFLUX: 0 MS
LEFT GREAT SAPHENOUS KNEE DIA: 0.23 CM
LEFT GREAT SAPHENOUS KNEE REFLUX: 3.29 MS
LEFT GREAT SAPHENOUS MIDDLE THIGH DIA: 0.6 CM
LEFT GREAT SAPHENOUS MIDDLE THIGH REFLUX: 2.54 MS
LEFT GREAT SAPHENOUS PROXIMAL CALF DIA: 0.46 CM
LEFT GREAT SAPHENOUS PROXIMAL CALF REFLUX: 0 MS
LEFT SMALL SAPHENOUS SPJ DIA: 0.2 CM
LEFT SMALL SAPHENOUS SPJ REFLUX: 0 MS
RIGHT GREAT SAPHENOUS DISTAL THIGH DIA: 0.65 CM
RIGHT GREAT SAPHENOUS DISTAL THIGH REFLUX: 3.8 MS
RIGHT GREAT SAPHENOUS JUNCTION DIA: 0.92 CM
RIGHT GREAT SAPHENOUS JUNCTION REFLUX: 4.12 MS
RIGHT GREAT SAPHENOUS KNEE DIA: 0.58 CM
RIGHT GREAT SAPHENOUS KNEE REFLUX: 4.9 MS
RIGHT GREAT SAPHENOUS MIDDLE THIGH DIA: 0.59 CM
RIGHT GREAT SAPHENOUS MIDDLE THIGH REFLUX: 2.79 MS
RIGHT GREAT SAPHENOUS PROXIMAL CALF DIA: 0.54 CM
RIGHT GREAT SAPHENOUS PROXIMAL CALF REFLUX: 2.79 MS
RIGHT SMALL SAPHENOUS SPJ DIA: 0.4 CM
RIGHT SMALL SAPHENOUS SPJ REFLUX: 0 MS

## 2024-02-01 PROCEDURE — 93970 EXTREMITY STUDY: CPT | Mod: TC

## 2024-02-02 NOTE — PROGRESS NOTES
Faculty Attestation: Blanquita Montoya  was seen in Sports Medicine Clinic. Patient seen and evaluated at the time of the visit. History of Present Illness, Physical Exam, and Assessment and Plan reviewed. Treatment plan is reasonable and appropriate. Compliance with treatment recommendations is important.  Radiology images independently reviewed and agree with radiologist interpretation. No procedure was performed.     Evonne Padilla MD  Sports Medicine

## 2024-02-19 ENCOUNTER — TELEPHONE (OUTPATIENT)
Dept: INTERNAL MEDICINE | Facility: CLINIC | Age: 69
End: 2024-02-19
Payer: MEDICARE

## 2024-02-19 DIAGNOSIS — Z86.718 HISTORY OF DEEP VENOUS THROMBOSIS (DVT) OF DISTAL VEIN OF RIGHT LOWER EXTREMITY: Primary | ICD-10-CM

## 2024-02-19 DIAGNOSIS — M85.88 OSTEOPENIA OF LUMBAR SPINE: ICD-10-CM

## 2024-02-19 DIAGNOSIS — I82.511 CHRONIC EMBOLISM AND THROMBOSIS OF RIGHT FEMORAL VEIN: ICD-10-CM

## 2024-02-19 DIAGNOSIS — I87.2 VENOUS INSUFFICIENCY (CHRONIC) (PERIPHERAL): ICD-10-CM

## 2024-02-19 DIAGNOSIS — E78.5 HYPERLIPIDEMIA, UNSPECIFIED HYPERLIPIDEMIA TYPE: ICD-10-CM

## 2024-02-19 DIAGNOSIS — Z86.73 HISTORY OF CVA (CEREBROVASCULAR ACCIDENT) WITHOUT RESIDUAL DEFICITS: ICD-10-CM

## 2024-02-19 DIAGNOSIS — M17.11 PRIMARY OSTEOARTHRITIS OF RIGHT KNEE: ICD-10-CM

## 2024-02-19 DIAGNOSIS — I73.9 PVD (PERIPHERAL VASCULAR DISEASE) WITH CLAUDICATION: ICD-10-CM

## 2024-02-19 DIAGNOSIS — F17.200 HAS BEEN SMOKING TOBACCO FOR 30 YEARS OR MORE: ICD-10-CM

## 2024-02-20 NOTE — TELEPHONE ENCOUNTER
"Called patient to discuss CV BLE Venous Insufficiency Ultrasound dated 2/1/24. Confirmed bilateral greater saphenous venous reflux with reading of "left GSV is positive for age indeterminate occlusive superficial vein thrombus at the level of the upper and mid calf. Additionaly, there is a partially occlusive age indeterminate thrombus in a varicosity at the level of the knee". Multiple attempts to risk assess patient for anticoagulation continuation, patient has completed full course since time of RLE DVT diagnosis. I discussed findings and current process with patient, she verbalized understanding. Will continue to contact vascular surgery for further details on ultrasound impression.     Discussed lumbar Xray imaging results as well with patient, she verbalized understanding of findings. Reports compliance with Gabapentin 300mg BID without benefit. Does report improvement with strength and some pain following physical therapy initiation. Discussed rescue muscle relaxer in setting of normal renal indices. Patient decided to initiate Gabapentin 300mg TID for 1 week to assess for improvement. Will continue to monitor.       Gary Morris MD   [facility] Internal Medicine HO-1   "

## 2024-02-22 ENCOUNTER — HOSPITAL ENCOUNTER (OUTPATIENT)
Dept: RADIOLOGY | Facility: CLINIC | Age: 69
Discharge: HOME | End: 2024-02-22
Attending: ORTHOPAEDIC SURGERY
Payer: MEDICARE

## 2024-02-22 ENCOUNTER — OFFICE VISIT (OUTPATIENT)
Dept: ORTHOPEDICS | Facility: CLINIC | Age: 69
End: 2024-02-22
Payer: MEDICARE

## 2024-02-22 VITALS
BODY MASS INDEX: 33.72 KG/M2 | HEART RATE: 60 BPM | WEIGHT: 209.81 LBS | HEIGHT: 66 IN | DIASTOLIC BLOOD PRESSURE: 82 MMHG | SYSTOLIC BLOOD PRESSURE: 134 MMHG

## 2024-02-22 DIAGNOSIS — M17.11 PRIMARY OSTEOARTHRITIS OF RIGHT KNEE: ICD-10-CM

## 2024-02-22 DIAGNOSIS — Z96.651 STATUS POST RIGHT KNEE REPLACEMENT: Primary | ICD-10-CM

## 2024-02-22 DIAGNOSIS — Z96.651 STATUS POST RIGHT KNEE REPLACEMENT: ICD-10-CM

## 2024-02-22 PROCEDURE — 3079F DIAST BP 80-89 MM HG: CPT | Mod: CPTII,,, | Performed by: ORTHOPAEDIC SURGERY

## 2024-02-22 PROCEDURE — 99204 OFFICE O/P NEW MOD 45 MIN: CPT | Mod: ,,, | Performed by: ORTHOPAEDIC SURGERY

## 2024-02-22 PROCEDURE — 1101F PT FALLS ASSESS-DOCD LE1/YR: CPT | Mod: CPTII,,, | Performed by: ORTHOPAEDIC SURGERY

## 2024-02-22 PROCEDURE — 73564 X-RAY EXAM KNEE 4 OR MORE: CPT | Mod: RT,,, | Performed by: ORTHOPAEDIC SURGERY

## 2024-02-22 PROCEDURE — 3288F FALL RISK ASSESSMENT DOCD: CPT | Mod: CPTII,,, | Performed by: ORTHOPAEDIC SURGERY

## 2024-02-22 PROCEDURE — 3008F BODY MASS INDEX DOCD: CPT | Mod: CPTII,,, | Performed by: ORTHOPAEDIC SURGERY

## 2024-02-22 PROCEDURE — 3075F SYST BP GE 130 - 139MM HG: CPT | Mod: CPTII,,, | Performed by: ORTHOPAEDIC SURGERY

## 2024-02-22 PROCEDURE — 1159F MED LIST DOCD IN RCRD: CPT | Mod: CPTII,,, | Performed by: ORTHOPAEDIC SURGERY

## 2024-02-22 NOTE — PROGRESS NOTES
"  Chief Complaint:   Chief Complaint   Patient presents with    Right Knee - Pain     Right Knee pain TKR- DR SMITH 43 years ago. Started 5-6 years but getting worse. Pain is constant "burning and aching" sensation. [facility] injection years ago. Reports numbness/tingling Radiating from the hip to the foot. "Crunching sound"       History of present illness:  68-year-old female presents today for evaluation of right knee pain.  Patient has a longstanding history of pain in the right knee.  It was worse with activity.  Improved by rest.  She is tried anti-inflammatories.  She is tried activity modification.  She is tried multiple injections without significant improvement.  Patient feels as though he has reached a point disability.  She would like to discuss further treatment options.    Past Medical History:   Diagnosis Date    Anxiety disorder     Coronary artery disease     Current use of long term anticoagulation 9/16/2022    GERD (gastroesophageal reflux disease)     History of CVA (cerebrovascular accident) without residual deficits 9/16/2022    History of deep venous thrombosis (DVT) of distal vein of right lower extremity 9/16/2022    History of deep venous thrombosis (DVT) of distal vein of right lower extremity 9/16/2022    History of pulmonary embolism 9/16/2022    History of pulmonary embolism 9/16/2022    Hypercholesterolemia     Hypertension     Hypothyroidism     Non-healing open wound of right groin 9/16/2022    Obesity     Right groin wound 9/20/2022    S/P ascending aortic aneurysm repair 9/16/2022    S/P CABG (coronary artery bypass graft) 9/16/2022    Seizure in childhood     last one age 11    Sleep apnea     does not currently use CPAP    Thoracic aortic aneurysm 03/10/2022    4.5X4.4 Ascending Aorta as of 3/10/2022    Thoracic aortic aneurysm     Thyroid disease        Past Surgical History:   Procedure Laterality Date    ANGIOGRAM, CORONARY, WITH LEFT HEART CATHETERIZATION N/A 07/11/2022    " Procedure: Angiogram, Coronary, with Left Heart Cath;  Surgeon: Harry Jj MD;  Location: [facility] CATH LAB;  Service: Cardiology;  Laterality: N/A;    COLONOSCOPY  10/14/2021    Dr. Marquis Fitch    HYSTERECTOMY      REPAIR OF ASCENDING AORTA N/A 07/29/2022    Procedure: REPAIR, AORTA, ASCENDING;  Surgeon: Alec Vega IV, MD;  Location: [facility] OR;  Service: Cardiovascular;  Laterality: N/A;  ECHO NOTIFIED    RT KNEE         Current Outpatient Medications   Medication Sig    albuterol (VENTOLIN HFA) 90 mcg/actuation inhaler Inhale 1-2 puffs into the lungs every 6 (six) hours as needed for Wheezing or Shortness of Breath. Rescue    albuterol-ipratropium (DUO-NEB) 2.5 mg-0.5 mg/3 mL nebulizer solution Take 3 mLs by nebulization every 4 (four) hours as needed for Wheezing or Shortness of Breath.    amLODIPine (NORVASC) 10 MG tablet Take 1 tablet (10 mg total) by mouth once daily.    aspirin (ECOTRIN) 81 MG EC tablet Take 1 tablet (81 mg total) by mouth once daily.    diclofenac sodium (VOLTAREN) 1 % Gel Apply 2 g topically 3 (three) times daily as needed (pain).    ezetimibe (ZETIA) 10 mg tablet Take 1 tablet (10 mg total) by mouth once daily.    fluticasone propion-salmeteroL 113-14 mcg/actuation AePB Inhale 1 puff into the lungs 2 (two) times a day.    fluticasone propionate (FLONASE) 50 mcg/actuation nasal spray 2 sprays (100 mcg total) by Each Nostril route once daily.    furosemide (LASIX) 20 MG tablet Take 1 tablet (20 mg total) by mouth daily as needed (as needed for shortness of breath).    gabapentin (NEURONTIN) 300 MG capsule Take 1 capsule (300 mg total) by mouth 3 (three) times daily.    [START ON 4/1/2024] levothyroxine (SYNTHROID) 75 MCG tablet Take 1 tablet (75 mcg total) by mouth before breakfast.    lisinopriL 10 MG tablet Take 1 tablet (10 mg total) by mouth once daily.    multivit-min/iron/folic/lutein (CENTRUM SILVER WOMEN ORAL) Take 1 tablet by mouth Daily.    nitroGLYCERIN (NITROSTAT) 0.4  MG SL tablet Place 1 tablet (0.4 mg total) under the tongue every 5 (five) minutes as needed for Chest pain.    pantoprazole (PROTONIX) 40 MG tablet Take 1 tablet (40 mg total) by mouth once daily.    pravastatin (PRAVACHOL) 10 MG tablet Take 1 tablet (10 mg total) by mouth every other day.     No current facility-administered medications for this visit.       Review of patient's allergies indicates:   Allergen Reactions    Tramadol Hives     Other reaction(s): sweating    Meperidine Rash     Other reaction(s): unknown       Family History   Problem Relation Age of Onset    Heart disease Mother     Uterine cancer Mother     Lung cancer Father     Seizures Sister     Heart disease Sister        Social History     Socioeconomic History    Marital status:    Tobacco Use    Smoking status: Former     Current packs/day: 0.00     Average packs/day: 2.2 packs/day for 82.5 years (183.0 ttl pk-yrs)     Types: Cigarettes, Cigars     Start date: 1969     Quit date: 2012     Years since quittin.1    Smokeless tobacco: Never    Tobacco comments:     Quit 12 years ago   Substance and Sexual Activity    Alcohol use: Not Currently    Drug use: Never    Sexual activity: Not Currently     Partners: Female     Social Determinants of Health     Financial Resource Strain: Low Risk  (2022)    Overall Financial Resource Strain (CARDIA)     Difficulty of Paying Living Expenses: Not hard at all   Food Insecurity: No Food Insecurity (2022)    Hunger Vital Sign     Worried About Running Out of Food in the Last Year: Never true     Ran Out of Food in the Last Year: Never true   Transportation Needs: No Transportation Needs (2022)    PRAPARE - Transportation     Lack of Transportation (Medical): No     Lack of Transportation (Non-Medical): No   Physical Activity: Inactive (2022)    Exercise Vital Sign     Days of Exercise per Week: 0 days     Minutes of Exercise per Session: 0 min   Stress: No Stress  Concern Present (6/28/2022)    [employer] - Occupational Stress Questionnaire     Feeling of Stress : Not at all   Social Connections: Moderately Isolated (6/28/2022)    Social Connection and Isolation Panel [NHANES]     Frequency of Communication with Friends and Family: More than three times a week     Frequency of Social Gatherings with Friends and Family: More than three times a week     Attends Oriental orthodox Services: Never     Active Member of Clubs or Organizations: No     Attends Club or Organization Meetings: Never     Marital Status:    Housing Stability: Unknown (6/28/2022)    Housing Stability Vital Sign     Unable to Pay for Housing in the Last Year: No     Unstable Housing in the Last Year: No           Review of Systems:    Constitution: Negative for chills, fever, and sweats.  Negative for unexplained weight loss.    HENT:  Negative for headaches and blurry vision.    Cardiovascular:Negative for chest pain or irregular heart beat. Negative for hypertension.    Respiratory:  Negative for cough and shortness of breath.    Gastrointestinal: Negative for abdominal pain, heartburn, melena, nausea, and vomitting.    Genitourinary:  Negative bladder incontinence and dysuria.    Musculoskeletal:  See HPI    Neurological: Negative for numbness.    Psychiatric/Behavioral: Negative for depression.  The patient is not nervous/anxious.      Endocrine: Negative for polyuria    Hematologic/Lymphatic: Negative for bleeding problem.  Does not bruise/bleed easily.    Skin: Negative for poor would healing and rash      Physical Examination:    Vital Signs:    Vitals:    02/22/24 1039   BP: 134/82   Pulse: 60       Body mass index is 33.86 kg/m².    General: No acute distress, alert and oriented, healthy appearing    HEENT: Head is atraumatic, mucous membranes are moist    Neck: Supples, no JVD    Cardiovascular: Palpable dorsalis pedis and posterior tibial pulses, regular rate and  rhythm to those pulses    Lungs: Breathing non-labored    Skin: no rashes appreciated    Neurologic: Can flex and extend knees, ankles, and toes. Sensation is grossly intact    Right knee:  Patient with significant crepitus range of motion.  Patient has pain with range of motion of the right knee.  Worse with activity.  Improved by rest.  Range of motion from 5-105.  Varus alignment that is not correctable.    X-rays:  Four views right knee reviewed with the patient end-stage osteoarthritis right knee.  She is complete loss of joint space and bone-on-bone articulation of the right knee.     Assessment::  Right knee osteoarthritis    Plan:  At this point the patient is tried and failed all conservative management with regards to their knee. They have tried and failed nonoperative management including: Anti-inflammatories, activity modification, injections. All of these have failed to completely remove their pain. They have pain going up and down stairs as well as walking on level ground. The knee pain is affecting activities of daily living They feel that theyve reached a point of disability with regards to their knee. X-rays reveal advanced, end-stage degenerative osteoarthritis as noted by subchondral sclerosis, joint space narrowing in the medial, lateral patellofemoral compartment, and periarticular osteophytes. The patient would like to proceed with surgical intervention and would be a good candidate for total knee replacement.    Total knee arthroplasty procedure, alternatives, risks, and benefits were discussed in detail. The risks including but not limited to: infection, need for revision surgery, pain, swelling, loosening, injury to surrounding neurovascular structures, stiffness, incomplete resolution of pain, DVT, PE, and death were discussed in detail. Despite these risks, the patient would like to proceed with surgical intervention. All questions were answered, no guarantees made. Will plan for right  TKA in March..      This note was created using [employer] voice recognition software that occasionally misinterpreted phrases or words.    Consult note is delivered via Epic messaging service.

## 2024-03-12 ENCOUNTER — OFFICE VISIT (OUTPATIENT)
Dept: ORTHOPEDICS | Facility: CLINIC | Age: 69
End: 2024-03-12
Payer: MEDICARE

## 2024-03-12 ENCOUNTER — HOSPITAL ENCOUNTER (OUTPATIENT)
Dept: RADIOLOGY | Facility: HOSPITAL | Age: 69
Discharge: HOME | End: 2024-03-12
Attending: NURSE PRACTITIONER
Payer: MEDICARE

## 2024-03-12 VITALS
SYSTOLIC BLOOD PRESSURE: 137 MMHG | HEART RATE: 57 BPM | WEIGHT: 209.81 LBS | DIASTOLIC BLOOD PRESSURE: 90 MMHG | HEIGHT: 66 IN | BODY MASS INDEX: 33.72 KG/M2

## 2024-03-12 DIAGNOSIS — M17.11 PRIMARY OSTEOARTHRITIS OF RIGHT KNEE: Primary | ICD-10-CM

## 2024-03-12 DIAGNOSIS — M19.90 OSTEOARTHRITIS: ICD-10-CM

## 2024-03-12 DIAGNOSIS — Z01.818 PREOPERATIVE TESTING: ICD-10-CM

## 2024-03-12 DIAGNOSIS — R79.9 ABNORMAL BLOOD CHEMISTRY LEVEL: ICD-10-CM

## 2024-03-12 DIAGNOSIS — M17.11 PRIMARY OSTEOARTHRITIS OF RIGHT KNEE: ICD-10-CM

## 2024-03-12 LAB — MRSA PCR SCRN (OHS): NOT DETECTED

## 2024-03-12 PROCEDURE — 3008F BODY MASS INDEX DOCD: CPT | Mod: CPTII,,, | Performed by: NURSE PRACTITIONER

## 2024-03-12 PROCEDURE — 1101F PT FALLS ASSESS-DOCD LE1/YR: CPT | Mod: CPTII,,, | Performed by: NURSE PRACTITIONER

## 2024-03-12 PROCEDURE — 99214 OFFICE O/P EST MOD 30 MIN: CPT | Mod: ,,, | Performed by: NURSE PRACTITIONER

## 2024-03-12 PROCEDURE — 3288F FALL RISK ASSESSMENT DOCD: CPT | Mod: CPTII,,, | Performed by: NURSE PRACTITIONER

## 2024-03-12 PROCEDURE — 3080F DIAST BP >= 90 MM HG: CPT | Mod: CPTII,,, | Performed by: NURSE PRACTITIONER

## 2024-03-12 PROCEDURE — 71046 X-RAY EXAM CHEST 2 VIEWS: CPT | Mod: TC

## 2024-03-12 PROCEDURE — 1125F AMNT PAIN NOTED PAIN PRSNT: CPT | Mod: CPTII,,, | Performed by: NURSE PRACTITIONER

## 2024-03-12 PROCEDURE — 3075F SYST BP GE 130 - 139MM HG: CPT | Mod: CPTII,,, | Performed by: NURSE PRACTITIONER

## 2024-03-12 PROCEDURE — 1159F MED LIST DOCD IN RCRD: CPT | Mod: CPTII,,, | Performed by: NURSE PRACTITIONER

## 2024-03-12 RX ORDER — SODIUM CHLORIDE, SODIUM GLUCONATE, SODIUM ACETATE, POTASSIUM CHLORIDE AND MAGNESIUM CHLORIDE 30; 37; 368; 526; 502 MG/100ML; MG/100ML; MG/100ML; MG/100ML; MG/100ML
INJECTION, SOLUTION INTRAVENOUS CONTINUOUS
Status: CANCELLED | OUTPATIENT
Start: 2024-03-12

## 2024-03-12 RX ORDER — SCOLOPAMINE TRANSDERMAL SYSTEM 1 MG/1
1 PATCH, EXTENDED RELEASE TRANSDERMAL ONCE AS NEEDED
Status: CANCELLED | OUTPATIENT
Start: 2024-03-12 | End: 2035-08-08

## 2024-03-12 RX ORDER — ACETAMINOPHEN 500 MG
500 TABLET ORAL EVERY 6 HOURS PRN
Status: ON HOLD | COMMUNITY
End: 2024-03-27 | Stop reason: HOSPADM

## 2024-03-12 RX ORDER — ACETAMINOPHEN 500 MG
1000 TABLET ORAL
Status: CANCELLED | OUTPATIENT
Start: 2024-03-12

## 2024-03-12 RX ORDER — ONDANSETRON 4 MG/1
4 TABLET, ORALLY DISINTEGRATING ORAL
Status: CANCELLED | OUTPATIENT
Start: 2024-03-12

## 2024-03-12 RX ORDER — TRANEXAMIC ACID 650 MG/1
1950 TABLET ORAL
Status: CANCELLED | OUTPATIENT
Start: 2024-03-12 | End: 2024-03-12

## 2024-03-12 RX ORDER — GABAPENTIN 100 MG/1
300 CAPSULE ORAL
Status: CANCELLED | OUTPATIENT
Start: 2024-03-12

## 2024-03-12 RX ORDER — CHOLECALCIFEROL (VITAMIN D3) 25 MCG
1000 TABLET ORAL DAILY
COMMUNITY

## 2024-03-12 NOTE — H&P (VIEW-ONLY)
Chief Complaint:   Chief Complaint   Patient presents with    Right Knee - Pre-op Exam     Pt is present for pre-op exam for surgery on 03/27/24--Rt TKA.       History of present illness:  68-year-old female presents today for evaluation of right knee pain.  Patient has a longstanding history of pain in the right knee.  It was worse with activity.  Improved by rest.  She is tried anti-inflammatories.  She is tried activity modification.  She is tried multiple injections without significant improvement.  Patient feels as though he has reached a point disability and would like to proceed with a right total knee arthroplasty.    Past Medical History:   Diagnosis Date    Anxiety disorder     Coronary artery disease     Current use of long term anticoagulation 9/16/2022    GERD (gastroesophageal reflux disease)     History of CVA (cerebrovascular accident) without residual deficits 9/16/2022    History of deep venous thrombosis (DVT) of distal vein of right lower extremity 9/16/2022    History of deep venous thrombosis (DVT) of distal vein of right lower extremity 9/16/2022    History of pulmonary embolism 9/16/2022    History of pulmonary embolism 9/16/2022    Hypercholesterolemia     Hypertension     Hypothyroidism     Non-healing open wound of right groin 9/16/2022    Obesity     Right groin wound 9/20/2022    S/P ascending aortic aneurysm repair 9/16/2022    S/P CABG (coronary artery bypass graft) 9/16/2022    Seizure in childhood     last one age 11    Sleep apnea     does not currently use CPAP    Thoracic aortic aneurysm 03/10/2022    4.5X4.4 Ascending Aorta as of 3/10/2022    Thoracic aortic aneurysm     Thyroid disease        Past Surgical History:   Procedure Laterality Date    ANGIOGRAM, CORONARY, WITH LEFT HEART CATHETERIZATION N/A 07/11/2022    Procedure: Angiogram, Coronary, with Left Heart Cath;  Surgeon: Harry Jj MD;  Location: [facility] CATH LAB;  Service: Cardiology;  Laterality: N/A;     COLONOSCOPY  10/14/2021    Dr. Cho Person    HYSTERECTOMY      REPAIR OF ASCENDING AORTA N/A 07/29/2022    Procedure: REPAIR, AORTA, ASCENDING;  Surgeon: Alec Vega IV, MD;  Location: [facility] OR;  Service: Cardiovascular;  Laterality: N/A;  ECHO NOTIFIED    RT KNEE         Current Outpatient Medications   Medication Sig    albuterol (VENTOLIN HFA) 90 mcg/actuation inhaler Inhale 1-2 puffs into the lungs every 6 (six) hours as needed for Wheezing or Shortness of Breath. Rescue    albuterol-ipratropium (DUO-NEB) 2.5 mg-0.5 mg/3 mL nebulizer solution Take 3 mLs by nebulization every 4 (four) hours as needed for Wheezing or Shortness of Breath.    amLODIPine (NORVASC) 10 MG tablet Take 1 tablet (10 mg total) by mouth once daily.    aspirin (ECOTRIN) 81 MG EC tablet Take 1 tablet (81 mg total) by mouth once daily.    diclofenac sodium (VOLTAREN) 1 % Gel Apply 2 g topically 3 (three) times daily as needed (pain).    ezetimibe (ZETIA) 10 mg tablet Take 1 tablet (10 mg total) by mouth once daily.    fluticasone propion-salmeteroL 113-14 mcg/actuation AePB Inhale 1 puff into the lungs 2 (two) times a day.    fluticasone propionate (FLONASE) 50 mcg/actuation nasal spray 2 sprays (100 mcg total) by Each Nostril route once daily.    furosemide (LASIX) 20 MG tablet Take 1 tablet (20 mg total) by mouth daily as needed (as needed for shortness of breath).    gabapentin (NEURONTIN) 300 MG capsule Take 1 capsule (300 mg total) by mouth 3 (three) times daily.    [START ON 4/1/2024] levothyroxine (SYNTHROID) 75 MCG tablet Take 1 tablet (75 mcg total) by mouth before breakfast.    lisinopriL 10 MG tablet Take 1 tablet (10 mg total) by mouth once daily.    multivit-min/iron/folic/lutein (CENTRUM SILVER WOMEN ORAL) Take 1 tablet by mouth Daily.    nitroGLYCERIN (NITROSTAT) 0.4 MG SL tablet Place 1 tablet (0.4 mg total) under the tongue every 5 (five) minutes as needed for Chest pain.    pantoprazole (PROTONIX) 40 MG tablet Take 1  tablet (40 mg total) by mouth once daily.    pravastatin (PRAVACHOL) 10 MG tablet Take 1 tablet (10 mg total) by mouth every other day.     No current facility-administered medications for this visit.       Review of patient's allergies indicates:   Allergen Reactions    Tramadol Hives     Other reaction(s): sweating    Meperidine Rash     Other reaction(s): unknown       Family History   Problem Relation Age of Onset    Heart disease Mother     Uterine cancer Mother     Lung cancer Father     Seizures Sister     Heart disease Sister        Social History     Socioeconomic History    Marital status:    Tobacco Use    Smoking status: Former     Current packs/day: 0.00     Average packs/day: 2.2 packs/day for 82.5 years (183.0 ttl pk-yrs)     Types: Cigarettes, Cigars     Start date: 1969     Quit date: 2012     Years since quittin.2    Smokeless tobacco: Never    Tobacco comments:     Quit 12 years ago   Substance and Sexual Activity    Alcohol use: Not Currently    Drug use: Never    Sexual activity: Not Currently     Partners: Female     Social Determinants of Health     Financial Resource Strain: Low Risk  (2022)    Overall Financial Resource Strain (CARDIA)     Difficulty of Paying Living Expenses: Not hard at all   Food Insecurity: No Food Insecurity (2022)    Hunger Vital Sign     Worried About Running Out of Food in the Last Year: Never true     Ran Out of Food in the Last Year: Never true   Transportation Needs: No Transportation Needs (2022)    PRAPARE - Transportation     Lack of Transportation (Medical): No     Lack of Transportation (Non-Medical): No   Physical Activity: Inactive (2022)    Exercise Vital Sign     Days of Exercise per Week: 0 days     Minutes of Exercise per Session: 0 min   Stress: No Stress Concern Present (2022)    [employer] - Occupational Stress Questionnaire     Feeling of Stress : Not at all   Social  Connections: Moderately Isolated (6/28/2022)    Social Connection and Isolation Panel [NHANES]     Frequency of Communication with Friends and Family: More than three times a week     Frequency of Social Gatherings with Friends and Family: More than three times a week     Attends Yarsani Services: Never     Active Member of Clubs or Organizations: No     Attends Club or Organization Meetings: Never     Marital Status:    Housing Stability: Unknown (6/28/2022)    Housing Stability Vital Sign     Unable to Pay for Housing in the Last Year: No     Unstable Housing in the Last Year: No           Review of Systems:    Constitution: Negative for chills, fever, and sweats.  Negative for unexplained weight loss.    HENT:  Negative for headaches and blurry vision.    Cardiovascular:Negative for chest pain or irregular heart beat. Negative for hypertension.    Respiratory:  Negative for cough and shortness of breath.    Gastrointestinal: Negative for abdominal pain, heartburn, melena, nausea, and vomitting.    Genitourinary:  Negative bladder incontinence and dysuria.    Musculoskeletal:  See HPI    Neurological: Negative for numbness.    Psychiatric/Behavioral: Negative for depression.  The patient is not nervous/anxious.      Endocrine: Negative for polyuria    Hematologic/Lymphatic: Negative for bleeding problem.  Does not bruise/bleed easily.    Skin: Negative for poor would healing and rash      Physical Examination:    Vital Signs:    Vitals:    03/12/24 0815   BP: (!) 137/90   Pulse: (!) 57       Body mass index is 33.86 kg/m².    General: No acute distress, alert and oriented, healthy appearing    HEENT: Head is atraumatic, mucous membranes are moist    Neck: Supples, no JVD    Cardiovascular: Palpable dorsalis pedis and posterior tibial pulses, regular rate and rhythm to those pulses    Lungs: Breathing non-labored    Skin: no rashes appreciated    Neurologic: Can flex and extend knees, ankles, and toes.  Sensation is grossly intact    Right knee:  Patient with significant crepitus range of motion.  Patient has pain with range of motion of the right knee.  Worse with activity.  Improved by rest.  Range of motion from 5-105.  Varus alignment that is not correctable.    X-rays:  Four views right knee reviewed with the patient end-stage osteoarthritis right knee.  She is complete loss of joint space and bone-on-bone articulation of the right knee.     Assessment::  Right knee osteoarthritis    Plan:  At this point the patient is tried and failed all conservative management with regards to their knee. They have tried and failed nonoperative management including: Anti-inflammatories, activity modification, injections. All of these have failed to completely remove their pain. They have pain going up and down stairs as well as walking on level ground. The knee pain is affecting activities of daily living They feel that theyve reached a point of disability with regards to their knee. X-rays reveal advanced, end-stage degenerative osteoarthritis as noted by subchondral sclerosis, joint space narrowing in the medial, lateral patellofemoral compartment, and periarticular osteophytes. The patient would like to proceed with surgical intervention and would be a good candidate for total knee replacement.    Total knee arthroplasty procedure, alternatives, risks, and benefits were discussed in detail. The risks including but not limited to: infection, need for revision surgery, pain, swelling, loosening, injury to surrounding neurovascular structures, stiffness, incomplete resolution of pain, DVT, PE, and death were discussed in detail. Despite these risks, the patient would like to proceed with surgical intervention. All questions were answered, no guarantees made. Will plan for right TKA on 3/27/2024.    The patient has a history of cardiac disease and is at high risk for adverse effects of surgery and anesthesia. We will admit  as inpatient and expect the patient to stay 2 nights in the hospital. We will closely monitor fluid intake and output, vital signs, neurological assessments and will use telemetry if needed. We plan for discharge once patient is stable and at baseline    This note was created using [employer] voice recognition software that occasionally misinterpreted phrases or words.    Consult note is delivered via Epic messaging service.

## 2024-03-12 NOTE — PROGRESS NOTES
Chief Complaint:   Chief Complaint   Patient presents with    Right Knee - Pre-op Exam     Pt is present for pre-op exam for surgery on 03/27/24--Rt TKA.       History of present illness:  68-year-old female presents today for evaluation of right knee pain.  Patient has a longstanding history of pain in the right knee.  It was worse with activity.  Improved by rest.  She is tried anti-inflammatories.  She is tried activity modification.  She is tried multiple injections without significant improvement.  Patient feels as though he has reached a point disability and would like to proceed with a right total knee arthroplasty.    Past Medical History:   Diagnosis Date    Anxiety disorder     Coronary artery disease     Current use of long term anticoagulation 9/16/2022    GERD (gastroesophageal reflux disease)     History of CVA (cerebrovascular accident) without residual deficits 9/16/2022    History of deep venous thrombosis (DVT) of distal vein of right lower extremity 9/16/2022    History of deep venous thrombosis (DVT) of distal vein of right lower extremity 9/16/2022    History of pulmonary embolism 9/16/2022    History of pulmonary embolism 9/16/2022    Hypercholesterolemia     Hypertension     Hypothyroidism     Non-healing open wound of right groin 9/16/2022    Obesity     Right groin wound 9/20/2022    S/P ascending aortic aneurysm repair 9/16/2022    S/P CABG (coronary artery bypass graft) 9/16/2022    Seizure in childhood     last one age 11    Sleep apnea     does not currently use CPAP    Thoracic aortic aneurysm 03/10/2022    4.5X4.4 Ascending Aorta as of 3/10/2022    Thoracic aortic aneurysm     Thyroid disease        Past Surgical History:   Procedure Laterality Date    ANGIOGRAM, CORONARY, WITH LEFT HEART CATHETERIZATION N/A 07/11/2022    Procedure: Angiogram, Coronary, with Left Heart Cath;  Surgeon: Harry Jj MD;  Location: [facility] CATH LAB;  Service: Cardiology;  Laterality: N/A;     COLONOSCOPY  10/14/2021    Dr. Cho Person    HYSTERECTOMY      REPAIR OF ASCENDING AORTA N/A 07/29/2022    Procedure: REPAIR, AORTA, ASCENDING;  Surgeon: Alec Vega IV, MD;  Location: [facility] OR;  Service: Cardiovascular;  Laterality: N/A;  ECHO NOTIFIED    RT KNEE         Current Outpatient Medications   Medication Sig    albuterol (VENTOLIN HFA) 90 mcg/actuation inhaler Inhale 1-2 puffs into the lungs every 6 (six) hours as needed for Wheezing or Shortness of Breath. Rescue    albuterol-ipratropium (DUO-NEB) 2.5 mg-0.5 mg/3 mL nebulizer solution Take 3 mLs by nebulization every 4 (four) hours as needed for Wheezing or Shortness of Breath.    amLODIPine (NORVASC) 10 MG tablet Take 1 tablet (10 mg total) by mouth once daily.    aspirin (ECOTRIN) 81 MG EC tablet Take 1 tablet (81 mg total) by mouth once daily.    diclofenac sodium (VOLTAREN) 1 % Gel Apply 2 g topically 3 (three) times daily as needed (pain).    ezetimibe (ZETIA) 10 mg tablet Take 1 tablet (10 mg total) by mouth once daily.    fluticasone propion-salmeteroL 113-14 mcg/actuation AePB Inhale 1 puff into the lungs 2 (two) times a day.    fluticasone propionate (FLONASE) 50 mcg/actuation nasal spray 2 sprays (100 mcg total) by Each Nostril route once daily.    furosemide (LASIX) 20 MG tablet Take 1 tablet (20 mg total) by mouth daily as needed (as needed for shortness of breath).    gabapentin (NEURONTIN) 300 MG capsule Take 1 capsule (300 mg total) by mouth 3 (three) times daily.    [START ON 4/1/2024] levothyroxine (SYNTHROID) 75 MCG tablet Take 1 tablet (75 mcg total) by mouth before breakfast.    lisinopriL 10 MG tablet Take 1 tablet (10 mg total) by mouth once daily.    multivit-min/iron/folic/lutein (CENTRUM SILVER WOMEN ORAL) Take 1 tablet by mouth Daily.    nitroGLYCERIN (NITROSTAT) 0.4 MG SL tablet Place 1 tablet (0.4 mg total) under the tongue every 5 (five) minutes as needed for Chest pain.    pantoprazole (PROTONIX) 40 MG tablet Take 1  tablet (40 mg total) by mouth once daily.    pravastatin (PRAVACHOL) 10 MG tablet Take 1 tablet (10 mg total) by mouth every other day.     No current facility-administered medications for this visit.       Review of patient's allergies indicates:   Allergen Reactions    Tramadol Hives     Other reaction(s): sweating    Meperidine Rash     Other reaction(s): unknown       Family History   Problem Relation Age of Onset    Heart disease Mother     Uterine cancer Mother     Lung cancer Father     Seizures Sister     Heart disease Sister        Social History     Socioeconomic History    Marital status:    Tobacco Use    Smoking status: Former     Current packs/day: 0.00     Average packs/day: 2.2 packs/day for 82.5 years (183.0 ttl pk-yrs)     Types: Cigarettes, Cigars     Start date: 1969     Quit date: 2012     Years since quittin.2    Smokeless tobacco: Never    Tobacco comments:     Quit 12 years ago   Substance and Sexual Activity    Alcohol use: Not Currently    Drug use: Never    Sexual activity: Not Currently     Partners: Female     Social Determinants of Health     Financial Resource Strain: Low Risk  (2022)    Overall Financial Resource Strain (CARDIA)     Difficulty of Paying Living Expenses: Not hard at all   Food Insecurity: No Food Insecurity (2022)    Hunger Vital Sign     Worried About Running Out of Food in the Last Year: Never true     Ran Out of Food in the Last Year: Never true   Transportation Needs: No Transportation Needs (2022)    PRAPARE - Transportation     Lack of Transportation (Medical): No     Lack of Transportation (Non-Medical): No   Physical Activity: Inactive (2022)    Exercise Vital Sign     Days of Exercise per Week: 0 days     Minutes of Exercise per Session: 0 min   Stress: No Stress Concern Present (2022)    [place] [employer] - Occupational Stress Questionnaire     Feeling of Stress : Not at all   Social  Connections: Moderately Isolated (6/28/2022)    Social Connection and Isolation Panel [NHANES]     Frequency of Communication with Friends and Family: More than three times a week     Frequency of Social Gatherings with Friends and Family: More than three times a week     Attends Evangelical Services: Never     Active Member of Clubs or Organizations: No     Attends Club or Organization Meetings: Never     Marital Status:    Housing Stability: Unknown (6/28/2022)    Housing Stability Vital Sign     Unable to Pay for Housing in the Last Year: No     Unstable Housing in the Last Year: No           Review of Systems:    Constitution: Negative for chills, fever, and sweats.  Negative for unexplained weight loss.    HENT:  Negative for headaches and blurry vision.    Cardiovascular:Negative for chest pain or irregular heart beat. Negative for hypertension.    Respiratory:  Negative for cough and shortness of breath.    Gastrointestinal: Negative for abdominal pain, heartburn, melena, nausea, and vomitting.    Genitourinary:  Negative bladder incontinence and dysuria.    Musculoskeletal:  See HPI    Neurological: Negative for numbness.    Psychiatric/Behavioral: Negative for depression.  The patient is not nervous/anxious.      Endocrine: Negative for polyuria    Hematologic/Lymphatic: Negative for bleeding problem.  Does not bruise/bleed easily.    Skin: Negative for poor would healing and rash      Physical Examination:    Vital Signs:    Vitals:    03/12/24 0815   BP: (!) 137/90   Pulse: (!) 57       Body mass index is 33.86 kg/m².    General: No acute distress, alert and oriented, healthy appearing    HEENT: Head is atraumatic, mucous membranes are moist    Neck: Supples, no JVD    Cardiovascular: Palpable dorsalis pedis and posterior tibial pulses, regular rate and rhythm to those pulses    Lungs: Breathing non-labored    Skin: no rashes appreciated    Neurologic: Can flex and extend knees, ankles, and toes.  Sensation is grossly intact    Right knee:  Patient with significant crepitus range of motion.  Patient has pain with range of motion of the right knee.  Worse with activity.  Improved by rest.  Range of motion from 5-105.  Varus alignment that is not correctable.    X-rays:  Four views right knee reviewed with the patient end-stage osteoarthritis right knee.  She is complete loss of joint space and bone-on-bone articulation of the right knee.     Assessment::  Right knee osteoarthritis    Plan:  At this point the patient is tried and failed all conservative management with regards to their knee. They have tried and failed nonoperative management including: Anti-inflammatories, activity modification, injections. All of these have failed to completely remove their pain. They have pain going up and down stairs as well as walking on level ground. The knee pain is affecting activities of daily living They feel that theyve reached a point of disability with regards to their knee. X-rays reveal advanced, end-stage degenerative osteoarthritis as noted by subchondral sclerosis, joint space narrowing in the medial, lateral patellofemoral compartment, and periarticular osteophytes. The patient would like to proceed with surgical intervention and would be a good candidate for total knee replacement.    Total knee arthroplasty procedure, alternatives, risks, and benefits were discussed in detail. The risks including but not limited to: infection, need for revision surgery, pain, swelling, loosening, injury to surrounding neurovascular structures, stiffness, incomplete resolution of pain, DVT, PE, and death were discussed in detail. Despite these risks, the patient would like to proceed with surgical intervention. All questions were answered, no guarantees made. Will plan for right TKA on 3/27/2024.    The patient has a history of cardiac disease and is at high risk for adverse effects of surgery and anesthesia. We will admit  as inpatient and expect the patient to stay 2 nights in the hospital. We will closely monitor fluid intake and output, vital signs, neurological assessments and will use telemetry if needed. We plan for discharge once patient is stable and at baseline    This note was created using M [employer] voice recognition software that occasionally misinterpreted phrases or words.    Consult note is delivered via Epic messaging service.

## 2024-03-15 ENCOUNTER — ANESTHESIA EVENT (OUTPATIENT)
Dept: SURGERY | Facility: HOSPITAL | Age: 69
End: 2024-03-15
Payer: MEDICARE

## 2024-03-15 NOTE — PROGRESS NOTES
CHIEF COMPLAINT:   No chief complaint on file.                                                 HPI:  Blanquita Montoya 68 y.o. 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  At last office visit patient stated that she was feeling well overall, however she was complaining of fatigue and weight gain.  At that time she felt as though her thyroid levels were off and I had advised her to discuss PCP for further testing.    Today the patient states that she feels well from a cardiac standpoint.  She states that she feels stable.  She denies any chest pain, SOB, TORRES, palpitations, PND, orthopnea, lightheadedness, dizziness, syncope, peripheral edema, or claudication symptoms.  She states that she does notice bilateral leg muscle tightness and pain in several joints of her lower extremities.  Of note, patient to undergo right total knee arthroplasty at the end of this month.  She states that she was able to complete her ADLs without any issues or ischemic symptoms, she was still feeling fatigued she was at the last visit, however she states that her thyroid complaints or never addressed.  It she was still able to exercise daily and she walks every morning for exercise.  She reports compliance with all her current medications.  She denies any recent tobacco or other illicit drug use.                                                                                                                                                                                                                                                                                                      CARDIAC TESTING:    Echo 11.15.22  · The left ventricle is normal in size with concentric remodeling and low normal systolic function.  ·  The estimated ejection fraction is 50%.  · Normal left ventricular diastolic function.  · Normal right ventricular size with normal right ventricular systolic function.  · Mild left atrial enlargement.  · Mild aortic regurgitation.  · Mild mitral regurgitation.  · Mild to moderate tricuspid regurgitation.  · There is no pulmonary hypertension.  · The estimated PA systolic pressure is 31 mmHg.  · Normal central venous pressure (3 mmHg).       Cardiac catheterization 7.11.22    Conclusion  · The pre-procedure left ventricular end diastolic pressure was 10.  · The estimated blood loss was none.  · There was non-obstructive coronary artery disease..    The procedure log was documented by Documenter: Larisa Deng RN and verified by Harry Jj MD.    FINDINGS  Anomalous origin of RCA    RECOMMENDATIONS  Medical management  Risk factor modifications  Activity -- avoid straining with affected limb for one week  Exercise -- as tolerated  Make follow-up appointment with CV surgery for aneurysm repair    Date: 7/11/2022  Time: 11:13 AM       Patient Active Problem List   Diagnosis    Gastroesophageal reflux disease    Hyperlipidemia    Hypertension    Hypothyroidism    Obstructive sleep apnea syndrome    Vitamin D deficiency    Prediabetes    Osteopenia    Osteoarthritis    Insomnia    S/P ascending aortic aneurysm repair    History of CVA (cerebrovascular accident) without residual deficits    SOB (shortness of breath)     Past Surgical History:   Procedure Laterality Date    ANGIOGRAM, CORONARY, WITH LEFT HEART CATHETERIZATION N/A 07/11/2022    Procedure: Angiogram, Coronary, with Left Heart Cath;  Surgeon: Harry Jj MD;  Location: [facility] C[facility] LAB;  Service: Cardiology;  Laterality: N/A;    ARTHROSCOPY OF KNEE      COLONOSCOPY  10/14/2021    Dr. Marquis Fitch    HYSTERECTOMY      REPAIR OF ASCENDING AORTA N/A 07/29/2022    Procedure: REPAIR, AORTA, ASCENDING;  Surgeon: Alec Vega IV, MD;  Location:  [facility] OR;  Service: Cardiovascular;  Laterality: N/A;  ECHO NOTIFIED     Social History     Socioeconomic History    Marital status:    Tobacco Use    Smoking status: Former     Current packs/day: 0.00     Average packs/day: 2.2 packs/day for 82.5 years (183.0 ttl pk-yrs)     Types: Cigarettes, Cigars     Start date: 1969     Quit date: 2012     Years since quittin.2    Smokeless tobacco: Never    Tobacco comments:     Quit 12 years ago   Substance and Sexual Activity    Alcohol use: Not Currently    Drug use: Never    Sexual activity: Yes     Partners: Female     Social Determinants of Health     Financial Resource Strain: Low Risk  (2022)    Overall Financial Resource Strain (CARDIA)     Difficulty of Paying Living Expenses: Not hard at all   Food Insecurity: No Food Insecurity (2022)    Hunger Vital Sign     Worried About Running Out of Food in the Last Year: Never true     Ran Out of Food in the Last Year: Never true   Transportation Needs: No Transportation Needs (2022)    PRAPARE - Transportation     Lack of Transportation (Medical): No     Lack of Transportation (Non-Medical): No   Physical Activity: Inactive (2022)    Exercise Vital Sign     Days of Exercise per Week: 0 days     Minutes of Exercise per Session: 0 min   Stress: No Stress Concern Present (2022)    [place] [employer] - Occupational Stress Questionnaire     Feeling of Stress : Not at all   Social Connections: Moderately Isolated (2022)    Social Connection and Isolation Panel [NHANES]     Frequency of Communication with Friends and Family: More than three times a week     Frequency of Social Gatherings with Friends and Family: More than three times a week     Attends Jehovah's witness Services: Never     Active Member of Clubs or Organizations: No     Attends Club or Organization Meetings: Never     Marital Status:    Housing Stability: Unknown (2022)    Housing  Stability Vital Sign     Unable to Pay for Housing in the Last Year: No     Unstable Housing in the Last Year: No        Family History   Problem Relation Age of Onset    Heart disease Mother     Uterine cancer Mother     Lung cancer Father     Seizures Sister     Heart disease Sister      Review of patient's allergies indicates:   Allergen Reactions    Tramadol Hives     Other reaction(s): sweating    Meperidine Rash     Other reaction(s): unknown         ROS:  Review of Systems   Constitutional:  Positive for malaise/fatigue.   HENT: Negative.     Eyes: Negative.    Respiratory: Negative.  Negative for shortness of breath.    Cardiovascular: Negative.  Negative for chest pain, palpitations, orthopnea, claudication, leg swelling and PND.   Gastrointestinal: Negative.    Genitourinary: Negative.    Musculoskeletal:  Positive for joint pain and myalgias.   Skin: Negative.    Neurological:  Positive for weakness. Negative for tingling.   Endo/Heme/Allergies: Negative.    Psychiatric/Behavioral: Negative.                                                                                                                                                                                  Negative except as stated in the history of present illness. See HPI for details.    PHYSICAL EXAM:  There were no vitals taken for this visit.        Physical Exam  HENT:      Head: Normocephalic.      Nose: Nose normal.      Mouth/Throat:      Mouth: Mucous membranes are moist.   Eyes:      Extraocular Movements: Extraocular movements intact.   Neck:      Vascular: No carotid bruit.   Cardiovascular:      Rate and Rhythm: Normal rate and regular rhythm.      Pulses: Normal pulses.      Heart sounds: Normal heart sounds.   Pulmonary:      Effort: Pulmonary effort is normal.   Abdominal:      General: There is no distension.   Musculoskeletal:         General: Normal range of motion.      Cervical back: Normal range of motion.      Right lower  leg: Edema (Mild) present.      Left lower leg: Edema (Mild) present.   Skin:     General: Skin is warm.   Neurological:      General: No focal deficit present.      Mental Status: She is alert.   Psychiatric:         Mood and Affect: Mood normal.       Current Outpatient Medications   Medication Instructions    acetaminophen (TYLENOL) 500 mg, Oral, Every 6 hours PRN    albuterol (VENTOLIN HFA) 90 mcg/actuation inhaler 1-2 puffs, Inhalation, Every 6 hours PRN, Rescue    albuterol-ipratropium (DUO-NEB) 2.5 mg-0.5 mg/3 mL nebulizer solution 3 mLs, Nebulization, Every 4 hours PRN    amLODIPine (NORVASC) 10 mg, Oral, Daily    aspirin (ECOTRIN) 81 mg, Oral, Daily    diclofenac sodium (VOLTAREN) 2 g, Topical (Top), 3 times daily PRN    ezetimibe (ZETIA) 10 mg, Oral, Daily    fluticasone propion-salmeteroL 113-14 mcg/actuation AePB 1 puff, Inhalation, 2 times daily    fluticasone propionate (FLONASE) 100 mcg, Each Nostril, Daily    furosemide (LASIX) 20 mg, Oral, Daily PRN    gabapentin (NEURONTIN) 300 mg, Oral, 3 times daily    [START ON 4/1/2024] levothyroxine (SYNTHROID) 75 mcg, Oral, Before breakfast    lisinopriL 10 mg, Oral, Daily    multivit-min/iron/folic/lutein (CENTRUM SILVER WOMEN ORAL) 1 tablet, Oral, Daily    nitroGLYCERIN (NITROSTAT) 0.4 mg, Sublingual, Every 5 min PRN    pantoprazole (PROTONIX) 40 mg, Oral, Daily    pravastatin (PRAVACHOL) 10 mg, Oral, Every other day    vitamin D (VITAMIN D3) 1,000 Units, Oral, Daily        All medications, laboratory studies, cardiac diagnostic imaging reviewed.     Lab Results   Component Value Date    LDL 96.00 04/03/2023    LDL 61.00 09/14/2022    TRIG 118 04/03/2023    TRIG 57 09/14/2022    CREATININE 0.91 03/12/2024    MG 2.10 08/22/2022    K 4.6 03/12/2024        ASSESSMENT/PLAN:  TORRES and SOB  Non Obstructive CAD   - Denies any recent TORRES or SOB- stable from last office visit   - Workup as above is negative  - Will recommend PFTs to be completed by primary care   -  Continue MAPT      Ascending Aortic Aneurysm s/p Repair  - Performed on 7/29/2022  - Asymptomatic- denies any associated complaints   - BP at goal today   - In the future consider outpatient imaging to assess graft repair     HTN  - Blood pressure elevated today - 137/74  - Continue Norvasc 10 mg daily and Lisinopril 10 MG daily   - Counseled on low sodium, heart healthy diet and exercise as tolerated     HLD  - Patient reports that she is unable to tolerate statins due to severe skin reaction   - Was trialed on Zetia per PCP and tolerating well without side effects  - Will have patient complete FLP/CMP prior next office visit- labs were ordered at last visit, however patient did not complete.  Encouraged patient to complete lab work soon  - Counseled on importance of low cholesterol, heart healthy diet, and exercise as tolerated     History of CVA  - Asymptomatic- still with some residual muscle tightness/rigidity from a stroke, otherwise denies any further stroke symptoms or recrudescence   - Continue MAPT and Zetia  - US Carotid with less than 50% stenosis in R ICA and L ICA     Post Op AF  - Asymptomatic, denies tachycardia, palpitations, lightheadedness, dizziness  - Initially placed on Amiodarone but since discontinued.   - Had CROW ligation but still potential risk for cardioembolic CVA.   - CHADSVASc 5  - Xarelto discontinued after 3 months   - Currently not on anticoagulation  - EKG completed last week revealed sinus bradycardia, no evidence of Afib  - Appeared to be in regular rate with regular rhythm on auscultation today     Post Op DVT  - Pt told she only needed to  be on Xarelto for 3 months   - Is no longer on anticoagulation   - no further DVT    Venous Insufficiency  - Venous reflux noted in the right and left greater saphenous veins on most recent bilateral lower extremity venous insufficiency ultrasounds  - Will refer to vascular surgery   - Patient was asymptomatic at this time   - There was also  noted to be a age indeterminate occlusive superficial vein thrombus at the level of the upper and mid calf in the left GSV    Cardiac Risk Stratification  - According to the Revised Cardiac Risk Index, patient is considered to be at least low-to-moderate risk for cardiac events such as death, MI, or cardiac arrest for moderate to high risk total right knee arthroplasty.  Patient to continue with Aspirin. Patient can complete > 4 METs. No further pre-operative testing needed at this time. Case discussed with Staff Cardiologist/Interventionalist, Dr. Jj.       Will refer to vascular surgery   Complete lab work ordered at last office visit  Follow up in cardiology clinic in 4 months or sooner if needed  Follow up with PCP as directed  We will fax over your cardiac risk stratification to surgeon's office today

## 2024-03-15 NOTE — OR NURSING
[facility] (MARILUZ Verduzco NP) requested that Ms. Montoya have her follow up appointment with cardiology prior to her surgery.  [facility] cardiology clinic called and had appointment moved up to Monday, March 18th at 2pm.  Ms. Montoya notified and stress importance of keeping this appointment to be cleared for her surgery with Dr. Barragan on March 27th.  She states understanding.

## 2024-03-18 ENCOUNTER — OFFICE VISIT (OUTPATIENT)
Dept: CARDIOLOGY | Facility: CLINIC | Age: 69
End: 2024-03-18
Payer: MEDICARE

## 2024-03-18 VITALS
RESPIRATION RATE: 18 BRPM | SYSTOLIC BLOOD PRESSURE: 137 MMHG | HEART RATE: 62 BPM | TEMPERATURE: 98 F | HEIGHT: 71 IN | DIASTOLIC BLOOD PRESSURE: 74 MMHG | BODY MASS INDEX: 29.35 KG/M2 | OXYGEN SATURATION: 97 % | WEIGHT: 209.69 LBS

## 2024-03-18 DIAGNOSIS — I10 PRIMARY HYPERTENSION: ICD-10-CM

## 2024-03-18 DIAGNOSIS — Z86.718 HISTORY OF DVT (DEEP VEIN THROMBOSIS): ICD-10-CM

## 2024-03-18 DIAGNOSIS — Z86.79 S/P ASCENDING AORTIC ANEURYSM REPAIR: ICD-10-CM

## 2024-03-18 DIAGNOSIS — I87.2 VENOUS INSUFFICIENCY OF BOTH LOWER EXTREMITIES: Primary | ICD-10-CM

## 2024-03-18 DIAGNOSIS — Z98.890 S/P ASCENDING AORTIC ANEURYSM REPAIR: ICD-10-CM

## 2024-03-18 DIAGNOSIS — E78.2 MIXED HYPERLIPIDEMIA: ICD-10-CM

## 2024-03-18 DIAGNOSIS — R06.02 SOB (SHORTNESS OF BREATH): ICD-10-CM

## 2024-03-18 PROCEDURE — 1126F AMNT PAIN NOTED NONE PRSNT: CPT | Mod: CPTII,,,

## 2024-03-18 PROCEDURE — 3288F FALL RISK ASSESSMENT DOCD: CPT | Mod: CPTII,,,

## 2024-03-18 PROCEDURE — 99214 OFFICE O/P EST MOD 30 MIN: CPT | Mod: S$PBB,,,

## 2024-03-18 PROCEDURE — 3075F SYST BP GE 130 - 139MM HG: CPT | Mod: CPTII,,,

## 2024-03-18 PROCEDURE — 3078F DIAST BP <80 MM HG: CPT | Mod: CPTII,,,

## 2024-03-18 PROCEDURE — 3044F HG A1C LEVEL LT 7.0%: CPT | Mod: CPTII,,,

## 2024-03-18 PROCEDURE — 1159F MED LIST DOCD IN RCRD: CPT | Mod: CPTII,,,

## 2024-03-18 PROCEDURE — 1160F RVW MEDS BY RX/DR IN RCRD: CPT | Mod: CPTII,,,

## 2024-03-18 PROCEDURE — 99215 OFFICE O/P EST HI 40 MIN: CPT | Mod: PBBFAC

## 2024-03-18 PROCEDURE — 1101F PT FALLS ASSESS-DOCD LE1/YR: CPT | Mod: CPTII,,,

## 2024-03-18 PROCEDURE — 3008F BODY MASS INDEX DOCD: CPT | Mod: CPTII,,,

## 2024-03-18 NOTE — PATIENT INSTRUCTIONS
Will refer to vascular surgery   Complete lab work ordered at last office visit  Follow up in cardiology clinic in 4 months or sooner if needed  Follow up with PCP as directed  We will fax over your cardiac risk stratification to surgeon's office today

## 2024-03-19 DIAGNOSIS — Z95.1 S/P CABG (CORONARY ARTERY BYPASS GRAFT): ICD-10-CM

## 2024-03-19 DIAGNOSIS — Z86.79 S/P ASCENDING AORTIC ANEURYSM REPAIR: ICD-10-CM

## 2024-03-19 DIAGNOSIS — I70.0 ATHEROSCLEROSIS OF AORTIC BIFURCATION AND COMMON ILIAC ARTERIES: Primary | ICD-10-CM

## 2024-03-19 DIAGNOSIS — I25.10 CORONARY ARTERY DISEASE, UNSPECIFIED VESSEL OR LESION TYPE, UNSPECIFIED WHETHER ANGINA PRESENT, UNSPECIFIED WHETHER NATIVE OR TRANSPLANTED HEART: ICD-10-CM

## 2024-03-19 DIAGNOSIS — Z86.73 HISTORY OF CVA (CEREBROVASCULAR ACCIDENT) WITHOUT RESIDUAL DEFICITS: ICD-10-CM

## 2024-03-19 DIAGNOSIS — I63.6 CEREBRAL INFARCTION DUE TO CEREBRAL VENOUS THROMBOSIS, NONPYOGENIC: ICD-10-CM

## 2024-03-19 DIAGNOSIS — I23.7 POSTINFARCTION ANGINA: ICD-10-CM

## 2024-03-19 DIAGNOSIS — Z13.6 ENCOUNTER FOR SCREENING FOR CARDIOVASCULAR DISORDERS: ICD-10-CM

## 2024-03-19 DIAGNOSIS — I70.8 ATHEROSCLEROSIS OF AORTIC BIFURCATION AND COMMON ILIAC ARTERIES: Primary | ICD-10-CM

## 2024-03-19 DIAGNOSIS — I97.89 POSTOPERATIVE ATRIAL FIBRILLATION: ICD-10-CM

## 2024-03-19 DIAGNOSIS — I48.91 POSTOPERATIVE ATRIAL FIBRILLATION: ICD-10-CM

## 2024-03-19 DIAGNOSIS — Z98.890 S/P ASCENDING AORTIC ANEURYSM REPAIR: ICD-10-CM

## 2024-03-19 DIAGNOSIS — Z53.20 REFUSAL OF STATIN MEDICATION BY PATIENT: ICD-10-CM

## 2024-03-20 NOTE — PROGRESS NOTES
Moderate calcified atherosclerosis is present in the aortoiliac vessels. There are multiple large calcified intra-articular bodies in the posterior recess of the joint. On CT knee without contrast done on 3/8/24 as pre-op evaluation for knee replacement.     Hx of high dose statin refusal 2/2 ADR of rash and only on Zetia as of 12/2023    Hx of CABG in 2022 with subsequent sx   Hx of ascending aortic aneurysm s/p repair with post-op AF, CVA and DVT/PE in 2022        Review of CT chest done in 2022 and repeat in 2024 consistent with progressive aortic calcifications. Will discuss updates with cardiology to inquire need for pre-operative stress test. As no additional imaging repeated since 2022 and LDL above target goal of <70 on last lipid panel, ordered BLE arterial and repeat B/L carotid artery doppler ultrasounds.       Addendum: Discussed with [facility]. Patient had reports of L sided breast pain on 9/21/24 and METS<4. Will add STAT nuclear stress test and Echo that is priority above all other studies.     Gary Morris MD   [facility] Internal Medicine HO-1

## 2024-03-22 DIAGNOSIS — R06.02 SOB (SHORTNESS OF BREATH): ICD-10-CM

## 2024-03-22 RX ORDER — FUROSEMIDE 20 MG/1
20 TABLET ORAL DAILY PRN
Qty: 30 TABLET | Refills: 3 | Status: SHIPPED | OUTPATIENT
Start: 2024-03-22 | End: 2025-03-22

## 2024-03-27 ENCOUNTER — HOSPITAL ENCOUNTER (OUTPATIENT)
Facility: HOSPITAL | Age: 69
LOS: 1 days | Discharge: HOME | End: 2024-03-29
Attending: ORTHOPAEDIC SURGERY | Admitting: ORTHOPAEDIC SURGERY
Payer: MEDICARE

## 2024-03-27 ENCOUNTER — ANESTHESIA (OUTPATIENT)
Dept: SURGERY | Facility: HOSPITAL | Age: 69
End: 2024-03-27
Payer: MEDICARE

## 2024-03-27 DIAGNOSIS — Z01.818 PREOPERATIVE TESTING: ICD-10-CM

## 2024-03-27 DIAGNOSIS — M19.90 OSTEOARTHRITIS: ICD-10-CM

## 2024-03-27 DIAGNOSIS — M17.11 PRIMARY OSTEOARTHRITIS OF RIGHT KNEE: ICD-10-CM

## 2024-03-27 DIAGNOSIS — R79.9 ABNORMAL BLOOD CHEMISTRY LEVEL: ICD-10-CM

## 2024-03-27 LAB
HCT VFR BLD AUTO: 40.1 % (ref 37–47)
HGB BLD-MCNC: 13 G/DL (ref 12–16)
POCT GLUCOSE: 112 MG/DL (ref 70–110)
POCT GLUCOSE: 123 MG/DL (ref 70–110)

## 2024-03-27 PROCEDURE — 96376 TX/PRO/DX INJ SAME DRUG ADON: CPT | Mod: 59

## 2024-03-27 PROCEDURE — 88305 TISSUE EXAM BY PATHOLOGIST: CPT | Performed by: ORTHOPAEDIC SURGERY

## 2024-03-27 PROCEDURE — D9220A PRA ANESTHESIA: Mod: ANES,,, | Performed by: ANESTHESIOLOGY

## 2024-03-27 PROCEDURE — 63600175 PHARM REV CODE 636 W HCPCS: Mod: JZ,JG | Performed by: ANESTHESIOLOGY

## 2024-03-27 PROCEDURE — 85018 HEMOGLOBIN: CPT | Performed by: ORTHOPAEDIC SURGERY

## 2024-03-27 PROCEDURE — 63600175 PHARM REV CODE 636 W HCPCS: Performed by: NURSE ANESTHETIST, CERTIFIED REGISTERED

## 2024-03-27 PROCEDURE — 25000003 PHARM REV CODE 250: Performed by: NURSE PRACTITIONER

## 2024-03-27 PROCEDURE — 36000712 HC OR TIME LEV V 1ST 15 MIN: Performed by: ORTHOPAEDIC SURGERY

## 2024-03-27 PROCEDURE — 27201423 OPTIME MED/SURG SUP & DEVICES STERILE SUPPLY: Performed by: ORTHOPAEDIC SURGERY

## 2024-03-27 PROCEDURE — 27447 TOTAL KNEE ARTHROPLASTY: CPT | Mod: RT,,, | Performed by: ORTHOPAEDIC SURGERY

## 2024-03-27 PROCEDURE — 94799 UNLISTED PULMONARY SVC/PX: CPT | Mod: XB

## 2024-03-27 PROCEDURE — 94640 AIRWAY INHALATION TREATMENT: CPT | Mod: XB

## 2024-03-27 PROCEDURE — 82962 GLUCOSE BLOOD TEST: CPT | Performed by: ORTHOPAEDIC SURGERY

## 2024-03-27 PROCEDURE — C1713 ANCHOR/SCREW BN/BN,TIS/BN: HCPCS | Performed by: ORTHOPAEDIC SURGERY

## 2024-03-27 PROCEDURE — 0055T BONE SRGRY CMPTR CT/MRI IMAG: CPT | Mod: ,,, | Performed by: ORTHOPAEDIC SURGERY

## 2024-03-27 PROCEDURE — 51702 INSERT TEMP BLADDER CATH: CPT | Performed by: ORTHOPAEDIC SURGERY

## 2024-03-27 PROCEDURE — D9220A PRA ANESTHESIA: Mod: CRNA,,, | Performed by: NURSE ANESTHETIST, CERTIFIED REGISTERED

## 2024-03-27 PROCEDURE — 25000003 PHARM REV CODE 250: Performed by: NURSE ANESTHETIST, CERTIFIED REGISTERED

## 2024-03-27 PROCEDURE — 37000008 HC ANESTHESIA 1ST 15 MINUTES: Performed by: ORTHOPAEDIC SURGERY

## 2024-03-27 PROCEDURE — 96375 TX/PRO/DX INJ NEW DRUG ADDON: CPT

## 2024-03-27 PROCEDURE — 64447 NJX AA&/STRD FEMORAL NRV IMG: CPT | Mod: 59,RT,, | Performed by: ANESTHESIOLOGY

## 2024-03-27 PROCEDURE — C1776 JOINT DEVICE (IMPLANTABLE): HCPCS | Performed by: ORTHOPAEDIC SURGERY

## 2024-03-27 PROCEDURE — 99900031 HC PATIENT EDUCATION (STAT)

## 2024-03-27 PROCEDURE — 97162 PT EVAL MOD COMPLEX 30 MIN: CPT

## 2024-03-27 PROCEDURE — 27000221 HC OXYGEN, UP TO 24 HOURS

## 2024-03-27 PROCEDURE — 25000003 PHARM REV CODE 250: Performed by: ORTHOPAEDIC SURGERY

## 2024-03-27 PROCEDURE — A4216 STERILE WATER/SALINE, 10 ML: HCPCS | Performed by: ORTHOPAEDIC SURGERY

## 2024-03-27 PROCEDURE — 36000713 HC OR TIME LEV V EA ADD 15 MIN: Performed by: ORTHOPAEDIC SURGERY

## 2024-03-27 PROCEDURE — 96365 THER/PROPH/DIAG IV INF INIT: CPT | Mod: 59

## 2024-03-27 PROCEDURE — 71000033 HC RECOVERY, INTIAL HOUR: Performed by: ORTHOPAEDIC SURGERY

## 2024-03-27 PROCEDURE — G0378 HOSPITAL OBSERVATION PER HR: HCPCS

## 2024-03-27 PROCEDURE — 88311 DECALCIFY TISSUE: CPT

## 2024-03-27 PROCEDURE — 37000009 HC ANESTHESIA EA ADD 15 MINS: Performed by: ORTHOPAEDIC SURGERY

## 2024-03-27 PROCEDURE — 51798 US URINE CAPACITY MEASURE: CPT | Performed by: ORTHOPAEDIC SURGERY

## 2024-03-27 PROCEDURE — 27447 TOTAL KNEE ARTHROPLASTY: CPT | Mod: AS,RT,, | Performed by: NURSE PRACTITIONER

## 2024-03-27 PROCEDURE — 63600175 PHARM REV CODE 636 W HCPCS: Performed by: ORTHOPAEDIC SURGERY

## 2024-03-27 PROCEDURE — 96361 HYDRATE IV INFUSION ADD-ON: CPT | Mod: 59

## 2024-03-27 PROCEDURE — 25000242 PHARM REV CODE 250 ALT 637 W/ HCPCS: Performed by: NURSE PRACTITIONER

## 2024-03-27 PROCEDURE — 64447 NJX AA&/STRD FEMORAL NRV IMG: CPT | Mod: 59 | Performed by: ANESTHESIOLOGY

## 2024-03-27 PROCEDURE — 94761 N-INVAS EAR/PLS OXIMETRY MLT: CPT

## 2024-03-27 DEVICE — TIBIAL COMPONENT
Type: IMPLANTABLE DEVICE | Site: KNEE | Status: FUNCTIONAL
Brand: TRIATHLON

## 2024-03-27 DEVICE — PATELLA
Type: IMPLANTABLE DEVICE | Site: KNEE | Status: FUNCTIONAL
Brand: TRIATHLON

## 2024-03-27 DEVICE — CRUCIATE RETAINING FEMORAL
Type: IMPLANTABLE DEVICE | Site: KNEE | Status: FUNCTIONAL
Brand: TRIATHLON

## 2024-03-27 DEVICE — TIBIAL BEARING INSERT - CS
Type: IMPLANTABLE DEVICE | Site: KNEE | Status: FUNCTIONAL
Brand: TRIATHLON

## 2024-03-27 RX ORDER — IPRATROPIUM BROMIDE AND ALBUTEROL SULFATE 2.5; .5 MG/3ML; MG/3ML
3 SOLUTION RESPIRATORY (INHALATION) EVERY 6 HOURS
Status: COMPLETED | OUTPATIENT
Start: 2024-03-27 | End: 2024-03-29

## 2024-03-27 RX ORDER — SCOLOPAMINE TRANSDERMAL SYSTEM 1 MG/1
1 PATCH, EXTENDED RELEASE TRANSDERMAL ONCE AS NEEDED
Status: DISCONTINUED | OUTPATIENT
Start: 2024-03-27 | End: 2024-03-27 | Stop reason: HOSPADM

## 2024-03-27 RX ORDER — MIDAZOLAM HYDROCHLORIDE 2 MG/2ML
2 INJECTION, SOLUTION INTRAMUSCULAR; INTRAVENOUS ONCE AS NEEDED
Status: CANCELLED | OUTPATIENT
Start: 2024-03-27 | End: 2035-08-23

## 2024-03-27 RX ORDER — TALC
6 POWDER (GRAM) TOPICAL NIGHTLY PRN
Status: DISCONTINUED | OUTPATIENT
Start: 2024-03-27 | End: 2024-03-29 | Stop reason: HOSPADM

## 2024-03-27 RX ORDER — BUPIVACAINE HYDROCHLORIDE 2.5 MG/ML
INJECTION, SOLUTION EPIDURAL; INFILTRATION; INTRACAUDAL
Status: COMPLETED
Start: 2024-03-27 | End: 2024-03-27

## 2024-03-27 RX ORDER — MORPHINE SULFATE 10 MG/ML
INJECTION INTRAMUSCULAR; INTRAVENOUS; SUBCUTANEOUS
Status: DISPENSED
Start: 2024-03-27 | End: 2024-03-27

## 2024-03-27 RX ORDER — NAPROXEN SODIUM 220 MG/1
81 TABLET, FILM COATED ORAL 2 TIMES DAILY
Status: DISCONTINUED | OUTPATIENT
Start: 2024-03-28 | End: 2024-03-29 | Stop reason: HOSPADM

## 2024-03-27 RX ORDER — TRANEXAMIC ACID 650 MG/1
1950 TABLET ORAL
Status: COMPLETED | OUTPATIENT
Start: 2024-03-27 | End: 2024-03-27

## 2024-03-27 RX ORDER — FUROSEMIDE 20 MG/1
20 TABLET ORAL DAILY PRN
Status: CANCELLED | OUTPATIENT
Start: 2024-03-27

## 2024-03-27 RX ORDER — ONDANSETRON 4 MG/1
4 TABLET, ORALLY DISINTEGRATING ORAL
Status: COMPLETED | OUTPATIENT
Start: 2024-03-27 | End: 2024-03-27

## 2024-03-27 RX ORDER — BISACODYL 10 MG/1
10 SUPPOSITORY RECTAL DAILY
Status: DISCONTINUED | OUTPATIENT
Start: 2024-03-30 | End: 2024-03-29 | Stop reason: HOSPADM

## 2024-03-27 RX ORDER — HYDROCODONE BITARTRATE AND ACETAMINOPHEN 5; 325 MG/1; MG/1
1 TABLET ORAL EVERY 4 HOURS PRN
Status: DISCONTINUED | OUTPATIENT
Start: 2024-03-27 | End: 2024-03-27

## 2024-03-27 RX ORDER — CEFADROXIL 500 MG/1
500 CAPSULE ORAL EVERY 12 HOURS
Qty: 14 CAPSULE | Refills: 0 | Status: SHIPPED | OUTPATIENT
Start: 2024-03-27 | End: 2024-04-03

## 2024-03-27 RX ORDER — BUPIVACAINE HYDROCHLORIDE 2.5 MG/ML
INJECTION, SOLUTION EPIDURAL; INFILTRATION; INTRACAUDAL
Status: DISCONTINUED | OUTPATIENT
Start: 2024-03-27 | End: 2024-03-27

## 2024-03-27 RX ORDER — MIDAZOLAM HYDROCHLORIDE 1 MG/ML
INJECTION INTRAMUSCULAR; INTRAVENOUS
Status: DISCONTINUED | OUTPATIENT
Start: 2024-03-27 | End: 2024-03-27

## 2024-03-27 RX ORDER — ONDANSETRON 4 MG/1
4 TABLET, ORALLY DISINTEGRATING ORAL ONCE
Status: CANCELLED | OUTPATIENT
Start: 2024-03-27 | End: 2024-03-27

## 2024-03-27 RX ORDER — TRAMADOL HYDROCHLORIDE 50 MG/1
50 TABLET ORAL EVERY 4 HOURS PRN
Status: DISCONTINUED | OUTPATIENT
Start: 2024-03-27 | End: 2024-03-27

## 2024-03-27 RX ORDER — DIPHENHYDRAMINE HYDROCHLORIDE 50 MG/ML
25 INJECTION INTRAMUSCULAR; INTRAVENOUS EVERY 6 HOURS PRN
Status: DISCONTINUED | OUTPATIENT
Start: 2024-03-27 | End: 2024-03-27 | Stop reason: HOSPADM

## 2024-03-27 RX ORDER — FAMOTIDINE 20 MG/1
20 TABLET, FILM COATED ORAL 2 TIMES DAILY
Status: DISCONTINUED | OUTPATIENT
Start: 2024-03-27 | End: 2024-03-29 | Stop reason: HOSPADM

## 2024-03-27 RX ORDER — POLYETHYLENE GLYCOL 3350 17 G/17G
17 POWDER, FOR SOLUTION ORAL NIGHTLY
Status: DISCONTINUED | OUTPATIENT
Start: 2024-03-27 | End: 2024-03-29 | Stop reason: HOSPADM

## 2024-03-27 RX ORDER — PHENYLEPHRINE HYDROCHLORIDE 10 MG/ML
INJECTION INTRAVENOUS
Status: DISCONTINUED | OUTPATIENT
Start: 2024-03-27 | End: 2024-03-27

## 2024-03-27 RX ORDER — AMOXICILLIN 250 MG
2 CAPSULE ORAL 2 TIMES DAILY
Status: DISCONTINUED | OUTPATIENT
Start: 2024-03-27 | End: 2024-03-29 | Stop reason: HOSPADM

## 2024-03-27 RX ORDER — HYDROMORPHONE HYDROCHLORIDE 2 MG/ML
0.4 INJECTION, SOLUTION INTRAMUSCULAR; INTRAVENOUS; SUBCUTANEOUS EVERY 5 MIN PRN
Status: DISCONTINUED | OUTPATIENT
Start: 2024-03-27 | End: 2024-03-27 | Stop reason: HOSPADM

## 2024-03-27 RX ORDER — ONDANSETRON HYDROCHLORIDE 2 MG/ML
4 INJECTION, SOLUTION INTRAVENOUS DAILY PRN
Status: DISCONTINUED | OUTPATIENT
Start: 2024-03-27 | End: 2024-03-27 | Stop reason: HOSPADM

## 2024-03-27 RX ORDER — ROPIVACAINE HYDROCHLORIDE 5 MG/ML
INJECTION, SOLUTION EPIDURAL; INFILTRATION; PERINEURAL
Status: DISCONTINUED | OUTPATIENT
Start: 2024-03-27 | End: 2024-03-27 | Stop reason: HOSPADM

## 2024-03-27 RX ORDER — HYDROCODONE BITARTRATE AND ACETAMINOPHEN 7.5; 325 MG/1; MG/1
1 TABLET ORAL EVERY 4 HOURS PRN
Qty: 42 TABLET | Refills: 0 | Status: SHIPPED | OUTPATIENT
Start: 2024-03-27 | End: 2024-04-03

## 2024-03-27 RX ORDER — GABAPENTIN 300 MG/1
300 CAPSULE ORAL
Status: COMPLETED | OUTPATIENT
Start: 2024-03-27 | End: 2024-03-27

## 2024-03-27 RX ORDER — METOCLOPRAMIDE HYDROCHLORIDE 5 MG/ML
10 INJECTION INTRAMUSCULAR; INTRAVENOUS EVERY 10 MIN PRN
Status: DISCONTINUED | OUTPATIENT
Start: 2024-03-27 | End: 2024-03-27 | Stop reason: HOSPADM

## 2024-03-27 RX ORDER — SODIUM CHLORIDE, SODIUM GLUCONATE, SODIUM ACETATE, POTASSIUM CHLORIDE AND MAGNESIUM CHLORIDE 30; 37; 368; 526; 502 MG/100ML; MG/100ML; MG/100ML; MG/100ML; MG/100ML
INJECTION, SOLUTION INTRAVENOUS CONTINUOUS
Status: CANCELLED | OUTPATIENT
Start: 2024-03-27 | End: 2024-04-26

## 2024-03-27 RX ORDER — PROPOFOL 10 MG/ML
VIAL (ML) INTRAVENOUS CONTINUOUS PRN
Status: DISCONTINUED | OUTPATIENT
Start: 2024-03-27 | End: 2024-03-27

## 2024-03-27 RX ORDER — KETOROLAC TROMETHAMINE 10 MG/1
10 TABLET, FILM COATED ORAL EVERY 6 HOURS
Status: DISCONTINUED | OUTPATIENT
Start: 2024-03-27 | End: 2024-03-29 | Stop reason: HOSPADM

## 2024-03-27 RX ORDER — LIDOCAINE HYDROCHLORIDE 10 MG/ML
1 INJECTION, SOLUTION EPIDURAL; INFILTRATION; INTRACAUDAL; PERINEURAL ONCE
Status: CANCELLED | OUTPATIENT
Start: 2024-03-27 | End: 2024-03-27

## 2024-03-27 RX ORDER — MORPHINE SULFATE 4 MG/ML
4 INJECTION, SOLUTION INTRAMUSCULAR; INTRAVENOUS
Status: ACTIVE | OUTPATIENT
Start: 2024-03-27 | End: 2024-03-28

## 2024-03-27 RX ORDER — EPINEPHRINE 1 MG/ML
INJECTION, SOLUTION, CONCENTRATE INTRAVENOUS
Status: DISPENSED
Start: 2024-03-27 | End: 2024-03-27

## 2024-03-27 RX ORDER — BUPIVACAINE HYDROCHLORIDE 7.5 MG/ML
INJECTION, SOLUTION EPIDURAL; RETROBULBAR
Status: COMPLETED | OUTPATIENT
Start: 2024-03-27 | End: 2024-03-27

## 2024-03-27 RX ORDER — HYDROCODONE BITARTRATE AND ACETAMINOPHEN 7.5; 325 MG/1; MG/1
1 TABLET ORAL EVERY 4 HOURS PRN
Status: DISCONTINUED | OUTPATIENT
Start: 2024-03-27 | End: 2024-03-29 | Stop reason: HOSPADM

## 2024-03-27 RX ORDER — SODIUM CHLORIDE 0.9 % (FLUSH) 0.9 %
SYRINGE (ML) INJECTION
Status: DISPENSED
Start: 2024-03-27 | End: 2024-03-27

## 2024-03-27 RX ORDER — SODIUM CHLORIDE 9 MG/ML
INJECTION, SOLUTION INTRAVENOUS CONTINUOUS
Status: DISCONTINUED | OUTPATIENT
Start: 2024-03-27 | End: 2024-03-29 | Stop reason: HOSPADM

## 2024-03-27 RX ORDER — DOCUSATE SODIUM 100 MG/1
200 CAPSULE, LIQUID FILLED ORAL DAILY
Status: DISCONTINUED | OUTPATIENT
Start: 2024-03-28 | End: 2024-03-29 | Stop reason: HOSPADM

## 2024-03-27 RX ORDER — EPINEPHRINE 1 MG/ML
INJECTION, SOLUTION, CONCENTRATE INTRAVENOUS
Status: DISCONTINUED | OUTPATIENT
Start: 2024-03-27 | End: 2024-03-27 | Stop reason: HOSPADM

## 2024-03-27 RX ORDER — METHOCARBAMOL 750 MG/1
750 TABLET, FILM COATED ORAL EVERY 8 HOURS PRN
Status: DISCONTINUED | OUTPATIENT
Start: 2024-03-27 | End: 2024-03-29 | Stop reason: HOSPADM

## 2024-03-27 RX ORDER — POLYETHYLENE GLYCOL 3350 17 G/17G
17 POWDER, FOR SOLUTION ORAL 2 TIMES DAILY
Qty: 28 EACH | Refills: 0 | Status: SHIPPED | OUTPATIENT
Start: 2024-03-27 | End: 2024-04-10

## 2024-03-27 RX ORDER — SODIUM CHLORIDE 9 MG/ML
INJECTION, SOLUTION INTRAMUSCULAR; INTRAVENOUS; SUBCUTANEOUS
Status: DISCONTINUED | OUTPATIENT
Start: 2024-03-27 | End: 2024-03-27 | Stop reason: HOSPADM

## 2024-03-27 RX ORDER — ACETAMINOPHEN 10 MG/ML
1000 INJECTION, SOLUTION INTRAVENOUS ONCE
Status: COMPLETED | OUTPATIENT
Start: 2024-03-27 | End: 2024-03-27

## 2024-03-27 RX ORDER — EZETIMIBE 10 MG/1
10 TABLET ORAL DAILY
Status: DISCONTINUED | OUTPATIENT
Start: 2024-03-27 | End: 2024-03-29 | Stop reason: HOSPADM

## 2024-03-27 RX ORDER — LISINOPRIL 10 MG/1
10 TABLET ORAL DAILY
Status: DISCONTINUED | OUTPATIENT
Start: 2024-03-27 | End: 2024-03-29 | Stop reason: HOSPADM

## 2024-03-27 RX ORDER — SODIUM CHLORIDE, SODIUM GLUCONATE, SODIUM ACETATE, POTASSIUM CHLORIDE AND MAGNESIUM CHLORIDE 30; 37; 368; 526; 502 MG/100ML; MG/100ML; MG/100ML; MG/100ML; MG/100ML
INJECTION, SOLUTION INTRAVENOUS CONTINUOUS
Status: DISCONTINUED | OUTPATIENT
Start: 2024-03-27 | End: 2024-03-27

## 2024-03-27 RX ORDER — MORPHINE SULFATE 10 MG/ML
INJECTION INTRAMUSCULAR; INTRAVENOUS; SUBCUTANEOUS
Status: DISCONTINUED | OUTPATIENT
Start: 2024-03-27 | End: 2024-03-27 | Stop reason: HOSPADM

## 2024-03-27 RX ORDER — GABAPENTIN 300 MG/1
300 CAPSULE ORAL NIGHTLY
Status: DISCONTINUED | OUTPATIENT
Start: 2024-03-27 | End: 2024-03-29 | Stop reason: HOSPADM

## 2024-03-27 RX ORDER — KETOROLAC TROMETHAMINE 30 MG/ML
INJECTION, SOLUTION INTRAMUSCULAR; INTRAVENOUS
Status: DISPENSED
Start: 2024-03-27 | End: 2024-03-27

## 2024-03-27 RX ORDER — AMLODIPINE BESYLATE 5 MG/1
10 TABLET ORAL DAILY
Status: DISCONTINUED | OUTPATIENT
Start: 2024-03-27 | End: 2024-03-29 | Stop reason: HOSPADM

## 2024-03-27 RX ORDER — METOCLOPRAMIDE HYDROCHLORIDE 5 MG/ML
10 INJECTION INTRAMUSCULAR; INTRAVENOUS
Status: DISPENSED | OUTPATIENT
Start: 2024-03-27 | End: 2024-03-29

## 2024-03-27 RX ORDER — ROPIVACAINE HYDROCHLORIDE 5 MG/ML
INJECTION, SOLUTION EPIDURAL; INFILTRATION; PERINEURAL
Status: DISPENSED
Start: 2024-03-27 | End: 2024-03-27

## 2024-03-27 RX ORDER — METHOCARBAMOL 750 MG/1
750 TABLET, FILM COATED ORAL EVERY 6 HOURS PRN
Qty: 56 TABLET | Refills: 0 | Status: SHIPPED | OUTPATIENT
Start: 2024-03-27 | End: 2024-04-10

## 2024-03-27 RX ORDER — KETOROLAC TROMETHAMINE 30 MG/ML
INJECTION, SOLUTION INTRAMUSCULAR; INTRAVENOUS
Status: DISCONTINUED | OUTPATIENT
Start: 2024-03-27 | End: 2024-03-27 | Stop reason: HOSPADM

## 2024-03-27 RX ORDER — ONDANSETRON HYDROCHLORIDE 2 MG/ML
4 INJECTION, SOLUTION INTRAVENOUS EVERY 6 HOURS PRN
Status: DISCONTINUED | OUTPATIENT
Start: 2024-03-27 | End: 2024-03-29 | Stop reason: HOSPADM

## 2024-03-27 RX ORDER — HYDROCODONE BITARTRATE AND ACETAMINOPHEN 5; 325 MG/1; MG/1
1 TABLET ORAL EVERY 4 HOURS PRN
Status: DISCONTINUED | OUTPATIENT
Start: 2024-03-27 | End: 2024-03-29 | Stop reason: HOSPADM

## 2024-03-27 RX ORDER — LACTULOSE 10 G/15ML
20 SOLUTION ORAL EVERY 6 HOURS PRN
Status: DISCONTINUED | OUTPATIENT
Start: 2024-03-27 | End: 2024-03-29 | Stop reason: HOSPADM

## 2024-03-27 RX ORDER — ALUMINUM HYDROXIDE, MAGNESIUM HYDROXIDE, AND SIMETHICONE 1200; 120; 1200 MG/30ML; MG/30ML; MG/30ML
30 SUSPENSION ORAL EVERY 6 HOURS PRN
Status: DISCONTINUED | OUTPATIENT
Start: 2024-03-27 | End: 2024-03-29 | Stop reason: HOSPADM

## 2024-03-27 RX ORDER — ASPIRIN 81 MG/1
81 TABLET ORAL DAILY
Qty: 90 TABLET | Refills: 3 | Status: SHIPPED | OUTPATIENT
Start: 2024-03-27 | End: 2025-03-22

## 2024-03-27 RX ORDER — ACETAMINOPHEN 500 MG
1000 TABLET ORAL
Status: COMPLETED | OUTPATIENT
Start: 2024-03-27 | End: 2024-03-27

## 2024-03-27 RX ADMIN — CEFAZOLIN 2 G: 2 INJECTION, POWDER, FOR SOLUTION INTRAMUSCULAR; INTRAVENOUS at 08:03

## 2024-03-27 RX ADMIN — BUPIVACAINE HYDROCHLORIDE 30 ML: 2.5 INJECTION, SOLUTION EPIDURAL; INFILTRATION; INTRACAUDAL; PERINEURAL at 08:03

## 2024-03-27 RX ADMIN — MIDAZOLAM 2 MG: 1 INJECTION INTRAMUSCULAR; INTRAVENOUS at 06:03

## 2024-03-27 RX ADMIN — HYDROCODONE BITARTRATE AND ACETAMINOPHEN 1 TABLET: 5; 325 TABLET ORAL at 06:03

## 2024-03-27 RX ADMIN — PHENYLEPHRINE HYDROCHLORIDE 35 MCG/MIN: 10 INJECTION INTRAVENOUS at 07:03

## 2024-03-27 RX ADMIN — KETOROLAC TROMETHAMINE 10 MG: 10 TABLET, FILM COATED ORAL at 02:03

## 2024-03-27 RX ADMIN — SODIUM CHLORIDE: 9 INJECTION, SOLUTION INTRAVENOUS at 09:03

## 2024-03-27 RX ADMIN — PROPOFOL 75 MCG/KG/MIN: 10 INJECTION, EMULSION INTRAVENOUS at 07:03

## 2024-03-27 RX ADMIN — BUPIVACAINE HYDROCHLORIDE 1.8 ML: 7.5 INJECTION, SOLUTION EPIDURAL; RETROBULBAR at 06:03

## 2024-03-27 RX ADMIN — GABAPENTIN 300 MG: 300 CAPSULE ORAL at 05:03

## 2024-03-27 RX ADMIN — SODIUM CHLORIDE, SODIUM GLUCONATE, SODIUM ACETATE, POTASSIUM CHLORIDE AND MAGNESIUM CHLORIDE: 526; 502; 368; 37; 30 INJECTION, SOLUTION INTRAVENOUS at 06:03

## 2024-03-27 RX ADMIN — METOCLOPRAMIDE 10 MG: 5 INJECTION, SOLUTION INTRAMUSCULAR; INTRAVENOUS at 06:03

## 2024-03-27 RX ADMIN — POLYETHYLENE GLYCOL 3350 17 G: 17 POWDER, FOR SOLUTION ORAL at 08:03

## 2024-03-27 RX ADMIN — IPRATROPIUM BROMIDE AND ALBUTEROL SULFATE 3 ML: 2.5; .5 SOLUTION RESPIRATORY (INHALATION) at 07:03

## 2024-03-27 RX ADMIN — HYDROCODONE BITARTRATE AND ACETAMINOPHEN 1 TABLET: 5; 325 TABLET ORAL at 12:03

## 2024-03-27 RX ADMIN — GABAPENTIN 300 MG: 300 CAPSULE ORAL at 08:03

## 2024-03-27 RX ADMIN — HYDROCODONE BITARTRATE AND ACETAMINOPHEN 1 TABLET: 5; 325 TABLET ORAL at 11:03

## 2024-03-27 RX ADMIN — TRANEXAMIC ACID 1950 MG: 650 TABLET ORAL at 05:03

## 2024-03-27 RX ADMIN — SODIUM CHLORIDE, SODIUM GLUCONATE, SODIUM ACETATE, POTASSIUM CHLORIDE AND MAGNESIUM CHLORIDE: 526; 502; 368; 37; 30 INJECTION, SOLUTION INTRAVENOUS at 08:03

## 2024-03-27 RX ADMIN — PHENYLEPHRINE HYDROCHLORIDE 100 MCG: 10 INJECTION INTRAVENOUS at 07:03

## 2024-03-27 RX ADMIN — METOCLOPRAMIDE 10 MG: 5 INJECTION, SOLUTION INTRAMUSCULAR; INTRAVENOUS at 12:03

## 2024-03-27 RX ADMIN — METHOCARBAMOL 750 MG: 750 TABLET ORAL at 08:03

## 2024-03-27 RX ADMIN — SODIUM CHLORIDE, SODIUM GLUCONATE, SODIUM ACETATE, POTASSIUM CHLORIDE AND MAGNESIUM CHLORIDE: 526; 502; 368; 37; 30 INJECTION, SOLUTION INTRAVENOUS at 05:03

## 2024-03-27 RX ADMIN — CEFAZOLIN 2 G: 2 INJECTION, POWDER, FOR SOLUTION INTRAMUSCULAR; INTRAVENOUS at 02:03

## 2024-03-27 RX ADMIN — ONDANSETRON 4 MG: 4 TABLET, ORALLY DISINTEGRATING ORAL at 05:03

## 2024-03-27 RX ADMIN — METOCLOPRAMIDE 10 MG: 5 INJECTION, SOLUTION INTRAMUSCULAR; INTRAVENOUS at 11:03

## 2024-03-27 RX ADMIN — ACETAMINOPHEN 1000 MG: 10 INJECTION INTRAVENOUS at 04:03

## 2024-03-27 RX ADMIN — PHENYLEPHRINE HYDROCHLORIDE 200 MCG: 10 INJECTION INTRAVENOUS at 07:03

## 2024-03-27 RX ADMIN — SENNOSIDES AND DOCUSATE SODIUM 2 TABLET: 50; 8.6 TABLET ORAL at 08:03

## 2024-03-27 RX ADMIN — DEXTROSE 2 G: 50 INJECTION, SOLUTION INTRAVENOUS at 07:03

## 2024-03-27 RX ADMIN — IPRATROPIUM BROMIDE AND ALBUTEROL SULFATE 3 ML: 2.5; .5 SOLUTION RESPIRATORY (INHALATION) at 01:03

## 2024-03-27 RX ADMIN — FAMOTIDINE 20 MG: 20 TABLET ORAL at 08:03

## 2024-03-27 RX ADMIN — ACETAMINOPHEN 1000 MG: 500 TABLET ORAL at 05:03

## 2024-03-27 RX ADMIN — KETOROLAC TROMETHAMINE 10 MG: 10 TABLET, FILM COATED ORAL at 11:03

## 2024-03-27 NOTE — NURSING
Nurses Note -- 4 Eyes      3/27/2024   12 PM      Skin assessed during: Admit      [x] No Altered Skin Integrity Present    []Prevention Measures Documented      [] Yes- Altered Skin Integrity Present or Discovered   [] LDA Added if Not in Epic (Describe Wound)   [] New Altered Skin Integrity was Present on Admit and Documented in LDA   [] Wound Image Taken    Wound Care Consulted? No    Attending Nurse:  Jacinta Contreras RN/Staff Member:   Lizz

## 2024-03-27 NOTE — DISCHARGE INSTRUCTIONS
[facility]  [address] [place] [address] [address]  [place] [address]  Phone [phone]       /      Fax [phone]  SURGEON: Dr. Barragan    After discharge, all questions or concerns should be handled at your surgeon's office ([phone]). If it is a weekend or after hours, you will get the surgeon on call.     Discharge Medications:    PAIN MANAGEMENT: Next Dose Available   Robaxin/Methocarbamol 750mg (Muscle Relaxer) - Every 6-8 hours AS NEEDED for muscle spasms, thigh pain or additional pain control anytime   Norco 7.5/325mg (Hydrocodone/Acetaminophen) (Pain Med) - every 4-6 hours AS NEEDED for pain 10:30pm   Neurontin/Gabapentin 300mg (Nerve/Shocking Pain) - Ok to restart home regimen PM on 3/29/24   COMPLICATION PREVENTION MEDS: Next Dose DUE   Aspirin 81mg twice a day for 6 weeks post-op for blood clot prevention PM on 3/29/24   MiraLAX 17gm - once or twice a day while on narcotics and muscle relaxers for constipation prevention PM on 3/29/24   Duricef/Cefadroxil 500mg (Antibiotic) - twice a day for 7 days post-op for infection prevention PM on 3/29/24                         Total Knee Replacement                                                                                                                                    PAIN MEDICATIONS/PAIN MANAGEMENT: (Use the medication log in your discharge packet to keep track of your medications)  For best wound healing, NO anti-inflammatories (Ibuprofen, Aleve, Motrin, Naproxen, Mobic/Meloxicam, Toradol/Ketorolac, Celebrex, Diclofenac/Voltaren...etc) for 2 weeks or until the wound is healed.    Norco 7.5/325mg (Hydrocodone/Acetaminophen) (pain pill) - You can take 1 tablet every 4-6 hours for pain. If the pain is mild, take 1/2 of a pill. Once you start taking a half of a pain pill, you can take a Tylenol 325mg with each dose for a little extra pain relief without side effects. Gradually decrease the use as the pain lessens. As you  decrease the Norco, increase the Tylenol.    **NO MORE THAN 3000mg OF TYLENOL IN 24 HOURS**.     Robaxin/Methocarbamol 750mg (muscle relaxer)- you can take every 6-8 hours as needed for muscle spasms, thigh pain and stiffness, additional pain control or breakthrough pain medications. This medication is helpful for pain control while lessening your need for narcotics. Please reduce the use gradually as the pain and spasms lessen. DO NOT TAKE AT THE SAME TIME AS A PAIN PILL. YOU WILL BE BETTER SERVED WITH 2 HOURS BETWEEN PAIN PILL AND MUSCLE RELAXER.     Neurontin/Gabapentin 300mg (neuropathic/shocking/ nerve like pain) - ok to restart home regimen.  If you get to sleepy during the day, SWITCH TO BEDTIME DOSING or decrease dose and frequency while on pain pills and muscle relaxers.     **Other things that help with pain control is WALKING, COMPRESSION WRAP, ICE and ELEVATION!!**    BLOOD CLOT PREVENTION:   Aspirin 81mg (blood thinner) - twice a day for 6 weeks for blood clot prevention. Start on the evening of 3/29/24. Stop on 5/9/24.  If you were taking Baby Aspirin 81mg prior to surgery, may return to daily dose AFTER 6 weeks - 5/10/24.    You need to continuing wearing your compression stocking (JOSH Hose - ThromboEmbolic Disease Prevention Device) for the next 2-6 weeks post-op. It is ok to remove them for hygiene and at bedtime.   Hand wash and Dry. **If the swelling persists in the legs after you stop wearing the Josh hose, continue to wear them until the swelling decreases.**  REMOVE STOCKINGS AT LEAST DAILY FOR SKIN ASSESSMENT.   Do NOT let the stockings roll down, creating a tourniquet around the back of your knee. If you need to, leave the excess at the bottom of the stocking.   The best thing you can do to prevent blood clots is to walk around as much as possible, AT LEAST EVERY 1-2 HOURS.       CONSTIPATION PREVENTION:   Miralax or Senokot S/Ana Maria-Colace and Stool softeners EVERY DAY while on pain meds.  Use  other more aggressive over the counter LAXATIVES as needed for constipation (Examples: Milk of Magnesia, Dulcolax tabs or suppository, Magnesium Citrate, Fleet's Enema...etc.)   Drink lots of water.  Increase Fiber in diet.  Increase walking distance each day  DO NOT GO MORE THAN 2 DAYS WITHOUT HAVING A BOWEL MOVEMENT!    ACTIVITY:   Weight bearing precautions as follows:  FULL weight bearing to operative leg with walker.   DO NOT TAKE YOURSELF OFF OF THE WALKER TOO SOON. ALLOW YOUR OUTPATIENT THERAPIST or SURGEON TO GUIDE YOU.   Range of motion as tolerated. Work on BENDING and STRAIGHTENING your knee. Change positions often throughout the day. DO NOT PUT ANYTHING BEHIND THE KNEE KEEPING IT IN A BENT POSITION.   Elevate affected extremity way ABOVE THE LEVEL OF THE HEART to reduce swelling.  Walk around at least every 1-2 hours while awake.   No heavy lifting, pulling, pushing or straining.  Ice the Knee, thigh and lower leg AS MUCH AS POSSIBLE  Outpatient Physical Therapy - Bring prescription to clinic of choice as soon as possible.      WOUND CARE:   Dry dressing changes every other day and as needed for soiling for 14 days. Start SUNDAY.  You may need to change the dressing daily if the wound is draining. Switch to every other day as soon as possible. NOTIFY MD OF EXCESSIVE WOUND DRAINAGE.     DO NOT WET WOUND or apply any ointments, creams, lotions or antiseptics.  Ace wrap - apply your compression stocking and apply the ace wrap where the stocking stops for extra added compression to the knee.   May wet incision AFTER 2 weeks- (after you follow-up with your surgeon).   Ok to shower before then if able to keep wound from getting wet (plastic barrier, saran wrap or cling wrap and tape).   DO NOT TOUCH INCISION      URINARY RETENTION:  If you start having difficulty urinating, decrease the use of Pain pills and muscle relaxers and notify your primary care doctor.     PNEUMONIA PREVENTION:  Stay out of bed as much  as possible and walk around every 1-2 hours.  Continue breathing exercises (Incentive Spirometry) every 1-2 hours while mobility is limited and while you are on pain pills.    FALL PREVENTION:  Wear sturdy shoes that fit well - Wearing shoes with high heels or slippery soles, or shoes that are too loose, can lead to falls. Walking around in bare feet, or only socks, can also increase your risk of falling.  Use walker as long as your surgeon and therapist recommend it  Use good lighting and  throw rugs, electrical cords, furniture and clutter (anything than can cause you to trip at home.   Non-slip rug in bathroom or shower      INFECTION PREVENTION:  Duricef/Cefadroxil 500mg (Antibiotic) -  take twice a day for 7 days to prevent infection  Proper handwashing before and after dressing changes. Do not wet the wound. Wound care instructions as written above. NOTIFY MD OF EXCESSIVE WOUND DRAINAGE.  No alcohol, smoking or tobacco products  Pets should not be allowed around the wound or the dressing.   Treat UTI and skin infections as soon as possible.  Pre-medicate with antibiotics prior to dental or surgical procedures.   If you are diabetic, MAINTAIN GOOD BLOOD SUGAR CONTROL (Below 150) DURING YOUR RECOVERY. If you see high numbers, notify your primary care doctor.     Call your SURGEON'S OFFICE ([phone]) if you experience the following signs and symptoms of infection:   Unusual redness, swelling, excessive, cloudy or foul smelling drainage at the incision site.   Persistent low grade temp OR a temp greater than 102 F, unrelieved by Tylenol  Pain at surgical site, unrelieved by pain meds    Warning signs of a blood clot in your leg: (CALL YOUR SURGEON)  New onset or increasing pain in calf, new onset tenderness or redness above or below the knee or increasing swelling of your calf, ankle, or foot.  Warning signs that a blood clot has traveled to your lungs: (REPORT TO THE ER/CALL 061)  Sudden or increase in  Shortness of breath, sudden onset of chest pains, or  Localized chest pain with coughing.       IF ANY ISSUES ARISE AND YOU FEEL THE NEED TO CALL YOUR PRIMARY CARE DOCTOR, PLEASE LET YOUR SURGEON KNOW AS WELL.     For emergencies, please report to OUR ([facility] [place]) Emergency department and tell them to call YOUR SURGEON at [phone].     BEFORE MAKING ANY CHANGES TO THE MEDICAL CARE PLAN OR GOING TO THE EMERGENCY ROOM, PLEASE CONTACT THE SURGEON.    3rd floor nursing unit # [phone]  Use this number for questions about your discharge instructions or problems filling your discharge prescriptions.

## 2024-03-27 NOTE — PLAN OF CARE
Problem: Adult Inpatient Plan of Care  Goal: Plan of Care Review  Outcome: Ongoing, Progressing  Goal: Patient-Specific Goal (Individualized)  Outcome: Ongoing, Progressing  Goal: Absence of Hospital-Acquired Illness or Injury  Outcome: Ongoing, Progressing  Goal: Optimal Comfort and Wellbeing  Outcome: Ongoing, Progressing  Goal: Readiness for Transition of Care  Outcome: Ongoing, Progressing     Problem: Infection  Goal: Absence of Infection Signs and Symptoms  Outcome: Ongoing, Progressing     Problem: Behavioral Health Comorbidity  Goal: Maintenance of Behavioral Health Symptom Control  Outcome: Ongoing, Progressing     Problem: Hypertension Comorbidity  Goal: Blood Pressure in Desired Range  Outcome: Ongoing, Progressing     Problem: Obstructive Sleep Apnea Risk or Actual Comorbidity Management  Goal: Unobstructed Breathing During Sleep  Outcome: Ongoing, Progressing     Problem: Adjustment to Surgery (Knee Arthroplasty)  Goal: Optimal Coping  Outcome: Ongoing, Progressing     Problem: Bleeding (Knee Arthroplasty)  Goal: Absence of Bleeding  Outcome: Ongoing, Progressing     Problem: Bowel Motility Impaired (Knee Arthroplasty)  Goal: Effective Bowel Elimination  Outcome: Ongoing, Progressing     Problem: Fluid and Electrolyte Imbalance (Knee Arthroplasty)  Goal: Fluid and Electrolyte Balance  Outcome: Ongoing, Progressing     Problem: Functional Ability Impaired (Knee Arthroplasty)  Goal: Optimal Functional Ability  Outcome: Ongoing, Progressing     Problem: Infection (Knee Arthroplasty)  Goal: Absence of Infection Signs and Symptoms  Outcome: Ongoing, Progressing     Problem: Neurovascular Compromise (Knee Arthroplasty)  Goal: Intact Neurovascular Status  Outcome: Ongoing, Progressing     Problem: Ongoing Anesthesia Effects (Knee Arthroplasty)  Goal: Anesthesia/Sedation Recovery  Outcome: Ongoing, Progressing     Problem: Pain (Knee Arthroplasty)  Goal: Acceptable Pain Control  Outcome: Ongoing,  Progressing     Problem: Postoperative Nausea and Vomiting (Knee Arthroplasty)  Goal: Nausea and Vomiting Relief  Outcome: Ongoing, Progressing     Problem: Postoperative Urinary Retention (Knee Arthroplasty)  Goal: Effective Urinary Elimination  Outcome: Ongoing, Progressing     Problem: Respiratory Compromise (Knee Arthroplasty)  Goal: Effective Oxygenation and Ventilation  Outcome: Ongoing, Progressing     Problem: VTE (Venous Thromboembolism)  Goal: VTE (Venous Thromboembolism) Symptom Resolution  Outcome: Ongoing, Progressing

## 2024-03-27 NOTE — TRANSFER OF CARE
"Anesthesia Transfer of Care Note    Patient: Blanquita Montoya    Procedure(s) Performed: Procedure(s) (LRB):  ROBOTIC ARTHROPLASTY, KNEE, TOTAL (Right)    Patient location: PACU    Anesthesia Type: spinal    Transport from OR: Transported from OR on room air with adequate spontaneous ventilation    Post pain: adequate analgesia    Post assessment: no apparent anesthetic complications    Post vital signs: stable    Level of consciousness: awake and alert    Nausea/Vomiting: no nausea/vomiting    Complications: none    Transfer of care protocol was followed      Last vitals: Visit Vitals  BP (!) 103/58 (BP Location: Right arm, Patient Position: Lying)   Pulse 66   Temp 36.5 °C (97.7 °F) (Skin)   Resp 18   Ht 5' 7" (1.702 m)   Wt 94.3 kg (207 lb 14.3 oz)   SpO2 100%   Breastfeeding No   BMI 32.56 kg/m²     "

## 2024-03-27 NOTE — PT/OT/SLP EVAL
"Physical Therapy Evaluation    Patient Name:  Blanquita Montoya   MRN:  [id number]    Recommendations:     Discharge Recommendations: Low Intensity Therapy   Discharge Equipment Recommendations: walker, rolling   Barriers to discharge:  impaired functional mobility    Assessment:     Blanquita Montoya is a 68 y.o. female admitted with a medical diagnosis of Primary osteoarthritis of right knee.  She presents with the following impairments/functional limitations: weakness, impaired endurance, impaired self care skills, impaired functional mobility, gait instability, decreased lower extremity function, pain, decreased ROM, edema, orthopedic precautions.    Rehab Prognosis: Good; patient would benefit from acute skilled PT services to address these deficits and reach maximum level of function.    Recent Surgery: Procedure(s) (LRB):  ROBOTIC ARTHROPLASTY, KNEE, TOTAL (Right) Day of Surgery    Plan:     During this hospitalization, patient to be seen BID to address the identified rehab impairments via gait training, therapeutic activities, therapeutic exercises, neuromuscular re-education and progress toward the following goals:    Plan of Care Expires:  04/02/24    Subjective     Chief Complaint: R knee "throbbing" and neck pain  Patient/Family Comments/goals: Agreeable to evaluation  Pain/Comfort:  Pain Rating 1: other (see comments) (Pt with report of headache and posterior neck pain)  Pain Addressed 1: Pre-medicate for activity, Distraction, Nurse notified, Reposition    Patients cultural, spiritual, Islam conflicts given the current situation: no    Living Environment:  The pt lives with her  in a single story home with 5 steps to enter with B HR.  Prior to admission, patients level of function was independent w/o AD.  Equipment used at home: none.  DME owned (not currently used): none.  Upon discharge, patient will have assistance from .    Objective:     Communicated with RN prior to session.  " Patient found HOB elevated with peripheral IV and sister present upon PT entry to room.    General Precautions: Standard, fall  Orthopedic Precautions:RLE weight bearing as tolerated   Braces: N/A  Respiratory Status: Room air    Exams:  Cognitive Exam:  Patient is oriented to Person, Place, Time, and Situation  RLE ROM:     (In degrees) AROM PROM   R knee flexion 55 60   R knee extension 1 0     RLE Strength: Not tested 2/2 surgery side  LLE ROM: WFL  LLE Strength: WFL    Functional Mobility:  Bed Mobility:     Supine to Sit: stand by assistance. Pt with complaint of some dizziness once seated EOB, but resolved with rest and pursed lip breathing.  Transfers:     Sit to Stand:  contact guard assistance with rolling walker  Gait: The pt ambulated ~70 ft with RW and CGA. Step-to gait pattern, slightly slowed pace. No LOB noted. ~3 brief standing rest breaks taken during ambulation.     Treatment & Education:  The pt was educated on PT plan of care, total knee protocol, safety with mobility, AD use, and importance of frequent mobility.   Pt did complete prehab prior to surgery.     Patient left up in chair with all lines intact, call button in reach, RN notified, and sister and respiratory therapist present.    GOALS:   Multidisciplinary Problems       Physical Therapy Goals          Problem: Physical Therapy    Goal Priority Disciplines Outcome Goal Variances Interventions   Physical Therapy Goal     PT, PT/OT Ongoing, Progressing     Description: Pt will improve functional independence by performing:    Bed mobility: SBA  Sit to stand: SBA with rolling walker  Bed to chair: SBA with Stand Step  with rolling walker   Car Transfer: SBA with rolling walker  Ambulation x 150' feet with SBA and rolling walker  1 Step (Curb): Min A  and rolling walker  5 Steps: Min A  and B HR  right knee AROM flexion (in degrees): 90  right knee AROM extension (in degrees): 0   Independent with total knee HEP                          History:     Past Medical History:   Diagnosis Date    Anxiety disorder     Arthritis     Coronary artery disease     Current use of long term anticoagulation 09/16/2022    GERD (gastroesophageal reflux disease)     History of CVA (cerebrovascular accident) without residual deficits 09/16/2022    History of deep venous thrombosis (DVT) of distal vein of right lower extremity 09/16/2022    History of pulmonary embolism 09/16/2022    Hypercholesterolemia     Hypertension     Hypothyroidism     Non-healing open wound of right groin 09/16/2022    Obesity     Right groin wound 09/20/2022    S/P ascending aortic aneurysm repair 09/16/2022    Seizure in childhood     last one age 11    Sleep apnea     does not currently use CPAP    Thoracic aortic aneurysm 03/10/2022    4.5X4.4 Ascending Aorta as of 3/10/2022    Thyroid disease        Past Surgical History:   Procedure Laterality Date    ANGIOGRAM, CORONARY, WITH LEFT HEART CATHETERIZATION N/A 07/11/2022    Procedure: Angiogram, Coronary, with Left Heart Cath;  Surgeon: Harry Jj MD;  Location: [facility] CATH LAB;  Service: Cardiology;  Laterality: N/A;    ARTHROSCOPY OF KNEE      COLONOSCOPY  10/14/2021    Dr. Cho Person    HYSTERECTOMY      REPAIR OF ASCENDING AORTA N/A 07/29/2022    Procedure: REPAIR, AORTA, ASCENDING;  Surgeon: Alec Vega IV, MD;  Location: [facility];  Service: Cardiovascular;  Laterality: N/A;  ECHO NOTIFIED       Time Tracking:     PT Received On:    PT Start Time: 1310     PT Stop Time: 1340  PT Total Time (min): 30 min     Billable Minutes: Evaluation 30      03/27/2024

## 2024-03-27 NOTE — PROGRESS NOTES
Patient experiencing a delayed response to anesthesia and pain control issues. Will convert to observation status, Will initiate our pre-emptive multimodal pain mgt protocol, provide post-anesthesia monitoring and perform frequent pain assessments. Will plan for discharge once the patient is tolerating pain and pain medications appropriately and the patient has been cleared from a safety/mobility standpoint.

## 2024-03-27 NOTE — OR NURSING
0840  procedure time out performed for rt adductor canal block, all agree  0846  started  0848  u/s guided rt adductor canal block completed, pt abhilash well, vss, resp full and regular, continuous monitoring.

## 2024-03-27 NOTE — ANESTHESIA PREPROCEDURE EVALUATION
03/27/2024  Blanquita Montoya is a 68 y.o., female.  Presents with severe chronic knee pain due to Osteoarthritis of the Right knee.    Diagnosis:   Primary osteoarthritis of right knee [M17.11]   Pre-op diagnosis: Primary osteoarthritis of right knee [M17.11]       The pt. comes [facility] for the noted procedure under SAB vs GA (LMA vs GETA).  The pt. Is NOT on any anticoagulant tx./meds.    Procedure:   ROBOTIC ARTHROPLASTY, KNEE, TOTAL (Right: Knee)   Anesthesia type: Spinal         PMHx:  No specialty history recorded    Other Medical History   Hypertension GERD (gastroesophageal reflux disease)   Thyroid disease Hypercholesterolemia   Hypothyroidism Thoracic aortic aneurysm   Anxiety disorder Coronary artery disease   Sleep apnea Seizure in childhood   Obesity S/P ascending aortic aneurysm repair   Non-healing open wound of right groin History of CVA (cerebrovascular accident) without residual deficits   History of deep venous thrombosis (DVT) of distal vein of right lower extremity Current use of long term anticoagulation   Right groin wound History of pulmonary embolism   Arthritis        Cardiac Clearance /Risk stratification on the chart.          Surgical History:  HYSTERECTOMY ANGIOGRAM, CORONARY, WITH LEFT HEART CATHETERIZATION   COLONOSCOPY REPAIR OF ASCENDING AORTA   ARTHROSCOPY OF KNEE          Vital signs:        Lab Data:      Echo:      EKG:          Pre-op Assessment    I have reviewed the Patient Summary Reports.     I have reviewed the Nursing Notes. I have reviewed the NPO Status.   I have reviewed the Medications.     Review of Systems  Anesthesia Hx:  No problems with previous Anesthesia                Social:  Non-Smoker       Hematology/Oncology:  Hematology Normal   Oncology Normal                                   EENT/Dental:  EENT/Dental Normal           Cardiovascular:  Exercise  tolerance: good   Hypertension   CAD                Functional Capacity good / => 4 METS                         Pulmonary:      Shortness of breath  Sleep Apnea                Renal/:  Renal/ Normal                 Hepatic/GI:     GERD             Musculoskeletal:  Arthritis               Neurological:       Seizures                                Endocrine:   Hypothyroidism          Dermatological:  Skin Normal    Psych:  Psychiatric History                  Physical Exam  General: Alert, Oriented, Well nourished and Cooperative    Airway:  Mallampati: II   Mouth Opening: Normal  TM Distance: Normal  Tongue: Normal  Neck ROM: Normal ROM    Dental:  Intact    Chest/Lungs:  Clear to auscultation, Normal Respiratory Rate    Heart:  Rate: Normal  Rhythm: Regular Rhythm        Anesthesia Plan  Type of Anesthesia, risks & benefits discussed:    Anesthesia Type: Spinal, Gen Natural Airway  Intra-op Monitoring Plan: Standard ASA Monitors  Post Op Pain Control Plan: IV/PO Opioids PRN and intrathecal opioid  Induction:  IV  Informed Consent: Informed consent signed with the Patient and all parties understand the risks and agree with anesthesia plan.  All questions answered. Patient consented to blood products? Yes  ASA Score: 3  Day of Surgery Review of History & Physical: H&P Update referred to the surgeon/provider.    Ready For Surgery From Anesthesia Perspective.     .

## 2024-03-27 NOTE — OP NOTE
OPERATIVE REPORT    Patient: Blanquita Montoya   : 1955    MRN: [id number]  Date: 2024      Surgeon: Yazan Barragan MD  Assistant: Sandra Barton NP  Assistant was essential, part of the procedure including positioning, holding multiple retractors, deep hardware placement and deep closure.    Preoperative Diagnosis:  R knee primary osteoarthritis  Postoperative Diagnosis: Same  Procedure:  R CAHCHO TKA (27528)  Anesthesiologist: Gonzalo Hampton MD  OR Staff: Circulator: Missy Jesus RN  Nurse Practitioner: Sandra Barton FNP  Scrub Person: Harry Starkey  Implants:   Implant Name Type Inv. Item Serial No.  Lot No. LRB No. Used Action   PIN BONE [id number] - [id number]  PIN BONE [id number]  [employer].  Right 1 Implanted and Explanted   PATELLA TRIATHLON [id number] ASYM - [id number]  PATELLA TRIATHLON [id number] ASYM  [employer]. [id number] Right 1 Implanted   COMP FEM CR BEAD SZ 4 RIGHT - [id number]  COMP FEM CR BEAD SZ 4 RIGHT  [employer]. [id number] Right 1 Implanted   INSERT TIBIAL CS X3 10MM SZ 5 - [id number]  INSERT TIBIAL CS X3 10MM SZ 5  [employer]. [id number] Right 1 Implanted   BASEPLATE TIBIAL TRIATHLON SZ - [id number]  BASEPLATE TIBIAL TRIATHLON SZ  [employer]. [id number] Right 1 Implanted     EBL: 150  Complications: None  Disposition: To PACU, stable    Indications: Blanquita Montoya is a 68 y.o. female presenting with R knee pain.  Patient had tried and failed all conservative measures including injections, activity modification, NSAIDs, and home exercise program.  Risks, benefits, and alternatives discussed with regards to right total knee arthroplasty.  All questions answered to patients satisfaction, no guarantees made.  Despite these risks, the patient agreed to proceed with surgical intervention.     Procedure Note:  The patient was brought back to the OR and placed supine on the  OR table. After successful induction of anesthesia by anesthesia staff, all bony prominences were padded appropriately.  Tourniquet placed to the right upper thigh.  Right knee prepped and draped in normal sterile fashion. At this time, a time-out was performed, with the correct patient, site, and procedure identified.  Preoperative antibiotics were verified as administered.     Leg was exsanguinated using Esmarch tourniquet and tourniquet was inflated to 250 mmHg.  Standard midline parapatellar incision was performed taken through the skin and subcutaneous tissue.  A fresh knife was used to make an arthrotomy midline parapatellar approach.  Proximal medial tibia retinaculam was released from the proximal medial tibial plateau.  Small resection of prepatellar fat pad was performed.  Knee flexed up.  Schanz pins attached to the distal femur as well as distal tibia.  Fosubo robotic arrays attached to both sets of pins.  Checkpoints attached to distal femur and proximal tibia.  Registration performed initially with hip center followed by ankle center.  And registration of both the distal femur and proximal tibia performed using Umberto protocol.  We then checked joint balance and ligamentous tension in both extension as well as 90° of flexion.  Adjusted femoral and tibial implants to get appropriate gaps in both extension as well as flexion.  After we were satisfied with implant position as well as volume resection, Fosubo robot was brought in.  Femoral cuts performed followed by tibial cuts.  Care performed to protect all necessary soft tissue structures during bony resection.  Bony fragments and resected and removed from the knee.  Medial and lateral meniscus resected.  Tibial tray placed in the position and pinned.  Trial liner placed in position.  Trial femoral component placed in position and adjusted medially and laterally to the appropriate position.  Knee taken to full range of motion.  Came to full extension.   Excellent flexion greater than 120°.  Stable throughout his range of motion with no extension instability or flexion instability.  Patella tracked well.    Patella was everted.  Thickness measured.  10 mm of bone was resected from the patella.  It was sized at this point as well.  Femoral lugs drilled and the femoral trial removed.  We then punched the tibia with a tibial tower.  Tibial trial then removed.  Implants opened.  Tibia component implanted, followed by the polyethylene liner.  Femoral component then implanted.  Patella was everted and lugs drilled and the patella was also implanted.  At this point brought the knee to full range of motion yet again.  Again excellent range of motion from full extension greater than 120° of flexion was noted with no instability at full flexion, mid flexion and extension.  Checkpoints removed as well as pins.    The tourniquet was dropped.  All bleeders were coagulated.  Injection of joint cocktail periarticularly.  Copious irrigation with normal saline performed along with Betadine lavage followed by more normal saline.  We then turned our attention towards closure.  Arthrotomy closed with #1 Vicryl as well as #1 Stratafix suture.  Subcutaneous tissue closed with 2-0 Monocryl.  Followed by skin closure.    Patient arose from anesthesia without any issue and was then subsequently transferred to to PACU in a stable condition.    All sponge and needle counts were correct at the end of the case.  I was present and participated in all aspects of the procedure.    Prognosis:  The patient will be weight-bearing as tolerated to the right lower extremity with range of motion with physical therapy.  Patient will receive DVT prophylaxis .   plan discharge home versus a facility once the patient is stable with physical therapy guidelines.      This note/OR report was created with the assistance of  voice recognition software or phone  dictation.  There may be transcription errors as a  result of using this technology however minimal. Effort has been made to assure accuracy of transcription but any obvious errors or omissions should be clarified with the author of the document.

## 2024-03-27 NOTE — ANESTHESIA PROCEDURE NOTES
Spinal    Diagnosis: Osteoarthritis  Patient location during procedure: OR  Start time: 3/27/2024 6:58 AM  Timeout: 3/27/2024 6:58 AM  End time: 3/27/2024 6:59 AM    Staffing  Authorizing Provider: Gonzalo Hampton MD  Performing Provider: Dabadie, Virginia G, CRNA    Staffing  Performed by: Dabadie, Virginia G, CRNA  Authorized by: Gonzalo Hampton MD    Preanesthetic Checklist  Completed: patient identified, IV checked, site marked, risks and benefits discussed, surgical consent, monitors and equipment checked, pre-op evaluation and timeout performed  Spinal Block  Patient position: sitting  Prep: ChloraPrep  Patient monitoring: heart rate, continuous pulse ox, frequent blood pressure checks and cardiac monitor  Approach: midline  Location: L3-4  Injection technique: single shot  CSF Fluid: clear free-flowing CSF  Needle  Needle type: pencil-tip   Needle gauge: 25 G  Needle length: 3.5 in  Additional Documentation: incremental injection, negative aspiration for heme and no paresthesia on injection  Needle localization: anatomical landmarks  Assessment  Sensory level: T6   Dermatomal levels determined by alcohol wipe  Ease of block: easy  Patient's tolerance of the procedure: comfortable throughout block and no complaints  Additional Notes  SA sp id'ed, +CSF free flow, injected Sp Marcaine as noted above w/o difficulty.   Returned to supine for prep, drape and procedure.  Appears to have adq. Blk for planned Sx.  Medications:    Medications: bupivacaine (pf) (MARCAINE) injection 0.75% - Intraspinal   1.8 mL - 3/27/2024 6:58:00 AM

## 2024-03-27 NOTE — ANESTHESIA POSTPROCEDURE EVALUATION
Anesthesia Post Evaluation    Patient: Blanquita Montoya    Procedure(s) Performed: Procedure(s) (LRB):  ROBOTIC ARTHROPLASTY, KNEE, TOTAL (Right)    Final Anesthesia Type: spinal      Patient location during evaluation: floor  Patient participation: Yes- Able to Participate  Level of consciousness: awake and alert and oriented  Post-procedure vital signs: reviewed and stable  Pain management: adequate  Airway patency: patent    PONV status at discharge: No PONV, nausea (controlled) and vomiting (controlled)  Anesthetic complications: no      Cardiovascular status: blood pressure returned to baseline and stable  Respiratory status: unassisted    Comments: SENSORI-MOTOR INTACT              Vitals Value Taken Time   /50 03/27/24 0856   Temp 36.4 °C (97.5 °F) 03/27/24 0840   Pulse 59 03/27/24 0857   Resp 21 03/27/24 0857   SpO2 95 % 03/27/24 0857   Vitals shown include unvalidated device data.      No case tracking events are documented in the log.      Pain/Thom Score: Pain Rating Prior to Med Admin: 5 (3/27/2024  5:36 AM)  Thom Score: 8 (3/27/2024  8:55 AM)

## 2024-03-27 NOTE — ANESTHESIA PROCEDURE NOTES
Peripheral Block/Adductor canal    Patient location during procedure: post-op   Block not for primary anesthetic.  Reason for block: at surgeon's request and post-op pain management   Post-op Pain Location: Right Knee   Start time: 3/27/2024 8:46 AM  Timeout: 3/27/2024 8:40 AM   End time: 3/27/2024 8:48 AM    Staffing  Authorizing Provider: Gonzalo Hampton MD  Performing Provider: Gonzalo Hampton MD    Staffing  Performed by: Gonzalo Hampton MD  Authorized by: Gonzalo Hampton MD    Preanesthetic Checklist  Completed: patient identified, IV checked, site marked, risks and benefits discussed, surgical consent, monitors and equipment checked, pre-op evaluation and timeout performed  Peripheral Block  Patient position: supine  Prep: ChloraPrep  Patient monitoring: heart rate, cardiac monitor, continuous pulse ox, continuous capnometry and frequent blood pressure checks  Block type: adductor canal  Laterality: right  Injection technique: single shot  Needle  Needle type: Stimuplex   Needle gauge: 21 G  Needle length: 4 in  Needle localization: anatomical landmarks and ultrasound guidance   -ultrasound image captured on disc.  Assessment  Injection assessment: negative aspiration, negative parasthesia and local visualized surrounding nerve  Paresthesia pain: none  Heart rate change: no  Slow fractionated injection: yes  Pain Tolerance: comfortable throughout block  Medications:    Medications: bupivacaine (pf) (MARCAINE) injection 0.25% - Perineural   30 mL - 3/27/2024 8:46:00 AM    Additional Notes  Utilizing Ultrasound, Adductor canal and associated anatomy identified.  Visualizing block needle as advanced to target area(Femoral and Saphenous nerve  adjacent to Femoral artery) local anesthetic observed surrounding Femoral artery   and nerves. Image saved and stored.       VSS.  [facility] RN monitoring vitals throughout procedure.  Patient tolerated procedure well.

## 2024-03-27 NOTE — PLAN OF CARE
Problem: Physical Therapy  Goal: Physical Therapy Goal  Description: Pt will improve functional independence by performing:    Bed mobility: SBA  Sit to stand: SBA with rolling walker  Bed to chair: SBA with Stand Step  with rolling walker   Car Transfer: SBA with rolling walker  Ambulation x 150' feet with SBA and rolling walker  1 Step (Curb): Min A  and rolling walker  5 Steps: Min A  and B HR  right knee AROM flexion (in degrees): 90  right knee AROM extension (in degrees): 0   Independent with total knee HEP    Outcome: Ongoing, Progressing

## 2024-03-28 LAB
ANION GAP SERPL CALC-SCNC: 6 MEQ/L
BUN SERPL-MCNC: 17.6 MG/DL (ref 9.8–20.1)
CALCIUM SERPL-MCNC: 7.7 MG/DL (ref 8.4–10.2)
CHLORIDE SERPL-SCNC: 107 MMOL/L (ref 98–107)
CO2 SERPL-SCNC: 24 MMOL/L (ref 23–31)
CREAT SERPL-MCNC: 0.97 MG/DL (ref 0.55–1.02)
CREAT/UREA NIT SERPL: 18
ERYTHROCYTE [DISTWIDTH] IN BLOOD BY AUTOMATED COUNT: 13.4 % (ref 11.5–17)
GFR SERPLBLD CREATININE-BSD FMLA CKD-EPI: >60 MLS/MIN/1.73/M2
GLUCOSE SERPL-MCNC: 170 MG/DL (ref 82–115)
HCT VFR BLD AUTO: 34.7 % (ref 37–47)
HGB BLD-MCNC: 11.1 G/DL (ref 12–16)
MCH RBC QN AUTO: 30.4 PG (ref 27–31)
MCHC RBC AUTO-ENTMCNC: 32 G/DL (ref 33–36)
MCV RBC AUTO: 95.1 FL (ref 80–94)
NRBC BLD AUTO-RTO: 0 %
PLATELET # BLD AUTO: 123 X10(3)/MCL (ref 130–400)
PMV BLD AUTO: 12.5 FL (ref 7.4–10.4)
POTASSIUM SERPL-SCNC: 4.5 MMOL/L (ref 3.5–5.1)
RBC # BLD AUTO: 3.65 X10(6)/MCL (ref 4.2–5.4)
SODIUM SERPL-SCNC: 137 MMOL/L (ref 136–145)
WBC # SPEC AUTO: 6.48 X10(3)/MCL (ref 4.5–11.5)

## 2024-03-28 PROCEDURE — 96361 HYDRATE IV INFUSION ADD-ON: CPT

## 2024-03-28 PROCEDURE — 25000242 PHARM REV CODE 250 ALT 637 W/ HCPCS: Performed by: NURSE PRACTITIONER

## 2024-03-28 PROCEDURE — 80048 BASIC METABOLIC PNL TOTAL CA: CPT | Performed by: ORTHOPAEDIC SURGERY

## 2024-03-28 PROCEDURE — 94761 N-INVAS EAR/PLS OXIMETRY MLT: CPT

## 2024-03-28 PROCEDURE — 99900031 HC PATIENT EDUCATION (STAT)

## 2024-03-28 PROCEDURE — 96366 THER/PROPH/DIAG IV INF ADDON: CPT

## 2024-03-28 PROCEDURE — 63600175 PHARM REV CODE 636 W HCPCS: Performed by: ORTHOPAEDIC SURGERY

## 2024-03-28 PROCEDURE — 97530 THERAPEUTIC ACTIVITIES: CPT

## 2024-03-28 PROCEDURE — G0378 HOSPITAL OBSERVATION PER HR: HCPCS

## 2024-03-28 PROCEDURE — 94799 UNLISTED PULMONARY SVC/PX: CPT | Mod: XB

## 2024-03-28 PROCEDURE — 25000003 PHARM REV CODE 250: Performed by: ORTHOPAEDIC SURGERY

## 2024-03-28 PROCEDURE — 96372 THER/PROPH/DIAG INJ SC/IM: CPT | Performed by: ORTHOPAEDIC SURGERY

## 2024-03-28 PROCEDURE — 97116 GAIT TRAINING THERAPY: CPT

## 2024-03-28 PROCEDURE — 85027 COMPLETE CBC AUTOMATED: CPT | Performed by: ORTHOPAEDIC SURGERY

## 2024-03-28 PROCEDURE — 96376 TX/PRO/DX INJ SAME DRUG ADON: CPT

## 2024-03-28 PROCEDURE — 94640 AIRWAY INHALATION TREATMENT: CPT | Mod: XB

## 2024-03-28 PROCEDURE — 25000003 PHARM REV CODE 250: Performed by: NURSE PRACTITIONER

## 2024-03-28 RX ORDER — GLUCAGON 1 MG
1 KIT INJECTION
Status: DISCONTINUED | OUTPATIENT
Start: 2024-03-28 | End: 2024-03-29 | Stop reason: HOSPADM

## 2024-03-28 RX ORDER — KETOROLAC TROMETHAMINE 30 MG/ML
30 INJECTION, SOLUTION INTRAMUSCULAR; INTRAVENOUS ONCE
Status: COMPLETED | OUTPATIENT
Start: 2024-03-28 | End: 2024-03-28

## 2024-03-28 RX ORDER — IBUPROFEN 200 MG
24 TABLET ORAL
Status: DISCONTINUED | OUTPATIENT
Start: 2024-03-28 | End: 2024-03-29 | Stop reason: HOSPADM

## 2024-03-28 RX ORDER — IBUPROFEN 200 MG
16 TABLET ORAL
Status: DISCONTINUED | OUTPATIENT
Start: 2024-03-28 | End: 2024-03-29 | Stop reason: HOSPADM

## 2024-03-28 RX ORDER — INSULIN ASPART 100 [IU]/ML
0-10 INJECTION, SOLUTION INTRAVENOUS; SUBCUTANEOUS
Status: DISCONTINUED | OUTPATIENT
Start: 2024-03-28 | End: 2024-03-29 | Stop reason: HOSPADM

## 2024-03-28 RX ADMIN — INSULIN ASPART 2 UNITS: 100 INJECTION, SOLUTION INTRAVENOUS; SUBCUTANEOUS at 07:03

## 2024-03-28 RX ADMIN — IPRATROPIUM BROMIDE AND ALBUTEROL SULFATE 3 ML: 2.5; .5 SOLUTION RESPIRATORY (INHALATION) at 12:03

## 2024-03-28 RX ADMIN — GABAPENTIN 300 MG: 300 CAPSULE ORAL at 09:03

## 2024-03-28 RX ADMIN — FAMOTIDINE 20 MG: 20 TABLET ORAL at 08:03

## 2024-03-28 RX ADMIN — METHOCARBAMOL 750 MG: 750 TABLET ORAL at 10:03

## 2024-03-28 RX ADMIN — KETOROLAC TROMETHAMINE 10 MG: 10 TABLET, FILM COATED ORAL at 05:03

## 2024-03-28 RX ADMIN — KETOROLAC TROMETHAMINE 30 MG: 30 INJECTION, SOLUTION INTRAMUSCULAR at 02:03

## 2024-03-28 RX ADMIN — HYDROCODONE BITARTRATE AND ACETAMINOPHEN 1 TABLET: 7.5; 325 TABLET ORAL at 05:03

## 2024-03-28 RX ADMIN — KETOROLAC TROMETHAMINE 10 MG: 10 TABLET, FILM COATED ORAL at 12:03

## 2024-03-28 RX ADMIN — AMLODIPINE BESYLATE 10 MG: 5 TABLET ORAL at 08:03

## 2024-03-28 RX ADMIN — SENNOSIDES AND DOCUSATE SODIUM 2 TABLET: 50; 8.6 TABLET ORAL at 09:03

## 2024-03-28 RX ADMIN — CEFAZOLIN 2 G: 2 INJECTION, POWDER, FOR SOLUTION INTRAMUSCULAR; INTRAVENOUS at 02:03

## 2024-03-28 RX ADMIN — HYDROCODONE BITARTRATE AND ACETAMINOPHEN 1 TABLET: 5; 325 TABLET ORAL at 04:03

## 2024-03-28 RX ADMIN — HYDROCODONE BITARTRATE AND ACETAMINOPHEN 1 TABLET: 7.5; 325 TABLET ORAL at 12:03

## 2024-03-28 RX ADMIN — DOCUSATE SODIUM 200 MG: 100 CAPSULE, LIQUID FILLED ORAL at 05:03

## 2024-03-28 RX ADMIN — LISINOPRIL 10 MG: 10 TABLET ORAL at 08:03

## 2024-03-28 RX ADMIN — ASPIRIN 81 MG 81 MG: 81 TABLET ORAL at 08:03

## 2024-03-28 RX ADMIN — SENNOSIDES AND DOCUSATE SODIUM 2 TABLET: 50; 8.6 TABLET ORAL at 08:03

## 2024-03-28 RX ADMIN — IPRATROPIUM BROMIDE AND ALBUTEROL SULFATE 3 ML: 2.5; .5 SOLUTION RESPIRATORY (INHALATION) at 01:03

## 2024-03-28 RX ADMIN — METOCLOPRAMIDE 10 MG: 5 INJECTION, SOLUTION INTRAMUSCULAR; INTRAVENOUS at 12:03

## 2024-03-28 RX ADMIN — FAMOTIDINE 20 MG: 20 TABLET ORAL at 09:03

## 2024-03-28 RX ADMIN — HYDROCODONE BITARTRATE AND ACETAMINOPHEN 1 TABLET: 5; 325 TABLET ORAL at 08:03

## 2024-03-28 RX ADMIN — HYDROCODONE BITARTRATE AND ACETAMINOPHEN 1 TABLET: 7.5; 325 TABLET ORAL at 10:03

## 2024-03-28 RX ADMIN — METOCLOPRAMIDE 10 MG: 5 INJECTION, SOLUTION INTRAMUSCULAR; INTRAVENOUS at 05:03

## 2024-03-28 RX ADMIN — EZETIMIBE 10 MG: 10 TABLET ORAL at 08:03

## 2024-03-28 RX ADMIN — ASPIRIN 81 MG 81 MG: 81 TABLET ORAL at 09:03

## 2024-03-28 RX ADMIN — IPRATROPIUM BROMIDE AND ALBUTEROL SULFATE 3 ML: 2.5; .5 SOLUTION RESPIRATORY (INHALATION) at 07:03

## 2024-03-28 RX ADMIN — POLYETHYLENE GLYCOL 3350 17 G: 17 POWDER, FOR SOLUTION ORAL at 09:03

## 2024-03-28 NOTE — PT/OT/SLP PROGRESS
Physical Therapy Treatment    Patient Name:  Blanquita Montoya   MRN:  [id number]    Recommendations:     Discharge Recommendations: Low Intensity Therapy  Discharge Equipment Recommendations: walker, rolling  Barriers to discharge:  impaired functional mobility    Assessment:     Blanquita Montoya is a 68 y.o. female admitted with a medical diagnosis of Primary osteoarthritis of right knee.  She presents with the following impairments/functional limitations: weakness, impaired endurance, impaired self care skills, impaired functional mobility, gait instability, decreased lower extremity function, pain, decreased ROM, edema, orthopedic precautions .    Rehab Prognosis: Good; patient would benefit from acute skilled PT services to address these deficits and reach maximum level of function.    Recent Surgery: Procedure(s) (LRB):  ROBOTIC ARTHROPLASTY, KNEE, TOTAL (Right) 1 Day Post-Op    Plan:     During this hospitalization, patient to be seen BID to address the identified rehab impairments via gait training, therapeutic activities, therapeutic exercises, neuromuscular re-education and progress toward the following goals:    Plan of Care Expires:  04/02/24    Subjective     Chief Complaint: R knee pain  Patient/Family Comments/goals:   Pain/Comfort:  Pain Rating 1: 7/10  Location - Side 1: Right  Location - Orientation 1: generalized  Location 1: knee  Pain Addressed 1: Reposition, Distraction, Cessation of Activity      Objective:     Communicated with RN prior to session.  Patient found HOB elevated with peripheral IV upon PT entry to room.     General Precautions: Standard, fall  Orthopedic Precautions: RLE weight bearing as tolerated  Braces: N/A  Respiratory Status: Room air     Functional Mobility:  Bed Mobility:     Supine to Sit: stand by assistance  Transfers:     Sit to Stand:  contact guard assistance with rolling walker  Car Transfer: contact guard assistance with  rolling walker  using  Step  "Transfer  Gait: The pt ambulated ~175 ft with RW and CGA. Initially step-to but improved to step through gait pattern with increased distance, fair pace. Cuing for increased R knee flexion in swing. ~3 brief standing rest breaks.  Stairs:  Pt ascended/descended 3 stair(s) and 4" curb step with Rolling Walker with bilateral handrails with Contact Guard Assistance. Min VC for proper technique.    Treatment & Education:  The pt was educated on PT plan of care, total knee protocol, safety with mobility, and importance of frequent mobility.     Patient left up in chair with all lines intact, call button in reach, RN notified, and  present..    GOALS:   Multidisciplinary Problems       Physical Therapy Goals          Problem: Physical Therapy    Goal Priority Disciplines Outcome Goal Variances Interventions   Physical Therapy Goal     PT, PT/OT Ongoing, Progressing     Description: Pt will improve functional independence by performing:    Bed mobility: SBA - MET  Sit to stand: SBA with rolling walker  Bed to chair: SBA with Stand Step  with rolling walker   Car Transfer: SBA with rolling walker  Ambulation x 150' feet with SBA and rolling walker  1 Step (Curb): Min A  and rolling walker - MET  5 Steps: Min A  and B HR - MET  right knee AROM flexion (in degrees): 90  right knee AROM extension (in degrees): 0 - MET  Independent with total knee HEP                         Time Tracking:     PT Received On:    PT Start Time: 1445     PT Stop Time: 1515  PT Total Time (min): 30 min     Billable Minutes: Gait Training 15 and Therapeutic Activity 15    Treatment Type: Treatment  PT/PTA: PT     Number of PTA visits since last PT visit: 0     03/28/2024  "

## 2024-03-28 NOTE — PLAN OF CARE
03/28/24 0944   Discharge Assessment   Assessment Type Discharge Planning Assessment   Source of Information patient;family   Does patient/caregiver understand observation status Yes   Communicated ADAM with patient/caregiver Yes   Reason For Admission s/p TKR   People in Home spouse   Do you expect to return to your current living situation? Yes   Do you have help at home or someone to help you manage your care at home? Yes   Who are your caregiver(s) and their phone number(s)? - RITCHIE [phone]   Prior to hospitilization cognitive status: Alert/Oriented   Current cognitive status: Alert/Oriented   Walking or Climbing Stairs Difficulty yes   Walking or Climbing Stairs ambulation difficulty, requires equipment   Dressing/Bathing Difficulty yes   Dressing/Bathing bathing difficulty, requires equipment   Equipment Currently Used at Home none   Readmission within 30 days? No   Patient currently being followed by outpatient case management? No   Do you currently have service(s) that help you manage your care at home? No   Do you take prescription medications? Yes   Do you have prescription coverage? Yes   Do you have any problems affording any of your prescribed medications? No   Is the patient taking medications as prescribed? yes   Who is going to help you get home at discharge? DAUGHTER/ HSB   How do you get to doctors appointments? car, drives self   Are you on dialysis? No   Do you take coumadin? No   Discharge Plan A Home with family   Discharge Plan B Home with family   DME Needed Upon Discharge  walker, rolling   Discharge Plan discussed with: Spouse/sig other;Patient;Adult children   Transition of Care Barriers None     S/p TKR. Spk w pt &  ( Ritchie) & daughter ( Shelby) @ bedside  -- daughter & hsb to asst w homecare. Pt needs RW. Provider list given. Foc obtained.   Called/ faxed referral for dme to [facility]. They will deliver to hospital.   Called/ faxed referral for outpatient therapy to  [facility]. They will contact pt with appointment date & time.   PCP: [facility]  Contact # Sylvain [phone]; Shelby [phone].     Patient DID participate in a pre-op exercise regimen  - [facility]

## 2024-03-28 NOTE — PLAN OF CARE
Problem: Adult Inpatient Plan of Care  Goal: Plan of Care Review  Outcome: Ongoing, Progressing  Goal: Patient-Specific Goal (Individualized)  Outcome: Ongoing, Progressing  Goal: Absence of Hospital-Acquired Illness or Injury  Outcome: Ongoing, Progressing  Goal: Optimal Comfort and Wellbeing  Outcome: Ongoing, Progressing  Goal: Readiness for Transition of Care  Outcome: Ongoing, Progressing     Problem: Infection  Goal: Absence of Infection Signs and Symptoms  Outcome: Ongoing, Progressing     Problem: Behavioral Health Comorbidity  Goal: Maintenance of Behavioral Health Symptom Control  Outcome: Ongoing, Progressing     Problem: Hypertension Comorbidity  Goal: Blood Pressure in Desired Range  Outcome: Ongoing, Progressing     Problem: Obstructive Sleep Apnea Risk or Actual Comorbidity Management  Goal: Unobstructed Breathing During Sleep  Outcome: Ongoing, Progressing     Problem: Adjustment to Surgery (Knee Arthroplasty)  Goal: Optimal Coping  Outcome: Ongoing, Progressing     Problem: Bleeding (Knee Arthroplasty)  Goal: Absence of Bleeding  Outcome: Ongoing, Progressing     Problem: Bowel Motility Impaired (Knee Arthroplasty)  Goal: Effective Bowel Elimination  Outcome: Ongoing, Progressing     Problem: Fluid and Electrolyte Imbalance (Knee Arthroplasty)  Goal: Fluid and Electrolyte Balance  Outcome: Ongoing, Progressing     Problem: Functional Ability Impaired (Knee Arthroplasty)  Goal: Optimal Functional Ability  Outcome: Ongoing, Progressing     Problem: Infection (Knee Arthroplasty)  Goal: Absence of Infection Signs and Symptoms  Outcome: Ongoing, Progressing     Problem: Neurovascular Compromise (Knee Arthroplasty)  Goal: Intact Neurovascular Status  Outcome: Ongoing, Progressing     Problem: Ongoing Anesthesia Effects (Knee Arthroplasty)  Goal: Anesthesia/Sedation Recovery  Outcome: Ongoing, Progressing     Problem: Pain (Knee Arthroplasty)  Goal: Acceptable Pain Control  Outcome: Ongoing,  Progressing     Problem: Postoperative Nausea and Vomiting (Knee Arthroplasty)  Goal: Nausea and Vomiting Relief  Outcome: Ongoing, Progressing     Problem: Postoperative Urinary Retention (Knee Arthroplasty)  Goal: Effective Urinary Elimination  Outcome: Ongoing, Progressing     Problem: Respiratory Compromise (Knee Arthroplasty)  Goal: Effective Oxygenation and Ventilation  Outcome: Ongoing, Progressing     Problem: VTE (Venous Thromboembolism)  Goal: VTE (Venous Thromboembolism) Symptom Resolution  Outcome: Ongoing, Progressing     Problem: Skin Injury Risk Increased  Goal: Skin Health and Integrity  Outcome: Ongoing, Progressing

## 2024-03-28 NOTE — PROGRESS NOTES
"No acute events overnight.  Pain controlled.  Resting in bed.     Vital Signs  Temp: 98.3 °F (36.8 °C)  Temp Source: Oral  Pulse: 70  Heart Rate Source: Monitor  Resp: 16  SpO2: (!) 93 %  Pulse Oximetry Type: Intermittent  Oxygen Concentration (%): 80  Device (Oxygen Therapy): room air  BP: 113/64  BP Location: Left arm  BP Method: Automatic  Patient Position: Lying  Arousal Level: arouses to voice  Height and Weight  Height: 5' 7" (170.2 cm)  Weight: 94.3 kg (207 lb 14.3 oz)  Weight Method: Standard Scale  BSA (Calculated - sq m): 2.11 sq meters  BMI (Calculated): 32.6  Weight in (lb) to have BMI = 25: 159.3]    +FHL/EHL  BCR distally  Dressing c/d/i  SILT distally    Recent Lab Results         03/28/24  0450   03/27/24  1119   03/27/24  0838        Anion Gap 6.0           BUN 17.6           BUN/CREAT RATIO 18           Calcium 7.7           Chloride 107           CO2 24           Creatinine 0.97           eGFR >60           Glucose 170           Hematocrit 34.7     40.1       Hemoglobin 11.1     13.0       MCH 30.4           MCHC 32.0           MCV 95.1           MPV 12.5           nRBC 0.0           Platelet Count 123           POCT Glucose   123         Potassium 4.5           RBC 3.65           RDW 13.4           Sodium 137           WBC 6.48                   A/P:  Status post TKA  Pain controlled  Overall patient doing well.  Therapy for mobility and ambulation.  ASA for DVT PPx    "

## 2024-03-28 NOTE — PT/OT/SLP PROGRESS
Physical Therapy Treatment    Patient Name:  Blanquita Montoya   MRN:  [id number]    Recommendations:     Discharge Recommendations: Low Intensity Therapy  Discharge Equipment Recommendations: walker, rolling  Barriers to discharge:  impaired functional mobility    Assessment:     Blanquita Montoya is a 68 y.o. female admitted with a medical diagnosis of Primary osteoarthritis of right knee.  She presents with the following impairments/functional limitations: weakness, impaired endurance, impaired self care skills, impaired functional mobility, gait instability, decreased lower extremity function, pain, decreased ROM, edema, orthopedic precautions.    Rehab Prognosis: Good; patient would benefit from acute skilled PT services to address these deficits and reach maximum level of function.    Recent Surgery: Procedure(s) (LRB):  ROBOTIC ARTHROPLASTY, KNEE, TOTAL (Right) 1 Day Post-Op    Plan:     During this hospitalization, patient to be seen BID to address the identified rehab impairments via gait training, therapeutic activities, therapeutic exercises, neuromuscular re-education and progress toward the following goals:    Plan of Care Expires:  04/02/24    Subjective     Chief Complaint: R knee pain  Patient/Family Comments/goals: none reported  Pain/Comfort:  Location - Side 1: Right  Location - Orientation 1: generalized  Location 1: knee  Pain Addressed 1: Reposition, Distraction, Cessation of Activity      Objective:     Communicated with RN prior to session.  Patient found HOB elevated with peripheral IV upon PT entry to room.     General Precautions: Standard, fall  Orthopedic Precautions: RLE weight bearing as tolerated  Braces: N/A  Respiratory Status: Room air     Functional Mobility:  Bed Mobility:     Supine to Sit: stand by assistance  Transfers:     Sit to Stand:  contact guard assistance with rolling walker  Gait: The pt ambulated ~120 ft with RW and CGA. Step-to gait pattern and slowed pace. Decreased  "R knee flexion noted in swing. Multiple standing rest breaks taken throughout ambulation.   Stairs:  Pt ascended/descended 5 stair(s) and 4" curb step with Rolling Walker with Minimal Assistance. Pt with report of increased pain with stair navigation. VC for proper technique.     (In degrees) AROM PROM   R knee flexion 50 55   R knee extension 0        Treatment & Education:  The pt performed x10 reps of total knee exercise protocol on the RLE.  The pt was educated on PT plan of care, total knee protocol, safety with mobility, HEP, and importance of frequent mobility.     Patient left up in chair with all lines intact, call button in reach, RN notified, and family present..    GOALS:   Multidisciplinary Problems       Physical Therapy Goals          Problem: Physical Therapy    Goal Priority Disciplines Outcome Goal Variances Interventions   Physical Therapy Goal     PT, PT/OT Ongoing, Progressing     Description: Pt will improve functional independence by performing:    Bed mobility: SBA - MET  Sit to stand: SBA with rolling walker  Bed to chair: SBA with Stand Step  with rolling walker   Car Transfer: SBA with rolling walker  Ambulation x 150' feet with SBA and rolling walker  1 Step (Curb): Min A  and rolling walker - MET  5 Steps: Min A  and B HR - MET  right knee AROM flexion (in degrees): 90  right knee AROM extension (in degrees): 0 - MET  Independent with total knee HEP                         Time Tracking:     PT Received On:    PT Start Time: 0935     PT Stop Time: 1015  PT Total Time (min): 40 min     Billable Minutes: Gait Training 15 and Therapeutic Activity 25    Treatment Type: Treatment  PT/PTA: PT     Number of PTA visits since last PT visit: 0     03/28/2024  "

## 2024-03-29 VITALS
WEIGHT: 207.88 LBS | OXYGEN SATURATION: 94 % | TEMPERATURE: 98 F | SYSTOLIC BLOOD PRESSURE: 135 MMHG | HEART RATE: 66 BPM | BODY MASS INDEX: 32.63 KG/M2 | RESPIRATION RATE: 20 BRPM | HEIGHT: 67 IN | DIASTOLIC BLOOD PRESSURE: 69 MMHG

## 2024-03-29 LAB
ANION GAP SERPL CALC-SCNC: 6 MEQ/L
BUN SERPL-MCNC: 15.8 MG/DL (ref 9.8–20.1)
CALCIUM SERPL-MCNC: 8.1 MG/DL (ref 8.4–10.2)
CHLORIDE SERPL-SCNC: 105 MMOL/L (ref 98–107)
CO2 SERPL-SCNC: 26 MMOL/L (ref 23–31)
CREAT SERPL-MCNC: 0.85 MG/DL (ref 0.55–1.02)
CREAT/UREA NIT SERPL: 19
ERYTHROCYTE [DISTWIDTH] IN BLOOD BY AUTOMATED COUNT: 13.6 % (ref 11.5–17)
GFR SERPLBLD CREATININE-BSD FMLA CKD-EPI: >60 MLS/MIN/1.73/M2
GLUCOSE SERPL-MCNC: 101 MG/DL (ref 82–115)
HCT VFR BLD AUTO: 33.4 % (ref 37–47)
HGB BLD-MCNC: 10.9 G/DL (ref 12–16)
MCH RBC QN AUTO: 30.4 PG (ref 27–31)
MCHC RBC AUTO-ENTMCNC: 32.6 G/DL (ref 33–36)
MCV RBC AUTO: 93.3 FL (ref 80–94)
NRBC BLD AUTO-RTO: 0 %
PLATELET # BLD AUTO: 118 X10(3)/MCL (ref 130–400)
PMV BLD AUTO: 12.7 FL (ref 7.4–10.4)
POTASSIUM SERPL-SCNC: 4.3 MMOL/L (ref 3.5–5.1)
RBC # BLD AUTO: 3.58 X10(6)/MCL (ref 4.2–5.4)
SODIUM SERPL-SCNC: 137 MMOL/L (ref 136–145)
WBC # SPEC AUTO: 8.6 X10(3)/MCL (ref 4.5–11.5)

## 2024-03-29 PROCEDURE — 85027 COMPLETE CBC AUTOMATED: CPT | Performed by: ORTHOPAEDIC SURGERY

## 2024-03-29 PROCEDURE — 25000003 PHARM REV CODE 250: Performed by: ORTHOPAEDIC SURGERY

## 2024-03-29 PROCEDURE — 97110 THERAPEUTIC EXERCISES: CPT | Mod: CQ

## 2024-03-29 PROCEDURE — 99900031 HC PATIENT EDUCATION (STAT)

## 2024-03-29 PROCEDURE — 97116 GAIT TRAINING THERAPY: CPT | Mod: CQ

## 2024-03-29 PROCEDURE — 94761 N-INVAS EAR/PLS OXIMETRY MLT: CPT

## 2024-03-29 PROCEDURE — 94640 AIRWAY INHALATION TREATMENT: CPT | Mod: XB

## 2024-03-29 PROCEDURE — 94799 UNLISTED PULMONARY SVC/PX: CPT | Mod: XB

## 2024-03-29 PROCEDURE — 80048 BASIC METABOLIC PNL TOTAL CA: CPT | Performed by: ORTHOPAEDIC SURGERY

## 2024-03-29 PROCEDURE — G0378 HOSPITAL OBSERVATION PER HR: HCPCS

## 2024-03-29 PROCEDURE — 25000242 PHARM REV CODE 250 ALT 637 W/ HCPCS: Performed by: NURSE PRACTITIONER

## 2024-03-29 PROCEDURE — 25000003 PHARM REV CODE 250: Performed by: NURSE PRACTITIONER

## 2024-03-29 RX ADMIN — IPRATROPIUM BROMIDE AND ALBUTEROL SULFATE 3 ML: 2.5; .5 SOLUTION RESPIRATORY (INHALATION) at 07:03

## 2024-03-29 RX ADMIN — AMLODIPINE BESYLATE 10 MG: 5 TABLET ORAL at 08:03

## 2024-03-29 RX ADMIN — KETOROLAC TROMETHAMINE 10 MG: 10 TABLET, FILM COATED ORAL at 05:03

## 2024-03-29 RX ADMIN — DOCUSATE SODIUM 200 MG: 100 CAPSULE, LIQUID FILLED ORAL at 08:03

## 2024-03-29 RX ADMIN — HYDROCODONE BITARTRATE AND ACETAMINOPHEN 1 TABLET: 7.5; 325 TABLET ORAL at 06:03

## 2024-03-29 RX ADMIN — HYDROCODONE BITARTRATE AND ACETAMINOPHEN 1 TABLET: 7.5; 325 TABLET ORAL at 02:03

## 2024-03-29 RX ADMIN — ASPIRIN 81 MG 81 MG: 81 TABLET ORAL at 08:03

## 2024-03-29 RX ADMIN — LISINOPRIL 10 MG: 10 TABLET ORAL at 08:03

## 2024-03-29 RX ADMIN — FAMOTIDINE 20 MG: 20 TABLET ORAL at 08:03

## 2024-03-29 RX ADMIN — IPRATROPIUM BROMIDE AND ALBUTEROL SULFATE 3 ML: 2.5; .5 SOLUTION RESPIRATORY (INHALATION) at 01:03

## 2024-03-29 RX ADMIN — KETOROLAC TROMETHAMINE 10 MG: 10 TABLET, FILM COATED ORAL at 12:03

## 2024-03-29 RX ADMIN — SENNOSIDES AND DOCUSATE SODIUM 2 TABLET: 50; 8.6 TABLET ORAL at 08:03

## 2024-03-29 RX ADMIN — EZETIMIBE 10 MG: 10 TABLET ORAL at 08:03

## 2024-03-29 NOTE — NURSING
Nurse Note:   Pt left in [facility] with staff member. Coverlets and wound care supplies given.  Pt stable, no distress. Pt  present at time of discharge instructions. Pt advised to call MD office with any questions/concerns.     3/29/2024   09:40 AM      Perception of Care - Joint Replacement Population Total Joint Surgery List: KNEE    Patient able to verbalize one way to treat/prevent SWELLING at home   [x] Yes   [] No   [] Further Education Provided        Attending Nurse:  Regla

## 2024-03-29 NOTE — PLAN OF CARE
Problem: Adult Inpatient Plan of Care  Goal: Plan of Care Review  Outcome: Met  Goal: Patient-Specific Goal (Individualized)  Outcome: Met  Goal: Absence of Hospital-Acquired Illness or Injury  Outcome: Met  Goal: Optimal Comfort and Wellbeing  Outcome: Met  Goal: Readiness for Transition of Care  Outcome: Met     Problem: Infection  Goal: Absence of Infection Signs and Symptoms  Outcome: Met     Problem: Physical Therapy  Goal: Physical Therapy Goal  Description: Pt will improve functional independence by performing:    Bed mobility: SBA - MET  Sit to stand: SBA with rolling walker  Bed to chair: SBA with Stand Step  with rolling walker   Car Transfer: SBA with rolling walker  Ambulation x 150' feet with SBA and rolling walker  1 Step (Curb): Min A  and rolling walker - MET  5 Steps: Min A  and B HR - MET  right knee AROM flexion (in degrees): 90  right knee AROM extension (in degrees): 0 - MET  Independent with total knee HEP    Outcome: Met     Problem: Behavioral Health Comorbidity  Goal: Maintenance of Behavioral Health Symptom Control  Outcome: Met     Problem: Hypertension Comorbidity  Goal: Blood Pressure in Desired Range  Outcome: Met     Problem: Obstructive Sleep Apnea Risk or Actual Comorbidity Management  Goal: Unobstructed Breathing During Sleep  Outcome: Met     Problem: Adjustment to Surgery (Knee Arthroplasty)  Goal: Optimal Coping  Outcome: Met     Problem: Bleeding (Knee Arthroplasty)  Goal: Absence of Bleeding  Outcome: Met     Problem: Bowel Motility Impaired (Knee Arthroplasty)  Goal: Effective Bowel Elimination  Outcome: Met     Problem: Fluid and Electrolyte Imbalance (Knee Arthroplasty)  Goal: Fluid and Electrolyte Balance  Outcome: Met     Problem: Functional Ability Impaired (Knee Arthroplasty)  Goal: Optimal Functional Ability  Outcome: Met     Problem: Infection (Knee Arthroplasty)  Goal: Absence of Infection Signs and Symptoms  Outcome: Met     Problem: Neurovascular Compromise (Knee  Arthroplasty)  Goal: Intact Neurovascular Status  Outcome: Met     Problem: Ongoing Anesthesia Effects (Knee Arthroplasty)  Goal: Anesthesia/Sedation Recovery  Outcome: Met     Problem: Pain (Knee Arthroplasty)  Goal: Acceptable Pain Control  Outcome: Met     Problem: Postoperative Nausea and Vomiting (Knee Arthroplasty)  Goal: Nausea and Vomiting Relief  Outcome: Met     Problem: Postoperative Urinary Retention (Knee Arthroplasty)  Goal: Effective Urinary Elimination  Outcome: Met     Problem: Respiratory Compromise (Knee Arthroplasty)  Goal: Effective Oxygenation and Ventilation  Outcome: Met     Problem: VTE (Venous Thromboembolism)  Goal: VTE (Venous Thromboembolism) Symptom Resolution  Outcome: Met     Problem: Skin Injury Risk Increased  Goal: Skin Health and Integrity  Outcome: Met

## 2024-03-29 NOTE — PT/OT/SLP PROGRESS
Physical Therapy Treatment    Patient Name:  Blanquita Montoya   MRN:  [id number]    Recommendations:     Discharge Recommendations: Low Intensity Therapy  Discharge Equipment Recommendations: walker, rolling  Barriers to discharge: None    Assessment:     Blanquita Montoya is a 68 y.o. female admitted with a medical diagnosis of Primary osteoarthritis of right knee.  She presents with the following impairments/functional limitations: weakness, impaired endurance, impaired functional mobility, gait instability, impaired balance, decreased lower extremity function, pain, decreased ROM, impaired coordination, orthopedic precautions, impaired muscle length .    Rehab Prognosis: Good; patient would benefit from acute skilled PT services to address these deficits and reach maximum level of function.    Recent Surgery: Procedure(s) (LRB):  ROBOTIC ARTHROPLASTY, KNEE, TOTAL (Right) 2 Days Post-Op    Plan:     During this hospitalization, patient to be seen BID to address the identified rehab impairments via gait training, therapeutic activities, therapeutic exercises, neuromuscular re-education and progress toward the following goals:    Plan of Care Expires:  04/02/24    Subjective     Chief Complaint: none   Patient/Family Comments/goals: to go home today   Pain/Comfort:  Pain Rating 1: 1/10  Location - Side 1: Right  Location 1: knee  Pain Addressed 1: Pre-medicate for activity      Objective:     Communicated with nursing  prior to session.  Patient found HOB elevated with peripheral IV, Other (comments) (pt in bed) upon PT entry to room.     General Precautions: Standard, fall  Orthopedic Precautions: RLE weight bearing as tolerated  Braces: N/A  Respiratory Status: Room air     Functional Mobility:  Bed Mobility:     Supine to Sit: independence  Transfers:     Sit to Stand:  modified independence with rolling walker  Bed to Chair: modified independence with  rolling walker  using  Step Transfer  Toilet Transfer:  supervision with  rolling walker  using  Step Transfer and vc for safety with descent to toilet + void   Gait: pt ambulated use of rw rtle wbat 300ft supervision     Treatment & Education:  Pt performed supine HEP per TKA handout in room reviewed and given 2 sets 10reps     (In degrees) AROM PROM   R knee flexion 70 80   R knee extension 0 0   Pt measured in supine position after exercises for improved ROM   Dynamic sitting balance with dressing activity set up while in restroom     Patient left up in chair with call button in reach,   present, and pt abhilash tx well with  present for tx for awareness of assist pt will require for dc home all verbalized concerns for dc addressed educated pt on safe shoes for home environment  ..    GOALS:   Multidisciplinary Problems       Physical Therapy Goals       Not on file              Multidisciplinary Problems (Resolved)          Problem: Physical Therapy    Goal Priority Disciplines Outcome Goal Variances Interventions   Physical Therapy Goal   (Resolved)     PT, PT/OT Met     Description: Pt will improve functional independence by performing:    Bed mobility: SBA - MET  Sit to stand: SBA with rolling walker  Bed to chair: SBA with Stand Step  with rolling walker   Car Transfer: SBA with rolling walker  Ambulation x 150' feet with SBA and rolling walker  1 Step (Curb): Min A  and rolling walker - MET  5 Steps: Min A  and B HR - MET  right knee AROM flexion (in degrees): 90  right knee AROM extension (in degrees): 0 - MET  Independent with total knee HEP                         Time Tracking:     PT Received On: 03/29/24  PT Start Time: 0810     PT Stop Time: 0840  PT Total Time (min): 30 min     Billable Minutes: Gait Training 15min and Therapeutic Exercise 15min    Treatment Type: Treatment  PT/PTA: PTA     Number of PTA visits since last PT visit: 2     03/29/2024

## 2024-03-29 NOTE — PROGRESS NOTES
"No acute events overnight.  Pain controlled.  Resting in bed. Feeling better this AM.    Vital Signs  Temp: 98.7 °F (37.1 °C)  Temp Source: Oral  Pulse: 72  Heart Rate Source: Monitor  Resp: 18  SpO2: (!) 90 %  Pulse Oximetry Type: Intermittent  Oxygen Concentration (%): 80  Device (Oxygen Therapy): room air  BP: 131/69  BP Location: Left arm  BP Method: Automatic  Patient Position: Lying  Arousal Level: arouses to voice  Height and Weight  Height: 5' 7" (170.2 cm)  Weight: 94.3 kg (207 lb 14.3 oz)  Weight Method: Standard Scale  BSA (Calculated - sq m): 2.11 sq meters  BMI (Calculated): 32.6  Weight in (lb) to have BMI = 25: 159.3]    +FHL/EHL  BCR distally  Dressing c/d/i  SILT distally    Recent Lab Results         03/29/24  0439        Anion Gap 6.0       BUN 15.8       BUN/CREAT RATIO 19       Calcium 8.1       Chloride 105       CO2 26       Creatinine 0.85       eGFR >60       Glucose 101       Hematocrit 33.4       Hemoglobin 10.9       MCH 30.4       MCHC 32.6       MCV 93.3       MPV 12.7       nRBC 0.0       Platelet Count 118       Potassium 4.3       RBC 3.58       RDW 13.6       Sodium 137       WBC 8.60               A/P:  Status post TKA  Pain controlled  Overall patient doing well.  Therapy for mobility and ambulation.  ASA for DVT PPx  Home after therapy    "

## 2024-04-01 NOTE — PROGRESS NOTES
I have seen the patient, reviewed the Nurse Practitioner's discharge summary. I have personally interviewed and examined the patient at bedside and: agree with the findings.     Yazan Barragan MD  Orthopedic Surgery

## 2024-04-01 NOTE — DISCHARGE SUMMARY
[facility]  Discharge Summary      Admit Date: 3/27/2024    Discharge Date and Time: 3/29/2024  9:40 AM    Attending Physician: Emily att. providers found     Reason for Admission: primary osteoarthritis right knee    Procedures Performed: Procedure(s) (LRB):  ROBOTIC ARTHROPLASTY, KNEE, TOTAL (Right)    Hospital Course Patient seen in clinic with complaints of right knee pain. Tried and failed all conservative measures including activity modification, anti-inflammatories, and injections. Patient felt as though they have reached a point of disability with regards to right knee pain. Risk, benefits, and alternatives discussed for right total knee arthroplasty. Despite these risks, the patient wished to proceed with surgical intervention.    Patient admitted as an outpatient. Seen preoperatively by anesthesia and cleared for surgery. Patient was then taken to the OR and a right total knee arthroplasty was performed. For full details please see dictated operative note. Patient tolerated the procedure without any issues and was transferred to the floor postoperatively. Physical therapy began working with the patient on postop day 1 and patient was made weightbearing as tolerated to affected extremity. Patient progressed well with physical therapy and eventually cleared all physical therapy guidelines. Patient's pain and vitals remained stable throughout hospitalization. Wound was checked and found to be clean, dry, and intact. At this point the patient was deemed suitable to discharge home.      Goals of Care Treatment Preferences:  Code Status: Full Code      Consults: PT    Significant Diagnostic Studies:   Specimen (24h ago, onward)      None            Final Diagnoses:    Principal Problem: Primary osteoarthritis of right knee   Secondary Diagnoses:   Active Hospital Problems    Diagnosis  POA    *Primary osteoarthritis of right knee [M17.11]  Yes      Resolved Hospital Problems   No  resolved problems to display.       Discharged Condition: good    Disposition: Home or Self Care    Follow Up/Patient Instructions:     Medications:  Reconciled Home Medications:      Medication List        START taking these medications      cefadroxil 500 MG Cap  Commonly known as: DURICEF  Take 1 capsule (500 mg total) by mouth every 12 (twelve) hours. for 7 days     HYDROcodone-acetaminophen 7.5-325 mg per tablet  Commonly known as: NORCO  Take 1 tablet by mouth every 4 (four) hours as needed for Pain.     methocarbamoL 750 MG Tab  Commonly known as: ROBAXIN  Take 1 tablet (750 mg total) by mouth every 6 (six) hours as needed (muscle spasms).     polyethylene glycol 17 gram Pwpk  Commonly known as: GLYCOLAX  Take 17 g by mouth 2 (two) times daily. Constipation PREVENTION for 14 days            CHANGE how you take these medications      aspirin 81 MG EC tablet  Commonly known as: ECOTRIN  Take 1 tablet (81 mg total) by mouth once daily. Increase to twice a day for 6 weeks post-op for blood clot prevention.  What changed: additional instructions            CONTINUE taking these medications      albuterol 90 mcg/actuation inhaler  Commonly known as: VENTOLIN HFA  Inhale 1-2 puffs into the lungs every 6 (six) hours as needed for Wheezing or Shortness of Breath. Rescue     albuterol-ipratropium 2.5 mg-0.5 mg/3 mL nebulizer solution  Commonly known as: DUO-NEB  Take 3 mLs by nebulization every 4 (four) hours as needed for Wheezing or Shortness of Breath.     amLODIPine 10 MG tablet  Commonly known as: NORVASC  Take 1 tablet (10 mg total) by mouth once daily.     CENTRUM SILVER WOMEN ORAL  Take 1 tablet by mouth Daily.     diclofenac sodium 1 % Gel  Commonly known as: VOLTAREN  Apply 2 g topically 3 (three) times daily as needed (pain).     ezetimibe 10 mg tablet  Commonly known as: ZETIA  Take 1 tablet (10 mg total) by mouth once daily.     fluticasone propion-salmeteroL 113-14 mcg/actuation Aepb  Inhale 1 puff into  the lungs 2 (two) times a day.     fluticasone propionate 50 mcg/actuation nasal spray  Commonly known as: FLONASE  2 sprays (100 mcg total) by Each Nostril route once daily.     furosemide 20 MG tablet  Commonly known as: LASIX  Take 1 tablet (20 mg total) by mouth daily as needed (as needed for shortness of breath).     gabapentin 300 MG capsule  Commonly known as: NEURONTIN  Take 1 capsule (300 mg total) by mouth 3 (three) times daily.     levothyroxine 75 MCG tablet  Commonly known as: SYNTHROID  Take 1 tablet (75 mcg total) by mouth before breakfast.     lisinopriL 10 MG tablet  Take 1 tablet (10 mg total) by mouth once daily.     nitroGLYCERIN 0.4 MG SL tablet  Commonly known as: NITROSTAT  Place 1 tablet (0.4 mg total) under the tongue every 5 (five) minutes as needed for Chest pain.     pantoprazole 40 MG tablet  Commonly known as: PROTONIX  Take 1 tablet (40 mg total) by mouth once daily.     pravastatin 10 MG tablet  Commonly known as: PRAVACHOL  Take 1 tablet (10 mg total) by mouth every other day.     vitamin D 1000 units Tab  Commonly known as: VITAMIN D3  Take 1,000 Units by mouth once daily.            STOP taking these medications      acetaminophen 500 MG tablet  Commonly known as: TYLENOL            No discharge procedures on file.   Follow-up Information       Yazan Barragan MD. Go on 4/11/2024.    Specialty: Orthopedic Surgery  Why: Ortho follow up appt on Thursday 4/11/24 @ 8:45am w/ Sandra (Dr Barragan's NP)  Contact information:  [address]  [address]  [place] [facility] [address]  [phone]               [facility] Follow up.    Specialty: Physical Therapy  Contact information:  [address]  [address]  [facility] [address]  [phone]               [employer] [employer] Follow up.    Why: This is the equipment company. Call if you have questions or concerns.  Contact information:  [address] [address]  [phone]

## 2024-04-02 ENCOUNTER — PATIENT OUTREACH (OUTPATIENT)
Dept: ADMINISTRATIVE | Facility: CLINIC | Age: 69
End: 2024-04-02
Payer: MEDICARE

## 2024-04-02 NOTE — PROGRESS NOTES
C3 nurse spoke with Blanquita Montoya for a TCC post hospital discharge follow up call.  The patient does not have a scheduled HOSFU appointment. Message sent to PCP staff for assistance with scheduling visit with patient.  The patient does have a POST OP appointment with Yazan Barragan MD (Orthopedic Surgery) on 4/11/2024; @ 8:45am

## 2024-04-02 NOTE — PROGRESS NOTES
C3 nurse attempted to contact Blanquita Montoya for a TCC post hospital discharge follow up call. No answer. Left voicemail with callback information. The patient does not have a scheduled HOSFU appointment. Message sent to PCP staff for assistance with scheduling visit with patient.  The patient does have a POST OP appointment with Yazan Barragan MD (Orthopedic Surgery) on 4/11/2024; @ 8:45am

## 2024-04-03 LAB — VIEW PATHOLOGY REPORT (RELIAPATH): NORMAL

## 2024-04-11 ENCOUNTER — HOSPITAL ENCOUNTER (OUTPATIENT)
Dept: RADIOLOGY | Facility: CLINIC | Age: 69
Discharge: HOME | End: 2024-04-11
Attending: NURSE PRACTITIONER
Payer: MEDICARE

## 2024-04-11 ENCOUNTER — OFFICE VISIT (OUTPATIENT)
Dept: ORTHOPEDICS | Facility: CLINIC | Age: 69
End: 2024-04-11
Payer: MEDICARE

## 2024-04-11 VITALS
HEART RATE: 64 BPM | HEIGHT: 67 IN | BODY MASS INDEX: 32.49 KG/M2 | WEIGHT: 207 LBS | DIASTOLIC BLOOD PRESSURE: 72 MMHG | SYSTOLIC BLOOD PRESSURE: 127 MMHG

## 2024-04-11 DIAGNOSIS — Z96.651 HISTORY OF TOTAL KNEE REPLACEMENT, RIGHT: ICD-10-CM

## 2024-04-11 DIAGNOSIS — Z96.651 HISTORY OF TOTAL KNEE REPLACEMENT, RIGHT: Primary | ICD-10-CM

## 2024-04-11 PROCEDURE — 4010F ACE/ARB THERAPY RXD/TAKEN: CPT | Mod: CPTII,,, | Performed by: ORTHOPAEDIC SURGERY

## 2024-04-11 PROCEDURE — 3044F HG A1C LEVEL LT 7.0%: CPT | Mod: CPTII,,, | Performed by: ORTHOPAEDIC SURGERY

## 2024-04-11 PROCEDURE — 1125F AMNT PAIN NOTED PAIN PRSNT: CPT | Mod: CPTII,,, | Performed by: ORTHOPAEDIC SURGERY

## 2024-04-11 PROCEDURE — 3078F DIAST BP <80 MM HG: CPT | Mod: CPTII,,, | Performed by: ORTHOPAEDIC SURGERY

## 2024-04-11 PROCEDURE — 73562 X-RAY EXAM OF KNEE 3: CPT | Mod: RT,,, | Performed by: NURSE PRACTITIONER

## 2024-04-11 PROCEDURE — 1159F MED LIST DOCD IN RCRD: CPT | Mod: CPTII,,, | Performed by: ORTHOPAEDIC SURGERY

## 2024-04-11 PROCEDURE — 3074F SYST BP LT 130 MM HG: CPT | Mod: CPTII,,, | Performed by: ORTHOPAEDIC SURGERY

## 2024-04-11 PROCEDURE — 99024 POSTOP FOLLOW-UP VISIT: CPT | Mod: ,,, | Performed by: ORTHOPAEDIC SURGERY

## 2024-04-11 PROCEDURE — 1101F PT FALLS ASSESS-DOCD LE1/YR: CPT | Mod: CPTII,,, | Performed by: ORTHOPAEDIC SURGERY

## 2024-04-11 PROCEDURE — 3288F FALL RISK ASSESSMENT DOCD: CPT | Mod: CPTII,,, | Performed by: ORTHOPAEDIC SURGERY

## 2024-04-11 RX ORDER — KETOROLAC TROMETHAMINE 10 MG/1
10 TABLET, FILM COATED ORAL EVERY 6 HOURS
Qty: 20 TABLET | Refills: 0 | Status: SHIPPED | OUTPATIENT
Start: 2024-04-11 | End: 2024-04-16

## 2024-04-11 NOTE — PROGRESS NOTES
Chief Complaint:   Chief Complaint   Patient presents with    Right Knee - Post-op Evaluation     Pt states she is experiencing significant throbbing/aching in her knee. Pt states she is attending PT 2x a weeK. Pt rates her pain level 9/10 today.        History of present illness:  68-year-old female presents today for evaluation follow-up of total knee arthroplasty.  Patient is overall doing well though she is having some pain with the right knee.    Past Medical History:   Diagnosis Date    Anxiety disorder     Arthritis     Coronary artery disease     Current use of long term anticoagulation 09/16/2022    GERD (gastroesophageal reflux disease)     History of CVA (cerebrovascular accident) without residual deficits 09/16/2022    History of deep venous thrombosis (DVT) of distal vein of right lower extremity 09/16/2022    History of pulmonary embolism 09/16/2022    Hypercholesterolemia     Hypertension     Hypothyroidism     Non-healing open wound of right groin 09/16/2022    Obesity     Right groin wound 09/20/2022    S/P ascending aortic aneurysm repair 09/16/2022    Seizure in childhood     last one age 11    Sleep apnea     does not currently use CPAP    Thoracic aortic aneurysm 03/10/2022    4.5X4.4 Ascending Aorta as of 3/10/2022    Thyroid disease        Past Surgical History:   Procedure Laterality Date    ANGIOGRAM, CORONARY, WITH LEFT HEART CATHETERIZATION N/A 07/11/2022    Procedure: Angiogram, Coronary, with Left Heart Cath;  Surgeon: Harry Jj MD;  Location: [facility] CATH LAB;  Service: Cardiology;  Laterality: N/A;    ARTHROSCOPY OF KNEE      COLONOSCOPY  10/14/2021    Dr. Marquis Fitch    HYSTERECTOMY      REPAIR OF ASCENDING AORTA N/A 07/29/2022    Procedure: REPAIR, AORTA, ASCENDING;  Surgeon: Alec Vega IV, MD;  Location: [facility];  Service: Cardiovascular;  Laterality: N/A;  ECHO NOTIFIED    ROBOTIC ARTHROPLASTY, KNEE Right 3/27/2024    Procedure: ROBOTIC ARTHROPLASTY, KNEE, TOTAL;   Surgeon: Yazan Barragan MD;  Location: [facility];  Service: Orthopedics;  Laterality: Right;       Current Outpatient Medications   Medication Sig    albuterol (VENTOLIN HFA) 90 mcg/actuation inhaler Inhale 1-2 puffs into the lungs every 6 (six) hours as needed for Wheezing or Shortness of Breath. Rescue    albuterol-ipratropium (DUO-NEB) 2.5 mg-0.5 mg/3 mL nebulizer solution Take 3 mLs by nebulization every 4 (four) hours as needed for Wheezing or Shortness of Breath.    amLODIPine (NORVASC) 10 MG tablet Take 1 tablet (10 mg total) by mouth once daily.    aspirin (ECOTRIN) 81 MG EC tablet Take 1 tablet (81 mg total) by mouth once daily. Increase to twice a day for 6 weeks post-op for blood clot prevention.    diclofenac sodium (VOLTAREN) 1 % Gel Apply 2 g topically 3 (three) times daily as needed (pain).    ezetimibe (ZETIA) 10 mg tablet Take 1 tablet (10 mg total) by mouth once daily.    fluticasone propion-salmeteroL 113-14 mcg/actuation AePB Inhale 1 puff into the lungs 2 (two) times a day. (Patient taking differently: Inhale 1 puff into the lungs 2 (two) times daily as needed.)    fluticasone propionate (FLONASE) 50 mcg/actuation nasal spray 2 sprays (100 mcg total) by Each Nostril route once daily.    furosemide (LASIX) 20 MG tablet Take 1 tablet (20 mg total) by mouth daily as needed (as needed for shortness of breath).    gabapentin (NEURONTIN) 300 MG capsule Take 1 capsule (300 mg total) by mouth 3 (three) times daily.    levothyroxine (SYNTHROID) 75 MCG tablet Take 1 tablet (75 mcg total) by mouth before breakfast.    lisinopriL 10 MG tablet Take 1 tablet (10 mg total) by mouth once daily.    multivit-min/iron/folic/lutein (CENTRUM SILVER WOMEN ORAL) Take 1 tablet by mouth Daily.    nitroGLYCERIN (NITROSTAT) 0.4 MG SL tablet Place 1 tablet (0.4 mg total) under the tongue every 5 (five) minutes as needed for Chest pain.    pantoprazole (PROTONIX) 40 MG tablet Take 1 tablet (40 mg total) by mouth once  daily. (Patient taking differently: Take 40 mg by mouth daily as needed.)    pravastatin (PRAVACHOL) 10 MG tablet Take 1 tablet (10 mg total) by mouth every other day.    vitamin D (VITAMIN D3) 1000 units Tab Take 1,000 Units by mouth once daily.     No current facility-administered medications for this visit.       Review of patient's allergies indicates:   Allergen Reactions    Tramadol Hives     Other reaction(s): sweating    Meperidine Rash     Other reaction(s): unknown       Family History   Problem Relation Age of Onset    Heart disease Mother     Uterine cancer Mother     Lung cancer Father     Seizures Sister     Heart disease Sister        Social History     Socioeconomic History    Marital status:    Tobacco Use    Smoking status: Former     Current packs/day: 0.00     Average packs/day: 2.2 packs/day for 82.5 years (183.0 ttl pk-yrs)     Types: Cigarettes, Cigars     Start date: 1969     Quit date: 2012     Years since quittin.2    Smokeless tobacco: Never    Tobacco comments:     Quit 12 years ago   Substance and Sexual Activity    Alcohol use: Not Currently    Drug use: Never    Sexual activity: Yes     Partners: Female     Social Determinants of Health     Financial Resource Strain: Low Risk  (2022)    Overall Financial Resource Strain (CARDIA)     Difficulty of Paying Living Expenses: Not hard at all   Food Insecurity: No Food Insecurity (2022)    Hunger Vital Sign     Worried About Running Out of Food in the Last Year: Never true     Ran Out of Food in the Last Year: Never true   Transportation Needs: No Transportation Needs (2022)    PRAPARE - Transportation     Lack of Transportation (Medical): No     Lack of Transportation (Non-Medical): No   Physical Activity: Inactive (2022)    Exercise Vital Sign     Days of Exercise per Week: 0 days     Minutes of Exercise per Session: 0 min   Stress: No Stress Concern Present (2022)    [employer] [place] [employer]  [employer]upational Health - Occupational Stress Questionnaire     Feeling of Stress : Not at all   Social Connections: Moderately Isolated (6/28/2022)    Social Connection and Isolation Panel [NHANES]     Frequency of Communication with Friends and Family: More than three times a week     Frequency of Social Gatherings with Friends and Family: More than three times a week     Attends Anabaptist Services: Never     Active Member of Clubs or Organizations: No     Attends Club or Organization Meetings: Never     Marital Status:    Housing Stability: Unknown (6/28/2022)    Housing Stability Vital Sign     Unable to Pay for Housing in the Last Year: No     Unstable Housing in the Last Year: No           Review of Systems:    Constitution: Negative for chills, fever, and sweats.  Negative for unexplained weight loss.    HENT:  Negative for headaches and blurry vision.    Cardiovascular:Negative for chest pain or irregular heart beat. Negative for hypertension.    Respiratory:  Negative for cough and shortness of breath.    Gastrointestinal: Negative for abdominal pain, heartburn, melena, nausea, and vomitting.    Genitourinary:  Negative bladder incontinence and dysuria.    Musculoskeletal:  See HPI    Neurological: Negative for numbness.    Psychiatric/Behavioral: Negative for depression.  The patient is not nervous/anxious.      Endocrine: Negative for polyuria    Hematologic/Lymphatic: Negative for bleeding problem.  Does not bruise/bleed easily.    Skin: Negative for poor would healing and rash      Physical Examination:    Vital Signs:    Vitals:    04/11/24 0928   BP: 127/72   Pulse: 64       Body mass index is 32.42 kg/m².    General: No acute distress, alert and oriented, healthy appearing    HEENT: Head is atraumatic, mucous membranes are moist    Neck: Supples, no JVD    Cardiovascular: Palpable dorsalis pedis and posterior tibial pulses, regular rate and rhythm to those pulses    Lungs: Breathing  non-labored    Skin: no rashes appreciated    Neurologic: Can flex and extend knees, ankles, and toes. Sensation is grossly intact    Right knee:  Patient's incision is clean and dry.  Brisk cap refill disappeared sensation intact distally.  Range of motion 0-90.    X-rays:  Three views of the right knee reviewed patient's implants well fixed no signs of loosening or subsidence noted.     Assessment::  Status post right total knee arthroplasty    Plan:  Patient overall doing fairly well.  We will call her in some Toradol to try and calm down some of the swelling.  See her back in a month repeat x-rays of the right knee.    This note was created using [employer] voice recognition software that occasionally misinterpreted phrases or words.    Consult note is delivered via Epic messaging service.

## 2024-04-12 NOTE — TELEPHONE ENCOUNTER
Patient left a VM 4/11/24 @ 4:30pm asking if her prescription was ready at the [facility] in [place].  Called patient back got VM I had let her know we sent it to her preferred pharmacy which is the [facility] in [place] at 1:07pm on 4/11/24 if they're having issues seeing it or if we need to send it again to call us back and let us know

## 2024-04-18 ENCOUNTER — OFFICE VISIT (OUTPATIENT)
Dept: ORTHOPEDICS | Facility: CLINIC | Age: 69
End: 2024-04-18
Payer: MEDICARE

## 2024-04-18 VITALS
SYSTOLIC BLOOD PRESSURE: 148 MMHG | WEIGHT: 207 LBS | HEIGHT: 67 IN | DIASTOLIC BLOOD PRESSURE: 72 MMHG | HEART RATE: 62 BPM | BODY MASS INDEX: 32.49 KG/M2

## 2024-04-18 DIAGNOSIS — Z96.651 STATUS POST RIGHT KNEE REPLACEMENT: Primary | ICD-10-CM

## 2024-04-18 PROCEDURE — 3044F HG A1C LEVEL LT 7.0%: CPT | Mod: CPTII,,, | Performed by: NURSE PRACTITIONER

## 2024-04-18 PROCEDURE — 4010F ACE/ARB THERAPY RXD/TAKEN: CPT | Mod: CPTII,,, | Performed by: NURSE PRACTITIONER

## 2024-04-18 PROCEDURE — 99024 POSTOP FOLLOW-UP VISIT: CPT | Mod: ,,, | Performed by: NURSE PRACTITIONER

## 2024-04-18 PROCEDURE — 1125F AMNT PAIN NOTED PAIN PRSNT: CPT | Mod: CPTII,,, | Performed by: NURSE PRACTITIONER

## 2024-04-18 PROCEDURE — 3077F SYST BP >= 140 MM HG: CPT | Mod: CPTII,,, | Performed by: NURSE PRACTITIONER

## 2024-04-18 PROCEDURE — 3078F DIAST BP <80 MM HG: CPT | Mod: CPTII,,, | Performed by: NURSE PRACTITIONER

## 2024-04-18 PROCEDURE — 1159F MED LIST DOCD IN RCRD: CPT | Mod: CPTII,,, | Performed by: NURSE PRACTITIONER

## 2024-04-18 RX ORDER — HYDROCODONE BITARTRATE AND ACETAMINOPHEN 7.5; 325 MG/1; MG/1
1 TABLET ORAL EVERY 6 HOURS PRN
Qty: 28 TABLET | Refills: 0 | Status: SHIPPED | OUTPATIENT
Start: 2024-04-18 | End: 2024-04-25

## 2024-04-18 NOTE — PROGRESS NOTES
Chief Complaint:   Chief Complaint   Patient presents with    Right Knee - Post-op Evaluation     3wks Post-Op Rt Knee 3/27/24-GL 6/25/24. Patient states she has throbbing but it bothers her more at night when she try to sleep. She is in PT 2xs a wk. She does a walker with her today and surgery dressing on.       History of present illness: Blanquita Montoya is a 68 y.o. female, presents to clinic today in regards to her right knee.  She is about 3 weeks out from surgery.  Overall she is doing well.  Ambulating with the assistance of a walker.  Currently in physical twice a week for strengthening, range of motion, mobility.  Incision site is well healed and no drainage or bleeding on dressing today here in clinic.  Patient would like a refill of pain medication she is currently taking Tylenol which does not help subside or pain.      Past Medical History:   Diagnosis Date    Anxiety disorder     Arthritis     Coronary artery disease     Current use of long term anticoagulation 09/16/2022    GERD (gastroesophageal reflux disease)     History of CVA (cerebrovascular accident) without residual deficits 09/16/2022    History of deep venous thrombosis (DVT) of distal vein of right lower extremity 09/16/2022    History of pulmonary embolism 09/16/2022    Hypercholesterolemia     Hypertension     Hypothyroidism     Non-healing open wound of right groin 09/16/2022    Obesity     Right groin wound 09/20/2022    S/P ascending aortic aneurysm repair 09/16/2022    Seizure in childhood     last one age 11    Sleep apnea     does not currently use CPAP    Thoracic aortic aneurysm 03/10/2022    4.5X4.4 Ascending Aorta as of 3/10/2022    Thyroid disease        Past Surgical History:   Procedure Laterality Date    ANGIOGRAM, CORONARY, WITH LEFT HEART CATHETERIZATION N/A 07/11/2022    Procedure: Angiogram, Coronary, with Left Heart Cath;  Surgeon: Harry Jj MD;  Location: [facility] CATH LAB;  Service: Cardiology;  Laterality:  N/A;    ARTHROSCOPY OF KNEE      COLONOSCOPY  10/14/2021    Dr. Cho Person    HYSTERECTOMY      REPAIR OF ASCENDING AORTA N/A 07/29/2022    Procedure: REPAIR, AORTA, ASCENDING;  Surgeon: Alec Vega IV, MD;  Location: [facility] OR;  Service: Cardiovascular;  Laterality: N/A;  ECHO NOTIFIED    ROBOTIC ARTHROPLASTY, KNEE Right 3/27/2024    Procedure: ROBOTIC ARTHROPLASTY, KNEE, TOTAL;  Surgeon: Yazan Barragan MD;  Location: [facility] OR;  Service: Orthopedics;  Laterality: Right;       Current Outpatient Medications   Medication Sig Dispense Refill    albuterol (VENTOLIN HFA) 90 mcg/actuation inhaler Inhale 1-2 puffs into the lungs every 6 (six) hours as needed for Wheezing or Shortness of Breath. Rescue 18 g 3    albuterol-ipratropium (DUO-NEB) 2.5 mg-0.5 mg/3 mL nebulizer solution Take 3 mLs by nebulization every 4 (four) hours as needed for Wheezing or Shortness of Breath. 75 mL 6    amLODIPine (NORVASC) 10 MG tablet Take 1 tablet (10 mg total) by mouth once daily. 90 tablet 3    aspirin (ECOTRIN) 81 MG EC tablet Take 1 tablet (81 mg total) by mouth once daily. Increase to twice a day for 6 weeks post-op for blood clot prevention. 90 tablet 3    diclofenac sodium (VOLTAREN) 1 % Gel Apply 2 g topically 3 (three) times daily as needed (pain). 100 g 3    ezetimibe (ZETIA) 10 mg tablet Take 1 tablet (10 mg total) by mouth once daily. 90 tablet 3    fluticasone propion-salmeteroL 113-14 mcg/actuation AePB Inhale 1 puff into the lungs 2 (two) times a day. (Patient taking differently: Inhale 1 puff into the lungs 2 (two) times daily as needed.) 1 each 6    fluticasone propionate (FLONASE) 50 mcg/actuation nasal spray 2 sprays (100 mcg total) by Each Nostril route once daily. 18.2 mL 0    furosemide (LASIX) 20 MG tablet Take 1 tablet (20 mg total) by mouth daily as needed (as needed for shortness of breath). 30 tablet 3    gabapentin (NEURONTIN) 300 MG capsule Take 1 capsule (300 mg total) by mouth 3 (three) times daily.  270 capsule 3    levothyroxine (SYNTHROID) 75 MCG tablet Take 1 tablet (75 mcg total) by mouth before breakfast. 90 tablet 3    lisinopriL 10 MG tablet Take 1 tablet (10 mg total) by mouth once daily. 90 tablet 3    multivit-min/iron/folic/lutein (CENTRUM SILVER WOMEN ORAL) Take 1 tablet by mouth Daily.      nitroGLYCERIN (NITROSTAT) 0.4 MG SL tablet Place 1 tablet (0.4 mg total) under the tongue every 5 (five) minutes as needed for Chest pain. 30 tablet 3    pantoprazole (PROTONIX) 40 MG tablet Take 1 tablet (40 mg total) by mouth once daily. (Patient taking differently: Take 40 mg by mouth daily as needed.) 90 tablet 3    pravastatin (PRAVACHOL) 10 MG tablet Take 1 tablet (10 mg total) by mouth every other day. 45 tablet 3    vitamin D (VITAMIN D3) 1000 units Tab Take 1,000 Units by mouth once daily.      HYDROcodone-acetaminophen (NORCO) 7.5-325 mg per tablet Take 1 tablet by mouth every 6 (six) hours as needed for Pain. 28 tablet 0     No current facility-administered medications for this visit.       Review of patient's allergies indicates:   Allergen Reactions    Tramadol Hives     Other reaction(s): sweating    Meperidine Rash     Other reaction(s): unknown       Family History   Problem Relation Name Age of Onset    Heart disease Mother Beth Campbell     Uterine cancer Mother Beth Campbell     Lung cancer Father      Seizures Sister Kayla villatoro     Heart disease Sister Kayla villatoro        Social History     Socioeconomic History    Marital status:    Tobacco Use    Smoking status: Former     Current packs/day: 0.00     Average packs/day: 2.3 packs/day for 81.0 years (183.0 ttl pk-yrs)     Types: Cigarettes, Cigars     Start date: 1969     Quit date: 2012     Years since quittin.3    Smokeless tobacco: Never    Tobacco comments:     Quit 12 years ago   Substance and Sexual Activity    Alcohol use: Not Currently    Drug use: Never    Sexual activity: Yes     Partners: Female      Social Determinants of Health     Financial Resource Strain: Low Risk  (6/28/2022)    Overall Financial Resource Strain (CARDIA)     Difficulty of Paying Living Expenses: Not hard at all   Food Insecurity: No Food Insecurity (6/28/2022)    Hunger Vital Sign     Worried About Running Out of Food in the Last Year: Never true     Ran Out of Food in the Last Year: Never true   Transportation Needs: No Transportation Needs (6/28/2022)    PRAPARE - Transportation     Lack of Transportation (Medical): No     Lack of Transportation (Non-Medical): No   Physical Activity: Inactive (6/28/2022)    Exercise Vital Sign     Days of Exercise per Week: 0 days     Minutes of Exercise per Session: 0 min   Stress: No Stress Concern Present (6/28/2022)    [employer] [place] [employer] - Occupational Stress Questionnaire     Feeling of Stress : Not at all   Social Connections: Moderately Isolated (6/28/2022)    Social Connection and Isolation Panel [NHANES]     Frequency of Communication with Friends and Family: More than three times a week     Frequency of Social Gatherings with Friends and Family: More than three times a week     Attends Anglican Services: Never     Active Member of Clubs or Organizations: No     Attends Club or Organization Meetings: Never     Marital Status:    Housing Stability: Unknown (6/28/2022)    Housing Stability Vital Sign     Unable to Pay for Housing in the Last Year: No     Unstable Housing in the Last Year: No         Review of Systems:    Denies fevers, chills, chest pain, shortness of breath. Comprehensive review of systems performed and otherwise negative except as noted in HPI      Physical Examination:    General: awake and alert, no acute distress, healthy appearing  Head and Neck: Head atraumatic/normocephalic. Moist MM  CV: brisk cap refill  Lungs: non-labored breathing, w/o cough or SOB  Skin: no rashes present, warm to touch  Neuro: sensation grossly intact distall      Vital Signs:    Vitals:    04/18/24 0800   BP: (!) 148/72   Pulse: 62       Body mass index is 32.42 kg/m².    Examination right knee:    Incision clean dry and intact. No erythema or drainage or signs of infection.  Sensation intact distally to right foot  Positive FHL/EHL/gastrocsoleus/tib ant  Brisk capillary refill to right foot  No swelling or signs of DVT  Range of motion: extension at 5 and flexion at 90  Stable to varus valgus  Stable to anterior and posterior drawer    X-rays: 3 views of the right knee reviewed. Patient's implants appear well fixed. No signs of loosening or subsidence noted.     Assessment::post op status post R TKA    Plan:  Patient presents to clinic today in regards to her right knee.  Her knee is stable on exam x-rays today here in clinic.  She is to continue physical therapy for strengthening, range of motion, mobility.  As she was doing very well with this.  Regards incision site is well healed and staples were discontinued.  Patient was educated she can not shower.  She is to follow up in clinic in 1 month with x-rays to re-evaluate her knee.  Patient states understanding and agrees with plan of care.    This note was created using Sequence voice recognition software that occasionally misinterpreted phrases or words.    Consult note is delivered via Epic messaging service.

## 2024-05-03 ENCOUNTER — HOSPITAL ENCOUNTER (OUTPATIENT)
Dept: RADIOLOGY | Facility: HOSPITAL | Age: 69
Discharge: HOME | End: 2024-05-03
Payer: MEDICARE

## 2024-05-03 ENCOUNTER — HOSPITAL ENCOUNTER (OUTPATIENT)
Dept: CARDIOLOGY | Facility: HOSPITAL | Age: 69
Discharge: HOME | End: 2024-05-03
Payer: MEDICARE

## 2024-05-03 VITALS
BODY MASS INDEX: 32.49 KG/M2 | WEIGHT: 207 LBS | SYSTOLIC BLOOD PRESSURE: 148 MMHG | DIASTOLIC BLOOD PRESSURE: 68 MMHG | HEIGHT: 67 IN

## 2024-05-03 DIAGNOSIS — I70.0 ATHEROSCLEROSIS OF AORTIC BIFURCATION AND COMMON ILIAC ARTERIES: ICD-10-CM

## 2024-05-03 DIAGNOSIS — Z53.20 REFUSAL OF STATIN MEDICATION BY PATIENT: ICD-10-CM

## 2024-05-03 DIAGNOSIS — Z95.1 S/P CABG (CORONARY ARTERY BYPASS GRAFT): ICD-10-CM

## 2024-05-03 DIAGNOSIS — I63.6 CEREBRAL INFARCTION DUE TO CEREBRAL VENOUS THROMBOSIS, NONPYOGENIC: ICD-10-CM

## 2024-05-03 DIAGNOSIS — I70.8 ATHEROSCLEROSIS OF AORTIC BIFURCATION AND COMMON ILIAC ARTERIES: ICD-10-CM

## 2024-05-03 DIAGNOSIS — I25.10 CORONARY ARTERY DISEASE, UNSPECIFIED VESSEL OR LESION TYPE, UNSPECIFIED WHETHER ANGINA PRESENT, UNSPECIFIED WHETHER NATIVE OR TRANSPLANTED HEART: ICD-10-CM

## 2024-05-03 LAB
AV INDEX (PROSTH): 0.65
AV MEAN GRADIENT: 5 MMHG
AV PEAK GRADIENT: 9 MMHG
AV REGURGITATION PRESSURE HALF TIME: 402.01 MS
AV VALVE AREA BY VELOCITY RATIO: 2.7 CM²
AV VALVE AREA: 2.16 CM²
AV VELOCITY RATIO: 0.81
BSA FOR ECHO PROCEDURE: 2.11 M2
CV ECHO LV RWT: 0.5 CM
DOP CALC AO PEAK VEL: 1.54 M/S
DOP CALC AO VTI: 37.1 CM
DOP CALC LVOT AREA: 3.3 CM2
DOP CALC LVOT DIAMETER: 2.06 CM
DOP CALC LVOT PEAK VEL: 1.25 M/S
DOP CALC LVOT STROKE VOLUME: 80.28 CM3
DOP CALC MV VTI: 28.5 CM
DOP CALCLVOT PEAK VEL VTI: 24.1 CM
E WAVE DECELERATION TIME: 305.19 MSEC
E/A RATIO: 2.26
E/E' RATIO: 7.52 M/S
ECHO LV POSTERIOR WALL: 1.12 CM (ref 0.6–1.1)
FRACTIONAL SHORTENING: 30 % (ref 28–44)
HR MV ECHO: 59 BPM
INTERVENTRICULAR SEPTUM: 1.09 CM (ref 0.6–1.1)
LEFT ABI: 1.1
LEFT ARM BP: 164 MMHG
LEFT ATRIUM SIZE: 4 CM
LEFT CCA DIST DIAS: 10 CM/S
LEFT CCA DIST SYS: 52 CM/S
LEFT CCA PROX DIAS: 11 CM/S
LEFT CCA PROX SYS: 65 CM/S
LEFT DORSALIS PEDIS: 178 MMHG
LEFT ECA DIAS: 7 CM/S
LEFT ECA SYS: 78 CM/S
LEFT ICA DIST DIAS: 25 CM/S
LEFT ICA DIST SYS: 85 CM/S
LEFT ICA MID DIAS: 15 CM/S
LEFT ICA MID SYS: 74 CM/S
LEFT ICA PROX DIAS: 10 CM/S
LEFT ICA PROX SYS: 60 CM/S
LEFT INTERNAL DIMENSION IN SYSTOLE: 3.11 CM (ref 2.1–4)
LEFT POSTERIOR TIBIAL: 184 MMHG
LEFT VENTRICLE DIASTOLIC VOLUME INDEX: 43.72 ML/M2
LEFT VENTRICLE DIASTOLIC VOLUME: 89.63 ML
LEFT VENTRICLE MASS INDEX: 84 G/M2
LEFT VENTRICLE SYSTOLIC VOLUME INDEX: 18.6 ML/M2
LEFT VENTRICLE SYSTOLIC VOLUME: 38.18 ML
LEFT VENTRICULAR INTERNAL DIMENSION IN DIASTOLE: 4.44 CM (ref 3.5–6)
LEFT VENTRICULAR MASS: 172.45 G
LEFT VERTEBRAL DIAS: 9 CM/S
LEFT VERTEBRAL SYS: 39 CM/S
LV LATERAL E/E' RATIO: 6.58 M/S
LV SEPTAL E/E' RATIO: 8.78 M/S
LVOT MG: 2.98 MMHG
LVOT MV: 0.81 CM/S
MV MEAN GRADIENT: 1 MMHG
MV PEAK A VEL: 0.35 M/S
MV PEAK E VEL: 0.79 M/S
MV PEAK GRADIENT: 4 MMHG
MV STENOSIS PRESSURE HALF TIME: 88.51 MS
MV VALVE AREA BY CONTINUITY EQUATION: 2.82 CM2
MV VALVE AREA P 1/2 METHOD: 2.49 CM2
OHS CV CAROTID RIGHT ICA EDV HIGHEST: 16
OHS CV CAROTID ULTRASOUND LEFT ICA/CCA RATIO: 1.63
OHS CV CAROTID ULTRASOUND RIGHT ICA/CCA RATIO: 1.44
OHS CV PV CAROTID LEFT HIGHEST CCA: 65
OHS CV PV CAROTID LEFT HIGHEST ICA: 85
OHS CV PV CAROTID RIGHT HIGHEST CCA: 69
OHS CV PV CAROTID RIGHT HIGHEST ICA: 62
OHS CV RV/LV RATIO: 0.74 CM
OHS CV US CAROTID LEFT HIGHEST EDV: 25
OHS LV EJECTION FRACTION SIMPSONS BIPLANE MOD: 58 %
PISA AR MAX VEL: 3.71 M/S
PISA TR MAX VEL: 2.73 M/S
RA PRESSURE ESTIMATED: 3 MMHG
RIGHT ABI: 1.07
RIGHT ARM BP: 167 MMHG
RIGHT CCA DIST DIAS: 6 CM/S
RIGHT CCA DIST SYS: 43 CM/S
RIGHT CCA PROX DIAS: 10 CM/S
RIGHT CCA PROX SYS: 69 CM/S
RIGHT DORSALIS PEDIS: 152 MMHG
RIGHT ECA DIAS: 6 CM/S
RIGHT ECA SYS: 88 CM/S
RIGHT ICA DIST DIAS: 16 CM/S
RIGHT ICA DIST SYS: 56 CM/S
RIGHT ICA MID DIAS: 15 CM/S
RIGHT ICA MID SYS: 62 CM/S
RIGHT ICA PROX DIAS: 11 CM/S
RIGHT ICA PROX SYS: 46 CM/S
RIGHT POSTERIOR TIBIAL: 178 MMHG
RIGHT VENTRICULAR END-DIASTOLIC DIMENSION: 3.29 CM
RIGHT VERTEBRAL DIAS: 8 CM/S
RIGHT VERTEBRAL SYS: 61 CM/S
RV TB RVSP: 6 MMHG
TDI LATERAL: 0.12 M/S
TDI SEPTAL: 0.09 M/S
TDI: 0.11 M/S
TR MAX PG: 30 MMHG
TV REST PULMONARY ARTERY PRESSURE: 33 MMHG
Z-SCORE OF LEFT VENTRICULAR DIMENSION IN END DIASTOLE: -3.26
Z-SCORE OF LEFT VENTRICULAR DIMENSION IN END SYSTOLE: -1.51

## 2024-05-03 PROCEDURE — 93880 EXTRACRANIAL BILAT STUDY: CPT

## 2024-05-03 PROCEDURE — 93922 UPR/L XTREMITY ART 2 LEVELS: CPT

## 2024-05-03 PROCEDURE — 93306 TTE W/DOPPLER COMPLETE: CPT

## 2024-05-14 ENCOUNTER — OFFICE VISIT (OUTPATIENT)
Dept: VASCULAR SURGERY | Facility: CLINIC | Age: 69
End: 2024-05-14
Payer: MEDICARE

## 2024-05-14 VITALS
WEIGHT: 203 LBS | TEMPERATURE: 98 F | HEIGHT: 67 IN | BODY MASS INDEX: 31.86 KG/M2 | SYSTOLIC BLOOD PRESSURE: 133 MMHG | HEART RATE: 64 BPM | OXYGEN SATURATION: 96 % | RESPIRATION RATE: 20 BRPM | DIASTOLIC BLOOD PRESSURE: 72 MMHG

## 2024-05-14 DIAGNOSIS — I77.89 OTHER SPECIFIED DISORDERS OF ARTERIES AND ARTERIOLES: ICD-10-CM

## 2024-05-14 DIAGNOSIS — Z86.79 S/P ASCENDING AORTIC ANEURYSM REPAIR: ICD-10-CM

## 2024-05-14 DIAGNOSIS — I87.2 VENOUS INSUFFICIENCY OF BOTH LOWER EXTREMITIES: ICD-10-CM

## 2024-05-14 DIAGNOSIS — Z86.718 HISTORY OF DVT (DEEP VEIN THROMBOSIS): ICD-10-CM

## 2024-05-14 DIAGNOSIS — Z86.73 HISTORY OF CVA (CEREBROVASCULAR ACCIDENT) WITHOUT RESIDUAL DEFICITS: Primary | ICD-10-CM

## 2024-05-14 DIAGNOSIS — Z98.890 S/P ASCENDING AORTIC ANEURYSM REPAIR: ICD-10-CM

## 2024-05-14 PROCEDURE — 99215 OFFICE O/P EST HI 40 MIN: CPT | Mod: PBBFAC

## 2024-05-14 NOTE — PROGRESS NOTES
Seen by Dr. Sanchez and Dr. Estrada.  Dr. Estrada will have his office contact the patient for the necessary procedure.  RTC 1 month.

## 2024-05-14 NOTE — PROGRESS NOTES
Vascular Surgery  Clinic Note    Reason for Consultation:   Bilateral varicose veins     History of Present Illness:  68 year old female patient with history of bilateral varicosities.   Patient states she has had them for years and have been getting worse. She has been having pain worse in the afternoon accompanied with heaviness. No wounds. She does states some itchiness sometimes. Reflux ultrasounds performed did show reflux of both greater saphenous veins.    She does have a history of a DVT/PE/Stroke in 2022 after repair of ascending aorta with Dr. Vega. She completed anticoagulation treatment and states she is on no blood thinners. Recently had robotic arthroplasty of R knee and has been going to physical therapy. Has  been wearing compression socks intermittently.       Lives in [place] with .  Works at a grocery store.  Denies tobacco, alcohol, or drug use.  Previous 2 PPD smoker, quit about 14 years ago      Allergies:  Review of patient's allergies indicates:   Allergen Reactions    Tramadol Hives     Other reaction(s): sweating    Meperidine Rash     Other reaction(s): unknown       Home Medications:  Current Outpatient Medications on File Prior to Visit   Medication Sig    albuterol (VENTOLIN HFA) 90 mcg/actuation inhaler Inhale 1-2 puffs into the lungs every 6 (six) hours as needed for Wheezing or Shortness of Breath. Rescue    albuterol-ipratropium (DUO-NEB) 2.5 mg-0.5 mg/3 mL nebulizer solution Take 3 mLs by nebulization every 4 (four) hours as needed for Wheezing or Shortness of Breath.    amLODIPine (NORVASC) 10 MG tablet Take 1 tablet (10 mg total) by mouth once daily.    aspirin (ECOTRIN) 81 MG EC tablet Take 1 tablet (81 mg total) by mouth once daily. Increase to twice a day for 6 weeks post-op for blood clot prevention.    diclofenac sodium (VOLTAREN) 1 % Gel Apply 2 g topically 3 (three) times daily as needed (pain).    ezetimibe (ZETIA) 10 mg tablet Take 1 tablet (10 mg total)  by mouth once daily.    fluticasone propion-salmeteroL 113-14 mcg/actuation AePB Inhale 1 puff into the lungs 2 (two) times a day.    furosemide (LASIX) 20 MG tablet Take 1 tablet (20 mg total) by mouth daily as needed (as needed for shortness of breath).    gabapentin (NEURONTIN) 300 MG capsule Take 1 capsule (300 mg total) by mouth 3 (three) times daily.    levothyroxine (SYNTHROID) 75 MCG tablet Take 1 tablet (75 mcg total) by mouth before breakfast.    lisinopriL 10 MG tablet Take 1 tablet (10 mg total) by mouth once daily.    multivit-min/iron/folic/lutein (CENTRUM SILVER WOMEN ORAL) Take 1 tablet by mouth Daily.    nitroGLYCERIN (NITROSTAT) 0.4 MG SL tablet Place 1 tablet (0.4 mg total) under the tongue every 5 (five) minutes as needed for Chest pain.    pantoprazole (PROTONIX) 40 MG tablet Take 1 tablet (40 mg total) by mouth once daily. (Patient taking differently: Take 40 mg by mouth daily as needed.)    pravastatin (PRAVACHOL) 10 MG tablet Take 1 tablet (10 mg total) by mouth every other day.    vitamin D (VITAMIN D3) 1000 units Tab Take 1,000 Units by mouth once daily.    fluticasone propionate (FLONASE) 50 mcg/actuation nasal spray 2 sprays (100 mcg total) by Each Nostril route once daily. (Patient not taking: Reported on 5/14/2024)    [DISCONTINUED] omega-3 fatty acids/fish oil (FISH OIL-OMEGA-3 FATTY ACIDS) 300-1,000 mg capsule Take 1 capsule by mouth once daily. Takes 1000mg daily     No current facility-administered medications on file prior to visit.       Past Medical History:   Diagnosis Date    Anxiety disorder     Arthritis     Coronary artery disease     Current use of long term anticoagulation 09/16/2022    GERD (gastroesophageal reflux disease)     History of CVA (cerebrovascular accident) without residual deficits 09/16/2022    History of deep venous thrombosis (DVT) of distal vein of right lower extremity 09/16/2022    History of pulmonary embolism 09/16/2022    Hypercholesterolemia      Hypertension     Hypothyroidism     Non-healing open wound of right groin 2022    Obesity     Right groin wound 2022    S/P ascending aortic aneurysm repair 2022    Seizure in childhood     last one age 11    Sleep apnea     does not currently use CPAP    Thoracic aortic aneurysm 03/10/2022    4.5X4.4 Ascending Aorta as of 3/10/2022    Thyroid disease      Past Surgical History:   Procedure Laterality Date    ANGIOGRAM, CORONARY, WITH LEFT HEART CATHETERIZATION N/A 2022    Procedure: Angiogram, Coronary, with Left Heart Cath;  Surgeon: Harry Jj MD;  Location: [facility] CATH LAB;  Service: Cardiology;  Laterality: N/A;    ARTHROSCOPY OF KNEE      COLONOSCOPY  10/14/2021    Dr. Marquis Fitch    HYSTERECTOMY      REPAIR OF ASCENDING AORTA N/A 2022    Procedure: REPAIR, AORTA, ASCENDING;  Surgeon: Alec Vega IV, MD;  Location: [facility] OR;  Service: Cardiovascular;  Laterality: N/A;  ECHO NOTIFIED    ROBOTIC ARTHROPLASTY, KNEE Right 3/27/2024    Procedure: ROBOTIC ARTHROPLASTY, KNEE, TOTAL;  Surgeon: Yazan Barragan MD;  Location: [facility] OR;  Service: Orthopedics;  Laterality: Right;     Family History   Problem Relation Name Age of Onset    Heart disease Mother Beth Campbell     Uterine cancer Mother Beth Campbell     Lung cancer Father      Seizures Sister Kayla villatoro     Heart disease Sister Kayla villatoro      Social History     Tobacco Use    Smoking status: Former     Current packs/day: 0.00     Average packs/day: 2.3 packs/day for 81.0 years (183.0 ttl pk-yrs)     Types: Cigarettes, Cigars     Start date: 1969     Quit date: 2012     Years since quittin.3    Smokeless tobacco: Never    Tobacco comments:     Quit 12 years ago   Substance Use Topics    Alcohol use: Not Currently    Drug use: Never        Review of Systems:  Constitutional: no fever or chills  Respiratory: no cough or shortness of breath  Cardiovascular: no chest pain or  palpitations  Gastrointestinal: no nausea or vomiting, no abdominal pain or change in bowel habits  Hematologic/Lymphatic: no easy bruising or lymphadenopathy  Musculoskeletal: no arthralgias or myalgias    OBJECTIVE:     Vital Signs:  Temp: 98.2 °F (36.8 °C) (05/14/24 1338)  Pulse: 64 (05/14/24 1338)  Resp: 20 (05/14/24 1338)  BP: 133/72 (05/14/24 1338)  SpO2: 96 % (05/14/24 1338)    Physical Exam:  No acute distress  Normal respiratory effort on room air  RRR  Abdomen soft, nontender, nondistended  Moving all extremities  Bilateral greater saphenous vein varicosities  Smaller spider varicosities  Palpable +2 femoral and +2 DP pulses bilaterally    Radiologic findings:  CV bilateral carotid ultrasounds  The right internal carotid artery demonstrated less than 50% stenosis.  There is a mild amount of heterogeneous plaque in the right proximal ICA and carotid bulb.     The left internal carotid artery demonstrated less than 50% stenosis.  There is a mild amount of heterogeneous plaque in the left proximal ICA and carotid bulb.     The bilateral vertebral arteries were patent with antegrade flow.    CV Arterial ultrasounds  There are normal multiphasic Doppler waveforms at the right ankle.   The YUE on the right is normal at 1.07.     There are normal multiphasic Doppler waveforms at the left ankle.   The YUE on the left is normal at 1.10.     There is no evidence of significant arterial insufficiency identified on this study.     CV Venous ultrasounds    There is no evidence of a right lower extremity DVT.    The right greater saphenous vein has vemous reflux.    There is no evidence of a left lower extremity DVT.    The left greater saphenous vein has venous reflux.    The left GSV is positive for age indeterminate occlusive superficial vein thrombus at the level of the upper and mid calf.  Additionaly, there is a partially occlusive age indeterminate thrombus in a varicosity at the level of the knee.    There are  varicosities with venous reflux at multiple levels in the lower extremities bilaterally.      ASSESSMENT:     68 year old female presents with symptomatic bilateral greater saphenous vein reflux varicosities    PLAN:     Will schedule for GSV ablations in the office of Dr. Estrada  Return to clinic in one month  She will also need Repeat carotid ultrasounds in one year    Arianna Burnett MD  General Surgery Resident PGY5

## 2024-05-14 NOTE — PROGRESS NOTES
I have reviewed the notes, assessments, and/or procedures performed by the resident, I concur with her/his documentation of Blanquita Montoya.  Date of Service: 5/14/2024    The patient is a candidate for venous ablation of the greater saphenous veins.  Unfortunately do not have that service here at [facility].  We will refer to our private office for treatment and refer her back to [facility] for her follow up.

## 2024-05-21 ENCOUNTER — HOSPITAL ENCOUNTER (OUTPATIENT)
Dept: RADIOLOGY | Facility: CLINIC | Age: 69
Discharge: HOME | End: 2024-05-21
Attending: NURSE PRACTITIONER
Payer: MEDICARE

## 2024-05-21 ENCOUNTER — OFFICE VISIT (OUTPATIENT)
Dept: ORTHOPEDICS | Facility: CLINIC | Age: 69
End: 2024-05-21
Payer: MEDICARE

## 2024-05-21 VITALS
WEIGHT: 200 LBS | BODY MASS INDEX: 32.14 KG/M2 | SYSTOLIC BLOOD PRESSURE: 145 MMHG | HEART RATE: 59 BPM | DIASTOLIC BLOOD PRESSURE: 73 MMHG | HEIGHT: 66 IN

## 2024-05-21 DIAGNOSIS — Z96.651 STATUS POST RIGHT KNEE REPLACEMENT: ICD-10-CM

## 2024-05-21 DIAGNOSIS — Z96.651 STATUS POST RIGHT KNEE REPLACEMENT: Primary | ICD-10-CM

## 2024-05-21 PROCEDURE — 1101F PT FALLS ASSESS-DOCD LE1/YR: CPT | Mod: CPTII,,, | Performed by: ORTHOPAEDIC SURGERY

## 2024-05-21 PROCEDURE — 73562 X-RAY EXAM OF KNEE 3: CPT | Mod: RT,,, | Performed by: NURSE PRACTITIONER

## 2024-05-21 PROCEDURE — 3044F HG A1C LEVEL LT 7.0%: CPT | Mod: CPTII,,, | Performed by: ORTHOPAEDIC SURGERY

## 2024-05-21 PROCEDURE — 1159F MED LIST DOCD IN RCRD: CPT | Mod: CPTII,,, | Performed by: ORTHOPAEDIC SURGERY

## 2024-05-21 PROCEDURE — 3078F DIAST BP <80 MM HG: CPT | Mod: CPTII,,, | Performed by: ORTHOPAEDIC SURGERY

## 2024-05-21 PROCEDURE — 1125F AMNT PAIN NOTED PAIN PRSNT: CPT | Mod: CPTII,,, | Performed by: ORTHOPAEDIC SURGERY

## 2024-05-21 PROCEDURE — 3077F SYST BP >= 140 MM HG: CPT | Mod: CPTII,,, | Performed by: ORTHOPAEDIC SURGERY

## 2024-05-21 PROCEDURE — 4010F ACE/ARB THERAPY RXD/TAKEN: CPT | Mod: CPTII,,, | Performed by: ORTHOPAEDIC SURGERY

## 2024-05-21 PROCEDURE — 3288F FALL RISK ASSESSMENT DOCD: CPT | Mod: CPTII,,, | Performed by: ORTHOPAEDIC SURGERY

## 2024-05-21 PROCEDURE — 99024 POSTOP FOLLOW-UP VISIT: CPT | Mod: ,,, | Performed by: ORTHOPAEDIC SURGERY

## 2024-05-21 RX ORDER — HYDROCODONE BITARTRATE AND ACETAMINOPHEN 7.5; 325 MG/1; MG/1
1 TABLET ORAL EVERY 6 HOURS PRN
COMMUNITY

## 2024-05-21 RX ORDER — METHOCARBAMOL 750 MG/1
500 TABLET, FILM COATED ORAL 4 TIMES DAILY
COMMUNITY

## 2024-05-21 NOTE — PROGRESS NOTES
"  Chief Complaint:   Chief Complaint   Patient presents with    Right Knee - Follow-up     Pt states she has been experiencing constant throbbing pain, a warmth sensation around the incision, radiating pain and swelling in the ankle and a "cyst" in the back of her knee that unable her to bend the knee backward.       History of present illness:    History of Present Illness  The patient presents for evaluation of knee pain.    The patient is experiencing significant throbbing pain in his knee, which radiates down the lateral aspect of his leg, extending to his ankle by the afternoon. It has been two months post-surgery. He demonstrated a flexion of 110 degrees at the physical therapist's session. Despite not wrapping his knee, his mobility is compromised due to the posterior aspect of his knee. His pain management regimen includes ibuprofen, which provides some relief, but he is unable to take excessive amounts due to concurrent hypertension, hypercholesterolemia, and thyroid issues. He still has some remaining pain medication, which he takes only at night, particularly after extensive walking during the day. He has been applying ice to his knee, primarily at bedtime. He is scheduled for an ablation procedure.    Past Medical History:   Diagnosis Date    Anxiety disorder     Arthritis     Coronary artery disease     Current use of long term anticoagulation 09/16/2022    GERD (gastroesophageal reflux disease)     History of CVA (cerebrovascular accident) without residual deficits 09/16/2022    History of deep venous thrombosis (DVT) of distal vein of right lower extremity 09/16/2022    History of pulmonary embolism 09/16/2022    Hypercholesterolemia     Hypertension     Hypothyroidism     Non-healing open wound of right groin 09/16/2022    Obesity     Right groin wound 09/20/2022    S/P ascending aortic aneurysm repair 09/16/2022    Seizure in childhood     last one age 11    Sleep apnea     does not currently use " CPAP    Thoracic aortic aneurysm 03/10/2022    4.5X4.4 Ascending Aorta as of 3/10/2022    Thyroid disease        Past Surgical History:   Procedure Laterality Date    ANGIOGRAM, CORONARY, WITH LEFT HEART CATHETERIZATION N/A 07/11/2022    Procedure: Angiogram, Coronary, with Left Heart Cath;  Surgeon: Harry Jj MD;  Location: [facility] CATH LAB;  Service: Cardiology;  Laterality: N/A;    ARTHROSCOPY OF KNEE      COLONOSCOPY  10/14/2021    Dr. Cho Person    HYSTERECTOMY      REPAIR OF ASCENDING AORTA N/A 07/29/2022    Procedure: REPAIR, AORTA, ASCENDING;  Surgeon: Alec Vega IV, MD;  Location: [facility] OR;  Service: Cardiovascular;  Laterality: N/A;  ECHO NOTIFIED    ROBOTIC ARTHROPLASTY, KNEE Right 3/27/2024    Procedure: ROBOTIC ARTHROPLASTY, KNEE, TOTAL;  Surgeon: Yazan Barragna MD;  Location: [facility] OR;  Service: Orthopedics;  Laterality: Right;       Current Outpatient Medications   Medication Sig    albuterol (VENTOLIN HFA) 90 mcg/actuation inhaler Inhale 1-2 puffs into the lungs every 6 (six) hours as needed for Wheezing or Shortness of Breath. Rescue    albuterol-ipratropium (DUO-NEB) 2.5 mg-0.5 mg/3 mL nebulizer solution Take 3 mLs by nebulization every 4 (four) hours as needed for Wheezing or Shortness of Breath.    amLODIPine (NORVASC) 10 MG tablet Take 1 tablet (10 mg total) by mouth once daily.    aspirin (ECOTRIN) 81 MG EC tablet Take 1 tablet (81 mg total) by mouth once daily. Increase to twice a day for 6 weeks post-op for blood clot prevention.    diclofenac sodium (VOLTAREN) 1 % Gel Apply 2 g topically 3 (three) times daily as needed (pain).    ezetimibe (ZETIA) 10 mg tablet Take 1 tablet (10 mg total) by mouth once daily.    fluticasone propion-salmeteroL 113-14 mcg/actuation AePB Inhale 1 puff into the lungs 2 (two) times a day.    furosemide (LASIX) 20 MG tablet Take 1 tablet (20 mg total) by mouth daily as needed (as needed for shortness of breath).    gabapentin (NEURONTIN) 300 MG  capsule Take 1 capsule (300 mg total) by mouth 3 (three) times daily.    HYDROcodone-acetaminophen (NORCO) 7.5-325 mg per tablet Take 1 tablet by mouth every 6 (six) hours as needed for Pain.    levothyroxine (SYNTHROID) 75 MCG tablet Take 1 tablet (75 mcg total) by mouth before breakfast.    lisinopriL 10 MG tablet Take 1 tablet (10 mg total) by mouth once daily.    methocarbamoL (ROBAXIN) 750 MG Tab Take 500 mg by mouth 4 (four) times daily.    multivit-min/iron/folic/lutein (CENTRUM SILVER WOMEN ORAL) Take 1 tablet by mouth Daily.    nitroGLYCERIN (NITROSTAT) 0.4 MG SL tablet Place 1 tablet (0.4 mg total) under the tongue every 5 (five) minutes as needed for Chest pain.    pantoprazole (PROTONIX) 40 MG tablet Take 1 tablet (40 mg total) by mouth once daily. (Patient taking differently: Take 40 mg by mouth daily as needed.)    pravastatin (PRAVACHOL) 10 MG tablet Take 1 tablet (10 mg total) by mouth every other day.    vitamin D (VITAMIN D3) 1000 units Tab Take 1,000 Units by mouth once daily.    fluticasone propionate (FLONASE) 50 mcg/actuation nasal spray 2 sprays (100 mcg total) by Each Nostril route once daily. (Patient not taking: Reported on 5/14/2024)     No current facility-administered medications for this visit.       Review of patient's allergies indicates:   Allergen Reactions    Tramadol Hives     Other reaction(s): sweating    Meperidine Rash     Other reaction(s): unknown       Family History   Problem Relation Name Age of Onset    Heart disease Mother Beth Campbell     Uterine cancer Mother Beth Campbell     Lung cancer Father      Seizures Sister Kayla villatoro     Heart disease Sister Kayla villatoro        Social History     Socioeconomic History    Marital status:    Tobacco Use    Smoking status: Former     Current packs/day: 0.00     Average packs/day: 2.3 packs/day for 81.0 years (183.0 ttl pk-yrs)     Types: Cigarettes, Cigars     Start date: 6/23/1969     Quit date: 1/1/2012      Years since quittin.3    Smokeless tobacco: Never    Tobacco comments:     Quit 12 years ago   Substance and Sexual Activity    Alcohol use: Not Currently    Drug use: Never    Sexual activity: Yes     Partners: Female     Social Determinants of Health     Financial Resource Strain: Low Risk  (2022)    Overall Financial Resource Strain (CARDIA)     Difficulty of Paying Living Expenses: Not hard at all   Food Insecurity: No Food Insecurity (2022)    Hunger Vital Sign     Worried About Running Out of Food in the Last Year: Never true     Ran Out of Food in the Last Year: Never true   Transportation Needs: No Transportation Needs (2022)    PRAPARE - Transportation     Lack of Transportation (Medical): No     Lack of Transportation (Non-Medical): No   Physical Activity: Inactive (2022)    Exercise Vital Sign     Days of Exercise per Week: 0 days     Minutes of Exercise per Session: 0 min   Stress: No Stress Concern Present (2022)    [employer] - Occupational Stress Questionnaire     Feeling of Stress : Not at all   Housing Stability: Unknown (2022)    Housing Stability Vital Sign     Unable to Pay for Housing in the Last Year: No     Unstable Housing in the Last Year: No           Review of Systems:    Constitution: Negative for chills, fever, and sweats.  Negative for unexplained weight loss.    HENT:  Negative for headaches and blurry vision.    Cardiovascular:Negative for chest pain or irregular heart beat. Negative for hypertension.    Respiratory:  Negative for cough and shortness of breath.    Gastrointestinal: Negative for abdominal pain, heartburn, melena, nausea, and vomitting.    Genitourinary:  Negative bladder incontinence and dysuria.    Musculoskeletal:  See HPI    Neurological: Negative for numbness.    Psychiatric/Behavioral: Negative for depression.  The patient is not nervous/anxious.      Endocrine: Negative for  polyuria    Hematologic/Lymphatic: Negative for bleeding problem.  Does not bruise/bleed easily.    Skin: Negative for poor would healing and rash      Physical Examination:    Vital Signs:    Vitals:    05/21/24 0854   BP: (!) 145/73   Pulse: (!) 59       Body mass index is 32.28 kg/m².    General: No acute distress, alert and oriented, healthy appearing    HEENT: Head is atraumatic, mucous membranes are moist    Neck: Supples, no JVD    Cardiovascular: Palpable dorsalis pedis and posterior tibial pulses, regular rate and rhythm to those pulses    Lungs: Breathing non-labored    Skin: no rashes appreciated    Neurologic: Can flex and extend knees, ankles, and toes. Sensation is grossly intact    Right knee:  Incision clean dry intact.  Brisk cap refill disappeared sensation intact distally range of motion of the knee from 0-110.  She has a palpable Baker cyst posteriorly    X-rays:  Three views of the right knee reviewed.  Patient's implants appear well fixed.  No signs of loosening or subsidence noted     Assessment::  Status post right total knee arthroplasty    Plan:  Patient overall is doing fairly well.  Range of motion was adequate.  I think she was going to calm down with some compression as well as some anti-inflammatories as needed.  Plan to see her back in 2 months.  No x-rays when she returns.    This note was generated with the assistance of ambient listening technology. Verbal consent was obtained by the patient and accompanying visitor(s) for the recording of patient appointment to facilitate this note. I attest to having reviewed and edited the generated note for accuracy, though some syntax or spelling errors may persist. Please contact the author of this note for any clarification.      This note was created using [employer] voice recognition software that occasionally misinterpreted phrases or words.    Consult note is delivered via Epic messaging service.

## 2024-06-20 ENCOUNTER — LAB VISIT (OUTPATIENT)
Dept: LAB | Facility: HOSPITAL | Age: 69
End: 2024-06-20
Payer: MEDICARE

## 2024-06-20 DIAGNOSIS — N64.4 BREAST PAIN: ICD-10-CM

## 2024-06-20 DIAGNOSIS — E78.2 MIXED HYPERLIPIDEMIA: ICD-10-CM

## 2024-06-20 LAB
ALBUMIN SERPL-MCNC: 3.8 G/DL (ref 3.4–4.8)
ALBUMIN/GLOB SERPL: 1.1 RATIO (ref 1.1–2)
ALP SERPL-CCNC: 124 UNIT/L (ref 40–150)
ALT SERPL-CCNC: 17 UNIT/L (ref 0–55)
ANION GAP SERPL CALC-SCNC: 5 MEQ/L
AST SERPL-CCNC: 17 UNIT/L (ref 5–34)
BILIRUB SERPL-MCNC: 0.5 MG/DL
BUN SERPL-MCNC: 10.3 MG/DL (ref 9.8–20.1)
CALCIUM SERPL-MCNC: 9.4 MG/DL (ref 8.4–10.2)
CHLORIDE SERPL-SCNC: 104 MMOL/L (ref 98–107)
CHOLEST SERPL-MCNC: 223 MG/DL
CHOLEST/HDLC SERPL: 4 {RATIO} (ref 0–5)
CO2 SERPL-SCNC: 30 MMOL/L (ref 23–31)
CREAT SERPL-MCNC: 0.92 MG/DL (ref 0.55–1.02)
CREAT/UREA NIT SERPL: 11
GFR SERPLBLD CREATININE-BSD FMLA CKD-EPI: >60 ML/MIN/1.73/M2
GLOBULIN SER-MCNC: 3.6 GM/DL (ref 2.4–3.5)
GLUCOSE SERPL-MCNC: 112 MG/DL (ref 82–115)
HDLC SERPL-MCNC: 50 MG/DL (ref 35–60)
LDLC SERPL CALC-MCNC: 147 MG/DL (ref 50–140)
POTASSIUM SERPL-SCNC: 4.3 MMOL/L (ref 3.5–5.1)
PROT SERPL-MCNC: 7.4 GM/DL (ref 5.8–7.6)
SODIUM SERPL-SCNC: 139 MMOL/L (ref 136–145)
TRIGL SERPL-MCNC: 132 MG/DL (ref 37–140)
VLDLC SERPL CALC-MCNC: 26 MG/DL

## 2024-06-20 PROCEDURE — 80061 LIPID PANEL: CPT

## 2024-06-20 PROCEDURE — 80053 COMPREHEN METABOLIC PANEL: CPT

## 2024-06-20 PROCEDURE — 36415 COLL VENOUS BLD VENIPUNCTURE: CPT

## 2024-06-20 PROCEDURE — 86787 VARICELLA-ZOSTER ANTIBODY: CPT

## 2024-06-21 LAB
VZV IGG SER IA-ACNC: 7.9
VZV IGG SER QL IA: POSITIVE

## 2024-07-05 ENCOUNTER — OFFICE VISIT (OUTPATIENT)
Dept: URGENT CARE | Facility: CLINIC | Age: 69
End: 2024-07-05
Payer: MEDICARE

## 2024-07-05 VITALS
TEMPERATURE: 98 F | HEART RATE: 61 BPM | DIASTOLIC BLOOD PRESSURE: 74 MMHG | SYSTOLIC BLOOD PRESSURE: 160 MMHG | RESPIRATION RATE: 18 BRPM | OXYGEN SATURATION: 97 % | HEIGHT: 66 IN | WEIGHT: 199.13 LBS | BODY MASS INDEX: 32 KG/M2

## 2024-07-05 DIAGNOSIS — N89.8 VAGINAL ITCHING: ICD-10-CM

## 2024-07-05 DIAGNOSIS — B37.31 VAGINAL YEAST INFECTION: Primary | ICD-10-CM

## 2024-07-05 DIAGNOSIS — R30.0 DYSURIA: ICD-10-CM

## 2024-07-05 DIAGNOSIS — R31.9 HEMATURIA, UNSPECIFIED TYPE: ICD-10-CM

## 2024-07-05 DIAGNOSIS — K64.4 EXTERNAL HEMORRHOID: ICD-10-CM

## 2024-07-05 LAB
BILIRUB UR QL STRIP: NEGATIVE
CLUE CELLS VAG QL WET PREP: ABNORMAL
GLUCOSE UR QL STRIP: NEGATIVE
KETONES UR QL STRIP: NEGATIVE
LEUKOCYTE ESTERASE UR QL STRIP: NEGATIVE
PH, POC UA: 6
POC BLOOD, URINE: POSITIVE
POC NITRATES, URINE: NEGATIVE
PROT UR QL STRIP: NEGATIVE
SP GR UR STRIP: 1 (ref 1–1.03)
T VAGINALIS VAG QL WET PREP: ABNORMAL
UROBILINOGEN UR STRIP-ACNC: 0.2 (ref 0.1–1.1)
WBC #/AREA VAG WET PREP: ABNORMAL
YEAST SPEC QL WET PREP: ABNORMAL

## 2024-07-05 PROCEDURE — 99215 OFFICE O/P EST HI 40 MIN: CPT | Mod: PBBFAC

## 2024-07-05 PROCEDURE — 87210 SMEAR WET MOUNT SALINE/INK: CPT

## 2024-07-05 RX ORDER — FLUCONAZOLE 150 MG/1
150 TABLET ORAL DAILY
Qty: 2 TABLET | Refills: 0 | Status: SHIPPED | OUTPATIENT
Start: 2024-07-05 | End: 2024-07-07

## 2024-07-05 RX ORDER — HYDROCORTISONE 25 MG/G
CREAM TOPICAL 2 TIMES DAILY
Qty: 28 G | Refills: 0 | Status: SHIPPED | OUTPATIENT
Start: 2024-07-05 | End: 2024-07-12

## 2024-07-05 NOTE — PROGRESS NOTES
"Subjective:       Patient ID: Blanquita Montoya is a 68 y.o. female.    Vitals:  height is 5' 5.98" (1.676 m) and weight is 90.3 kg (199 lb 1.6 oz). Her oral temperature is 98.3 °F (36.8 °C). Her blood pressure is 160/74 (abnormal) and her pulse is 61. Her respiration is 18 and oxygen saturation is 97%.     Chief Complaint: vaginal problems (Pt reports that she's been having two boils towards her bottom, pt reports vaginal itching and dysuria. )    69 y/o white female reports vaginal irritation with itching approximately 1 week, has tried Monistat with some improvement in symptoms.  Reports she is not currently sexually active.    Also reports noted "boils" projection from anus today.          Constitution: Negative for chills and fever.   Gastrointestinal:  Positive for rectal pain. Negative for constipation, bright red blood in stool and rectal bleeding.   Genitourinary:  Positive for dysuria. Negative for hematuria, vaginal discharge, vaginal odor and genital sore.   Allergic/Immunologic: Positive for itching.       Objective:      Physical Exam   Constitutional: She appears well-developed. She is cooperative. She is easily aroused. She does not appear ill. awake  HENT:   Head: Normocephalic and atraumatic.   Eyes: Lids are normal.   Abdominal: Normal appearance.   Genitourinary: Rectum:      External hemorrhoid present.      Vaginal tenderness present.   There is tenderness in the vagina.         Comments: 2 flesh colored masses protruding from anus, no bleeding, minimal fluctuance, +TTP(consistent with external hemmrhoids)     Labia appears red and inflamed, no discharge or odor present. Some erythema note to groin.      Neurological: She is alert and easily aroused.   Skin: Skin is warm, dry and intact. Capillary refill takes less than 2 seconds.   Psychiatric: Her behavior is normal.   Nursing note and vitals reviewed.        Assessment:       1. Vaginal yeast infection    2. External hemorrhoid    3. Dysuria "    4. Hematuria, unspecified type    5. Vaginal itching          Plan:     Blood noted in UA, we will send for culture.  Encouraged patient to remain hydrated.  Rectal findings consistent with external hemorrhoids, promote Sitz bath, and stool management. Wet prep is pending, staff will contact patient with any positive findings.     Vaginal yeast infection    External hemorrhoid    Dysuria  -     POCT Urinalysis, Dipstick, Manual, W/O Scope    Hematuria, unspecified type  -     Urine culture    Vaginal itching  -     Wet Prep, Genital    Other orders  -     hydrocortisone (PROCTO-MED HC) 2.5 % rectal cream; Place rectally 2 (two) times daily. for 7 days  Dispense: 28 g; Refill: 0           Results for orders placed or performed in visit on 07/05/24   POCT Urinalysis, Dipstick, Manual, W/O Scope   Result Value Ref Range    POC Blood, Urine Positive (A) Negative    POC Bilirubin, Urine Negative Negative    POC Urobilinogen, Urine 0.2 0.1 - 1.1    POC Ketones, Urine Negative Negative    POC Protein, Urine Negative Negative    POC Nitrates, Urine Negative Negative    POC Glucose, Urine Negative Negative    pH, UA 6.0     POC Specific Gravity, Urine 1.005 1.003 - 1.029    POC Leukocytes, Urine Negative Negative

## 2024-07-05 NOTE — PROGRESS NOTES
CHIEF COMPLAINT:   No chief complaint on file.                                                 HPI:  Blanquita Montoya 68 y.o. 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  At last office visit patient stated that she felt well from a cardiac standpoint.  Her primary care did order an array of cardiac testing all of which were unremarkable.  See results of echocardiogram, resting ABIs, and carotid ultrasounds below.    Today the patient states that she feels well from a cardiac standpoint.  She states that following her last office visit she had to discontinue 1 of her antihypertensive medications and her Zetia due to leg weakness and feeling dizzy.  She continues to have occasional shortness of breath but states that it was improved with inhalers.  She uses these as needed.  Otherwise she denies any further cardiac complaints such as chest pain, palpitations, PND, orthopnea, lightheadedness, dizziness, syncope, or claudication symptoms.  She does have some peripheral edema in his currently following very closely with vascular surgery.  She will be undergoing a GSV ablation in the upcoming month.  She complains of tortuous veins.  She also had a right knee replacement since her last office visit and is recovering well.  She was doing physical therapy at this time and is working on regaining her strength and mobility.  She reports compliance with all her other medications.  She does not follow a specific diet.  She denies any tobacco or other illicit drug use.                                                                                                                                                                                                                                                                                                    CARDIAC TESTING:  CV US BL Doppler Carotid 5.3.24  The right internal carotid artery demonstrated less than 50% stenosis.  There is a mild amount of heterogeneous plaque in the right proximal ICA and carotid bulb.  The left internal carotid artery demonstrated less than 50% stenosis.  There is a mild amount of heterogeneous plaque in the left proximal ICA and carotid bulb.  The bilateral vertebral arteries were patent with antegrade flow.     CV Resting YUE 5.3.24  There are normal multiphasic Doppler waveforms at the right ankle.   The YUE on the right is normal at 1.07.  There are normal multiphasic Doppler waveforms at the left ankle.   The YUE on the left is normal at 1.10.  There is no evidence of significant arterial insufficiency identified on this study.     Echo 5.2.24    Left Ventricle: The left ventricle is normal in size. Normal wall thickness. Biplane (2D) method of discs ejection fraction is 58%.    Right Ventricle: Normal right ventricular cavity size. Systolic function is normal.    Left Atrium: Left atrium is mildly dilated. LA volume index is 37ml/m2.    Right Atrium: Right atrium is mildly dilated.    Aortic Valve: The aortic valve is a trileaflet valve. There is no stenosis. Aortic valve peak velocity is 1.54 m/s. Mean gradient is 5 mmHg.    Mitral Valve: There is no stenosis. The mean pressure gradient across the mitral valve is 1 mmHg at a heart rate of 59 bpm. There is mild regurgitation.    Tricuspid Valve: There is moderate regurgitation.    Pulmonary Artery: The estimated pulmonary artery systolic pressure is 33 mmHg.    IVC/SVC: Normal venous pressure at 3 mmHg.     CV US LE Vein BL Insufficiency 2.1.24    There is no evidence of a right lower extremity DVT.    The right greater saphenous vein has vemous reflux.    There is no evidence of a left lower extremity DVT.    The left greater saphenous vein has venous reflux.    The left GSV is positive  for age indeterminate occlusive superficial vein thrombus at the level of the upper and mid calf.  Additionaly, there is a partially occlusive age indeterminate thrombus in a varicosity at the level of the knee.    There are varicosities with venous reflux at multiple levels in the lower extremities bilaterally.       Echo 11.15.22  · The left ventricle is normal in size with concentric remodeling and low normal systolic function.  · The estimated ejection fraction is 50%.  · Normal left ventricular diastolic function.  · Normal right ventricular size with normal right ventricular systolic function.  · Mild left atrial enlargement.  · Mild aortic regurgitation.  · Mild mitral regurgitation.  · Mild to moderate tricuspid regurgitation.  · There is no pulmonary hypertension.  · The estimated PA systolic pressure is 31 mmHg.  · Normal central venous pressure (3 mmHg).       Cardiac catheterization 7.11.22    Conclusion  · The pre-procedure left ventricular end diastolic pressure was 10.  · The estimated blood loss was none.  · There was non-obstructive coronary artery disease..    The procedure log was documented by Documenter: Larisa Deng RN and verified by Harry Jj MD.    FINDINGS  Anomalous origin of RCA    RECOMMENDATIONS  Medical management  Risk factor modifications  Activity -- avoid straining with affected limb for one week  Exercise -- as tolerated  Make follow-up appointment with CV surgery for aneurysm repair    Date: 7/11/2022  Time: 11:13 AM       Patient Active Problem List   Diagnosis    Gastroesophageal reflux disease    Hyperlipidemia    Hypertension    Hypothyroidism    Obstructive sleep apnea syndrome    Vitamin D deficiency    Prediabetes    Osteopenia    Osteoarthritis    Insomnia    S/P ascending aortic aneurysm repair    History of CVA (cerebrovascular accident) without residual deficits    SOB (shortness of breath)    Primary osteoarthritis of right knee     Past Surgical History:    Procedure Laterality Date    ANGIOGRAM, CORONARY, WITH LEFT HEART CATHETERIZATION N/A 2022    Procedure: Angiogram, Coronary, with Left Heart Cath;  Surgeon: Harry Jj MD;  Location: [facility] CATH LAB;  Service: Cardiology;  Laterality: N/A;    ARTHROSCOPY OF KNEE      COLONOSCOPY  10/14/2021    Dr. Marquis Fitch    HYSTERECTOMY      REPAIR OF ASCENDING AORTA N/A 2022    Procedure: REPAIR, AORTA, ASCENDING;  Surgeon: Alec Vega IV, MD;  Location: [facility] OR;  Service: Cardiovascular;  Laterality: N/A;  ECHO NOTIFIED    ROBOTIC ARTHROPLASTY, KNEE Right 3/27/2024    Procedure: ROBOTIC ARTHROPLASTY, KNEE, TOTAL;  Surgeon: Yazan Barragan MD;  Location: [facility] OR;  Service: Orthopedics;  Laterality: Right;     Social History     Socioeconomic History    Marital status:    Tobacco Use    Smoking status: Former     Current packs/day: 0.00     Average packs/day: 2.3 packs/day for 81.0 years (183.0 ttl pk-yrs)     Types: Cigarettes, Cigars     Start date: 1969     Quit date: 2012     Years since quittin.5    Smokeless tobacco: Never    Tobacco comments:     Quit 12 years ago   Substance and Sexual Activity    Alcohol use: Not Currently    Drug use: Never    Sexual activity: Yes     Partners: Female     Social Determinants of Health     Financial Resource Strain: Low Risk  (2022)    Overall Financial Resource Strain (CARDIA)     Difficulty of Paying Living Expenses: Not hard at all   Food Insecurity: No Food Insecurity (2022)    Hunger Vital Sign     Worried About Running Out of Food in the Last Year: Never true     Ran Out of Food in the Last Year: Never true   Transportation Needs: No Transportation Needs (2022)    PRAPARE - Transportation     Lack of Transportation (Medical): No     Lack of Transportation (Non-Medical): No   Physical Activity: Inactive (2022)    Exercise Vital Sign     Days of Exercise per Week: 0 days     Minutes of Exercise per Session: 0  min   Stress: No Stress Concern Present (6/28/2022)    [place] [employer] - Occupational Stress Questionnaire     Feeling of Stress : Not at all   Housing Stability: Unknown (6/28/2022)    Housing Stability Vital Sign     Unable to Pay for Housing in the Last Year: No     Unstable Housing in the Last Year: No        Family History   Problem Relation Name Age of Onset    Heart disease Mother Beth Campbell     Uterine cancer Mother Beth Campbell     Lung cancer Father      Seizures Sister Kayla villatoro     Heart disease Sister Kayla villatoro      Review of patient's allergies indicates:   Allergen Reactions    Tramadol Hives     Other reaction(s): sweating    Meperidine Rash     Other reaction(s): unknown         ROS:  Review of Systems   Constitutional:  Positive for malaise/fatigue.   HENT: Negative.     Eyes: Negative.    Respiratory:  Positive for shortness of breath.    Cardiovascular:  Positive for claudication and leg swelling. Negative for chest pain, palpitations, orthopnea and PND.   Gastrointestinal: Negative.    Genitourinary: Negative.    Musculoskeletal:  Positive for joint pain and myalgias.   Skin: Negative.    Neurological:  Positive for weakness. Negative for tingling.   Endo/Heme/Allergies: Negative.    Psychiatric/Behavioral: Negative.                                                                                                                                                                                  Negative except as stated in the history of present illness. See HPI for details.    PHYSICAL EXAM:  There were no vitals taken for this visit.        Physical Exam  HENT:      Head: Normocephalic.      Nose: Nose normal.      Mouth/Throat:      Mouth: Mucous membranes are moist.   Eyes:      Extraocular Movements: Extraocular movements intact.   Neck:      Vascular: No carotid bruit.   Cardiovascular:      Rate and Rhythm: Normal rate and regular rhythm.       Pulses: Normal pulses.      Heart sounds: Normal heart sounds.   Pulmonary:      Effort: Pulmonary effort is normal.   Abdominal:      General: There is no distension.   Musculoskeletal:         General: Normal range of motion.      Cervical back: Normal range of motion.      Right lower leg: Edema (Mild) present.      Left lower leg: Edema (Mild) present.   Skin:     General: Skin is warm.   Neurological:      General: No focal deficit present.      Mental Status: She is alert.   Psychiatric:         Mood and Affect: Mood normal.       Current Outpatient Medications   Medication Instructions    albuterol (VENTOLIN HFA) 90 mcg/actuation inhaler 1-2 puffs, Inhalation, Every 6 hours PRN, Rescue    albuterol-ipratropium (DUO-NEB) 2.5 mg-0.5 mg/3 mL nebulizer solution 3 mLs, Nebulization, Every 4 hours PRN    amLODIPine (NORVASC) 10 mg, Oral, Daily    aspirin (ECOTRIN) 81 mg, Oral, Daily, Increase to twice a day for 6 weeks post-op for blood clot prevention.    diclofenac sodium (VOLTAREN) 2 g, Topical (Top), 3 times daily PRN    ezetimibe (ZETIA) 10 mg, Oral, Daily    fluticasone propion-salmeteroL 113-14 mcg/actuation AePB 1 puff, Inhalation, 2 times daily    fluticasone propionate (FLONASE) 100 mcg, Each Nostril, Daily    furosemide (LASIX) 20 mg, Oral, Daily PRN    gabapentin (NEURONTIN) 300 mg, Oral, 3 times daily    HYDROcodone-acetaminophen (NORCO) 7.5-325 mg per tablet 1 tablet, Oral, Every 6 hours PRN    hydrocortisone (PROCTO-MED HC) 2.5 % rectal cream Rectal, 2 times daily    levothyroxine (SYNTHROID) 75 mcg, Oral, Before breakfast    lisinopriL 10 mg, Oral, Daily    methocarbamoL (ROBAXIN) 500 mg, Oral, 4 times daily    multivit-min/iron/folic/lutein (CENTRUM SILVER WOMEN ORAL) 1 tablet, Oral, Daily    nitroGLYCERIN (NITROSTAT) 0.4 mg, Sublingual, Every 5 min PRN    pantoprazole (PROTONIX) 40 mg, Oral, Daily    pravastatin (PRAVACHOL) 10 mg, Oral, Every other day    vitamin D (VITAMIN D3) 1,000 Units, Oral,  Daily        All medications, laboratory studies, cardiac diagnostic imaging reviewed.     Lab Results   Component Value Date    .00 (H) 06/20/2024    LDL 96.00 04/03/2023    TRIG 132 06/20/2024    TRIG 118 04/03/2023    CREATININE 0.92 06/20/2024    MG 2.10 08/22/2022    K 4.3 06/20/2024        ASSESSMENT/PLAN:  TORRES and SOB  Non Obstructive CAD   - Denies any recent TORRES or SOB- stable from last office visit   - SOB/TORRES resolves with inhaler use   - Workup as above is negative  - Will recommend PFTs to be completed by primary care   - Continue MAPT      Ascending Aortic Aneurysm s/p Repair  - Performed on 7/29/2022  - Asymptomatic- denies any associated complaints   - BP at goal today   - In the future consider outpatient imaging to assess graft repair     HTN  - Blood pressure at goal today   - Continue Norvasc 10 mg daily  - Self discontinued Lisinopril 2/2 dizziness and weakness  - Counseled on low sodium, heart healthy diet and exercise as tolerated     HLD  - Patient reports that she is unable to tolerate statins due to severe skin reaction   - Was trialed on Zetia per PCP and ultimately discontinued due to dizziness   - Patient amenable to starting Leqvio  - Will have patient complete FLP/CMP prior next office visit if able to start Leqvio   - Counseled on importance of low cholesterol, heart healthy diet, and exercise as tolerated     History of CVA  - Asymptomatic- still with some residual muscle tightness/rigidity from a stroke, otherwise denies any further stroke symptoms or recrudescence   - Some right sided weakness  - Continue MAPT and Zetia  - US Carotid with less than 50% stenosis in R ICA and L ICA     Post Op AF  - Asymptomatic, denies tachycardia, palpitations, lightheadedness, dizziness  - Initially placed on Amiodarone but since discontinued.   - Had CROW ligation but still potential risk for cardioembolic CVA.   - CHADSVASc 5  - Xarelto discontinued after 3 months   - Currently not on  anticoagulation  - EKG completed last week revealed sinus bradycardia, no evidence of Afib  - Appeared to be in regular rate with regular rhythm on auscultation today     Post Op DVT  - Pt told she only needed to  be on Xarelto for 3 months   - Is no longer on anticoagulation   - no further DVT    Venous Insufficiency  - Venous reflux noted in the right and left greater saphenous veins on most recent bilateral lower extremity venous insufficiency ultrasounds  - There was also noted to be a age indeterminate occlusive superficial vein thrombus at the level of the upper and mid calf in the left GSV  - Undergoing GSV ablation in upcoming months  - Follow up closely with vascular surgery      Start taking Leqvio  Follow up in cardiology clinic in 3 months or sooner if needed   Follow up with PCP as directed   I will call you regarding Leqvio

## 2024-07-06 ENCOUNTER — TELEPHONE (OUTPATIENT)
Dept: URGENT CARE | Facility: CLINIC | Age: 69
End: 2024-07-06
Payer: MEDICARE

## 2024-07-06 DIAGNOSIS — N39.0 UTI (URINARY TRACT INFECTION), UNCOMPLICATED: Primary | ICD-10-CM

## 2024-07-06 LAB — BACTERIA UR CULT: ABNORMAL

## 2024-07-06 RX ORDER — NITROFURANTOIN 25; 75 MG/1; MG/1
100 CAPSULE ORAL 2 TIMES DAILY
Qty: 10 CAPSULE | Refills: 0 | Status: SHIPPED | OUTPATIENT
Start: 2024-07-06 | End: 2024-07-11

## 2024-07-06 NOTE — TELEPHONE ENCOUNTER
----- Message from GIANA Dowell sent at 7/6/2024 11:59 AM CDT -----  Please inform Blanquita positive urine culture, will placed on Macrobid for 5 days.

## 2024-07-06 NOTE — TELEPHONE ENCOUNTER
Patient contacted, name and  verified. Patient informed of results, medication prescribed and providers recommendations, patient verbalized understanding. All questions answered.

## 2024-07-11 ENCOUNTER — TELEPHONE (OUTPATIENT)
Dept: INTERNAL MEDICINE | Facility: CLINIC | Age: 69
End: 2024-07-11
Payer: MEDICARE

## 2024-07-11 NOTE — TELEPHONE ENCOUNTER
----- Message from Marcia Hubbard sent at 7/11/2024  8:22 AM CDT -----  Regarding: Arturo -- Medications  Caller is:  (x) Patient       Provider:Dr. Morris    Last Visit:01/11/2024    Next Visit:07/25/2024    Reason for Call:  Patient called and states that she went to Urgent Care on 07/05/2024 with Itching and burning.She was put on MACROBID and hydrocortisone (PROCTO-MED HC) 2.5 % rectal cream. Patient states that she has finished all medications and she still has the burning and itching. Wanted to know if something can be sent to the pharmacy until she comes to her appointment on 07/25/2024.            Preferred Phone Number: [phone]      Thanks for all that you do,  Marcia

## 2024-07-18 ENCOUNTER — OFFICE VISIT (OUTPATIENT)
Dept: CARDIOLOGY | Facility: CLINIC | Age: 69
End: 2024-07-18
Payer: MEDICARE

## 2024-07-18 VITALS
RESPIRATION RATE: 18 BRPM | OXYGEN SATURATION: 97 % | HEIGHT: 67 IN | BODY MASS INDEX: 30.38 KG/M2 | HEART RATE: 64 BPM | DIASTOLIC BLOOD PRESSURE: 61 MMHG | TEMPERATURE: 98 F | WEIGHT: 193.56 LBS | SYSTOLIC BLOOD PRESSURE: 124 MMHG

## 2024-07-18 DIAGNOSIS — I10 PRIMARY HYPERTENSION: ICD-10-CM

## 2024-07-18 DIAGNOSIS — Z86.79 S/P ASCENDING AORTIC ANEURYSM REPAIR: Primary | ICD-10-CM

## 2024-07-18 DIAGNOSIS — Z86.73 HISTORY OF CVA (CEREBROVASCULAR ACCIDENT) WITHOUT RESIDUAL DEFICITS: ICD-10-CM

## 2024-07-18 DIAGNOSIS — Z98.890 S/P ASCENDING AORTIC ANEURYSM REPAIR: Primary | ICD-10-CM

## 2024-07-18 DIAGNOSIS — R06.02 SOB (SHORTNESS OF BREATH): ICD-10-CM

## 2024-07-18 DIAGNOSIS — E78.2 MIXED HYPERLIPIDEMIA: ICD-10-CM

## 2024-07-18 PROCEDURE — 3078F DIAST BP <80 MM HG: CPT | Mod: CPTII,,,

## 2024-07-18 PROCEDURE — 3074F SYST BP LT 130 MM HG: CPT | Mod: CPTII,,,

## 2024-07-18 PROCEDURE — 3008F BODY MASS INDEX DOCD: CPT | Mod: CPTII,,,

## 2024-07-18 PROCEDURE — 3288F FALL RISK ASSESSMENT DOCD: CPT | Mod: CPTII,,,

## 2024-07-18 PROCEDURE — 3044F HG A1C LEVEL LT 7.0%: CPT | Mod: CPTII,,,

## 2024-07-18 PROCEDURE — 1101F PT FALLS ASSESS-DOCD LE1/YR: CPT | Mod: CPTII,,,

## 2024-07-18 PROCEDURE — 1160F RVW MEDS BY RX/DR IN RCRD: CPT | Mod: CPTII,,,

## 2024-07-18 PROCEDURE — 4010F ACE/ARB THERAPY RXD/TAKEN: CPT | Mod: CPTII,,,

## 2024-07-18 PROCEDURE — 1126F AMNT PAIN NOTED NONE PRSNT: CPT | Mod: CPTII,,,

## 2024-07-18 PROCEDURE — 1159F MED LIST DOCD IN RCRD: CPT | Mod: CPTII,,,

## 2024-07-18 PROCEDURE — 99214 OFFICE O/P EST MOD 30 MIN: CPT | Mod: S$PBB,,,

## 2024-07-18 PROCEDURE — 99215 OFFICE O/P EST HI 40 MIN: CPT | Mod: PBBFAC

## 2024-07-18 NOTE — PATIENT INSTRUCTIONS
Start taking Leqvio  Follow up in cardiology clinic in 3 months or sooner if needed   Follow up with PCP as directed   I will call you regarding Leqvio

## 2024-07-22 ENCOUNTER — TELEPHONE (OUTPATIENT)
Dept: ORTHOPEDICS | Facility: CLINIC | Age: 69
End: 2024-07-22
Payer: MEDICARE

## 2024-07-24 PROBLEM — Z78.9 STATIN INTOLERANCE: Status: ACTIVE | Noted: 2024-07-24

## 2024-07-25 ENCOUNTER — TELEPHONE (OUTPATIENT)
Dept: INTERNAL MEDICINE | Facility: CLINIC | Age: 69
End: 2024-07-25
Payer: MEDICARE

## 2024-07-25 ENCOUNTER — OFFICE VISIT (OUTPATIENT)
Dept: INTERNAL MEDICINE | Facility: CLINIC | Age: 69
End: 2024-07-25
Payer: MEDICARE

## 2024-07-25 VITALS
DIASTOLIC BLOOD PRESSURE: 72 MMHG | SYSTOLIC BLOOD PRESSURE: 130 MMHG | WEIGHT: 198.38 LBS | OXYGEN SATURATION: 97 % | RESPIRATION RATE: 20 BRPM | TEMPERATURE: 99 F | BODY MASS INDEX: 31.07 KG/M2 | HEART RATE: 65 BPM

## 2024-07-25 DIAGNOSIS — R05.8 ACE-INHIBITOR COUGH: ICD-10-CM

## 2024-07-25 DIAGNOSIS — I70.8 ATHEROSCLEROSIS OF AORTIC BIFURCATION AND COMMON ILIAC ARTERIES: ICD-10-CM

## 2024-07-25 DIAGNOSIS — K63.5 POLYP OF COLON, UNSPECIFIED PART OF COLON, UNSPECIFIED TYPE: ICD-10-CM

## 2024-07-25 DIAGNOSIS — Z86.73 HISTORY OF CVA (CEREBROVASCULAR ACCIDENT) WITHOUT RESIDUAL DEFICITS: ICD-10-CM

## 2024-07-25 DIAGNOSIS — Z98.890 S/P ASCENDING AORTIC ANEURYSM REPAIR: ICD-10-CM

## 2024-07-25 DIAGNOSIS — I73.9 PERIPHERAL VASCULAR DISEASE, UNSPECIFIED: Primary | ICD-10-CM

## 2024-07-25 DIAGNOSIS — Z12.31 ENCOUNTER FOR SCREENING MAMMOGRAM FOR MALIGNANT NEOPLASM OF BREAST: ICD-10-CM

## 2024-07-25 DIAGNOSIS — F17.200 HAS BEEN SMOKING TOBACCO FOR 30 YEARS OR MORE: ICD-10-CM

## 2024-07-25 DIAGNOSIS — I25.10 CORONARY ARTERY DISEASE, UNSPECIFIED VESSEL OR LESION TYPE, UNSPECIFIED WHETHER ANGINA PRESENT, UNSPECIFIED WHETHER NATIVE OR TRANSPLANTED HEART: ICD-10-CM

## 2024-07-25 DIAGNOSIS — I70.0 ATHEROSCLEROSIS OF AORTIC BIFURCATION AND COMMON ILIAC ARTERIES: ICD-10-CM

## 2024-07-25 DIAGNOSIS — E78.5 HYPERLIPIDEMIA, UNSPECIFIED HYPERLIPIDEMIA TYPE: ICD-10-CM

## 2024-07-25 DIAGNOSIS — Z86.79 S/P ASCENDING AORTIC ANEURYSM REPAIR: ICD-10-CM

## 2024-07-25 DIAGNOSIS — T46.4X5A ACE-INHIBITOR COUGH: ICD-10-CM

## 2024-07-25 DIAGNOSIS — Z53.20 REFUSAL OF STATIN MEDICATION BY PATIENT: ICD-10-CM

## 2024-07-25 PROCEDURE — 99215 OFFICE O/P EST HI 40 MIN: CPT | Mod: PBBFAC

## 2024-07-25 NOTE — PROGRESS NOTES
IM Clinic Note    Patient Name: Blanquita Montoya  YOB: 1955   MRN: [id number]  Date: 07/25/2024  Home Address: [facility] [place] [address]      Subjective:     Chief Complaint:   Chief Complaint   Patient presents with    Follow-up     CARDIO D/C'D Zetia, WILL START REPATHA. Stop lisinopril d/t makes her cough all day. Mammogram needed.         HPI:  Blanquita Montoya 67 y.o. female  She had ascending aortic aneurysm s/p repair with post op DVT/PT (previously on Eliquis), post op AF, HTN, HLD, non-obstructive CAD on coronary angiogram, history of CVA without deficits, MIKI, hypothyroidism, Vit D defciency, GERD, Osteopenia, and prediabetes who presents to clinic today for follow up. Patient is doing well s/p RTNK as well as R GSV ablation for venous insufficiency. Per Vascular, patient is to undergo L GSV ablation. Patient still complains of claudication. Please see orders only note dated 3/19/24 for further details. Will be started on Lequivo as she cannot tolerate statin and LDL is above goal. Informed patient of CTA chest non coronary studies from 2022. Lisinopril discontinued per cardiology for ACEI cough.     Care Team   [facility]- Last appointment on 3/18/24. Next appointment 10/25/24.   [facility]necology: Follows for women's health. Last appointment 6/2021, no next scheduled appointment   External Vascular: follows for PVD and suspected PAD     Health Care Maintenance   -Mammogram: ordered today   -DEXA: 9/2023, osteopenia of lumbar vertebrae  -Colon cancer screening: Colonoscopy from 10/2021 with diverticulosis of the sigmoid colon, otherwise normal, repeat 5 years in 2026  -Vaccines:    -Flu vaccine, UTD    -Pneumonia 23 on 7/2019    -Tdap on 3/2017    -Zoster series completed 12/2020    -COVID-19: Vaccinated x2    Available notes and lab results since last visit. Repeat Echo consistent with moderate TR.     Past Medical History:   Diagnosis Date    Anxiety disorder     Arthritis      Coronary artery disease     Current use of long term anticoagulation 09/16/2022    GERD (gastroesophageal reflux disease)     History of CVA (cerebrovascular accident) without residual deficits 09/16/2022    History of deep venous thrombosis (DVT) of distal vein of right lower extremity 09/16/2022    History of pulmonary embolism 09/16/2022    Hypercholesterolemia     Hypertension     Hypothyroidism     Non-healing open wound of right groin 09/16/2022    Obesity     Right groin wound 09/20/2022    S/P ascending aortic aneurysm repair 09/16/2022    Seizure in childhood     last one age 11    Sleep apnea     does not currently use CPAP    Statin intolerance 07/24/2024    Thoracic aortic aneurysm 03/10/2022    4.5X4.4 Ascending Aorta as of 3/10/2022    Thyroid disease         Past Surgical History:   Procedure Laterality Date    ANGIOGRAM, CORONARY, WITH LEFT HEART CATHETERIZATION N/A 07/11/2022    Procedure: Angiogram, Coronary, with Left Heart Cath;  Surgeon: Harry Jj MD;  Location: [facility] CATH LAB;  Service: Cardiology;  Laterality: N/A;    ARTHROSCOPY OF KNEE      COLONOSCOPY  10/14/2021    Dr. Marquis Fitch    HYSTERECTOMY      REPAIR OF ASCENDING AORTA N/A 07/29/2022    Procedure: REPAIR, AORTA, ASCENDING;  Surgeon: Alec Vega IV, MD;  Location: [facility] OR;  Service: Cardiovascular;  Laterality: N/A;  ECHO NOTIFIED    ROBOTIC ARTHROPLASTY, KNEE Right 3/27/2024    Procedure: ROBOTIC ARTHROPLASTY, KNEE, TOTAL;  Surgeon: Yazan Barragan MD;  Location: [facility];  Service: Orthopedics;  Laterality: Right;       Allergy:  Review of patient's allergies indicates:   Allergen Reactions    Tramadol Hives     Other reaction(s): sweating    Meperidine Rash     Other reaction(s): unknown        Current Medications:    Current Outpatient Medications:     albuterol (VENTOLIN HFA) 90 mcg/actuation inhaler, Inhale 1-2 puffs into the lungs every 6 (six) hours as needed for Wheezing or Shortness of Breath. Rescue,  Disp: 18 g, Rfl: 3    albuterol-ipratropium (DUO-NEB) 2.5 mg-0.5 mg/3 mL nebulizer solution, Take 3 mLs by nebulization every 4 (four) hours as needed for Wheezing or Shortness of Breath., Disp: 75 mL, Rfl: 6    amLODIPine (NORVASC) 10 MG tablet, Take 1 tablet (10 mg total) by mouth once daily., Disp: 90 tablet, Rfl: 3    aspirin (ECOTRIN) 81 MG EC tablet, Take 1 tablet (81 mg total) by mouth once daily. Increase to twice a day for 6 weeks post-op for blood clot prevention., Disp: 90 tablet, Rfl: 3    fluticasone propion-salmeteroL 113-14 mcg/actuation AePB, Inhale 1 puff into the lungs 2 (two) times a day., Disp: 1 each, Rfl: 6    furosemide (LASIX) 20 MG tablet, Take 1 tablet (20 mg total) by mouth daily as needed (as needed for shortness of breath)., Disp: 30 tablet, Rfl: 3    levothyroxine (SYNTHROID) 75 MCG tablet, Take 1 tablet (75 mcg total) by mouth before breakfast., Disp: 90 tablet, Rfl: 3    multivit-min/iron/folic/lutein (CENTRUM SILVER WOMEN ORAL), Take 1 tablet by mouth Daily., Disp: , Rfl:     nitroGLYCERIN (NITROSTAT) 0.4 MG SL tablet, Place 1 tablet (0.4 mg total) under the tongue every 5 (five) minutes as needed for Chest pain., Disp: 30 tablet, Rfl: 3    pantoprazole (PROTONIX) 40 MG tablet, Take 1 tablet (40 mg total) by mouth once daily. (Patient taking differently: Take 40 mg by mouth daily as needed.), Disp: 90 tablet, Rfl: 3    vitamin D (VITAMIN D3) 1000 units Tab, Take 1,000 Units by mouth once daily., Disp: , Rfl:     diclofenac sodium (VOLTAREN) 1 % Gel, Apply 2 g topically 3 (three) times daily as needed (pain). (Patient not taking: Reported on 7/25/2024), Disp: 100 g, Rfl: 3     Social History:   reports that she quit smoking about 12 years ago. Her smoking use included cigarettes and cigars. She started smoking about 55 years ago. She has a 183 pack-year smoking history. She has never used smokeless tobacco. She reports that she does not currently use alcohol. She reports that she does  not use drugs.   Smoking 2PPD starting in teenage years and stopped 12 years ago. No current.     Family History   Problem Relation Name Age of Onset    Heart disease Mother Beth Campbell     Uterine cancer Mother Beth Campbell     Lung cancer Father      Seizures Sister Kayla villatoro     Heart disease Sister Kayla villatoro         Immunization History   Administered Date(s) Administered    COVID-19, MRNA, LN-S, PF (MODERNA FULL 0.5 ML DOSE) 03/03/2021, 03/31/2021    Influenza (FLUAD) - Quadrivalent - Adjuvanted - PF *Preferred* (65+) 09/21/2023    Influenza - Quadrivalent 10/24/2019    Influenza - Quadrivalent - High Dose - PF (65 years and older) 12/17/2020, 02/08/2022, 09/22/2022    Influenza - Trivalent - Recombinant - PF 02/14/2018    Pneumococcal Conjugate - 20 Valent 05/29/2023    Pneumococcal Polysaccharide - 23 Valent 07/30/2019    Tdap 03/07/2017    Zoster Recombinant 09/23/2020, 12/17/2020       Review of Systems   Constitutional:  Negative for chills, fever, malaise/fatigue and weight loss.   HENT:  Negative for congestion and sore throat.    Eyes:  Negative for blurred vision and double vision.   Respiratory:  Negative for cough and shortness of breath.    Cardiovascular:  Positive for claudication. Negative for chest pain and palpitations.   Gastrointestinal:  Negative for abdominal pain, constipation, diarrhea, heartburn, nausea and vomiting.   Genitourinary:  Negative for dysuria and urgency.   Musculoskeletal:  Positive for myalgias. Negative for back pain, falls and neck pain.   Neurological:  Negative for dizziness, loss of consciousness and headaches.   Psychiatric/Behavioral:  Negative for depression and suicidal ideas. The patient is not nervous/anxious.        Objective:      Physical Exam  Vitals:    07/25/24 0814   BP: 130/72   BP Location: Right arm   Patient Position: Sitting   BP Method: Large (Automatic)   Pulse: 65   Resp: 20   Temp: 98.8 °F (37.1 °C)   TempSrc: Oral   SpO2: 97%    Weight: 90 kg (198 lb 6.4 oz)          Wt Readings from Last 3 Encounters:   07/25/24 90 kg (198 lb 6.4 oz)   07/18/24 87.8 kg (193 lb 9 oz)   07/05/24 90.3 kg (199 lb 1.6 oz)       Physical Exam  Vitals and nursing note reviewed.   Constitutional:       General: She is not in acute distress.  Eyes:      General:         Right eye: No discharge.   Neck:      Vascular: No carotid bruit.   Cardiovascular:      Rate and Rhythm: Regular rhythm. Bradycardia present.      Heart sounds: No murmur heard.  Pulmonary:      Effort: Pulmonary effort is normal. No respiratory distress.      Breath sounds: No wheezing.   Abdominal:      General: Abdomen is flat. Bowel sounds are normal.      Palpations: Abdomen is soft.   Musculoskeletal:         General: No swelling or tenderness.      Cervical back: Normal range of motion.      Right knee: No swelling, effusion, erythema, bony tenderness or crepitus.      Right lower leg: No edema.      Left lower leg: No edema.      Comments: Bandaged R knee appropriately. No active bleeding or drainage.    Skin:     General: Skin is warm and dry.      Capillary Refill: Capillary refill takes less than 2 seconds.      Findings: No lesion or rash.   Neurological:      Mental Status: She is alert and oriented to person, place, and time.        Body mass index is 31.07 kg/m².      Office Visit on 07/05/2024   Component Date Value Ref Range Status    POC Blood, Urine 07/05/2024 Positive (A)  Negative Final    moderate    POC Bilirubin, Urine 07/05/2024 Negative  Negative Final    POC Urobilinogen, Urine 07/05/2024 0.2  0.1 - 1.1 Final    POC Ketones, Urine 07/05/2024 Negative  Negative Final    POC Protein, Urine 07/05/2024 Negative  Negative Final    POC Nitrates, Urine 07/05/2024 Negative  Negative Final    POC Glucose, Urine 07/05/2024 Negative  Negative Final    pH, UA 07/05/2024 6.0   Final    POC Specific Gravity, Urine 07/05/2024 1.005  1.003 - 1.029 Final    POC Leukocytes, Urine  07/05/2024 Negative  Negative Final    Urine Culture 07/05/2024 10,000 - 25,000 colonies/ml Escherichia coli (A)   Final    WBC, Wet Prep 07/05/2024 None Seen  None Seen Final    Clue Cells, Wet Prep 07/05/2024 None Seen  None Seen Final    Trichomonas, Wet Prep 07/05/2024 None Seen  None Seen Final    Yeast, Wet Prep 07/05/2024 Rare (A)  None Seen Final   Lab Visit on 06/20/2024   Component Date Value Ref Range Status    Varicella-Zoster Ab, IgG, S 06/20/2024 Positive   Final    Results suggest response to immunization or  prior exposure to the virus.     -------------------REFERENCE VALUE--------------------------  Vaccinated: Positive (>=1.1 AI)  Unvaccinated: Negative (<=0.8 AI)    Varicella IgG Antibody Index 06/20/2024 7.9   Final       Test Performed by:  [facility]  [address] [place] [address]  : Aaron Cuevas Ph.D.; CLIA# [id number]    Cholesterol Total 06/20/2024 223 (H)  <=200 mg/dL Final    HDL Cholesterol 06/20/2024 50  35 - 60 mg/dL Final    Triglyceride 06/20/2024 132  37 - 140 mg/dL Final    Cholesterol/HDL Ratio 06/20/2024 4  0 - 5 Final    Very Low Density Lipoprotein 06/20/2024 26   Final    LDL Cholesterol 06/20/2024 147.00 (H)  50.00 - 140.00 mg/dL Final    Sodium 06/20/2024 139  136 - 145 mmol/L Final    Potassium 06/20/2024 4.3  3.5 - 5.1 mmol/L Final    Chloride 06/20/2024 104  98 - 107 mmol/L Final    CO2 06/20/2024 30  23 - 31 mmol/L Final    Glucose 06/20/2024 112  82 - 115 mg/dL Final    Blood Urea Nitrogen 06/20/2024 10.3  9.8 - 20.1 mg/dL Final    Creatinine 06/20/2024 0.92  0.55 - 1.02 mg/dL Final    Calcium 06/20/2024 9.4  8.4 - 10.2 mg/dL Final    Protein Total 06/20/2024 7.4  5.8 - 7.6 gm/dL Final    Albumin 06/20/2024 3.8  3.4 - 4.8 g/dL Final    Globulin 06/20/2024 3.6 (H)  2.4 - 3.5 gm/dL Final    Albumin/Globulin Ratio 06/20/2024 1.1  1.1 - 2.0 ratio Final    Bilirubin Total 06/20/2024 0.5  <=1.5 mg/dL  Final    ALP 06/20/2024 124  40 - 150 unit/L Final    ALT 06/20/2024 17  0 - 55 unit/L Final    AST 06/20/2024 17  5 - 34 unit/L Final    eGFR 06/20/2024 >60  mL/min/1.73/m2 Final    Anion Gap 06/20/2024 5.0  mEq/L Final    BUN/Creatinine Ratio 06/20/2024 11   Final   Hospital Outpatient Visit on 05/03/2024   Component Date Value Ref Range Status    Right CCA prox sys 05/03/2024 69  cm/s Final    Right CCA prox zaragoza 05/03/2024 10  cm/s Final    Right CCA dist sys 05/03/2024 43  cm/s Final    Right CCA dist zaragoza 05/03/2024 6  cm/s Final    Right ICA prox sys 05/03/2024 46  cm/s Final    Right ICA prox zaragoza 05/03/2024 11  cm/s Final    Right ICA mid sys 05/03/2024 62  cm/s Final    Right ICA mid zaragoza 05/03/2024 15  cm/s Final    Right ICA dist sys 05/03/2024 56  cm/s Final    Right ICA dist zaragoza 05/03/2024 16  cm/s Final    Right ECA sys 05/03/2024 88  cm/s Final    Right ECA zaragoza 05/03/2024 6  cm/s Final    Right vertebral sys 05/03/2024 61  cm/s Final    Right vertebral zaragoza 05/03/2024 8  cm/s Final    Right ICA/CCA ratio 05/03/2024 1.44   Final    Right Highest ICA 05/03/2024 62.00   Final    Right Highest EDV 05/03/2024 16.00   Final    Right Highest CCA 05/03/2024 69   Final    Left CCA prox sys 05/03/2024 65  cm/s Final    Left CCA prox zaragoza 05/03/2024 11  cm/s Final    Left CCA dist sys 05/03/2024 52  cm/s Final    Left CCA dist zaragoza 05/03/2024 10  cm/s Final    Left ICA prox sys 05/03/2024 60  cm/s Final    Left ICA prox zaragoza 05/03/2024 10  cm/s Final    Left ICA mid sys 05/03/2024 74  cm/s Final    Left ICA mid zaragoza 05/03/2024 15  cm/s Final    Left ICA dist sys 05/03/2024 85  cm/s Final    Left ICA dist zaragoza 05/03/2024 25  cm/s Final    Left ECA sys 05/03/2024 78  cm/s Final    Left ECA zaragoza 05/03/2024 7  cm/s Final    Left vertebral sys 05/03/2024 39  cm/s Final    Left vertebral zaragoza 05/03/2024 9  cm/s Final    Left ICA/CCA ratio 05/03/2024 1.63   Final    Left Highest ICA 05/03/2024 85.00   Final    LT  Highest EDV 05/03/2024 25.00   Final    Left Highest CCA 05/03/2024 65   Final   Hospital Outpatient Visit on 05/03/2024   Component Date Value Ref Range Status    Right arm BP 05/03/2024 167.00  mmHg Final    Right posterior tibial 05/03/2024 178.00  mmHg Final    Right dorsalis pedis 05/03/2024 152.00  mmHg Final    Right YUE 05/03/2024 1.07   Final    Left arm BP 05/03/2024 164.00  mmHg Final    Left posterior tibial 05/03/2024 184.00  mmHg Final    Left dorsalis pedis 05/03/2024 178.00  mmHg Final    Left YUE 05/03/2024 1.10   Final   Hospital Outpatient Visit on 05/03/2024   Component Date Value Ref Range Status    BSA 05/03/2024 2.11  m2 Final    Louis's Biplane MOD Ejection Fra* 05/03/2024 58  % Final    LVOT stroke volume 05/03/2024 80.28  cm3 Final    LVIDd 05/03/2024 4.44  3.5 - 6.0 cm Final    LV Systolic Volume 05/03/2024 38.18  mL Final    LV Systolic Volume Index 05/03/2024 18.6  mL/m2 Final    LVIDs 05/03/2024 3.11  2.1 - 4.0 cm Final    LV Diastolic Volume 05/03/2024 89.63  mL Final    LV Diastolic Volume Index 05/03/2024 43.72  mL/m2 Final    IVS 05/03/2024 1.09  0.6 - 1.1 cm Final    LVOT diameter 05/03/2024 2.06  cm Final    LVOT area 05/03/2024 3.3  cm2 Final    FS 05/03/2024 30  28 - 44 % Final    Left Ventricle Relative Wall Thick* 05/03/2024 0.50  cm Final    Posterior Wall 05/03/2024 1.12 (A)  0.6 - 1.1 cm Final    LV mass 05/03/2024 172.45  g Final    LV Mass Index 05/03/2024 84  g/m2 Final    MV Peak E Wil 05/03/2024 0.79  m/s Final    TDI LATERAL 05/03/2024 0.12  m/s Final    TDI SEPTAL 05/03/2024 0.09  m/s Final    E/E' ratio 05/03/2024 7.52  m/s Final    MV Peak A Wil 05/03/2024 0.35  m/s Final    TR Max Wil 05/03/2024 2.73  m/s Final    E/A ratio 05/03/2024 2.26   Final    E wave deceleration time 05/03/2024 305.19  msec Final    LV SEPTAL E/E' RATIO 05/03/2024 8.78  m/s Final    LV LATERAL E/E' RATIO 05/03/2024 6.58  m/s Final    LVOT peak wil 05/03/2024 1.25  m/s Final    Left  Ventricular Outflow Tract Maria L* 05/03/2024 0.81  cm/s Final    Left Ventricular Outflow Tract Maria L* 05/03/2024 2.98  mmHg Final    RVDD 05/03/2024 3.29  cm Final    RV/LV Ratio 05/03/2024 0.74  cm Final    LA size 05/03/2024 4.00  cm Final    AV regurgitation pressure 1/2 time 05/03/2024 [id number]  ms Final    AR Max Syeda 05/03/2024 3.71  m/s Final    AV mean gradient 05/03/2024 5  mmHg Final    AV peak gradient 05/03/2024 9  mmHg Final    Ao peak syeda 05/03/2024 1.54  m/s Final    Ao VTI 05/03/2024 37.10  cm Final    LVOT peak VTI 05/03/2024 24.10  cm Final    AV valve area 05/03/2024 2.16  cm² Final    AV Velocity Ratio 05/03/2024 0.81   Final    AV index (prosthetic) 05/03/2024 0.65   Final    LINCOLN by Velocity Ratio 05/03/2024 2.70  cm² Final    MV mean gradient 05/03/2024 1  mmHg Final    MV peak gradient 05/03/2024 4  mmHg Final    MV stenosis pressure 1/2 time 05/03/2024 88.51  ms Final    MV valve area p 1/2 method 05/03/2024 2.49  cm2 Final    MV valve area by continuity eq 05/03/2024 2.82  cm2 Final    MV VTI 05/03/2024 28.5  cm Final    Triscuspid Valve Regurgitation Pea* 05/03/2024 30  mmHg Final    Mean e' 05/03/2024 0.11  m/s Final    ZLVIDS 05/03/2024 -1.51   Final    ZLVIDD 05/03/2024 -3.26   Final    Mitral Valve Heart Rate 05/03/2024 59  bpm Final    TV resting pulmonary artery pressu* 05/03/2024 33  mmHg Final    RV TB RVSP 05/03/2024 6  mmHg Final    Est. RA pres 05/03/2024 3  mmHg Final   Admission on 03/27/2024, Discharged on 03/29/2024   Component Date Value Ref Range Status    View Reliapath Pathology Report 03/27/2024 See Scanned Reliapath Report   Final    POCT Glucose 03/27/2024 112 (H)  70 - 110 mg/dL Final    Hgb 03/27/2024 13.0  12.0 - 16.0 g/dL Final    Hct 03/27/2024 40.1  37.0 - 47.0 % Final    POCT Glucose 03/27/2024 123 (H)  70 - 110 mg/dL Final    Sodium 03/28/2024 137  136 - 145 mmol/L Final    Potassium 03/28/2024 4.5  3.5 - 5.1 mmol/L Final    Chloride 03/28/2024 107  98 -  107 mmol/L Final    CO2 03/28/2024 24  23 - 31 mmol/L Final    Glucose 03/28/2024 170 (H)  82 - 115 mg/dL Final    Blood Urea Nitrogen 03/28/2024 17.6  9.8 - 20.1 mg/dL Final    Creatinine 03/28/2024 0.97  0.55 - 1.02 mg/dL Final    BUN/Creatinine Ratio 03/28/2024 18   Final    Calcium 03/28/2024 7.7 (L)  8.4 - 10.2 mg/dL Final    Anion Gap 03/28/2024 6.0  mEq/L Final    eGFR 03/28/2024 >60  mls/min/1.73/m2 Final    WBC 03/28/2024 6.48  4.50 - 11.50 x10(3)/mcL Final    RBC 03/28/2024 3.65 (L)  4.20 - 5.40 x10(6)/mcL Final    Hgb 03/28/2024 11.1 (L)  12.0 - 16.0 g/dL Final    Hct 03/28/2024 34.7 (L)  37.0 - 47.0 % Final    MCV 03/28/2024 95.1 (H)  80.0 - 94.0 fL Final    MCH 03/28/2024 30.4  27.0 - 31.0 pg Final    MCHC 03/28/2024 32.0 (L)  33.0 - 36.0 g/dL Final    RDW 03/28/2024 13.4  11.5 - 17.0 % Final    Platelet 03/28/2024 123 (L)  130 - 400 x10(3)/mcL Final    MPV 03/28/2024 12.5 (H)  7.4 - 10.4 fL Final    NRBC% 03/28/2024 0.0  % Final    Sodium 03/29/2024 137  136 - 145 mmol/L Final    Potassium 03/29/2024 4.3  3.5 - 5.1 mmol/L Final    Chloride 03/29/2024 105  98 - 107 mmol/L Final    CO2 03/29/2024 26  23 - 31 mmol/L Final    Glucose 03/29/2024 101  82 - 115 mg/dL Final    Blood Urea Nitrogen 03/29/2024 15.8  9.8 - 20.1 mg/dL Final    Creatinine 03/29/2024 0.85  0.55 - 1.02 mg/dL Final    BUN/Creatinine Ratio 03/29/2024 19   Final    Calcium 03/29/2024 8.1 (L)  8.4 - 10.2 mg/dL Final    Anion Gap 03/29/2024 6.0  mEq/L Final    eGFR 03/29/2024 >60  mls/min/1.73/m2 Final    WBC 03/29/2024 8.60  4.50 - 11.50 x10(3)/mcL Final    RBC 03/29/2024 3.58 (L)  4.20 - 5.40 x10(6)/mcL Final    Hgb 03/29/2024 10.9 (L)  12.0 - 16.0 g/dL Final    Hct 03/29/2024 33.4 (L)  37.0 - 47.0 % Final    MCV 03/29/2024 93.3  80.0 - 94.0 fL Final    MCH 03/29/2024 30.4  27.0 - 31.0 pg Final    MCHC 03/29/2024 32.6 (L)  33.0 - 36.0 g/dL Final    RDW 03/29/2024 13.6  11.5 - 17.0 % Final    Platelet 03/29/2024 118 (L)  130 - 400  x10(3)/mcL Final    MPV 03/29/2024 12.7 (H)  7.4 - 10.4 fL Final    NRBC% 03/29/2024 0.0  % Final   Clinical Support on 03/12/2024   Component Date Value Ref Range Status    QRS Duration 03/12/2024 78  ms Final    [facility] QTC Calculation 03/12/2024 403  ms Final   Lab Visit on 03/12/2024   Component Date Value Ref Range Status    Sodium 03/12/2024 141  136 - 145 mmol/L Final    Potassium 03/12/2024 4.6  3.5 - 5.1 mmol/L Final    Chloride 03/12/2024 105  98 - 107 mmol/L Final    CO2 03/12/2024 29  23 - 31 mmol/L Final    Glucose 03/12/2024 110  82 - 115 mg/dL Final    Blood Urea Nitrogen 03/12/2024 10.6  9.8 - 20.1 mg/dL Final    Creatinine 03/12/2024 0.91  0.55 - 1.02 mg/dL Final    Calcium 03/12/2024 9.3  8.4 - 10.2 mg/dL Final    Protein Total 03/12/2024 7.7 (H)  5.8 - 7.6 gm/dL Final    Albumin 03/12/2024 4.0  3.4 - 4.8 g/dL Final    Globulin 03/12/2024 3.7 (H)  2.4 - 3.5 gm/dL Final    Albumin/Globulin Ratio 03/12/2024 1.1  1.1 - 2.0 ratio Final    Bilirubin Total 03/12/2024 0.5  <=1.5 mg/dL Final    ALP 03/12/2024 112  40 - 150 unit/L Final    ALT 03/12/2024 23  0 - 55 unit/L Final    AST 03/12/2024 21  5 - 34 unit/L Final    eGFR 03/12/2024 >60  mls/min/1.73/m2 Final    Color, UA 03/12/2024 Straw  Yellow, Light-Yellow, Dark Yellow, Maribell, Straw Final    Appearance, UA 03/12/2024 Clear  Clear Final    Specific Gravity, UA 03/12/2024 <=1.005  1.005 - 1.030 Final    pH, UA 03/12/2024 6.0  5.0 - 8.5 Final    Protein, UA 03/12/2024 Negative  Negative Final    Glucose, UA 03/12/2024 Negative  Negative, Normal Final    Ketones, UA 03/12/2024 Negative  Negative Final    Blood, UA 03/12/2024 Moderate (A)  Negative Final    Bilirubin, UA 03/12/2024 Negative  Negative Final    Urobilinogen, UA 03/12/2024 0.2  0.2, 1.0, Normal Final    Nitrites, UA 03/12/2024 Negative  Negative Final    Leukocyte Esterase, UA 03/12/2024 Negative  Negative Final    Hemoglobin A1c 03/12/2024 5.5  <=7.0 % Final    Estimated Average Glucose  03/12/2024 111.2  mg/dL Final    WBC 03/12/2024 8.90  4.50 - 11.50 x10(3)/mcL Final    RBC 03/12/2024 5.00  4.20 - 5.40 x10(6)/mcL Final    Hgb 03/12/2024 15.0  12.0 - 16.0 g/dL Final    Hct 03/12/2024 45.6  37.0 - 47.0 % Final    MCV 03/12/2024 91.2  80.0 - 94.0 fL Final    MCH 03/12/2024 30.0  27.0 - 31.0 pg Final    MCHC 03/12/2024 32.9 (L)  33.0 - 36.0 g/dL Final    RDW 03/12/2024 13.3  11.5 - 17.0 % Final    Platelet 03/12/2024 196  130 - 400 x10(3)/mcL Final    MPV 03/12/2024 11.9 (H)  7.4 - 10.4 fL Final    Neut % 03/12/2024 73.1  % Final    Lymph % 03/12/2024 17.1  % Final    Mono % 03/12/2024 5.7  % Final    Eos % 03/12/2024 3.3  % Final    Basophil % 03/12/2024 0.6  % Final    Lymph # 03/12/2024 1.52  0.6 - 4.6 x10(3)/mcL Final    Neut # 03/12/2024 6.51  2.1 - 9.2 x10(3)/mcL Final    Mono # 03/12/2024 0.51  0.1 - 1.3 x10(3)/mcL Final    Eos # 03/12/2024 0.29  0 - 0.9 x10(3)/mcL Final    Baso # 03/12/2024 0.05  <=0.2 x10(3)/mcL Final    IG# 03/12/2024 0.02  0 - 0.04 x10(3)/mcL Final    IG% 03/12/2024 0.2  % Final    NRBC% 03/12/2024 0.0  % Final    Bacteria, UA 03/12/2024 None Seen  None Seen, Rare, Occasional /HPF Final    RBC, UA 03/12/2024 None Seen  None Seen, 0-2, 3-5, 0-5 /HPF Final    WBC, UA 03/12/2024 None Seen  None Seen, 0-2, 3-5, 0-5 /HPF Final    Squamous Epithelial Cells, UA 03/12/2024 None Seen  None Seen, Rare, Occasional, Occ /HPF Final   Office Visit on 03/12/2024   Component Date Value Ref Range Status    MRSA PCR Screen 03/12/2024 Not Detected  Not Detected Final   Hospital Outpatient Visit on 02/01/2024   Component Date Value Ref Range Status    Right GSJ missael 02/01/2024 0.92  cm Final    Right GSJ reflux 02/01/2024 4.12 (A)  ms Final    Right GSMT missael 02/01/2024 0.59  cm Final    Right GSMT reflux 02/01/2024 2.79 (A)  ms Final    Right GSDT missael 02/01/2024 0.65  cm Final    Right GSDT reflux 02/01/2024 3.80 (A)  ms Final    Right GSK missael 02/01/2024 0.58  cm Final    Right GSK reflux  02/01/2024 4.90 (A)  ms Final    Right GSPC missael 02/01/2024 0.54  cm Final    Right GSPC reflux 02/01/2024 2.79 (A)  ms Final    Left GSJ missael 02/01/2024 0.76  cm Final    Left GSMT missael 02/01/2024 0.60  cm Final    Left GSMT reflux 02/01/2024 2.54 (A)  ms Final    Left GSDT missael 02/01/2024 0.32  cm Final    Left GSK missael 02/01/2024 0.23  cm Final    Left GSK reflux 02/01/2024 3.29 (A)  ms Final    Right Small Saphenous SPJ Diameter 02/01/2024 0.40  cm Final    Right Small Saphenous SPJ Reflux T* 02/01/2024 0.00  ms Final    Left GSJ reflux 02/01/2024 0.00  ms Final    Left GSDT reflux 02/01/2024 0.00  ms Final    Left GSPC missael 02/01/2024 0.46  cm Final    Left GSPC reflux 02/01/2024 0.00  ms Final    Left Small Saphenous SPJ Diameter 02/01/2024 0.20  cm Final    Left Small Saphenous SPJ Reflux Ti* 02/01/2024 0.00  ms Final   There may be more visits with results that are not included.           Assessment:   PVD s/p R GSV ablation   Hx of 30+ tobacco user, former   HLD   Atherosclerosis of aortic bifurcation and iliac arteries   S/p Aortic aneurysm repair 2022   CAD s/p CABG x 2   Hx of CVA   Statin Refusal   - Ordered CTA abd pelvis with runoff for further evaluation of aortoiliac atherosclerosis first documented in 2022 on CTA chest non coronary studies   - Suspect recent ABIs may not be accurate due to degree of calcification on prior CT imaging as primary and incidental findings   - Starting Lequivo per [facility]   - Continue routine follow up with [facility]     Hypertension  ACE Inhibitor Cough   HFpEF w EF 50%   - Continue Amlodipine 10mg daily, lasix 20mg daily, ASA 81mg qd   - Lisinopril recently discontinued by [facility] for complaints of cough, instructed to call [facility] if another antihypertensive agent is needed in replacement   - Instructed on lifestyle modifications including physical activity as tolerated 30 min per day x 5 days per week and low sodium 2g, low  cholesterol diet    - Continue to f/u with [facility]ology as recommended     GERD/gastritis  - Continue Protonix 40mg qd   - Avoid NSAIDs due to risk of aggravated GERD/gastritis     BMI 31  Obese   - Referred to [facility] for dietary recommendations     Hypothyroidism  - Continue Synthroid 75 MCG daily     L Breast Pain - resolved   - Resolved with change in bra type      MIKI on CPAP  -Continue to monitor     Insomnia  - Continue melatonin        ED precautions given, patient verbalized understanding.         Disposition: RTC in 3 months or sooner if needed.     Patient case and plan of care discussed and patient assessed with Dr. Lexy Morris MD   Internal Medicine - -2

## 2024-07-31 ENCOUNTER — TELEPHONE (OUTPATIENT)
Dept: INTERNAL MEDICINE | Facility: CLINIC | Age: 69
End: 2024-07-31
Payer: MEDICARE

## 2024-07-31 NOTE — TELEPHONE ENCOUNTER
Pt called, name and  verified. Pt reported they informed her that she was denied d/t not enough documentation on the reason for needing this test. Pt also reported I have financial assistance so I don't understand why they are saying my insurance denied me. Pt informed that once Dr. Fox review the denial she will call for a peer to peer or speak with centralized scheduling regarding payment. No further questions or concerns noted. Called centralized scheduling and spoke with Ms. Swansona Prosper regarding pt having financial assistance. Ms. Lewis reported she don't know anything for this form of payment. Called and spoke with [employer] financial assistance where she reported that they only cover the portion of the bill the pt's insurance don't pay. Please provide more documentation for the need for this testing. Thanks for all you do.

## 2024-07-31 NOTE — TELEPHONE ENCOUNTER
----- Message from Faustina Frederick sent at 7/31/2024 12:14 PM CDT -----  Regarding: Dr. Morris-Test Cancelled  Good afternoon, patient has called in a request to speak with Dr. Morris or her nurse in regards to CTA of the abdomen-pelvis that was scheduled for today being cancelled. Patient states more info is needed as to why the test is being done and it must be rescheduled within 14 days. Please call patient to discuss. Thank you

## 2024-08-01 DIAGNOSIS — Z86.73 HISTORY OF CVA (CEREBROVASCULAR ACCIDENT) WITHOUT RESIDUAL DEFICITS: ICD-10-CM

## 2024-08-01 DIAGNOSIS — F17.200 HAS BEEN SMOKING TOBACCO FOR 30 YEARS OR MORE: ICD-10-CM

## 2024-08-01 DIAGNOSIS — I25.10 CORONARY ARTERY DISEASE, UNSPECIFIED VESSEL OR LESION TYPE, UNSPECIFIED WHETHER ANGINA PRESENT, UNSPECIFIED WHETHER NATIVE OR TRANSPLANTED HEART: ICD-10-CM

## 2024-08-01 DIAGNOSIS — I70.213 INTERMITTENT CLAUDICATION OF BOTH LOWER EXTREMITIES DUE TO ATHEROSCLEROSIS: Primary | ICD-10-CM

## 2024-08-01 DIAGNOSIS — Z86.79 S/P ASCENDING AORTIC ANEURYSM REPAIR: ICD-10-CM

## 2024-08-01 DIAGNOSIS — I70.0 ATHEROSCLEROSIS OF AORTIC BIFURCATION AND COMMON ILIAC ARTERIES: ICD-10-CM

## 2024-08-01 DIAGNOSIS — Z98.890 S/P ASCENDING AORTIC ANEURYSM REPAIR: ICD-10-CM

## 2024-08-01 DIAGNOSIS — I73.9 PVD (PERIPHERAL VASCULAR DISEASE) WITH CLAUDICATION: ICD-10-CM

## 2024-08-01 DIAGNOSIS — I70.8 ATHEROSCLEROSIS OF AORTIC BIFURCATION AND COMMON ILIAC ARTERIES: ICD-10-CM

## 2024-08-01 DIAGNOSIS — E78.5 HYPERLIPIDEMIA, UNSPECIFIED HYPERLIPIDEMIA TYPE: ICD-10-CM

## 2024-08-01 NOTE — PROGRESS NOTES
Patient has had chronic issues with claudication and bilateral leg pain documented since 2018 per chart review. Patient has tried exercise program in the last for this per PCP, cardiology, and CV surgery since 2017. Moderate calcified atherosclerosis is present in the aortoiliac vessels on CTA non coronary studies done in 2022 following her ascending aortic aneurysm repair however arterial imaging of BLE have never been completed via run off to date to evaluate atherosclerosis downstream of aortoiliac arteries. On CT knee without contrast done on 3/8/24 as pre-op evaluation for knee replacement, there are multiple large calcified intra-articular bodies in the posterior recess of the joint in setting of moderate calcified atherosclerosis of aortoiliac vessels. Patient continues to complain of leg pain with activity despite total knee replacement surgery. Only BLE ABIs have been completed however ABIs are known to be inaccurate if arterial vessels are not compressible in setting of calcified atherosclerosis. I suspect her previously diagnosed moderate aortoiliac calcifications are contributing to her leg pain with movement as she has not met her LDL goal 2/2 statin intolerance. Patient is scheduled to start Lequivo however still needs proper imaging to evaluate downstream arterial disease.     On physical exam, pulses are palpable, consistent with collateral flow however all digits of feet bilaterally are cold to touch on examination. Patient is in need of imaging to quantify downstream arterial disease. Please refer to phone encounter dated 3/19/24 for prior information prompting atherosclerosis work-up.         Gary Morris MD   [facility] Internal Medicine HO-2

## 2024-08-02 ENCOUNTER — HOSPITAL ENCOUNTER (OUTPATIENT)
Dept: RADIOLOGY | Facility: HOSPITAL | Age: 69
Discharge: HOME | End: 2024-08-02
Payer: MEDICARE

## 2024-08-02 DIAGNOSIS — Z12.31 ENCOUNTER FOR SCREENING MAMMOGRAM FOR MALIGNANT NEOPLASM OF BREAST: ICD-10-CM

## 2024-08-02 PROCEDURE — 77067 SCR MAMMO BI INCL CAD: CPT | Mod: TC

## 2024-08-02 PROCEDURE — 77067 SCR MAMMO BI INCL CAD: CPT | Mod: 26,,, | Performed by: RADIOLOGY

## 2024-08-02 PROCEDURE — 77063 BREAST TOMOSYNTHESIS BI: CPT | Mod: 26,,, | Performed by: RADIOLOGY

## 2024-08-06 ENCOUNTER — OFFICE VISIT (OUTPATIENT)
Dept: ORTHOPEDICS | Facility: CLINIC | Age: 69
End: 2024-08-06
Payer: MEDICARE

## 2024-08-06 VITALS
DIASTOLIC BLOOD PRESSURE: 75 MMHG | SYSTOLIC BLOOD PRESSURE: 157 MMHG | BODY MASS INDEX: 30.45 KG/M2 | WEIGHT: 194 LBS | HEART RATE: 59 BPM | HEIGHT: 67 IN

## 2024-08-06 DIAGNOSIS — Z96.651 STATUS POST RIGHT KNEE REPLACEMENT: Primary | ICD-10-CM

## 2024-08-06 PROCEDURE — 3288F FALL RISK ASSESSMENT DOCD: CPT | Mod: CPTII,,, | Performed by: NURSE PRACTITIONER

## 2024-08-06 PROCEDURE — 3008F BODY MASS INDEX DOCD: CPT | Mod: CPTII,,, | Performed by: NURSE PRACTITIONER

## 2024-08-06 PROCEDURE — 4010F ACE/ARB THERAPY RXD/TAKEN: CPT | Mod: CPTII,,, | Performed by: NURSE PRACTITIONER

## 2024-08-06 PROCEDURE — 3077F SYST BP >= 140 MM HG: CPT | Mod: CPTII,,, | Performed by: NURSE PRACTITIONER

## 2024-08-06 PROCEDURE — 99213 OFFICE O/P EST LOW 20 MIN: CPT | Mod: ,,, | Performed by: NURSE PRACTITIONER

## 2024-08-06 PROCEDURE — 1159F MED LIST DOCD IN RCRD: CPT | Mod: CPTII,,, | Performed by: NURSE PRACTITIONER

## 2024-08-06 PROCEDURE — 3078F DIAST BP <80 MM HG: CPT | Mod: CPTII,,, | Performed by: NURSE PRACTITIONER

## 2024-08-06 PROCEDURE — 3044F HG A1C LEVEL LT 7.0%: CPT | Mod: CPTII,,, | Performed by: NURSE PRACTITIONER

## 2024-08-06 PROCEDURE — 1101F PT FALLS ASSESS-DOCD LE1/YR: CPT | Mod: CPTII,,, | Performed by: NURSE PRACTITIONER

## 2024-08-09 ENCOUNTER — TELEPHONE (OUTPATIENT)
Dept: INTERNAL MEDICINE | Facility: CLINIC | Age: 69
End: 2024-08-09
Payer: MEDICARE

## 2024-08-21 ENCOUNTER — TELEPHONE (OUTPATIENT)
Dept: INTERNAL MEDICINE | Facility: CLINIC | Age: 69
End: 2024-08-21
Payer: MEDICARE

## 2024-08-21 NOTE — TELEPHONE ENCOUNTER
Pt called requesting authorization be obtained for CTA ABD/ PELVIC. States patient's insurance stated that the ordered didn't have a supportive DX  to obtain  authorizations. Informed patient that a message will be sent to her PCP ASAP.

## 2024-09-16 ENCOUNTER — TELEPHONE (OUTPATIENT)
Dept: CARDIOLOGY | Facility: CLINIC | Age: 69
End: 2024-09-16
Payer: MEDICARE

## 2024-09-16 ENCOUNTER — INFUSION (OUTPATIENT)
Dept: INFUSION THERAPY | Facility: HOSPITAL | Age: 69
End: 2024-09-16
Attending: INTERNAL MEDICINE
Payer: MEDICARE

## 2024-09-16 ENCOUNTER — OFFICE VISIT (OUTPATIENT)
Dept: INTERNAL MEDICINE | Facility: CLINIC | Age: 69
End: 2024-09-16
Payer: MEDICARE

## 2024-09-16 VITALS
DIASTOLIC BLOOD PRESSURE: 60 MMHG | RESPIRATION RATE: 20 BRPM | HEIGHT: 67 IN | TEMPERATURE: 98 F | OXYGEN SATURATION: 97 % | WEIGHT: 197 LBS | BODY MASS INDEX: 30.92 KG/M2 | HEART RATE: 74 BPM | SYSTOLIC BLOOD PRESSURE: 158 MMHG

## 2024-09-16 VITALS
SYSTOLIC BLOOD PRESSURE: 136 MMHG | DIASTOLIC BLOOD PRESSURE: 67 MMHG | HEART RATE: 61 BPM | RESPIRATION RATE: 20 BRPM | TEMPERATURE: 98 F | OXYGEN SATURATION: 98 % | BODY MASS INDEX: 31.04 KG/M2 | WEIGHT: 198.19 LBS

## 2024-09-16 DIAGNOSIS — Z98.890 S/P ASCENDING AORTIC ANEURYSM REPAIR: ICD-10-CM

## 2024-09-16 DIAGNOSIS — Z23 NEED FOR VACCINATION: ICD-10-CM

## 2024-09-16 DIAGNOSIS — I70.213 INTERMITTENT CLAUDICATION OF BOTH LOWER EXTREMITIES DUE TO ATHEROSCLEROSIS: Primary | ICD-10-CM

## 2024-09-16 DIAGNOSIS — R73.09 ABNORMAL NON-FASTING GLUCOSE: ICD-10-CM

## 2024-09-16 DIAGNOSIS — Z86.718 HISTORY OF DEEP VENOUS THROMBOSIS (DVT) OF DISTAL VEIN OF RIGHT LOWER EXTREMITY: ICD-10-CM

## 2024-09-16 DIAGNOSIS — E78.2 MIXED HYPERLIPIDEMIA: ICD-10-CM

## 2024-09-16 DIAGNOSIS — R06.02 SOB (SHORTNESS OF BREATH): ICD-10-CM

## 2024-09-16 DIAGNOSIS — I70.8 ATHEROSCLEROSIS OF AORTIC BIFURCATION AND COMMON ILIAC ARTERIES: ICD-10-CM

## 2024-09-16 DIAGNOSIS — Z86.79 S/P ASCENDING AORTIC ANEURYSM REPAIR: ICD-10-CM

## 2024-09-16 DIAGNOSIS — I25.10 CORONARY ARTERY DISEASE, UNSPECIFIED VESSEL OR LESION TYPE, UNSPECIFIED WHETHER ANGINA PRESENT, UNSPECIFIED WHETHER NATIVE OR TRANSPLANTED HEART: ICD-10-CM

## 2024-09-16 DIAGNOSIS — J44.9 CHRONIC OBSTRUCTIVE PULMONARY DISEASE, UNSPECIFIED COPD TYPE: ICD-10-CM

## 2024-09-16 DIAGNOSIS — Z86.73 HISTORY OF CVA (CEREBROVASCULAR ACCIDENT) WITHOUT RESIDUAL DEFICITS: ICD-10-CM

## 2024-09-16 DIAGNOSIS — I63.9 ISCHEMIC EMBOLIC STROKE: ICD-10-CM

## 2024-09-16 DIAGNOSIS — I26.99 PULMONARY EMBOLISM, UNSPECIFIED CHRONICITY, UNSPECIFIED PULMONARY EMBOLISM TYPE, UNSPECIFIED WHETHER ACUTE COR PULMONALE PRESENT: ICD-10-CM

## 2024-09-16 DIAGNOSIS — I10 HYPERTENSION, UNSPECIFIED TYPE: ICD-10-CM

## 2024-09-16 DIAGNOSIS — R03.0 ELEVATED BLOOD PRESSURE READING: ICD-10-CM

## 2024-09-16 DIAGNOSIS — R73.03 PREDIABETES: ICD-10-CM

## 2024-09-16 DIAGNOSIS — I82.511 CHRONIC EMBOLISM AND THROMBOSIS OF RIGHT FEMORAL VEIN: ICD-10-CM

## 2024-09-16 DIAGNOSIS — Z01.89 ROUTINE LAB DRAW: ICD-10-CM

## 2024-09-16 DIAGNOSIS — Z12.31 ENCOUNTER FOR SCREENING MAMMOGRAM FOR MALIGNANT NEOPLASM OF BREAST: ICD-10-CM

## 2024-09-16 DIAGNOSIS — K21.9 GASTROESOPHAGEAL REFLUX DISEASE, UNSPECIFIED WHETHER ESOPHAGITIS PRESENT: ICD-10-CM

## 2024-09-16 DIAGNOSIS — Z78.9 STATIN INTOLERANCE: Primary | ICD-10-CM

## 2024-09-16 DIAGNOSIS — I70.0 ATHEROSCLEROSIS OF AORTIC BIFURCATION AND COMMON ILIAC ARTERIES: ICD-10-CM

## 2024-09-16 DIAGNOSIS — F17.200 HAS BEEN SMOKING TOBACCO FOR 30 YEARS OR MORE: ICD-10-CM

## 2024-09-16 DIAGNOSIS — N64.4 BREAST PAIN: ICD-10-CM

## 2024-09-16 DIAGNOSIS — E03.9 HYPOTHYROIDISM, UNSPECIFIED TYPE: ICD-10-CM

## 2024-09-16 DIAGNOSIS — I10 PRIMARY HYPERTENSION: ICD-10-CM

## 2024-09-16 DIAGNOSIS — Z12.39 ENCOUNTER FOR SCREENING FOR MALIGNANT NEOPLASM OF BREAST, UNSPECIFIED SCREENING MODALITY: ICD-10-CM

## 2024-09-16 DIAGNOSIS — E78.5 HYPERLIPIDEMIA, UNSPECIFIED HYPERLIPIDEMIA TYPE: ICD-10-CM

## 2024-09-16 PROCEDURE — 96372 THER/PROPH/DIAG INJ SC/IM: CPT

## 2024-09-16 PROCEDURE — 99214 OFFICE O/P EST MOD 30 MIN: CPT | Mod: PBBFAC,25

## 2024-09-16 PROCEDURE — 63600175 PHARM REV CODE 636 W HCPCS: Mod: JZ,TB

## 2024-09-16 RX ORDER — METHYLPREDNISOLONE SOD SUCC 125 MG
125 VIAL (EA) INJECTION ONCE AS NEEDED
OUTPATIENT
Start: 2024-12-01

## 2024-09-16 RX ORDER — IPRATROPIUM BROMIDE AND ALBUTEROL SULFATE 2.5; .5 MG/3ML; MG/3ML
3 SOLUTION RESPIRATORY (INHALATION) EVERY 4 HOURS PRN
Qty: 75 ML | Refills: 6 | Status: SHIPPED | OUTPATIENT
Start: 2024-09-16

## 2024-09-16 RX ORDER — EPINEPHRINE 0.3 MG/.3ML
0.3 INJECTION SUBCUTANEOUS ONCE AS NEEDED
OUTPATIENT
Start: 2024-10-01

## 2024-09-16 RX ORDER — DIPHENHYDRAMINE HYDROCHLORIDE 50 MG/ML
50 INJECTION INTRAMUSCULAR; INTRAVENOUS ONCE AS NEEDED
Status: DISCONTINUED | OUTPATIENT
Start: 2024-09-16 | End: 2024-09-16 | Stop reason: HOSPADM

## 2024-09-16 RX ORDER — METHYLPREDNISOLONE SOD SUCC 125 MG
125 VIAL (EA) INJECTION
Status: DISCONTINUED | OUTPATIENT
Start: 2024-09-16 | End: 2024-09-16 | Stop reason: HOSPADM

## 2024-09-16 RX ORDER — EPINEPHRINE 0.3 MG/.3ML
0.3 INJECTION SUBCUTANEOUS ONCE AS NEEDED
Status: CANCELLED | OUTPATIENT
Start: 2024-10-01

## 2024-09-16 RX ORDER — FLUTICASONE PROPIONATE AND SALMETEROL 113; 14 UG/1; UG/1
1 POWDER, METERED RESPIRATORY (INHALATION) 2 TIMES DAILY
Qty: 1 EACH | Refills: 6 | Status: SHIPPED | OUTPATIENT
Start: 2024-09-16

## 2024-09-16 RX ORDER — AMLODIPINE BESYLATE 10 MG/1
10 TABLET ORAL DAILY
Qty: 90 TABLET | Refills: 3 | Status: SHIPPED | OUTPATIENT
Start: 2024-09-16

## 2024-09-16 RX ORDER — METHYLPREDNISOLONE SOD SUCC 125 MG
125 VIAL (EA) INJECTION
Status: CANCELLED | OUTPATIENT
Start: 2024-10-01

## 2024-09-16 RX ORDER — ALBUTEROL SULFATE 90 UG/1
1-2 INHALANT RESPIRATORY (INHALATION) EVERY 6 HOURS PRN
Qty: 18 G | Refills: 3 | Status: SHIPPED | OUTPATIENT
Start: 2024-09-16

## 2024-09-16 RX ORDER — KETOROLAC TROMETHAMINE 30 MG/ML
15 INJECTION, SOLUTION INTRAMUSCULAR; INTRAVENOUS ONCE AS NEEDED
OUTPATIENT
Start: 2024-10-01

## 2024-09-16 RX ORDER — ONDANSETRON HYDROCHLORIDE 2 MG/ML
8 INJECTION, SOLUTION INTRAVENOUS ONCE AS NEEDED
OUTPATIENT
Start: 2024-10-01

## 2024-09-16 RX ORDER — DIPHENHYDRAMINE HYDROCHLORIDE 50 MG/ML
50 INJECTION INTRAMUSCULAR; INTRAVENOUS ONCE AS NEEDED
OUTPATIENT
Start: 2024-10-01

## 2024-09-16 RX ORDER — NITROGLYCERIN 0.4 MG/1
0.4 TABLET SUBLINGUAL EVERY 5 MIN PRN
Qty: 30 TABLET | Refills: 3 | Status: SHIPPED | OUTPATIENT
Start: 2024-09-16

## 2024-09-16 RX ORDER — EPINEPHRINE 0.3 MG/.3ML
0.3 INJECTION SUBCUTANEOUS ONCE AS NEEDED
Status: DISCONTINUED | OUTPATIENT
Start: 2024-09-16 | End: 2024-09-16 | Stop reason: HOSPADM

## 2024-09-16 RX ORDER — METHYLPREDNISOLONE SOD SUCC 125 MG
80 VIAL (EA) INJECTION
OUTPATIENT
Start: 2024-10-01

## 2024-09-16 RX ORDER — ACETAMINOPHEN 325 MG/1
650 TABLET ORAL
OUTPATIENT
Start: 2024-10-01

## 2024-09-16 RX ADMIN — INCLISIRAN 284 MG: 284 INJECTION, SOLUTION SUBCUTANEOUS at 01:09

## 2024-09-16 NOTE — TELEPHONE ENCOUNTER
Patient present in clinic stating she had an apt on today with PCP, and was told to get in touch with her cardiologist to have possible restart a 2nd bp med. Patient stated she was on Lisinopril and Amlodipine, but was taken off the Lisinopril b/c it caused a constant cough.

## 2024-09-16 NOTE — PROGRESS NOTES
IM Clinic Note    Patient Name: Claudine Montoya  YOB: 1955   MRN: [id number]  Date: 09/16/2024  [facility] [place] [address]      Subjective:     Chief Complaint:   Chief Complaint   Patient presents with    Follow-up     Pain in both of legs. Wants information on the plague in her arteries     HPI:  Blanquita Montoya 67 y.o. female  She had ascending aortic aneurysm s/p repair with post op DVT/PT (previously on Eliquis), post op AF (previously on Xarelto), HTN, HLD, non-obstructive CAD on coronary angiogram, history of CVA without deficits, MIKI, hypothyroidism, Vit D defciency, GERD, Osteopenia, and prediabetes who presents to clinic today for follow up. Patient still complains of BLE claudication aggravated with walking. She reports this started roughly 3 years ago. Though she recently had knee replacement surgery and venous surgery, symptoms of claudication is unchanged. She also complaints of BL feet numbness, tingling and color change with ambulation. Patient has previous ABIs on file however are incongruent with atherosclerosis noted on CTA aorta non-coronary in 2022. Please see orders only note dated 3/19/24 for further details. Will be started on Lequivo as she cannot tolerate statin and LDL is above goal. Informed patient of CTA chest non coronary studies from 2022. Lisinopril discontinued per cardiology for ACEI cough. Patient has self discontinued statins, Zetia, Eliquis, Xarelto due to ADR of dizziness in the past. Patient has elevated BP upon clinic evaluation today, per last cardiology note, no other antihypertensive med replaced Lisinopril.     Care Team   [facility] Cardiology- Last appointment on 3/18/24. Next appointment 10/25/24.   [facility] Gynecology: Follows for women's health. Last appointment 6/2021, no next scheduled appointment   External Vascular: signed off   External Orthopedics: follows for knee replacement       Health Care Maintenance   -Mammogram: completed  8/2/24, f/u in 1 year   -DEXA: 9/2023, osteopenia of lumbar vertebrae  -Colon cancer screening: Colonoscopy from 10/2021 with diverticulosis of the sigmoid colon, otherwise normal, repeat 5 years in 2026  -Vaccines:    -Flu vaccine, declined today     -Pneumonia 23 on 7/2019    -Tdap on 3/2017    -Zoster series completed 12/2020    -COVID-19: Vaccinated x2    Available notes and lab results since last visit.    Past Medical History:   Diagnosis Date    Anxiety disorder     Arthritis     Coronary artery disease     Current use of long term anticoagulation 09/16/2022    GERD (gastroesophageal reflux disease)     History of CVA (cerebrovascular accident) without residual deficits 09/16/2022    History of deep venous thrombosis (DVT) of distal vein of right lower extremity 09/16/2022    History of pulmonary embolism 09/16/2022    Hypercholesterolemia     Hypertension     Hypothyroidism     Non-healing open wound of right groin 09/16/2022    Obesity     Right groin wound 09/20/2022    S/P ascending aortic aneurysm repair 09/16/2022    Seizure in childhood     last one age 11    Sleep apnea     does not currently use CPAP    Statin intolerance 07/24/2024    Thoracic aortic aneurysm 03/10/2022    4.5X4.4 Ascending Aorta as of 3/10/2022    Thyroid disease         Past Surgical History:   Procedure Laterality Date    ANGIOGRAM, CORONARY, WITH LEFT HEART CATHETERIZATION N/A 07/11/2022    Procedure: Angiogram, Coronary, with Left Heart Cath;  Surgeon: Harry Jj MD;  Location: [facility] CATH LAB;  Service: Cardiology;  Laterality: N/A;    ARTHROSCOPY OF KNEE      COLONOSCOPY  10/14/2021    Dr. Marquis Fitch    HYSTERECTOMY      REPAIR OF ASCENDING AORTA N/A 07/29/2022    Procedure: REPAIR, AORTA, ASCENDING;  Surgeon: Alec Vega IV, MD;  Location: [facility] OR;  Service: Cardiovascular;  Laterality: N/A;  ECHO NOTIFIED    ROBOTIC ARTHROPLASTY, KNEE Right 3/27/2024    Procedure: ROBOTIC ARTHROPLASTY, KNEE, TOTAL;  Surgeon:  Yazan Barragan MD;  Location: [facility];  Service: Orthopedics;  Laterality: Right;       Allergy:  Review of patient's allergies indicates:   Allergen Reactions    Tramadol Hives     Other reaction(s): sweating    Meperidine Rash     Other reaction(s): unknown        Current Medications:    Current Outpatient Medications:     albuterol (VENTOLIN HFA) 90 mcg/actuation inhaler, Inhale 1-2 puffs into the lungs every 6 (six) hours as needed for Wheezing or Shortness of Breath. Rescue, Disp: 18 g, Rfl: 3    albuterol-ipratropium (DUO-NEB) 2.5 mg-0.5 mg/3 mL nebulizer solution, Take 3 mLs by nebulization every 4 (four) hours as needed for Wheezing or Shortness of Breath., Disp: 75 mL, Rfl: 6    amLODIPine (NORVASC) 10 MG tablet, Take 1 tablet (10 mg total) by mouth once daily., Disp: 90 tablet, Rfl: 3    aspirin (ECOTRIN) 81 MG EC tablet, Take 1 tablet (81 mg total) by mouth once daily. Increase to twice a day for 6 weeks post-op for blood clot prevention., Disp: 90 tablet, Rfl: 3    fluticasone propion-salmeteroL 113-14 mcg/actuation AePB, Inhale 1 puff into the lungs 2 (two) times a day., Disp: 1 each, Rfl: 6    furosemide (LASIX) 20 MG tablet, Take 1 tablet (20 mg total) by mouth daily as needed (as needed for shortness of breath)., Disp: 30 tablet, Rfl: 3    levothyroxine (SYNTHROID) 75 MCG tablet, Take 1 tablet (75 mcg total) by mouth before breakfast., Disp: 90 tablet, Rfl: 3    multivit-min/iron/folic/lutein (CENTRUM SILVER WOMEN ORAL), Take 1 tablet by mouth Daily., Disp: , Rfl:     nitroGLYCERIN (NITROSTAT) 0.4 MG SL tablet, Place 1 tablet (0.4 mg total) under the tongue every 5 (five) minutes as needed for Chest pain., Disp: 30 tablet, Rfl: 3    pantoprazole (PROTONIX) 40 MG tablet, Take 1 tablet (40 mg total) by mouth once daily. (Patient taking differently: Take 40 mg by mouth daily as needed.), Disp: 90 tablet, Rfl: 3    vitamin D (VITAMIN D3) 1000 units Tab, Take 1,000 Units by mouth once daily. (Patient  not taking: Reported on 9/16/2024), Disp: , Rfl:   No current facility-administered medications for this visit.    Facility-Administered Medications Ordered in Other Visits:     diphenhydrAMINE injection 50 mg, 50 mg, Intravenous, Once PRN, Airam Evans PA-C    EPINEPHrine (EPIPEN) 0.3 mg/0.3 mL pen injection 0.3 mg, 0.3 mg, Intramuscular, Once PRN, Airam Evans PA-C    methylPREDNISolone sodium succinate injection 125 mg, 125 mg, Intravenous, 1 time in Clinic/HOD, Airam Evans PA-C     Social History:   reports that she quit smoking about 12 years ago. Her smoking use included cigarettes and cigars. She started smoking about 55 years ago. She has a 183 pack-year smoking history. She has never used smokeless tobacco. She reports that she does not currently use alcohol. She reports that she does not use drugs.   Smoking 2PPD starting in teenage years and stopped 12 years ago. No current.     Family History   Problem Relation Name Age of Onset    Heart disease Mother Beth Campbell     Uterine cancer Mother Beth Campbell     Lung cancer Father      Seizures Sister Kayla villatoro     Heart disease Sister aKyla villatoro         Immunization History   Administered Date(s) Administered    COVID-19, MRNA, LN-S, PF (MODERNA FULL 0.5 ML DOSE) 03/03/2021, 03/31/2021    Influenza (FLUAD) - Quadrivalent - Adjuvanted - PF *Preferred* (65+) 09/21/2023    Influenza - Quadrivalent 10/24/2019    Influenza - Quadrivalent - High Dose - PF (65 years and older) 12/17/2020, 02/08/2022, 09/22/2022    Influenza - Trivalent - Recombinant - PF 02/14/2018    Pneumococcal Conjugate - 20 Valent 05/29/2023    Pneumococcal Polysaccharide - 23 Valent 07/30/2019    Tdap 03/07/2017    Zoster Recombinant 09/23/2020, 12/17/2020       Review of Systems   Constitutional:  Negative for chills, fever, malaise/fatigue and weight loss.   HENT:  Negative for congestion and sore throat.    Eyes:  Negative for blurred vision and  double vision.   Respiratory:  Negative for cough and shortness of breath.    Cardiovascular:  Positive for claudication. Negative for chest pain and palpitations.   Gastrointestinal:  Negative for abdominal pain, constipation, diarrhea, heartburn, nausea and vomiting.   Genitourinary:  Negative for dysuria and urgency.   Musculoskeletal:  Positive for myalgias. Negative for back pain, falls and neck pain.   Neurological:  Positive for sensory change. Negative for dizziness, loss of consciousness and headaches.   Psychiatric/Behavioral:  Negative for depression and suicidal ideas. The patient is not nervous/anxious.        Objective:      Physical Exam  Vitals:    09/16/24 1341   BP: 136/67   BP Location: Left arm   Patient Position: Sitting   BP Method: Large (Automatic)   Pulse: 61   Resp: 20   Temp: 98.4 °F (36.9 °C)   TempSrc: Oral   SpO2: 98%   Weight: 89.9 kg (198 lb 3.1 oz)     Initial Reading 158/60       Wt Readings from Last 3 Encounters:   09/16/24 89.9 kg (198 lb 3.1 oz)   09/16/24 89.4 kg (197 lb)   08/06/24 88 kg (194 lb)       Physical Exam  Vitals and nursing note reviewed.   Constitutional:       General: She is not in acute distress.  Eyes:      General:         Right eye: No discharge.   Neck:      Vascular: No carotid bruit.   Cardiovascular:      Rate and Rhythm: Regular rhythm. Bradycardia present.      Pulses: Decreased pulses.      Heart sounds: No murmur heard.     Comments: + BLE decreased distal pulses, difficult to palpate   + Warmth to touch   + Diffuse varicose veins throughout   Pulmonary:      Effort: Pulmonary effort is normal. No respiratory distress.      Breath sounds: No wheezing.   Abdominal:      General: Abdomen is flat. Bowel sounds are normal.      Palpations: Abdomen is soft.   Musculoskeletal:         General: No swelling or tenderness.      Cervical back: Normal range of motion.      Right knee: No swelling, effusion, erythema, bony tenderness or crepitus.      Right  lower leg: No edema.      Left lower leg: No edema.      Comments: BLE strength 5/5    Skin:     General: Skin is warm and dry.      Capillary Refill: Capillary refill takes less than 2 seconds.      Findings: No lesion or rash.   Neurological:      Mental Status: She is alert and oriented to person, place, and time.        Body mass index is 31.04 kg/m².      Office Visit on 07/05/2024   Component Date Value Ref Range Status    POC Blood, Urine 07/05/2024 Positive (A)  Negative Final    moderate    POC Bilirubin, Urine 07/05/2024 Negative  Negative Final    POC Urobilinogen, Urine 07/05/2024 0.2  0.1 - 1.1 Final    POC Ketones, Urine 07/05/2024 Negative  Negative Final    POC Protein, Urine 07/05/2024 Negative  Negative Final    POC Nitrates, Urine 07/05/2024 Negative  Negative Final    POC Glucose, Urine 07/05/2024 Negative  Negative Final    pH, UA 07/05/2024 6.0   Final    POC Specific Gravity, Urine 07/05/2024 1.005  1.003 - 1.029 Final    POC Leukocytes, Urine 07/05/2024 Negative  Negative Final    Urine Culture 07/05/2024 10,000 - 25,000 colonies/ml Escherichia coli (A)   Final    WBC, Wet Prep 07/05/2024 None Seen  None Seen Final    Clue Cells, Wet Prep 07/05/2024 None Seen  None Seen Final    Trichomonas, Wet Prep 07/05/2024 None Seen  None Seen Final    Yeast, Wet Prep 07/05/2024 Rare (A)  None Seen Final   Lab Visit on 06/20/2024   Component Date Value Ref Range Status    Varicella-Zoster Ab, IgG, S 06/20/2024 Positive   Final    Results suggest response to immunization or  prior exposure to the virus.     -------------------REFERENCE VALUE--------------------------  Vaccinated: Positive (>=1.1 AI)  Unvaccinated: Negative (<=0.8 AI)    Varicella IgG Antibody Index 06/20/2024 7.9   Final       Test Performed by:  [facility]  [address] [place], [place] [id number]  : Aaron Cuevas Ph.D.; CLIA# [id number]    Cholesterol Total 06/20/2024 223 (H)   <=200 mg/dL Final    HDL Cholesterol 06/20/2024 50  35 - 60 mg/dL Final    Triglyceride 06/20/2024 132  37 - 140 mg/dL Final    Cholesterol/HDL Ratio 06/20/2024 4  0 - 5 Final    Very Low Density Lipoprotein 06/20/2024 26   Final    LDL Cholesterol 06/20/2024 147.00 (H)  50.00 - 140.00 mg/dL Final    Sodium 06/20/2024 139  136 - 145 mmol/L Final    Potassium 06/20/2024 4.3  3.5 - 5.1 mmol/L Final    Chloride 06/20/2024 104  98 - 107 mmol/L Final    CO2 06/20/2024 30  23 - 31 mmol/L Final    Glucose 06/20/2024 112  82 - 115 mg/dL Final    Blood Urea Nitrogen 06/20/2024 10.3  9.8 - 20.1 mg/dL Final    Creatinine 06/20/2024 0.92  0.55 - 1.02 mg/dL Final    Calcium 06/20/2024 9.4  8.4 - 10.2 mg/dL Final    Protein Total 06/20/2024 7.4  5.8 - 7.6 gm/dL Final    Albumin 06/20/2024 3.8  3.4 - 4.8 g/dL Final    Globulin 06/20/2024 3.6 (H)  2.4 - 3.5 gm/dL Final    Albumin/Globulin Ratio 06/20/2024 1.1  1.1 - 2.0 ratio Final    Bilirubin Total 06/20/2024 0.5  <=1.5 mg/dL Final    ALP 06/20/2024 124  40 - 150 unit/L Final    ALT 06/20/2024 17  0 - 55 unit/L Final    AST 06/20/2024 17  5 - 34 unit/L Final    eGFR 06/20/2024 >60  mL/min/1.73/m2 Final    Anion Gap 06/20/2024 5.0  mEq/L Final    BUN/Creatinine Ratio 06/20/2024 11   Final   Hospital Outpatient Visit on 05/03/2024   Component Date Value Ref Range Status    Right CCA prox sys 05/03/2024 69  cm/s Final    Right CCA prox zaragoza 05/03/2024 10  cm/s Final    Right CCA dist sys 05/03/2024 43  cm/s Final    Right CCA dist zaragoza 05/03/2024 6  cm/s Final    Right ICA prox sys 05/03/2024 46  cm/s Final    Right ICA prox zaragoza 05/03/2024 11  cm/s Final    Right ICA mid sys 05/03/2024 62  cm/s Final    Right ICA mid zaragoza 05/03/2024 15  cm/s Final    Right ICA dist sys 05/03/2024 56  cm/s Final    Right ICA dist zaragoza 05/03/2024 16  cm/s Final    Right ECA sys 05/03/2024 88  cm/s Final    Right ECA zaragoza 05/03/2024 6  cm/s Final    Right vertebral sys 05/03/2024 61  cm/s Final     Right vertebral zaragoza 05/03/2024 8  cm/s Final    Right ICA/CCA ratio 05/03/2024 1.44   Final    Right Highest ICA 05/03/2024 62.00   Final    Right Highest EDV 05/03/2024 16.00   Final    Right Highest CCA 05/03/2024 69   Final    Left CCA prox sys 05/03/2024 65  cm/s Final    Left CCA prox zaragoza 05/03/2024 11  cm/s Final    Left CCA dist sys 05/03/2024 52  cm/s Final    Left CCA dist zaragoza 05/03/2024 10  cm/s Final    Left ICA prox sys 05/03/2024 60  cm/s Final    Left ICA prox zaragoza 05/03/2024 10  cm/s Final    Left ICA mid sys 05/03/2024 74  cm/s Final    Left ICA mid zaragoza 05/03/2024 15  cm/s Final    Left ICA dist sys 05/03/2024 85  cm/s Final    Left ICA dist zaragoza 05/03/2024 25  cm/s Final    Left ECA sys 05/03/2024 78  cm/s Final    Left ECA zaragoza 05/03/2024 7  cm/s Final    Left vertebral sys 05/03/2024 39  cm/s Final    Left vertebral zaragoza 05/03/2024 9  cm/s Final    Left ICA/CCA ratio 05/03/2024 1.63   Final    Left Highest ICA 05/03/2024 85.00   Final    LT Highest EDV 05/03/2024 25.00   Final    Left Highest CCA 05/03/2024 65   Final   Hospital Outpatient Visit on 05/03/2024   Component Date Value Ref Range Status    Right arm BP 05/03/2024 167.00  mmHg Final    Right posterior tibial 05/03/2024 178.00  mmHg Final    Right dorsalis pedis 05/03/2024 152.00  mmHg Final    Right YUE 05/03/2024 1.07   Final    Left arm BP 05/03/2024 164.00  mmHg Final    Left posterior tibial 05/03/2024 184.00  mmHg Final    Left dorsalis pedis 05/03/2024 178.00  mmHg Final    Left YUE 05/03/2024 1.10   Final   Hospital Outpatient Visit on 05/03/2024   Component Date Value Ref Range Status    BSA 05/03/2024 2.11  m2 Final    Louis's Biplane MOD Ejection Fra* 05/03/2024 58  % Final    LVOT stroke volume 05/03/2024 80.28  cm3 Final    LVIDd 05/03/2024 4.44  3.5 - 6.0 cm Final    LV Systolic Volume 05/03/2024 38.18  mL Final    LV Systolic Volume Index 05/03/2024 18.6  mL/m2 Final    LVIDs 05/03/2024 3.11  2.1 - 4.0 cm Final    LV  Diastolic Volume 05/03/2024 89.63  mL Final    LV Diastolic Volume Index 05/03/2024 43.72  mL/m2 Final    IVS 05/03/2024 1.09  0.6 - 1.1 cm Final    LVOT diameter 05/03/2024 2.06  cm Final    LVOT area 05/03/2024 3.3  cm2 Final    FS 05/03/2024 30  28 - 44 % Final    Left Ventricle Relative Wall Thick* 05/03/2024 0.50  cm Final    Posterior Wall 05/03/2024 1.12 (A)  0.6 - 1.1 cm Final    LV mass 05/03/2024 172.45  g Final    LV Mass Index 05/03/2024 84  g/m2 Final    MV Peak E Wil 05/03/2024 0.79  m/s Final    TDI LATERAL 05/03/2024 0.12  m/s Final    TDI SEPTAL 05/03/2024 0.09  m/s Final    E/E' ratio 05/03/2024 7.52  m/s Final    MV Peak A Wil 05/03/2024 0.35  m/s Final    TR Max Wil 05/03/2024 2.73  m/s Final    E/A ratio 05/03/2024 2.26   Final    E wave deceleration time 05/03/2024 305.19  msec Final    LV SEPTAL E/E' RATIO 05/03/2024 8.78  m/s Final    LV LATERAL E/E' RATIO 05/03/2024 6.58  m/s Final    LVOT peak wil 05/03/2024 1.25  m/s Final    Left Ventricular Outflow Tract Maria L* 05/03/2024 0.81  cm/s Final    Left Ventricular Outflow Tract Maria L* 05/03/2024 2.98  mmHg Final    RVDD 05/03/2024 3.29  cm Final    RV/LV Ratio 05/03/2024 0.74  cm Final    LA size 05/03/2024 4.00  cm Final    AV regurgitation pressure 1/2 time 05/03/2024 [id number]  ms Final    AR Max Wil 05/03/2024 3.71  m/s Final    AV mean gradient 05/03/2024 5  mmHg Final    AV peak gradient 05/03/2024 9  mmHg Final    Ao peak wil 05/03/2024 1.54  m/s Final    Ao VTI 05/03/2024 37.10  cm Final    LVOT peak VTI 05/03/2024 24.10  cm Final    AV valve area 05/03/2024 2.16  cm² Final    AV Velocity Ratio 05/03/2024 0.81   Final    AV index (prosthetic) 05/03/2024 0.65   Final    LINCOLN by Velocity Ratio 05/03/2024 2.70  cm² Final    MV mean gradient 05/03/2024 1  mmHg Final    MV peak gradient 05/03/2024 4  mmHg Final    MV stenosis pressure 1/2 time 05/03/2024 88.51  ms Final    MV valve area p 1/2 method 05/03/2024 2.49  cm2 Final    MV  valve area by continuity eq 05/03/2024 2.82  cm2 Final    MV VTI 05/03/2024 28.5  cm Final    Triscuspid Valve Regurgitation Pea* 05/03/2024 30  mmHg Final    Mean e' 05/03/2024 0.11  m/s Final    ZLVIDS 05/03/2024 -1.51   Final    ZLVIDD 05/03/2024 -3.26   Final    Mitral Valve Heart Rate 05/03/2024 59  bpm Final    TV resting pulmonary artery pressu* 05/03/2024 33  mmHg Final    RV TB RVSP 05/03/2024 6  mmHg Final    Est. RA pres 05/03/2024 3  mmHg Final   Admission on 03/27/2024, Discharged on 03/29/2024   Component Date Value Ref Range Status    View Reliapath Pathology Report 03/27/2024 See Scanned Reliapath Report   Final    POCT Glucose 03/27/2024 112 (H)  70 - 110 mg/dL Final    Hgb 03/27/2024 13.0  12.0 - 16.0 g/dL Final    Hct 03/27/2024 40.1  37.0 - 47.0 % Final    POCT Glucose 03/27/2024 123 (H)  70 - 110 mg/dL Final    Sodium 03/28/2024 137  136 - 145 mmol/L Final    Potassium 03/28/2024 4.5  3.5 - 5.1 mmol/L Final    Chloride 03/28/2024 107  98 - 107 mmol/L Final    CO2 03/28/2024 24  23 - 31 mmol/L Final    Glucose 03/28/2024 170 (H)  82 - 115 mg/dL Final    Blood Urea Nitrogen 03/28/2024 17.6  9.8 - 20.1 mg/dL Final    Creatinine 03/28/2024 0.97  0.55 - 1.02 mg/dL Final    BUN/Creatinine Ratio 03/28/2024 18   Final    Calcium 03/28/2024 7.7 (L)  8.4 - 10.2 mg/dL Final    Anion Gap 03/28/2024 6.0  mEq/L Final    eGFR 03/28/2024 >60  mls/min/1.73/m2 Final    WBC 03/28/2024 6.48  4.50 - 11.50 x10(3)/mcL Final    RBC 03/28/2024 3.65 (L)  4.20 - 5.40 x10(6)/mcL Final    Hgb 03/28/2024 11.1 (L)  12.0 - 16.0 g/dL Final    Hct 03/28/2024 34.7 (L)  37.0 - 47.0 % Final    MCV 03/28/2024 95.1 (H)  80.0 - 94.0 fL Final    MCH 03/28/2024 30.4  27.0 - 31.0 pg Final    MCHC 03/28/2024 32.0 (L)  33.0 - 36.0 g/dL Final    RDW 03/28/2024 13.4  11.5 - 17.0 % Final    Platelet 03/28/2024 123 (L)  130 - 400 x10(3)/mcL Final    MPV 03/28/2024 12.5 (H)  7.4 - 10.4 fL Final    NRBC% 03/28/2024 0.0  % Final    Sodium  03/29/2024 137  136 - 145 mmol/L Final    Potassium 03/29/2024 4.3  3.5 - 5.1 mmol/L Final    Chloride 03/29/2024 105  98 - 107 mmol/L Final    CO2 03/29/2024 26  23 - 31 mmol/L Final    Glucose 03/29/2024 101  82 - 115 mg/dL Final    Blood Urea Nitrogen 03/29/2024 15.8  9.8 - 20.1 mg/dL Final    Creatinine 03/29/2024 0.85  0.55 - 1.02 mg/dL Final    BUN/Creatinine Ratio 03/29/2024 19   Final    Calcium 03/29/2024 8.1 (L)  8.4 - 10.2 mg/dL Final    Anion Gap 03/29/2024 6.0  mEq/L Final    eGFR 03/29/2024 >60  mls/min/1.73/m2 Final    WBC 03/29/2024 8.60  4.50 - 11.50 x10(3)/mcL Final    RBC 03/29/2024 3.58 (L)  4.20 - 5.40 x10(6)/mcL Final    Hgb 03/29/2024 10.9 (L)  12.0 - 16.0 g/dL Final    Hct 03/29/2024 33.4 (L)  37.0 - 47.0 % Final    MCV 03/29/2024 93.3  80.0 - 94.0 fL Final    MCH 03/29/2024 30.4  27.0 - 31.0 pg Final    MCHC 03/29/2024 32.6 (L)  33.0 - 36.0 g/dL Final    RDW 03/29/2024 13.6  11.5 - 17.0 % Final    Platelet 03/29/2024 118 (L)  130 - 400 x10(3)/mcL Final    MPV 03/29/2024 12.7 (H)  7.4 - 10.4 fL Final    NRBC% 03/29/2024 0.0  % Final           Assessment:   PVD s/p R GSV ablation   Hx of 30+ tobacco user, former   HLD   Atherosclerosis of aortic bifurcation and iliac arteries   S/p Aortic aneurysm repair 2022   CAD s/p CABG x 2   Hx of CVA   Statin Refusal   BLE Claudication, persistent   - Ordered CTA abd pelvis with runoff for further evaluation of aortoiliac atherosclerosis first documented in 2022 on CTA chest non coronary studies however insurance has denied this   - Suspect recent ABIs may not be accurate due to degree of calcification on prior CT imaging as primary and incidental findings   - Ordered BLE arterial doppler ultrasound with toe brachial index for further work-up as patient's insurance will not approve previously ordered CTA study   - Continue Lequivo per [facility]  - Zetia self discontinued as documented by [facility] for complaints of dizziness on 7/18/24;  Refuses to use statin due to same complaints   - Continue routine follow up with [facility] Cardiology   - If arterial studies are abnormal, will call patient and refer to [facility] Vascular Surgery     Postoperative Atrial Fibrillation   - CHADVASC 5; patient was previously on Amiodarone and Xarelto but this also appears to be self discontinued   - Patient is high risk for further venous thrombosis vs embolus     Elevated BP reading in clinic   Hx Hypertension  ACE Inhibitor Cough   HFpEF w EF 50%   - Continue Amlodipine 10mg daily, lasix 20mg daily, ASA 81mg qd   - Lisinopril recently self discontinued as documented by [facility] Cardology for complaints of cough and concern for angioedema on 7/18/24, instructed to call [facility] Cardiology if another antihypertensive agent is needed in replacement   - Instructed on lifestyle modifications including physical activity as tolerated 30 min per day x 5 days per week and low sodium 2g, low cholesterol diet    - Continue to f/u with [facility] cardiology as recommended     GERD/gastritis  - Continue Protonix 40mg qd   - Avoid NSAIDs due to risk of aggravated GERD/gastritis     BMI 31  Obese   - Referred to [facility] Nutrition for dietary recommendations     Hypothyroidism  - Continue Synthroid 75 MCG daily     L Breast Pain - resolved   - Resolved with change in bra type      MIKI on CPAP  - Continue to monitor     Insomnia  - Continue melatonin        ED precautions given, patient verbalized understanding.         Disposition: RTC in 6 months or sooner if needed.     Patient case and plan of care discussed and patient assessed with Dr. Divina Morris MD   Internal Medicine - -2

## 2024-09-16 NOTE — TELEPHONE ENCOUNTER
Thank so much!    Airam Rangel PA-C20 minutes ago (3:17 PM)     MJ  Pt advised to monitor at home. She reports it has been running ok at home in 130s/60s. She was advised to contact clinic if she is getting consistent elevated readings. Understanding verbalized.     Airam Evans PA-C  You31 minutes ago (3:06 PM)       Good afternoon,    Per my chart review, patient's blood pressure was excellent and at goal at last office visit when her lisinopril was discontinued, that was why we did not restart a 2nd agent (it was 124/61 that day).  I am unsure why PCP did not just start another agent today at her office visit with them if they had concerns- can we clarify with the patient please.  It has been over 2 months since we have last seen this patient, but it looks like she is scheduled to see me next month...  In the meantime, I would recommend that patient log BP at home daily so that we are able to see how it runs normally.  It appears as though she did have an isolated episode of elevated blood pressure today, however, it should be noted that patient received her 1st Leqvio ejection this morning and I am wondering if that may have something to do with her elevated pressure.  If I have any sooner appointments, I am happy to see her in clinic so we can discuss these blood pressure/medication changes.    Thanks so much!  Airam Evans PA-C

## 2024-09-16 NOTE — NURSING
1230 Patient is here for Leqvio #1 of 2 q 3 months.  Patient Education information provided for Leqvio.

## 2024-09-17 NOTE — PROGRESS NOTES
Attending Attestation:    I have reviewed the notes, assessments, and/or procedures performed this visit, and I concur with the documentation. I physically saw the patient with the resident.    Patient with continued claudication on ambulation that improves with rest associated with skin changes and numbness and tingling in BLE's. YUE's performed were WNL however concerned were falsely negative results with pt's history of arterial disease. Agree with Toe brachial index. Recommend for patient to be referred to vascular surgery for further evaluation. Recommend exercise for now along with continued Plavix and Lequivo.     Divina Littlejohn MD

## 2024-09-20 DIAGNOSIS — K21.9 GASTROESOPHAGEAL REFLUX DISEASE, UNSPECIFIED WHETHER ESOPHAGITIS PRESENT: ICD-10-CM

## 2024-09-20 DIAGNOSIS — Z98.890 S/P ASCENDING AORTIC ANEURYSM REPAIR: ICD-10-CM

## 2024-09-20 DIAGNOSIS — R06.02 SOB (SHORTNESS OF BREATH): ICD-10-CM

## 2024-09-20 DIAGNOSIS — Z86.79 S/P ASCENDING AORTIC ANEURYSM REPAIR: ICD-10-CM

## 2024-09-20 DIAGNOSIS — I63.9 ISCHEMIC EMBOLIC STROKE: ICD-10-CM

## 2024-09-20 DIAGNOSIS — I10 HYPERTENSION, UNSPECIFIED TYPE: ICD-10-CM

## 2024-09-20 DIAGNOSIS — Z86.73 HISTORY OF CVA (CEREBROVASCULAR ACCIDENT) WITHOUT RESIDUAL DEFICITS: ICD-10-CM

## 2024-09-20 DIAGNOSIS — E03.9 HYPOTHYROIDISM, UNSPECIFIED TYPE: ICD-10-CM

## 2024-09-20 DIAGNOSIS — Z12.31 ENCOUNTER FOR SCREENING MAMMOGRAM FOR MALIGNANT NEOPLASM OF BREAST: ICD-10-CM

## 2024-09-20 DIAGNOSIS — Z86.718 HISTORY OF DEEP VENOUS THROMBOSIS (DVT) OF DISTAL VEIN OF RIGHT LOWER EXTREMITY: ICD-10-CM

## 2024-09-20 DIAGNOSIS — E78.5 HYPERLIPIDEMIA, UNSPECIFIED HYPERLIPIDEMIA TYPE: ICD-10-CM

## 2024-09-20 DIAGNOSIS — Z23 NEED FOR VACCINATION: ICD-10-CM

## 2024-09-20 DIAGNOSIS — I26.99 PULMONARY EMBOLISM, UNSPECIFIED CHRONICITY, UNSPECIFIED PULMONARY EMBOLISM TYPE, UNSPECIFIED WHETHER ACUTE COR PULMONALE PRESENT: ICD-10-CM

## 2024-09-20 DIAGNOSIS — Z12.39 ENCOUNTER FOR SCREENING FOR MALIGNANT NEOPLASM OF BREAST, UNSPECIFIED SCREENING MODALITY: ICD-10-CM

## 2024-09-20 RX ORDER — FUROSEMIDE 20 MG/1
20 TABLET ORAL DAILY PRN
Qty: 30 TABLET | Refills: 3 | Status: CANCELLED | OUTPATIENT
Start: 2024-09-20 | End: 2025-09-20

## 2024-09-20 RX ORDER — AMLODIPINE BESYLATE 10 MG/1
10 TABLET ORAL DAILY
Qty: 90 TABLET | Refills: 3 | Status: CANCELLED | OUTPATIENT
Start: 2024-09-20

## 2024-09-23 RX ORDER — FUROSEMIDE 20 MG/1
20 TABLET ORAL DAILY PRN
Qty: 30 TABLET | Refills: 3 | Status: SHIPPED | OUTPATIENT
Start: 2024-09-23 | End: 2025-09-23

## 2024-10-17 ENCOUNTER — HOSPITAL ENCOUNTER (OUTPATIENT)
Dept: RADIOLOGY | Facility: HOSPITAL | Age: 69
Discharge: HOME | End: 2024-10-17
Payer: MEDICARE

## 2024-10-17 ENCOUNTER — HOSPITAL ENCOUNTER (OUTPATIENT)
Dept: PULMONOLOGY | Facility: HOSPITAL | Age: 69
Discharge: HOME | End: 2024-10-17
Payer: MEDICARE

## 2024-10-17 VITALS — OXYGEN SATURATION: 98 % | RESPIRATION RATE: 19 BRPM | HEART RATE: 58 BPM

## 2024-10-17 DIAGNOSIS — I70.0 ATHEROSCLEROSIS OF AORTIC BIFURCATION AND COMMON ILIAC ARTERIES: ICD-10-CM

## 2024-10-17 DIAGNOSIS — R06.02 SOB (SHORTNESS OF BREATH): Primary | ICD-10-CM

## 2024-10-17 DIAGNOSIS — I73.9 PAD (PERIPHERAL ARTERY DISEASE): ICD-10-CM

## 2024-10-17 DIAGNOSIS — J44.9 CHRONIC OBSTRUCTIVE PULMONARY DISEASE, UNSPECIFIED COPD TYPE: ICD-10-CM

## 2024-10-17 DIAGNOSIS — I25.10 CORONARY ARTERY DISEASE, UNSPECIFIED VESSEL OR LESION TYPE, UNSPECIFIED WHETHER ANGINA PRESENT, UNSPECIFIED WHETHER NATIVE OR TRANSPLANTED HEART: ICD-10-CM

## 2024-10-17 DIAGNOSIS — I70.8 ATHEROSCLEROSIS OF AORTIC BIFURCATION AND COMMON ILIAC ARTERIES: ICD-10-CM

## 2024-10-17 DIAGNOSIS — I70.213 INTERMITTENT CLAUDICATION OF BOTH LOWER EXTREMITIES DUE TO ATHEROSCLEROSIS: ICD-10-CM

## 2024-10-17 LAB
DLCO SINGLE BREATH LLN: 17.56
DLCO SINGLE BREATH PRE REF: 64.3 %
DLCO SINGLE BREATH REF: 23.29
DLCOC SBVA LLN: 2.97
DLCOC SBVA REF: 4.28
DLCOC SINGLE BREATH LLN: 17.56
DLCOC SINGLE BREATH REF: 23.29
DLCOVA LLN: 2.97
DLCOVA PRE REF: 82.1 %
DLCOVA PRE: 3.51 ML/(MIN*MMHG*L) (ref 2.97–5.59)
DLCOVA REF: 4.28
ERV LLN: -16449.3
ERV PRE REF: 112.5 %
ERV REF: 0.7
FEF 25 75 CHG: 23.3 %
FEF 25 75 LLN: 1.3
FEF 25 75 POST REF: 44.8 %
FEF 25 75 PRE REF: 36.3 %
FEF 25 75 REF: 2.7
FET100 CHG: -1.1 %
FEV1 CHG: 8.6 %
FEV1 FVC CHG: 3.8 %
FEV1 FVC LLN: 65
FEV1 FVC POST REF: 91.2 %
FEV1 FVC PRE REF: 87.8 %
FEV1 FVC REF: 78
FEV1 LLN: 1.78
FEV1 POST REF: 78.8 %
FEV1 PRE REF: 72.6 %
FEV1 REF: 2.44
FRCPLETH LLN: 2.06
FRCPLETH PREREF: 102.9 %
FRCPLETH REF: 2.88
FVC CHG: 4.6 %
FVC LLN: 2.31
FVC POST REF: 85.6 %
FVC PRE REF: 81.8 %
FVC REF: 3.17
IMMEDIATE ARM BP: 156 MMHG
IMMEDIATE LEFT ABI: 1.15
IMMEDIATE LEFT TIBIAL: 179 MMHG
IMMEDIATE RIGHT ABI: 1.04
IMMEDIATE RIGHT TIBIAL: 163 MMHG
IVC PRE: 2.51 L (ref 2.31–4.06)
IVC SINGLE BREATH LLN: 2.31
IVC SINGLE BREATH PRE REF: 79.3 %
IVC SINGLE BREATH REF: 3.17
LEFT ABI: 1.06
LEFT ARM BP: 149 MMHG
LEFT CFA PSV: 87 CM/S
LEFT DORSALIS PEDIS PSV: 36 CM/S
LEFT DORSALIS PEDIS: 157 MMHG
LEFT PERONEAL SYS PSV: 61 CM/S
LEFT POPLITEAL PSV: 51 CM/S
LEFT POST TIBIAL SYS PSV: 45 CM/S
LEFT POSTERIOR TIBIAL: 158 MMHG
LEFT PROFUNDA SYS PSV: 89 CM/S
LEFT SUPER FEMORAL DIST SYS PSV: 125 CM/S
LEFT SUPER FEMORAL MID SYS PSV: 89 CM/S
LEFT SUPER FEMORAL PROX SYS PSV: 121 CM/S
MVV LLN: 82
MVV PRE REF: 79.3 %
MVV REF: 96
OHS CV LEFT LOWER EXTREMITY ABI (NO CALC): 1.06
OHS CV RIGHT ABI LOWER EXTREMITY (NO CALC): 1.05
PEF CHG: -0.2 %
PEF LLN: 4.32
PEF POST REF: 85.7 %
PEF PRE REF: 85.8 %
PEF REF: 6.19
POST FEF 25 75: 1.21 L/S (ref 1.3–4.1)
POST FET 100: 8.26 SEC
POST FEV1 FVC: 70.92 % (ref 64.59–89.1)
POST FEV1: 1.92 L (ref 1.78–3.08)
POST FVC: 2.71 L (ref 2.31–4.06)
POST PEF: 5.3 L/S (ref 4.32–8.07)
PRE DLCO: 14.98 ML/(MIN*MMHG) (ref 17.56–29.02)
PRE ERV: 0.78 L (ref -16449.3–16450.7)
PRE FEF 25 75: 0.98 L/S (ref 1.3–4.1)
PRE FET 100: 8.35 SEC
PRE FEV1 FVC: 68.32 % (ref 64.59–89.1)
PRE FEV1: 1.77 L (ref 1.78–3.08)
PRE FRC PL: 2.97 L (ref 2.06–3.7)
PRE FVC: 2.59 L (ref 2.31–4.06)
PRE MVV: 76.03 L/MIN (ref 81.5–110.27)
PRE PEF: 5.31 L/S (ref 4.32–8.07)
PRE RV: 2.18 L (ref 1.61–2.76)
PRE TLC: 4.77 L (ref 4.45–6.43)
RAW LLN: 3.06
RAW PRE REF: 119 %
RAW PRE: 3.64 CMH2O*S/L (ref 3.06–3.06)
RAW REF: 3.06
RIGHT ABI: 1.05
RIGHT ARM BP: 145 MMHG
RIGHT CFA PSV: 108 CM/S
RIGHT DORSALIS PEDIS PSV: 25 CM/S
RIGHT DORSALIS PEDIS: 142 MMHG
RIGHT PERONEAL SYS PSV: 43 CM/S
RIGHT POPLITEAL PSV: 51 CM/S
RIGHT POST TIBIAL SYS PSV: 60 CM/S
RIGHT POSTERIOR TIBIAL: 156 MMHG
RIGHT PROFUNDA SYS PSV: 65 CM/S
RIGHT SUPER FEMORAL DIST SYS PSV: 142 CM/S
RIGHT SUPER FEMORAL MID SYS PSV: 101 CM/S
RIGHT SUPER FEMORAL PROX SYS PSV: 96 CM/S
RV LLN: 1.61
RV PRE REF: 99.9 %
RV REF: 2.18
RVTLC LLN: 33
RVTLC PRE REF: 107.7 %
RVTLC PRE: 45.7 % (ref 32.83–52.01)
RVTLC REF: 42
TLC LLN: 4.45
TLC PRE REF: 87.7 %
TLC REF: 5.44
VA PRE: 4.26 L (ref 5.29–5.29)
VA SINGLE BREATH LLN: 5.29
VA SINGLE BREATH PRE REF: 80.6 %
VA SINGLE BREATH REF: 5.29
VC LLN: 2.31
VC PRE REF: 81.8 %
VC PRE: 2.59 L (ref 2.31–4.06)
VC REF: 3.17

## 2024-10-17 PROCEDURE — 94640 AIRWAY INHALATION TREATMENT: CPT | Mod: XB

## 2024-10-17 PROCEDURE — 93924 LWR XTR VASC STDY BILAT: CPT

## 2024-10-17 PROCEDURE — 93925 LOWER EXTREMITY STUDY: CPT

## 2024-10-17 RX ORDER — ALBUTEROL SULFATE 0.83 MG/ML
2.5 SOLUTION RESPIRATORY (INHALATION) EVERY 4 HOURS PRN
Status: DISPENSED | OUTPATIENT
Start: 2024-10-17

## 2024-10-17 RX ADMIN — ALBUTEROL SULFATE 2.5 MG: 0.83 SOLUTION RESPIRATORY (INHALATION) at 08:10

## 2024-10-17 NOTE — PROGRESS NOTES
Good cooperation and effort given. Albuterol 2.5 mg given HR 58/62, SAT 98%, BBS CL  PFT completed

## 2024-10-24 NOTE — PROGRESS NOTES
CHIEF COMPLAINT:   Chief Complaint   Patient presents with    3 months      Occasional cp (possible that it is related to surgery)                                                   HPI:  Claudine Montoya 68 y.o. 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  At last office visit patient stated that she felt well from a cardiac standpoint.  Her primary care did order an array of cardiac testing all of which were unremarkable.  See results of echocardiogram, resting ABIs, and carotid ultrasounds below.    At last office visit started on Leqvio for hyperlipidemia.  Pending repeat FLP/CMP to assess status of hyperlipidemia.  She states that she tolerated the injection well.  She was due for another injection in December.  In regards to cardiac symptoms, patient states that she feels mostly stable from her last office visit.  She endorses ongoing SOB/TORRES but states that this is not much of a change from prior.  She reports some dizziness in the morning time when she wakes up, but states it resolves quickly.  She was not any chest pain, but she does endorse some symptoms of chest heaviness.  She states that this typically occurs with exertion.  She states that it feels as though something is sitting on her chest.  LHC from 2022 was reviewed today with staff interventional cardiologist, it was only notable for some very mild luminal irregularities.  Unlikely CAD that has causing her heaviness.  We will reach out to her CV surgeon to see if they recommend any further testing for this issue.  She continues to have bilateral lower extremity pain, but states that she has had trouble walking following her stroke.  Otherwise she denies any further complaints such as palpitations, PND, orthopnea, syncope,  near-syncope, or peripheral edema.  She was ongoing leg discomfort, but associates this with prior knee surgeries and her prior CVA.  She was had several ultrasounds recently which were all unremarkable.  She follows very closely with vascular surgery for her several vascular issues.  She reports compliance with all her current medications and is tolerating them well.  She denies any tobacco or other illicit drug use.  She was not follow a specific diet at this time.                                                                                                                                                                                                                                                                            CARDIAC TESTING:  CV US Doppler Arterial Legs BL 10.17.24  The right lower extremity demonstrated multiphasic waveforms at all levels.  The YUE on the right is normal.  The right lower extremity demonstrated no significant arterial flow reduction.  The left lower extremity demonstrated triphasic waveforms at all levels.  The YUE on the left was normal.  The left lower extremity demonstrated no significant arterial flow reduction.    CV US BL Doppler Carotid 5.3.24  The right internal carotid artery demonstrated less than 50% stenosis.  There is a mild amount of heterogeneous plaque in the right proximal ICA and carotid bulb.  The left internal carotid artery demonstrated less than 50% stenosis.  There is a mild amount of heterogeneous plaque in the left proximal ICA and carotid bulb.  The bilateral vertebral arteries were patent with antegrade flow.     CV Resting YUE 5.3.24  There are normal multiphasic Doppler waveforms at the right ankle.   The YUE on the right is normal at 1.07.  There are normal multiphasic Doppler waveforms at the left ankle.   The YUE on the left is normal at 1.10.  There is no evidence of significant arterial insufficiency identified on this study.     Echo 5.2.24     Left Ventricle: The left ventricle is normal in size. Normal wall thickness. Biplane (2D) method of discs ejection fraction is 58%.    Right Ventricle: Normal right ventricular cavity size. Systolic function is normal.    Left Atrium: Left atrium is mildly dilated. LA volume index is 37ml/m2.    Right Atrium: Right atrium is mildly dilated.    Aortic Valve: The aortic valve is a trileaflet valve. There is no stenosis. Aortic valve peak velocity is 1.54 m/s. Mean gradient is 5 mmHg.    Mitral Valve: There is no stenosis. The mean pressure gradient across the mitral valve is 1 mmHg at a heart rate of 59 bpm. There is mild regurgitation.    Tricuspid Valve: There is moderate regurgitation.    Pulmonary Artery: The estimated pulmonary artery systolic pressure is 33 mmHg.    IVC/SVC: Normal venous pressure at 3 mmHg.     CV US LE Vein BL Insufficiency 2.1.24    There is no evidence of a right lower extremity DVT.    The right greater saphenous vein has vemous reflux.    There is no evidence of a left lower extremity DVT.    The left greater saphenous vein has venous reflux.    The left GSV is positive for age indeterminate occlusive superficial vein thrombus at the level of the upper and mid calf.  Additionaly, there is a partially occlusive age indeterminate thrombus in a varicosity at the level of the knee.    There are varicosities with venous reflux at multiple levels in the lower extremities bilaterally.     Echo 11.15.22  · The left ventricle is normal in size with concentric remodeling and low normal systolic function.  · The estimated ejection fraction is 50%.  · Normal left ventricular diastolic function.  · Normal right ventricular size with normal right ventricular systolic function.  · Mild left atrial enlargement.  · Mild aortic regurgitation.  · Mild mitral regurgitation.  · Mild to moderate tricuspid regurgitation.  · There is no pulmonary hypertension.  · The estimated PA systolic pressure is 31 mmHg.  ·  Normal central venous pressure (3 mmHg).    Cardiac catheterization 7.11.22    Conclusion  · The pre-procedure left ventricular end diastolic pressure was 10.  · The estimated blood loss was none.  · There was non-obstructive coronary artery disease..    The procedure log was documented by Documenter: Larisa Deng RN and verified by Harry Jj MD.    FINDINGS  Anomalous origin of RCA    RECOMMENDATIONS  Medical management  Risk factor modifications  Activity -- avoid straining with affected limb for one week  Exercise -- as tolerated  Make follow-up appointment with CV surgery for aneurysm repair    Date: 7/11/2022  Time: 11:13 AM       Patient Active Problem List   Diagnosis    Gastroesophageal reflux disease    Hyperlipidemia    Hypertension    Hypothyroidism    Obstructive sleep apnea syndrome    Vitamin D deficiency    Prediabetes    Osteopenia    Osteoarthritis    Insomnia    S/P ascending aortic aneurysm repair    History of CVA (cerebrovascular accident) without residual deficits    SOB (shortness of breath)    Primary osteoarthritis of right knee    Statin intolerance     Past Surgical History:   Procedure Laterality Date    ANGIOGRAM, CORONARY, WITH LEFT HEART CATHETERIZATION N/A 07/11/2022    Procedure: Angiogram, Coronary, with Left Heart Cath;  Surgeon: Harry Jj MD;  Location: [facility] CATH LAB;  Service: Cardiology;  Laterality: N/A;    ARTHROSCOPY OF KNEE      COLONOSCOPY  10/14/2021    Dr. Marquis Fitch    HYSTERECTOMY      REPAIR OF ASCENDING AORTA N/A 07/29/2022    Procedure: REPAIR, AORTA, ASCENDING;  Surgeon: Alec Vega IV, MD;  Location: [facility];  Service: Cardiovascular;  Laterality: N/A;  ECHO NOTIFIED    ROBOTIC ARTHROPLASTY, KNEE Right 3/27/2024    Procedure: ROBOTIC ARTHROPLASTY, KNEE, TOTAL;  Surgeon: Yazan Barragan MD;  Location: [facility] OR;  Service: Orthopedics;  Laterality: Right;     Social History     Socioeconomic History    Marital status:    Tobacco  Use    Smoking status: Former     Current packs/day: 0.00     Average packs/day: 2.3 packs/day for 81.0 years (183.0 ttl pk-yrs)     Types: Cigarettes, Cigars     Start date: 1969     Quit date: 2012     Years since quittin.8    Smokeless tobacco: Never    Tobacco comments:     Quit 12 years ago   Substance and Sexual Activity    Alcohol use: Not Currently    Drug use: Never    Sexual activity: Yes     Partners: Male     Social Drivers of Health     Financial Resource Strain: Medium Risk (2024)    Overall Financial Resource Strain (CARDIA)     Difficulty of Paying Living Expenses: Somewhat hard   Food Insecurity: No Food Insecurity (2024)    Hunger Vital Sign     Worried About Running Out of Food in the Last Year: Never true     Ran Out of Food in the Last Year: Never true   Transportation Needs: No Transportation Needs (2022)    PRAPARE - Transportation     Lack of Transportation (Medical): No     Lack of Transportation (Non-Medical): No   Physical Activity: Unknown (2024)    Exercise Vital Sign     Days of Exercise per Week: 5 days   Stress: No Stress Concern Present (2024)    [employer] [place] [employer] - Occupational Stress Questionnaire     Feeling of Stress : Only a little   Housing Stability: Unknown (2022)    Housing Stability Vital Sign     Unable to Pay for Housing in the Last Year: No     Unstable Housing in the Last Year: No        Family History   Problem Relation Name Age of Onset    Heart disease Mother Beth Campbell     Uterine cancer Mother Beth Campbell     Lung cancer Father      Seizures Sister Kayla villatoro     Heart disease Sister Kayla villatoro      Review of patient's allergies indicates:   Allergen Reactions    Tramadol Hives     Other reaction(s): sweating    Meperidine Rash     Other reaction(s): unknown         ROS:  Review of Systems   Constitutional:  Positive for malaise/fatigue.   HENT: Negative.     Eyes: Negative.   "  Respiratory:  Positive for shortness of breath.    Cardiovascular:  Positive for leg swelling. Negative for chest pain, palpitations, orthopnea, claudication and PND.        "Heaviness"   Gastrointestinal: Negative.    Genitourinary: Negative.    Musculoskeletal:  Positive for joint pain and myalgias.   Skin: Negative.    Neurological:  Positive for weakness. Negative for tingling.   Endo/Heme/Allergies: Negative.    Psychiatric/Behavioral: Negative.                                                                                                                                                                                  Negative except as stated in the history of present illness. See HPI for details.    PHYSICAL EXAM:  Visit Vitals  BP (!) 141/68 (BP Location: Right leg, Patient Position: Sitting)   Pulse (!) 54   Temp 98.3 °F (36.8 °C) (Oral)   Resp 18   Ht 5' 7" (1.702 m)   Wt 90.3 kg (199 lb)   SpO2 97%   BMI 31.17 kg/m²           Physical Exam  Constitutional:       Appearance: Normal appearance. She is obese. She is not ill-appearing.   HENT:      Head: Normocephalic.      Nose: Nose normal.      Mouth/Throat:      Mouth: Mucous membranes are moist.   Eyes:      Extraocular Movements: Extraocular movements intact.   Neck:      Vascular: No carotid bruit.   Cardiovascular:      Rate and Rhythm: Regular rhythm. Bradycardia present.      Pulses: Normal pulses.      Heart sounds: Normal heart sounds.   Pulmonary:      Effort: Pulmonary effort is normal.   Abdominal:      General: There is no distension.   Musculoskeletal:         General: Normal range of motion.      Cervical back: Normal range of motion.      Right lower leg: Edema (Mild) present.      Left lower leg: Edema (Mild) present.   Skin:     General: Skin is warm.   Neurological:      General: No focal deficit present.      Mental Status: She is alert.   Psychiatric:         Mood and Affect: Mood normal.         Current Outpatient Medications " "  Medication Instructions    albuterol (VENTOLIN HFA) 90 mcg/actuation inhaler 1-2 puffs, Inhalation, Every 6 hours PRN, Rescue    albuterol-ipratropium (DUO-NEB) 2.5 mg-0.5 mg/3 mL nebulizer solution 3 mLs, Nebulization, Every 4 hours PRN    amLODIPine (NORVASC) 10 mg, Oral, Daily    aspirin (ECOTRIN) 81 mg, Oral, Daily, Increase to twice a day for 6 weeks post-op for blood clot prevention.    fluticasone propion-salmeteroL 113-14 mcg/actuation AePB 1 puff, Inhalation, 2 times daily    furosemide (LASIX) 20 mg, Oral, Daily PRN    levothyroxine (SYNTHROID) 75 mcg, Oral, Before breakfast    multivit-min/iron/folic/lutein (CENTRUM SILVER WOMEN ORAL) 1 tablet, Daily    nitroGLYCERIN (NITROSTAT) 0.4 mg, Sublingual, Every 5 min PRN    pantoprazole (PROTONIX) 40 mg, Oral, Daily    vitamin D (VITAMIN D3) 1,000 Units, Daily        All medications, laboratory studies, cardiac diagnostic imaging reviewed.     Lab Results   Component Value Date    .00 (H) 06/20/2024    LDL 96.00 04/03/2023    TRIG 132 06/20/2024    TRIG 118 04/03/2023    CREATININE 0.92 06/20/2024    MG 2.10 08/22/2022    K 4.3 06/20/2024        ASSESSMENT/PLAN:  TORRES and SOB  Non Obstructive CAD   - Denies any recent TORRES or SOB- stable from last office visit   - SOB/TORRES resolves with inhaler use   - Workup as above is negative  - Reports chest "heaviness" as if someone is sitting on her chest"- states it occurs at rest and with exertion-she was unable to really determine a pattern  - [facility] from 2022 was reviewed today with staff interventional cardiologist-Given very mild luminal CAD from that time, unlikely that CAD is causing her symptoms   - Will reach out to CV surgery for recommendations, concern that potentially having discomfort from prior aortic aneurysm repair?  - Will recommend PFTs to be completed by primary care   - Continue MAPT      Ascending Aortic Aneurysm s/p Repair  - Performed on 7/29/2022  - Reports chest "heaviness" as if someone is " "sitting on her chest"- states it occurs at rest and with exertion-she was unable to really determine a pattern  - [facility] from 2022 was reviewed today with staff interventional cardiologist-Given very mild luminal CAD from that time, unlikely that CAD is causing her symptoms   - Will reach out to CV surgery for recommendations, concern that potentially having discomfort from prior aortic aneurysm repair?  - BP at goal today   - In the future consider outpatient imaging to assess graft repair? Will reach out to CV surgery for recommendations     HTN  - Blood pressure at goal today - 122/68  - Continue Norvasc 10 mg daily  - Self discontinued Lisinopril 2/2 dizziness and weakness  - Counseled on low sodium, heart healthy diet and exercise as tolerated     HLD  - Patient reports that she is unable to tolerate statins due to severe skin reaction   - Was trialed on Zetia per PCP and ultimately discontinued due to dizziness   - Continue Leqvio for now- is tolerating very well and benefiting form us   - Will have patient complete FLP/CMP prior next office visit   - Counseled on importance of low cholesterol, heart healthy diet, and exercise as tolerated     History of CVA  - Asymptomatic- still with some residual muscle tightness/rigidity from a stroke and some BLE weakness, otherwise denies any further stroke symptoms or recrudescence   - Some right sided weakness  - Continue MAPT and Zetia  - US Carotid with less than 50% stenosis in R ICA and L ICA     Post Op AF  - No recent issues or events   - Asymptomatic, denies tachycardia, palpitations, lightheadedness, dizziness  - Initially placed on Amiodarone but since discontinued.   - Had CROW ligation but still potential risk for cardioembolic CVA.   - CHADSVASc 5  - Xarelto discontinued after 3 months   - Currently not on anticoagulation  - Appeared to be in regular rate with regular rhythm on auscultation today     Post Op DVT  - Pt told she only needed to  be on Xarelto for " 3 months   - Is no longer on anticoagulation   - no further DVT    Venous Insufficiency  - Venous reflux noted in the right and left greater saphenous veins on most recent bilateral lower extremity venous insufficiency ultrasounds  - There was also noted to be a age indeterminate occlusive superficial vein thrombus at the level of the upper and mid calf in the left GSV  - Undergoing GSV ablation in upcoming months  - Recent arterial US without evidence of arterial flow reduction   - Follow up closely with vascular surgery        Complete FLP/CMP-these are fasting   I will reach out to CV surgery-once I am able to get in touch with them I will call you with an updated plan  Follow up in cardiology clinic in 3 months or sooner if needed   Follow up with PCP as directed   Please notify clinic if any new concerns or any change in symptoms   ED precautions given

## 2024-10-25 ENCOUNTER — OFFICE VISIT (OUTPATIENT)
Dept: CARDIOLOGY | Facility: CLINIC | Age: 69
End: 2024-10-25
Payer: MEDICARE

## 2024-10-25 VITALS
WEIGHT: 199 LBS | SYSTOLIC BLOOD PRESSURE: 122 MMHG | HEIGHT: 67 IN | DIASTOLIC BLOOD PRESSURE: 68 MMHG | RESPIRATION RATE: 18 BRPM | HEART RATE: 54 BPM | BODY MASS INDEX: 31.23 KG/M2 | OXYGEN SATURATION: 97 % | TEMPERATURE: 98 F

## 2024-10-25 DIAGNOSIS — G47.33 OBSTRUCTIVE SLEEP APNEA SYNDROME: ICD-10-CM

## 2024-10-25 DIAGNOSIS — E78.2 MIXED HYPERLIPIDEMIA: ICD-10-CM

## 2024-10-25 DIAGNOSIS — I10 PRIMARY HYPERTENSION: ICD-10-CM

## 2024-10-25 DIAGNOSIS — R06.02 SOB (SHORTNESS OF BREATH): ICD-10-CM

## 2024-10-25 DIAGNOSIS — Z86.79 S/P ASCENDING AORTIC ANEURYSM REPAIR: Primary | ICD-10-CM

## 2024-10-25 DIAGNOSIS — R73.03 PREDIABETES: ICD-10-CM

## 2024-10-25 DIAGNOSIS — Z98.890 S/P ASCENDING AORTIC ANEURYSM REPAIR: Primary | ICD-10-CM

## 2024-10-25 PROCEDURE — 99215 OFFICE O/P EST HI 40 MIN: CPT | Mod: PBBFAC

## 2024-10-25 NOTE — PATIENT INSTRUCTIONS
Complete FLP/CMP-these are fasting   I will reach out to CV surgery-once I am able to get in touch with them I will call you with an updated plan  Follow up in cardiology clinic in 3 months or sooner if needed   Follow up with PCP as directed   Please notify clinic if any new concerns or any change in symptoms   ED precautions given

## 2024-11-08 RX ORDER — HYDROXYZINE HYDROCHLORIDE 25 MG/1
25 TABLET, FILM COATED ORAL 3 TIMES DAILY PRN
COMMUNITY

## 2024-11-13 RX ORDER — HYDROXYZINE HYDROCHLORIDE 25 MG/1
25 TABLET, FILM COATED ORAL 3 TIMES DAILY PRN
OUTPATIENT
Start: 2024-11-13

## 2024-11-14 ENCOUNTER — TELEPHONE (OUTPATIENT)
Dept: INTERNAL MEDICINE | Facility: CLINIC | Age: 69
End: 2024-11-14
Payer: MEDICARE

## 2024-11-14 ENCOUNTER — HOSPITAL ENCOUNTER (OUTPATIENT)
Dept: RADIOLOGY | Facility: HOSPITAL | Age: 69
Discharge: HOME | End: 2024-11-14
Payer: MEDICARE

## 2024-11-14 DIAGNOSIS — Z86.79 S/P ASCENDING AORTIC ANEURYSM REPAIR: ICD-10-CM

## 2024-11-14 DIAGNOSIS — Z98.890 S/P ASCENDING AORTIC ANEURYSM REPAIR: ICD-10-CM

## 2024-11-14 DIAGNOSIS — R07.89 CHEST DISCOMFORT: ICD-10-CM

## 2024-11-14 DIAGNOSIS — R06.02 SOB (SHORTNESS OF BREATH): ICD-10-CM

## 2024-11-14 LAB
CREAT SERPL-MCNC: 0.93 MG/DL (ref 0.55–1.02)
GFR SERPLBLD CREATININE-BSD FMLA CKD-EPI: >60 ML/MIN/1.73/M2

## 2024-11-14 PROCEDURE — 71275 CT ANGIOGRAPHY CHEST: CPT | Mod: TC

## 2024-11-14 PROCEDURE — 82565 ASSAY OF CREATININE: CPT

## 2024-11-14 PROCEDURE — 25500020 PHARM REV CODE 255

## 2024-11-14 RX ADMIN — IOHEXOL 100 ML: 350 INJECTION, SOLUTION INTRAVENOUS at 08:11

## 2024-11-14 NOTE — TELEPHONE ENCOUNTER
Contacted patient ID and  verified. Patient requesting results of aterial ultrasound of BLE and YUE. Patient still c/o pain to BLE and stated she recently started taking Leqvio given by Cardiologist. Patient also requesting handicap parking tag due to her BLE pain. Please review and advise

## 2024-11-14 NOTE — TELEPHONE ENCOUNTER
----- Message from Kathie sent at 11/14/2024  8:09 AM CST -----  Regarding: Dr Morris  Caller is:  (Blanquita) Patient       Provider:Dr Morris    Last Visit:9/16/24    Next Visit:03/05/25    Reason for Call:   Pt ask about her results and ask that nurse  Lacey give her a call            Preferred Phone Number: [phone]

## 2024-12-12 RX ORDER — HYDROXYZINE HYDROCHLORIDE 25 MG/1
25 TABLET, FILM COATED ORAL 3 TIMES DAILY PRN
Qty: 30 TABLET | Refills: 0 | Status: SHIPPED | OUTPATIENT
Start: 2024-12-12

## 2024-12-16 ENCOUNTER — INFUSION (OUTPATIENT)
Dept: INFUSION THERAPY | Facility: HOSPITAL | Age: 69
End: 2024-12-16
Attending: INTERNAL MEDICINE
Payer: MEDICARE

## 2024-12-16 VITALS
TEMPERATURE: 98 F | OXYGEN SATURATION: 95 % | SYSTOLIC BLOOD PRESSURE: 158 MMHG | BODY MASS INDEX: 31.83 KG/M2 | DIASTOLIC BLOOD PRESSURE: 67 MMHG | WEIGHT: 202.81 LBS | HEIGHT: 67 IN | RESPIRATION RATE: 20 BRPM | HEART RATE: 66 BPM

## 2024-12-16 DIAGNOSIS — E78.2 MIXED HYPERLIPIDEMIA: ICD-10-CM

## 2024-12-16 DIAGNOSIS — Z78.9 STATIN INTOLERANCE: Primary | ICD-10-CM

## 2024-12-16 DIAGNOSIS — I10 PRIMARY HYPERTENSION: ICD-10-CM

## 2024-12-16 DIAGNOSIS — Z86.73 HISTORY OF CVA (CEREBROVASCULAR ACCIDENT) WITHOUT RESIDUAL DEFICITS: ICD-10-CM

## 2024-12-16 PROCEDURE — 63600175 PHARM REV CODE 636 W HCPCS: Mod: JZ,TB

## 2024-12-16 PROCEDURE — 96372 THER/PROPH/DIAG INJ SC/IM: CPT

## 2024-12-16 RX ORDER — DIPHENHYDRAMINE HYDROCHLORIDE 50 MG/ML
50 INJECTION INTRAMUSCULAR; INTRAVENOUS ONCE AS NEEDED
OUTPATIENT
Start: 2025-03-01

## 2024-12-16 RX ORDER — ACETAMINOPHEN 325 MG/1
650 TABLET ORAL
OUTPATIENT
Start: 2025-03-01

## 2024-12-16 RX ORDER — METHYLPREDNISOLONE SOD SUCC 125 MG
80 VIAL (EA) INJECTION
OUTPATIENT
Start: 2025-03-01

## 2024-12-16 RX ORDER — ONDANSETRON HYDROCHLORIDE 2 MG/ML
8 INJECTION, SOLUTION INTRAVENOUS ONCE AS NEEDED
OUTPATIENT
Start: 2025-03-01

## 2024-12-16 RX ORDER — KETOROLAC TROMETHAMINE 30 MG/ML
15 INJECTION, SOLUTION INTRAMUSCULAR; INTRAVENOUS ONCE AS NEEDED
OUTPATIENT
Start: 2025-03-01

## 2024-12-16 RX ORDER — EPINEPHRINE 0.3 MG/.3ML
0.3 INJECTION SUBCUTANEOUS ONCE AS NEEDED
OUTPATIENT
Start: 2025-03-01

## 2024-12-16 RX ORDER — METHYLPREDNISOLONE SOD SUCC 125 MG
125 VIAL (EA) INJECTION ONCE AS NEEDED
OUTPATIENT
Start: 2025-03-01

## 2024-12-16 RX ADMIN — INCLISIRAN 284 MG: 284 INJECTION, SOLUTION SUBCUTANEOUS at 01:12

## 2024-12-16 NOTE — NURSING
1303  Pt arrived for #2 leqvio which is 3 mon from first.  Next doses q 6 mon.  Pt denies any pain or complaint.

## 2025-01-04 ENCOUNTER — HOSPITAL ENCOUNTER (EMERGENCY)
Facility: HOSPITAL | Age: 70
Discharge: HOME | End: 2025-01-04
Attending: EMERGENCY MEDICINE
Payer: MEDICARE

## 2025-01-04 VITALS
RESPIRATION RATE: 19 BRPM | WEIGHT: 201.75 LBS | TEMPERATURE: 98 F | OXYGEN SATURATION: 98 % | DIASTOLIC BLOOD PRESSURE: 62 MMHG | BODY MASS INDEX: 31.66 KG/M2 | HEART RATE: 56 BPM | HEIGHT: 67 IN | SYSTOLIC BLOOD PRESSURE: 147 MMHG

## 2025-01-04 DIAGNOSIS — G62.9 NEUROPATHY: Primary | ICD-10-CM

## 2025-01-04 DIAGNOSIS — R53.1 WEAKNESS: ICD-10-CM

## 2025-01-04 DIAGNOSIS — R07.9 CHEST PAIN: ICD-10-CM

## 2025-01-04 LAB
ALBUMIN SERPL-MCNC: 3.8 G/DL (ref 3.4–4.8)
ALBUMIN/GLOB SERPL: 1 RATIO (ref 1.1–2)
ALP SERPL-CCNC: 130 UNIT/L (ref 40–150)
ALT SERPL-CCNC: 25 UNIT/L (ref 0–55)
ANION GAP SERPL CALC-SCNC: 8 MEQ/L
AST SERPL-CCNC: 21 UNIT/L (ref 5–34)
BASOPHILS # BLD AUTO: 0.05 X10(3)/MCL
BASOPHILS NFR BLD AUTO: 0.7 %
BILIRUB SERPL-MCNC: 0.5 MG/DL
BNP BLD-MCNC: 121.4 PG/ML
BUN SERPL-MCNC: 12.1 MG/DL (ref 9.8–20.1)
CALCIUM SERPL-MCNC: 9.4 MG/DL (ref 8.4–10.2)
CHLORIDE SERPL-SCNC: 105 MMOL/L (ref 98–107)
CO2 SERPL-SCNC: 26 MMOL/L (ref 23–31)
CREAT SERPL-MCNC: 0.91 MG/DL (ref 0.55–1.02)
CREAT/UREA NIT SERPL: 13
EOSINOPHIL # BLD AUTO: 0.2 X10(3)/MCL (ref 0–0.9)
EOSINOPHIL NFR BLD AUTO: 2.7 %
ERYTHROCYTE [DISTWIDTH] IN BLOOD BY AUTOMATED COUNT: 13.3 % (ref 11.5–17)
GFR SERPLBLD CREATININE-BSD FMLA CKD-EPI: >60 ML/MIN/1.73/M2
GLOBULIN SER-MCNC: 3.8 GM/DL (ref 2.4–3.5)
GLUCOSE SERPL-MCNC: 140 MG/DL (ref 82–115)
HCT VFR BLD AUTO: 43.8 % (ref 37–47)
HGB BLD-MCNC: 14.4 G/DL (ref 12–16)
HOLD SPECIMEN: NORMAL
IMM GRANULOCYTES # BLD AUTO: 0.02 X10(3)/MCL (ref 0–0.04)
IMM GRANULOCYTES NFR BLD AUTO: 0.3 %
LYMPHOCYTES # BLD AUTO: 1.28 X10(3)/MCL (ref 0.6–4.6)
LYMPHOCYTES NFR BLD AUTO: 17 %
MCH RBC QN AUTO: 30 PG (ref 27–31)
MCHC RBC AUTO-ENTMCNC: 32.9 G/DL (ref 33–36)
MCV RBC AUTO: 91.3 FL (ref 80–94)
MONOCYTES # BLD AUTO: 0.42 X10(3)/MCL (ref 0.1–1.3)
MONOCYTES NFR BLD AUTO: 5.6 %
NEUTROPHILS # BLD AUTO: 5.57 X10(3)/MCL (ref 2.1–9.2)
NEUTROPHILS NFR BLD AUTO: 73.7 %
NRBC BLD AUTO-RTO: 0 %
PLATELET # BLD AUTO: 175 X10(3)/MCL (ref 130–400)
PMV BLD AUTO: 11.6 FL (ref 7.4–10.4)
POTASSIUM SERPL-SCNC: 3.7 MMOL/L (ref 3.5–5.1)
PROT SERPL-MCNC: 7.6 GM/DL (ref 5.8–7.6)
RBC # BLD AUTO: 4.8 X10(6)/MCL (ref 4.2–5.4)
SODIUM SERPL-SCNC: 139 MMOL/L (ref 136–145)
TROPONIN I SERPL-MCNC: <0.01 NG/ML (ref 0–0.04)
WBC # BLD AUTO: 7.54 X10(3)/MCL (ref 4.5–11.5)

## 2025-01-04 PROCEDURE — 85025 COMPLETE CBC W/AUTO DIFF WBC: CPT | Performed by: EMERGENCY MEDICINE

## 2025-01-04 PROCEDURE — 80053 COMPREHEN METABOLIC PANEL: CPT | Performed by: EMERGENCY MEDICINE

## 2025-01-04 PROCEDURE — 84484 ASSAY OF TROPONIN QUANT: CPT | Performed by: EMERGENCY MEDICINE

## 2025-01-04 PROCEDURE — 99285 EMERGENCY DEPT VISIT HI MDM: CPT | Mod: 25

## 2025-01-04 PROCEDURE — 83880 ASSAY OF NATRIURETIC PEPTIDE: CPT | Performed by: EMERGENCY MEDICINE

## 2025-01-04 PROCEDURE — 93005 ELECTROCARDIOGRAM TRACING: CPT

## 2025-01-04 RX ORDER — GABAPENTIN 100 MG/1
100 CAPSULE ORAL 3 TIMES DAILY
Qty: 90 CAPSULE | Refills: 11 | Status: SHIPPED | OUTPATIENT
Start: 2025-01-04 | End: 2026-01-04

## 2025-01-04 NOTE — ED PROVIDER NOTES
ED PROVIDER NOTE  1/4/2025    CHIEF COMPLAINT:   Chief Complaint   Patient presents with    Burning in Chest    Burning in Legs     Presents from home with c/o burning and throbbing sensation in chest and bilat legs x1 week. Hx of AAA, PE, CVA, and HTN. Denies cough, chest pain, or SOB.       HISTORY OF PRESENT ILLNESS:   Claudine Montoya is a 69 y.o. female who presents with chief complaint Leg pain.  Onset was about a week ago whenever she began having pain on the inside of both of her legs that she describes as a burning sensation, nothing specifically makes it better or worse.  She also reports having some burning and itching substernal chest pain that she states is aggravated with bending over, states she has been dealing with this off and on since she underwent open heart surgery 2 years ago for thoracic aortic aneurysm repair.  Denies any new chest pain or shortness of breath.    The history is provided by the patient.         REVIEW OF SYSTEMS: as noted in the HPI.  NURSING NOTES REVIEWED      PAST MEDICAL/SURGICAL HISTORY:   Past Medical History:   Diagnosis Date    Anxiety disorder     Arthritis     Coronary artery disease     Current use of long term anticoagulation 09/16/2022    GERD (gastroesophageal reflux disease)     History of CVA (cerebrovascular accident) without residual deficits 09/16/2022    History of deep venous thrombosis (DVT) of distal vein of right lower extremity 09/16/2022    History of pulmonary embolism 09/16/2022    Hypercholesterolemia     Hypertension     Hypothyroidism     Non-healing open wound of right groin 09/16/2022    Obesity     Right groin wound 09/20/2022    S/P ascending aortic aneurysm repair 09/16/2022    Seizure in childhood     last one age 11    Sleep apnea     does not currently use CPAP    Statin intolerance 07/24/2024    Thoracic aortic aneurysm 03/10/2022    4.5X4.4 Ascending Aorta as of 3/10/2022    Thyroid disease       Past Surgical History:   Procedure  Laterality Date    ANGIOGRAM, CORONARY, WITH LEFT HEART CATHETERIZATION N/A 2022    Procedure: Angiogram, Coronary, with Left Heart Cath;  Surgeon: Harry Jj MD;  Location: [facility] CATH LAB;  Service: Cardiology;  Laterality: N/A;    ARTHROSCOPY OF KNEE      COLONOSCOPY  10/14/2021    Dr. Marquis Fitch    HYSTERECTOMY      REPAIR OF ASCENDING AORTA N/A 2022    Procedure: REPAIR, AORTA, ASCENDING;  Surgeon: Alec Vega IV, MD;  Location: [facility] OR;  Service: Cardiovascular;  Laterality: N/A;  ECHO NOTIFIED    ROBOTIC ARTHROPLASTY, KNEE Right 3/27/2024    Procedure: ROBOTIC ARTHROPLASTY, KNEE, TOTAL;  Surgeon: Yazan Barragan MD;  Location: [facility] OR;  Service: Orthopedics;  Laterality: Right;       FAMILY HISTORY:   Family History   Problem Relation Name Age of Onset    Heart disease Mother Beth Campbell     Uterine cancer Mother Beth Campbell     Lung cancer Father      Seizures Sister Kayla villatoro     Heart disease Sister Kayla villatoro        SOCIAL HISTORY:   Social History     Tobacco Use    Smoking status: Former     Current packs/day: 0.00     Average packs/day: 2.3 packs/day for 81.0 years (183.0 ttl pk-yrs)     Types: Cigarettes, Cigars     Start date: 1969     Quit date: 2012     Years since quittin.0    Smokeless tobacco: Never    Tobacco comments:     Quit 12 years ago   Substance Use Topics    Alcohol use: Not Currently    Drug use: Never       ALLERGIES:   Review of patient's allergies indicates:   Allergen Reactions    Tramadol Hives     Other reaction(s): sweating    Meperidine Rash     Other reaction(s): unknown       PHYSICAL EXAM:  Initial Vitals [25 1237]   BP Pulse Resp Temp SpO2   (!) 167/70 65 20 97.7 °F (36.5 °C) 97 %      MAP       --         Physical Exam    Nursing note and vitals reviewed.  Constitutional: She appears well-developed and well-nourished.   HENT:   Head: Normocephalic and atraumatic. Mouth/Throat: Uvula is midline and mucous  membranes are normal.   Eyes: EOM are normal. Pupils are equal, round, and reactive to light.   Neck: Trachea normal. Neck supple.   Cardiovascular:  Normal rate, regular rhythm, intact distal pulses and normal pulses.           Pulmonary/Chest: Effort normal and breath sounds normal.   Abdominal: Abdomen is soft. Bowel sounds are normal. There is no rebound and no guarding.   Musculoskeletal:         General: Normal range of motion.      Cervical back: Neck supple.     Neurological: She is alert and oriented to person, place, and time. GCS eye subscore is 4. GCS verbal subscore is 5. GCS motor subscore is 6.   Skin: Skin is warm and dry.   Psychiatric: She has a normal mood and affect. Her speech is normal. Thought content normal.         RESULTS:  Labs Reviewed   B-TYPE NATRIURETIC PEPTIDE - Abnormal       Result Value    Natriuretic Peptide 121.4 (*)    COMPREHENSIVE METABOLIC PANEL - Abnormal    Sodium 139      Potassium 3.7      Chloride 105      CO2 26      Glucose 140 (*)     Blood Urea Nitrogen 12.1      Creatinine 0.91      Calcium 9.4      Protein Total 7.6      Albumin 3.8      Globulin 3.8 (*)     Albumin/Globulin Ratio 1.0 (*)     Bilirubin Total 0.5            ALT 25      AST 21      eGFR >60      Anion Gap 8.0      BUN/Creatinine Ratio 13     CBC WITH DIFFERENTIAL - Abnormal    WBC 7.54      RBC 4.80      Hgb 14.4      Hct 43.8      MCV 91.3      MCH 30.0      MCHC 32.9 (*)     RDW 13.3      Platelet 175      MPV 11.6 (*)     Neut % 73.7      Lymph % 17.0      Mono % 5.6      Eos % 2.7      Basophil % 0.7      Lymph # 1.28      Neut # 5.57      Mono # 0.42      Eos # 0.20      Baso # 0.05      IG# 0.02      IG% 0.3      NRBC% 0.0     TROPONIN I - Normal    Troponin-I <0.010     CBC W/ AUTO DIFFERENTIAL    Narrative:     The following orders were created for panel order CBC auto differential.  Procedure                               Abnormality         Status                     ---------                                -----------         ------                     CBC with Differential[[id number]]       Abnormal            Final result                 Please view results for these tests on the individual orders.   EXTRA TUBES    Narrative:     The following orders were created for panel order EXTRA TUBES.  Procedure                               Abnormality         Status                     ---------                               -----------         ------                     Light Blue Top Hold[[id number]]                             Final result               Red Top Hold[[id number]]                                    Final result               Light Green Top Hold[[id number]]                            Final result               Gold Top Hold[[id number]]                                   Final result                 Please view results for these tests on the individual orders.   LIGHT BLUE TOP HOLD    Extra Tube Hold for add-ons.     RED TOP HOLD    Extra Tube Hold for add-ons.     LIGHT GREEN TOP HOLD    Extra Tube Hold for add-ons.     GOLD TOP HOLD    Extra Tube Hold for add-ons.       Imaging Results              X-Ray Chest AP Portable (Final result)  Result time 01/04/25 13:21:09      Final result by Kirby Harrell MD (01/04/25 13:21:09)                   Impression:      No acute cardiopulmonary process identified.      Electronically signed by: Kirby Harrell  Date:    01/04/2025  Time:    13:21               Narrative:    EXAMINATION:  XR CHEST AP PORTABLE    CLINICAL HISTORY:  chest pain;    TECHNIQUE:  One view    COMPARISON:  March 12, 2024.    FINDINGS:  Cardiopericardial silhouette is within normal limits.  Sternotomy changes.  No acute dense focal or segmental consolidation, congestive process, pleural effusions or pneumothorax.                                      PROCEDURES:  ED US DVT Lower Extremity    Date/Time: 1/4/2025 1:05 PM    Performed by: Du Chau DO  Authorized by:  Du Chau DO    Indication:  Lower extremity pain  Identified structures:  Right  Findings:     Impression:  No DVT    Charge?:  No (educational)      ECG:  EKG Readings: (Independently Interpreted)   Initial Reading: No STEMI. Rhythm: Sinus Bradycardia. Heart Rate: 59. Ectopy: No Ectopy. Conduction: Normal. Axis: Normal.       ED COURSE AND MEDICAL DECISION MAKING:  Medications - No data to display  ED Course as of 01/06/25 1405   Sat Jan 04, 2025   1314 WBC: 7.54 [IB]   1314 Hemoglobin: 14.4 [IB]   1314 Platelet Count: 175 [IB]   1336 Creatinine: 0.91 [IB]   1348 BNP(!): 121.4 [IB]   1439 Troponin I: <0.010 [IB]      ED Course User Index  [IB] Du Chau DO        Medical Decision Making  This patient presents with chest pain that is very unlikely angina or acute coronary syndrome. The emergency department evaluation has not identified any cause for suspicion that this chest pain has a cardiac etiology. Based on their history, EKG (which showed no evidence of infarction) and imaging, in addition to the patient's physical exam, I see no evidence at this time for a malignant etiology for the patient's chest pain. There is no acute evidence for pulmonary embolus, acute myocardial infarction, pneumothorax, Boerhaeve syndrome, cardiac tamponade, thoracic artery dissection, or any other emergent cardiac, pulmonary or aortic pathology. Given the low pre-test probability for cardiac etiology of chest pain and the absence of any sign of infarction, discharge for outpatient follow-up and further evaluation is reasonable.    I have explained to the patient that even though a cardiac problem is very unlikely, follow-up and further testing is required to reduce further the already small uncertainty that exists. Other life-threatening diagnoses have been considered. The patient understands the need to return immediately if their symptoms worsen or they develop any new symptoms, and not to engage in any significant  exertional activity until follow-up is obtained.  Given strict ED return precautions. I have spoken with the patient and/or caregivers. I have explained the patient's condition, diagnoses and treatment plan based on the information available to me at this time. I have answered the patient's and/or caregiver's questions and addressed any concerns. The patient and/or caregivers have as good an understanding of the patient's diagnosis, condition and treatment plan as can be expected at this point. The vital signs have been stable. The patient's condition is stable and appropriate for discharge from the emergency department.     The patient will pursue further outpatient evaluation with the primary care physician or other designated or consulting physician as outlined in the discharge instructions. The patient and/or caregivers are agreeable to this plan of care and follow-up instructions have been explained in detail. The patient and/or caregivers have received these instructions in written format and have expressed an understanding of the discharge instructions. The patient and/or caregivers are aware that any significant change in condition or worsening of symptoms should prompt an immediate return to this or the closest emergency department or a call to 911.    Amount and/or Complexity of Data Reviewed  Labs: ordered. Decision-making details documented in ED Course.  Radiology: ordered.  ECG/medicine tests: ordered and independent interpretation performed.    Risk  OTC drugs.  Prescription drug management.  Decision regarding hospitalization.        CLINICAL IMPRESSION:  1. Neuropathy    2. Weakness    3. Chest pain        DISPOSITION:   ED Disposition Condition    Discharge Stable            ED Prescriptions       Medication Sig Dispense Start Date End Date Auth. Provider    gabapentin (NEURONTIN) 100 MG capsule Take 1 capsule (100 mg total) by mouth 3 (three) times daily. 90 capsule 1/4/2025 1/4/2026 Isac  DO Du          Follow-up Information       Follow up With Specialties Details Why Contact Info    Gary Morris MD Internal Medicine Schedule an appointment as soon as possible for a visit   [address] [facility] [address]  [phone]      [facility] Emergency Dept Emergency Medicine  If symptoms worsen [address] [facility] [id number] [phone]               Du Chau DO  01/06/25 1408

## 2025-01-05 LAB
OHS QRS DURATION: 82 MS
OHS QTC CALCULATION: 413 MS

## 2025-02-04 DIAGNOSIS — M17.11 PRIMARY OSTEOARTHRITIS OF RIGHT KNEE: ICD-10-CM

## 2025-02-04 RX ORDER — ASPIRIN 81 MG/1
81 TABLET ORAL DAILY
Qty: 90 TABLET | Refills: 3 | Status: SHIPPED | OUTPATIENT
Start: 2025-02-04 | End: 2026-01-30

## 2025-02-10 ENCOUNTER — OFFICE VISIT (OUTPATIENT)
Dept: CARDIOLOGY | Facility: CLINIC | Age: 70
End: 2025-02-10
Payer: MEDICARE

## 2025-02-10 VITALS
SYSTOLIC BLOOD PRESSURE: 122 MMHG | BODY MASS INDEX: 31.97 KG/M2 | OXYGEN SATURATION: 98 % | HEART RATE: 55 BPM | RESPIRATION RATE: 18 BRPM | DIASTOLIC BLOOD PRESSURE: 60 MMHG | HEIGHT: 67 IN | WEIGHT: 203.69 LBS | TEMPERATURE: 99 F

## 2025-02-10 DIAGNOSIS — I10 PRIMARY HYPERTENSION: ICD-10-CM

## 2025-02-10 DIAGNOSIS — Z98.890 S/P ASCENDING AORTIC ANEURYSM REPAIR: Primary | ICD-10-CM

## 2025-02-10 DIAGNOSIS — E78.2 MIXED HYPERLIPIDEMIA: ICD-10-CM

## 2025-02-10 DIAGNOSIS — Z86.79 S/P ASCENDING AORTIC ANEURYSM REPAIR: Primary | ICD-10-CM

## 2025-02-10 PROCEDURE — 99215 OFFICE O/P EST HI 40 MIN: CPT | Mod: PBBFAC

## 2025-02-10 NOTE — PATIENT INSTRUCTIONS
Complete FLP/CMP prior next office visit  Follow up in Cardiology Clinic in 5 months or sooner if needed   Follow up with PCP as directed   Please notify clinic if any new concerns or any change symptoms  ED precautions given

## 2025-02-10 NOTE — PROGRESS NOTES
CHIEF COMPLAINT:   Chief Complaint   Patient presents with    Follow-up     3 mos f/u EKG 01/04/25 burning in chest since surgery                                                  HPI:  Claudine Montoya 69 y.o. 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  At last office visit patient stated that she was feeling mostly stable from a cardiac standpoint, but she did have some chest heaviness at that time.  She stated that it has been present for a few years.  We repeated a CTA of her chest which was unremarkable and unchanged from prior.  No need to for her to follow up with CV surgery at this time.    Today the patient states that she feels well from a cardiac standpoint.  She states that recently she has been having some neuropathic pain.  She states that the pelvic pain occurs in her chest area at incision site and in her lower extremities.  Is completely relieved with gabapentin.  She does not like to take gabapentin daily as she believes that it causes her to gain weight.  Encouraged her to discuss this with PCP as they may be able to change her to an alternative medication.  She also states that she has a difficult time losing weight.  Advised her to discuss this with PCP as they may be able to prescribe her something to help with weight loss.  Otherwise she denies any further cardiac complaints today such as chest pain, palpitations, PND, orthopnea, lightheadedness, dizziness, syncope, lower extremity edema, or claudication symptoms.  She has stable SOB/TORRES that is unchanged from prior.  She continues to have some bilateral lower extremity pain and some ambulation issues, but she associates this with prior stroke.  She follows very closely with vascular surgery and other specialties.  She reports  compliance with all her current medications and she is tolerating them well.  She recently had 2nd Leqvio injection in his tolerating well.  Pending updated FLP.  She denies any tobacco or other illicit drug use.  She tries her best to follow a low-cholesterol diet.                                                                                                                                                            CARDIAC TESTING:  CV US Doppler Arterial Legs BL 10.17.24  The right lower extremity demonstrated multiphasic waveforms at all levels.  The YUE on the right is normal.  The right lower extremity demonstrated no significant arterial flow reduction.  The left lower extremity demonstrated triphasic waveforms at all levels.  The YUE on the left was normal.  The left lower extremity demonstrated no significant arterial flow reduction.    CV US BL Doppler Carotid 5.3.24  The right internal carotid artery demonstrated less than 50% stenosis.  There is a mild amount of heterogeneous plaque in the right proximal ICA and carotid bulb.  The left internal carotid artery demonstrated less than 50% stenosis.  There is a mild amount of heterogeneous plaque in the left proximal ICA and carotid bulb.  The bilateral vertebral arteries were patent with antegrade flow.     CV Resting YUE 5.3.24  There are normal multiphasic Doppler waveforms at the right ankle.   The YUE on the right is normal at 1.07.  There are normal multiphasic Doppler waveforms at the left ankle.   The YUE on the left is normal at 1.10.  There is no evidence of significant arterial insufficiency identified on this study.     Echo 5.2.24    Left Ventricle: The left ventricle is normal in size. Normal wall thickness. Biplane (2D) method of discs ejection fraction is 58%.    Right Ventricle: Normal right ventricular cavity size. Systolic function is normal.    Left Atrium: Left atrium is mildly dilated. LA volume index is 37ml/m2.    Right Atrium: Right  atrium is mildly dilated.    Aortic Valve: The aortic valve is a trileaflet valve. There is no stenosis. Aortic valve peak velocity is 1.54 m/s. Mean gradient is 5 mmHg.    Mitral Valve: There is no stenosis. The mean pressure gradient across the mitral valve is 1 mmHg at a heart rate of 59 bpm. There is mild regurgitation.    Tricuspid Valve: There is moderate regurgitation.    Pulmonary Artery: The estimated pulmonary artery systolic pressure is 33 mmHg.    IVC/SVC: Normal venous pressure at 3 mmHg.     CV US LE Vein BL Insufficiency 2.1.24    There is no evidence of a right lower extremity DVT.    The right greater saphenous vein has vemous reflux.    There is no evidence of a left lower extremity DVT.    The left greater saphenous vein has venous reflux.    The left GSV is positive for age indeterminate occlusive superficial vein thrombus at the level of the upper and mid calf.  Additionaly, there is a partially occlusive age indeterminate thrombus in a varicosity at the level of the knee.    There are varicosities with venous reflux at multiple levels in the lower extremities bilaterally.     Echo 11.15.22  · The left ventricle is normal in size with concentric remodeling and low normal systolic function.  · The estimated ejection fraction is 50%.  · Normal left ventricular diastolic function.  · Normal right ventricular size with normal right ventricular systolic function.  · Mild left atrial enlargement.  · Mild aortic regurgitation.  · Mild mitral regurgitation.  · Mild to moderate tricuspid regurgitation.  · There is no pulmonary hypertension.  · The estimated PA systolic pressure is 31 mmHg.  · Normal central venous pressure (3 mmHg).    Cardiac catheterization 7.11.22    Conclusion  · The pre-procedure left ventricular end diastolic pressure was 10.  · The estimated blood loss was none.  · There was non-obstructive coronary artery disease..    The procedure log was documented by Documenter: Larisa  FREDDY Deng and verified by Harry Jj MD.    FINDINGS  Anomalous origin of RCA    RECOMMENDATIONS  Medical management  Risk factor modifications  Activity -- avoid straining with affected limb for one week  Exercise -- as tolerated  Make follow-up appointment with CV surgery for aneurysm repair    Date: 2022  Time: 11:13 AM       Patient Active Problem List   Diagnosis    Gastroesophageal reflux disease    Hyperlipidemia    Hypertension    Hypothyroidism    Obstructive sleep apnea syndrome    Vitamin D deficiency    Prediabetes    Osteopenia    Osteoarthritis    Insomnia    S/P ascending aortic aneurysm repair    History of CVA (cerebrovascular accident) without residual deficits    SOB (shortness of breath)    Primary osteoarthritis of right knee    Statin intolerance     Past Surgical History:   Procedure Laterality Date    ANGIOGRAM, CORONARY, WITH LEFT HEART CATHETERIZATION N/A 2022    Procedure: Angiogram, Coronary, with Left Heart Cath;  Surgeon: Harry Jj MD;  Location: [facility] C[facility];  Service: Cardiology;  Laterality: N/A;    ARTHROSCOPY OF KNEE      COLONOSCOPY  10/14/2021    Dr. Marquis Fitch    HYSTERECTOMY      REPAIR OF ASCENDING AORTA N/A 2022    Procedure: REPAIR, AORTA, ASCENDING;  Surgeon: Alec Vega IV, MD;  Location: [facility];  Service: Cardiovascular;  Laterality: N/A;  ECHO NOTIFIED    ROBOTIC ARTHROPLASTY, KNEE Right 3/27/2024    Procedure: ROBOTIC ARTHROPLASTY, KNEE, TOTAL;  Surgeon: Yazan Barragan MD;  Location: [facility] OR;  Service: Orthopedics;  Laterality: Right;     Social History     Socioeconomic History    Marital status:    Tobacco Use    Smoking status: Former     Current packs/day: 0.00     Average packs/day: 2.3 packs/day for 81.0 years (183.0 ttl pk-yrs)     Types: Cigarettes, Cigars     Start date: 1969     Quit date: 2012     Years since quittin.1    Smokeless tobacco: Never    Tobacco comments:     Quit 12 years ago  "  Substance and Sexual Activity    Alcohol use: Not Currently    Drug use: Never    Sexual activity: Not Currently     Partners: Female     Social Drivers of Health     Financial Resource Strain: Medium Risk (9/13/2024)    Overall Financial Resource Strain (CARDIA)     Difficulty of Paying Living Expenses: Somewhat hard   Food Insecurity: No Food Insecurity (9/13/2024)    Hunger Vital Sign     Worried About Running Out of Food in the Last Year: Never true     Ran Out of Food in the Last Year: Never true   Transportation Needs: No Transportation Needs (6/28/2022)    PRAPARE - Transportation     Lack of Transportation (Medical): No     Lack of Transportation (Non-Medical): No   Physical Activity: Unknown (9/13/2024)    Exercise Vital Sign     Days of Exercise per Week: 5 days   Stress: No Stress Concern Present (9/13/2024)    Citizen of [place] [employer] - Occupational Stress Questionnaire     Feeling of Stress : Only a little   Housing Stability: Unknown (6/28/2022)    Housing Stability Vital Sign     Unable to Pay for Housing in the Last Year: No     Unstable Housing in the Last Year: No        Family History   Problem Relation Name Age of Onset    Heart disease Mother Beth Campbell     Uterine cancer Mother Beth Campbell     Lung cancer Father      Seizures Sister Kayla villatoro     Heart disease Sister Kayla villatoro      Review of patient's allergies indicates:   Allergen Reactions    Tramadol Hives     Other reaction(s): sweating    Meperidine Rash     Other reaction(s): unknown         ROS:  Review of Systems   Constitutional:  Negative for malaise/fatigue.   HENT: Negative.     Eyes: Negative.    Respiratory:  Positive for shortness of breath.    Cardiovascular:  Positive for leg swelling. Negative for chest pain, palpitations, orthopnea, claudication and PND.        "Heaviness"   Gastrointestinal: Negative.    Genitourinary: Negative.    Musculoskeletal:  Positive for joint pain and myalgias. " "  Skin: Negative.    Neurological:  Positive for weakness. Negative for tingling.   Endo/Heme/Allergies: Negative.    Psychiatric/Behavioral: Negative.                                                                                                                                                                                  Negative except as stated in the history of present illness. See HPI for details.    PHYSICAL EXAM:  Visit Vitals  /60   Pulse (!) 55   Temp 98.8 °F (37.1 °C) (Oral)   Resp 18   Ht 5' 7" (1.702 m)   Wt 92.4 kg (203 lb 11.3 oz)   SpO2 98%   BMI 31.90 kg/m²           Physical Exam  Constitutional:       Appearance: Normal appearance. She is obese. She is not ill-appearing.   HENT:      Head: Normocephalic.      Nose: Nose normal.      Mouth/Throat:      Mouth: Mucous membranes are moist.   Eyes:      Extraocular Movements: Extraocular movements intact.   Neck:      Vascular: No carotid bruit.   Cardiovascular:      Rate and Rhythm: Regular rhythm. Bradycardia present.      Pulses: Normal pulses.      Heart sounds: Normal heart sounds.   Pulmonary:      Effort: Pulmonary effort is normal.   Abdominal:      General: There is no distension.   Musculoskeletal:         General: Normal range of motion.      Cervical back: Normal range of motion.      Right lower leg: No edema (Mild).      Left lower leg: No edema (Mild).   Skin:     General: Skin is warm.   Neurological:      General: No focal deficit present.      Mental Status: She is alert.   Psychiatric:         Mood and Affect: Mood normal.         Current Outpatient Medications   Medication Instructions    albuterol (VENTOLIN HFA) 90 mcg/actuation inhaler 1-2 puffs, Inhalation, Every 6 hours PRN, Rescue    albuterol-ipratropium (DUO-NEB) 2.5 mg-0.5 mg/3 mL nebulizer solution 3 mLs, Nebulization, Every 4 hours PRN    amLODIPine (NORVASC) 10 mg, Oral, Daily    aspirin (ECOTRIN) 81 mg, Oral, Daily, Increase to twice a day for 6 weeks post-op " for blood clot prevention.    fluticasone propion-salmeteroL 113-14 mcg/actuation AePB 1 puff, Inhalation, 2 times daily    furosemide (LASIX) 20 mg, Oral, Daily PRN    gabapentin (NEURONTIN) 100 mg, Oral, 3 times daily    hydrOXYzine HCL (ATARAX) 25 mg, Oral, 3 times daily PRN    levothyroxine (SYNTHROID) 75 mcg, Oral, Before breakfast    multivit-min/iron/folic/lutein (CENTRUM SILVER WOMEN ORAL) 1 tablet, Daily    nitroGLYCERIN (NITROSTAT) 0.4 mg, Sublingual, Every 5 min PRN    pantoprazole (PROTONIX) 40 mg, Oral, Daily    vitamin D (VITAMIN D3) 1,000 Units, Daily        All medications, laboratory studies, cardiac diagnostic imaging reviewed.     Lab Results   Component Value Date    LDL 98.00 11/14/2024    .00 (H) 06/20/2024    TRIG 105 11/14/2024    TRIG 132 06/20/2024    CREATININE 0.91 01/04/2025    MG 2.10 08/22/2022    K 3.7 01/04/2025        ASSESSMENT/PLAN:  TORRES and SOB  Non Obstructive CAD   - Denies any recent TORRES or SOB- stable from last office visit   - SOB/TORRES resolves with inhaler use   - Workup as above is negative  - No further chest heaviness today- has some burning and neuropathic pain along the incision site, but states that it is relieved with gabapentin  - LHC from 2022 was reviewed today with staff interventional cardiologist-Given very mild luminal CAD from that time, unlikely that CAD is causing her symptoms   - Will recommend PFTs to be completed by primary care   - Continue MAPT      Ascending Aortic Aneurysm s/p Repair  - Performed on 7/29/2022  - Reports prior complaints of chest heaviness have resolved- mostly just has neuropathic pain at incision site that she states is resolved with gabapentin   - CTA Chest completed following last visit revealed 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 - so no need for CV surgery appt at this time, will continue to monitor clinically   - LHC from 2022 was reviewed today with staff interventional  cardiologist-Given very mild luminal CAD from that time, unlikely that CAD is causing her symptoms   - BP at goal today      HTN  - Blood pressure at goal today - 122/60  - Continue Norvasc 10 mg daily  - Self discontinued Lisinopril 2/2 dizziness and weakness  - Counseled on low sodium, heart healthy diet and exercise as tolerated     HLD  - Patient reports that she is unable to tolerate statins due to severe skin reaction   - Was trialed on Zetia per PCP and ultimately discontinued due to dizziness   - Continue Leqvio for now- is tolerating very well and benefiting from use- had second injection 12.2024   - Will have patient complete FLP/CMP prior next office visit - will order today   - Counseled on importance of low cholesterol, heart healthy diet, and exercise as tolerated     History of CVA  - No new neuro symptoms- still with some residual muscle tightness/rigidity from a stroke and some BLE weakness, otherwise denies any further stroke symptoms or recrudescence   - Some right sided weakness  - Continue MAPT and Zetia  - US Carotid with less than 50% stenosis in R ICA and L ICA     Post Op AF  - No recent issues or events - appears to be in regular rate with regular rhythm today   - Asymptomatic, denies tachycardia, palpitations, lightheadedness, dizziness  - Initially placed on Amiodarone but since discontinued.   - Had CROW ligation but still potential risk for cardioembolic CVA.   - CHADSVASc 5  - Xarelto discontinued after 3 months   - Currently not on anticoagulation  - Appeared to be in regular rate with regular rhythm on auscultation today     Post Op DVT  - Pt told she only needed to  be on Xarelto for 3 months   - Is no longer on anticoagulation   - no further DVT    Venous Insufficiency  - No change in symptoms from last office visit   - Venous reflux noted in the right and left greater saphenous veins on most recent bilateral lower extremity venous insufficiency ultrasounds  - There was also noted  to be a age indeterminate occlusive superficial vein thrombus at the level of the upper and mid calf in the left GSV  - Undergoing GSV ablation in upcoming months  - Recent arterial US without evidence of arterial flow reduction   - Follow up closely with vascular surgery    Obesity  - Counseled on the importance of weight loss  - Recommend weight loss through healthy diet and exercise as tolerated  - States that she will discuss with PCP, is interested in assistance with weight loss     Complete FLP/CMP prior next office visit  Follow up in Cardiology Clinic in 5 months or sooner if needed   Follow up with PCP as directed   Please notify clinic if any new concerns or any change symptoms  ED precautions given

## 2025-02-28 RX ORDER — DICLOFENAC SODIUM 10 MG/G
1 GEL TOPICAL 3 TIMES DAILY PRN
COMMUNITY
Start: 2024-12-12 | End: 2025-03-05 | Stop reason: SDUPTHER

## 2025-03-05 ENCOUNTER — OFFICE VISIT (OUTPATIENT)
Dept: INTERNAL MEDICINE | Facility: CLINIC | Age: 70
End: 2025-03-05
Payer: MEDICARE

## 2025-03-05 VITALS
BODY MASS INDEX: 32.23 KG/M2 | HEART RATE: 58 BPM | WEIGHT: 205.81 LBS | RESPIRATION RATE: 20 BRPM | SYSTOLIC BLOOD PRESSURE: 136 MMHG | TEMPERATURE: 98 F | DIASTOLIC BLOOD PRESSURE: 62 MMHG | OXYGEN SATURATION: 98 %

## 2025-03-05 DIAGNOSIS — M17.11 PRIMARY OSTEOARTHRITIS OF RIGHT KNEE: ICD-10-CM

## 2025-03-05 DIAGNOSIS — I10 HYPERTENSION, UNSPECIFIED TYPE: ICD-10-CM

## 2025-03-05 DIAGNOSIS — R73.03 PREDIABETES: Primary | ICD-10-CM

## 2025-03-05 DIAGNOSIS — K21.9 GASTROESOPHAGEAL REFLUX DISEASE, UNSPECIFIED WHETHER ESOPHAGITIS PRESENT: ICD-10-CM

## 2025-03-05 DIAGNOSIS — J44.9 CHRONIC OBSTRUCTIVE PULMONARY DISEASE, UNSPECIFIED COPD TYPE: ICD-10-CM

## 2025-03-05 DIAGNOSIS — R06.02 SOB (SHORTNESS OF BREATH): ICD-10-CM

## 2025-03-05 DIAGNOSIS — Z00.00 HEALTHCARE MAINTENANCE: ICD-10-CM

## 2025-03-05 DIAGNOSIS — Z53.20 REFUSAL OF STATIN MEDICATION BY PATIENT: ICD-10-CM

## 2025-03-05 DIAGNOSIS — F17.200 HAS BEEN SMOKING TOBACCO FOR 30 YEARS OR MORE: ICD-10-CM

## 2025-03-05 DIAGNOSIS — Z86.73 HISTORY OF CVA (CEREBROVASCULAR ACCIDENT) WITHOUT RESIDUAL DEFICITS: ICD-10-CM

## 2025-03-05 DIAGNOSIS — E78.5 HYPERLIPIDEMIA, UNSPECIFIED HYPERLIPIDEMIA TYPE: ICD-10-CM

## 2025-03-05 DIAGNOSIS — Z76.0 MEDICATION REFILL: ICD-10-CM

## 2025-03-05 DIAGNOSIS — Z98.890 S/P ASCENDING AORTIC ANEURYSM REPAIR: ICD-10-CM

## 2025-03-05 DIAGNOSIS — E03.9 HYPOTHYROIDISM, UNSPECIFIED TYPE: ICD-10-CM

## 2025-03-05 DIAGNOSIS — F17.211 NICOTINE DEPENDENCE, CIGARETTES, IN REMISSION: ICD-10-CM

## 2025-03-05 DIAGNOSIS — Z86.79 S/P ASCENDING AORTIC ANEURYSM REPAIR: ICD-10-CM

## 2025-03-05 DIAGNOSIS — I82.511 CHRONIC EMBOLISM AND THROMBOSIS OF RIGHT FEMORAL VEIN: ICD-10-CM

## 2025-03-05 DIAGNOSIS — Z86.718 HISTORY OF DEEP VENOUS THROMBOSIS (DVT) OF DISTAL VEIN OF RIGHT LOWER EXTREMITY: ICD-10-CM

## 2025-03-05 DIAGNOSIS — M79.2 NEUROPATHIC PAIN: ICD-10-CM

## 2025-03-05 DIAGNOSIS — Z86.711 HISTORY OF PULMONARY EMBOLUS (PE): ICD-10-CM

## 2025-03-05 LAB — HBA1C MFR BLD: 6.3 %

## 2025-03-05 PROCEDURE — 99214 OFFICE O/P EST MOD 30 MIN: CPT | Mod: PBBFAC

## 2025-03-05 PROCEDURE — 83036 HEMOGLOBIN GLYCOSYLATED A1C: CPT | Mod: PBBFAC

## 2025-03-05 RX ORDER — ASPIRIN 81 MG/1
81 TABLET ORAL DAILY
Qty: 90 TABLET | Refills: 3 | Status: SHIPPED | OUTPATIENT
Start: 2025-03-05 | End: 2026-02-28

## 2025-03-05 RX ORDER — IPRATROPIUM BROMIDE AND ALBUTEROL SULFATE 2.5; .5 MG/3ML; MG/3ML
3 SOLUTION RESPIRATORY (INHALATION) EVERY 4 HOURS PRN
Qty: 75 ML | Refills: 6 | Status: SHIPPED | OUTPATIENT
Start: 2025-03-05

## 2025-03-05 RX ORDER — AMLODIPINE BESYLATE 10 MG/1
10 TABLET ORAL DAILY
Qty: 90 TABLET | Refills: 3 | Status: SHIPPED | OUTPATIENT
Start: 2025-03-05

## 2025-03-05 RX ORDER — GABAPENTIN 100 MG/1
100 CAPSULE ORAL 3 TIMES DAILY
Qty: 270 CAPSULE | Refills: 3 | Status: SHIPPED | OUTPATIENT
Start: 2025-03-05 | End: 2026-03-05

## 2025-03-05 RX ORDER — HYDROXYZINE HYDROCHLORIDE 25 MG/1
25 TABLET, FILM COATED ORAL 3 TIMES DAILY PRN
Qty: 30 TABLET | Refills: 1 | Status: SHIPPED | OUTPATIENT
Start: 2025-03-05

## 2025-03-05 RX ORDER — PANTOPRAZOLE SODIUM 40 MG/1
40 TABLET, DELAYED RELEASE ORAL DAILY
Qty: 90 TABLET | Refills: 3 | Status: SHIPPED | OUTPATIENT
Start: 2025-03-05 | End: 2026-03-05

## 2025-03-05 RX ORDER — DICLOFENAC SODIUM 10 MG/G
1 GEL TOPICAL 3 TIMES DAILY PRN
Qty: 200 G | Refills: 3 | Status: SHIPPED | OUTPATIENT
Start: 2025-03-05 | End: 2025-09-01

## 2025-03-05 RX ORDER — DULOXETIN HYDROCHLORIDE 60 MG/1
60 CAPSULE, DELAYED RELEASE ORAL DAILY
Qty: 90 CAPSULE | Refills: 3 | Status: SHIPPED | OUTPATIENT
Start: 2025-03-05 | End: 2026-03-05

## 2025-03-05 RX ORDER — LEVOTHYROXINE SODIUM 75 UG/1
75 TABLET ORAL
Qty: 90 TABLET | Refills: 3 | Status: SHIPPED | OUTPATIENT
Start: 2025-03-05

## 2025-03-05 RX ORDER — ALBUTEROL SULFATE 90 UG/1
1-2 INHALANT RESPIRATORY (INHALATION) EVERY 6 HOURS PRN
Qty: 18 G | Refills: 3 | Status: SHIPPED | OUTPATIENT
Start: 2025-03-05

## 2025-03-05 NOTE — LETTER
I certify that (Name) Claudine Montoya meets the requirements as outlined in #  (shown on reverse side) and qualifies for a mobility impaired license plate/hang-tag. I further understand that willful and false certification shall subject me to fines/imprisonment as outlined in R.S. 47:463.4 (G) (4). The applicant's information is as follows:    YOB: 1955            Race:White        Gender:Female    Address:  [address] [place] [address]    City:[place]                               State:[place]     Zip Code:[address]     []Permanently Impaired - Applicant has a total or lifelong condition of mobility impairment from which little or no improvement or recovery can reasonably be expected. A medical examiners certification is required on initial application only.      [] Temporarily Impaired - Applicant has a temporary condition of mobility impairment of which improvement or recovery can reasonably be expected. Applicant is entitled to a hangtag, which will be valid for one (1) year. A medical examiners certification is required for the renewal of the hangtag      [] Unable to appear in person at the Office of Motor Vehicles - Applicant must bring facial photo        Medical Examiner's Signature________________________________________ Date:3/5/25_________________________    Printed Name:___________________________________________________    State License #_____________________________    Address: [address] [place] [address] [address]                                     Phone Number: [phone]                                                                                                                                                                                                                  City: [place]__________________________________ State: [place] __Zip Code: [address] _______________    TO BE COMPLETED BY MOTOR VEHICLE ANALYST ONLY    ERIC   Lic. Plate #      Hangtag Control #   Hangtag ID #      Date  Issued:    #:   Office #:

## 2025-03-05 NOTE — PROGRESS NOTES
IM Clinic Note    Patient Name: Claudine Montoya  YOB: 1955   MRN: [id number]  Date: 03/05/2025  Home Address: [facility] [place] [address]      Subjective:     Chief Complaint:   Chief Complaint   Patient presents with    Follow-up     Test Results.  Medication Refills- voltaren gel, duoneb, pantoprazole and hydroxyzine. Handicap tag     HPI:  Blanquita Montoya 67 y.o. female  She had ascending aortic aneurysm s/p repair with post op DVT/PT (previously on Eliquis), post op AF (previously on Xarelto), HTN, HLD, non-obstructive CAD on coronary angiogram, history of CVA without deficits, MIKI, hypothyroidism, Vit D defciency, GERD, Osteopenia, and prediabetes who presents to clinic today for follow up. CTA non-coronary repeated per cardiology, no significant stenosis or blockages noted. Aortic pseudoaneurysm stable. Continues to report LLE neuropathic pain, states Gabapentin helps with this but also makes her drowsy. States she does not take scheduled Advair or Breo for hx of COPD. Would like to try Duloxetine for neuropathic pain.     Care Team   [facility] Cardiology- Last appointment on 2/10/25. Next appointment 7/22/25.   [facility] Gynecology: Follows for women's health. Last appointment 6/2021, no next scheduled appointment   External Vascular: signed off   External Orthopedics: follows for knee replacement. Next appointment 5/6/25.       Health Care Maintenance   - Mammogram: completed 8/2/24, f/u in 1 year   - DEXA: 9/2023, osteopenia of lumbar vertebrae  - Colon cancer screening: Colonoscopy from 10/2021 with diverticulosis of the sigmoid colon, otherwise normal, repeat 5 years in 2026  -Vaccines:    -Flu vaccine    -Pneumonia 23 on 7/2019    -Tdap on 3/2017    -Zoster series completed 12/2020    -COVID-19: Vaccinated x2  - POC A1c: ordered today for prediabetes   - LDCT: ordered today     Available notes and lab results since last visit.    Past Medical History:   Diagnosis Date    Anxiety  disorder     Arthritis     Coronary artery disease     Current use of long term anticoagulation 09/16/2022    GERD (gastroesophageal reflux disease)     History of CVA (cerebrovascular accident) without residual deficits 09/16/2022    History of deep venous thrombosis (DVT) of distal vein of right lower extremity 09/16/2022    History of pulmonary embolism 09/16/2022    Hypercholesterolemia     Hypertension     Hypothyroidism     Non-healing open wound of right groin 09/16/2022    Obesity     Right groin wound 09/20/2022    S/P ascending aortic aneurysm repair 09/16/2022    Seizure in childhood     last one age 11    Sleep apnea     does not currently use CPAP    Statin intolerance 07/24/2024    Thoracic aortic aneurysm 03/10/2022    4.5X4.4 Ascending Aorta as of 3/10/2022    Thyroid disease         Past Surgical History:   Procedure Laterality Date    ANGIOGRAM, CORONARY, WITH LEFT HEART CATHETERIZATION N/A 07/11/2022    Procedure: Angiogram, Coronary, with Left Heart Cath;  Surgeon: Harry Jj MD;  Location: [facility] CATH LAB;  Service: Cardiology;  Laterality: N/A;    ARTHROSCOPY OF KNEE      COLONOSCOPY  10/14/2021    Dr. Marquis Fitch    HYSTERECTOMY      REPAIR OF ASCENDING AORTA N/A 07/29/2022    Procedure: REPAIR, AORTA, ASCENDING;  Surgeon: Alec Vega IV, MD;  Location: [facility] OR;  Service: Cardiovascular;  Laterality: N/A;  ECHO NOTIFIED    ROBOTIC ARTHROPLASTY, KNEE Right 3/27/2024    Procedure: ROBOTIC ARTHROPLASTY, KNEE, TOTAL;  Surgeon: Yazan Barragan MD;  Location: [facility] OR;  Service: Orthopedics;  Laterality: Right;       Allergy:  Review of patient's allergies indicates:   Allergen Reactions    Tramadol Hives     Other reaction(s): sweating    Meperidine Rash     Other reaction(s): unknown        Current Medications:    Current Outpatient Medications:     albuterol (VENTOLIN HFA) 90 mcg/actuation inhaler, Inhale 1-2 puffs into the lungs every 6 (six) hours as needed for Wheezing or  Shortness of Breath. Rescue, Disp: 18 g, Rfl: 3    albuterol-ipratropium (DUO-NEB) 2.5 mg-0.5 mg/3 mL nebulizer solution, Take 3 mLs by nebulization every 4 (four) hours as needed for Wheezing or Shortness of Breath., Disp: 75 mL, Rfl: 6    amLODIPine (NORVASC) 10 MG tablet, Take 1 tablet (10 mg total) by mouth once daily., Disp: 90 tablet, Rfl: 3    aspirin (ECOTRIN) 81 MG EC tablet, Take 1 tablet (81 mg total) by mouth once daily. Increase to twice a day for 6 weeks post-op for blood clot prevention., Disp: 90 tablet, Rfl: 3    diclofenac sodium (VOLTAREN) 1 % Gel, Apply 1 g topically 3 (three) times daily as needed., Disp: , Rfl:     fluticasone propion-salmeteroL 113-14 mcg/actuation AePB, Inhale 1 puff into the lungs 2 (two) times a day., Disp: 1 each, Rfl: 6    furosemide (LASIX) 20 MG tablet, Take 1 tablet (20 mg total) by mouth daily as needed (as needed for shortness of breath)., Disp: 30 tablet, Rfl: 3    gabapentin (NEURONTIN) 100 MG capsule, Take 1 capsule (100 mg total) by mouth 3 (three) times daily., Disp: 90 capsule, Rfl: 11    hydrOXYzine HCL (ATARAX) 25 MG tablet, Take 1 tablet (25 mg total) by mouth 3 (three) times daily as needed for Itching., Disp: 30 tablet, Rfl: 0    levothyroxine (SYNTHROID) 75 MCG tablet, Take 1 tablet (75 mcg total) by mouth before breakfast., Disp: 90 tablet, Rfl: 3    multivit-min/iron/folic/lutein (CENTRUM SILVER WOMEN ORAL), Take 1 tablet by mouth Daily., Disp: , Rfl:     nitroGLYCERIN (NITROSTAT) 0.4 MG SL tablet, Place 1 tablet (0.4 mg total) under the tongue every 5 (five) minutes as needed for Chest pain., Disp: 30 tablet, Rfl: 3    pantoprazole (PROTONIX) 40 MG tablet, Take 1 tablet (40 mg total) by mouth once daily., Disp: 90 tablet, Rfl: 3    vitamin D (VITAMIN D3) 1000 units Tab, Take 1,000 Units by mouth once daily., Disp: , Rfl:     Current Facility-Administered Medications:     albuterol nebulizer solution 2.5 mg, 2.5 mg, Nebulization, Q4H PRN, Arturo,  MD Gary, 2.5 mg at 10/17/24 0832     Social History:   reports that she quit smoking about 13 years ago. Her smoking use included cigarettes and cigars. She started smoking about 55 years ago. She has a 183 pack-year smoking history. She has never used smokeless tobacco. She reports that she does not currently use alcohol. She reports that she does not use drugs.   Smoking 2PPD starting in teenage years and stopped 12 years ago. No current.     Family History   Problem Relation Name Age of Onset    Heart disease Mother Beth Campbell     Uterine cancer Mother Beth Campbell     Lung cancer Father      Seizures Sister Kayla villatoro     Heart disease Sister Kayla villatoro         Immunization History   Administered Date(s) Administered    COVID-19, MRNA, LN-S, PF (MODERNA FULL 0.5 ML DOSE) 03/03/2021, 03/31/2021    Influenza (FLUAD) - Quadrivalent - Adjuvanted - PF *Preferred* (65+) 09/21/2023    Influenza - Quadrivalent 10/24/2019    Influenza - Quadrivalent - High Dose - PF (65 years and older) 12/17/2020, 02/08/2022, 09/22/2022    Influenza - Trivalent - Flublok - PF (18 years and older) 02/14/2018    Pneumococcal Conjugate - 20 Valent 05/29/2023    Pneumococcal Polysaccharide - 23 Valent 07/30/2019    Tdap 03/07/2017    Zoster Recombinant 09/23/2020, 12/17/2020       Review of Systems   Constitutional:  Negative for chills, fever, malaise/fatigue and weight loss.   HENT:  Negative for congestion and sore throat.    Eyes:  Negative for blurred vision and double vision.   Respiratory:  Negative for cough and shortness of breath.    Cardiovascular:  Negative for chest pain, palpitations and claudication.   Gastrointestinal:  Negative for abdominal pain, constipation, diarrhea, heartburn, nausea and vomiting.   Genitourinary:  Negative for dysuria and urgency.   Musculoskeletal:  Positive for myalgias. Negative for back pain, falls and neck pain.   Neurological:  Positive for sensory change. Negative for  dizziness, loss of consciousness and headaches.   Psychiatric/Behavioral:  Negative for depression and suicidal ideas. The patient is not nervous/anxious.        Objective:      Physical Exam  Vitals:    03/05/25 0822   BP: (!) 143/65   BP Location: Left arm   Patient Position: Sitting   Pulse: (!) 58   Resp: 20   Temp: 98.2 °F (36.8 °C)   TempSrc: Oral   SpO2: 98%   Weight: 93.4 kg (205 lb 12.8 oz)     Initial Reading 158/60       Wt Readings from Last 3 Encounters:   03/05/25 93.4 kg (205 lb 12.8 oz)   02/10/25 92.4 kg (203 lb 11.3 oz)   01/04/25 91.5 kg (201 lb 11.5 oz)       Physical Exam  Vitals and nursing note reviewed.   Constitutional:       General: She is not in acute distress.  Eyes:      General:         Right eye: No discharge.   Neck:      Vascular: No carotid bruit.   Cardiovascular:      Rate and Rhythm: Regular rhythm. Bradycardia present.      Pulses: Decreased pulses.      Heart sounds: No murmur heard.     Comments: + BLE decreased distal pulses, difficult to palpate   + Warmth to touch   + Diffuse varicose veins throughout   Pulmonary:      Effort: Pulmonary effort is normal. No respiratory distress.      Breath sounds: No wheezing.   Abdominal:      General: Abdomen is flat. Bowel sounds are normal.      Palpations: Abdomen is soft.   Musculoskeletal:         General: No swelling or tenderness.      Cervical back: Normal range of motion.      Right knee: No swelling, effusion, erythema, bony tenderness or crepitus.      Right lower leg: No edema.      Left lower leg: No edema.      Comments: BLE strength 5/5    Skin:     General: Skin is warm and dry.      Capillary Refill: Capillary refill takes less than 2 seconds.      Findings: No lesion or rash.   Neurological:      Mental Status: She is alert and oriented to person, place, and time.        Body mass index is 32.23 kg/m².      Admission on 01/04/2025, Discharged on 01/04/2025   Component Date Value Ref Range Status    QRS Duration  01/04/2025 82  ms Final    OHS QTC Calculation 01/04/2025 413  ms Final    Natriuretic Peptide 01/04/2025 121.4 (H)  <=100.0 pg/mL Final    Sodium 01/04/2025 139  136 - 145 mmol/L Final    Potassium 01/04/2025 3.7  3.5 - 5.1 mmol/L Final    Chloride 01/04/2025 105  98 - 107 mmol/L Final    CO2 01/04/2025 26  23 - 31 mmol/L Final    Glucose 01/04/2025 140 (H)  82 - 115 mg/dL Final    Blood Urea Nitrogen 01/04/2025 12.1  9.8 - 20.1 mg/dL Final    Creatinine 01/04/2025 0.91  0.55 - 1.02 mg/dL Final    Calcium 01/04/2025 9.4  8.4 - 10.2 mg/dL Final    Protein Total 01/04/2025 7.6  5.8 - 7.6 gm/dL Final    Albumin 01/04/2025 3.8  3.4 - 4.8 g/dL Final    Globulin 01/04/2025 3.8 (H)  2.4 - 3.5 gm/dL Final    Albumin/Globulin Ratio 01/04/2025 1.0 (L)  1.1 - 2.0 ratio Final    Bilirubin Total 01/04/2025 0.5  <=1.5 mg/dL Final    ALP 01/04/2025 130  40 - 150 unit/L Final    ALT 01/04/2025 25  0 - 55 unit/L Final    AST 01/04/2025 21  5 - 34 unit/L Final    eGFR 01/04/2025 >60  mL/min/1.73/m2 Final    Anion Gap 01/04/2025 8.0  mEq/L Final    BUN/Creatinine Ratio 01/04/2025 13   Final    WBC 01/04/2025 7.54  4.50 - 11.50 x10(3)/mcL Final    RBC 01/04/2025 4.80  4.20 - 5.40 x10(6)/mcL Final    Hgb 01/04/2025 14.4  12.0 - 16.0 g/dL Final    Hct 01/04/2025 43.8  37.0 - 47.0 % Final    MCV 01/04/2025 91.3  80.0 - 94.0 fL Final    MCH 01/04/2025 30.0  27.0 - 31.0 pg Final    MCHC 01/04/2025 32.9 (L)  33.0 - 36.0 g/dL Final    RDW 01/04/2025 13.3  11.5 - 17.0 % Final    Platelet 01/04/2025 175  130 - 400 x10(3)/mcL Final    MPV 01/04/2025 11.6 (H)  7.4 - 10.4 fL Final    Neut % 01/04/2025 73.7  % Final    Lymph % 01/04/2025 17.0  % Final    Mono % 01/04/2025 5.6  % Final    Eos % 01/04/2025 2.7  % Final    Basophil % 01/04/2025 0.7  % Final    Lymph # 01/04/2025 1.28  0.6 - 4.6 x10(3)/mcL Final    Neut # 01/04/2025 5.57  2.1 - 9.2 x10(3)/mcL Final    Mono # 01/04/2025 0.42  0.1 - 1.3 x10(3)/mcL Final    Eos # 01/04/2025 0.20  0 - 0.9  x10(3)/mcL Final    Baso # 01/04/2025 0.05  <=0.2 x10(3)/mcL Final    Imm Gran # 01/04/2025 0.02  0 - 0.04 x10(3)/mcL Final    Imm Grans % 01/04/2025 0.3  % Final    NRBC% 01/04/2025 0.0  % Final    Extra Tube 01/04/2025 Hold for add-ons.   Final    Auto resulted.       Extra Tube 01/04/2025 Hold for add-ons.   Final    Auto resulted.       Extra Tube 01/04/2025 Hold for add-ons.   Final    Auto resulted.       Extra Tube 01/04/2025 Hold for add-ons.   Final    Auto resulted.       Troponin-I 01/04/2025 <0.010  0.000 - 0.045 ng/mL Final   Hospital Outpatient Visit on 11/14/2024   Component Date Value Ref Range Status    Creatinine 11/14/2024 0.93  0.55 - 1.02 mg/dL Final    eGFR 11/14/2024 >60  mL/min/1.73/m2 Final   Lab Visit on 11/14/2024   Component Date Value Ref Range Status    Cholesterol Total 11/14/2024 170  <=200 mg/dL Final    HDL Cholesterol 11/14/2024 51  35 - 60 mg/dL Final    Triglyceride 11/14/2024 105  37 - 140 mg/dL Final    Cholesterol/HDL Ratio 11/14/2024 3  0 - 5 Final    Very Low Density Lipoprotein 11/14/2024 21   Final    LDL Cholesterol 11/14/2024 98.00  50.00 - 140.00 mg/dL Final    Sodium 11/14/2024 139  136 - 145 mmol/L Final    Potassium 11/14/2024 4.9  3.5 - 5.1 mmol/L Final    Chloride 11/14/2024 103  98 - 107 mmol/L Final    CO2 11/14/2024 28  23 - 31 mmol/L Final    Glucose 11/14/2024 111  82 - 115 mg/dL Final    Blood Urea Nitrogen 11/14/2024 14.0  9.8 - 20.1 mg/dL Final    Creatinine 11/14/2024 0.92  0.55 - 1.02 mg/dL Final    Calcium 11/14/2024 9.9  8.4 - 10.2 mg/dL Final    Protein Total 11/14/2024 7.5  5.8 - 7.6 gm/dL Final    Albumin 11/14/2024 3.8  3.4 - 4.8 g/dL Final    Globulin 11/14/2024 3.7 (H)  2.4 - 3.5 gm/dL Final    Albumin/Globulin Ratio 11/14/2024 1.0 (L)  1.1 - 2.0 ratio Final    Bilirubin Total 11/14/2024 0.5  <=1.5 mg/dL Final    ALP 11/14/2024 113  40 - 150 unit/L Final    ALT 11/14/2024 24  0 - 55 unit/L Final    AST 11/14/2024 24  5 - 34 unit/L Final    eGFR  11/14/2024 >60  mL/min/1.73/m2 Final    Anion Gap 11/14/2024 8.0  mEq/L Final    BUN/Creatinine Ratio 11/14/2024 15   Final   Hospital Outpatient Visit on 10/17/2024   Component Date Value Ref Range Status    Right arm BP 10/17/2024 145.00  mmHg Final    Right posterior tibial 10/17/2024 156.00  mmHg Final    Right dorsalis pedis 10/17/2024 142.00  mmHg Final    Right YUE 10/17/2024 1.05   Final    Left arm BP 10/17/2024 149.00  mmHg Final    Left posterior tibial 10/17/2024 158.00  mmHg Final    Left dorsalis pedis 10/17/2024 157.00  mmHg Final    Left YUE 10/17/2024 1.06   Final    Immediate arm BP 10/17/2024 156.00  mmHg Final    Immediate right tibial 10/17/2024 163.00  mmHg Final    Immediate right YUE 10/17/2024 1.04   Final    Immediate left tibial 10/17/2024 179.00  mmHg Final    Immediate left YUE 10/17/2024 1.15   Final   Hospital Outpatient Visit on 10/17/2024   Component Date Value Ref Range Status    Right YUE 10/17/2024 1.05   Final    Left YUE 10/17/2024 1.06   Final    Right CFA PSV 10/17/2024 108  cm/s Final    Right profunda sys PSV 10/17/2024 65  cm/s Final    Right super femoral prox sys PSV 10/17/2024 96  cm/s Final    Right super femoral mid sys PSV 10/17/2024 101  cm/s Final    Right super femoral dist sys PSV 10/17/2024 142  cm/s Final    Right popliteal PSV 10/17/2024 51  cm/s Final    Right post tibial sys PSV 10/17/2024 60  cm/s Final    Right peroneal sys PSV 10/17/2024 43  cm/s Final    Right Dorsalis Pedis PSV 10/17/2024 25  cm/s Final    Left CFA PSV 10/17/2024 87  cm/s Final    Left profunda sys PSV 10/17/2024 89  cm/s Final    Left super femoral prox sys PSV 10/17/2024 121  cm/s Final    Left super femoral mid sys PSV 10/17/2024 89  cm/s Final    Left super femoral dist sys PSV 10/17/2024 125  cm/s Final    Left popliteal PSV 10/17/2024 51  cm/s Final    Left post tibial sys PSV 10/17/2024 45  cm/s Final    Left peroneal sys PSV 10/17/2024 61  cm/s Final    LEFT DORSALIS PEDIS PSV  10/17/2024 36  cm/s Final   Hospital Outpatient Visit on 10/17/2024   Component Date Value Ref Range Status    Post FVC 10/24/2024 2.71  2.31 - 4.06 L Preliminary    Post FEV1 10/24/2024 1.92  1.78 - 3.08 L Preliminary    Post FEV1 FVC 10/24/2024 70.92  64.59 - 89.10 % Preliminary    Post FEF 25 75 10/24/2024 1.21 (L)  1.30 - 4.10 L/s Preliminary    Post PEF 10/24/2024 5.30  4.32 - 8.07 L/s Preliminary    Post  10/24/2024 8.26  sec Preliminary    Pre FVC 10/24/2024 2.59  2.31 - 4.06 L Preliminary    Pre FEV1 10/24/2024 1.77 (L)  1.78 - 3.08 L Preliminary    Pre FEV1 FVC 10/24/2024 68.32  64.59 - 89.10 % Preliminary    Pre FEF 25 75 10/24/2024 0.98 (L)  1.30 - 4.10 L/s Preliminary    Pre PEF 10/24/2024 5.31  4.32 - 8.07 L/s Preliminary    Pre  10/24/2024 8.35  sec Preliminary    Pre MVV 10/24/2024 76.03 (L)  81.50 - 110.27 L/min Preliminary    Pre TLC 10/24/2024 4.77  4.45 - 6.43 L Preliminary    VC PRE 10/24/2024 2.59  2.31 - 4.06 L Preliminary    Pre FRC PL 10/24/2024 2.97  2.06 - 3.70 L Preliminary    Pre ERV 10/24/2024 0.78  -64367.30 - 93940.70 L Preliminary    Pre RV 10/24/2024 2.18  1.61 - 2.76 L Preliminary    RVTLC PRE 10/24/2024 45.70  32.83 - 52.01 % Preliminary    Raw PRE 10/24/2024 3.64 (H)  3.06 - 3.06 cmH2O*s/L Preliminary    Pre DLCO 10/24/2024 14.98 (L)  17.56 - 29.02 ml/(min*mmHg) Preliminary    DLCOVA PRE 10/24/2024 3.51  2.97 - 5.59 ml/(min*mmHg*L) Preliminary    VA PRE 10/24/2024 4.26 (L)  5.29 - 5.29 L Preliminary    IVC PRE 10/24/2024 2.51  2.31 - 4.06 L Preliminary    FVC Ref 10/24/2024 3.17   Preliminary    FVC LLN 10/24/2024 2.31   Preliminary    FVC Pre Ref 10/24/2024 81.8  % Preliminary    FVC Post Ref 10/24/2024 85.6  % Preliminary    FVC Chg 10/24/2024 4.6  % Preliminary    FEV1 Ref 10/24/2024 2.44   Preliminary    FEV1 LLN 10/24/2024 1.78   Preliminary    FEV1 Pre Ref 10/24/2024 72.6  % Preliminary    FEV1 Post Ref 10/24/2024 78.8  % Preliminary    FEV1 Chg 10/24/2024 8.6   % Preliminary    FEV1 FVC Ref 10/24/2024 78   Preliminary    FEV1 FVC LLN 10/24/2024 65   Preliminary    FEV1 FVC Pre Ref 10/24/2024 87.8  % Preliminary    FEV1 FVC Post Ref 10/24/2024 91.2  % Preliminary    FEV1 FVC Chg 10/24/2024 3.8  % Preliminary    FEF 25 75 Ref 10/24/2024 2.70   Preliminary    FEF 25 75 LLN 10/24/2024 1.30   Preliminary    FEF 25 75 Pre Ref 10/24/2024 36.3  % Preliminary    FEF 25 75 Post Ref 10/24/2024 44.8  % Preliminary    FEF 25 75 Chg 10/24/2024 23.3  % Preliminary    PEF Ref 10/24/2024 6.19   Preliminary    PEF LLN 10/24/2024 4.32   Preliminary    PEF Pre Ref 10/24/2024 85.8  % Preliminary    PEF Post Ref 10/24/2024 85.7  % Preliminary    PEF Chg 10/24/2024 -0.2  % Preliminary    ITT170 Chg 10/24/2024 -1.1  % Preliminary    MVV Ref 10/24/2024 96   Preliminary    MVV LLN 10/24/2024 82   Preliminary    MVV Pre Ref 10/24/2024 79.3  % Preliminary    TLC Ref 10/24/2024 5.44   Preliminary    TLC LLN 10/24/2024 4.45   Preliminary    TLC Pre Ref 10/24/2024 87.7  % Preliminary    VC Ref 10/24/2024 3.17   Preliminary    VC LLN 10/24/2024 2.31   Preliminary    VC Pre Ref 10/24/2024 81.8  % Preliminary    FRCpleth Ref 10/24/2024 2.88   Preliminary    FRCpleth LLN 10/24/2024 2.06   Preliminary    FRCpleth PreRef 10/24/2024 102.9  % Preliminary    ERV Ref 10/24/2024 0.70   Preliminary    ERV LLN 10/24/2024 -73897.30   Preliminary    ERV Pre Ref 10/24/2024 112.5  % Preliminary    RV Ref 10/24/2024 2.18   Preliminary    RV LLN 10/24/2024 1.61   Preliminary    RV Pre Ref 10/24/2024 99.9  % Preliminary    RVTLC Ref 10/24/2024 42   Preliminary    RVTLC LLN 10/24/2024 33   Preliminary    RVTLC Pre Ref 10/24/2024 107.7  % Preliminary    Raw Ref 10/24/2024 3.06   Preliminary    Raw LLN 10/24/2024 3.06   Preliminary    Raw Pre Ref 10/24/2024 119.0  % Preliminary    DLCO Single Breath Ref 10/24/2024 23.29   Preliminary    DLCO Single Breath LLN 10/24/2024 17.56   Preliminary    DLCO Single Breath Pre Ref  10/24/2024 64.3  % Preliminary    DLCOc Single Breath Ref 10/24/2024 23.29   Preliminary    DLCOc Single Breath LLN 10/24/2024 17.56   Preliminary    DLCOVA Ref 10/24/2024 4.28   Preliminary    DLCOVA LLN 10/24/2024 2.97   Preliminary    DLCOVA Pre Ref 10/24/2024 82.1  % Preliminary    DLCOc SBVA Ref 10/24/2024 4.28   Preliminary    DLCOc SBVA LLN 10/24/2024 2.97   Preliminary    VA Single Breath Ref 10/24/2024 5.29   Preliminary    VA Single Breath LLN 10/24/2024 5.29   Preliminary    VA Single Breath Pre Ref 10/24/2024 80.6  % Preliminary    IVC Single Breath Ref 10/24/2024 3.17   Preliminary    IVC Single Breath LLN 10/24/2024 2.31   Preliminary    IVC Single Breath Pre Ref 10/24/2024 79.3  % Preliminary           Assessment:   PVD s/p R GSV ablation   Hx of 30+ tobacco user, former   HLD   Atherosclerosis of aortic bifurcation and iliac arteries   S/p Aortic aneurysm repair 2022   CAD s/p CABG x 2   Hx of CVA   Statin Refusal   - CTA repeat per cardiology negative for significant calcification and stenosis. Aortic pseudoaneurysm is stable.   - Continue Lequivo per [facility], next dose 3/1/25   - Zetia self discontinued as documented by [facility] for complaints of dizziness on 7/18/24; Refuses to use statin due to same complaints   - LDCT screening ordered today   - Refilled ASA 81mg qd, inhalers and nebs   - Continue routine follow up with [facility]     LLE Neuropathic Pain   - Refilled Gabapentin 100mg TID however patient takes this when needed   - Initiated Duloxetine 60mg qd trial for further relief   - Refilled Voltaren gel     Prediabetes  - Repeat A1c ordered today     Postoperative Atrial Fibrillation   - CHADVASC 5; patient was previously on Amiodarone and Xarelto but this also appears to be self discontinued   - Patient is high risk for further venous thrombosis vs embolus     Elevated BP reading in clinic   Hx Hypertension  ACE Inhibitor Cough   HFpEF w EF 50%   - Refilled Amlodipine  10mg daily, lasix 20mg daily, ASA 81mg qd   - Lisinopril recently self discontinued as documented by [facility] for complaints of cough and concern for angioedema on 7/18/24, instructed to call [facility] if another antihypertensive agent is needed in replacement   - Instructed on lifestyle modifications including physical activity as tolerated 30 min per day x 5 days per week and low sodium 2g, low cholesterol diet    - Failed Imdur, consider Ranexa if chest pain returns, will defer to [facility] for management   - Continue to f/u with [facility] cardiology as recommended     GERD/gastritis  - Refilled Protonix 40mg qd   - Avoid NSAIDs due to risk of aggravated GERD/gastritis     BMI 31  Obese   - Referred to [facility] for dietary recommendations     Hypothyroidism  - Refilled Synthroid 75 MCG daily     L Breast Pain - resolved   - Resolved with change in bra type      MIKI on CPAP  - Continue to monitor     Insomnia  - Continue melatonin    Medication Refill   - Refilled all patient listed medications         ED precautions given, patient verbalized understanding.         Disposition: RTC in 6 months or sooner if needed.     Patient case and plan of care discussed and patient assessed with Dr. Lexy Morris MD   Internal Medicine - -2

## 2025-03-29 ENCOUNTER — PATIENT MESSAGE (OUTPATIENT)
Dept: ADMINISTRATIVE | Facility: OTHER | Age: 70
End: 2025-03-29
Payer: MEDICARE

## 2025-04-14 ENCOUNTER — HOSPITAL ENCOUNTER (OUTPATIENT)
Dept: RADIOLOGY | Facility: HOSPITAL | Age: 70
Discharge: HOME | End: 2025-04-14
Attending: STUDENT IN AN ORGANIZED HEALTH CARE EDUCATION/TRAINING PROGRAM
Payer: MEDICARE

## 2025-04-14 DIAGNOSIS — I77.89 OTHER SPECIFIED DISORDERS OF ARTERIES AND ARTERIOLES: ICD-10-CM

## 2025-04-14 DIAGNOSIS — Z86.73 HISTORY OF CVA (CEREBROVASCULAR ACCIDENT) WITHOUT RESIDUAL DEFICITS: ICD-10-CM

## 2025-04-14 PROCEDURE — 93880 EXTRACRANIAL BILAT STUDY: CPT

## 2025-04-19 LAB
LEFT CCA DIST DIAS: 22 CM/S
LEFT CCA DIST SYS: 70 CM/S
LEFT CCA PROX DIAS: 13 CM/S
LEFT CCA PROX SYS: 70 CM/S
LEFT ECA DIAS: 9 CM/S
LEFT ECA SYS: 77 CM/S
LEFT ICA DIST DIAS: 18 CM/S
LEFT ICA DIST SYS: 86 CM/S
LEFT ICA MID DIAS: 14 CM/S
LEFT ICA MID SYS: 61 CM/S
LEFT ICA PROX DIAS: 21 CM/S
LEFT ICA PROX SYS: 75 CM/S
LEFT VERTEBRAL DIAS: 15 CM/S
LEFT VERTEBRAL SYS: 68 CM/S
OHS CV CAROTID RIGHT ICA EDV HIGHEST: 18
OHS CV CAROTID ULTRASOUND LEFT ICA/CCA RATIO: 1.23
OHS CV CAROTID ULTRASOUND RIGHT ICA/CCA RATIO: 1.37
OHS CV PV CAROTID LEFT HIGHEST CCA: 70
OHS CV PV CAROTID LEFT HIGHEST ICA: 86
OHS CV PV CAROTID RIGHT HIGHEST CCA: 88
OHS CV PV CAROTID RIGHT HIGHEST ICA: 67
OHS CV US CAROTID LEFT HIGHEST EDV: 21
RIGHT CCA DIST DIAS: 12 CM/S
RIGHT CCA DIST SYS: 49 CM/S
RIGHT CCA PROX DIAS: 10 CM/S
RIGHT CCA PROX SYS: 88 CM/S
RIGHT ECA DIAS: 14 CM/S
RIGHT ECA SYS: 125 CM/S
RIGHT ICA DIST DIAS: 18 CM/S
RIGHT ICA DIST SYS: 67 CM/S
RIGHT ICA MID DIAS: 12 CM/S
RIGHT ICA MID SYS: 59 CM/S
RIGHT ICA PROX DIAS: 8 CM/S
RIGHT ICA PROX SYS: 41 CM/S
RIGHT VERTEBRAL DIAS: 9 CM/S
RIGHT VERTEBRAL SYS: 57 CM/S

## 2025-06-03 ENCOUNTER — TELEPHONE (OUTPATIENT)
Dept: INTERNAL MEDICINE | Facility: CLINIC | Age: 70
End: 2025-06-03
Payer: MEDICARE

## 2025-06-16 ENCOUNTER — INFUSION (OUTPATIENT)
Dept: INFUSION THERAPY | Facility: HOSPITAL | Age: 70
End: 2025-06-16
Attending: INTERNAL MEDICINE
Payer: MEDICARE

## 2025-06-16 VITALS
RESPIRATION RATE: 18 BRPM | OXYGEN SATURATION: 97 % | SYSTOLIC BLOOD PRESSURE: 173 MMHG | WEIGHT: 208.19 LBS | DIASTOLIC BLOOD PRESSURE: 71 MMHG | BODY MASS INDEX: 32.61 KG/M2 | TEMPERATURE: 98 F | HEART RATE: 60 BPM

## 2025-06-16 DIAGNOSIS — E78.2 MIXED HYPERLIPIDEMIA: ICD-10-CM

## 2025-06-16 DIAGNOSIS — I10 PRIMARY HYPERTENSION: ICD-10-CM

## 2025-06-16 DIAGNOSIS — Z86.73 HISTORY OF CVA (CEREBROVASCULAR ACCIDENT) WITHOUT RESIDUAL DEFICITS: ICD-10-CM

## 2025-06-16 DIAGNOSIS — Z78.9 STATIN INTOLERANCE: Primary | ICD-10-CM

## 2025-06-16 PROCEDURE — 63600175 PHARM REV CODE 636 W HCPCS: Mod: JZ,TB

## 2025-06-16 PROCEDURE — 96372 THER/PROPH/DIAG INJ SC/IM: CPT

## 2025-06-16 RX ORDER — METHYLPREDNISOLONE SOD SUCC 125 MG
80 VIAL (EA) INJECTION
OUTPATIENT
Start: 2025-07-01

## 2025-06-16 RX ORDER — METHYLPREDNISOLONE SOD SUCC 125 MG
125 VIAL (EA) INJECTION ONCE AS NEEDED
Status: DISCONTINUED | OUTPATIENT
Start: 2025-06-16 | End: 2025-06-16 | Stop reason: HOSPADM

## 2025-06-16 RX ORDER — KETOROLAC TROMETHAMINE 30 MG/ML
15 INJECTION, SOLUTION INTRAMUSCULAR; INTRAVENOUS ONCE AS NEEDED
Status: DISCONTINUED | OUTPATIENT
Start: 2025-06-16 | End: 2025-06-16 | Stop reason: HOSPADM

## 2025-06-16 RX ORDER — ACETAMINOPHEN 325 MG/1
650 TABLET ORAL
OUTPATIENT
Start: 2025-07-01

## 2025-06-16 RX ORDER — METHYLPREDNISOLONE SOD SUCC 125 MG
125 VIAL (EA) INJECTION ONCE AS NEEDED
OUTPATIENT
Start: 2025-07-01

## 2025-06-16 RX ORDER — DIPHENHYDRAMINE HYDROCHLORIDE 50 MG/ML
50 INJECTION, SOLUTION INTRAMUSCULAR; INTRAVENOUS ONCE AS NEEDED
Status: DISCONTINUED | OUTPATIENT
Start: 2025-06-16 | End: 2025-06-16 | Stop reason: HOSPADM

## 2025-06-16 RX ORDER — KETOROLAC TROMETHAMINE 30 MG/ML
15 INJECTION, SOLUTION INTRAMUSCULAR; INTRAVENOUS ONCE AS NEEDED
OUTPATIENT
Start: 2025-07-01

## 2025-06-16 RX ORDER — DIPHENHYDRAMINE HYDROCHLORIDE 50 MG/ML
50 INJECTION, SOLUTION INTRAMUSCULAR; INTRAVENOUS ONCE AS NEEDED
OUTPATIENT
Start: 2025-07-01

## 2025-06-16 RX ORDER — EPINEPHRINE 1 MG/ML
0.3 INJECTION INTRAMUSCULAR; INTRAVENOUS; SUBCUTANEOUS ONCE AS NEEDED
Status: DISCONTINUED | OUTPATIENT
Start: 2025-06-16 | End: 2025-06-16 | Stop reason: HOSPADM

## 2025-06-16 RX ORDER — METHYLPREDNISOLONE SOD SUCC 125 MG
80 VIAL (EA) INJECTION DAILY PRN
Status: DISCONTINUED | OUTPATIENT
Start: 2025-06-16 | End: 2025-06-16 | Stop reason: HOSPADM

## 2025-06-16 RX ORDER — ONDANSETRON HYDROCHLORIDE 2 MG/ML
8 INJECTION, SOLUTION INTRAVENOUS ONCE AS NEEDED
OUTPATIENT
Start: 2025-07-01

## 2025-06-16 RX ORDER — ACETAMINOPHEN 325 MG/1
650 TABLET ORAL DAILY PRN
Status: DISCONTINUED | OUTPATIENT
Start: 2025-06-16 | End: 2025-06-16 | Stop reason: HOSPADM

## 2025-06-16 RX ORDER — EPINEPHRINE 0.3 MG/.3ML
0.3 INJECTION SUBCUTANEOUS ONCE AS NEEDED
OUTPATIENT
Start: 2025-07-01

## 2025-06-16 RX ORDER — ONDANSETRON HYDROCHLORIDE 2 MG/ML
8 INJECTION, SOLUTION INTRAVENOUS ONCE AS NEEDED
Status: DISCONTINUED | OUTPATIENT
Start: 2025-06-16 | End: 2025-06-16 | Stop reason: HOSPADM

## 2025-06-16 RX ADMIN — INCLISIRAN 284 MG: 284 INJECTION, SOLUTION SUBCUTANEOUS at 12:06

## 2025-06-16 NOTE — NURSING
1147 Patient is here for Leqvio injection. She voices no c/o.     Leqvio 284mgs subc given to r arm.

## 2025-06-23 ENCOUNTER — OFFICE VISIT (OUTPATIENT)
Dept: INTERNAL MEDICINE | Facility: CLINIC | Age: 70
End: 2025-06-23
Payer: MEDICARE

## 2025-06-23 ENCOUNTER — LAB VISIT (OUTPATIENT)
Dept: LAB | Facility: HOSPITAL | Age: 70
End: 2025-06-23
Payer: MEDICARE

## 2025-06-23 VITALS
BODY MASS INDEX: 32.55 KG/M2 | TEMPERATURE: 98 F | OXYGEN SATURATION: 97 % | SYSTOLIC BLOOD PRESSURE: 130 MMHG | HEART RATE: 61 BPM | RESPIRATION RATE: 20 BRPM | WEIGHT: 207.81 LBS | DIASTOLIC BLOOD PRESSURE: 74 MMHG

## 2025-06-23 DIAGNOSIS — K21.9 GASTROESOPHAGEAL REFLUX DISEASE, UNSPECIFIED WHETHER ESOPHAGITIS PRESENT: ICD-10-CM

## 2025-06-23 DIAGNOSIS — R14.0 ABDOMINAL BLOATING: ICD-10-CM

## 2025-06-23 DIAGNOSIS — L29.9 ITCHING: ICD-10-CM

## 2025-06-23 DIAGNOSIS — R11.2 NAUSEA AND VOMITING, UNSPECIFIED VOMITING TYPE: ICD-10-CM

## 2025-06-23 DIAGNOSIS — I25.2 OLD MYOCARDIAL INFARCTION: ICD-10-CM

## 2025-06-23 DIAGNOSIS — E78.5 HYPERLIPIDEMIA, UNSPECIFIED HYPERLIPIDEMIA TYPE: ICD-10-CM

## 2025-06-23 DIAGNOSIS — R73.03 PREDIABETES: ICD-10-CM

## 2025-06-23 DIAGNOSIS — M54.9 BACK PAIN, UNSPECIFIED BACK LOCATION, UNSPECIFIED BACK PAIN LATERALITY, UNSPECIFIED CHRONICITY: Primary | ICD-10-CM

## 2025-06-23 DIAGNOSIS — E78.2 MIXED HYPERLIPIDEMIA: ICD-10-CM

## 2025-06-23 DIAGNOSIS — Z12.31 ENCOUNTER FOR SCREENING MAMMOGRAM FOR MALIGNANT NEOPLASM OF BREAST: ICD-10-CM

## 2025-06-23 DIAGNOSIS — Z00.00 HEALTHCARE MAINTENANCE: ICD-10-CM

## 2025-06-23 DIAGNOSIS — Z53.20 REFUSAL OF STATIN MEDICATION BY PATIENT: ICD-10-CM

## 2025-06-23 DIAGNOSIS — M54.9 BACK PAIN, UNSPECIFIED BACK LOCATION, UNSPECIFIED BACK PAIN LATERALITY, UNSPECIFIED CHRONICITY: ICD-10-CM

## 2025-06-23 DIAGNOSIS — E03.9 HYPOTHYROIDISM, UNSPECIFIED TYPE: ICD-10-CM

## 2025-06-23 LAB
ALBUMIN SERPL-MCNC: 3.9 G/DL (ref 3.4–4.8)
ALBUMIN/GLOB SERPL: 1 RATIO (ref 1.1–2)
ALP SERPL-CCNC: 121 UNIT/L (ref 40–150)
ALT SERPL-CCNC: 24 UNIT/L (ref 0–55)
AMYLASE SERPL-CCNC: 45 UNIT/L (ref 20–160)
ANION GAP SERPL CALC-SCNC: 8 MEQ/L
AST SERPL-CCNC: 24 UNIT/L (ref 11–45)
BILIRUB SERPL-MCNC: 0.5 MG/DL
BUN SERPL-MCNC: 14.4 MG/DL (ref 9.8–20.1)
CALCIUM SERPL-MCNC: 9.1 MG/DL (ref 8.4–10.2)
CHLORIDE SERPL-SCNC: 104 MMOL/L (ref 98–107)
CHOLEST SERPL-MCNC: 162 MG/DL
CHOLEST/HDLC SERPL: 3 {RATIO} (ref 0–5)
CO2 SERPL-SCNC: 27 MMOL/L (ref 23–31)
CREAT SERPL-MCNC: 0.94 MG/DL (ref 0.55–1.02)
CREAT/UREA NIT SERPL: 15
GFR SERPLBLD CREATININE-BSD FMLA CKD-EPI: >60 ML/MIN/1.73/M2
GLOBULIN SER-MCNC: 4 GM/DL (ref 2.4–3.5)
GLUCOSE SERPL-MCNC: 95 MG/DL (ref 82–115)
HDLC SERPL-MCNC: 49 MG/DL (ref 35–60)
IGA SERPL-MCNC: 201 MG/DL (ref 69–517)
LDLC SERPL CALC-MCNC: 78 MG/DL (ref 50–140)
LIPASE SERPL-CCNC: 9 U/L
POTASSIUM SERPL-SCNC: 4.5 MMOL/L (ref 3.5–5.1)
PROT SERPL-MCNC: 7.9 GM/DL (ref 5.8–7.6)
SODIUM SERPL-SCNC: 139 MMOL/L (ref 136–145)
TRIGL SERPL-MCNC: 173 MG/DL (ref 37–140)
TSH SERPL-ACNC: 2.97 UIU/ML (ref 0.35–4.94)
VLDLC SERPL CALC-MCNC: 35 MG/DL

## 2025-06-23 PROCEDURE — 82784 ASSAY IGA/IGD/IGG/IGM EACH: CPT

## 2025-06-23 PROCEDURE — 84443 ASSAY THYROID STIM HORMONE: CPT

## 2025-06-23 PROCEDURE — 86364 TISS TRNSGLTMNASE EA IG CLAS: CPT

## 2025-06-23 PROCEDURE — 83690 ASSAY OF LIPASE: CPT

## 2025-06-23 PROCEDURE — 99215 OFFICE O/P EST HI 40 MIN: CPT | Mod: PBBFAC

## 2025-06-23 PROCEDURE — 80061 LIPID PANEL: CPT

## 2025-06-23 PROCEDURE — 36415 COLL VENOUS BLD VENIPUNCTURE: CPT

## 2025-06-23 PROCEDURE — 80053 COMPREHEN METABOLIC PANEL: CPT

## 2025-06-23 PROCEDURE — 82150 ASSAY OF AMYLASE: CPT

## 2025-06-23 RX ORDER — SUCRALFATE 1 G/1
1 TABLET ORAL 4 TIMES DAILY
Qty: 40 TABLET | Refills: 0 | Status: SHIPPED | OUTPATIENT
Start: 2025-06-23 | End: 2025-07-03

## 2025-06-23 RX ORDER — HYDROXYZINE HYDROCHLORIDE 25 MG/1
25 TABLET, FILM COATED ORAL 3 TIMES DAILY PRN
Qty: 30 TABLET | Refills: 1 | Status: SHIPPED | OUTPATIENT
Start: 2025-06-23

## 2025-06-23 NOTE — PROGRESS NOTES
[facility] Internal Post Wards Visit    Chief Complaint:      Post wards follow-up    Subjective:     HPI:  Claudine Montoya is a 69 y.o. female who  has a past medical history of Anxiety disorder, Arthritis, Coronary artery disease, Current use of long term anticoagulation, GERD (gastroesophageal reflux disease), History of CVA (cerebrovascular accident) without residual deficits, History of deep venous thrombosis (DVT) of distal vein of right lower extremity, History of pulmonary embolism, Hypercholesterolemia, Hypertension, Hypothyroidism, Non-healing open wound of right groin, Obesity, Right groin wound, S/P ascending aortic aneurysm repair, Seizure in childhood, Sleep apnea, Statin intolerance, Thoracic aortic aneurysm, and Thyroid disease.  Patient has been complaining of abdominal bloating, back pain nausea and vomiting for 3 weeks. She is tolerating PO intake at this time and hemodynamically stable. Denies coffee ground emesis, melena and rectal bleeding. Symptoms refractory to protonix qd. Has not tried anything else for this.     Review of Systems  A comprehensive 12 point review of systems was completed.  Please see above for pertinent positives and negatives.     Objective:   Last 24 Hour Vital Signs:  Vitals  BP: 130/74  Temp: 98.4 °F (36.9 °C)  Temp Source: Oral  Pulse: 61  Resp: 20  SpO2: 97 %  Weight: 94.3 kg (207 lb 12.8 oz)    Physical Examination:  General: well-developed well-nourished in no acute distress  Eye: PERRLA, EOMI, clear conjunctiva, eyelids normal  HENT: TMs pearly gray bilaterally, oropharynx without erythema or exudate, oropharynx and nasal mucosal surfaces moist  Neck: full range of motion, no lymphadenopathy  Respiratory: clear to auscultation bilaterally. No wheezes, rhonchi, or rales.  Cardiovascular: regular rate and rhythm. S1/S2 present. No murmurs, gallops or rubs appreciated  Gastrointestinal: soft, non-tender, non-distended with normal bowel sounds. Nicole's sign negative.    Musculoskeletal: full range of motion of all extremities and spine without limitation or discomfort  Extremities: No peripheral edema  Neurologic: Alert and oriented x3, no signs of peripheral neurological deficit, motor and sensory function intact        Assessment & Plan:     Abdominal bloating   N/V   Abdominal pain radiating to back   Refusal of Statin   Hx of gastritis   - Abdomen U/S to rule out cholecystitis and pancreatitis   - CMP, lipase, amylase, transglutaminase IgA, lipid panel, and H pylori fecal antigen   - Continue Protonix 40mg qd, added carafate 1g QID for 10 days   - Will refer to [facility] for repeat EGD following completion of these studies, last scope 2021     Medication Refill   Itching   - Refilled PRN Hydroxyzine     Hypothyroidism   Prediabetes   - TSH and A1c     Healthcare Maintenance   Encounter for breast ca screening   - Mammogram ordered   - LDCT ordered 3/2025, UTD on all other measures     Follow-ups  -Follow-up with PCP in 1 month, acute care slot in 1 week to check symptomatic relief     Ed precautions given     Patient case and plan of care discussed with Dr. Ladarius Morris MD   \[facility] [place] [facility]\"" Internal Medicine HO2

## 2025-06-26 ENCOUNTER — HOSPITAL ENCOUNTER (OUTPATIENT)
Dept: RADIOLOGY | Facility: HOSPITAL | Age: 70
Discharge: HOME | End: 2025-06-26
Payer: MEDICARE

## 2025-06-26 DIAGNOSIS — M54.9 BACK PAIN, UNSPECIFIED BACK LOCATION, UNSPECIFIED BACK PAIN LATERALITY, UNSPECIFIED CHRONICITY: ICD-10-CM

## 2025-06-26 DIAGNOSIS — R11.2 NAUSEA AND VOMITING, UNSPECIFIED VOMITING TYPE: ICD-10-CM

## 2025-06-26 LAB — TTG IGA SER IA-ACNC: <1.02 FLU

## 2025-06-26 PROCEDURE — 76705 ECHO EXAM OF ABDOMEN: CPT | Mod: TC

## 2025-07-18 NOTE — PROGRESS NOTES
CHIEF COMPLAINT:   No chief complaint on file.                                                 HPI:  Claudine Montoya 69 y.o. 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  At last office visit patient stated that she was feeling mostly stable from a cardiac standpoint, but she did have some chest heaviness at that time.  She stated that it has been present for a few years.  We repeated a CTA of her chest which was unremarkable and unchanged from prior.  No need to for her to follow up with CV surgery at this time per discussion with CV surgery.     Today the patient states that she feels well from a cardiac standpoint. She continues to have SOB/TORRES she also endorses an increase in shortness a breath with bending over.  She states that this is slightly worsened from her last office visit.  She also endorses some lightheadedness and dizziness, but denies any near-syncope or actual syncopal events.  She also endorses feeling bloated in her chest area and stomach area.  She denies any palpitations, PND, orthopnea, lower extremity edema, or claudication symptoms.  She is able to complete her ADLs without any issues or ischemic symptoms.  She continues to follow up closely with her other specialists.  She reports compliance with all her current medications and is tolerating them well. She continues to do treatment with Leqvio and is doing well.  She denies any tobacco or other illicit drug use.  She tries her best to follow a low-cholesterol diet.                                                                                                                                                            CARDIAC TESTING:  CV US BL Doppler Carotid 4.14.25  The right internal carotid artery is patent with less  than 50% stenosis.  The left internal carotid artery is patent with less than 50% stenosis.  Bilateral vertebral arteries are patent with antegrade flow.    CV US Doppler Arterial Legs BL 10.17.24  The right lower extremity demonstrated multiphasic waveforms at all levels.  The YUE on the right is normal.  The right lower extremity demonstrated no significant arterial flow reduction.  The left lower extremity demonstrated triphasic waveforms at all levels.  The YUE on the left was normal.  The left lower extremity demonstrated no significant arterial flow reduction.    CV US BL Doppler Carotid 5.3.24  The right internal carotid artery demonstrated less than 50% stenosis.  There is a mild amount of heterogeneous plaque in the right proximal ICA and carotid bulb.  The left internal carotid artery demonstrated less than 50% stenosis.  There is a mild amount of heterogeneous plaque in the left proximal ICA and carotid bulb.  The bilateral vertebral arteries were patent with antegrade flow.     CV Resting YUE 5.3.24  There are normal multiphasic Doppler waveforms at the right ankle.   The YUE on the right is normal at 1.07.  There are normal multiphasic Doppler waveforms at the left ankle.   The YUE on the left is normal at 1.10.  There is no evidence of significant arterial insufficiency identified on this study.     Echo 5.2.24    Left Ventricle: The left ventricle is normal in size. Normal wall thickness. Biplane (2D) method of discs ejection fraction is 58%.    Right Ventricle: Normal right ventricular cavity size. Systolic function is normal.    Left Atrium: Left atrium is mildly dilated. LA volume index is 37ml/m2.    Right Atrium: Right atrium is mildly dilated.    Aortic Valve: The aortic valve is a trileaflet valve. There is no stenosis. Aortic valve peak velocity is 1.54 m/s. Mean gradient is 5 mmHg.    Mitral Valve: There is no stenosis. The mean pressure gradient across the mitral valve is 1 mmHg at a heart  rate of 59 bpm. There is mild regurgitation.    Tricuspid Valve: There is moderate regurgitation.    Pulmonary Artery: The estimated pulmonary artery systolic pressure is 33 mmHg.    IVC/SVC: Normal venous pressure at 3 mmHg.     CV US LE Vein BL Insufficiency 2.1.24    There is no evidence of a right lower extremity DVT.    The right greater saphenous vein has vemous reflux.    There is no evidence of a left lower extremity DVT.    The left greater saphenous vein has venous reflux.    The left GSV is positive for age indeterminate occlusive superficial vein thrombus at the level of the upper and mid calf.  Additionaly, there is a partially occlusive age indeterminate thrombus in a varicosity at the level of the knee.    There are varicosities with venous reflux at multiple levels in the lower extremities bilaterally.     Echo 11.15.22  · The left ventricle is normal in size with concentric remodeling and low normal systolic function.  · The estimated ejection fraction is 50%.  · Normal left ventricular diastolic function.  · Normal right ventricular size with normal right ventricular systolic function.  · Mild left atrial enlargement.  · Mild aortic regurgitation.  · Mild mitral regurgitation.  · Mild to moderate tricuspid regurgitation.  · There is no pulmonary hypertension.  · The estimated PA systolic pressure is 31 mmHg.  · Normal central venous pressure (3 mmHg).    Cardiac catheterization 7.11.22    Conclusion  · The pre-procedure left ventricular end diastolic pressure was 10.  · The estimated blood loss was none.  · There was non-obstructive coronary artery disease..    The procedure log was documented by Documenter: Larisa Deng RN and verified by Harry Jj MD.    FINDINGS  Anomalous origin of RCA    RECOMMENDATIONS  Medical management  Risk factor modifications  Activity -- avoid straining with affected limb for one week  Exercise -- as tolerated  Make follow-up appointment with CV surgery  for aneurysm repair    Date: 2022  Time: 11:13 AM       Patient Active Problem List   Diagnosis    Gastroesophageal reflux disease    Hyperlipidemia    Hypertension    Hypothyroidism    Obstructive sleep apnea syndrome    Vitamin D deficiency    Prediabetes    Osteopenia    Osteoarthritis    Insomnia    S/P ascending aortic aneurysm repair    History of CVA (cerebrovascular accident) without residual deficits    SOB (shortness of breath)    Primary osteoarthritis of right knee    Statin intolerance     Past Surgical History:   Procedure Laterality Date    ANGIOGRAM, CORONARY, WITH LEFT HEART CATHETERIZATION N/A 2022    Procedure: Angiogram, Coronary, with Left Heart Cath;  Surgeon: Harry Jj MD;  Location: [facility] CATH LAB;  Service: Cardiology;  Laterality: N/A;    ARTHROSCOPY OF KNEE      COLONOSCOPY  10/14/2021    Dr. Cho Person    HYSTERECTOMY      REPAIR OF ASCENDING AORTA N/A 2022    Procedure: REPAIR, AORTA, ASCENDING;  Surgeon: Alec Vega IV, MD;  Location: [facility] OR;  Service: Cardiovascular;  Laterality: N/A;  ECHO NOTIFIED    ROBOTIC ARTHROPLASTY, KNEE Right 3/27/2024    Procedure: ROBOTIC ARTHROPLASTY, KNEE, TOTAL;  Surgeon: Yazan Barragan MD;  [facility];  Service: Orthopedics;  Laterality: Right;     Social History     Socioeconomic History    Marital status:    Tobacco Use    Smoking status: Former     Current packs/day: 0.00     Average packs/day: 2.3 packs/day for 81.0 years (183.0 ttl pk-yrs)     Types: Cigarettes, Cigars     Start date: 1969     Quit date: 2012     Years since quittin.5    Smokeless tobacco: Never    Tobacco comments:     Quit 12 years ago   Substance and Sexual Activity    Alcohol use: Not Currently    Drug use: Never    Sexual activity: Not Currently     Partners: Female     Social Drivers of Health     Financial Resource Strain: Medium Risk (3/5/2025)    Overall Financial Resource Strain (CARDIA)     Difficulty of  "Paying Living Expenses: Somewhat hard   Food Insecurity: No Food Insecurity (3/5/2025)    Hunger Vital Sign     Worried About Running Out of Food in the Last Year: Never true     Ran Out of Food in the Last Year: Never true   Transportation Needs: No Transportation Needs (3/5/2025)    PRAPARE - Transportation     Lack of Transportation (Medical): No     Lack of Transportation (Non-Medical): No   Physical Activity: Inactive (3/5/2025)    Exercise Vital Sign     Days of Exercise per Week: 0 days     Minutes of Exercise per Session: 0 min   Stress: No Stress Concern Present (3/5/2025)    [employer] - Occupational Stress Questionnaire     Feeling of Stress : Only a little   Housing Stability: Low Risk  (3/5/2025)    Housing Stability Vital Sign     Unable to Pay for Housing in the Last Year: No     Homeless in the Last Year: No        Family History   Problem Relation Name Age of Onset    Heart disease Mother Beth Campbell     Uterine cancer Mother Beth Campbell     Lung cancer Father      Seizures Sister Kayla villatoro     Heart disease Sister Kayla villatoro      Review of patient's allergies indicates:   Allergen Reactions    Tramadol Hives     Other reaction(s): sweating    Meperidine Rash     Other reaction(s): unknown         ROS:  Review of Systems   Constitutional:  Negative for malaise/fatigue.   HENT: Negative.     Eyes: Negative.    Respiratory:  Positive for shortness of breath.    Cardiovascular:  Negative for chest pain, palpitations, orthopnea, claudication, leg swelling and PND.        "Heaviness"  "Bloating in chest"  Bendopnea   Gastrointestinal: Negative.    Genitourinary: Negative.    Musculoskeletal:  Positive for joint pain and myalgias.   Skin: Negative.    Neurological:  Positive for weakness. Negative for tingling.   Endo/Heme/Allergies: Negative.    Psychiatric/Behavioral: Negative.     All other systems reviewed and are negative.                                 "                                                                                                                                               Negative except as stated in the history of present illness. See HPI for details.    PHYSICAL EXAM:  There were no vitals taken for this visit.          Physical Exam  Vitals reviewed.   Constitutional:       Appearance: Normal appearance. She is obese. She is not ill-appearing.   HENT:      Head: Normocephalic.      Nose: Nose normal.      Mouth/Throat:      Mouth: Mucous membranes are moist.   Eyes:      Extraocular Movements: Extraocular movements intact.   Neck:      Vascular: No carotid bruit.   Cardiovascular:      Rate and Rhythm: Normal rate and regular rhythm.      Pulses: Normal pulses.      Heart sounds: Normal heart sounds.   Pulmonary:      Effort: Pulmonary effort is normal.   Abdominal:      General: There is no distension.   Musculoskeletal:         General: Normal range of motion.      Cervical back: Normal range of motion.      Right lower leg: No edema (Mild).      Left lower leg: No edema (Mild).   Skin:     General: Skin is warm.   Neurological:      General: No focal deficit present.      Mental Status: She is alert.   Psychiatric:         Mood and Affect: Mood normal.         Current Outpatient Medications   Medication Instructions    albuterol (VENTOLIN HFA) 90 mcg/actuation inhaler 1-2 puffs, Inhalation, Every 6 hours PRN, Rescue    albuterol-ipratropium (DUO-NEB) 2.5 mg-0.5 mg/3 mL nebulizer solution 3 mLs, Nebulization, Every 4 hours PRN    amLODIPine (NORVASC) 10 mg, Oral, Daily    aspirin (ECOTRIN) 81 mg, Oral, Daily, Increase to twice a day for 6 weeks post-op for blood clot prevention.    diclofenac sodium (VOLTAREN) 1 g, Topical (Top), 3 times daily PRN    DULoxetine (CYMBALTA) 60 mg, Oral, Daily    furosemide (LASIX) 20 mg, Oral, Daily PRN    gabapentin (NEURONTIN) 100 mg, Oral, 3 times daily    hydrOXYzine HCL (ATARAX) 25 mg, Oral, 3 times  "daily PRN    levothyroxine (SYNTHROID) 75 mcg, Oral, Before breakfast    multivit-min/iron/folic/lutein (CENTRUM SILVER WOMEN ORAL) 1 tablet, Daily    nitroGLYCERIN (NITROSTAT) 0.4 mg, Sublingual, Every 5 min PRN    pantoprazole (PROTONIX) 40 mg, Oral, Daily    vitamin D (VITAMIN D3) 1,000 Units, Daily        All medications, laboratory studies, cardiac diagnostic imaging reviewed.     Lab Results   Component Value Date    LDL 78.00 06/23/2025    LDL 98.00 11/14/2024    TRIG 173 (H) 06/23/2025    TRIG 105 11/14/2024    CREATININE 0.94 06/23/2025    MG 2.10 08/22/2022    K 4.5 06/23/2025        ASSESSMENT/PLAN:  TORRES and SOB  Non Obstructive CAD   - Worsening SOB and bendopnea from last visit  - Has occasional "tightness and bloating" in chest   - Denies any chest pain   - LHC from 2022 was reviewed today with staff interventional cardiologist-Given very mild luminal CAD from that time, unlikely that CAD is causing her symptoms   - Will recommend PFTs to be completed by primary care   - Continue MAPT   - Will have patient complete echocardiogram to assess for any underlying heart failure, valvular disease, or structural disease that may be contributing to her current symptoms     Ascending Aortic Aneurysm s/p Repair  - Performed on 7/29/2022  - Continues to have some neuropathic pain at incision site that she states is resolved with gabapentin   - CTA Chest completed following last visit revealed 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 - so no need for CV surgery appt at this time, will continue to monitor clinically   - LHC from 2022 was reviewed today with staff interventional cardiologist-Given very mild luminal CAD from that time, unlikely that CAD is causing her symptoms   - BP at goal today   - Has some "heaviness and bloating" on occasion     HTN  - Blood pressure at goal today - 120/68  - Continue Norvasc 10 mg daily  - Self discontinued Lisinopril 2/2 dizziness and " weakness  - Counseled on low sodium, heart healthy diet and exercise as tolerated     HLD  - Patient reports that she is unable to tolerate statins due to severe skin reaction   - Was trialed on Zetia per PCP and ultimately discontinued due to dizziness   - Continue Leqvio for now- is tolerating very well and benefiting from use  - LDL 78 per labs June 2025- this is much improved   - Counseled on importance of low cholesterol, heart healthy diet, and exercise as tolerated     History of CVA  - No new neuro symptoms- still with some residual muscle tightness/rigidity from a stroke and some BLE weakness, otherwise denies any further stroke symptoms or recrudescence   - Some right sided weakness  - Continue MAPT and Zetia  - US Carotid with less than 50% stenosis in R ICA and L ICA     Post Op AF  - No recent issues or events - appears to be in regular rate with regular rhythm today   - Asymptomatic, denies tachycardia, palpitations, lightheadedness, dizziness  - Initially placed on Amiodarone but since discontinued.   - Had CROW ligation but still potential risk for cardioembolic CVA.   - CHADSVASc 5  - Xarelto discontinued after 3 months   - Currently not on anticoagulation  - Appeared to be in regular rate with regular rhythm on auscultation today     Post Op DVT  - Pt told she only needed to  be on Xarelto for 3 months   - Is no longer on anticoagulation   - no further DVT    Venous Insufficiency  - No change in symptoms from last office visit   - She has tortuous varicose veins noted in both bilateral lower extremities.  She is not interested in seeing vascular right now  - Venous reflux noted in the right and left greater saphenous veins on most recent bilateral lower extremity venous insufficiency ultrasounds  - There was also noted to be a age indeterminate occlusive superficial vein thrombus at the level of the upper and mid calf in the left GSV  - Recent arterial US without evidence of arterial flow reduction    - Previously followed closely with vascular surgery but has not seen them in several months - encouraged her to follow up     Obesity  - Counseled on the importance of weight loss  - Recommend weight loss through healthy diet and exercise as tolerated  - States that she will discuss with PCP, is interested in assistance with weight loss       Echocardiogram   Follow up in cardiology clinic in 4 months or sooner if needed   Follow up with PCP as directed   Please notify clinic if any new concerns or any change in symptoms

## 2025-07-22 ENCOUNTER — OFFICE VISIT (OUTPATIENT)
Dept: CARDIOLOGY | Facility: CLINIC | Age: 70
End: 2025-07-22
Payer: MEDICARE

## 2025-07-22 VITALS
HEART RATE: 60 BPM | TEMPERATURE: 99 F | SYSTOLIC BLOOD PRESSURE: 120 MMHG | WEIGHT: 204 LBS | DIASTOLIC BLOOD PRESSURE: 68 MMHG | RESPIRATION RATE: 18 BRPM | OXYGEN SATURATION: 96 % | BODY MASS INDEX: 32.02 KG/M2 | HEIGHT: 67 IN

## 2025-07-22 DIAGNOSIS — R42 DIZZINESS: ICD-10-CM

## 2025-07-22 DIAGNOSIS — G47.33 OBSTRUCTIVE SLEEP APNEA SYNDROME: ICD-10-CM

## 2025-07-22 DIAGNOSIS — I10 PRIMARY HYPERTENSION: ICD-10-CM

## 2025-07-22 DIAGNOSIS — E78.2 MIXED HYPERLIPIDEMIA: ICD-10-CM

## 2025-07-22 DIAGNOSIS — R06.02 SOB (SHORTNESS OF BREATH): ICD-10-CM

## 2025-07-22 DIAGNOSIS — R06.00: ICD-10-CM

## 2025-07-22 DIAGNOSIS — Z86.79 S/P ASCENDING AORTIC ANEURYSM REPAIR: Primary | ICD-10-CM

## 2025-07-22 DIAGNOSIS — Z98.890 S/P ASCENDING AORTIC ANEURYSM REPAIR: Primary | ICD-10-CM

## 2025-07-22 PROCEDURE — 3044F HG A1C LEVEL LT 7.0%: CPT | Mod: CPTII,,,

## 2025-07-22 PROCEDURE — 99214 OFFICE O/P EST MOD 30 MIN: CPT | Mod: S$PBB,,,

## 2025-07-22 PROCEDURE — 3074F SYST BP LT 130 MM HG: CPT | Mod: CPTII,,,

## 2025-07-22 PROCEDURE — 99215 OFFICE O/P EST HI 40 MIN: CPT | Mod: PBBFAC

## 2025-07-22 PROCEDURE — 1159F MED LIST DOCD IN RCRD: CPT | Mod: CPTII,,,

## 2025-07-22 PROCEDURE — 1126F AMNT PAIN NOTED NONE PRSNT: CPT | Mod: CPTII,,,

## 2025-07-22 PROCEDURE — 3078F DIAST BP <80 MM HG: CPT | Mod: CPTII,,,

## 2025-07-22 PROCEDURE — 3008F BODY MASS INDEX DOCD: CPT | Mod: CPTII,,,

## 2025-07-22 PROCEDURE — 1101F PT FALLS ASSESS-DOCD LE1/YR: CPT | Mod: CPTII,,,

## 2025-07-22 PROCEDURE — 3288F FALL RISK ASSESSMENT DOCD: CPT | Mod: CPTII,,,

## 2025-07-22 PROCEDURE — 1160F RVW MEDS BY RX/DR IN RCRD: CPT | Mod: CPTII,,,

## 2025-07-22 NOTE — PATIENT INSTRUCTIONS
Echocardiogram   Follow up in cardiology clinic in 4 months or sooner if needed   Follow up with PCP as directed   Please notify clinic if any new concerns or any change in symptoms

## 2025-08-06 DIAGNOSIS — R06.02 SOB (SHORTNESS OF BREATH): ICD-10-CM

## 2025-08-06 RX ORDER — FUROSEMIDE 20 MG/1
20 TABLET ORAL DAILY PRN
Qty: 30 TABLET | Refills: 3 | Status: SHIPPED | OUTPATIENT
Start: 2025-08-06 | End: 2026-08-06

## (undated) DEVICE — Device

## (undated) DEVICE — INSERT INTRACK CLAMP ULT 66MM

## (undated) DEVICE — KIT SURGICAL TURNOVER

## (undated) DEVICE — SUT ETHBND XTRA 1 OS-8 30IN

## (undated) DEVICE — DEVICE STRATAFIX SYMMETRIC +

## (undated) DEVICE — GLOVE SENSICARE PI GRN 7

## (undated) DEVICE — GLOVE SENSICARE PI GRN 8.5

## (undated) DEVICE — APPLICATOR CHLORAPREP ORN 26ML

## (undated) DEVICE — DRAPE FULL SHEET 70X100IN

## (undated) DEVICE — TAPE SILK 3IN

## (undated) DEVICE — BAG MEDI-PLAST DECANTER C-FLOW

## (undated) DEVICE — CUSHION  WC FOAM 20X20X.75IN

## (undated) DEVICE — KIT DRAPE RIO ONE PIECE W/POCK

## (undated) DEVICE — GLOVE SENSICARE PI MICRO 6.5

## (undated) DEVICE — SOL .9NACL PF 100 ML

## (undated) DEVICE — CARTRIDGE HEPARIN 2 CHNNL ACT

## (undated) DEVICE — KIT TRIATHLON CR TIB PREP SZ5

## (undated) DEVICE — BLADE MAKO NARROW

## (undated) DEVICE — KIT VIZADISC KNEE TRACKING

## (undated) DEVICE — DRESSING ADHESIVE ISLAND 3 X 6

## (undated) DEVICE — DRAIN CHEST DRY SUCTION

## (undated) DEVICE — SPONGE COTTON TRAY 4X4IN

## (undated) DEVICE — CATH MPA2 INFINITI SH 5X100CM

## (undated) DEVICE — SOL NORMAL USPCA 0.9%

## (undated) DEVICE — DEVICE MYNX CONTROL 5FR 10ML

## (undated) DEVICE — COVER TABLE HVY DTY 60X90IN

## (undated) DEVICE — SUT MONO 2-0 CT-1 VIL

## (undated) DEVICE — DRSNG POLYSKIN TRNSPAR 4X4.75

## (undated) DEVICE — GLOVE PROTEXIS HYDROGEL SZ6.5

## (undated) DEVICE — ELECTRODE PATIENT RETURN DISP

## (undated) DEVICE — WIRE INTRAMYOCARDIAL TEMP

## (undated) DEVICE — KIT CATHGARD DBL LUMN 9FRX11.5

## (undated) DEVICE — DRAPE STERI U-SHAPED 47X51IN

## (undated) DEVICE — KIT VAVD

## (undated) DEVICE — KIT C.A.T.S. FAST START 4/CASE

## (undated) DEVICE — WRAP DEMAYO LEG STERILE

## (undated) DEVICE — GLOVE PROTEXIS LTX MICRO  7

## (undated) DEVICE — PADDING WYTEX UNDRCST 6INX4YD

## (undated) DEVICE — INTRODUCER CATH 5F 11CM

## (undated) DEVICE — STOPCOCK 4-WAY

## (undated) DEVICE — DRESSING XEROFORM NONADH 1X8IN

## (undated) DEVICE — GLOVE PROTEXIS LTX MICRO 8

## (undated) DEVICE — SUT MONOCRYL PLUS UD 3-0 27

## (undated) DEVICE — GLOVE SENSICARE PI ORTHO LT 8

## (undated) DEVICE — GUIDEWIRE SAFE T .035IN 180CM

## (undated) DEVICE — INTRO KIT MICPUNC STIFF CANN

## (undated) DEVICE — SENSOR LOW LEVEL OXYGEN

## (undated) DEVICE — GLOVE PROTEXIS BLUE LATEX 6.5

## (undated) DEVICE — CORD SILICONE RETRACTOR

## (undated) DEVICE — DRAPE MEDIUM SHEET 40X70IN

## (undated) DEVICE — GLOVE PROTEXIS LTX MICRO  7.5

## (undated) DEVICE — DRAIN JACKSON PRATT TRCR 19FR

## (undated) DEVICE — APPLICATOR SURG 2 SPRY 1 PLUNG

## (undated) DEVICE — SOL POVIDONE IODINE PCH 3/4OZ

## (undated) DEVICE — NDL BLUNT FILL 18G 1IN

## (undated) DEVICE — KIT TRIATHLON CR FEM PREP SZ4

## (undated) DEVICE — TRAY CATH FOL SIL TEMP 10 16FR

## (undated) DEVICE — SEE MEDLINE ITEM 159606

## (undated) DEVICE — DRESSING TELFA + RECT 6X10IN

## (undated) DEVICE — PAD ABDOMINAL STERILE 8X10IN

## (undated) DEVICE — SOL ELECTROLYTE PH 7.4 500ML

## (undated) DEVICE — STAPLER INT LINEAR ARTC 3.5-45

## (undated) DEVICE — GLOVE SIGNATURE ESSNTL LTX 8.5

## (undated) DEVICE — SOL NACL IRR 1000ML BTL

## (undated) DEVICE — SUT PROLENE BL 4-0 RB-1 36IN

## (undated) DEVICE — SOL PLASMALYTE PH 7.4 1000ML

## (undated) DEVICE — SUT VICRYL BR 1 GEN 27 CT-1

## (undated) DEVICE — KIT NEXTGEN CANN FEM ART 17FR

## (undated) DEVICE — BLADE SAG DUAL CUT 18X90X1.35

## (undated) DEVICE — SUT 2 30IN SILK BLK BRAIDE

## (undated) DEVICE — RELOAD ECHELON ENDOPATH 45MM

## (undated) DEVICE — DRAPE SLUSH WARMER WITH DISC

## (undated) DEVICE — SUT MONOCRYL 3-0 PS-2 UND

## (undated) DEVICE — CONTRAST ISOVUE 370 100ML

## (undated) DEVICE — CARTRIDGE SILV 4CHAN 2.0-3.5MG

## (undated) DEVICE — CANNULA NON-VENT 24FR 14.5IN

## (undated) DEVICE — CANNULA MC2 NVENT .5IN 28/36FR

## (undated) DEVICE — PIN BONE 3.2X110MM
Type: IMPLANTABLE DEVICE | Site: KNEE | Status: NON-FUNCTIONAL
Removed: 2024-03-27

## (undated) DEVICE — CARTRIDGE HEPARIN DOSE

## (undated) DEVICE — GOWN POLY REINF X-LONG 2XL

## (undated) DEVICE — CUFF ATS 2 PORT SNGL BLDR 34IN

## (undated) DEVICE — KIT CHECKPOINT MAKO

## (undated) DEVICE — SUT VICRYL 1 OB 36 CTX